# Patient Record
Sex: MALE | Race: WHITE | Employment: OTHER | URBAN - METROPOLITAN AREA
[De-identification: names, ages, dates, MRNs, and addresses within clinical notes are randomized per-mention and may not be internally consistent; named-entity substitution may affect disease eponyms.]

---

## 2018-10-15 ENCOUNTER — HOSPITAL ENCOUNTER (OUTPATIENT)
Age: 63
Setting detail: SPECIMEN
Discharge: HOME | End: 2018-10-15
Payer: MEDICARE

## 2018-10-15 DIAGNOSIS — Z12.5 SCREENING PSA (PROSTATE SPECIFIC ANTIGEN): ICD-10-CM

## 2018-10-15 DIAGNOSIS — I10 HYPERTENSION, UNSPECIFIED TYPE: ICD-10-CM

## 2018-10-15 DIAGNOSIS — E11.9 TYPE 2 DIABETES MELLITUS WITHOUT COMPLICATION, WITHOUT LONG-TERM CURRENT USE OF INSULIN (HCC): ICD-10-CM

## 2018-10-15 LAB
ABSOLUTE EOS #: 0.28 K/UL (ref 0–0.44)
ABSOLUTE IMMATURE GRANULOCYTE: <0.03 K/UL (ref 0–0.3)
ABSOLUTE LYMPH #: 1.28 K/UL (ref 1.1–3.7)
ABSOLUTE MONO #: 0.55 K/UL (ref 0.1–1.2)
ALBUMIN SERPL-MCNC: 4.7 G/DL (ref 3.5–5.2)
ALBUMIN/GLOBULIN RATIO: 1.7 (ref 1–2.5)
ALP BLD-CCNC: 86 U/L (ref 40–129)
ALT SERPL-CCNC: 18 U/L (ref 5–41)
ANION GAP SERPL CALCULATED.3IONS-SCNC: 18 MMOL/L (ref 9–17)
AST SERPL-CCNC: 14 U/L
BASOPHILS # BLD: 1 % (ref 0–2)
BASOPHILS ABSOLUTE: 0.07 K/UL (ref 0–0.2)
BILIRUB SERPL-MCNC: 0.41 MG/DL (ref 0.3–1.2)
BUN BLDV-MCNC: 27 MG/DL (ref 8–23)
BUN/CREAT BLD: ABNORMAL (ref 9–20)
CALCIUM SERPL-MCNC: 10 MG/DL (ref 8.6–10.4)
CHLORIDE BLD-SCNC: 101 MMOL/L (ref 98–107)
CHOLESTEROL/HDL RATIO: 4.8
CHOLESTEROL: 163 MG/DL
CO2: 23 MMOL/L (ref 20–31)
CREAT SERPL-MCNC: 1.67 MG/DL (ref 0.7–1.2)
CREATININE URINE: 84 MG/DL (ref 39–259)
DIFFERENTIAL TYPE: ABNORMAL
EOSINOPHILS RELATIVE PERCENT: 5 % (ref 1–4)
GFR AFRICAN AMERICAN: 51 ML/MIN
GFR NON-AFRICAN AMERICAN: 42 ML/MIN
GFR SERPL CREATININE-BSD FRML MDRD: ABNORMAL ML/MIN/{1.73_M2}
GFR SERPL CREATININE-BSD FRML MDRD: ABNORMAL ML/MIN/{1.73_M2}
GLUCOSE BLD-MCNC: 222 MG/DL (ref 70–99)
HCT VFR BLD CALC: 44.2 % (ref 40.7–50.3)
HDLC SERPL-MCNC: 34 MG/DL
HEMOGLOBIN: 14.5 G/DL (ref 13–17)
IMMATURE GRANULOCYTES: 0 %
LDL CHOLESTEROL: 97 MG/DL (ref 0–130)
LYMPHOCYTES # BLD: 22 % (ref 24–43)
MCH RBC QN AUTO: 29.7 PG (ref 25.2–33.5)
MCHC RBC AUTO-ENTMCNC: 32.8 G/DL (ref 28.4–34.8)
MCV RBC AUTO: 90.6 FL (ref 82.6–102.9)
MICROALBUMIN/CREAT 24H UR: 27 MG/L
MICROALBUMIN/CREAT UR-RTO: 32 MCG/MG CREAT
MONOCYTES # BLD: 9 % (ref 3–12)
NRBC AUTOMATED: 0 PER 100 WBC
PDW BLD-RTO: 12.1 % (ref 11.8–14.4)
PLATELET # BLD: 245 K/UL (ref 138–453)
PLATELET ESTIMATE: ABNORMAL
PMV BLD AUTO: 11.5 FL (ref 8.1–13.5)
POTASSIUM SERPL-SCNC: 5.3 MMOL/L (ref 3.7–5.3)
PROSTATE SPECIFIC ANTIGEN: 3.74 UG/L
RBC # BLD: 4.88 M/UL (ref 4.21–5.77)
RBC # BLD: ABNORMAL 10*6/UL
SEG NEUTROPHILS: 63 % (ref 36–65)
SEGMENTED NEUTROPHILS ABSOLUTE COUNT: 3.7 K/UL (ref 1.5–8.1)
SODIUM BLD-SCNC: 142 MMOL/L (ref 135–144)
TOTAL PROTEIN: 7.4 G/DL (ref 6.4–8.3)
TRIGL SERPL-MCNC: 161 MG/DL
VLDLC SERPL CALC-MCNC: ABNORMAL MG/DL (ref 1–30)
WBC # BLD: 5.9 K/UL (ref 3.5–11.3)
WBC # BLD: ABNORMAL 10*3/UL

## 2018-10-16 LAB
ESTIMATED AVERAGE GLUCOSE: 212 MG/DL
HBA1C MFR BLD: 9 % (ref 4–6)

## 2018-10-23 PROBLEM — E11.29 MICROALBUMINURIA DUE TO TYPE 2 DIABETES MELLITUS (HCC): Status: ACTIVE | Noted: 2018-10-23

## 2018-10-23 PROBLEM — R80.9 MICROALBUMINURIA DUE TO TYPE 2 DIABETES MELLITUS (HCC): Status: ACTIVE | Noted: 2018-10-23

## 2019-01-31 ENCOUNTER — HOSPITAL ENCOUNTER (OUTPATIENT)
Dept: DIABETES SERVICES | Age: 64
Setting detail: THERAPIES SERIES
Discharge: HOME | End: 2019-01-31
Payer: MEDICARE

## 2019-01-31 VITALS
BODY MASS INDEX: 27.71 KG/M2 | DIASTOLIC BLOOD PRESSURE: 89 MMHG | SYSTOLIC BLOOD PRESSURE: 130 MMHG | HEART RATE: 84 BPM | WEIGHT: 204.59 LBS | HEIGHT: 72 IN

## 2019-01-31 PROCEDURE — G0108 DIAB MANAGE TRN  PER INDIV: HCPCS

## 2019-01-31 RX ORDER — LISINOPRIL 5 MG/1
5 TABLET ORAL DAILY
Status: ON HOLD | COMMUNITY
End: 2020-04-24

## 2019-02-21 ENCOUNTER — HOSPITAL ENCOUNTER (OUTPATIENT)
Dept: DIABETES SERVICES | Age: 64
Setting detail: THERAPIES SERIES
Discharge: HOME | End: 2019-02-21
Payer: MEDICARE

## 2019-02-21 PROCEDURE — G0108 DIAB MANAGE TRN  PER INDIV: HCPCS

## 2019-03-11 ENCOUNTER — HOSPITAL ENCOUNTER (OUTPATIENT)
Dept: DIABETES SERVICES | Age: 64
Setting detail: THERAPIES SERIES
Discharge: HOME | End: 2019-03-11
Payer: MEDICARE

## 2019-03-11 DIAGNOSIS — E11.9 TYPE 2 DIABETES MELLITUS WITHOUT COMPLICATION, UNSPECIFIED WHETHER LONG TERM INSULIN USE (HCC): Primary | ICD-10-CM

## 2019-03-11 PROCEDURE — G0108 DIAB MANAGE TRN  PER INDIV: HCPCS

## 2019-03-14 ENCOUNTER — HOSPITAL ENCOUNTER (OUTPATIENT)
Dept: DIABETES SERVICES | Age: 64
Setting detail: THERAPIES SERIES
Discharge: HOME | End: 2019-03-14
Payer: MEDICARE

## 2019-03-14 PROCEDURE — G0108 DIAB MANAGE TRN  PER INDIV: HCPCS

## 2019-04-02 ENCOUNTER — HOSPITAL ENCOUNTER (OUTPATIENT)
Dept: DIABETES SERVICES | Age: 64
Setting detail: THERAPIES SERIES
Discharge: HOME | End: 2019-04-02
Payer: MEDICARE

## 2019-04-02 DIAGNOSIS — E11.9 TYPE 2 DIABETES MELLITUS WITHOUT COMPLICATION, UNSPECIFIED WHETHER LONG TERM INSULIN USE (HCC): Primary | ICD-10-CM

## 2019-04-02 PROCEDURE — G0108 DIAB MANAGE TRN  PER INDIV: HCPCS

## 2019-04-02 NOTE — PROGRESS NOTES
Diabetes Self- Management Education Program Assessment -   Also see Diabetic Screening  Patient, Bruna Corona,  here for diabetes self-management education  visit/ assessment. Today's visit was in an individual setting. Diet History :  Diet Questionnaire and typical meal /portion sheet completed  [x]Yes  [] NO    Goals setting:  Goal work sheet completed  [x]Yes  [] NO  (SEE  EDUCATION AND GOALS FLOWSHEET)    MEDICAL HISTORY:  Past Medical History:   Diagnosis Date    Contracture, elbow     h/o right elbow fx, compound fracture.  Erectile dysfunction     Hypertension     Osteogenesis imperfecta     DX 6 months. 30 broken bones in past.     Type II or unspecified type diabetes mellitus without mention of complication, not stated as uncontrolled      Family History   Problem Relation Age of Onset    Diabetes Mother     Stroke Mother     Diabetes Father     Heart Disease Father      Patient has no known allergies. There is no immunization history on file for this patient. Current Medications  Current Outpatient Medications   Medication Sig Dispense Refill    lisinopril (PRINIVIL;ZESTRIL) 5 MG tablet Take 5 mg by mouth daily      insulin glargine (LANTUS SOLOSTAR) 100 UNIT/ML injection pen Inject into the skin 2 times daily Taking 30 units in the am and 80 units 7-8p in the evening daily      insulin aspart (FIASP FLEXTOUCH) 100 UNIT/ML injection pen Inject 8 Units into the skin 2 times daily Taking 8 units plus ISS for breakfast and 8 units plus ISS for dinner      pantoprazole (PROTONIX) 40 MG tablet Take 1 tablet by mouth daily 90 tablet 1    ramipril (ALTACE) 5 MG capsule TAKE 1 CAPSULE DAILY 90 capsule 1    Blood Glucose Monitoring Suppl (ACURA BLOOD GLUCOSE METER) w/Device KIT 1 kit by Does not apply route 2 times daily A1C of 9.0. NIDDM. 1 kit 0    traMADol (ULTRAM) 50 MG tablet 50 mg daily.        No current facility-administered medications for this encounter.    : Comments:  Allergies:  No Known Allergies  Diabetes 5  / Health Status    A1C blood level - at goal < 7%   Lab Results   Component Value Date    LABA1C 9.0 (H) 10/15/2018    LABA1C 8.7 04/03/2015    LABA1C 8.5 (H) 10/17/2012     Lab Results   Component Value Date    LABMICR 32 (H) 10/15/2018    LDLCALC 91 04/03/2015    CREATININE 1.67 (H) 10/15/2018       Blood pressure ( 130/ 80)  Or less  BP Readings from Last 3 Encounters:   01/31/19 130/89   10/23/18 98/62   09/25/18 116/84        Cholesterol ( LDL under  100)   Lab Results   Component Value Date    LDLCALC 91 04/03/2015    LDLCHOLESTEROL 97 10/15/2018       4 . Smoking ? []Yes   [x]No    5. Taking an Asprin daily? []Yes   [x]No          Diabetes Self- Management Education Record    Participant Name: Junie Handy  Referring Provider: Azeem Murphy MD   Assessment/Evaluation Ratings:  1=Needs Instruction   4=Demonstrates Understanding/Competency  2=Needs Review   NC=Not Covered    3=Comprehends Key Points  N/A=Not Applicable  Topics/Learning Objectives Pre-session Assess Date:  1/31/19CB Instr. Date Reinforce Date Post- session Eval Comments   Diabetes disease process & Treatment process: Define diabetes & pre-diabetes; Identify own type of diabetes; role of the pancreas; signs/symptoms; diagnostic criteria; prevention & treatment options; contributing factors. 1 2/21/19CB 3/14/19CB  Hx of T2DM for about 13 y but on orals and only checked BG 1x/w until recently starting losing weight and checked BG on friend's meter and it was >500. [place] Ledy Strong family Hx with both mom and Dad. Daughter with him today who said she was told she may be \"insulin resistant\" years ago when she was trying to get pregnant. Also patient has Osteogeneis Imperfecta and passed onto other 2 kids in family ( not Candice Willam)  1/31/19CB   Incorporating nutritional management into lifestyle: Describe effect of type, amount & timing of food on blood glucose;  Describe basic meal planning techniques & current nutrition guideline   1 switched to diet pepsi about 3 16oz bottles/d, eats lunch out and does [place] Man dinners and fries for dinner as wife works in [employer] during the week and does not come home until weekend. Daughter lives close and is willing to help cook meals so Dad can eat healthy1/31/19CB 3/11/19 JW- praised pt progress, offered healthy suggestions for eating out and with tv dinners. Gave list to help with shopping and eating out. Provided carb plan to help with food amts. 4/2/19 JW  What to eat - Food groups, When to eat - timing of meals and snacks, and How much to eat - portions control. 1800 calories/ day   CHO choices/ meal  4,1,4,1,4,1   CHO choices/  day   grams of protein /day   gram of fat /day     Correctly read food labels & demonstrate CHO counting & portion control with personalized meal plan. Identify dining out strategies, & dietary sick day guidelines. 1 3/11/19 JW 4/2/19 JW     Incorporating physical activity into lifestyle:   Verbalize effect of exercise on blood glucose levels; benefits of regular exercise; safety considerations; contraindications; maintenance of activity. 1 2/21/19CB 3/14/19CB  Used to be very active at Y but now goes about 2x/w and uses treadmill. Daughter offered for him to come to the house to use their treadmill daily. Pt also golfs in summer and will walk the course.1/31/19CB  Just came back from cruise and did walking but also had larger portions of food. Plans to start using daughter's treadmill now.2/21/19CB    Has not been getting activity and daughter encouraged him to use treadmill at the house. 3/14/19CB   Using medications safely:  Identify effects of diabetes medicines on blood glucose levels; List diabetes medication taken, action & side effects;    1 3/14/19CB   Used to be on orals in past, but since seeing Dr. Yogi Evans 3 weeks ago he D/C and now just taking insulin. 1/31/19CB   Insulin / Injectable - Appropriate injection describe the relationship of blood glucose levels to long term complications of diabetes. Identify preventative measures & standards of care. 1 3/14/19CB   Has some numbness in toes and was given referral to Podiatrist but waiting until after cruise to set up. Up to date on immunizations. 1/31/19CB  Has not seen Podiatrist but did have checked . 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 1 3/14/19CB   Motivated now to take good care of self. Daughter with him and very supportive and wants to learn as well. Wife ( not mother of kids) has T2DM also. 1/31/19CB  Daughter with him for all sessions and very encouraging. Has been trying to cut back on CHO (fries his favorite) and making better choices. 3/14/19CB   Developing strategies to promote health/change behavior: Identify 7 self-care behaviors; Personal health risk factors; Benefits, challenges & strategies for behavioral change;    1    Has Osteogenesis Imperfecta and also son and younger daughter. Care in prevention of broken bones. 1/31/19CB     Individualized goal selection. My goal , to help me improve my health, I will:   1.try to watch potion size      2.learn to CHO count. Plan  Follow-up Appointments planned in individual setting. Next Appointment in 3 weeks.     Instruction Method: [x]Lecture/Discussion  []Power Point Presentation  [x]Handouts  [x]Return Demonstration      Education Materials/Equipment Provided:    [x]Self-Management - Initial assessment - Enrolment in to ADA  Where do I Begin, Living with Type 2 diabetes ADA home support program and handout for no concentrated sweets and diet meal planning basics, handout on diabetes education classes. 1/31/19CB      [x]Self-Management  Class 1 - Fox Living Well with Diabetes Booklet and Healthy i On the Road to better managing your diabetes map handouts2/21/19CB    [x] Self-Management  Class 2 - Meal Plan and handout for serving sizes, smarter snacking, Ready Set Carb Counting / Plate Method, Nutrient Conversion and International Diabetes [address] Eating for People with Diabetes and Nutrition in the [employer] - fast facts about fast food3/11/19 JW    [x] Self-Management  Class 3 -  Diabetes ID card,  foot care tips sheet, Healthy I  Continuing Your Journey with Diabetes map handout, Individualized Diabetes report card3/14/19CB    [x] Self-Management Class 4 - BD Booklet  Sick Day [place] and  Dinning Out Guide , recipe hand outs and tips, diabetes Cookbooks  ( when available) 4/2/19 JW    []Self-Management - 3 month follow - up  AADE booklet Side by Side a partnership approach to diabetes self- care, [place] Tobacco Quit line, [facility] Diabetes support program information sheet, [facility] [employer] information sheet       []Self-Management  Gestational - RN class -Resource materials provided today include educational self care booklet - \" Gestational Diabetes - A Healthy pregnancy and beyond\"  with SMGB and 3 small meals and 3 small snacks diet plan. SMBG sheets to fax back to MFM weekly.  [facility] guidelines for SMGB and blood glucose log sheets, Never too early to prevent diabetes handout from NDEP      []Self-Management Gestational - RD class - My Food Plan for Gestational diabetes    []Glucose Meter     []Insulin Kit     []Other      Encounter Type Date Start Time End Time Comments No Show Dates   Assessment 1/31/19CB   10:00 11:30   [x]In Person  []Telephone  With daughter, Virl Goltz 1 - Understanding diabetes 2/21/19CB 4:30 5:40  x with daughter, Virl Goltz 2- Nutrition and diabetes   3/11/19 JW 3:00 4:00 X with daughterReji Holiday    Class 3 - Preventing Complications 1/55/63PM 9:84 5:30  x with daughter, Javid Robi 4 -  In depth Nutrition and sick day care 4/2/19 JW 3:00 4:15 X with daughter, Con Robi 5 - 3 month follow up / goal reassessment        Gestational - RN         Gestational - RD        Individual MNT         Shared Med Appt         Yearly Follow-up        Meter Instrx        Insulin Instrx      []Pen  []Vial & Syringe      DSMS Support :   [] MNT      [] Annual update      []  DEEP Workshop - Diabetes Empowerment Education Program   Open to older adults living with diabetes or pre diabetes. [address]  [address] [facility]. [place], [place]  Friday 12:30pm- 3:00pm 1/11/19- 2/15/19  Free -  REGISTRATION IS REQUIRED -  CALL [phone]     [] Starting Fresh / DEEP Workshop - Open to older adults living with diabetes or pre diabetes. [address] [place], [place]    [id number]-7:30pm- on 6 week rotation  Free -  REGISTRATION IS REQUIRED - [facility] [phone]     []  Diabetes Group at  [place] [phone] 6 week diabetes education support   classes - contact : Amanda Carlisle [phone]  [email or web address] / for dates and locations      []Diabetes Workshop- [address]  [address] [employer]. [place], [place]  Tuesdays-1:00pm- 3:30pm  3/19/19- 4/9/19   Free  Registration is required [phone]     []ADA  Where do I Begin, Living with Type 2 diabetes ADA home support program  Web site: diabetes. org/living    Call: 1800 DIABETES  e-mail: [email or web address]     []  Internet web sites - ADAWeb site: diabetes. org/living   D- life     []  [facility] opens at 8 30 am daily for walkers, shops open at 10 am   3rd Tuesday of every month [facility] expert speakers visit from [address] [facility] [place] [facility] [place], [address] [place] [address]   [phone] Daily - 8:30am - 10am   []  Kerri Patiño - juan jsoé registration required [address], [address] [facility] Monday - Wednesday - Friday  11am - 12 :00 pm    []  [address]  Evening Hiking Group   Free - no registration required compareit4me  [address] [place], [address] [facility] [place] Thursday 6pm - 7pm  1/3/19 - 2/28/19     []  Sunday [address]  Evening Hiking Group   Free - no registration required compareit4me  [address], [address] [place] Thursday 7pm- 8pm  3/7/19 - 3/28/19   []  [address]  [address] [facility], [address] [facility] Tuesday and Thursday - 9 :30 am- 10:30am - ongoing   []  [address] [facility]  [address] [facility], [address] [facility] Thursday 11:00am - 11:45am   []  Third Wednesday Cooking  Class  Free  Registration is required  CALL [phone] or   Email [email or web address]. [address].   [address] [place], [place] [id number]-6:99- 6:00 pm  1/16/19     5/15/19    9/18/19  2/20/19     6/19/19    10/16/19  3/20/19     7/17/19     11/20/19  4/17/19     8/21/19     12/18/19         Post Education Referrals:      [] [address] information sheet and [address] [facility] , [address]      [] Dental care - [facility] care of [facility]     [] [facility] link  phone number - for information and referral to [facility]  Clinically  [address], WOUND, WEIGHT MANAGEMENT        []Other  Julieta Maldonado, RD, LD

## 2019-04-29 ENCOUNTER — HOSPITAL ENCOUNTER (OUTPATIENT)
Age: 64
Discharge: HOME | End: 2019-05-01
Payer: MEDICARE

## 2019-04-29 ENCOUNTER — HOSPITAL ENCOUNTER (OUTPATIENT)
Age: 64
Setting detail: SPECIMEN
Discharge: HOME | End: 2019-04-29
Payer: MEDICARE

## 2019-04-29 ENCOUNTER — HOSPITAL ENCOUNTER (OUTPATIENT)
Dept: GENERAL RADIOLOGY | Age: 64
Discharge: HOME | End: 2019-05-01
Payer: MEDICARE

## 2019-04-29 DIAGNOSIS — R05.9 COUGH: ICD-10-CM

## 2019-04-29 LAB
ANION GAP SERPL CALCULATED.3IONS-SCNC: 12 MMOL/L (ref 9–17)
BUN BLDV-MCNC: 21 MG/DL (ref 8–23)
BUN/CREAT BLD: ABNORMAL (ref 9–20)
CALCIUM SERPL-MCNC: 9.2 MG/DL (ref 8.6–10.4)
CHLORIDE BLD-SCNC: 104 MMOL/L (ref 98–107)
CO2: 23 MMOL/L (ref 20–31)
CREAT SERPL-MCNC: 1.7 MG/DL (ref 0.7–1.2)
GFR AFRICAN AMERICAN: 50 ML/MIN
GFR NON-AFRICAN AMERICAN: 41 ML/MIN
GFR SERPL CREATININE-BSD FRML MDRD: ABNORMAL ML/MIN/{1.73_M2}
GFR SERPL CREATININE-BSD FRML MDRD: ABNORMAL ML/MIN/{1.73_M2}
GLUCOSE BLD-MCNC: 277 MG/DL (ref 70–99)
POTASSIUM SERPL-SCNC: 5 MMOL/L (ref 3.7–5.3)
SODIUM BLD-SCNC: 139 MMOL/L (ref 135–144)

## 2019-04-29 PROCEDURE — 71046 X-RAY EXAM CHEST 2 VIEWS: CPT

## 2019-07-02 ENCOUNTER — TELEPHONE (OUTPATIENT)
Dept: GASTROENTEROLOGY | Age: 64
End: 2019-07-02

## 2019-07-29 ENCOUNTER — HOSPITAL ENCOUNTER (OUTPATIENT)
Dept: DIABETES SERVICES | Age: 64
Setting detail: THERAPIES SERIES
Discharge: HOME | End: 2019-07-29
Payer: MEDICARE

## 2020-01-08 ENCOUNTER — OUTSIDE SERVICES (OUTPATIENT)
Dept: PRIMARY CARE CLINIC | Age: 65
End: 2020-01-08

## 2020-01-15 ENCOUNTER — OUTSIDE SERVICES (OUTPATIENT)
Dept: PRIMARY CARE CLINIC | Age: 65
End: 2020-01-15

## 2020-01-15 PROBLEM — N18.30 STAGE 3 CHRONIC KIDNEY DISEASE (HCC): Status: ACTIVE | Noted: 2018-11-16

## 2020-01-15 PROBLEM — S82.92XA: Status: ACTIVE | Noted: 2020-01-15

## 2020-01-15 PROBLEM — S82.91XA: Status: ACTIVE | Noted: 2020-01-15

## 2020-01-16 NOTE — PROGRESS NOTES
Gina Peña is a 59 y.o. male being seen for his weekly follow up. Location of visit: [facility]    HPI:  Admission History: bilateral lower ext fractures- s/p surgery      Last DRG review:    New today:  Pt states pain comes and goes. Worse with walking. Would like to try to get off the oxy though. Wonders if he could try tramadol more in the afternoon. Maybe take the oxy when he first wakes up to go to therapy and then tramadol later through the day. Review of Systems   General Constitutional: Denies fever or chills. Ortho:  Denies joint pain or swelling. Respiratory: Denies cough SOB or wheezing. Cardiovascular: Denies chest pain. .  Gastrointestinal: Denies abdominal pain, nausea, vomiting, diarrhea, constipation. Genitourinary: Denies painful urination. Skin:  Denies rash or itching. Neurologic: No falls recently. Psychiatric: Denies sleep disturbance. Denies anxiety or depression symptoms. Physical Exam   General Appearance: in no acute distress, well nourished. Eyes: pupils equal.  Neck/Thyroid:  no cervical lymphadenopathy, no thyromegaly  Skin: warm and dry, incisions healing well. Heart: regular rate and rhythm. No murmurs. S1, S2 normal, no gallops. Lungs: clear to auscultation bilaterally. Abdomen:soft, nontender, nondistended, no masses or organomegaly. Extremities: no cyanosis or edema. Neurologic: verbally responsive, moves extremities. Psych: normal affect. ASSESSMENT:   Diagnosis Orders   1. Type 2 diabetes mellitus without complication, unspecified whether long term insulin use ([facility] [address]     2. Hypertension, unspecified type     3. Traumatic bilateral lower extremity fractures         PLAN:  Continue therapy. Will use tramadol and oxy as needed.

## 2020-01-21 ENCOUNTER — OUTSIDE SERVICES (OUTPATIENT)
Dept: PRIMARY CARE CLINIC | Age: 65
End: 2020-01-21

## 2020-01-22 NOTE — PROGRESS NOTES
Roseann Mccracken is a 59 y.o. male being seen for his weekly follow up. Location of visit: [facility]    HPI:  Admission History:      Last DRG review:    New today: Pt states Tramadol was not at all effective for his pain. He is taking Oxy but just working on decreasing it. Is supposed to go home tomorrow. Review of Systems   General Constitutional: Denies fever or chills. Ortho:  Denies joint pain or swelling. Respiratory: Denies cough SOB or wheezing. Cardiovascular: Denies chest pain. .  Gastrointestinal: Denies abdominal pain, nausea, vomiting, diarrhea, constipation. Genitourinary: Denies painful urination. Skin:  Denies rash or itching. Neurologic: No falls recently. Psychiatric: Denies sleep disturbance. Denies anxiety or depression symptoms. Physical Exam   General Appearance: in no acute distress, well nourished. Eyes: pupils equal.  Neck/Thyroid:  no cervical lymphadenopathy, no thyromegaly  Skin: warm and dry, incisions healing but slightly red around edges- looks more like just some healing than any infection  Heart: regular rate and rhythm. No murmurs. S1, S2 normal, no gallops. Lungs: clear to auscultation bilaterally. Abdomen:soft, nontender, nondistended, no masses or organomegaly. Extremities: no cyanosis or edema. Neurologic: verbally responsive, moves extremities. Psych: normal affect. ASSESSMENT:   Diagnosis Orders   1. Traumatic bilateral lower extremity fractures     2. Type 2 diabetes mellitus without complication, unspecified whether long term insulin use ([facility])         PLAN:  Discharge to home with home medication including the card of pain meds that we have here. He is to f/u with his PCP- Altagracia Anderson after discharge.

## 2020-01-30 ENCOUNTER — HOSPITAL ENCOUNTER (OUTPATIENT)
Age: 65
Setting detail: SPECIMEN
Discharge: HOME | End: 2020-01-30
Payer: MEDICARE

## 2020-01-30 LAB
ABSOLUTE EOS #: 0.35 K/UL (ref 0–0.44)
ABSOLUTE IMMATURE GRANULOCYTE: <0.03 K/UL (ref 0–0.3)
ABSOLUTE LYMPH #: 0.82 K/UL (ref 1.1–3.7)
ABSOLUTE MONO #: 0.48 K/UL (ref 0.1–1.2)
ANION GAP SERPL CALCULATED.3IONS-SCNC: 14 MMOL/L (ref 9–17)
BASOPHILS # BLD: 1 % (ref 0–2)
BASOPHILS ABSOLUTE: 0.06 K/UL (ref 0–0.2)
BUN BLDV-MCNC: 23 MG/DL (ref 8–23)
BUN/CREAT BLD: ABNORMAL (ref 9–20)
CALCIUM SERPL-MCNC: 10.3 MG/DL (ref 8.6–10.4)
CHLORIDE BLD-SCNC: 99 MMOL/L (ref 98–107)
CO2: 24 MMOL/L (ref 20–31)
CREAT SERPL-MCNC: 1.62 MG/DL (ref 0.7–1.2)
DIFFERENTIAL TYPE: ABNORMAL
EOSINOPHILS RELATIVE PERCENT: 5 % (ref 1–4)
GFR AFRICAN AMERICAN: 52 ML/MIN
GFR NON-AFRICAN AMERICAN: 43 ML/MIN
GFR SERPL CREATININE-BSD FRML MDRD: ABNORMAL ML/MIN/{1.73_M2}
GFR SERPL CREATININE-BSD FRML MDRD: ABNORMAL ML/MIN/{1.73_M2}
GLUCOSE BLD-MCNC: 248 MG/DL (ref 70–99)
HCT VFR BLD CALC: 44.6 % (ref 40.7–50.3)
HEMOGLOBIN: 14 G/DL (ref 13–17)
IMMATURE GRANULOCYTES: 0 %
LYMPHOCYTES # BLD: 11 % (ref 24–43)
MCH RBC QN AUTO: 29.6 PG (ref 25.2–33.5)
MCHC RBC AUTO-ENTMCNC: 31.4 G/DL (ref 28.4–34.8)
MCV RBC AUTO: 94.3 FL (ref 82.6–102.9)
MONOCYTES # BLD: 6 % (ref 3–12)
NRBC AUTOMATED: 0 PER 100 WBC
PDW BLD-RTO: 13.8 % (ref 11.8–14.4)
PLATELET # BLD: ABNORMAL K/UL (ref 138–453)
PLATELET ESTIMATE: ABNORMAL
PLATELET, FLUORESCENCE: 185 K/UL (ref 138–453)
PLATELET, IMMATURE FRACTION: 4.2 % (ref 1.1–10.3)
PMV BLD AUTO: ABNORMAL FL (ref 8.1–13.5)
POTASSIUM SERPL-SCNC: 4.8 MMOL/L (ref 3.7–5.3)
RBC # BLD: 4.73 M/UL (ref 4.21–5.77)
RBC # BLD: ABNORMAL 10*6/UL
SEG NEUTROPHILS: 77 % (ref 36–65)
SEGMENTED NEUTROPHILS ABSOLUTE COUNT: 5.91 K/UL (ref 1.5–8.1)
SODIUM BLD-SCNC: 137 MMOL/L (ref 135–144)
WBC # BLD: 7.6 K/UL (ref 3.5–11.3)
WBC # BLD: ABNORMAL 10*3/UL

## 2020-04-23 ENCOUNTER — APPOINTMENT (OUTPATIENT)
Dept: GENERAL RADIOLOGY | Age: 65
DRG: 481 | End: 2020-04-23
Payer: MEDICARE

## 2020-04-23 ENCOUNTER — ANESTHESIA (OUTPATIENT)
Dept: OPERATING ROOM | Age: 65
DRG: 481 | End: 2020-04-23
Payer: MEDICARE

## 2020-04-23 ENCOUNTER — ANESTHESIA EVENT (OUTPATIENT)
Dept: OPERATING ROOM | Age: 65
DRG: 481 | End: 2020-04-23
Payer: MEDICARE

## 2020-04-23 ENCOUNTER — HOSPITAL ENCOUNTER (INPATIENT)
Age: 65
LOS: 2 days | Discharge: NURSING, REHAB OR LONG-TERM CARE | DRG: 481 | End: 2020-04-27
Attending: EMERGENCY MEDICINE | Admitting: ORTHOPAEDIC SURGERY
Payer: MEDICARE

## 2020-04-23 VITALS — SYSTOLIC BLOOD PRESSURE: 154 MMHG | OXYGEN SATURATION: 100 % | TEMPERATURE: 96.6 F | DIASTOLIC BLOOD PRESSURE: 72 MMHG

## 2020-04-23 PROBLEM — S72.001A CLOSED RIGHT HIP FRACTURE, INITIAL ENCOUNTER (HCC): Status: ACTIVE | Noted: 2020-04-23

## 2020-04-23 PROBLEM — Q78.0 OSTEOGENESIS IMPERFECTA: Status: ACTIVE | Noted: 2019-12-05

## 2020-04-23 PROBLEM — E11.65 TYPE 2 DIABETES MELLITUS WITH HYPERGLYCEMIA (HCC): Status: ACTIVE | Noted: 2019-12-20

## 2020-04-23 PROBLEM — E11.22 TYPE 2 DIABETES MELLITUS WITH CHRONIC KIDNEY DISEASE (HCC): Status: ACTIVE | Noted: 2018-11-16

## 2020-04-23 LAB
ABSOLUTE EOS #: 0.3 K/UL (ref 0–0.4)
ABSOLUTE IMMATURE GRANULOCYTE: ABNORMAL K/UL (ref 0–0.3)
ABSOLUTE LYMPH #: 1 K/UL (ref 1–4.8)
ABSOLUTE MONO #: 0.5 K/UL (ref 0.1–1.3)
ANION GAP SERPL CALCULATED.3IONS-SCNC: 14 MMOL/L (ref 9–17)
BASOPHILS # BLD: 1 % (ref 0–2)
BASOPHILS ABSOLUTE: 0.1 K/UL (ref 0–0.2)
BUN BLDV-MCNC: 29 MG/DL (ref 8–23)
BUN/CREAT BLD: ABNORMAL (ref 9–20)
CALCIUM SERPL-MCNC: 9.8 MG/DL (ref 8.6–10.4)
CHLORIDE BLD-SCNC: 101 MMOL/L (ref 98–107)
CO2: 18 MMOL/L (ref 20–31)
CREAT SERPL-MCNC: 1.68 MG/DL (ref 0.7–1.2)
DIFFERENTIAL TYPE: ABNORMAL
EKG ATRIAL RATE: 75 BPM
EKG P AXIS: 62 DEGREES
EKG P-R INTERVAL: 208 MS
EKG Q-T INTERVAL: 380 MS
EKG QRS DURATION: 98 MS
EKG QTC CALCULATION (BAZETT): 424 MS
EKG R AXIS: -48 DEGREES
EKG T AXIS: 30 DEGREES
EKG VENTRICULAR RATE: 75 BPM
EOSINOPHILS RELATIVE PERCENT: 4 % (ref 0–4)
GFR AFRICAN AMERICAN: 50 ML/MIN
GFR NON-AFRICAN AMERICAN: 41 ML/MIN
GFR SERPL CREATININE-BSD FRML MDRD: ABNORMAL ML/MIN/{1.73_M2}
GFR SERPL CREATININE-BSD FRML MDRD: ABNORMAL ML/MIN/{1.73_M2}
GLUCOSE BLD-MCNC: 202 MG/DL (ref 70–99)
GLUCOSE BLD-MCNC: 214 MG/DL (ref 75–110)
GLUCOSE BLD-MCNC: 225 MG/DL (ref 75–110)
GLUCOSE BLD-MCNC: 244 MG/DL (ref 75–110)
GLUCOSE BLD-MCNC: 246 MG/DL (ref 75–110)
GLUCOSE BLD-MCNC: 270 MG/DL (ref 75–110)
HCT VFR BLD CALC: 42.5 % (ref 41–53)
HEMOGLOBIN: 14 G/DL (ref 13.5–17.5)
IMMATURE GRANULOCYTES: ABNORMAL %
LYMPHOCYTES # BLD: 12 % (ref 24–44)
MCH RBC QN AUTO: 28.9 PG (ref 26–34)
MCHC RBC AUTO-ENTMCNC: 33 G/DL (ref 31–37)
MCV RBC AUTO: 87.7 FL (ref 80–100)
MONOCYTES # BLD: 7 % (ref 1–7)
NRBC AUTOMATED: ABNORMAL PER 100 WBC
PDW BLD-RTO: 13.6 % (ref 11.5–14.9)
PLATELET # BLD: 224 K/UL (ref 150–450)
PLATELET ESTIMATE: ABNORMAL
PMV BLD AUTO: 8 FL (ref 6–12)
POTASSIUM SERPL-SCNC: 4.1 MMOL/L (ref 3.7–5.3)
RBC # BLD: 4.85 M/UL (ref 4.5–5.9)
RBC # BLD: ABNORMAL 10*6/UL
SEG NEUTROPHILS: 76 % (ref 36–66)
SEGMENTED NEUTROPHILS ABSOLUTE COUNT: 6.2 K/UL (ref 1.3–9.1)
SODIUM BLD-SCNC: 133 MMOL/L (ref 135–144)
WBC # BLD: 8.1 K/UL (ref 3.5–11)
WBC # BLD: ABNORMAL 10*3/UL

## 2020-04-23 PROCEDURE — 2500000003 HC RX 250 WO HCPCS: Performed by: NURSE ANESTHETIST, CERTIFIED REGISTERED

## 2020-04-23 PROCEDURE — 96374 THER/PROPH/DIAG INJ IV PUSH: CPT

## 2020-04-23 PROCEDURE — 0QS606Z REPOSITION RIGHT UPPER FEMUR WITH INTRAMEDULLARY INTERNAL FIXATION DEVICE, OPEN APPROACH: ICD-10-PCS | Performed by: ORTHOPAEDIC SURGERY

## 2020-04-23 PROCEDURE — 73502 X-RAY EXAM HIP UNI 2-3 VIEWS: CPT

## 2020-04-23 PROCEDURE — 73501 X-RAY EXAM HIP UNI 1 VIEW: CPT

## 2020-04-23 PROCEDURE — G0378 HOSPITAL OBSERVATION PER HR: HCPCS

## 2020-04-23 PROCEDURE — 2780000010 HC IMPLANT OTHER: Performed by: ORTHOPAEDIC SURGERY

## 2020-04-23 PROCEDURE — 3600000002 HC SURGERY LEVEL 2 BASE: Performed by: ORTHOPAEDIC SURGERY

## 2020-04-23 PROCEDURE — 2580000003 HC RX 258: Performed by: EMERGENCY MEDICINE

## 2020-04-23 PROCEDURE — 71045 X-RAY EXAM CHEST 1 VIEW: CPT

## 2020-04-23 PROCEDURE — 73552 X-RAY EXAM OF FEMUR 2/>: CPT

## 2020-04-23 PROCEDURE — 3600000004 HC SURGERY LEVEL 4 BASE: Performed by: ORTHOPAEDIC SURGERY

## 2020-04-23 PROCEDURE — 3700000000 HC ANESTHESIA ATTENDED CARE: Performed by: ORTHOPAEDIC SURGERY

## 2020-04-23 PROCEDURE — 2709999900 HC NON-CHARGEABLE SUPPLY: Performed by: ORTHOPAEDIC SURGERY

## 2020-04-23 PROCEDURE — C1713 ANCHOR/SCREW BN/BN,TIS/BN: HCPCS | Performed by: ORTHOPAEDIC SURGERY

## 2020-04-23 PROCEDURE — 85025 COMPLETE CBC W/AUTO DIFF WBC: CPT

## 2020-04-23 PROCEDURE — 6360000004 HC RX CONTRAST MEDICATION: Performed by: ORTHOPAEDIC SURGERY

## 2020-04-23 PROCEDURE — 27245 TREAT THIGH FRACTURE: CPT | Performed by: ORTHOPAEDIC SURGERY

## 2020-04-23 PROCEDURE — 96375 TX/PRO/DX INJ NEW DRUG ADDON: CPT

## 2020-04-23 PROCEDURE — 6370000000 HC RX 637 (ALT 250 FOR IP): Performed by: ORTHOPAEDIC SURGERY

## 2020-04-23 PROCEDURE — 36415 COLL VENOUS BLD VENIPUNCTURE: CPT

## 2020-04-23 PROCEDURE — 6360000002 HC RX W HCPCS: Performed by: ORTHOPAEDIC SURGERY

## 2020-04-23 PROCEDURE — 2580000003 HC RX 258: Performed by: ANESTHESIOLOGY

## 2020-04-23 PROCEDURE — 2580000003 HC RX 258: Performed by: ORTHOPAEDIC SURGERY

## 2020-04-23 PROCEDURE — C1776 JOINT DEVICE (IMPLANTABLE): HCPCS | Performed by: ORTHOPAEDIC SURGERY

## 2020-04-23 PROCEDURE — 96365 THER/PROPH/DIAG IV INF INIT: CPT

## 2020-04-23 PROCEDURE — 99218 PR INITIAL OBSERVATION CARE/DAY 30 MINUTES: CPT | Performed by: ORTHOPAEDIC SURGERY

## 2020-04-23 PROCEDURE — 96376 TX/PRO/DX INJ SAME DRUG ADON: CPT

## 2020-04-23 PROCEDURE — 6360000002 HC RX W HCPCS: Performed by: ANESTHESIOLOGY

## 2020-04-23 PROCEDURE — 99285 EMERGENCY DEPT VISIT HI MDM: CPT

## 2020-04-23 PROCEDURE — 76000 FLUOROSCOPY <1 HR PHYS/QHP: CPT

## 2020-04-23 PROCEDURE — 3700000001 HC ADD 15 MINUTES (ANESTHESIA): Performed by: ORTHOPAEDIC SURGERY

## 2020-04-23 PROCEDURE — 82947 ASSAY GLUCOSE BLOOD QUANT: CPT

## 2020-04-23 PROCEDURE — 7100000000 HC PACU RECOVERY - FIRST 15 MIN: Performed by: ORTHOPAEDIC SURGERY

## 2020-04-23 PROCEDURE — 3600000012 HC SURGERY LEVEL 2 ADDTL 15MIN: Performed by: ORTHOPAEDIC SURGERY

## 2020-04-23 PROCEDURE — 93005 ELECTROCARDIOGRAM TRACING: CPT | Performed by: EMERGENCY MEDICINE

## 2020-04-23 PROCEDURE — 93010 ELECTROCARDIOGRAM REPORT: CPT | Performed by: INTERNAL MEDICINE

## 2020-04-23 PROCEDURE — 6370000000 HC RX 637 (ALT 250 FOR IP): Performed by: FAMILY MEDICINE

## 2020-04-23 PROCEDURE — 6360000002 HC RX W HCPCS: Performed by: EMERGENCY MEDICINE

## 2020-04-23 PROCEDURE — 7100000001 HC PACU RECOVERY - ADDTL 15 MIN: Performed by: ORTHOPAEDIC SURGERY

## 2020-04-23 PROCEDURE — 2720000010 HC SURG SUPPLY STERILE: Performed by: ORTHOPAEDIC SURGERY

## 2020-04-23 PROCEDURE — 6360000002 HC RX W HCPCS: Performed by: NURSE ANESTHETIST, CERTIFIED REGISTERED

## 2020-04-23 PROCEDURE — 3600000014 HC SURGERY LEVEL 4 ADDTL 15MIN: Performed by: ORTHOPAEDIC SURGERY

## 2020-04-23 PROCEDURE — 80048 BASIC METABOLIC PNL TOTAL CA: CPT

## 2020-04-23 DEVICE — CEMENT BNE INJ: Type: IMPLANTABLE DEVICE | Site: HIP | Status: FUNCTIONAL

## 2020-04-23 DEVICE — SCREW BNE L48MM DIA5MM LAT FEM LT GRN TI ST LOK FULL THRD: Type: IMPLANTABLE DEVICE | Site: HIP | Status: FUNCTIONAL

## 2020-04-23 DEVICE — NAIL IM L400MM DIA11MM 130DEG LNG R PROX FEM GRN TI CANN: Type: IMPLANTABLE DEVICE | Site: HIP | Status: FUNCTIONAL

## 2020-04-23 DEVICE — IMPLANTABLE DEVICE: Type: IMPLANTABLE DEVICE | Site: HIP | Status: FUNCTIONAL

## 2020-04-23 RX ORDER — TRAMADOL HYDROCHLORIDE 50 MG/1
50 TABLET ORAL EVERY 6 HOURS PRN
Status: DISCONTINUED | OUTPATIENT
Start: 2020-04-23 | End: 2020-04-24

## 2020-04-23 RX ORDER — PROMETHAZINE HYDROCHLORIDE 25 MG/1
12.5 TABLET ORAL EVERY 6 HOURS PRN
Status: DISCONTINUED | OUTPATIENT
Start: 2020-04-23 | End: 2020-04-27 | Stop reason: HOSPADM

## 2020-04-23 RX ORDER — NICOTINE POLACRILEX 4 MG
15 LOZENGE BUCCAL PRN
Status: DISCONTINUED | OUTPATIENT
Start: 2020-04-23 | End: 2020-04-27 | Stop reason: HOSPADM

## 2020-04-23 RX ORDER — 0.9 % SODIUM CHLORIDE 0.9 %
1000 INTRAVENOUS SOLUTION INTRAVENOUS ONCE
Status: COMPLETED | OUTPATIENT
Start: 2020-04-23 | End: 2020-04-23

## 2020-04-23 RX ORDER — SODIUM CHLORIDE 0.9 % (FLUSH) 0.9 %
10 SYRINGE (ML) INJECTION EVERY 12 HOURS SCHEDULED
Status: DISCONTINUED | OUTPATIENT
Start: 2020-04-23 | End: 2020-04-27 | Stop reason: HOSPADM

## 2020-04-23 RX ORDER — DEXTROSE MONOHYDRATE 50 MG/ML
100 INJECTION, SOLUTION INTRAVENOUS PRN
Status: DISCONTINUED | OUTPATIENT
Start: 2020-04-23 | End: 2020-04-27 | Stop reason: HOSPADM

## 2020-04-23 RX ORDER — SODIUM CHLORIDE 9 MG/ML
INJECTION, SOLUTION INTRAVENOUS CONTINUOUS
Status: DISCONTINUED | OUTPATIENT
Start: 2020-04-23 | End: 2020-04-24

## 2020-04-23 RX ORDER — DEXTROSE AND SODIUM CHLORIDE 5; .45 G/100ML; G/100ML
INJECTION, SOLUTION INTRAVENOUS CONTINUOUS
Status: DISCONTINUED | OUTPATIENT
Start: 2020-04-23 | End: 2020-04-23

## 2020-04-23 RX ORDER — NEOSTIGMINE METHYLSULFATE 5 MG/5 ML
SYRINGE (ML) INTRAVENOUS PRN
Status: DISCONTINUED | OUTPATIENT
Start: 2020-04-23 | End: 2020-04-23 | Stop reason: SDUPTHER

## 2020-04-23 RX ORDER — METOCLOPRAMIDE HYDROCHLORIDE 5 MG/ML
INJECTION INTRAMUSCULAR; INTRAVENOUS PRN
Status: DISCONTINUED | OUTPATIENT
Start: 2020-04-23 | End: 2020-04-23 | Stop reason: SDUPTHER

## 2020-04-23 RX ORDER — PROMETHAZINE HYDROCHLORIDE 25 MG/1
12.5 TABLET ORAL EVERY 6 HOURS PRN
Status: DISCONTINUED | OUTPATIENT
Start: 2020-04-23 | End: 2020-04-23

## 2020-04-23 RX ORDER — LIDOCAINE HYDROCHLORIDE 10 MG/ML
INJECTION, SOLUTION EPIDURAL; INFILTRATION; INTRACAUDAL; PERINEURAL PRN
Status: DISCONTINUED | OUTPATIENT
Start: 2020-04-23 | End: 2020-04-23 | Stop reason: SDUPTHER

## 2020-04-23 RX ORDER — PHENYLEPHRINE HYDROCHLORIDE 10 MG/ML
INJECTION INTRAVENOUS PRN
Status: DISCONTINUED | OUTPATIENT
Start: 2020-04-23 | End: 2020-04-23 | Stop reason: SDUPTHER

## 2020-04-23 RX ORDER — OXYCODONE HYDROCHLORIDE AND ACETAMINOPHEN 5; 325 MG/1; MG/1
1 TABLET ORAL PRN
Status: DISCONTINUED | OUTPATIENT
Start: 2020-04-23 | End: 2020-04-23

## 2020-04-23 RX ORDER — PANTOPRAZOLE SODIUM 40 MG/1
40 TABLET, DELAYED RELEASE ORAL DAILY
Status: DISCONTINUED | OUTPATIENT
Start: 2020-04-23 | End: 2020-04-27 | Stop reason: HOSPADM

## 2020-04-23 RX ORDER — ROCURONIUM BROMIDE 10 MG/ML
INJECTION, SOLUTION INTRAVENOUS PRN
Status: DISCONTINUED | OUTPATIENT
Start: 2020-04-23 | End: 2020-04-23 | Stop reason: SDUPTHER

## 2020-04-23 RX ORDER — MORPHINE SULFATE 2 MG/ML
2 INJECTION, SOLUTION INTRAMUSCULAR; INTRAVENOUS
Status: DISCONTINUED | OUTPATIENT
Start: 2020-04-23 | End: 2020-04-24

## 2020-04-23 RX ORDER — ONDANSETRON 2 MG/ML
4 INJECTION INTRAMUSCULAR; INTRAVENOUS EVERY 6 HOURS PRN
Status: DISCONTINUED | OUTPATIENT
Start: 2020-04-23 | End: 2020-04-27 | Stop reason: HOSPADM

## 2020-04-23 RX ORDER — ONDANSETRON 2 MG/ML
4 INJECTION INTRAMUSCULAR; INTRAVENOUS
Status: DISCONTINUED | OUTPATIENT
Start: 2020-04-23 | End: 2020-04-23

## 2020-04-23 RX ORDER — SODIUM CHLORIDE 0.9 % (FLUSH) 0.9 %
10 SYRINGE (ML) INJECTION PRN
Status: DISCONTINUED | OUTPATIENT
Start: 2020-04-23 | End: 2020-04-27 | Stop reason: HOSPADM

## 2020-04-23 RX ORDER — GLYCOPYRROLATE 1 MG/5 ML
SYRINGE (ML) INTRAVENOUS PRN
Status: DISCONTINUED | OUTPATIENT
Start: 2020-04-23 | End: 2020-04-23 | Stop reason: SDUPTHER

## 2020-04-23 RX ORDER — SODIUM CHLORIDE 9 MG/ML
INJECTION, SOLUTION INTRAVENOUS CONTINUOUS
Status: DISCONTINUED | OUTPATIENT
Start: 2020-04-23 | End: 2020-04-23

## 2020-04-23 RX ORDER — SENNA AND DOCUSATE SODIUM 50; 8.6 MG/1; MG/1
1 TABLET, FILM COATED ORAL 2 TIMES DAILY
Status: DISCONTINUED | OUTPATIENT
Start: 2020-04-23 | End: 2020-04-27 | Stop reason: HOSPADM

## 2020-04-23 RX ORDER — MORPHINE SULFATE 10 MG/ML
INJECTION, SOLUTION INTRAMUSCULAR; INTRAVENOUS PRN
Status: DISCONTINUED | OUTPATIENT
Start: 2020-04-23 | End: 2020-04-23 | Stop reason: SDUPTHER

## 2020-04-23 RX ORDER — MIDAZOLAM HYDROCHLORIDE 1 MG/ML
INJECTION INTRAMUSCULAR; INTRAVENOUS PRN
Status: DISCONTINUED | OUTPATIENT
Start: 2020-04-23 | End: 2020-04-23 | Stop reason: SDUPTHER

## 2020-04-23 RX ORDER — ONDANSETRON 2 MG/ML
INJECTION INTRAMUSCULAR; INTRAVENOUS PRN
Status: DISCONTINUED | OUTPATIENT
Start: 2020-04-23 | End: 2020-04-23 | Stop reason: SDUPTHER

## 2020-04-23 RX ORDER — DIPHENHYDRAMINE HYDROCHLORIDE 50 MG/ML
12.5 INJECTION INTRAMUSCULAR; INTRAVENOUS
Status: DISCONTINUED | OUTPATIENT
Start: 2020-04-23 | End: 2020-04-23

## 2020-04-23 RX ORDER — PROPOFOL 10 MG/ML
INJECTION, EMULSION INTRAVENOUS PRN
Status: DISCONTINUED | OUTPATIENT
Start: 2020-04-23 | End: 2020-04-23 | Stop reason: SDUPTHER

## 2020-04-23 RX ORDER — LABETALOL 20 MG/4 ML (5 MG/ML) INTRAVENOUS SYRINGE
5 EVERY 10 MIN PRN
Status: DISCONTINUED | OUTPATIENT
Start: 2020-04-23 | End: 2020-04-23

## 2020-04-23 RX ORDER — OXYCODONE HYDROCHLORIDE AND ACETAMINOPHEN 5; 325 MG/1; MG/1
2 TABLET ORAL PRN
Status: DISCONTINUED | OUTPATIENT
Start: 2020-04-23 | End: 2020-04-23

## 2020-04-23 RX ORDER — ONDANSETRON 2 MG/ML
4 INJECTION INTRAMUSCULAR; INTRAVENOUS EVERY 6 HOURS PRN
Status: DISCONTINUED | OUTPATIENT
Start: 2020-04-23 | End: 2020-04-23

## 2020-04-23 RX ORDER — TRANEXAMIC ACID 100 MG/ML
INJECTION, SOLUTION INTRAVENOUS PRN
Status: DISCONTINUED | OUTPATIENT
Start: 2020-04-23 | End: 2020-04-23 | Stop reason: SDUPTHER

## 2020-04-23 RX ORDER — DEXTROSE MONOHYDRATE 25 G/50ML
12.5 INJECTION, SOLUTION INTRAVENOUS PRN
Status: DISCONTINUED | OUTPATIENT
Start: 2020-04-23 | End: 2020-04-27 | Stop reason: HOSPADM

## 2020-04-23 RX ORDER — FENTANYL CITRATE 50 UG/ML
INJECTION, SOLUTION INTRAMUSCULAR; INTRAVENOUS PRN
Status: DISCONTINUED | OUTPATIENT
Start: 2020-04-23 | End: 2020-04-23 | Stop reason: SDUPTHER

## 2020-04-23 RX ORDER — POLYETHYLENE GLYCOL 3350 17 G/17G
17 POWDER, FOR SOLUTION ORAL DAILY PRN
Status: DISCONTINUED | OUTPATIENT
Start: 2020-04-23 | End: 2020-04-23

## 2020-04-23 RX ADMIN — PHENYLEPHRINE HYDROCHLORIDE 100 MCG: 10 INJECTION INTRAVENOUS at 12:46

## 2020-04-23 RX ADMIN — PHENYLEPHRINE HYDROCHLORIDE 100 MCG: 10 INJECTION INTRAVENOUS at 11:25

## 2020-04-23 RX ADMIN — PANTOPRAZOLE SODIUM 40 MG: 40 TABLET, DELAYED RELEASE ORAL at 15:24

## 2020-04-23 RX ADMIN — TRANEXAMIC ACID 1000 MG: 100 INJECTION, SOLUTION INTRAVENOUS at 11:20

## 2020-04-23 RX ADMIN — FENTANYL CITRATE 100 MCG: 50 INJECTION, SOLUTION INTRAMUSCULAR; INTRAVENOUS at 11:03

## 2020-04-23 RX ADMIN — MORPHINE SULFATE 5 MG: 10 INJECTION, SOLUTION INTRAMUSCULAR; INTRAVENOUS at 12:15

## 2020-04-23 RX ADMIN — SENNOSIDES AND DOCUSATE SODIUM 1 TABLET: 8.6; 5 TABLET ORAL at 20:09

## 2020-04-23 RX ADMIN — CEFAZOLIN 2 G: 10 INJECTION, POWDER, FOR SOLUTION INTRAVENOUS at 11:10

## 2020-04-23 RX ADMIN — HYDROMORPHONE HYDROCHLORIDE 0.5 MG: 1 INJECTION, SOLUTION INTRAMUSCULAR; INTRAVENOUS; SUBCUTANEOUS at 13:56

## 2020-04-23 RX ADMIN — FENTANYL CITRATE 50 MCG: 50 INJECTION, SOLUTION INTRAMUSCULAR; INTRAVENOUS at 11:50

## 2020-04-23 RX ADMIN — DEXTROSE AND SODIUM CHLORIDE: 5; 450 INJECTION, SOLUTION INTRAVENOUS at 06:43

## 2020-04-23 RX ADMIN — ROCURONIUM BROMIDE 50 MG: 10 INJECTION, SOLUTION INTRAVENOUS at 11:03

## 2020-04-23 RX ADMIN — LIDOCAINE HYDROCHLORIDE 50 MG: 10 INJECTION, SOLUTION EPIDURAL; INFILTRATION; INTRACAUDAL; PERINEURAL at 11:03

## 2020-04-23 RX ADMIN — PROPOFOL 150 MG: 10 INJECTION, EMULSION INTRAVENOUS at 11:03

## 2020-04-23 RX ADMIN — HYDROMORPHONE HYDROCHLORIDE 0.5 MG: 1 INJECTION, SOLUTION INTRAMUSCULAR; INTRAVENOUS; SUBCUTANEOUS at 05:28

## 2020-04-23 RX ADMIN — INSULIN LISPRO 3 UNITS: 100 INJECTION, SOLUTION INTRAVENOUS; SUBCUTANEOUS at 20:17

## 2020-04-23 RX ADMIN — PHENYLEPHRINE HYDROCHLORIDE 100 MCG: 10 INJECTION INTRAVENOUS at 11:28

## 2020-04-23 RX ADMIN — PHENYLEPHRINE HYDROCHLORIDE 100 MCG: 10 INJECTION INTRAVENOUS at 12:01

## 2020-04-23 RX ADMIN — PHENYLEPHRINE HYDROCHLORIDE 200 MCG: 10 INJECTION INTRAVENOUS at 12:19

## 2020-04-23 RX ADMIN — CEFAZOLIN 2 G: 10 INJECTION, POWDER, FOR SOLUTION INTRAVENOUS at 20:08

## 2020-04-23 RX ADMIN — PHENYLEPHRINE HYDROCHLORIDE 100 MCG: 10 INJECTION INTRAVENOUS at 11:36

## 2020-04-23 RX ADMIN — Medication 0.2 MG: at 10:57

## 2020-04-23 RX ADMIN — MIDAZOLAM 2 MG: 1 INJECTION INTRAMUSCULAR; INTRAVENOUS at 10:57

## 2020-04-23 RX ADMIN — PHENYLEPHRINE HYDROCHLORIDE 100 MCG: 10 INJECTION INTRAVENOUS at 12:31

## 2020-04-23 RX ADMIN — PROPOFOL 50 MG: 10 INJECTION, EMULSION INTRAVENOUS at 13:16

## 2020-04-23 RX ADMIN — HYDROMORPHONE HYDROCHLORIDE 1 MG: 1 INJECTION, SOLUTION INTRAMUSCULAR; INTRAVENOUS; SUBCUTANEOUS at 04:52

## 2020-04-23 RX ADMIN — ONDANSETRON 4 MG: 2 INJECTION INTRAMUSCULAR; INTRAVENOUS at 12:56

## 2020-04-23 RX ADMIN — HYDROMORPHONE HYDROCHLORIDE 1 MG: 1 INJECTION, SOLUTION INTRAMUSCULAR; INTRAVENOUS; SUBCUTANEOUS at 05:48

## 2020-04-23 RX ADMIN — SODIUM CHLORIDE: 9 INJECTION, SOLUTION INTRAVENOUS at 10:22

## 2020-04-23 RX ADMIN — SODIUM CHLORIDE 1000 ML: 9 INJECTION, SOLUTION INTRAVENOUS at 06:11

## 2020-04-23 RX ADMIN — TRAMADOL HYDROCHLORIDE 50 MG: 50 TABLET, FILM COATED ORAL at 15:24

## 2020-04-23 RX ADMIN — Medication 3 MG: at 13:02

## 2020-04-23 RX ADMIN — HYDROMORPHONE HYDROCHLORIDE 1 MG: 1 INJECTION, SOLUTION INTRAMUSCULAR; INTRAVENOUS; SUBCUTANEOUS at 10:44

## 2020-04-23 RX ADMIN — ROCURONIUM BROMIDE 20 MG: 10 INJECTION, SOLUTION INTRAVENOUS at 11:46

## 2020-04-23 RX ADMIN — PHENYLEPHRINE HYDROCHLORIDE 100 MCG: 10 INJECTION INTRAVENOUS at 13:00

## 2020-04-23 RX ADMIN — METOCLOPRAMIDE 10 MG: 5 INJECTION, SOLUTION INTRAMUSCULAR; INTRAVENOUS at 11:00

## 2020-04-23 RX ADMIN — INSULIN LISPRO 6 UNITS: 100 INJECTION, SOLUTION INTRAVENOUS; SUBCUTANEOUS at 17:40

## 2020-04-23 RX ADMIN — TRAMADOL HYDROCHLORIDE 50 MG: 50 TABLET, FILM COATED ORAL at 22:40

## 2020-04-23 RX ADMIN — PHENYLEPHRINE HYDROCHLORIDE 100 MCG: 10 INJECTION INTRAVENOUS at 11:47

## 2020-04-23 RX ADMIN — HYDROMORPHONE HYDROCHLORIDE 0.5 MG: 1 INJECTION, SOLUTION INTRAMUSCULAR; INTRAVENOUS; SUBCUTANEOUS at 06:36

## 2020-04-23 RX ADMIN — Medication 10 ML: at 06:36

## 2020-04-23 RX ADMIN — PHENYLEPHRINE HYDROCHLORIDE 100 MCG: 10 INJECTION INTRAVENOUS at 12:50

## 2020-04-23 RX ADMIN — MORPHINE SULFATE 2 MG: 2 INJECTION, SOLUTION INTRAMUSCULAR; INTRAVENOUS at 20:18

## 2020-04-23 RX ADMIN — SODIUM CHLORIDE: 9 INJECTION, SOLUTION INTRAVENOUS at 15:24

## 2020-04-23 RX ADMIN — PHENYLEPHRINE HYDROCHLORIDE 100 MCG: 10 INJECTION INTRAVENOUS at 12:29

## 2020-04-23 RX ADMIN — FENTANYL CITRATE 50 MCG: 50 INJECTION, SOLUTION INTRAMUSCULAR; INTRAVENOUS at 11:53

## 2020-04-23 RX ADMIN — PHENYLEPHRINE HYDROCHLORIDE 100 MCG: 10 INJECTION INTRAVENOUS at 11:43

## 2020-04-23 RX ADMIN — MORPHINE SULFATE 2 MG: 10 INJECTION, SOLUTION INTRAMUSCULAR; INTRAVENOUS at 13:13

## 2020-04-23 RX ADMIN — HYDROMORPHONE HYDROCHLORIDE 0.25 MG: 1 INJECTION, SOLUTION INTRAMUSCULAR; INTRAVENOUS; SUBCUTANEOUS at 09:20

## 2020-04-23 RX ADMIN — SODIUM CHLORIDE: 9 INJECTION, SOLUTION INTRAVENOUS at 11:40

## 2020-04-23 RX ADMIN — Medication 0.6 MG: at 13:02

## 2020-04-23 RX ADMIN — LIDOCAINE HYDROCHLORIDE 50 MG: 10 INJECTION, SOLUTION EPIDURAL; INFILTRATION; INTRACAUDAL; PERINEURAL at 13:09

## 2020-04-23 RX ADMIN — MORPHINE SULFATE 3 MG: 10 INJECTION, SOLUTION INTRAMUSCULAR; INTRAVENOUS at 13:17

## 2020-04-23 ASSESSMENT — PULMONARY FUNCTION TESTS
PIF_VALUE: 20
PIF_VALUE: 6
PIF_VALUE: 24
PIF_VALUE: 23
PIF_VALUE: 2
PIF_VALUE: 23
PIF_VALUE: 16
PIF_VALUE: 3
PIF_VALUE: 24
PIF_VALUE: 23
PIF_VALUE: 22
PIF_VALUE: 1
PIF_VALUE: 0
PIF_VALUE: 24
PIF_VALUE: 25
PIF_VALUE: 20
PIF_VALUE: 24
PIF_VALUE: 23
PIF_VALUE: 29
PIF_VALUE: 24
PIF_VALUE: 18
PIF_VALUE: 23
PIF_VALUE: 2
PIF_VALUE: 24
PIF_VALUE: 1
PIF_VALUE: 23
PIF_VALUE: 1
PIF_VALUE: 23
PIF_VALUE: 20
PIF_VALUE: 25
PIF_VALUE: 23
PIF_VALUE: 24
PIF_VALUE: 23
PIF_VALUE: 0
PIF_VALUE: 25
PIF_VALUE: 23
PIF_VALUE: 25
PIF_VALUE: 24
PIF_VALUE: 3
PIF_VALUE: 25
PIF_VALUE: 23
PIF_VALUE: 24
PIF_VALUE: 25
PIF_VALUE: 12
PIF_VALUE: 24
PIF_VALUE: 23
PIF_VALUE: 1
PIF_VALUE: 1
PIF_VALUE: 24
PIF_VALUE: 24
PIF_VALUE: 2
PIF_VALUE: 24
PIF_VALUE: 23
PIF_VALUE: 24
PIF_VALUE: 2
PIF_VALUE: 23
PIF_VALUE: 23
PIF_VALUE: 24
PIF_VALUE: 22
PIF_VALUE: 26
PIF_VALUE: 10
PIF_VALUE: 25
PIF_VALUE: 24
PIF_VALUE: 6
PIF_VALUE: 25
PIF_VALUE: 23
PIF_VALUE: 23
PIF_VALUE: 24
PIF_VALUE: 3
PIF_VALUE: 23
PIF_VALUE: 23
PIF_VALUE: 0
PIF_VALUE: 24
PIF_VALUE: 23
PIF_VALUE: 23
PIF_VALUE: 25
PIF_VALUE: 21
PIF_VALUE: 24
PIF_VALUE: 25
PIF_VALUE: 23
PIF_VALUE: 24
PIF_VALUE: 3
PIF_VALUE: 24
PIF_VALUE: 24
PIF_VALUE: 23
PIF_VALUE: 24
PIF_VALUE: 24
PIF_VALUE: 23
PIF_VALUE: 2
PIF_VALUE: 23
PIF_VALUE: 24
PIF_VALUE: 2
PIF_VALUE: 6
PIF_VALUE: 24
PIF_VALUE: 20
PIF_VALUE: 23
PIF_VALUE: 23
PIF_VALUE: 17
PIF_VALUE: 23
PIF_VALUE: 23
PIF_VALUE: 2
PIF_VALUE: 25
PIF_VALUE: 24
PIF_VALUE: 26
PIF_VALUE: 24
PIF_VALUE: 24
PIF_VALUE: 22
PIF_VALUE: 23
PIF_VALUE: 23
PIF_VALUE: 24
PIF_VALUE: 23
PIF_VALUE: 24
PIF_VALUE: 17
PIF_VALUE: 2
PIF_VALUE: 18
PIF_VALUE: 24
PIF_VALUE: 23
PIF_VALUE: 25
PIF_VALUE: 25
PIF_VALUE: 13
PIF_VALUE: 24
PIF_VALUE: 10
PIF_VALUE: 23
PIF_VALUE: 24
PIF_VALUE: 22
PIF_VALUE: 2
PIF_VALUE: 24
PIF_VALUE: 25
PIF_VALUE: 24
PIF_VALUE: 24
PIF_VALUE: 25
PIF_VALUE: 24
PIF_VALUE: 24
PIF_VALUE: 25
PIF_VALUE: 24
PIF_VALUE: 23
PIF_VALUE: 1
PIF_VALUE: 23
PIF_VALUE: 24
PIF_VALUE: 23
PIF_VALUE: 2
PIF_VALUE: 24
PIF_VALUE: 23
PIF_VALUE: 24
PIF_VALUE: 15
PIF_VALUE: 24

## 2020-04-23 ASSESSMENT — ENCOUNTER SYMPTOMS
VOICE CHANGE: 0
SORE THROAT: 0
BLOOD IN STOOL: 0
ABDOMINAL PAIN: 0
CHOKING: 0
STRIDOR: 0
EYE PAIN: 0
ANAL BLEEDING: 0
SHORTNESS OF BREATH: 0
BACK PAIN: 0
TROUBLE SWALLOWING: 0
COLOR CHANGE: 0
VOMITING: 0
RECTAL PAIN: 0
COUGH: 0
DIARRHEA: 0
ABDOMINAL DISTENTION: 0
PHOTOPHOBIA: 0
NAUSEA: 0

## 2020-04-23 ASSESSMENT — PAIN SCALES - GENERAL
PAINLEVEL_OUTOF10: 3
PAINLEVEL_OUTOF10: 10
PAINLEVEL_OUTOF10: 0
PAINLEVEL_OUTOF10: 7
PAINLEVEL_OUTOF10: 3
PAINLEVEL_OUTOF10: 8
PAINLEVEL_OUTOF10: 10
PAINLEVEL_OUTOF10: 8
PAINLEVEL_OUTOF10: 3
PAINLEVEL_OUTOF10: 10
PAINLEVEL_OUTOF10: 10
PAINLEVEL_OUTOF10: 6
PAINLEVEL_OUTOF10: 10
PAINLEVEL_OUTOF10: 7

## 2020-04-23 ASSESSMENT — PAIN - FUNCTIONAL ASSESSMENT: PAIN_FUNCTIONAL_ASSESSMENT: 0-10

## 2020-04-23 ASSESSMENT — PAIN DESCRIPTION - LOCATION: LOCATION: HIP

## 2020-04-23 ASSESSMENT — PAIN DESCRIPTION - PAIN TYPE: TYPE: ACUTE PAIN

## 2020-04-23 ASSESSMENT — PAIN DESCRIPTION - ORIENTATION: ORIENTATION: RIGHT

## 2020-04-23 NOTE — PLAN OF CARE
Problem: Falls - Risk of:  Goal: Will remain free from falls  Description: Will remain free from falls  Outcome: Ongoing  Note: No falls this shift, call light within reach, bed rails up x2, bed in lowest position. Goal: Absence of physical injury  Description: Absence of physical injury  Outcome: Ongoing  Note: No injury this shift. Problem: Pain:  Goal: Pain level will decrease  Description: Pain level will decrease  Outcome: Ongoing  Note: Pain level decreased with PRN pain medication.     Goal: Control of acute pain  Description: Control of acute pain  Outcome: Ongoing  Goal: Control of chronic pain  Description: Control of chronic pain  Outcome: Ongoing

## 2020-04-23 NOTE — CARE COORDINATION
Admitted to room 2072 from ED per cart, VS taken, oriented to room, instructed on call light use, and orders reviewed. Orders released.

## 2020-04-23 NOTE — OP NOTE
OPERATIVE REPORT    Date of Surgery: 4/23/2020     Pre-operative Diagnosis: Right femur intertrochanteric fracture (S72.141)    Post-operative Diagnosis: Right femur intertrochanteric fracture (P50.810)    Procedure: Right femur intertrochanteric fracture surgical stabilization with intramedullary nail (07398)    Surgeon(s): Amor Shen MD    Assistant(s): Nick Whatley DO (PGY 4)    Anesthesia: General    Fluids: See anesthesia record    Urine output: See anesthesia record    Estimated blood loss: 100 mL    Findings: See note below    Specimen: None    Tourniquet time: Not applicable    Implants: Synthes 11 x 400 mm TFNa, 570 mm helical blade, and 5 x 48 mm distal interlocking screw, with proximal PMMA    Surgical Indications:  Ray Allison is a 59 y.o. old male who presented with a closed right femur 4-part intertrochanteric fracture stemming from a fall in his home. He has a history of osteogenesis imperfecta with history of multiple fractures. Following a discussion with the patient regarding both non-operative and operative treatment options, he consented to proceed with surgical stabilization of his fracture using an intramedullary nail. he came to this decision after demonstrating an understanding of our discussion regarding details of the procedure, risks and benefits, expected outcome, and postoperative course. Operative technique: Following appropriate identification of the patient and his operative extremity, consent was reviewed with the patient and his operative extremity was signed. he was wheeled to the operating room where he finished a course of pre-operative antibiotic prophylaxis by way of 2 G IV ancef. The anesthesia service administered a general anesthetic. All bony prominences were appropriately padded and the patient was secured to the operative fracture table in a supine position position.  The patient's operative extremity was placed in the fracture traction boot assembled

## 2020-04-23 NOTE — ED NOTES
Mode of arrival (squad #, walk in, police, etc) : [facility]        Chief complaint(s): fall, right hip pain        Arrival Note (brief scenario, treatment PTA, etc). : Pt is an alert and oriented x4 female who presents to the ER after loosing his balance in the bathroom. Pt fell and landed on his right hip. Pt denies any head injury ir LOC. Distal PMS intact. Pt arrives on vaccumatress by EMS. Pt is unbearable pain, not tolerated movement well. Dr Darling Doe at bedside for exam. Obvious deformity noted on the right hip. C= \"Have you ever felt that you should Cut down on your drinking? \"  No  A= \"Have people Annoyed you by criticizing your drinking? \"  No  G= \"Have you ever felt bad or Guilty about your drinking? \"  No  E= \"Have you ever had a drink as an Eye-opener first thing in the morning to steady your nerves or to help a hangover? \"  No      Deferred []      Reason for deferring: N/A    *If yes to two or more: probable alcohol abuse. Johny Meyers RN  04/23/20 [id number]

## 2020-04-23 NOTE — CONSULTS
Consult for medical management      Name: Sugar Ramirez  MRN: [id number]     Acct: [de-identified]  Room: [facility] OR [place]/NONE    Admit Date: 4/23/2020  PCP: ADEEL Cowart CNP      Chief Complaint:     Chief Complaint   Patient presents with    Hip Pain    Fall       History Obtained From:     patient, electronic medical record    History of Present Illness:      Sugar Ramirez is a  59 y.o.  male with a past medical history of osteogenic imperfecta, stable fractures in the past, hypertension, diabetes insulin-dependent, presents with Hip Pain and Fall  Patient presented with a fall and right hip pain. Patient right hip pain is 7 out of 10 well controlled with pain meds movement aggravates pain rest alleviates pain there is no pain radiation. Patient denies head injury loss of consciousness chest pain shortness of breath nausea vomiting abdominal pain. Past Medical History:     Past Medical History:   Diagnosis Date    Contracture, elbow     h/o right elbow fx, compound fracture.  Erectile dysfunction     Hypertension     Osteogenesis imperfecta     DX 6 months. 30 broken bones in past.     Type II or unspecified type diabetes mellitus without mention of complication, not stated as uncontrolled         Past Surgical History:     Past Surgical History:   Procedure Laterality Date    CHOLECYSTECTOMY      FRACTURE SURGERY Right     right elbow x 3. Compound fx at work.  FRACTURE SURGERY Bilateral     with hardware    FRACTURE SURGERY Left     with hardware    TONSILLECTOMY AND ADENOIDECTOMY          Medications Prior to Admission:       Prior to Admission medications    Medication Sig Start Date End Date Taking?  Authorizing Provider   lisinopril (PRINIVIL;ZESTRIL) 5 MG tablet Take 5 mg by mouth daily    Historical Provider, MD   insulin glargine (LANTUS SOLOSTAR) 100 UNIT/ML injection pen Inject into the skin 2 times daily Taking 30 units in the am and 80 units 7-8p in the evening daily controlled). Negative for back pain, myalgias and neck stiffness. Skin: Negative for color change, pallor and rash. Allergic/Immunologic: Negative for environmental allergies and food allergies. Neurological: Negative for tremors, seizures, facial asymmetry and numbness. Hematological: Negative for adenopathy. Does not bruise/bleed easily. Psychiatric/Behavioral: Negative for agitation, behavioral problems, hallucinations, self-injury and suicidal ideas. Code Status:  Full Code    Physical Exam:     Vitals:  BP (!) 182/82   Pulse 83   Temp 98.4 °F (36.9 °C) (Oral)   Resp 18   Ht 6' (1.829 m)   Wt 195 lb (88.5 kg)   SpO2 100%   BMI 26.45 kg/m²   Temp (24hrs), Av.8 °F (36 °C), Min:92.3 °F (33.5 °C), Max:98.4 °F (36.9 °C)      Physical Exam  Vitals signs reviewed. Constitutional:       Appearance: Normal appearance. He is not diaphoretic. HENT:      Head: Normocephalic and atraumatic. Right Ear: External ear normal.      Left Ear: External ear normal.      Nose: Nose normal.      Mouth/Throat:      Mouth: Mucous membranes are moist.      Pharynx: Oropharynx is clear. Eyes:      Conjunctiva/sclera: Conjunctivae normal.   Neck:      Musculoskeletal: Normal range of motion and neck supple. No neck rigidity. Cardiovascular:      Rate and Rhythm: Normal rate and regular rhythm. Pulses: Normal pulses. Heart sounds: Normal heart sounds. Pulmonary:      Effort: Pulmonary effort is normal.      Breath sounds: Normal breath sounds. Abdominal:      General: Bowel sounds are normal. There is no distension. Palpations: Abdomen is soft. Musculoskeletal:         General: No tenderness or deformity. Right lower leg: No edema. Left lower leg: No edema. Comments: S/p right hip ORIF   Skin:     General: Skin is warm and dry. Capillary Refill: Capillary refill takes less than 2 seconds. Coloration: Skin is not jaundiced.    Neurological:      General: No

## 2020-04-23 NOTE — FLOWSHEET NOTE
Patient and family are known to [address] [place] [address]. Patient has been [place] after extensive orthopedic injury last fall; he relayed to writer what happened this morning. Patient is an avid golfer and was just beginning to think he would be golfing again this summer. Writer offered emotional support and will continue to follow up.     04/23/20 1550   Encounter Summary   Services provided to: Patient and family together   Referral/Consult From: Family   Support System Spouse; Children;Family members;Friends/neighbors   Continue Visiting   (4-23-20)   Complexity of Encounter Moderate   Length of Encounter 15 minutes   Routine   Type Initial   Spiritual/Jehovah's witness   Type Spiritual support   Assessment Anxious   Intervention Active listening;Explored feelings, thoughts, concerns;Nurtured hope;Sustaining presence/ Ministry of presence; Discussed illness/injury and it's impact   Outcome Expressed gratitude;Engaged in conversation;Expressed feelings/needs/concerns;Venting emotion

## 2020-04-23 NOTE — ANESTHESIA PRE PROCEDURE
Department of Anesthesiology  Preprocedure Note       Name:  Wes Duff   Age:  59 y.o.  :  1955                                          MRN:  [id number]         Date:  2020      Surgeon: Mortimer Gerlach):  Alanna Myers MD    Procedure: HIP TFN (Right Hip)    Medications prior to admission:   Prior to Admission medications    Medication Sig Start Date End Date Taking? Authorizing Provider   lisinopril (PRINIVIL;ZESTRIL) 5 MG tablet Take 5 mg by mouth daily    Historical Provider, MD   insulin glargine (LANTUS SOLOSTAR) 100 UNIT/ML injection pen Inject into the skin 2 times daily Taking 30 units in the am and 80 units 7-8p in the evening daily    Historical Provider, MD   insulin aspart (FIASP FLEXTOUCH) 100 UNIT/ML injection pen Inject 8 Units into the skin 2 times daily Taking 8 units plus ISS for breakfast and 8 units plus ISS for dinner    Historical Provider, MD   pantoprazole (PROTONIX) 40 MG tablet Take 1 tablet by mouth daily 10/31/18 11/30/18  Malgorzata Chowdary MD   ramipril (ALTACE) 5 MG capsule TAKE 1 CAPSULE DAILY 10/31/18   Malgorzata Chowdary MD   Blood Glucose Monitoring Suppl [facility] BLOOD GLUCOSE METER) w/Device KIT 1 kit by Does not apply route 2 times daily A1C of 9.0. NIDDM. 10/23/18   Martita Seo PA-C   traMADol (ULTRAM) 50 MG tablet 50 mg daily.  13   Historical Provider, MD       Current medications:    Current Facility-Administered Medications   Medication Dose Route Frequency Provider Last Rate Last Dose    sodium chloride flush 0.9 % injection 10 mL  10 mL Intravenous 2 times per day Alanna Myers MD        sodium chloride flush 0.9 % injection 10 mL  10 mL Intravenous PRN Alanna Myers MD   10 mL at 20 0636    polyethylene glycol (GLYCOLAX) packet 17 g  17 g Oral Daily PRN Alanna Myers MD        promethazine (PHENERGAN) tablet 12.5 mg  12.5 mg Oral Q6H PRN Alanna Myers MD        Or    ondansetron (ZOFRAN) injection 4 mg  4 mg Intravenous Q6H PRN Kunal BARRIENTOS ADENOIDECTOMY         Social History:    Social History     Tobacco Use    Smoking status: Former Smoker     Packs/day: 2.00     Years: 18.00     Pack years: 36.00     Types: Cigarettes     Last attempt to quit: 12/3/1991     Years since quittin.4    Smokeless tobacco: Never Used   Substance Use Topics    Alcohol use: Not on file                                Counseling given: Not Answered      Vital Signs (Current):   Vitals:    20 0456 20 0529 20 0530 20 0559   BP:  (!) 161/91 (!) 150/93    Pulse: 73  74    Resp: 16  16    Temp:    98.1 °F (36.7 °C)   TempSrc:    Oral   SpO2: 99%  100%    Weight: 195 lb (88.5 kg)      Height: 6' (1.829 m)                                                 BP Readings from Last 3 Encounters:   20 (!) 150/93   19 130/89   10/23/18 98/62       NPO Status:                                                                                 BMI:   Wt Readings from Last 3 Encounters:   20 195 lb (88.5 kg)   19 204 lb 9.4 oz (92.8 kg)   10/23/18 202 lb (91.6 kg)     Body mass index is 26.45 kg/m². CBC:   Lab Results   Component Value Date    WBC 8.1 2020    RBC 4.85 2020    HGB 14.0 2020    HCT 42.5 2020    MCV 87.7 2020    RDW 13.6 2020     2020       CMP:   Lab Results   Component Value Date     2020    K 4.1 2020     2020    CO2 18 2020    BUN 29 2020    CREATININE 1.68 2020    GFRAA 50 2020    LABGLOM 41 2020    GLUCOSE 202 2020    GLUCOSE 165 10/13/2011    PROT 7.4 10/15/2018    CALCIUM 9.8 2020    BILITOT 0.41 10/15/2018    ALKPHOS 86 10/15/2018    AST 14 10/15/2018    ALT 18 10/15/2018       POC Tests: No results for input(s): POCGLU, POCNA, POCK, POCCL, POCBUN, POCHEMO, POCHCT in the last 72 hours.     Coags: No results found for: PROTIME, INR, APTT    HCG (If Applicable): No results found for: PREGTESTUR,

## 2020-04-23 NOTE — ED PROVIDER NOTES
History:  has a past surgical history that includes fracture surgery; Cholecystectomy; and Tonsillectomy and adenoidectomy. Social History:  reports that he quit smoking about 28 years ago. His smoking use included cigarettes. He has a 36.00 pack-year smoking history. He has never used smokeless tobacco.  Family History: Noncontributory at this time  Psychiatric History: Noncontributoryat this time    Allergies:has No Known Allergies. PHYSICAL EXAM     INITIAL VITALS: BP (!) 150/93   Pulse 74   Temp 98.1 °F (36.7 °C) (Oral)   Resp 16   Ht 6' (1.829 m)   Wt 195 lb (88.5 kg)   SpO2 100%   BMI 26.45 kg/m²     Physical Exam  Constitutional:       Appearance: He is well-developed. He is not diaphoretic. HENT:      Head: Normocephalic and atraumatic. Right Ear: External ear normal.      Left Ear: External ear normal.      Nose: Nose normal.   Eyes:      General: No scleral icterus. Right eye: No discharge. Left eye: No discharge. Conjunctiva/sclera: Conjunctivae normal.      Pupils: Pupils are equal, round, and reactive to light. Neck:      Musculoskeletal: Normal range of motion and neck supple. Trachea: No tracheal deviation. Cardiovascular:      Rate and Rhythm: Normal rate and regular rhythm. Heart sounds: Normal heart sounds. No murmur. No friction rub. No gallop. Pulmonary:      Effort: Pulmonary effort is normal. No respiratory distress. Breath sounds: Normal breath sounds. No wheezing or rales. Chest:      Chest wall: No tenderness. Abdominal:      General: Bowel sounds are normal. There is no distension. Palpations: Abdomen is soft. There is no mass. Tenderness: There is no abdominal tenderness. There is no guarding or rebound. Musculoskeletal:         General: Tenderness and deformity present. Right hip: He exhibits decreased range of motion, tenderness, bony tenderness, swelling and deformity.         Legs:       Comments: Right lateral hip pain, neurovascular intact distally light touch DP pulse in movement of toes. Skin:     Findings: No rash. Neurological:      Mental Status: He is alert and oriented to person, place, and time. Cranial Nerves: No cranial nerve deficit. Motor: No abnormal muscle tone. Deep Tendon Reflexes: Reflexes are normal and symmetric. Psychiatric:         Behavior: Behavior normal.         Thought Content: Thought content normal.         DIAGNOSTIC RESULTS     EKG: All EKG's areinterpreted by the Emergency Department Physician who either signs or Co-signs this chart in the absence of a cardiologist.  EKG Interpretation    Interpreted by emergency department physician    Rhythm: normal sinus   Rate: normal, 75  Axis: left  Ectopy: none  Conduction: left anterior fasciclar block  ST Segments: normal  T Waves: non specific changes  Q Waves: nonspecific    EKG  Impression: non-specific EKG        RADIOLOGY:   All plain film, CT,MRI, and formal ultrasound images (except ED bedside ultrasound) are read by the radiologist, see reports below, unless otherwise noted in the medical decision-making or here. XR HIP 2-3 VW W PELVIS RIGHT   Final Result   Proximal right femoral intercondylar impacted acute fracture. Bilateral hip joint mild degenerative osteoarthritis.          XR CHEST PORTABLE   Final Result   Unremarkable single portable semi upright AP view of the chest.                  LABS:  Labs Reviewed   CBC WITH AUTO DIFFERENTIAL - Abnormal; Notable for the following components:       Result Value    Seg Neutrophils 76 (*)     Lymphocytes 12 (*)     All other components within normal limits   BASIC METABOLIC PANEL - Abnormal; Notable for the following components:    Glucose 202 (*)     BUN 29 (*)     CREATININE 1.68 (*)     Sodium 133 (*)     CO2 18 (*)     GFR Non- 41 (*)     GFR  50 (*)     All other components within normal limits   HEMOGLOBIN A1C   POCT

## 2020-04-23 NOTE — H&P
ORTHOPAEDIC H and P      HPI / Chief Maikel Beth is a 59 y.o. old male who presented to the Emergency department this morning (4/23/20) for evaluation of his right him. He had gotten up to go to the bathroom overnight and fell hurting his right hip. He was brought to the ED as a result where he was diagnosed with a right intertrochanteric femur fracture. As a result he was admitted to the orthopedic service for appropriate fracture management. Of note he has a history of osteogenesis imperfecta and has had multiple fractures. None of this femur. His last fractures were of bilateral patellae in November of 2019. He had both of these fixed by Dr. Kayleen East. Past Medical History  Jazzy Wayne  has a past medical history of Contracture, elbow, Erectile dysfunction, Hypertension, Osteogenesis imperfecta, and Type II or unspecified type diabetes mellitus without mention of complication, not stated as uncontrolled. Past Surgical History  Jazzy Wayne  has a past surgical history that includes Cholecystectomy; Tonsillectomy and adenoidectomy; fracture surgery (Right); fracture surgery (Bilateral); and fracture surgery (Left). Current Medications  Reviewed. See EMR for details. Allergies  Allergies have been reviewed. Jazzy Wayne has No Known Allergies. Social History  Jazzy Wayne  reports that he quit smoking about 28 years ago. His smoking use included cigarettes. He has a 36.00 pack-year smoking history. He has never used smokeless tobacco.    Family History  Miles's family history includes Diabetes in his father and mother; Heart Disease in his father; Stroke in his mother. Review of Systems   History obtained from the patient.    Constitution: no fever or chills  Musculoskeletal: As noted in the HPI   Neurologic: no neurologic symptoms    Physical Exam  BP (!) 182/82   Pulse 83   Temp 98.4 °F (36.9 °C) (Oral)   Resp 18   Ht 6' (1.829 m)   Wt 195 lb (88.5 kg)   SpO2 100%   BMI 26.45 kg/m²   General with surgery. We will proceed with surgery as he has been cleared medically at intermediate risk. We will continue with mechanical DVT prophylaxis preoperatively and initiate chemoprophylaxis postoperatively. Postoperatively he will be mobilized with physical therapy and will likely be allowed to weight-bear as tolerated but this will be dependent on intraoperative assessment of the fracture and fixation.       Hollis Gordon MD    Shoulder and Elbow Surgery

## 2020-04-23 NOTE — CARE COORDINATION
CASE MANAGEMENT NOTE:    Admission Date:  4/23/2020 Devin Liriano is a 59 y.o.  male    Admitted for : Closed right hip fracture, initial encounter ([facility] Romaine Osler    Writer reviewed chart. Was unable to meet with patient as he has been in surgery. PCP:  Clary Finney NP                                Insurance:  Jody Montero      Current Residence/ Living Arrangements:  independently at home with spouse          Current Services PTA:  None listed. Is patient agreeable to VNS: Will need to discuss this with patient. DME:  Unsure what DME pt has. Potential Assistance Needed: May qualify for ARU? Will follow PT/OT rec's post op    Pharmacy:  [facility]       Does Patient want to use MEDS to BEDS? unsure    Is the Patient an [facility] with Readmission Risk Score greater than 14%? No  If yes, pt needs a follow up appointment made within 7 days. Family Members/Caregivers that pt would like involved in their care:    Yes    If yes, list name here:  Gabrielle Ly and [address]    Transportation Provider:  Patient and Family                   Discharge Plan:  Home VS ARU? Will follow PT/OT rec's.                  Electronically signed by: Sigrid Chan RN on 4/23/2020 at 1:57 PM

## 2020-04-24 ENCOUNTER — TELEPHONE (OUTPATIENT)
Dept: ORTHOPEDIC SURGERY | Age: 65
End: 2020-04-24

## 2020-04-24 LAB
ABSOLUTE EOS #: 0.1 K/UL (ref 0–0.4)
ABSOLUTE IMMATURE GRANULOCYTE: ABNORMAL K/UL (ref 0–0.3)
ABSOLUTE LYMPH #: 0.6 K/UL (ref 1–4.8)
ABSOLUTE MONO #: 0.5 K/UL (ref 0.1–1.3)
ALBUMIN SERPL-MCNC: 3.5 G/DL (ref 3.5–5.2)
ALBUMIN/GLOBULIN RATIO: ABNORMAL (ref 1–2.5)
ALP BLD-CCNC: 77 U/L (ref 40–129)
ALT SERPL-CCNC: 9 U/L (ref 5–41)
ANION GAP SERPL CALCULATED.3IONS-SCNC: 13 MMOL/L (ref 9–17)
AST SERPL-CCNC: 13 U/L
BASOPHILS # BLD: 1 % (ref 0–2)
BASOPHILS ABSOLUTE: 0 K/UL (ref 0–0.2)
BILIRUB SERPL-MCNC: 0.44 MG/DL (ref 0.3–1.2)
BUN BLDV-MCNC: 28 MG/DL (ref 8–23)
BUN/CREAT BLD: ABNORMAL (ref 9–20)
CALCIUM SERPL-MCNC: 9.1 MG/DL (ref 8.6–10.4)
CHLORIDE BLD-SCNC: 104 MMOL/L (ref 98–107)
CO2: 20 MMOL/L (ref 20–31)
CREAT SERPL-MCNC: 1.5 MG/DL (ref 0.7–1.2)
DIFFERENTIAL TYPE: ABNORMAL
EOSINOPHILS RELATIVE PERCENT: 2 % (ref 0–4)
GFR AFRICAN AMERICAN: 57 ML/MIN
GFR NON-AFRICAN AMERICAN: 47 ML/MIN
GFR SERPL CREATININE-BSD FRML MDRD: ABNORMAL ML/MIN/{1.73_M2}
GFR SERPL CREATININE-BSD FRML MDRD: ABNORMAL ML/MIN/{1.73_M2}
GLUCOSE BLD-MCNC: 211 MG/DL (ref 75–110)
GLUCOSE BLD-MCNC: 225 MG/DL (ref 70–99)
GLUCOSE BLD-MCNC: 232 MG/DL (ref 75–110)
GLUCOSE BLD-MCNC: 238 MG/DL (ref 75–110)
GLUCOSE BLD-MCNC: 271 MG/DL (ref 75–110)
HCT VFR BLD CALC: 31 % (ref 41–53)
HEMOGLOBIN: 10.2 G/DL (ref 13.5–17.5)
IMMATURE GRANULOCYTES: ABNORMAL %
LYMPHOCYTES # BLD: 12 % (ref 24–44)
MCH RBC QN AUTO: 28.9 PG (ref 26–34)
MCHC RBC AUTO-ENTMCNC: 32.8 G/DL (ref 31–37)
MCV RBC AUTO: 88.2 FL (ref 80–100)
MONOCYTES # BLD: 10 % (ref 1–7)
NRBC AUTOMATED: ABNORMAL PER 100 WBC
PDW BLD-RTO: 13.9 % (ref 11.5–14.9)
PLATELET # BLD: 166 K/UL (ref 150–450)
PLATELET ESTIMATE: ABNORMAL
PMV BLD AUTO: 8.2 FL (ref 6–12)
POTASSIUM SERPL-SCNC: 4.4 MMOL/L (ref 3.7–5.3)
RBC # BLD: 3.52 M/UL (ref 4.5–5.9)
RBC # BLD: ABNORMAL 10*6/UL
SEG NEUTROPHILS: 75 % (ref 36–66)
SEGMENTED NEUTROPHILS ABSOLUTE COUNT: 4.2 K/UL (ref 1.3–9.1)
SODIUM BLD-SCNC: 137 MMOL/L (ref 135–144)
TOTAL PROTEIN: 6.3 G/DL (ref 6.4–8.3)
WBC # BLD: 5.5 K/UL (ref 3.5–11)
WBC # BLD: ABNORMAL 10*3/UL

## 2020-04-24 PROCEDURE — 2580000003 HC RX 258: Performed by: ORTHOPAEDIC SURGERY

## 2020-04-24 PROCEDURE — 6360000002 HC RX W HCPCS: Performed by: FAMILY MEDICINE

## 2020-04-24 PROCEDURE — 97535 SELF CARE MNGMENT TRAINING: CPT

## 2020-04-24 PROCEDURE — 36415 COLL VENOUS BLD VENIPUNCTURE: CPT

## 2020-04-24 PROCEDURE — 6370000000 HC RX 637 (ALT 250 FOR IP): Performed by: ORTHOPAEDIC SURGERY

## 2020-04-24 PROCEDURE — G0378 HOSPITAL OBSERVATION PER HR: HCPCS

## 2020-04-24 PROCEDURE — 96366 THER/PROPH/DIAG IV INF ADDON: CPT

## 2020-04-24 PROCEDURE — 97110 THERAPEUTIC EXERCISES: CPT

## 2020-04-24 PROCEDURE — 6360000002 HC RX W HCPCS: Performed by: ORTHOPAEDIC SURGERY

## 2020-04-24 PROCEDURE — 97530 THERAPEUTIC ACTIVITIES: CPT

## 2020-04-24 PROCEDURE — 80053 COMPREHEN METABOLIC PANEL: CPT

## 2020-04-24 PROCEDURE — 6370000000 HC RX 637 (ALT 250 FOR IP): Performed by: INTERNAL MEDICINE

## 2020-04-24 PROCEDURE — 82947 ASSAY GLUCOSE BLOOD QUANT: CPT

## 2020-04-24 PROCEDURE — 97167 OT EVAL HIGH COMPLEX 60 MIN: CPT

## 2020-04-24 PROCEDURE — 97163 PT EVAL HIGH COMPLEX 45 MIN: CPT

## 2020-04-24 PROCEDURE — 96372 THER/PROPH/DIAG INJ SC/IM: CPT

## 2020-04-24 PROCEDURE — 96376 TX/PRO/DX INJ SAME DRUG ADON: CPT

## 2020-04-24 PROCEDURE — 85025 COMPLETE CBC W/AUTO DIFF WBC: CPT

## 2020-04-24 PROCEDURE — 6370000000 HC RX 637 (ALT 250 FOR IP): Performed by: FAMILY MEDICINE

## 2020-04-24 PROCEDURE — 6360000002 HC RX W HCPCS: Performed by: INTERNAL MEDICINE

## 2020-04-24 RX ORDER — ASPIRIN 81 MG/1
81 TABLET ORAL DAILY
Status: DISCONTINUED | OUTPATIENT
Start: 2020-04-24 | End: 2020-04-27 | Stop reason: HOSPADM

## 2020-04-24 RX ORDER — LORAZEPAM 2 MG/ML
0.5 INJECTION INTRAMUSCULAR EVERY 6 HOURS PRN
Status: DISCONTINUED | OUTPATIENT
Start: 2020-04-24 | End: 2020-04-27 | Stop reason: HOSPADM

## 2020-04-24 RX ORDER — METOPROLOL SUCCINATE 50 MG/1
50 TABLET, EXTENDED RELEASE ORAL 2 TIMES DAILY
Status: DISCONTINUED | OUTPATIENT
Start: 2020-04-24 | End: 2020-04-24

## 2020-04-24 RX ORDER — METOPROLOL SUCCINATE 50 MG/1
50 TABLET, EXTENDED RELEASE ORAL 2 TIMES DAILY
Status: ON HOLD | COMMUNITY
End: 2020-05-21 | Stop reason: HOSPADM

## 2020-04-24 RX ORDER — TRAZODONE HYDROCHLORIDE 100 MG/1
100 TABLET ORAL NIGHTLY
COMMUNITY

## 2020-04-24 RX ORDER — METOPROLOL SUCCINATE 50 MG/1
50 TABLET, EXTENDED RELEASE ORAL 2 TIMES DAILY
Status: DISCONTINUED | OUTPATIENT
Start: 2020-04-24 | End: 2020-04-27 | Stop reason: HOSPADM

## 2020-04-24 RX ORDER — TRAZODONE HYDROCHLORIDE 100 MG/1
100 TABLET ORAL NIGHTLY
Status: DISCONTINUED | OUTPATIENT
Start: 2020-04-24 | End: 2020-04-27 | Stop reason: HOSPADM

## 2020-04-24 RX ORDER — SERTRALINE HYDROCHLORIDE 100 MG/1
100 TABLET, FILM COATED ORAL NIGHTLY
COMMUNITY
End: 2021-07-19

## 2020-04-24 RX ORDER — SERTRALINE HYDROCHLORIDE 100 MG/1
100 TABLET, FILM COATED ORAL NIGHTLY
Status: DISCONTINUED | OUTPATIENT
Start: 2020-04-24 | End: 2020-04-27 | Stop reason: HOSPADM

## 2020-04-24 RX ORDER — MORPHINE SULFATE 2 MG/ML
2 INJECTION, SOLUTION INTRAMUSCULAR; INTRAVENOUS
Status: DISCONTINUED | OUTPATIENT
Start: 2020-04-24 | End: 2020-04-26

## 2020-04-24 RX ORDER — HYDROCODONE BITARTRATE AND ACETAMINOPHEN 5; 325 MG/1; MG/1
1 TABLET ORAL EVERY 4 HOURS PRN
Status: DISCONTINUED | OUTPATIENT
Start: 2020-04-24 | End: 2020-04-26

## 2020-04-24 RX ADMIN — CEFAZOLIN 2 G: 1 INJECTION, POWDER, FOR SOLUTION INTRAMUSCULAR; INTRAVENOUS at 11:20

## 2020-04-24 RX ADMIN — LORAZEPAM 0.5 MG: 2 INJECTION INTRAMUSCULAR; INTRAVENOUS at 21:08

## 2020-04-24 RX ADMIN — INSULIN LISPRO 5 UNITS: 100 INJECTION, SOLUTION INTRAVENOUS; SUBCUTANEOUS at 21:08

## 2020-04-24 RX ADMIN — SERTRALINE HYDROCHLORIDE 100 MG: 100 TABLET ORAL at 20:59

## 2020-04-24 RX ADMIN — TRAMADOL HYDROCHLORIDE 50 MG: 50 TABLET, FILM COATED ORAL at 05:27

## 2020-04-24 RX ADMIN — CEFAZOLIN 2 G: 10 INJECTION, POWDER, FOR SOLUTION INTRAVENOUS at 02:44

## 2020-04-24 RX ADMIN — MORPHINE SULFATE 2 MG: 2 INJECTION, SOLUTION INTRAMUSCULAR; INTRAVENOUS at 15:01

## 2020-04-24 RX ADMIN — Medication 10 ML: at 21:08

## 2020-04-24 RX ADMIN — PANTOPRAZOLE SODIUM 40 MG: 40 TABLET, DELAYED RELEASE ORAL at 07:19

## 2020-04-24 RX ADMIN — TRAZODONE HYDROCHLORIDE 100 MG: 100 TABLET ORAL at 20:59

## 2020-04-24 RX ADMIN — MORPHINE SULFATE 2 MG: 2 INJECTION, SOLUTION INTRAMUSCULAR; INTRAVENOUS at 12:58

## 2020-04-24 RX ADMIN — HYDROCODONE BITARTRATE AND ACETAMINOPHEN 1 TABLET: 5; 325 TABLET ORAL at 18:07

## 2020-04-24 RX ADMIN — HYDROCODONE BITARTRATE AND ACETAMINOPHEN 1 TABLET: 5; 325 TABLET ORAL at 13:53

## 2020-04-24 RX ADMIN — METOPROLOL SUCCINATE 50 MG: 50 TABLET, EXTENDED RELEASE ORAL at 15:06

## 2020-04-24 RX ADMIN — SENNOSIDES AND DOCUSATE SODIUM 1 TABLET: 8.6; 5 TABLET ORAL at 20:59

## 2020-04-24 RX ADMIN — MORPHINE SULFATE 2 MG: 2 INJECTION, SOLUTION INTRAMUSCULAR; INTRAVENOUS at 01:08

## 2020-04-24 RX ADMIN — TRAMADOL HYDROCHLORIDE 50 MG: 50 TABLET, FILM COATED ORAL at 11:27

## 2020-04-24 RX ADMIN — INSULIN LISPRO 6 UNITS: 100 INJECTION, SOLUTION INTRAVENOUS; SUBCUTANEOUS at 16:59

## 2020-04-24 RX ADMIN — MORPHINE SULFATE 2 MG: 2 INJECTION, SOLUTION INTRAMUSCULAR; INTRAVENOUS at 07:16

## 2020-04-24 RX ADMIN — SENNOSIDES AND DOCUSATE SODIUM 1 TABLET: 8.6; 5 TABLET ORAL at 07:19

## 2020-04-24 RX ADMIN — MORPHINE SULFATE 2 MG: 2 INJECTION, SOLUTION INTRAMUSCULAR; INTRAVENOUS at 10:31

## 2020-04-24 RX ADMIN — INSULIN LISPRO 6 UNITS: 100 INJECTION, SOLUTION INTRAVENOUS; SUBCUTANEOUS at 11:20

## 2020-04-24 RX ADMIN — LORAZEPAM 0.5 MG: 2 INJECTION INTRAMUSCULAR; INTRAVENOUS at 15:01

## 2020-04-24 RX ADMIN — ENOXAPARIN SODIUM 40 MG: 40 INJECTION SUBCUTANEOUS at 07:20

## 2020-04-24 RX ADMIN — Medication 10 ML: at 07:19

## 2020-04-24 RX ADMIN — HYDROCODONE BITARTRATE AND ACETAMINOPHEN 1 TABLET: 5; 325 TABLET ORAL at 21:52

## 2020-04-24 RX ADMIN — ASPIRIN 81 MG: 81 TABLET, COATED ORAL at 15:06

## 2020-04-24 RX ADMIN — CEFAZOLIN 2 G: 1 INJECTION, POWDER, FOR SOLUTION INTRAMUSCULAR; INTRAVENOUS at 18:07

## 2020-04-24 RX ADMIN — INSULIN LISPRO 6 UNITS: 100 INJECTION, SOLUTION INTRAVENOUS; SUBCUTANEOUS at 07:20

## 2020-04-24 ASSESSMENT — PAIN SCALES - GENERAL
PAINLEVEL_OUTOF10: 7
PAINLEVEL_OUTOF10: 6
PAINLEVEL_OUTOF10: 5
PAINLEVEL_OUTOF10: 10
PAINLEVEL_OUTOF10: 8
PAINLEVEL_OUTOF10: 8
PAINLEVEL_OUTOF10: 3
PAINLEVEL_OUTOF10: 9
PAINLEVEL_OUTOF10: 5
PAINLEVEL_OUTOF10: 8
PAINLEVEL_OUTOF10: 7
PAINLEVEL_OUTOF10: 10
PAINLEVEL_OUTOF10: 8
PAINLEVEL_OUTOF10: 4
PAINLEVEL_OUTOF10: 10
PAINLEVEL_OUTOF10: 9

## 2020-04-24 ASSESSMENT — ENCOUNTER SYMPTOMS
ABDOMINAL DISTENTION: 0
BACK PAIN: 0
COLOR CHANGE: 0
BLOOD IN STOOL: 0
CHOKING: 0
TROUBLE SWALLOWING: 0
PHOTOPHOBIA: 0
ANAL BLEEDING: 0
RECTAL PAIN: 0
STRIDOR: 0
VOICE CHANGE: 0

## 2020-04-24 ASSESSMENT — PAIN DESCRIPTION - LOCATION
LOCATION: HIP;GROIN
LOCATION: HIP
LOCATION: HIP;GROIN
LOCATION: HIP
LOCATION: LEG

## 2020-04-24 ASSESSMENT — PAIN DESCRIPTION - FREQUENCY
FREQUENCY: CONTINUOUS

## 2020-04-24 ASSESSMENT — PAIN DESCRIPTION - PAIN TYPE
TYPE: SURGICAL PAIN
TYPE: SURGICAL PAIN;ACUTE PAIN
TYPE: SURGICAL PAIN
TYPE: ACUTE PAIN;SURGICAL PAIN
TYPE: ACUTE PAIN;SURGICAL PAIN
TYPE: SURGICAL PAIN

## 2020-04-24 ASSESSMENT — PAIN DESCRIPTION - ONSET
ONSET: ON-GOING
ONSET: ON-GOING

## 2020-04-24 ASSESSMENT — PAIN DESCRIPTION - DESCRIPTORS
DESCRIPTORS: SHARP

## 2020-04-24 ASSESSMENT — PAIN DESCRIPTION - ORIENTATION
ORIENTATION: RIGHT

## 2020-04-24 ASSESSMENT — PAIN DESCRIPTION - PROGRESSION: CLINICAL_PROGRESSION: GRADUALLY WORSENING

## 2020-04-24 NOTE — PROGRESS NOTES
Value Ref Range    POC Glucose 225 (H) 75 - 110 mg/dL   POC Glucose Fingerstick    Collection Time: 04/23/20  7:18 PM   Result Value Ref Range    POC Glucose 244 (H) 75 - 110 mg/dL   Comp Metabolic Prof    Collection Time: 04/24/20  5:18 AM   Result Value Ref Range    Glucose 225 (H) 70 - 99 mg/dL    BUN 28 (H) 8 - 23 mg/dL    CREATININE 1.50 (H) 0.70 - 1.20 mg/dL    Bun/Cre Ratio NOT REPORTED 9 - 20    Calcium 9.1 8.6 - 10.4 mg/dL    Sodium 137 135 - 144 mmol/L    Potassium 4.4 3.7 - 5.3 mmol/L    Chloride 104 98 - 107 mmol/L    CO2 20 20 - 31 mmol/L    Anion Gap 13 9 - 17 mmol/L    Alkaline Phosphatase 77 40 - 129 U/L    ALT 9 5 - 41 U/L    AST 13 <40 U/L    Total Bilirubin 0.44 0.3 - 1.2 mg/dL    Total Protein 6.3 (L) 6.4 - 8.3 g/dL    Alb 3.5 3.5 - 5.2 g/dL    Albumin/Globulin Ratio NOT REPORTED 1.0 - 2.5    GFR Non- 47 (L) >60 mL/min    GFR  57 (L) >60 mL/min    GFR Comment          GFR Staging NOT REPORTED    CBC with DIFF    Collection Time: 04/24/20  5:18 AM   Result Value Ref Range    WBC 5.5 3.5 - 11.0 k/uL    RBC 3.52 (L) 4.5 - 5.9 m/uL    Hemoglobin 10.2 (L) 13.5 - 17.5 g/dL    Hematocrit 31.0 (L) 41 - 53 %    MCV 88.2 80 - 100 fL    MCH 28.9 26 - 34 pg    MCHC 32.8 31 - 37 g/dL    RDW 13.9 11.5 - 14.9 %    Platelets 722 731 - 501 k/uL    MPV 8.2 6.0 - 12.0 fL    NRBC Automated NOT REPORTED per 100 WBC    Differential Type NOT REPORTED     Immature Granulocytes NOT REPORTED 0 %    Absolute Immature Granulocyte NOT REPORTED 0.00 - 0.30 k/uL    WBC Morphology NOT REPORTED     RBC Morphology NOT REPORTED     Platelet Estimate NOT REPORTED     Seg Neutrophils 75 (H) 36 - 66 %    Lymphocytes 12 (L) 24 - 44 %    Monocytes 10 (H) 1 - 7 %    Eosinophils % 2 0 - 4 %    Basophils 1 0 - 2 %    Segs Absolute 4.20 1.3 - 9.1 k/uL    Absolute Lymph # 0.60 (L) 1.0 - 4.8 k/uL    Absolute Mono # 0.50 0.1 - 1.3 k/uL    Absolute Eos # 0.10 0.0 - 0.4 k/uL    Basophils Absolute 0.00 0.0 - 0.2 k/uL   POC Glucose Fingerstick    Collection Time: 20  7:03 AM   Result Value Ref Range    POC Glucose 232 (H) 75 - 110 mg/dL   POC Glucose Fingerstick    Collection Time: 20 11:03 AM   Result Value Ref Range    POC Glucose 238 (H) 75 - 110 mg/dL       Physical Examination:        Vitals:  BP (!) 133/93   Pulse 128   Temp 99.1 °F (37.3 °C) (Oral)   Resp 18   Ht 6' (1.829 m)   Wt 195 lb (88.5 kg)   SpO2 99%   BMI 26.45 kg/m²   Temp (24hrs), Av.1 °F (36.7 °C), Min:97.7 °F (36.5 °C), Max:99.1 °F (37.3 °C)    Recent Labs     20  1708 20  1918 20  0703 20  1103   POCGLU 225* 244* 232* 238*         Physical Exam  Vitals signs reviewed. Constitutional:       Appearance: Normal appearance. He is not diaphoretic. HENT:      Head: Normocephalic and atraumatic. Right Ear: External ear normal.      Left Ear: External ear normal.      Nose: Nose normal.      Mouth/Throat:      Mouth: Mucous membranes are moist.      Pharynx: Oropharynx is clear. Eyes:      Conjunctiva/sclera: Conjunctivae normal.   Neck:      Musculoskeletal: Normal range of motion and neck supple. No neck rigidity. Cardiovascular:      Rate and Rhythm: Normal rate and regular rhythm. Pulses: Normal pulses. Heart sounds: Normal heart sounds. Pulmonary:      Effort: Pulmonary effort is normal.      Breath sounds: Normal breath sounds. Abdominal:      General: Bowel sounds are normal. There is no distension. Palpations: Abdomen is soft. Musculoskeletal:      Right lower leg: No edema. Left lower leg: No edema. Comments: Tender right hip   Skin:     General: Skin is warm and dry. Capillary Refill: Capillary refill takes less than 2 seconds. Coloration: Skin is not jaundiced. Neurological:      General: No focal deficit present. Mental Status: Mental status is at baseline.    Psychiatric:         Mood and Affect: Mood normal.         Behavior: Behavior normal. Assessment:        Primary Problem  Closed right hip fracture, initial encounter ([facility]     Principal Problem:    Closed right hip fracture, initial encounter ([facility]  Active Problems:    DM w/o complication type II ([facility]    Hypertension    [facility]album[facility] due to type [facility]    Stage 3 chronic kidney disease ([facility]    Osteogenesis imperfecta    Type 2 diabetes mellitus with chronic kidney disease ([facility]    Type 2 diabetes mellitus with hyperglycemia ([facility]  Resolved Problems:    * No resolved hospital problems. *      Past Medical History:   Diagnosis Date    Contracture, elbow     h/o right elbow fx, compound fracture.  Erectile dysfunction     Hypertension     Osteogenesis imperfecta     DX 6 months. 30 broken bones in past.     Type II or unspecified type diabetes mellitus without mention of complication, not stated as uncontrolled         Plan:        1. Change IV morphine 2 mg every 2 hours  2. DC Ultram  3. Norco  every 4 hours as needed for moderate pain  4. Hold Lantus  5. Hold lisinopril  6. PPI  7. DVT Prophylaxis  8. EPCs  9.  PT/OT to evaluate and treat  10.  check and replace electrolytes per sliding scale      Electronically signed by Kobe Garcia MD

## 2020-04-24 NOTE — ANESTHESIA POSTPROCEDURE EVALUATION
Department of Anesthesiology  Postprocedure Note    Patient: Con Brown  MRN: [id number]  YOB: 1955  Date of evaluation: 4/24/2020  Time:  2:01 PM     Procedure Summary     Date:  04/23/20 Room / Location:  [address] [place] [address]: J CARLOS BATEMAN    Anesthesia Start:  8672 Anesthesia Stop:  4678    Procedure:  HIP TFN WITH C-ARM VISUALIZATION (Right Hip) Diagnosis:  (FRACTURE RIGHT HIP)    Surgeon:  Florentin Kay MD Responsible Provider:  Merissa Cage MD    Anesthesia Type:  general ASA Status:  3          Anesthesia Type: general    Ovidio Phase I: Oviido Score: 8    Ovidio Phase II:      Last vitals: Reviewed and per EMR flowsheets. Anesthesia Post Evaluation    Comments: POD #1. Pt seen lying in bed. Denies any anesthesia related issues. However, because we had noticed a tiny laceration on his upper lip after intubation - he was questioned about that specifically but he said he didn't feel anything but dryness. On further examination the left upper lip appears full but there was no obvious sign of laceration.

## 2020-04-24 NOTE — PROGRESS NOTES
Physical Therapy  Facility/Department: [facility] MED SURG  Daily Treatment Note  NAME: Richard Torres  : 1955  MRN: [id number]    Date of Service: 2020    Discharge Recommendations:  Patient would benefit from continued therapy after discharge   PT Equipment Recommendations  Equipment Needed: No    Assessment   Body structures, Functions, Activity limitations: Decreased functional mobility ; Decreased endurance;Decreased ROM; Decreased strength;Decreased balance; Increased pain  Assessment: pt would benefit from additional rehab at D/C   Treatment Diagnosis: impaired mobility  Specific instructions for Next Treatment: 2020 supine bilateral LE exercise x 10-20 reps; fall risk, Right LE PWB 25%; hx osteogenesis imperfecta, COORDINATE W/ NURSING FOR PAIN MEDS PRIOR TO TREATMENT  Prognosis: Good  Decision Making: High Complexity  History: admitted following a fall at home in the bathroom  Exam: ROM, MMT, balance and mobility assessments  Clinical Presentation: supine bilateral LE exercise x 10-20 reps; fall risk, Right LE PWB 25%; hx osteogenesis imperfecta, COORDINATE W/ NURSING FOR PAIN MEDS PRIOR TO TREATMENT  PT Education: Goals;PT Role;Plan of Care;Weight-bearing Education;Transfer Training;Gait Training  Barriers to Learning: pain level- can't concentrate   REQUIRES PT FOLLOW UP: Yes  Activity Tolerance  Activity Tolerance: Patient limited by pain     Patient Diagnosis(es): The primary encounter diagnosis was Closed fracture of right hip, initial encounter ([facility] [address] Diagnoses of Elevated BUN and Elevated serum creatinine were also pertinent to this visit. has a past medical history of Contracture, elbow, Erectile dysfunction, Hypertension, Osteogenesis imperfecta, and Type II or unspecified type diabetes mellitus without mention of complication, not stated as uncontrolled. has a past surgical history that includes Cholecystectomy;  Tonsillectomy and adenoidectomy; fracture surgery (Right); fracture

## 2020-04-24 NOTE — PROGRESS NOTES
Dressing: Dependent/Total(Dependent to mike/doff footies)  Additional Comments: Bathing needs - decided based on  OT clinical reasoning skills. UE Function           LUE Strength  L Shoulder Flex: 4-/5  L Shoulder ABduction: 4-/5  L Elbow Flex: 4/5  L Elbow Ext: 4/5  L Wrist Flex: 4/5  L Wrist Ext: 4-/5  L Hand General: (flexion 4/5,ext 4-/5)     LUE Tone: Normotonic     LUE AROM (degrees)  LUE General AROM: Elbow( flexion -wfls,extension limitations w end range extension), wrist and hand -wfls  L Shoulder Flexion 0-180: 90  L Shoulder ABduction 0-180: 90     Left Hand AROM (degrees)  Left Hand AROM: WFL  RUE Strength  R Shoulder Flex: 4-/5  R Shoulder ABduction: 4-/5  R Elbow Flex: 4/5  R Elbow Ext: 4/5  R Wrist Flex: 4/5  R Wrist Ext: 4-/5  R Hand General: (flex 4/5, ext 4-/5)      RUE Tone: Normotonic  RUE PROM (degrees)  RUE General PROM: Elbow( flexion -wfls,extension limitations w end range extension), wrist and hand -wfls  R Shoulder Flex  0-180: 90  R Shoulder ABduction 0-180: 90             Fine Motor Skills  Coordination  Movements Are Fluid And Coordinated: Yes                           Mobility  Supine to Sit: 2 Person assistance, Maximum assistance  Sit to Supine: Dependent/Total(dependent x3)       Balance  Sitting Balance: Moderate assistance(Pt sat at EOB)  Standing Balance: (TBA)     Functional Mobility  Assist Level: (TBA)  Bed mobility  Rolling to Left: 2 Person assistance;Maximum assistance  Supine to Sit: 2 Person assistance;Maximum assistance  Sit to Supine: Dependent/Total(dependent x3)  Scooting: Dependent/Total((dep x 3 to boost up in bed))  Comment: Pillow used between pt's legs to position him during bed mobility. Transfers  Sit to stand: (TBA)  Stand to sit: (TBA)  Transfer Comments: TBA - deferred d/t pt having increase pain. Pt didn't want to attempt standing.        Assessment  Performance deficits / Impairments: Decreased functional mobility , Decreased ADL status, Decreased

## 2020-04-24 NOTE — CONSULTS
Physical Medicine & Rehabilitation    CHART REVIEW      Admitting Physician: Genny Munguia MD    Primary Care Provider: ADEEL Flores CNP     Reason for Consult:  Acute Inpatient Rehabilitation    Chief Complaint: Status post fall with right hip pain    History of Present Illness:  Referring Provider is requesting an evaluation for appropriate placement upon discharge from acute care. Mr. Devin Liriano is a 59 y.o. male who was admitted to [address] [place] [address] on 4/23/2020 with Hip Pain and Fall    59year-old male status post fall with right hip pain. Diagnosed with right intertrochanteric femur fracture. Elizabeth Umana He does have a history of osteogenesis imperfecta and has had multiple fractures. Last fractures were of bilateral patella in November 2019- surgery by Dr. Oquendo  post right femur intratrochanteric fracture with IM nailing by Dr. Bogdan Macdonald right hip  Status postoperative reduction and internal fixation of a comminuted inter  trochanteric fracture of the right hip bridged by hip pin and intramedullary  christiano.  Fracture alignment is improved and satisfactory.  Expected soft tissue  postsurgical changes are noted. Functional History:  PTA: Independent with all activities.     Current:  PT:  Restrictions/Precautions: Fall Risk, Weight Bearing((peripheral IV left antecubital))  Implants present? : Metal implants((right humerus and ulna (hx 7 compound fractures), right knee ORIF))  Right Lower Extremity Weight Bearing: Partial Weight Bearing   Transfers  Comment: deferred at this time  WB Status: PWB 25% right LE    Transfers  Comment: deferred at this time  Ambulation  Ambulation?: No(not ready yet)  WB Status: PWB 25% right LE      Bed mobility  Rolling to Left: 2 Person assistance;Maximum assistance  Supine to Sit: 2 Person assistance;Supervision  Sit to Supine: Dependent/Total(dep x 3)  Scooting: Dependent/Total(dep x 3 to boost up in bed)  Comment: pt dangled at the EOB with a history of Closed right hip fracture, initial encounter ([facility] [address]    1. Status post fall with right femur intertrochanteric fracture status post IM nailing- 25% weight bearing per PT note  2. Osteogenesis imperfecta with multiple fractures 30 broken bones in past  3. Right elbow contracture history of elbow fracture,   4. ?  Acute kidney injury-BUN/creatinine 28/1.5  5. Blood Loss anemia  6. Diabetes  7. Pain-Ultram    Recommendations:  1. Diagnosis: Osteogenesis imperfecta, multiple fractures, status post fall with right femur fracture status post IM nailing  2. Therapy: Await OT, low level in PT currently- rolling to left max assist to person, supine to sit to person supervision, otherwise dependent to total for sit to supine and scooting, unable to evaluate transfers or ambulation at this time  3. Medical  Necessity: As above  4. Support: clarify, spouse  5. Rehab recommendation: First day postop, possible low level due to pain,  Follow progress- acute inpatient rehab versus skilled  depending on progress ,will follow( prior per notes independent transfers and ambulation)  6. DVT proph: Lovenox     Please call with questions. Ap Clark. Luzmaria Saavedra MD          This note is created with the assistance of a speech recognition program.  While intending to generate a document that actually reflects the content of the visit, the document can still have some errors including those of syntax and sound a like substitutions which may escape proof reading.   In such instances, actual meaning can be extrapolated by contextual diversion

## 2020-04-24 NOTE — PROGRESS NOTES
Physical Therapy    Facility/Department: [facility] MED SURG  Initial Assessment    NAME: Devin Liriano  : 1955  MRN: [id number]    Date of Service: 2020    Discharge Recommendations:  Patient would benefit from continued therapy after discharge   PT Equipment Recommendations  Equipment Needed: No    Assessment   Body structures, Functions, Activity limitations: Decreased functional mobility ; Decreased endurance;Decreased ROM; Decreased strength;Decreased balance; Increased pain  Assessment: pt would benefit from additional rehab at D/C   Treatment Diagnosis: impaired mobility  Specific instructions for Next Treatment: 2020 max x 2 for rolling, max x 2 supine > sit, dep x 3 for sit > supine & to boost up in bed; dangled 6 minutes at the EOB w/ mod x 1, POOR SITTING BALANCE- fall risk, Right LE PWB 25%; hx osteogenesis imperfecta, COORDINATE W/ NURSING FOR PAIN MEDS PRIOR TO TREATMENT  Prognosis: Good  Decision Making: High Complexity  History: admitted following a fall at home in the bathroom  Exam: ROM, MMT, balance and mobility assessments  Clinical Presentation: max x 2 for rolling, max x 2 supine > sit, dep x 3 for sit > supine & to boost up in bed; dangled 6 minutes at the EOB w/ mod x 1, POOR SITTING BALANCE- fall risk, Right LE PWB 25%; hx osteogenesis imperfecta, COORDINATE W/ NURSING FOR PAIN MEDS PRIOR TO TREATMENT  PT Education: Goals;PT Role;Plan of Care;Weight-bearing Education;Transfer Training;Gait Training  Barriers to Learning: pain level- can't concentrate   REQUIRES PT FOLLOW UP: Yes  Activity Tolerance  Activity Tolerance: Patient limited by pain       Patient Diagnosis(es): The primary encounter diagnosis was Closed fracture of right hip, initial encounter ([facility] [address] Diagnoses of Elevated BUN and Elevated serum creatinine were also pertinent to this visit.      has a past medical history of Contracture, elbow, Erectile dysfunction, Hypertension, Osteogenesis imperfecta, and Type II or Currently in Pain: Yes       Orientation  Orientation  Overall Orientation Status: Impaired(place: [facility], month: May then corrected to June, year: 2020, President: Bar Ndiaye, YOB: 1955)  Orientation Level: Oriented to place; Disoriented to time;Oriented to situation;Oriented to person  Social/Functional History  Social/Functional History  Lives With: Spouse  Type of Home: Mobile home  Home Layout: One level  Home Access: Stairs to enter with rails  Entrance Stairs - Number of Steps: 2  Entrance Stairs - Rails: Both  Bathroom Shower/Tub: Walk-in shower, Shower chair with back, Shower chair without back(unable to use either shower chair- unable to bend knees enough to use)  Bathroom Toilet: Handicap height  Bathroom Equipment: Grab bars in shower, Toilet raiser, Grab bars around toilet  Bathroom Accessibility: [facility] accessible(has to turn side ways when walking )  Home Equipment: Rolling walker, Wheelchair-manual, Cane, Reacher, Sock aid, Long-handled shoehorn  Receives Help From: Family  ADL Assistance: Independent(stands to take showers- unable to bend knees enough to sit to take a shower)  Homemaking Assistance: Needs assistance(spouse is primary for cooking, cleaning, grocery shopping  and laundry)  Homemaking Responsibilities: No  Ambulation Assistance: Independent  Transfer Assistance: Independent  Active : Yes  Mode of Transportation: [facility]  Occupation: Retired  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  Leisure & Hobbies: golf, shoot pool  Cognition        Objective     Observation/Palpation  Observation: peripheral IV left antecubital, surgical dressing right lateral hip    AROM RLE (degrees)  RLE AROM: Exceptions  RLE General AROM: limited motion due to poor pain control- previous right knee flexion contracture  R Hip Flexion 0-125: AAROM 10-35 degrees  R Hip ABduction 0-45: AAROM 0-20 degrees  R Hip ADduction 0-10: AAROM to neutral from abducted position  R Knee Flexion 0-145:  AAROM 20-45 degrees- HX OF PREVIOUS KNEE FLEXION CONTRACTURE PER DTR  R Knee Extension 0: appears to lack around 20 degrees from neutral  R Ankle Dorsiflexion 0-20: WFL  R Ankle Plantar Flexion 0-45: WFL  AROM LLE (degrees)  LLE AROM : Exceptions  LLE General AROM: limited due to increased pain on contralateral side  L Hip Flexion 0-125: AAROM 0-60  L Hip ABduction 0-45: AAROM 0-25  L Hip ADduction 0-10: AAROM to neutral from abducted position  L Knee Flexion 0-145: AAROM 0-75  L Knee Extension 0: 0  L Ankle Dorsiflexion 0-20: WFL  L Ankle Plantar Flexion 0-45: WFL  AROM RUE (degrees)  RUE General AROM: see OT for UE assessment  AROM LUE (degrees)  LUE General AROM: see OT for UE assessment  Strength RLE  Strength RLE: Exception  Comment: poor tolerance to MMT due to poor pain control  R Hip Flexion: 2-/5  R Hip ABduction: 2/5  R Hip ADduction: 2/5  R Knee Flexion: 2/5  R Knee Extension: 2-/5  R Ankle Dorsiflexion: 4-/5  R Ankle Plantar flexion: 4-/5  Strength LLE  Strength LLE: Exception  L Hip Flexion: 2+/5  L Hip ABduction: 3-/5  L Hip ADduction: 3-/5  L Knee Flexion: 3-/5  L Knee Extension: 3-/5  L Ankle Dorsiflexion: 4+/5  L Ankle Plantar Flexion: 4+/5  Strength RUE  Comment: see OT for UE assessment  Strength LUE  Comment: see OT for UE assessment     Sensation  Overall Sensation Status: Impaired(C/O neuropathy bilaterl feet- sole of foot)  Bed mobility  Rolling to Left: 2 Person assistance;Maximum assistance  Supine to Sit: 2 Person assistance; Maximum assistance  Sit to Supine: Dependent/Total(dep x 3)  Scooting: Dependent/Total(dep x 3 to boost up in bed)  Comment: pt dangled at the EOB x 6 minutes w/ mod x 1- tends to lean at a diagonal to the left and posteriorly - unable to flex at trunk; had issues w/ bleeding from IV site at left antecubital- nurse Ernestine Stain had to pull IV site- gown and linen changed  Transfers  Comment: deferred at this time  Ambulation  Ambulation?: No(not ready yet)  WB Status: PWB 25% right LE  Stairs/Curb  Stairs?: No     Balance  Sitting - Static: Poor  Sitting - Dynamic: Poor  Standing - Static: (NT)  Standing - Dynamic: (NT)        Plan   Plan  Times per week: BID x 5 days  Times per day: (BID x 5 days)  Specific instructions for Next Treatment: 4- max x 2 for rolling, max x 2 supine > sit, dep x 3 for sit > supine & to boost up in bed; dangled 6 minutes at the EOB w/ mod x 1, POOR SITTING BALANCE- fall risk, Right LE PWB 25%; hx osteogenesis imperfecta, COORDINATE W/ NURSING FOR PAIN MEDS PRIOR TO TREATMENT  Current Treatment Recommendations: Strengthening, Gait Training, Patient/Caregiver Education & Training, ROM, Balance Training, Endurance Training, Functional Mobility Training, Transfer Training, Safety Education & Training  Safety Devices  Type of devices: Bed alarm in place, All fall risk precautions in place, Call light within reach, Gait belt, Patient at risk for falls, Left in bed, Nurse notified(nurse García)    G-Code       OutComes Score                                                  AM-PAC Score  AM-PAC Inpatient Mobility Raw Score : 6 (04/24/20 0915)  AM-PAC Inpatient T-Scale Score : 23.55 (04/24/20 0915)  Mobility Inpatient CMS 0-100% Score: 100 (04/24/20 0915)  Mobility Inpatient CMS G-Code Modifier : CN (04/24/20 0915)          Goals  Short term goals  Time Frame for Short term goals: BID x 5 days  Short term goal 1: pt to tolerate 1/2 hour BID for therapuetic exercise and activity  Short term goal 2: pt to tolerate gentle ROM and strengthening exercises bilateral LEs  Short term goal 3: pt to demonstrate increased strength by 1/2 MMG bilateral LEs  Short term goal 4: pt to demonstrate rolling and supine <> sit w/ mod x 2  Short term goal 5: pt to tolerate dangling at the EOB x 15 minutes w/ min x 1 demonstrating fair or better balance  Short term goal 6: pt to demonstrate sit <> stand and bed <> chair using w walker right PWB 25% w/ mod x 2  Short term

## 2020-04-25 LAB
-: ABNORMAL
ABSOLUTE EOS #: 0 K/UL (ref 0–0.4)
ABSOLUTE IMMATURE GRANULOCYTE: ABNORMAL K/UL (ref 0–0.3)
ABSOLUTE LYMPH #: 0.5 K/UL (ref 1–4.8)
ABSOLUTE MONO #: 0.8 K/UL (ref 0.1–1.3)
ALBUMIN SERPL-MCNC: 3.4 G/DL (ref 3.5–5.2)
ALBUMIN/GLOBULIN RATIO: ABNORMAL (ref 1–2.5)
ALP BLD-CCNC: 82 U/L (ref 40–129)
ALT SERPL-CCNC: 6 U/L (ref 5–41)
AMORPHOUS: ABNORMAL
ANION GAP SERPL CALCULATED.3IONS-SCNC: 14 MMOL/L (ref 9–17)
AST SERPL-CCNC: 14 U/L
BACTERIA: ABNORMAL
BASOPHILS # BLD: 1 % (ref 0–2)
BASOPHILS ABSOLUTE: 0.1 K/UL (ref 0–0.2)
BILIRUB SERPL-MCNC: 0.9 MG/DL (ref 0.3–1.2)
BILIRUBIN URINE: NEGATIVE
BUN BLDV-MCNC: 21 MG/DL (ref 8–23)
BUN/CREAT BLD: ABNORMAL (ref 9–20)
CALCIUM SERPL-MCNC: 9.8 MG/DL (ref 8.6–10.4)
CASTS UA: ABNORMAL /LPF
CHLORIDE BLD-SCNC: 97 MMOL/L (ref 98–107)
CO2: 19 MMOL/L (ref 20–31)
COLOR: YELLOW
COMMENT UA: ABNORMAL
CREAT SERPL-MCNC: 1.35 MG/DL (ref 0.7–1.2)
CRYSTALS, UA: ABNORMAL /HPF
DIFFERENTIAL TYPE: ABNORMAL
EOSINOPHILS RELATIVE PERCENT: 1 % (ref 0–4)
EPITHELIAL CELLS UA: ABNORMAL /HPF
GFR AFRICAN AMERICAN: >60 ML/MIN
GFR NON-AFRICAN AMERICAN: 53 ML/MIN
GFR SERPL CREATININE-BSD FRML MDRD: ABNORMAL ML/MIN/{1.73_M2}
GFR SERPL CREATININE-BSD FRML MDRD: ABNORMAL ML/MIN/{1.73_M2}
GLUCOSE BLD-MCNC: 246 MG/DL (ref 75–110)
GLUCOSE BLD-MCNC: 282 MG/DL (ref 70–99)
GLUCOSE BLD-MCNC: 297 MG/DL (ref 75–110)
GLUCOSE BLD-MCNC: 325 MG/DL (ref 75–110)
GLUCOSE BLD-MCNC: 354 MG/DL (ref 75–110)
GLUCOSE URINE: ABNORMAL
HCT VFR BLD CALC: 31.8 % (ref 41–53)
HEMOGLOBIN: 10.5 G/DL (ref 13.5–17.5)
IMMATURE GRANULOCYTES: ABNORMAL %
KETONES, URINE: NEGATIVE
LEUKOCYTE ESTERASE, URINE: NEGATIVE
LYMPHOCYTES # BLD: 7 % (ref 24–44)
MCH RBC QN AUTO: 29.1 PG (ref 26–34)
MCHC RBC AUTO-ENTMCNC: 33.1 G/DL (ref 31–37)
MCV RBC AUTO: 88 FL (ref 80–100)
MONOCYTES # BLD: 10 % (ref 1–7)
MUCUS: ABNORMAL
NITRITE, URINE: NEGATIVE
NRBC AUTOMATED: ABNORMAL PER 100 WBC
OTHER OBSERVATIONS UA: ABNORMAL
PDW BLD-RTO: 13.8 % (ref 11.5–14.9)
PH UA: 5.5 (ref 5–8)
PLATELET # BLD: 178 K/UL (ref 150–450)
PLATELET ESTIMATE: ABNORMAL
PMV BLD AUTO: 8.4 FL (ref 6–12)
POTASSIUM SERPL-SCNC: 4.3 MMOL/L (ref 3.7–5.3)
PROTEIN UA: ABNORMAL
RBC # BLD: 3.62 M/UL (ref 4.5–5.9)
RBC # BLD: ABNORMAL 10*6/UL
RBC UA: ABNORMAL /HPF
RENAL EPITHELIAL, UA: ABNORMAL /HPF
SEG NEUTROPHILS: 81 % (ref 36–66)
SEGMENTED NEUTROPHILS ABSOLUTE COUNT: 6.5 K/UL (ref 1.3–9.1)
SODIUM BLD-SCNC: 130 MMOL/L (ref 135–144)
SPECIFIC GRAVITY UA: 1.03 (ref 1–1.03)
TOTAL PROTEIN: 7 G/DL (ref 6.4–8.3)
TRICHOMONAS: ABNORMAL
TURBIDITY: ABNORMAL
URINE HGB: ABNORMAL
UROBILINOGEN, URINE: NORMAL
WBC # BLD: 8 K/UL (ref 3.5–11)
WBC # BLD: ABNORMAL 10*3/UL
WBC UA: ABNORMAL /HPF
YEAST: ABNORMAL

## 2020-04-25 PROCEDURE — 96372 THER/PROPH/DIAG INJ SC/IM: CPT

## 2020-04-25 PROCEDURE — 6360000002 HC RX W HCPCS: Performed by: ORTHOPAEDIC SURGERY

## 2020-04-25 PROCEDURE — 6370000000 HC RX 637 (ALT 250 FOR IP): Performed by: INTERNAL MEDICINE

## 2020-04-25 PROCEDURE — 36415 COLL VENOUS BLD VENIPUNCTURE: CPT

## 2020-04-25 PROCEDURE — 6370000000 HC RX 637 (ALT 250 FOR IP): Performed by: FAMILY MEDICINE

## 2020-04-25 PROCEDURE — 97530 THERAPEUTIC ACTIVITIES: CPT

## 2020-04-25 PROCEDURE — 6370000000 HC RX 637 (ALT 250 FOR IP): Performed by: ORTHOPAEDIC SURGERY

## 2020-04-25 PROCEDURE — 6360000002 HC RX W HCPCS: Performed by: INTERNAL MEDICINE

## 2020-04-25 PROCEDURE — 85025 COMPLETE CBC W/AUTO DIFF WBC: CPT

## 2020-04-25 PROCEDURE — 82947 ASSAY GLUCOSE BLOOD QUANT: CPT

## 2020-04-25 PROCEDURE — 2580000003 HC RX 258: Performed by: ORTHOPAEDIC SURGERY

## 2020-04-25 PROCEDURE — 97110 THERAPEUTIC EXERCISES: CPT

## 2020-04-25 PROCEDURE — 6360000002 HC RX W HCPCS: Performed by: FAMILY MEDICINE

## 2020-04-25 PROCEDURE — 1200000000 HC SEMI PRIVATE

## 2020-04-25 PROCEDURE — 81001 URINALYSIS AUTO W/SCOPE: CPT

## 2020-04-25 PROCEDURE — 80053 COMPREHEN METABOLIC PANEL: CPT

## 2020-04-25 RX ORDER — INSULIN GLARGINE 100 [IU]/ML
20 INJECTION, SOLUTION SUBCUTANEOUS 2 TIMES DAILY
Status: DISCONTINUED | OUTPATIENT
Start: 2020-04-25 | End: 2020-04-26

## 2020-04-25 RX ADMIN — ASPIRIN 81 MG: 81 TABLET, COATED ORAL at 08:58

## 2020-04-25 RX ADMIN — HYDROCODONE BITARTRATE AND ACETAMINOPHEN 1 TABLET: 5; 325 TABLET ORAL at 19:27

## 2020-04-25 RX ADMIN — PANTOPRAZOLE SODIUM 40 MG: 40 TABLET, DELAYED RELEASE ORAL at 08:58

## 2020-04-25 RX ADMIN — LORAZEPAM 0.5 MG: 2 INJECTION INTRAMUSCULAR; INTRAVENOUS at 03:00

## 2020-04-25 RX ADMIN — CEFAZOLIN 2 G: 1 INJECTION, POWDER, FOR SOLUTION INTRAMUSCULAR; INTRAVENOUS at 01:56

## 2020-04-25 RX ADMIN — HYDROCODONE BITARTRATE AND ACETAMINOPHEN 1 TABLET: 5; 325 TABLET ORAL at 06:00

## 2020-04-25 RX ADMIN — SENNOSIDES AND DOCUSATE SODIUM 1 TABLET: 8.6; 5 TABLET ORAL at 08:57

## 2020-04-25 RX ADMIN — METOPROLOL SUCCINATE 50 MG: 50 TABLET, EXTENDED RELEASE ORAL at 08:58

## 2020-04-25 RX ADMIN — Medication 10 ML: at 08:58

## 2020-04-25 RX ADMIN — SERTRALINE HYDROCHLORIDE 100 MG: 100 TABLET ORAL at 19:27

## 2020-04-25 RX ADMIN — INSULIN LISPRO 15 UNITS: 100 INJECTION, SOLUTION INTRAVENOUS; SUBCUTANEOUS at 17:11

## 2020-04-25 RX ADMIN — HYDROCODONE BITARTRATE AND ACETAMINOPHEN 1 TABLET: 5; 325 TABLET ORAL at 23:22

## 2020-04-25 RX ADMIN — METOPROLOL SUCCINATE 50 MG: 50 TABLET, EXTENDED RELEASE ORAL at 19:26

## 2020-04-25 RX ADMIN — INSULIN LISPRO 9 UNITS: 100 INJECTION, SOLUTION INTRAVENOUS; SUBCUTANEOUS at 08:04

## 2020-04-25 RX ADMIN — INSULIN LISPRO 12 UNITS: 100 INJECTION, SOLUTION INTRAVENOUS; SUBCUTANEOUS at 11:33

## 2020-04-25 RX ADMIN — MORPHINE SULFATE 2 MG: 2 INJECTION, SOLUTION INTRAMUSCULAR; INTRAVENOUS at 20:40

## 2020-04-25 RX ADMIN — ENOXAPARIN SODIUM 40 MG: 40 INJECTION SUBCUTANEOUS at 08:58

## 2020-04-25 RX ADMIN — HYDROCODONE BITARTRATE AND ACETAMINOPHEN 1 TABLET: 5; 325 TABLET ORAL at 10:40

## 2020-04-25 RX ADMIN — SENNOSIDES AND DOCUSATE SODIUM 1 TABLET: 8.6; 5 TABLET ORAL at 19:27

## 2020-04-25 RX ADMIN — INSULIN GLARGINE 20 UNITS: 100 INJECTION, SOLUTION SUBCUTANEOUS at 12:44

## 2020-04-25 RX ADMIN — INSULIN GLARGINE 20 UNITS: 100 INJECTION, SOLUTION SUBCUTANEOUS at 22:02

## 2020-04-25 RX ADMIN — TRAZODONE HYDROCHLORIDE 100 MG: 100 TABLET ORAL at 22:02

## 2020-04-25 RX ADMIN — HYDROCODONE BITARTRATE AND ACETAMINOPHEN 1 TABLET: 5; 325 TABLET ORAL at 01:56

## 2020-04-25 RX ADMIN — HYDROCODONE BITARTRATE AND ACETAMINOPHEN 1 TABLET: 5; 325 TABLET ORAL at 15:11

## 2020-04-25 RX ADMIN — INSULIN LISPRO 3 UNITS: 100 INJECTION, SOLUTION INTRAVENOUS; SUBCUTANEOUS at 22:02

## 2020-04-25 RX ADMIN — Medication 10 ML: at 19:27

## 2020-04-25 ASSESSMENT — PAIN DESCRIPTION - ORIENTATION
ORIENTATION: RIGHT
ORIENTATION_2: LOWER
ORIENTATION: RIGHT

## 2020-04-25 ASSESSMENT — PAIN SCALES - GENERAL
PAINLEVEL_OUTOF10: 9
PAINLEVEL_OUTOF10: 6
PAINLEVEL_OUTOF10: 3
PAINLEVEL_OUTOF10: 6
PAINLEVEL_OUTOF10: 4
PAINLEVEL_OUTOF10: 8
PAINLEVEL_OUTOF10: 9
PAINLEVEL_OUTOF10: 8
PAINLEVEL_OUTOF10: 5
PAINLEVEL_OUTOF10: 6
PAINLEVEL_OUTOF10: 5
PAINLEVEL_OUTOF10: 6
PAINLEVEL_OUTOF10: 9
PAINLEVEL_OUTOF10: 6
PAINLEVEL_OUTOF10: 7
PAINLEVEL_OUTOF10: 7
PAINLEVEL_OUTOF10: 5

## 2020-04-25 ASSESSMENT — PAIN DESCRIPTION - PAIN TYPE
TYPE: SURGICAL PAIN
TYPE: SURGICAL PAIN
TYPE_2: CHRONIC PAIN
TYPE: ACUTE PAIN;CHRONIC PAIN;SURGICAL PAIN
TYPE: SURGICAL PAIN

## 2020-04-25 ASSESSMENT — PAIN DESCRIPTION - LOCATION
LOCATION: HIP
LOCATION_2: BACK

## 2020-04-25 ASSESSMENT — PAIN DESCRIPTION - DURATION: DURATION_2: CONTINUOUS

## 2020-04-25 ASSESSMENT — PAIN DESCRIPTION - DESCRIPTORS
DESCRIPTORS_2: ACHING;SORE
DESCRIPTORS: SHARP

## 2020-04-25 ASSESSMENT — PAIN DESCRIPTION - FREQUENCY: FREQUENCY: CONTINUOUS

## 2020-04-25 ASSESSMENT — PAIN DESCRIPTION - PROGRESSION: CLINICAL_PROGRESSION: NOT CHANGED

## 2020-04-25 ASSESSMENT — PAIN - FUNCTIONAL ASSESSMENT: PAIN_FUNCTIONAL_ASSESSMENT: PREVENTS OR INTERFERES WITH MANY ACTIVE NOT PASSIVE ACTIVITIES

## 2020-04-25 NOTE — PROGRESS NOTES
Pain: Yes  Pain Level: 9  Overall Orientation Status: Within Functional Limits     Pain Assessment  Pain Assessment: 0-10  Pain Level: 9  Pain Type: Acute pain, Chronic pain, Surgical pain  Clinical Progression: Not changed  Non-Pharmaceutical Pain Intervention(s): Distraction, Repositioned, Therapeutic presence    Objective  Cognition  Overall Cognitive Status: WFL  Attention Span: Attends with cues to redirect; Difficulty attending to directions(Pain and anxiety limited )  Memory: Appears intact  Safety Judgement: Decreased awareness of need for assistance;Decreased awareness of need for safety  Problem Solving: Assistance required to generate solutions;Assistance required to implement solutions  Insights: Decreased awareness of deficits  Initiation: Requires cues for some  Bed mobility  Rolling to Left: 2 Person assistance;Maximum assistance  Supine to Sit: 2 Person assistance;Maximum assistance  Scooting: Dependent/Total;2 Person assistance  Balance  Sitting Balance: Moderate assistance         ADL  Feeding: Setup; Increased time to complete  Grooming: Moderate assistance  UE Bathing: Moderate assistance  LE Bathing: Dependent/Total  UE Dressing: Moderate assistance  LE Dressing: Dependent/Total  Toileting: Dependent/Total  Additional Comments: Bathing needs - based on OT observation and clinical reasoning skills. Assessment  Performance deficits / Impairments: Decreased functional mobility ; Decreased safe awareness;Decreased ADL status; Decreased endurance;Decreased strength;Decreased ROM  Activity Tolerance: Patient limited by pain; Patient limited by fatigue  Activity Tolerance: Pt's rt hip/rt leg pain limits his movement which is restricted by limited limited typical Knee range   Type of devices: Patient at risk for falls; Left in bed;Call light within reach             Patient Education:     Learner:patient  Method: demonstration and explanation       Outcome: needs

## 2020-04-25 NOTE — PLAN OF CARE
Problem: Falls - Risk of:  Goal: Will remain free from falls  Description: Will remain free from falls  4/25/2020 1741 by Alis Peterson RN  Outcome: Met This Shift  Note: No falls this shift, call light within reach, bed rails up x2, bed in lowest position. 4/25/2020 0437 by Donald Aceves RN  Outcome: Met This Shift  Note: Pt. Free of falls and injuries this shift. Goal: Absence of physical injury  Description: Absence of physical injury  4/25/2020 1741 by Alis Peterson RN  Outcome: Met This Shift  Note: No injury this shift.    4/25/2020 0437 by Donald Aceves RN  Outcome: Met This Shift     Problem: Musculor/Skeletal Functional Status  Goal: Absence of falls  4/25/2020 1741 by Alis Peterson RN  Outcome: Met This Shift  4/25/2020 0437 by Donald Aceves RN  Outcome: Met This Shift

## 2020-04-25 NOTE — CARE COORDINATION
DISCHARGE PLANNING NOTE:    Patient and family would like [facility]. Referral made and they are considering accepting this patient - Will made decision on Monday per LSW notes. If not accepted at [facility], patient will discharge to his daughter Santa Chew home - Contact her with any discharge questions. Will continue to follow along.      Electronically signed by Gina Rg RN on 4/25/2020 at 12:59 PM

## 2020-04-25 NOTE — FLOWSHEET NOTE
Pt's wife Teodora Suarez was at bedside. Pt said it's been a \"rough\" morning with pain, wondering if he cracked a rib in the fall. He is discouraged to be \"back at this point. \" Writer provided emotional support and listening presence. 04/25/20 1147   Encounter Summary   Services provided to: Patient and family together   Referral/Consult From: Rounding   Continue Visiting   (4-25-20)   Complexity of Encounter Moderate   Length of Encounter 15 minutes   Routine   Type Follow up   Grief and Life Adjustment   Type New Diagnosis   Assessment Anxious  (Frustrated)   Intervention Active listening;Explored feelings, thoughts, concerns;Sustaining presence/ Ministry of presence; Discussed illness/injury and it's impact   Outcome Expressed gratitude;Engaged in conversation;Expressed feelings/needs/concerns;Venting emotion

## 2020-04-26 LAB
ABSOLUTE EOS #: 0.3 K/UL (ref 0–0.4)
ABSOLUTE IMMATURE GRANULOCYTE: ABNORMAL K/UL (ref 0–0.3)
ABSOLUTE LYMPH #: 0.7 K/UL (ref 1–4.8)
ABSOLUTE MONO #: 0.5 K/UL (ref 0.1–1.3)
ALBUMIN SERPL-MCNC: 3.3 G/DL (ref 3.5–5.2)
ALBUMIN/GLOBULIN RATIO: ABNORMAL (ref 1–2.5)
ALP BLD-CCNC: 76 U/L (ref 40–129)
ALT SERPL-CCNC: <5 U/L (ref 5–41)
ANION GAP SERPL CALCULATED.3IONS-SCNC: 11 MMOL/L (ref 9–17)
AST SERPL-CCNC: 13 U/L
BASOPHILS # BLD: 1 % (ref 0–2)
BASOPHILS ABSOLUTE: 0 K/UL (ref 0–0.2)
BILIRUB SERPL-MCNC: 0.58 MG/DL (ref 0.3–1.2)
BUN BLDV-MCNC: 31 MG/DL (ref 8–23)
BUN/CREAT BLD: ABNORMAL (ref 9–20)
CALCIUM SERPL-MCNC: 9.9 MG/DL (ref 8.6–10.4)
CHLORIDE BLD-SCNC: 98 MMOL/L (ref 98–107)
CO2: 24 MMOL/L (ref 20–31)
CREAT SERPL-MCNC: 1.38 MG/DL (ref 0.7–1.2)
DIFFERENTIAL TYPE: ABNORMAL
EOSINOPHILS RELATIVE PERCENT: 5 % (ref 0–4)
GFR AFRICAN AMERICAN: >60 ML/MIN
GFR NON-AFRICAN AMERICAN: 52 ML/MIN
GFR SERPL CREATININE-BSD FRML MDRD: ABNORMAL ML/MIN/{1.73_M2}
GFR SERPL CREATININE-BSD FRML MDRD: ABNORMAL ML/MIN/{1.73_M2}
GLUCOSE BLD-MCNC: 291 MG/DL (ref 75–110)
GLUCOSE BLD-MCNC: 294 MG/DL (ref 75–110)
GLUCOSE BLD-MCNC: 300 MG/DL (ref 75–110)
GLUCOSE BLD-MCNC: 302 MG/DL (ref 75–110)
GLUCOSE BLD-MCNC: 334 MG/DL (ref 70–99)
HCT VFR BLD CALC: 29 % (ref 41–53)
HEMOGLOBIN: 9.6 G/DL (ref 13.5–17.5)
IMMATURE GRANULOCYTES: ABNORMAL %
LYMPHOCYTES # BLD: 11 % (ref 24–44)
MCH RBC QN AUTO: 29 PG (ref 26–34)
MCHC RBC AUTO-ENTMCNC: 32.9 G/DL (ref 31–37)
MCV RBC AUTO: 88 FL (ref 80–100)
MONOCYTES # BLD: 9 % (ref 1–7)
NRBC AUTOMATED: ABNORMAL PER 100 WBC
PDW BLD-RTO: 13.7 % (ref 11.5–14.9)
PLATELET # BLD: 180 K/UL (ref 150–450)
PLATELET ESTIMATE: ABNORMAL
PMV BLD AUTO: 8.3 FL (ref 6–12)
POTASSIUM SERPL-SCNC: 4.6 MMOL/L (ref 3.7–5.3)
RBC # BLD: 3.3 M/UL (ref 4.5–5.9)
RBC # BLD: ABNORMAL 10*6/UL
SEG NEUTROPHILS: 74 % (ref 36–66)
SEGMENTED NEUTROPHILS ABSOLUTE COUNT: 4.7 K/UL (ref 1.3–9.1)
SODIUM BLD-SCNC: 133 MMOL/L (ref 135–144)
TOTAL PROTEIN: 6.9 G/DL (ref 6.4–8.3)
WBC # BLD: 6.2 K/UL (ref 3.5–11)
WBC # BLD: ABNORMAL 10*3/UL

## 2020-04-26 PROCEDURE — 80053 COMPREHEN METABOLIC PANEL: CPT

## 2020-04-26 PROCEDURE — 6360000002 HC RX W HCPCS: Performed by: FAMILY MEDICINE

## 2020-04-26 PROCEDURE — 2580000003 HC RX 258: Performed by: ORTHOPAEDIC SURGERY

## 2020-04-26 PROCEDURE — 85025 COMPLETE CBC W/AUTO DIFF WBC: CPT

## 2020-04-26 PROCEDURE — 97110 THERAPEUTIC EXERCISES: CPT

## 2020-04-26 PROCEDURE — 6360000002 HC RX W HCPCS: Performed by: ORTHOPAEDIC SURGERY

## 2020-04-26 PROCEDURE — 82947 ASSAY GLUCOSE BLOOD QUANT: CPT

## 2020-04-26 PROCEDURE — 6370000000 HC RX 637 (ALT 250 FOR IP): Performed by: FAMILY MEDICINE

## 2020-04-26 PROCEDURE — 97530 THERAPEUTIC ACTIVITIES: CPT

## 2020-04-26 PROCEDURE — 6370000000 HC RX 637 (ALT 250 FOR IP): Performed by: ORTHOPAEDIC SURGERY

## 2020-04-26 PROCEDURE — 36415 COLL VENOUS BLD VENIPUNCTURE: CPT

## 2020-04-26 PROCEDURE — 6370000000 HC RX 637 (ALT 250 FOR IP): Performed by: INTERNAL MEDICINE

## 2020-04-26 PROCEDURE — 1200000000 HC SEMI PRIVATE

## 2020-04-26 RX ORDER — ACETAMINOPHEN 500 MG
1000 TABLET ORAL EVERY 6 HOURS
Status: DISCONTINUED | OUTPATIENT
Start: 2020-04-26 | End: 2020-04-27 | Stop reason: HOSPADM

## 2020-04-26 RX ORDER — TRAZODONE HYDROCHLORIDE 100 MG/1
100 TABLET ORAL NIGHTLY
Status: CANCELLED | OUTPATIENT
Start: 2020-04-26

## 2020-04-26 RX ORDER — DEXTROSE MONOHYDRATE 50 MG/ML
100 INJECTION, SOLUTION INTRAVENOUS PRN
Status: CANCELLED | OUTPATIENT
Start: 2020-04-26

## 2020-04-26 RX ORDER — HYDROCODONE BITARTRATE AND ACETAMINOPHEN 5; 325 MG/1; MG/1
1 TABLET ORAL EVERY 4 HOURS PRN
Qty: 42 TABLET | Refills: 0 | Status: ON HOLD | OUTPATIENT
Start: 2020-04-26 | End: 2020-05-21 | Stop reason: HOSPADM

## 2020-04-26 RX ORDER — NICOTINE POLACRILEX 4 MG
15 LOZENGE BUCCAL PRN
Status: CANCELLED | OUTPATIENT
Start: 2020-04-26

## 2020-04-26 RX ORDER — INSULIN GLARGINE 100 [IU]/ML
40 INJECTION, SOLUTION SUBCUTANEOUS 2 TIMES DAILY
Status: DISCONTINUED | OUTPATIENT
Start: 2020-04-26 | End: 2020-04-27

## 2020-04-26 RX ORDER — INSULIN GLARGINE 100 [IU]/ML
20 INJECTION, SOLUTION SUBCUTANEOUS 2 TIMES DAILY
Status: CANCELLED | OUTPATIENT
Start: 2020-04-26

## 2020-04-26 RX ORDER — SODIUM CHLORIDE 0.9 % (FLUSH) 0.9 %
10 SYRINGE (ML) INJECTION PRN
Status: CANCELLED | OUTPATIENT
Start: 2020-04-26

## 2020-04-26 RX ORDER — SENNA AND DOCUSATE SODIUM 50; 8.6 MG/1; MG/1
1 TABLET, FILM COATED ORAL 2 TIMES DAILY
Status: CANCELLED | OUTPATIENT
Start: 2020-04-26

## 2020-04-26 RX ORDER — OXYCODONE HYDROCHLORIDE 5 MG/1
5 TABLET ORAL EVERY 4 HOURS PRN
Status: DISCONTINUED | OUTPATIENT
Start: 2020-04-26 | End: 2020-04-27 | Stop reason: HOSPADM

## 2020-04-26 RX ORDER — ASPIRIN 81 MG/1
81 TABLET ORAL DAILY
Status: CANCELLED | OUTPATIENT
Start: 2020-04-27

## 2020-04-26 RX ORDER — SERTRALINE HYDROCHLORIDE 100 MG/1
100 TABLET, FILM COATED ORAL NIGHTLY
Status: CANCELLED | OUTPATIENT
Start: 2020-04-26

## 2020-04-26 RX ORDER — HYDROCODONE BITARTRATE AND ACETAMINOPHEN 5; 325 MG/1; MG/1
1 TABLET ORAL EVERY 4 HOURS PRN
Status: CANCELLED | OUTPATIENT
Start: 2020-04-26

## 2020-04-26 RX ORDER — PANTOPRAZOLE SODIUM 40 MG/1
40 TABLET, DELAYED RELEASE ORAL DAILY
Status: CANCELLED | OUTPATIENT
Start: 2020-04-27

## 2020-04-26 RX ORDER — SODIUM CHLORIDE 0.9 % (FLUSH) 0.9 %
10 SYRINGE (ML) INJECTION EVERY 12 HOURS SCHEDULED
Status: CANCELLED | OUTPATIENT
Start: 2020-04-26

## 2020-04-26 RX ORDER — METOPROLOL SUCCINATE 50 MG/1
50 TABLET, EXTENDED RELEASE ORAL 2 TIMES DAILY
Status: CANCELLED | OUTPATIENT
Start: 2020-04-26

## 2020-04-26 RX ORDER — DEXTROSE MONOHYDRATE 25 G/50ML
12.5 INJECTION, SOLUTION INTRAVENOUS PRN
Status: CANCELLED | OUTPATIENT
Start: 2020-04-26

## 2020-04-26 RX ADMIN — OXYCODONE 5 MG: 5 TABLET ORAL at 20:59

## 2020-04-26 RX ADMIN — INSULIN GLARGINE 20 UNITS: 100 INJECTION, SOLUTION SUBCUTANEOUS at 07:55

## 2020-04-26 RX ADMIN — INSULIN GLARGINE 40 UNITS: 100 INJECTION, SOLUTION SUBCUTANEOUS at 20:59

## 2020-04-26 RX ADMIN — METOPROLOL SUCCINATE 50 MG: 50 TABLET, EXTENDED RELEASE ORAL at 07:55

## 2020-04-26 RX ADMIN — INSULIN LISPRO 5 UNITS: 100 INJECTION, SOLUTION INTRAVENOUS; SUBCUTANEOUS at 20:59

## 2020-04-26 RX ADMIN — Medication 10 ML: at 20:58

## 2020-04-26 RX ADMIN — SERTRALINE HYDROCHLORIDE 100 MG: 100 TABLET ORAL at 20:59

## 2020-04-26 RX ADMIN — SENNOSIDES AND DOCUSATE SODIUM 1 TABLET: 8.6; 5 TABLET ORAL at 07:55

## 2020-04-26 RX ADMIN — ENOXAPARIN SODIUM 40 MG: 40 INJECTION SUBCUTANEOUS at 07:57

## 2020-04-26 RX ADMIN — INSULIN LISPRO 12 UNITS: 100 INJECTION, SOLUTION INTRAVENOUS; SUBCUTANEOUS at 11:51

## 2020-04-26 RX ADMIN — Medication 10 ML: at 08:08

## 2020-04-26 RX ADMIN — MORPHINE SULFATE 2 MG: 2 INJECTION, SOLUTION INTRAMUSCULAR; INTRAVENOUS at 01:21

## 2020-04-26 RX ADMIN — PANTOPRAZOLE SODIUM 40 MG: 40 TABLET, DELAYED RELEASE ORAL at 07:55

## 2020-04-26 RX ADMIN — HYDROCODONE BITARTRATE AND ACETAMINOPHEN 1 TABLET: 5; 325 TABLET ORAL at 13:03

## 2020-04-26 RX ADMIN — INSULIN LISPRO 12 UNITS: 100 INJECTION, SOLUTION INTRAVENOUS; SUBCUTANEOUS at 08:04

## 2020-04-26 RX ADMIN — INSULIN LISPRO 9 UNITS: 100 INJECTION, SOLUTION INTRAVENOUS; SUBCUTANEOUS at 17:07

## 2020-04-26 RX ADMIN — HYDROCODONE BITARTRATE AND ACETAMINOPHEN 1 TABLET: 5; 325 TABLET ORAL at 08:34

## 2020-04-26 RX ADMIN — ACETAMINOPHEN 1000 MG: 500 TABLET, FILM COATED ORAL at 17:07

## 2020-04-26 RX ADMIN — ASPIRIN 81 MG: 81 TABLET, COATED ORAL at 07:55

## 2020-04-26 RX ADMIN — TRAZODONE HYDROCHLORIDE 100 MG: 100 TABLET ORAL at 20:59

## 2020-04-26 RX ADMIN — HYDROCODONE BITARTRATE AND ACETAMINOPHEN 1 TABLET: 5; 325 TABLET ORAL at 04:24

## 2020-04-26 RX ADMIN — OXYCODONE 5 MG: 5 TABLET ORAL at 15:54

## 2020-04-26 RX ADMIN — SENNOSIDES AND DOCUSATE SODIUM 1 TABLET: 8.6; 5 TABLET ORAL at 21:05

## 2020-04-26 RX ADMIN — METOPROLOL SUCCINATE 50 MG: 50 TABLET, EXTENDED RELEASE ORAL at 20:59

## 2020-04-26 ASSESSMENT — PAIN SCALES - GENERAL
PAINLEVEL_OUTOF10: 6
PAINLEVEL_OUTOF10: 3
PAINLEVEL_OUTOF10: 4
PAINLEVEL_OUTOF10: 4
PAINLEVEL_OUTOF10: 7
PAINLEVEL_OUTOF10: 8
PAINLEVEL_OUTOF10: 10
PAINLEVEL_OUTOF10: 6
PAINLEVEL_OUTOF10: 8
PAINLEVEL_OUTOF10: 8
PAINLEVEL_OUTOF10: 3
PAINLEVEL_OUTOF10: 9
PAINLEVEL_OUTOF10: 5

## 2020-04-26 ASSESSMENT — PAIN DESCRIPTION - LOCATION
LOCATION: HIP
LOCATION_2: RIB CAGE
LOCATION_2: RIB CAGE
LOCATION: HIP
LOCATION: HIP

## 2020-04-26 ASSESSMENT — PAIN - FUNCTIONAL ASSESSMENT: PAIN_FUNCTIONAL_ASSESSMENT: PREVENTS OR INTERFERES WITH MANY ACTIVE NOT PASSIVE ACTIVITIES

## 2020-04-26 ASSESSMENT — PAIN DESCRIPTION - ORIENTATION
ORIENTATION: RIGHT
ORIENTATION: RIGHT
ORIENTATION_2: RIGHT;LOWER
ORIENTATION: RIGHT
ORIENTATION_2: RIGHT;LOWER;ANTERIOR

## 2020-04-26 ASSESSMENT — PAIN DESCRIPTION - FREQUENCY
FREQUENCY: INTERMITTENT
FREQUENCY: INTERMITTENT

## 2020-04-26 ASSESSMENT — PAIN DESCRIPTION - PAIN TYPE
TYPE: SURGICAL PAIN
TYPE_2: ACUTE PAIN
TYPE: SURGICAL PAIN
TYPE_2: ACUTE PAIN
TYPE: SURGICAL PAIN

## 2020-04-26 ASSESSMENT — PAIN DESCRIPTION - DESCRIPTORS
DESCRIPTORS_2: SORE;ACHING
DESCRIPTORS: ACHING;SHARP
DESCRIPTORS: SHARP;SHOOTING

## 2020-04-26 ASSESSMENT — PAIN DESCRIPTION - DURATION
DURATION_2: CONTINUOUS
DURATION_2: CONTINUOUS

## 2020-04-26 ASSESSMENT — PAIN DESCRIPTION - INTENSITY
RATING_2: 2
RATING_2: 5

## 2020-04-26 ASSESSMENT — PAIN DESCRIPTION - ONSET: ONSET: ON-GOING

## 2020-04-26 NOTE — PROGRESS NOTES
Pt called home to daughter asking questions that were not making much sense. Confusing today with events that happened yesterday and just general displays of confusion. Dr. Yadi Joyner was contacted who ordered a UA. Will collect urine and continue to follow.

## 2020-04-26 NOTE — PROGRESS NOTES
[facility] Internal Medicine     Date:   4/26/2020  Patient name:  Igor Saini  Date of admission:  4/23/2020  4:52 AM  MRN:   [id number]  YOB: 1955    CC- post op     HPI-  Pt postop doing well   No chest pain sob  bp well controlled   Still hyperglycemia     REVIEW OF SYSTEMS:    · General----negative for fatigue, weight loss  · GI negative for nausea and vomiting, no dysphagia       EXAM-  BP (!) 113/51   Pulse 76   Temp 98.1 °F (36.7 °C) (Oral)   Resp 16   Ht 6' (1.829 m)   Wt 195 lb (88.5 kg)   SpO2 99%   BMI 26.45 kg/m²      · General appearance: NAD conversant  · Lungs: normal effort, clear to auscultation bilaterally,no wheeze.   · Heart: regular rate and rhythm, S1, S2 normal, no murmur  · Abdomen: soft, non-tender; no masses, no organomegaly  · Extremities: no cyanosis, no edema no clubbing no synovitis      Laboratory Testing:  CBC:   Recent Labs     04/26/20  0544   WBC 6.2   HGB 9.6*        BMP:    Recent Labs     04/24/20  0518 04/25/20  0532 04/26/20  0544    130* 133*   K 4.4 4.3 4.6    97* 98   CO2 20 19* 24   BUN 28* 21 31*   CREATININE 1.50* 1.35* 1.38*   GLUCOSE 225* 282* 334*         ASSESSMENT:    Patient Active Problem List   Diagnosis    DM w/o complication type II (HCC)    Hypertension    Erectile dysfunction    Microalbuminuria due to type 2 diabetes mellitus (HCC)    Stage 3 chronic kidney disease ([facility] [address]    Traumatic bilateral lower extremity fractures    Closed right hip fracture, initial encounter ([facility] [address]    Osteogenesis imperfecta    Type 2 diabetes mellitus with chronic kidney disease ([facility] [address]    Type 2 diabetes mellitus with hyperglycemia (HCC)       PLAN:  Pt with hx of HTN DM CRF   Cr stable it is his baseline   DM poor control increase lantus 40  bid ( pt is on high dose at home slowly increase )       Harish Kaur MD  [facility] Internal Medicine   [address] [facility] [address] [phone]

## 2020-04-26 NOTE — DISCHARGE INSTR - COC
Video (Video Swallowing Test): {Done Not Done QLUO:892204878}    Treatments at the Time of Hospital Discharge:   Respiratory Treatments: ***  Oxygen Therapy:  {Therapy; copd oxygen:77196}  Ventilator:    { CC Vent WZNM:251195365}    Rehab Therapies: {THERAPEUTIC INTERVENTION:1514214183}  Weight Bearing Status/Restrictions: { CC Weight Bearin}  Other Medical Equipment (for information only, NOT a DME order):  {EQUIPMENT:017395081}  Other Treatments: ***    Patient's personal belongings (please select all that are sent with patient):  {CHP DME Belongings:954344511}    RN SIGNATURE:  {Esignature:[id number]}    CASE MANAGEMENT/SOCIAL WORK SECTION    Inpatient Status Date: ***    Readmission Risk Assessment Score:  Readmission Risk              Risk of Unplanned Readmission:        14           Discharging to Facility/ Agency   · Name:   · Address:  · Phone:  · Fax:    Dialysis Facility (if applicable)   · Name:  · Address:  · Dialysis Schedule:  · Phone:  · Fax:    / signature: {Esignature:[id number]}    PHYSICIAN SECTION    Prognosis: Good    Condition at Discharge: Stable    Rehab Potential (if transferring to Rehab): Good    Recommended Labs or Other Treatments After Discharge: Daily dressing changes. Keep incisions clean and dry at all times. Physician Certification: I certify the above information and transfer of Teja Scott  is necessary for the continuing treatment of the diagnosis listed and that he requires {Admit to Appropriate Level of Care:94553} for less 30 days.      Update Admission H&P: No change in H&P    PHYSICIAN SIGNATURE:  Electronically signed by Kitty Garcia MD on 20 at 9:34 AM EDT

## 2020-04-26 NOTE — PROGRESS NOTES
assistance  UE Bathing: Moderate assistance  LE Bathing: Dependent/Total  UE Dressing: Moderate assistance  LE Dressing: Dependent/Total  Toileting: Dependent/Total  Additional Comments: Bathing needs - based on OT observation and clinical reasoning skills. Balance  Sitting Balance: Moderate assistance  Standing Balance: (TBA)      Functional Mobility  Assist Level: (TBA)     Bed mobility  Rolling to Left: 2 Person assistance, Maximum assistance  Supine to Sit: 2 Person assistance, Maximum assistance  Sit to Supine: Dependent/Total(dependent x3)  Scooting: Dependent/Total, 2 Person assistance(scoot to [facility])  Comment: repositioned w/ pillow support under right LE and ice on lateral right hip for patient comfort; pt refused OOB this p.m. due to high pain and high anxiety levels. Dr. Lori Stern to add pain med [facility] and get home anxiety meds started  Transfers  Sit to stand: (TBA)  Stand to sit: (TBA)  Transfer Comments: TBA - deferred d/t pt having increase pain. Pt didn't want to attempt standing. Objective:  BP 99/64   Pulse 95   Temp 97.9 °F (36.6 °C) (Oral)   Resp 18   Ht 6' (1.829 m)   Wt 195 lb (88.5 kg)   SpO2 100%   BMI 26.45 kg/m²  I Body mass index is 26.45 kg/m².  I   Wt Readings from Last 1 Encounters:   20 195 lb (88.5 kg)      Temp (24hrs), Av.1 °F (36.7 °C), Min:97.9 °F (36.6 °C), Max:98.2 °F (36.8 °C)               Medications   Scheduled Meds:   insulin glargine  20 Units Subcutaneous BID    aspirin  81 mg Oral Daily    sertraline  100 mg Oral Nightly    traZODone  100 mg Oral Nightly    metoprolol succinate  50 mg Oral BID    sodium chloride flush  10 mL Intravenous 2 times per day    pantoprazole  40 mg Oral Daily    insulin lispro  0-18 Units Subcutaneous TID WC    insulin lispro  0-9 Units Subcutaneous Nightly    sodium chloride flush  10 mL Intravenous 2 times per day    sennosides-docusate sodium  1 tablet Oral BID    enoxaparin  40 mg Subcutaneous Daily     Continuous Infusions:   dextrose       PRN Meds:.morphine, HYDROcodone 5 mg - acetaminophen, LORazepam, sodium chloride flush, glucose, dextrose, glucagon (rDNA), dextrose, sodium chloride flush, magnesium hydroxide, promethazine **OR** ondansetron     Diagnostics:     CBC:   Recent Labs     04/24/20  0518 04/25/20  0532 04/26/20  0544   WBC 5.5 8.0 6.2   RBC 3.52* 3.62* 3.30*   HGB 10.2* 10.5* 9.6*   HCT 31.0* 31.8* 29.0*   MCV 88.2 88.0 88.0   RDW 13.9 13.8 13.7    178 180     BMP:   Recent Labs     04/24/20  0518 04/25/20  0532 04/26/20  0544    130* 133*   K 4.4 4.3 4.6    97* 98   CO2 20 19* 24   BUN 28* 21 31*   CREATININE 1.50* 1.35* 1.38*     BNP: No results for input(s): BNP in the last 72 hours. PT/INR: No results for input(s): PROTIME, INR in the last 72 hours. APTT: No results for input(s): APTT in the last 72 hours. CARDIAC ENZYMES: No results for input(s): CKMB, CKMBINDEX, TROPONINT in the last 72 hours. Invalid input(s): CKTOTAL;3  FASTING LIPID PANEL:  Lab Results   Component Value Date    CHOL 163 10/15/2018    HDL 34 (L) 10/15/2018    TRIG 161 (H) 10/15/2018     LIVER PROFILE:   Recent Labs     04/24/20  0518 04/25/20  0532 04/26/20  0544   AST 13 14 13   ALT 9 6 <5*   BILITOT 0.44 0.90 0.58   ALKPHOS 77 82 76        I/O (24Hr):     Intake/Output Summary (Last 24 hours) at 4/26/2020 1243  Last data filed at 4/26/2020 0854  Gross per 24 hour   Intake 10 ml   Output 625 ml   Net -615 ml       Glu last 24 hour  Recent Labs     04/25/20  1611 04/25/20  1921 04/26/20  0642 04/26/20  1114   POCGLU 354* 246* 300* 302*       Recent Labs     04/25/20  2216   COLORU YELLOW   PHUR 5.5   SPECGRAV 1.027   PROTEINU 1+*   RBCUA 2 TO 5   BACTERIA FEW*   NITRU NEGATIVE   WBCUA 2 TO 5   LEUKOCYTESUR NEGATIVE   YEAST NOT REPORTED   GLUCOSEU 3+*   BILIRUBINUR NEGATIVE         Impression/Plan:    Mr. Jairo Choi is a 59 y.o.  male with a history of Closed right hip fracture, initial encounter ([facility] [address]     1. Status post fall with right femur intertrochanteric fracture status post IM nailing- 25% weight bearing per PT note  2. Osteogenesis imperfecta with multiple fractures 30 broken bones in past  3. Right elbow contracture history of elbow fracture,   4. Blood Loss anemia  5. Diabetes-insulin, sliding scale  6. Anxiety/depression-Ativan, Zoloft  7. Hypertension -Metroprolol  8. Pain- Norco, frequent use 1 tablet-consider long-acting pain medication     Recommendations:  1. Diagnosis: Osteogenesis imperfecta, multiple fractures, status post fall with right femur fracture status post IM nailing  2. Therapy:  Limited in PT and OT- dep/total lower extremity, mod assist upper extremity, toileting dependent/total rolling and supine to sit and scooting max assist to dependent/total-unable to assist with scooting  3. Medical  Necessity: As above  4. Support: clarify, spouse noted unable to take care of patient's needs at this time  5. Rehab recommendation: low level, would recommend skilled nurse facility at this time- monitor for any change  6. DVT proph: Angus Roger MD       This note is created with the assistance of a speech recognition program.  While intending to generate a document that actually reflects the content of the visit, the document can still have some errors including those of syntax and sound a like substitutions which may escape proof reading.   In such instances, actual meaning can be extrapolated by contextual diversion

## 2020-04-27 ENCOUNTER — HOSPITAL ENCOUNTER (INPATIENT)
Age: 65
LOS: 24 days | Discharge: HOME | DRG: 561 | End: 2020-05-21
Attending: PHYSICAL MEDICINE & REHABILITATION | Admitting: PHYSICAL MEDICINE & REHABILITATION
Payer: MEDICARE

## 2020-04-27 VITALS
HEIGHT: 72 IN | SYSTOLIC BLOOD PRESSURE: 131 MMHG | DIASTOLIC BLOOD PRESSURE: 65 MMHG | RESPIRATION RATE: 16 BRPM | OXYGEN SATURATION: 100 % | BODY MASS INDEX: 26.41 KG/M2 | HEART RATE: 93 BPM | WEIGHT: 195 LBS | TEMPERATURE: 98.2 F

## 2020-04-27 LAB
ABSOLUTE EOS #: 0.5 K/UL (ref 0–0.4)
ABSOLUTE IMMATURE GRANULOCYTE: ABNORMAL K/UL (ref 0–0.3)
ABSOLUTE LYMPH #: 0.7 K/UL (ref 1–4.8)
ABSOLUTE MONO #: 0.4 K/UL (ref 0.1–1.3)
ALBUMIN SERPL-MCNC: 3.1 G/DL (ref 3.5–5.2)
ALBUMIN/GLOBULIN RATIO: ABNORMAL (ref 1–2.5)
ALP BLD-CCNC: 73 U/L (ref 40–129)
ALT SERPL-CCNC: 6 U/L (ref 5–41)
ANION GAP SERPL CALCULATED.3IONS-SCNC: 11 MMOL/L (ref 9–17)
AST SERPL-CCNC: 11 U/L
BASOPHILS # BLD: 1 % (ref 0–2)
BASOPHILS ABSOLUTE: 0 K/UL (ref 0–0.2)
BILIRUB SERPL-MCNC: 0.56 MG/DL (ref 0.3–1.2)
BUN BLDV-MCNC: 40 MG/DL (ref 8–23)
BUN/CREAT BLD: ABNORMAL (ref 9–20)
CALCIUM SERPL-MCNC: 9.6 MG/DL (ref 8.6–10.4)
CHLORIDE BLD-SCNC: 97 MMOL/L (ref 98–107)
CO2: 24 MMOL/L (ref 20–31)
CREAT SERPL-MCNC: 1.47 MG/DL (ref 0.7–1.2)
DIFFERENTIAL TYPE: ABNORMAL
EOSINOPHILS RELATIVE PERCENT: 10 % (ref 0–4)
GFR AFRICAN AMERICAN: 58 ML/MIN
GFR NON-AFRICAN AMERICAN: 48 ML/MIN
GFR SERPL CREATININE-BSD FRML MDRD: ABNORMAL ML/MIN/{1.73_M2}
GFR SERPL CREATININE-BSD FRML MDRD: ABNORMAL ML/MIN/{1.73_M2}
GLUCOSE BLD-MCNC: 262 MG/DL (ref 75–110)
GLUCOSE BLD-MCNC: 310 MG/DL (ref 75–110)
GLUCOSE BLD-MCNC: 317 MG/DL (ref 75–110)
GLUCOSE BLD-MCNC: 335 MG/DL (ref 70–99)
GLUCOSE BLD-MCNC: 398 MG/DL (ref 75–110)
HCT VFR BLD CALC: 26.5 % (ref 41–53)
HEMOGLOBIN: 8.9 G/DL (ref 13.5–17.5)
IMMATURE GRANULOCYTES: ABNORMAL %
LYMPHOCYTES # BLD: 13 % (ref 24–44)
MCH RBC QN AUTO: 29.5 PG (ref 26–34)
MCHC RBC AUTO-ENTMCNC: 33.4 G/DL (ref 31–37)
MCV RBC AUTO: 88.1 FL (ref 80–100)
MONOCYTES # BLD: 8 % (ref 1–7)
NRBC AUTOMATED: ABNORMAL PER 100 WBC
PDW BLD-RTO: 14 % (ref 11.5–14.9)
PLATELET # BLD: 192 K/UL (ref 150–450)
PLATELET ESTIMATE: ABNORMAL
PMV BLD AUTO: 8.3 FL (ref 6–12)
POTASSIUM SERPL-SCNC: 4.4 MMOL/L (ref 3.7–5.3)
RBC # BLD: 3.01 M/UL (ref 4.5–5.9)
RBC # BLD: ABNORMAL 10*6/UL
SEG NEUTROPHILS: 68 % (ref 36–66)
SEGMENTED NEUTROPHILS ABSOLUTE COUNT: 3.4 K/UL (ref 1.3–9.1)
SODIUM BLD-SCNC: 132 MMOL/L (ref 135–144)
TOTAL PROTEIN: 6.6 G/DL (ref 6.4–8.3)
WBC # BLD: 5 K/UL (ref 3.5–11)
WBC # BLD: ABNORMAL 10*3/UL

## 2020-04-27 PROCEDURE — 6370000000 HC RX 637 (ALT 250 FOR IP): Performed by: ORTHOPAEDIC SURGERY

## 2020-04-27 PROCEDURE — 1180000000 HC REHAB R&B

## 2020-04-27 PROCEDURE — 36415 COLL VENOUS BLD VENIPUNCTURE: CPT

## 2020-04-27 PROCEDURE — 6360000002 HC RX W HCPCS: Performed by: PHYSICAL MEDICINE & REHABILITATION

## 2020-04-27 PROCEDURE — 97110 THERAPEUTIC EXERCISES: CPT

## 2020-04-27 PROCEDURE — 97116 GAIT TRAINING THERAPY: CPT

## 2020-04-27 PROCEDURE — 6370000000 HC RX 637 (ALT 250 FOR IP): Performed by: FAMILY MEDICINE

## 2020-04-27 PROCEDURE — 6370000000 HC RX 637 (ALT 250 FOR IP): Performed by: INTERNAL MEDICINE

## 2020-04-27 PROCEDURE — 6360000002 HC RX W HCPCS: Performed by: ORTHOPAEDIC SURGERY

## 2020-04-27 PROCEDURE — 80053 COMPREHEN METABOLIC PANEL: CPT

## 2020-04-27 PROCEDURE — 97530 THERAPEUTIC ACTIVITIES: CPT

## 2020-04-27 PROCEDURE — 6370000000 HC RX 637 (ALT 250 FOR IP): Performed by: PHYSICAL MEDICINE & REHABILITATION

## 2020-04-27 PROCEDURE — 82947 ASSAY GLUCOSE BLOOD QUANT: CPT

## 2020-04-27 PROCEDURE — 2580000003 HC RX 258: Performed by: ORTHOPAEDIC SURGERY

## 2020-04-27 PROCEDURE — 85025 COMPLETE CBC W/AUTO DIFF WBC: CPT

## 2020-04-27 RX ORDER — DEXTROSE MONOHYDRATE 50 MG/ML
100 INJECTION, SOLUTION INTRAVENOUS PRN
Status: DISCONTINUED | OUTPATIENT
Start: 2020-04-27 | End: 2020-05-21 | Stop reason: HOSPADM

## 2020-04-27 RX ORDER — TRAZODONE HYDROCHLORIDE 100 MG/1
100 TABLET ORAL NIGHTLY
Status: DISCONTINUED | OUTPATIENT
Start: 2020-04-27 | End: 2020-05-21 | Stop reason: HOSPADM

## 2020-04-27 RX ORDER — PANTOPRAZOLE SODIUM 40 MG/1
40 TABLET, DELAYED RELEASE ORAL DAILY
Status: DISCONTINUED | OUTPATIENT
Start: 2020-04-28 | End: 2020-05-21 | Stop reason: HOSPADM

## 2020-04-27 RX ORDER — NICOTINE POLACRILEX 4 MG
15 LOZENGE BUCCAL PRN
Status: DISCONTINUED | OUTPATIENT
Start: 2020-04-27 | End: 2020-05-21 | Stop reason: HOSPADM

## 2020-04-27 RX ORDER — SODIUM CHLORIDE 0.9 % (FLUSH) 0.9 %
10 SYRINGE (ML) INJECTION EVERY 12 HOURS SCHEDULED
Status: DISCONTINUED | OUTPATIENT
Start: 2020-04-27 | End: 2020-04-30

## 2020-04-27 RX ORDER — OXYCODONE HYDROCHLORIDE 5 MG/1
10 TABLET ORAL EVERY 4 HOURS PRN
Status: DISCONTINUED | OUTPATIENT
Start: 2020-04-27 | End: 2020-05-21 | Stop reason: HOSPADM

## 2020-04-27 RX ORDER — SENNA AND DOCUSATE SODIUM 50; 8.6 MG/1; MG/1
1 TABLET, FILM COATED ORAL 2 TIMES DAILY
Status: DISCONTINUED | OUTPATIENT
Start: 2020-04-27 | End: 2020-04-28

## 2020-04-27 RX ORDER — OXYCODONE HYDROCHLORIDE 5 MG/1
5 TABLET ORAL ONCE
Status: COMPLETED | OUTPATIENT
Start: 2020-04-27 | End: 2020-04-27

## 2020-04-27 RX ORDER — DEXTROSE MONOHYDRATE 25 G/50ML
12.5 INJECTION, SOLUTION INTRAVENOUS PRN
Status: DISCONTINUED | OUTPATIENT
Start: 2020-04-27 | End: 2020-05-21 | Stop reason: HOSPADM

## 2020-04-27 RX ORDER — ASPIRIN 81 MG/1
81 TABLET ORAL DAILY
Status: DISCONTINUED | OUTPATIENT
Start: 2020-04-28 | End: 2020-05-21 | Stop reason: HOSPADM

## 2020-04-27 RX ORDER — ACETAMINOPHEN 500 MG
500 TABLET ORAL EVERY 8 HOURS PRN
Status: DISCONTINUED | OUTPATIENT
Start: 2020-04-27 | End: 2020-04-28

## 2020-04-27 RX ORDER — OXYCODONE HYDROCHLORIDE 5 MG/1
5 TABLET ORAL EVERY 4 HOURS PRN
Qty: 30 TABLET | Refills: 0 | Status: ON HOLD | OUTPATIENT
Start: 2020-04-27 | End: 2020-05-21 | Stop reason: HOSPADM

## 2020-04-27 RX ORDER — SENNA AND DOCUSATE SODIUM 50; 8.6 MG/1; MG/1
1 TABLET, FILM COATED ORAL 2 TIMES DAILY
Status: DISCONTINUED | OUTPATIENT
Start: 2020-04-27 | End: 2020-04-27 | Stop reason: SDUPTHER

## 2020-04-27 RX ORDER — SODIUM CHLORIDE 0.9 % (FLUSH) 0.9 %
10 SYRINGE (ML) INJECTION PRN
Status: DISCONTINUED | OUTPATIENT
Start: 2020-04-27 | End: 2020-05-21 | Stop reason: HOSPADM

## 2020-04-27 RX ORDER — INSULIN GLARGINE 100 [IU]/ML
50 INJECTION, SOLUTION SUBCUTANEOUS 2 TIMES DAILY
Status: DISCONTINUED | OUTPATIENT
Start: 2020-04-27 | End: 2020-04-27 | Stop reason: HOSPADM

## 2020-04-27 RX ORDER — METOPROLOL SUCCINATE 50 MG/1
50 TABLET, EXTENDED RELEASE ORAL 2 TIMES DAILY
Status: DISCONTINUED | OUTPATIENT
Start: 2020-04-27 | End: 2020-05-21 | Stop reason: HOSPADM

## 2020-04-27 RX ORDER — OXYCODONE HYDROCHLORIDE 5 MG/1
5 TABLET ORAL EVERY 4 HOURS PRN
Status: DISCONTINUED | OUTPATIENT
Start: 2020-04-27 | End: 2020-05-21 | Stop reason: HOSPADM

## 2020-04-27 RX ORDER — POLYETHYLENE GLYCOL 3350 17 G/17G
17 POWDER, FOR SOLUTION ORAL DAILY
Status: DISCONTINUED | OUTPATIENT
Start: 2020-04-27 | End: 2020-05-21 | Stop reason: HOSPADM

## 2020-04-27 RX ORDER — SERTRALINE HYDROCHLORIDE 100 MG/1
100 TABLET, FILM COATED ORAL NIGHTLY
Status: DISCONTINUED | OUTPATIENT
Start: 2020-04-27 | End: 2020-05-21 | Stop reason: HOSPADM

## 2020-04-27 RX ADMIN — INSULIN LISPRO 12 UNITS: 100 INJECTION, SOLUTION INTRAVENOUS; SUBCUTANEOUS at 09:22

## 2020-04-27 RX ADMIN — METOPROLOL SUCCINATE 50 MG: 50 TABLET, EXTENDED RELEASE ORAL at 09:23

## 2020-04-27 RX ADMIN — SENNOSIDES AND DOCUSATE SODIUM 1 TABLET: 8.6; 5 TABLET ORAL at 09:23

## 2020-04-27 RX ADMIN — INSULIN LISPRO 9 UNITS: 100 INJECTION, SOLUTION INTRAVENOUS; SUBCUTANEOUS at 12:14

## 2020-04-27 RX ADMIN — SERTRALINE HYDROCHLORIDE 100 MG: 100 TABLET ORAL at 21:33

## 2020-04-27 RX ADMIN — OXYCODONE HYDROCHLORIDE 10 MG: 5 TABLET ORAL at 21:33

## 2020-04-27 RX ADMIN — ACETAMINOPHEN 500 MG: 500 TABLET, FILM COATED ORAL at 22:52

## 2020-04-27 RX ADMIN — Medication 10 ML: at 09:25

## 2020-04-27 RX ADMIN — SENNOSIDES AND DOCUSATE SODIUM 1 TABLET: 8.6; 5 TABLET ORAL at 21:33

## 2020-04-27 RX ADMIN — ACETAMINOPHEN 1000 MG: 500 TABLET, FILM COATED ORAL at 17:01

## 2020-04-27 RX ADMIN — INSULIN LISPRO 7 UNITS: 100 INJECTION, SOLUTION INTRAVENOUS; SUBCUTANEOUS at 21:32

## 2020-04-27 RX ADMIN — ACETAMINOPHEN 1000 MG: 500 TABLET, FILM COATED ORAL at 00:27

## 2020-04-27 RX ADMIN — OXYCODONE 5 MG: 5 TABLET ORAL at 09:16

## 2020-04-27 RX ADMIN — OXYCODONE 5 MG: 5 TABLET ORAL at 13:10

## 2020-04-27 RX ADMIN — METOPROLOL SUCCINATE 50 MG: 50 TABLET, EXTENDED RELEASE ORAL at 21:40

## 2020-04-27 RX ADMIN — INSULIN LISPRO 9 UNITS: 100 INJECTION, SOLUTION INTRAVENOUS; SUBCUTANEOUS at 17:00

## 2020-04-27 RX ADMIN — OXYCODONE 5 MG: 5 TABLET ORAL at 05:00

## 2020-04-27 RX ADMIN — ASPIRIN 81 MG: 81 TABLET, COATED ORAL at 09:22

## 2020-04-27 RX ADMIN — TRAZODONE HYDROCHLORIDE 100 MG: 100 TABLET ORAL at 21:33

## 2020-04-27 RX ADMIN — INSULIN GLARGINE 40 UNITS: 100 INJECTION, SOLUTION SUBCUTANEOUS at 09:18

## 2020-04-27 RX ADMIN — OXYCODONE 5 MG: 5 TABLET ORAL at 18:55

## 2020-04-27 RX ADMIN — OXYCODONE 5 MG: 5 TABLET ORAL at 01:14

## 2020-04-27 RX ADMIN — PANTOPRAZOLE SODIUM 40 MG: 40 TABLET, DELAYED RELEASE ORAL at 09:23

## 2020-04-27 RX ADMIN — OXYCODONE 5 MG: 5 TABLET ORAL at 17:01

## 2020-04-27 RX ADMIN — ACETAMINOPHEN 1000 MG: 500 TABLET, FILM COATED ORAL at 05:00

## 2020-04-27 RX ADMIN — ENOXAPARIN SODIUM 40 MG: 40 INJECTION SUBCUTANEOUS at 09:23

## 2020-04-27 RX ADMIN — ENOXAPARIN SODIUM 40 MG: 40 INJECTION SUBCUTANEOUS at 21:39

## 2020-04-27 ASSESSMENT — PAIN SCALES - GENERAL
PAINLEVEL_OUTOF10: 7
PAINLEVEL_OUTOF10: 10
PAINLEVEL_OUTOF10: 8
PAINLEVEL_OUTOF10: 9
PAINLEVEL_OUTOF10: 6
PAINLEVEL_OUTOF10: 9
PAINLEVEL_OUTOF10: 8
PAINLEVEL_OUTOF10: 7
PAINLEVEL_OUTOF10: 6
PAINLEVEL_OUTOF10: 4
PAINLEVEL_OUTOF10: 10
PAINLEVEL_OUTOF10: 4
PAINLEVEL_OUTOF10: 6
PAINLEVEL_OUTOF10: 8
PAINLEVEL_OUTOF10: 7
PAINLEVEL_OUTOF10: 8
PAINLEVEL_OUTOF10: 7
PAINLEVEL_OUTOF10: 8
PAINLEVEL_OUTOF10: 9

## 2020-04-27 ASSESSMENT — PAIN DESCRIPTION - PROGRESSION
CLINICAL_PROGRESSION: NOT CHANGED
CLINICAL_PROGRESSION: NOT CHANGED

## 2020-04-27 ASSESSMENT — PAIN DESCRIPTION - LOCATION
LOCATION: HIP

## 2020-04-27 ASSESSMENT — ENCOUNTER SYMPTOMS
TROUBLE SWALLOWING: 0
CHOKING: 0
BLOOD IN STOOL: 0
ANAL BLEEDING: 0
PHOTOPHOBIA: 0
ABDOMINAL DISTENTION: 0
VOICE CHANGE: 0
BACK PAIN: 0
STRIDOR: 0
RECTAL PAIN: 0
COLOR CHANGE: 0

## 2020-04-27 ASSESSMENT — PAIN DESCRIPTION - ORIENTATION
ORIENTATION: RIGHT

## 2020-04-27 ASSESSMENT — PAIN DESCRIPTION - PAIN TYPE
TYPE: SURGICAL PAIN
TYPE: SURGICAL PAIN;ACUTE PAIN
TYPE: SURGICAL PAIN
TYPE: SURGICAL PAIN;ACUTE PAIN
TYPE: SURGICAL PAIN

## 2020-04-27 ASSESSMENT — PAIN DESCRIPTION - DESCRIPTORS
DESCRIPTORS: SHARP

## 2020-04-27 ASSESSMENT — PAIN DESCRIPTION - FREQUENCY: FREQUENCY: INTERMITTENT

## 2020-04-27 NOTE — FLOWSHEET NOTE
Writer visited with patient and his daughter Sola Lopes before he was transferred to ARU. Patient is experiencing lots of pain and is frustrated. Patient offered encouragement and listening presence. 04/27/20 1822   Encounter Summary   Services provided to: Patient and family together   Referral/Consult From: Family   Continue Visiting   (4-27-20)   Complexity of Encounter Moderate   Length of Encounter 30 minutes   Routine   Type Follow up   Assessment Approachable   Intervention Active listening;Explored feelings, thoughts, concerns;Explored coping resources;Sustaining presence/ Ministry of presence; Discussed illness/injury and it's impact   Outcome Engaged in conversation;Expressed feelings/needs/concerns;Venting emotion

## 2020-04-27 NOTE — PROGRESS NOTES
and results of the pre-admission screening assessment completed by the Inpatient Rehabilitation Admissions Coordinator.

## 2020-04-27 NOTE — PROGRESS NOTES
Patient is stable and still complains of pain. Assisted to wheelchair. Daughter at bedside. All belongings sent with patient.

## 2020-04-27 NOTE — PROGRESS NOTES
[address]   Physical Therapy Progress Note    Date: 20  Patient Name: Benedict Holter       Room:   MRN: [id number]   Account: [de-identified]   : 1955  (62 y.o.)   Gender: male     Discharge Recommendations   Patient would benefit from continued therapy after discharge  Equipment Needed: No(pt has RW at home)    Referring Practitioner: (P) Dr Leah Piña  Diagnosis: (P) Right hip intertrochanteric femur fracture, s/p ORIF Right hip using a long TFN 20   Restrictions/Precautions: Fall Risk, Weight Bearing, General Precautions  Implants present? : Metal implants  Other position/activity restrictions: Low tolerance for Pain - restricts all repositioning and functional mobility / care task performances   Right Lower Extremity Weight Bearing: Partial Weight Bearing  Right Upper Extremity Weight Bearing: (Reports that he is limited in Active ROM of extension in R elbow due to past fractures/surgeries/conditions/procedures related to elbow fracture)   Past Medical History:  has a past medical history of Contracture, elbow, Erectile dysfunction, Hypertension, Osteogenesis imperfecta, and Type II or unspecified type diabetes mellitus without mention of complication, not stated as uncontrolled. Past Surgical History:   has a past surgical history that includes Cholecystectomy; Tonsillectomy and adenoidectomy; fracture surgery (Right); fracture surgery (Bilateral); fracture surgery (Left); and Femur fracture surgery (Right, 2020).   Additional Pertinent Hx: hx osteogenesis imperfecta- hx multiple fractures    Overall Orientation Status: Within Normal Limits  Restrictions/Precautions  Restrictions/Precautions: Fall Risk;Weight Bearing;General PrecautionsRequired Braces or Orthoses?: No  Implants present? : Metal implants  Lower Extremity Weight Bearing Restrictions  Right Lower Extremity Weight Bearing: Partial Weight Bearing  Partial Weight Bearing Percentage Or Pounds: 25%  Partial Weight Bearing Percentage Or Pounds: Reports that he only recently was able to achieve 90+ degrees of flexion in R knee due to past fractures/surgeries/conditions/procedures (Simultaneous filing. User may not have seen previous data.)  Position Activity Restriction  Other position/activity restrictions: Low tolerance for Pain - restricts all repositioning and functional mobility / care task performances     Subjective: Pt reports starting to feel pain in right hip  Comments: CEASAR Ferris. aware of 3rd attempt to work with pt. Pt agreeable to work with PT/OT at this time. Vital Signs  Patient Currently in Pain: Yes  Pain Assessment: 0-10  Pain Level: 5  Pain Type: Surgical pain  Pain Location: Hip  Pain Orientation: Right  Non-Pharmaceutical Pain Intervention(s): Ambulation/Increased Activity; Distraction;Repositioned  Response to Pain Intervention: Patient Satisfied        Bed Mobility  Rolling: Minimal assistance  Supine to Sit: Moderate assistance(x1 assisting torso, pt able to use gait belt as leg )  Sit to Supine: Unable to assess(pt left in bedside chair)  Scooting: Minimal assistance  Comment: Pt able to perform more tasks this date      Transfers:  Sit to Stand: Moderate Assistance;2 Person Assistance(from EOB)  Stand to sit: Maximum Assistance;2 Person Assistance(from high Bekah Chill position into Low chair)  Bed to Chair: Dependent/Total(standing with Bekah Chill)           WB Status: PWB 25% right LE           Stairs/Curb  Stairs?: No                              Posture: Fair  Sitting - Static: Good;-  Sitting - Dynamic: Poor;+  Standing - Static: Fair;-  Standing - Dynamic: (NT)  Comments: Seated EOB, Standing with Bekah Chill     Other exercises 1: Education on using gait belt as leg   Other exercises 2: Dangle EOB 10 min. Other exercises 3: Static Standing in Bekah Chill 1min 40sec.   Other exercises 5: Repositioned into bedside chair to improve tolerance and muscle strengthening           Activity Tolerance: Patient limited by pain; Patient Tolerated treatment well  Activity Tolerance: Pain limiting tx, pt requests 30min after pain meds for therapy  PT Equipment Recommendations  Equipment Needed: No(pt has RW at home)  Other Comments  Comments: Coordinate with nursing with pain med schedule; pt will not work with therapy unless he's had pain meds. Let pt utilize gait belt to R LE with supine to sitting EOB. Pt requires encouragement d/t anxiety/fear of pain    Assessment  Activity Tolerance: Patient limited by pain; Patient Tolerated treatment well   Body structures, Functions, Activity limitations: Decreased functional mobility ; Decreased endurance;Decreased ROM; Decreased strength;Decreased balance; Increased pain  Prognosis: Good  Discharge Recommendations: Patient would benefit from continued therapy after discharge     Type of devices: Call light within reach; Left in chair;Nurse notified;Gait belt     Plan  Times per week: BID x 5 days  Times per day: (BID x 5 days)  Current Treatment Recommendations: Strengthening, Gait Training, Patient/Caregiver Education & Training, ROM, Balance Training, Endurance Training, Functional Mobility Training, Transfer Training, Safety Education & Training    Patient Education  New Education Provided:  POC  Learner:patient  Method: explanation       Outcome: acknowledged understanding of POC and needs reinforcement     Goals  Short term goals  Time Frame for Short term goals: BID x 5 days  Short term goal 1: pt to tolerate 1/2 hour BID for therapuetic exercise and activity  Short term goal 2: pt to tolerate gentle ROM and strengthening exercises bilateral LEs  Short term goal 3: pt to demonstrate increased strength by 1/2 MMG bilateral LEs  Short term goal 4: pt to demonstrate rolling and supine <> sit w/ mod x 2  Short term goal 5: pt to tolerate dangling at the EOB x 15 minutes w/ min x 1 demonstrating fair or better balance  Short term goal 6: pt to demonstrate sit <> stand and bed <> chair using w walker right PWB 25% w/ mod x 2  Short term goal 7: pt to demonstrate gait 10-20'  w/ w walker  right PWB 25% w/ mod x 2 and W/C follow   Short term goal 8: pt to demonstrate fair balance  w/ w walker right PWB 25% w/ mod x 2    PT Individual Minutes  Time In: 1151  Time Out: 1220  Minutes: 29    Electronically signed by Kenny Fontenot PTA on 4/27/20 at 1:05 PM EDT

## 2020-04-27 NOTE — PROGRESS NOTES
[address]    INPATIENT OCCUPATIONAL THERAPY  PROGRESS NOTE  Date: 2020  Patient Name: Zulay Willard      Room:   MRN: [id number]    : 1955  (62 y.o.) Gender: male     Discharge Recommendations:  Further Occupational  Therapy is recommended upon facility discharge. Referring Practitioner: Dr Helen Kramer  Diagnosis: Right hip intertrochanteric femur fracture, s/p ORIF Right hip using a long TFN 20   General  Chart Reviewed: Yes, Imaging, Operative Notes, Orders, Progress Notes, History and Physical  Patient assessed for rehabilitation services?: Yes  Additional Pertinent Hx: Reports that he fell when walking to the bathroom at home ; History of Osteogenesis imperfecta   Family / Caregiver Present: No  Referring Practitioner: Dr Helen Kramer  Diagnosis: Right hip intertrochanteric femur fracture, s/p ORIF Right hip using a long TFN 20     Restrictions  Restrictions/Precautions: Fall Risk, Weight Bearing, General Precautions  Implants present? : Metal implants  Other position/activity restrictions: Low tolerance for Pain - restricts all repositioning and functional mobility / care task performances (Simultaneous filing.  User may not have seen previous data.)  Right Lower Extremity Weight Bearing: Partial Weight Bearing(PWB 25% )  Right Upper Extremity Weight Bearing: (Reports that he is limited in Active ROM of extension in R elbow due to past fractures/surgeries/conditions/procedures related to elbow fracture  )  Required Braces or Orthoses?: No      Subjective  Subjective: pt and spouse asking to sit up in chair  Comments: pt alert and demo anxiety at times req support, co-tx sukumar Castro PTA  Patient Currently in Pain: Yes  Pain Level: 8  Pain Location: Hip  Pain Orientation: Right  Overall Orientation Status: Within Functional Limits  Patient Observation  Observations: pt demo limited B knee flexion/extension  Pain Assessment  Pain Assessment: 0-10  Pain Level: 8  Pain Type: Surgical pain, Acute pain  Pain Location: Hip  Pain Orientation: Right  Clinical Progression: Not changed    Objective     Bed Mobility  Rolling: Minimal assistance  Supine to Sit: Moderate assistance(pt utilizes gait belt as leg  to transfer RLE to EOB)  Sit to Supine: Unable to assess(pt left in bedside chair)  Scooting: Minimal assistance  Comment: Pt demo slow movements    Balance  Sitting Balance: Stand by assistance  Standing Balance: Minimal assistance(A x 2)  Standing Balance  Time: 1-2 min c BUE support on aakash stedy horizontal bar  Activity: static stand to increase standing tolerance to support standing func tasks  Comment: max VCs for posture, support req to decrease pt anxiety  Functional Mobility  Assist Level: (TBA)   ADL  Feeding: Setup; Increased time to complete  Grooming: Minimal assistance; Setup  UE Bathing: Stand by assist;Setup; Increased time to complete  LE Bathing: Maximum assist  UE Dressing: Moderate assistance  LE Dressing: Dependent/Total  Toileting: Dependent/Total  Additional Comments: answers based on skilled observation and clinical reasoning . Transfers  Sit to Stand: Moderate Assistance;2 Person Assistance(from EOB)  Stand to sit: Maximum Assistance;2 Person Assistance(from [place] position into Low chair)  Transfer Comments: max VCs for foot/hand placement,              Assessment  Performance deficits / Impairments: Decreased functional mobility ; Decreased safe awareness;Decreased ADL status; Decreased endurance;Decreased strength;Decreased ROM  Assessment: Pt will benefit from OT to improve pt's selfcare independence through use of AE, ECWS, compensatory techniques, Pt has increase pain rt hip/groin w movement & sitting at EOB. Pt anxious during OT. Prognosis: Good  Discharge Recommendations: Patient would benefit from continued therapy after discharge  Activity Tolerance: Patient limited by pain; Patient limited by fatigue  Activity Tolerance:

## 2020-04-27 NOTE — PROGRESS NOTES
[address]   OCCUPATIONAL THERAPY MISSED TREATMENT NOTE   INPATIENT   Date: 20  Patient Name: Tracy Hair       Room: 5434/1413-26  MRN: [id number]   Account #: [de-identified]    : 1955  (62 y.o.)  Gender: male   Referring Practitioner: Dr Mulugeta Kramer  Diagnosis: Right hip intertrochanteric femur fracture, s/p ORIF Right hip using a long TFN 20              REASON FOR MISSED TREATMENT:  Patient refusal   -    Pt declined x2 this AM, 0841 pt cx requesting pain meds at 0900 first then will work with PT/OT at 0930. Writer and Pernell Ortiz PTA attempted again at 0930, pt and spouse reports receiving pain meds at 0915 and needs more time before working with PT/OT, 2 attempts this morning.        Meagan Urena MARLENE

## 2020-04-27 NOTE — PROGRESS NOTES
No events O/N. Up in bedside chair. Pain appropriately controlled. Blood pressure 131/65, pulse 93, temperature 98.2 °F (36.8 °C), temperature source Oral, resp. rate 16, height 6' (1.829 m), weight 195 lb (88.5 kg), SpO2 100 %. Right lower extremity dressings are clean, dry, and intact. 1+ pedal pulses. +teddy DF/PF of toes and foot. Sensation is grossly intact to light touch diffusely. A/P: sp right femur IT fracture IMN POD 4  - Pain control  - Encourage to get up to bedside chair. Continue to mobilize with PT. PWB RLE  - DVT ppx.  On lovenox  - Continue with daily dressing changes  - Dispo: ARU vs SNF today

## 2020-04-27 NOTE — PROGRESS NOTES
vitals taken, /??, HR 88 while sitting EOB, vitals retaken in supine /69, HR 88  Pain Assessment  Pain Assessment: 0-10  Pain Level: 10  Pain Type: Surgical pain, Acute pain  Pain Location: Hip  Pain Orientation: Right  Pain Descriptors: Sharp  Clinical Progression: Not changed    Objective     Bed mobility  Rolling to Left: 2 Person assistance; Moderate assistance  Rolling to Right: Moderate assistance;2 Person assistance  Supine to Sit: Minimal assistance(pt utilizes gait belt as leg , to transfer RLE to EOB)  Sit to Supine: 2 Person assistance; Moderate assistance  Comment:  slow guarded movements noted slow guarded movements noted  Balance  Sitting Balance: Stand by assistance  Standing Balance: Minimal assistance(A x 2)  Standing Balance  Time: 1-2 min c BUE support on walker-lift device  Activity: chair>bed transfer  Comment: max VCs for posture, sequencing, support req to decrease pt anxiety  Functional Mobility  Functional - Mobility Device: (walker-lift device)  Activity: (chair>bed transfer)  Assist Level: Moderate assistance(A x 2)  Functional Mobility Comments: max VCs for posture, sequencing, support req to decrease pt anxiety,  slow guarded movements noted   ADL  Feeding: Setup; Increased time to complete  Grooming: Moderate assistance  UE Bathing: Moderate assistance  LE Bathing: Dependent/Total  UE Dressing: Moderate assistance  LE Dressing: Dependent/Total  Toileting: Dependent/Total  Additional Comments: Bathing needs - based on OT observation and clinical reasoning skills. Transfers  Sit to stand: 2 Person assistance; Moderate assistance(from low chair c walker-lift)  Stand to sit: 2 Person assistance; Moderate assistance(walker-lift device>bed)  Transfer Comments: max VCs for foot/hand placement, pt demo G return, slow guarded movements noted                             Assessment  Performance deficits / Impairments: Decreased functional mobility ; Decreased safe Individual Minutes  Time In: 7124  Time Out: 8481  Minutes: 41      Electronically signed by MARLENE Mohr on 4/27/20 at 3:14 PM EDT

## 2020-04-27 NOTE — PROGRESS NOTES
EOB)  Bed to Chair: Moderate assistance, 2 Person Assistance(Standing with Walker Lift)  Comment: Using Walker lift from bedside chair to bed  Ambulation  Ambulation?: Yes  WB Status: PWB 25% right LE  Ambulation 1  Surface: level tile  Device: Rhoda Baumgarten Walker Lift)  Assistance: Moderate assistance, 2 Person assistance  Quality of Gait: short step length, maintaining PWB with bilat UE support on walker Lift, very guarded movements with extra time for sequencing  Gait Deviations: Slow Belén, Decreased step length, Decreased step height, Shuffles  Distance: 6 steps (3steps forward, 3 steps backward)  Comments: Max encouragment to participate and complete. vc's for sequencing with PWB 25% on right LE    Surface: level tile  Ambulation 1  Surface: level tile  Device: Rhoda Baumgarten Walker Lift)  Assistance: Moderate assistance, 2 Person assistance  Quality of Gait: short step length, maintaining PWB with bilat UE support on walker Lift, very guarded movements with extra time for sequencing  Gait Deviations: Slow Belén, Decreased step length, Decreased step height, Shuffles  Distance: 6 steps (3steps forward, 3 steps backward)  Comments: Max encouragment to participate and complete. vc's for sequencing with PWB 25% on right LE        OT:  ADL  Feeding: Setup, Increased time to complete  Grooming: Moderate assistance  UE Bathing: Moderate assistance  LE Bathing: Dependent/Total  UE Dressing: Moderate assistance  LE Dressing: Dependent/Total  Toileting: Dependent/Total  Additional Comments: Bathing needs - based on OT observation and clinical reasoning skills.          Balance  Sitting Balance: Stand by assistance  Standing Balance: Minimal assistance(A x 2)   Standing Balance  Time: 1-2 min c BUE support on walker-lift device  Activity: chair>bed transfer  Comment: max VCs for posture, sequencing, support req to decrease pt anxiety  Functional Mobility  Functional - Mobility Device: (walker-lift device)  Activity: (chair>bed flush, glucose, dextrose, glucagon (rDNA), dextrose, sodium chloride flush, magnesium hydroxide, promethazine **OR** ondansetron     Diagnostics:     CBC:   Recent Labs     04/25/20  0532 04/26/20  0544 04/27/20  0545   WBC 8.0 6.2 5.0   RBC 3.62* 3.30* 3.01*   HGB 10.5* 9.6* 8.9*   HCT 31.8* 29.0* 26.5*   MCV 88.0 88.0 88.1   RDW 13.8 13.7 14.0    180 192     BMP:   Recent Labs     04/25/20  0532 04/26/20  0544 04/27/20  0545   * 133* 132*   K 4.3 4.6 4.4   CL 97* 98 97*   CO2 19* 24 24   BUN 21 31* 40*   CREATININE 1.35* 1.38* 1.47*     BNP: No results for input(s): BNP in the last 72 hours. PT/INR: No results for input(s): PROTIME, INR in the last 72 hours. APTT: No results for input(s): APTT in the last 72 hours. CARDIAC ENZYMES: No results for input(s): CKMB, CKMBINDEX, TROPONINT in the last 72 hours. Invalid input(s): CKTOTAL;3  FASTING LIPID PANEL:  Lab Results   Component Value Date    CHOL 163 10/15/2018    HDL 34 (L) 10/15/2018    TRIG 161 (H) 10/15/2018     LIVER PROFILE:   Recent Labs     04/25/20  0532 04/26/20  0544 04/27/20  0545   AST 14 13 11   ALT 6 <5* 6   BILITOT 0.90 0.58 0.56   ALKPHOS 82 76 73        I/O (24Hr):     Intake/Output Summary (Last 24 hours) at 4/27/2020 1528  Last data filed at 4/26/2020 2058  Gross per 24 hour   Intake 10 ml   Output 150 ml   Net -140 ml       Glu last 24 hour  Recent Labs     04/26/20  1555 04/26/20 2023 04/27/20  0634 04/27/20  1109   POCGLU 294* 291* 317* 262*       Recent Labs     04/25/20  2216   COLORU YELLOW   PHUR 5.5   SPECGRAV 1.027   PROTEINU 1+*   RBCUA 2 TO 5   BACTERIA FEW*   NITRU NEGATIVE   WBCUA 2 TO 5   LEUKOCYTESUR NEGATIVE   YEAST NOT REPORTED   GLUCOSEU 3+*   BILIRUBINUR NEGATIVE         Impression: Mr. Genna Avila is a 59 y.o.  male with a history of Closed right hip fracture, [facility]     1. Status post fall with right femur intertrochanteric fracture status post IM nailing- 25% weight bearing per PT

## 2020-04-27 NOTE — PROGRESS NOTES
Progress Note    4/27/2020   2:23 PM    Name:  Devin Liriano  MRN:    [id number]     Acct:     [de-identified]   Room:  2072/2072-01   Day: 2     Admit Date: 4/23/2020  4:52 AM  PCP: ADEEL Flores CNP    Subjective:     C/C:   Chief Complaint   Patient presents with    Hip Pain    Fall       Interval History: Status: not changed. Right hip pain well controlled. Vital signs stable. Labs reviewed, creatinine 1.47    ROS:   all 10 systems reviewed and are negative except as noted    Review of Systems   Constitutional: Negative for appetite change, chills and diaphoresis. HENT: Negative for drooling, ear pain, trouble swallowing and voice change. Eyes: Negative for photophobia and visual disturbance. Respiratory: Negative for choking and stridor. Cardiovascular: Negative for chest pain and palpitations. Gastrointestinal: Negative for abdominal distention, anal bleeding, blood in stool and rectal pain. Endocrine: Negative for polyphagia and polyuria. Genitourinary: Negative for dysuria, flank pain, hematuria and urgency. Musculoskeletal: Positive for arthralgias (Right hip pain well controlled). Negative for back pain, myalgias and neck stiffness. Skin: Negative for color change, pallor and rash. Allergic/Immunologic: Negative for environmental allergies and food allergies. Neurological: Negative for tremors, seizures, facial asymmetry and numbness. Hematological: Negative for adenopathy. Does not bruise/bleed easily. Psychiatric/Behavioral: Negative for agitation, behavioral problems, hallucinations, self-injury and suicidal ideas. Medications:      Allergies: No Known Allergies    Current Meds: insulin glargine (LANTUS) injection vial 50 Units, BID  oxyCODONE (ROXICODONE) immediate release tablet 5 mg, Q4H PRN  acetaminophen (TYLENOL) tablet 1,000 mg, Q6H  aspirin EC tablet 81 mg, Daily  sertraline (ZOLOFT) tablet 100 mg, Nightly  traZODone (DESYREL) tablet 100 mg, Collection Time: 04/26/20  3:55 PM   Result Value Ref Range    POC Glucose 294 (H) 75 - 110 mg/dL   POC Glucose Fingerstick    Collection Time: 04/26/20  8:23 PM   Result Value Ref Range    POC Glucose 291 (H) 75 - 110 mg/dL   CBC with DIFF    Collection Time: 04/27/20  5:45 AM   Result Value Ref Range    WBC 5.0 3.5 - 11.0 k/uL    RBC 3.01 (L) 4.5 - 5.9 m/uL    Hemoglobin 8.9 (L) 13.5 - 17.5 g/dL    Hematocrit 26.5 (L) 41 - 53 %    MCV 88.1 80 - 100 fL    MCH 29.5 26 - 34 pg    MCHC 33.4 31 - 37 g/dL    RDW 14.0 11.5 - 14.9 %    Platelets 091 409 - 206 k/uL    MPV 8.3 6.0 - 12.0 fL    NRBC Automated NOT REPORTED per 100 WBC    Differential Type NOT REPORTED     Seg Neutrophils 68 (H) 36 - 66 %    Lymphocytes 13 (L) 24 - 44 %    Monocytes 8 (H) 1 - 7 %    Eosinophils % 10 (H) 0 - 4 %    Basophils 1 0 - 2 %    Immature Granulocytes NOT REPORTED 0 %    Segs Absolute 3.40 1.3 - 9.1 k/uL    Absolute Lymph # 0.70 (L) 1.0 - 4.8 k/uL    Absolute Mono # 0.40 0.1 - 1.3 k/uL    Absolute Eos # 0.50 (H) 0.0 - 0.4 k/uL    Basophils Absolute 0.00 0.0 - 0.2 k/uL    Absolute Immature Granulocyte NOT REPORTED 0.00 - 0.30 k/uL    WBC Morphology NOT REPORTED     RBC Morphology NOT REPORTED     Platelet Estimate NOT REPORTED    Comprehensive Metabolic Panel w/ Reflex to MG    Collection Time: 04/27/20  5:45 AM   Result Value Ref Range    Glucose 335 (H) 70 - 99 mg/dL    BUN 40 (H) 8 - 23 mg/dL    CREATININE 1.47 (H) 0.70 - 1.20 mg/dL    Bun/Cre Ratio NOT REPORTED 9 - 20    Calcium 9.6 8.6 - 10.4 mg/dL    Sodium 132 (L) 135 - 144 mmol/L    Potassium 4.4 3.7 - 5.3 mmol/L    Chloride 97 (L) 98 - 107 mmol/L    CO2 24 20 - 31 mmol/L    Anion Gap 11 9 - 17 mmol/L    Alkaline Phosphatase 73 40 - 129 U/L    ALT 6 5 - 41 U/L    AST 11 <40 U/L    Total Bilirubin 0.56 0.3 - 1.2 mg/dL    Total Protein 6.6 6.4 - 8.3 g/dL    Alb 3.1 (L) 3.5 - 5.2 g/dL    Albumin/Globulin Ratio NOT REPORTED 1.0 - 2.5    GFR Non- 48 (L) >60 mL/min Behavior: Behavior normal.         Assessment:        Primary Problem  Closed right hip fracture, initial encounter ([facility].)     Principal Problem:    Closed right hip fracture, initial encounter ([facility].)  Active Problems:    DM w/o complication type II ([facility].)    Hypertension    Microalbuminuria due to type 2 [facility] mellitus [facility]    Stage 3 chronic kidney disease ([facility].)    Osteogenesis imperfecta    Type 2 diabetes mellitus with chronic kidney disease ([facility].)    Type 2 diabetes mellitus with hyperglycemia (HCC)  Resolved Problems:    * No resolved hospital problems. *      Past Medical History:   Diagnosis Date    Contracture, elbow     h/o right elbow fx, compound fracture.  Erectile dysfunction     Hypertension     Osteogenesis imperfecta     DX 6 months. 30 broken bones in past.     Type II or unspecified type diabetes mellitus without mention of complication, not stated as uncontrolled         Plan:        1. Increase Lantus to 50 units subcu twice daily  2. Monitor CMP  3. PPI  4. DVT Prophylaxis  5. EPCs  6.  PT/OT to evaluate and treat  7.  check and replace electrolytes per sliding scale      Electronically signed by Ebony Chao MD

## 2020-04-28 ENCOUNTER — APPOINTMENT (OUTPATIENT)
Dept: GENERAL RADIOLOGY | Age: 65
DRG: 561 | End: 2020-04-28
Attending: PHYSICAL MEDICINE & REHABILITATION
Payer: MEDICARE

## 2020-04-28 PROBLEM — K21.9 GASTROESOPHAGEAL REFLUX DISEASE WITHOUT ESOPHAGITIS: Status: ACTIVE | Noted: 2020-04-28

## 2020-04-28 LAB
ALBUMIN SERPL-MCNC: 3.3 G/DL (ref 3.5–5.2)
ALBUMIN/GLOBULIN RATIO: ABNORMAL (ref 1–2.5)
ALP BLD-CCNC: 86 U/L (ref 40–129)
ALT SERPL-CCNC: 10 U/L (ref 5–41)
ANION GAP SERPL CALCULATED.3IONS-SCNC: 12 MMOL/L (ref 9–17)
AST SERPL-CCNC: 13 U/L
BILIRUB SERPL-MCNC: 0.62 MG/DL (ref 0.3–1.2)
BUN BLDV-MCNC: 41 MG/DL (ref 8–23)
BUN/CREAT BLD: ABNORMAL (ref 9–20)
CALCIUM SERPL-MCNC: 9.5 MG/DL (ref 8.6–10.4)
CHLORIDE BLD-SCNC: 97 MMOL/L (ref 98–107)
CO2: 25 MMOL/L (ref 20–31)
CREAT SERPL-MCNC: 1.42 MG/DL (ref 0.7–1.2)
GFR AFRICAN AMERICAN: >60 ML/MIN
GFR NON-AFRICAN AMERICAN: 50 ML/MIN
GFR SERPL CREATININE-BSD FRML MDRD: ABNORMAL ML/MIN/{1.73_M2}
GFR SERPL CREATININE-BSD FRML MDRD: ABNORMAL ML/MIN/{1.73_M2}
GLUCOSE BLD-MCNC: 225 MG/DL (ref 75–110)
GLUCOSE BLD-MCNC: 234 MG/DL (ref 75–110)
GLUCOSE BLD-MCNC: 332 MG/DL (ref 75–110)
GLUCOSE BLD-MCNC: 347 MG/DL (ref 75–110)
GLUCOSE BLD-MCNC: 374 MG/DL (ref 70–99)
POTASSIUM SERPL-SCNC: 4.5 MMOL/L (ref 3.7–5.3)
SODIUM BLD-SCNC: 134 MMOL/L (ref 135–144)
TOTAL PROTEIN: 6.7 G/DL (ref 6.4–8.3)

## 2020-04-28 PROCEDURE — 97535 SELF CARE MNGMENT TRAINING: CPT

## 2020-04-28 PROCEDURE — 99223 1ST HOSP IP/OBS HIGH 75: CPT | Performed by: PHYSICAL MEDICINE & REHABILITATION

## 2020-04-28 PROCEDURE — 97162 PT EVAL MOD COMPLEX 30 MIN: CPT

## 2020-04-28 PROCEDURE — 6370000000 HC RX 637 (ALT 250 FOR IP): Performed by: PHYSICAL MEDICINE & REHABILITATION

## 2020-04-28 PROCEDURE — 71045 X-RAY EXAM CHEST 1 VIEW: CPT

## 2020-04-28 PROCEDURE — 97530 THERAPEUTIC ACTIVITIES: CPT

## 2020-04-28 PROCEDURE — 36415 COLL VENOUS BLD VENIPUNCTURE: CPT

## 2020-04-28 PROCEDURE — 1180000000 HC REHAB R&B

## 2020-04-28 PROCEDURE — 6360000002 HC RX W HCPCS: Performed by: PHYSICAL MEDICINE & REHABILITATION

## 2020-04-28 PROCEDURE — 6370000000 HC RX 637 (ALT 250 FOR IP): Performed by: ORTHOPAEDIC SURGERY

## 2020-04-28 PROCEDURE — 97167 OT EVAL HIGH COMPLEX 60 MIN: CPT

## 2020-04-28 PROCEDURE — 80053 COMPREHEN METABOLIC PANEL: CPT

## 2020-04-28 PROCEDURE — 82947 ASSAY GLUCOSE BLOOD QUANT: CPT

## 2020-04-28 RX ORDER — INSULIN GLARGINE 100 [IU]/ML
40 INJECTION, SOLUTION SUBCUTANEOUS 2 TIMES DAILY
Status: DISCONTINUED | OUTPATIENT
Start: 2020-04-28 | End: 2020-04-30

## 2020-04-28 RX ORDER — BISACODYL 10 MG
10 SUPPOSITORY, RECTAL RECTAL DAILY PRN
Status: DISCONTINUED | OUTPATIENT
Start: 2020-04-28 | End: 2020-05-21 | Stop reason: HOSPADM

## 2020-04-28 RX ORDER — SENNA PLUS 8.6 MG/1
2 TABLET ORAL NIGHTLY PRN
Status: DISCONTINUED | OUTPATIENT
Start: 2020-04-28 | End: 2020-05-21 | Stop reason: HOSPADM

## 2020-04-28 RX ORDER — OXYCODONE HCL 10 MG/1
10 TABLET, FILM COATED, EXTENDED RELEASE ORAL EVERY 12 HOURS SCHEDULED
Status: COMPLETED | OUTPATIENT
Start: 2020-04-28 | End: 2020-05-03

## 2020-04-28 RX ORDER — ACETAMINOPHEN 500 MG
1000 TABLET ORAL EVERY 8 HOURS SCHEDULED
Status: DISCONTINUED | OUTPATIENT
Start: 2020-04-28 | End: 2020-05-21 | Stop reason: HOSPADM

## 2020-04-28 RX ADMIN — OXYCODONE HYDROCHLORIDE 10 MG: 5 TABLET ORAL at 01:48

## 2020-04-28 RX ADMIN — INSULIN GLARGINE 40 UNITS: 100 INJECTION, SOLUTION SUBCUTANEOUS at 21:12

## 2020-04-28 RX ADMIN — INSULIN LISPRO 12 UNITS: 100 INJECTION, SOLUTION INTRAVENOUS; SUBCUTANEOUS at 16:46

## 2020-04-28 RX ADMIN — OXYCODONE HYDROCHLORIDE 10 MG: 5 TABLET ORAL at 15:06

## 2020-04-28 RX ADMIN — OXYCODONE HYDROCHLORIDE 10 MG: 10 TABLET, FILM COATED, EXTENDED RELEASE ORAL at 21:07

## 2020-04-28 RX ADMIN — MAGNESIUM HYDROXIDE 30 ML: 400 SUSPENSION ORAL at 10:46

## 2020-04-28 RX ADMIN — POLYETHYLENE GLYCOL 3350 17 G: 17 POWDER, FOR SOLUTION ORAL at 08:19

## 2020-04-28 RX ADMIN — OXYCODONE HYDROCHLORIDE 10 MG: 5 TABLET ORAL at 06:41

## 2020-04-28 RX ADMIN — ENOXAPARIN SODIUM 40 MG: 40 INJECTION SUBCUTANEOUS at 08:20

## 2020-04-28 RX ADMIN — OXYCODONE HYDROCHLORIDE 10 MG: 5 TABLET ORAL at 15:57

## 2020-04-28 RX ADMIN — ACETAMINOPHEN 1000 MG: 500 TABLET, FILM COATED ORAL at 13:40

## 2020-04-28 RX ADMIN — ASPIRIN 81 MG: 81 TABLET, COATED ORAL at 08:20

## 2020-04-28 RX ADMIN — METOPROLOL SUCCINATE 50 MG: 50 TABLET, EXTENDED RELEASE ORAL at 21:07

## 2020-04-28 RX ADMIN — ACETAMINOPHEN 1000 MG: 500 TABLET, FILM COATED ORAL at 21:06

## 2020-04-28 RX ADMIN — INSULIN LISPRO 12 UNITS: 100 INJECTION, SOLUTION INTRAVENOUS; SUBCUTANEOUS at 08:22

## 2020-04-28 RX ADMIN — METOPROLOL SUCCINATE 50 MG: 50 TABLET, EXTENDED RELEASE ORAL at 08:19

## 2020-04-28 RX ADMIN — SERTRALINE HYDROCHLORIDE 100 MG: 100 TABLET ORAL at 21:10

## 2020-04-28 RX ADMIN — SENNOSIDES AND DOCUSATE SODIUM 1 TABLET: 8.6; 5 TABLET ORAL at 08:20

## 2020-04-28 RX ADMIN — OXYCODONE HYDROCHLORIDE 10 MG: 5 TABLET ORAL at 20:06

## 2020-04-28 RX ADMIN — INSULIN GLARGINE 40 UNITS: 100 INJECTION, SOLUTION SUBCUTANEOUS at 12:03

## 2020-04-28 RX ADMIN — PANTOPRAZOLE SODIUM 40 MG: 40 TABLET, DELAYED RELEASE ORAL at 08:19

## 2020-04-28 RX ADMIN — INSULIN LISPRO 3 UNITS: 100 INJECTION, SOLUTION INTRAVENOUS; SUBCUTANEOUS at 21:11

## 2020-04-28 RX ADMIN — OXYCODONE HYDROCHLORIDE 10 MG: 5 TABLET ORAL at 10:46

## 2020-04-28 RX ADMIN — INSULIN LISPRO 6 UNITS: 100 INJECTION, SOLUTION INTRAVENOUS; SUBCUTANEOUS at 11:20

## 2020-04-28 RX ADMIN — TRAZODONE HYDROCHLORIDE 100 MG: 100 TABLET ORAL at 21:06

## 2020-04-28 ASSESSMENT — PAIN SCALES - GENERAL
PAINLEVEL_OUTOF10: 4
PAINLEVEL_OUTOF10: 8
PAINLEVEL_OUTOF10: 4
PAINLEVEL_OUTOF10: 6
PAINLEVEL_OUTOF10: 7
PAINLEVEL_OUTOF10: 6
PAINLEVEL_OUTOF10: 9
PAINLEVEL_OUTOF10: 7
PAINLEVEL_OUTOF10: 4
PAINLEVEL_OUTOF10: 7
PAINLEVEL_OUTOF10: 8
PAINLEVEL_OUTOF10: 4

## 2020-04-28 ASSESSMENT — ENCOUNTER SYMPTOMS
CHOKING: 0
VOICE CHANGE: 0
STRIDOR: 0
BACK PAIN: 0
TROUBLE SWALLOWING: 0
BLOOD IN STOOL: 0
PHOTOPHOBIA: 0
ABDOMINAL DISTENTION: 0
COLOR CHANGE: 0
RECTAL PAIN: 0
ANAL BLEEDING: 0

## 2020-04-28 ASSESSMENT — PAIN DESCRIPTION - ONSET: ONSET: ON-GOING

## 2020-04-28 ASSESSMENT — PAIN DESCRIPTION - ORIENTATION
ORIENTATION: RIGHT
ORIENTATION: RIGHT

## 2020-04-28 ASSESSMENT — PAIN DESCRIPTION - DESCRIPTORS
DESCRIPTORS: ACHING
DESCRIPTORS: ACHING

## 2020-04-28 ASSESSMENT — PAIN DESCRIPTION - LOCATION
LOCATION: HIP;RIB CAGE
LOCATION: HIP;RIB CAGE

## 2020-04-28 ASSESSMENT — PAIN DESCRIPTION - PAIN TYPE
TYPE: ACUTE PAIN
TYPE: ACUTE PAIN;SURGICAL PAIN

## 2020-04-28 ASSESSMENT — PAIN DESCRIPTION - FREQUENCY
FREQUENCY: CONTINUOUS
FREQUENCY: CONTINUOUS

## 2020-04-28 ASSESSMENT — PAIN DESCRIPTION - PROGRESSION: CLINICAL_PROGRESSION: GRADUALLY IMPROVING

## 2020-04-28 NOTE — PROGRESS NOTES
Physical Therapy    Facility/Department: [facility]  Initial Assessment    NAME: Igor Saini  : 1955  MRN: [id number]    Date of Service: 2020    Discharge Recommendations:  Patient would benefit from continued therapy after discharge, Home with assist PRN   PT Equipment Recommendations  Equipment Needed: No(pt has RW at home)    Assessment   Body structures, Functions, Activity limitations: Decreased functional mobility ; Decreased endurance;Decreased ROM; Decreased strength;Decreased balance; Increased pain;Decreased ADL status; Decreased posture  Assessment: Pt severely limited by pain levels at this time, requiring 2 assist for all mobility. Pt would benefit from continued therapy to maximize his functional potential.  Treatment Diagnosis: impaired mobility 2* R hip fx  Specific instructions for Next Treatment: STS with aakash carey, progress to walker lift and RW, monitor with pain meds, encouragement  Prognosis: Good  Decision Making: Medium Complexity  History: 60 y/o male admitted to [facility] 20 with hip pain and fall. 54-year-old male status post fall with right hip pain. Diagnosed with right intertrochanteric femur fracture. Abner Hadley He does have a history of osteogenesis imperfecta and has had multiple fractures. Last fractures were of bilateral patella in 2019- surgery by Dr. Darnell Hammond. Pt admitted to [facility] 20. Exam: ROM, MMT, balance and mobility assessments  Clinical Presentation: Pt alert, pleasant despite pain, requires encouragement and motivation. Barriers to Learning: pain/anxiety  REQUIRES PT FOLLOW UP: Yes  Activity Tolerance  Activity Tolerance: Patient limited by pain; Patient limited by endurance  Other Comments  Comments: Coordinate with nursing with pain med schedule; Pt requires encouragement d/t anxiety/fear of pain       Patient Diagnosis(es): There were no encounter diagnoses.      has a past medical history of Contracture, elbow, Erectile dysfunction, Hypertension, Osteogenesis imperfecta, and Type II or unspecified type diabetes mellitus without mention of complication, not stated as uncontrolled. has a past surgical history that includes Cholecystectomy; Tonsillectomy and adenoidectomy; fracture surgery (Right); fracture surgery (Bilateral); fracture surgery (Left); and Femur fracture surgery (Right, 4/23/2020). Restrictions  Restrictions/Precautions  Restrictions/Precautions: Fall Risk, Weight Bearing, General Precautions  Required Braces or Orthoses?: No  Implants present? : Metal implants(R hip, R knee, R elbow - pins/screws)  Lower Extremity Weight Bearing Restrictions  Right Lower Extremity Weight Bearing: Partial Weight Bearing(PWB 25%)  Partial Weight Bearing Percentage Or Pounds: 25%   Partial Weight Bearing Percentage Or Pounds: Reports that he only recently was able to achieve 90+ degrees of flexion in R knee due to past fractures/surgeries/conditions/procedures   Upper Extremity Weight Bearing Restrictions  Right Upper Extremity Weight Bearing: (Reports that he is limited in Active ROM of extension in R elbow due to past fractures/surgeries/conditions/procedures related to elbow fracture )  Position Activity Restriction  Other position/activity restrictions: Low tolerance for Pain - restricts all repositioning and functional mobility / care task performances   Vision/Hearing  Vision: Impaired  Vision Exceptions: Wears glasses at all times  Hearing: Within functional limits     Subjective  General  Chart Reviewed: Yes  Patient assessed for rehabilitation services?: Yes  Additional Pertinent Hx: 58 y/o male admitted [facility]/23/20 with hip pain and fall. 77-year-old male status post fall with right hip pain. Diagnosed with right intertrochanteric femur fracture. Christine Prey He does have a history of osteogenesis imperfecta and has had multiple fractures.   Last fractures were of bilateral patella in November 2019- surgery by  In Jacky         Time Out 0922         Minutes 60         Timed Code Treatment Minutes: [place] , [place]

## 2020-04-28 NOTE — CONSULTS
Consult for medical management      Name: Helena Leal  MRN: [id number]     [place]: [de-identified]  Room: [address] [place] [address]    Admit Date: 4/27/2020  PCP: ADEEL Malik CNP      Chief Complaint:    closed right hip fracture    History Obtained From:     patient, electronic medical record    History of Present Illness:      Helena Leal is a  59 y.o.  male  With a past medical history of diabetes mellitus CKD stage 3 hypertension presents with  Closed right hip fracture   patient is status post right hip  ORIF. Patient's right hip pain is well controlled. Patient denies chest pain shortness of breath, fever chills palpitations nausea vomiting abdominal pain. Past Medical History:     Past Medical History:   Diagnosis Date    Contracture, elbow     h/o right elbow fx, compound fracture.  Erectile dysfunction     Hypertension     Osteogenesis imperfecta     DX 6 months. 30 broken bones in past.     Type II or unspecified type diabetes mellitus without mention of complication, not stated as uncontrolled         Past Surgical History:     Past Surgical History:   Procedure Laterality Date    CHOLECYSTECTOMY      FEMUR FRACTURE SURGERY Right 4/23/2020    HIP TFN WITH C-ARM VISUALIZATION performed by Dax Tatum MD at [facility] Right     right elbow x 3. Compound fx at work.  FRACTURE SURGERY Bilateral     with hardware    FRACTURE SURGERY Left     with hardware    TONSILLECTOMY AND ADENOIDECTOMY          Medications Prior to Admission:       Prior to Admission medications    Medication Sig Start Date End Date Taking? Authorizing Provider   oxyCODONE (ROXICODONE) 5 MG immediate release tablet Take 1 tablet by mouth every 4 hours as needed for Pain for up to 5 days.  4/27/20 5/2/20  Yanick Kramer MD   enoxaparin (LOVENOX) 40 MG/0.4ML injection Inject 0.4 mLs into the skin daily for 10 days 4/27/20 5/7/20  Dax Tatum MD   HYDROcodone-acetaminophen (NORCO) 5-325 MG per tablet Time: 04/28/20  3:48 PM   Result Value Ref Range    POC Glucose 332 (H) 75 - 110 mg/dL       Assesment:     Primary Problem  Closed right hip fracture, initial encounter ([facility]    Principal Problem:    Closed right hip fracture, initial encounter [facility])  Active Problems:    Essential hypertension    Microalbuminuria due to type 2 diabetes mellitus ([facility].)    Stage 3 chronic kidney disease ([place] [address].)    Type 2 diabetes mellitus with chronic kidney disease ([facility].)    Type 2 diabetes mellitus with hyperglycemia ([facility].)    Gastroesophageal reflux disease without esophagitis  Resolved Problems:    * No resolved hospital problems. *      Plan:     1. Restart home medications  2. Lantus 40 mg subcu twice daily  3.  insulin sliding scale  4. CBC CMP twice weekly  5. DVT prophylaxis  6. POCT  AC and HS  7. PPI  8. PT OT to evaluate and treat  9.  pain control  10.  check and replace electrolytes per sliding scale  11. EPCs       thanks for the consult.   Electronically signed by Cristina Montgomery MD     Copy sent to Dr. Amanda Morales, APRN - CNP

## 2020-04-28 NOTE — FLOWSHEET NOTE
Writer stopped to check on patient since he transferred to ARU; wife Chau Liur with him. Clearly in pain and not in much of a mood to talk so writer offered emotional support and will follow up on Sat when [address] [employer] works again. 04/28/20 6959   Encounter Summary   Services provided to: Patient and family together   Referral/Consult From: Ori   Continue Visiting   (4-28-20)   Complexity of Encounter High   Length of Encounter 15 minutes   Routine   Type Follow up   Assessment Anxious; Helplessness   Intervention Active listening;Sustaining presence/ Ministry of presence; Discussed illness/injury and it's impact   Outcome Expressed gratitude;Expressed feelings/needs/concerns;Venting emotion

## 2020-04-28 NOTE — CARE COORDINATION
Pt completed the phq-2 depression scale and scored a 5  which places the pt in the mild depression category. There were no reports of suicidal/homicidal ideation or plans at the time of the assessment. there is no  history suicidal/homicidal attempts or thought. There is a history of mental health treatment. There is no history of drug or alcohol abuse.  There is no history of drug or alcohol treatment in the past.

## 2020-04-28 NOTE — PROGRESS NOTES
[facility]    Acute Rehabilitation OT Evaluation  Date: 20  Patient Name: Igor Saini       Room: 2890/0092-36  MRN: [id number]  Account: [de-identified]   : 1955  (62 y.o.) Gender: male     Referring Practitioner: Regina Miller MD  Diagnosis: Closed R hip fracture   Additional Pertinent Hx: admitted to the [address] [place] [address] on 2020. He was originally admitted to [facility] on 2020 for fall at home from standing height with R hip fracture. He has known history of osteogenesis imperfecta with multiple previous fractures. Work-up included x-rays of R hip and femur showing displaced R IT fracture. Treatment included IM nail fixation performed by Dr. Jahaira De León on 2020. Treatment Diagnosis: impaired self care status    Past Medical History:  has a past medical history of Contracture, elbow, Erectile dysfunction, Hypertension, Osteogenesis imperfecta, and Type II or unspecified type diabetes mellitus without mention of complication, not stated as uncontrolled. Past Surgical History:   has a past surgical history that includes Cholecystectomy; Tonsillectomy and adenoidectomy; fracture surgery (Right); fracture surgery (Bilateral); fracture surgery (Left); and Femur fracture surgery (Right, 2020).     Restrictions  Restrictions/Precautions: Fall Risk, Weight Bearing, General Precautions  Implants present? : Metal implants(R hip, R knee, R elbow - pins/screws)  Other position/activity restrictions: Low tolerance for Pain - restricts all repositioning and functional mobility / care task performances   Right Lower Extremity Weight Bearing: Partial Weight Bearing(PWB 25%)  Right Upper Extremity Weight Bearing: (Reports that he is limited in Active ROM of extension in R elbow due to past fractures/surgeries/conditions/procedures related to elbow fracture )  Required Braces or Orthoses?: No      Subjective  Subjective: \"PT was very tough today, I am in a lot of pain right now\"   Comments: Patient required Max encouragement to participate in OT session this date. Patient reporting high pain levels.  Patient's daughter present during AM session, son present during PM session   Pain Level: 8  Pain Location: Hip, Rib cage  Pain Orientation: Right  Orientation  Overall Orientation Status: Within Functional Limits  Orientation Level: Oriented X4  Vision  Vision: Impaired  Vision Exceptions: Wears glasses at all times  Hearing  Hearing: Within functional limits  Social/Functional History  Lives With: Spouse  Type of Home: Mobile home  Home Layout: One level  Home Access: Stairs to enter with rails  Entrance Stairs - Number of Steps: 2(4 platform steps on other entry, B HR (can reach both))  Entrance Stairs - Rails: Both(can reach both)  Bathroom Shower/Tub: Walk-in shower, Shower chair with back, Shower chair without back, Doors((? (unable to use either shower chair- unable to bend knees enough to use))  Bathroom Toilet: Standard  Bathroom Equipment: Grab bars in shower, Toilet raiser, Grab bars around toilet, Shower chair  Bathroom Accessibility: Walker accessible((has to turn side ways when walking)  Home Equipment: Rolling walker, Wheelchair-manual, Cane, Reacher, Sock aid, Long-handled shoehorn  Receives Help From: Family  ADL Assistance: Independent(((stands to take showers- unable to bend knees enough to sit to take a shower)  Homemaking Assistance: Independent(shares with wife - wife is in [facility] for work)  Homemaking Responsibilities: No(wife does)  Ambulation Assistance: Independent(using RW since December 2019)  Transfer Assistance: Independent  Active : Yes  Mode of Transportation: [facility]  Occupation: Retired  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

## 2020-04-28 NOTE — CARE COORDINATION
CASE MANAGEMENT NOTE:    Admission Date:  4/27/2020 Natividad Dodson is a 59 y.o.  male    Admitted for : Closed right hip fracture, initial encounter ([facility] [S72.001A]    Met with:  Patient    PCP:  Christopher Torres cnp                                Insurance:  Alpa Russo      Current Residence/ Living Arrangements:  independently at home with wife. However, she is out of town 4 days out of the week             Current Services PTA:  No    Is patient agreeable to VNS: Yes    Freedom of choice provided:  Yes    List of [address] provided: Yes    VNS chosen:  No    DME:  walker    Home Oxygen: No    Nebulizer: No    CPAP/BIPAP: No    Supplier: N/A    Potential Assistance Needed: Yes    SNF needed: No    Freedom of choice and list provided: NA    Pharmacy:  unk       Does Patient want to use MEDS to BEDS? Yes    Is the Patient an [facility] with Readmission Risk Score greater than 14%? No  If yes, pt needs a follow up appointment made within 7 days. Family Members/Caregivers that pt would like involved in their care:    Yes    If yes, list name here:  [address]    Transportation Provider:  Family             Is patient in Isolation/One on One/Altered Mental Status? No  If yes, skip next question. If no, would they like an I-Pad to  use? No  If yes, call [phone].              Discharge Plan:  Discharged home with vns dme ordered as needed                 Electronically signed by: BENJAMÍN Villa LSW on 4/28/2020 at 2:12 PM

## 2020-04-28 NOTE — PLAN OF CARE
Nutrition Problem: Increased nutrient needs  Intervention: Food and/or Nutrient Delivery: Continue current diet, Modify current ONS  Nutritional Goals: PO intake % of meals and supplements

## 2020-04-28 NOTE — PLAN OF CARE
Problem: Pain:  Goal: Control of acute pain  Description: Control of acute pain  Outcome: Ongoing  Patient assessed for pain, medicated per orders. Patient reports an acceptable level of discomfort with the current medications. Patient was repositioned and cold/heat therapy applied. Problem: Falls - Risk of:  Goal: Will remain free from falls  Description: Will remain free from falls  Outcome: Ongoing  No falls or injury this shift. Bed is maintained at the lowest level, brakes engaged, call light and assistive devices in reach. Room is clutter free, adequate lighting for safe transfers and ambulation. Assistance provided for transfers and toileting. Problem: Mobility - Impaired:  Goal: Mobility will improve  Description: Mobility will improve  Outcome: Ongoing  Problem: Skin Integrity:  Goal: Will show no infection signs and symptoms  Description: Will show no infection signs and symptoms  Outcome: Ongoing   Skin assessment completed this shift. Nutrition and Hydration status assessed with adequate intake. Yovani Score as charted. Patient tolerates repositioning by staff at least every 2 hours. Patient able to reposition self for comfort and to prevent breakdown. Patient verbalizes understanding of pressure ulcer prevention measures. Skin integrity maintained. No new skin breakdown noted. Skin to high risk pressure areas including coccyx and heels are clear. Samia / Incontinence care provided as needed throughout the shift. Aloe Vesta Moisture Barrier ointment applied to buttocks as a preventative measure.

## 2020-04-28 NOTE — H&P
elbow fracture )   Transfers  Sit to Stand: 2 Person Assistance, Maximum Assistance(from low chair using walker lift)  Stand to sit: 2 Person Assistance, Maximum Assistance(to EOB)  Bed to Chair: (pt defers up to w/c - requests back to bed)  Comment: Attempted to stand with RW and raised HOB, unable to keep L LE flexed and stand. Stood in [facility] x2 with max x2, able to stand with bed raised and cueing. Pt maintaining WB precautions with standing with cues. WB Status: PWB 25% right LE    Transfers  Sit to Stand: 2 Person Assistance, Maximum Assistance(from low chair using walker lift)  Stand to sit: 2 Person Assistance, Maximum Assistance(to EOB)  Bed to Chair: (pt defers up to w/c - requests back to bed)  Comment: Attempted to stand with RW and raised HOB, unable to keep L LE flexed and stand. Stood in [address] [facility] x2 with max x2, able to stand with bed raised and cueing. Pt maintaining WB precautions with standing with cues. Ambulation  Ambulation?: No(pt defers attempting with green walker d/t pain)  WB Status: PWB 25% right LE                 OT:   ADL  Feeding: Independent(reports no difficulty opening packages/containers/cutting up food during mealtime)  Grooming: Setup, Increased time to complete(oral care, washing face)  UE Bathing: Setup, Increased time to complete, Stand by assistance(washing underarms/chest/abdomen)  LE Bathing: Increased time to complete, Verbal cueing, Maximum assistance(washing ivanna area only (and applying powder) ; requires assistance with all other LB bathing areas)  UE Dressing: Minimal assistance(hospital gown change only)  LE Dressing: Dependent/Total(socks/TEDs)  Toileting: Dependent/Total(urinal/bedpan )  Additional Comments: Patient agreeable to few ADL tasks only from supine position due to high pain level this date. Functional Mobility  Functional Mobility Comments: Pt declines sitting EOB/transfers/further mobility this date due to high pain level.  Per PT report, pt required 2 assist with Linda Cruz      Bed mobility  Rolling to Left: Unable to assess  Rolling to Right: Unable to assess  Supine to Sit: Unable to assess  Sit to Supine: Unable to assess  Scooting: Unable to assess  Comment: Patient refusing to perform any bed mobility tasks this date due to high pain level   Transfers  Sit to stand: Unable to assess  Stand to sit: Unable to assess  Transfer Comments: pt refusing transfers/mobility this date due to high pain level, per PT report, pt required 2 person assist with Linda Cruz this date                  SPEECH:      Past Medical History:      Diagnosis Date    Contracture, elbow     h/o right elbow fx, compound fracture.  Erectile dysfunction     Hypertension     Osteogenesis imperfecta     DX 6 months. 30 broken bones in past.     Type II or unspecified type diabetes mellitus without mention of complication, not stated as uncontrolled        Past Surgical History:      Procedure Laterality Date    CHOLECYSTECTOMY      FEMUR FRACTURE SURGERY Right 4/23/2020    HIP TFN WITH C-ARM VISUALIZATION performed by Saw Romero MD at [facility] Right     right elbow x 3. Compound fx at work.  FRACTURE SURGERY Bilateral     with hardware    FRACTURE SURGERY Left     with hardware    TONSILLECTOMY AND ADENOIDECTOMY         Allergies:    Patient has no known allergies.     Medications   Scheduled Meds:   acetaminophen  1,000 mg Oral 3 times per day    insulin glargine  40 Units Subcutaneous BID    oxyCODONE  10 mg Oral 2 times per day    sodium chloride flush  10 mL Intravenous 2 times per day    aspirin  81 mg Oral Daily    insulin lispro  0-18 Units Subcutaneous TID WC    insulin lispro  0-9 Units Subcutaneous Nightly    metoprolol succinate  50 mg Oral BID    pantoprazole  40 mg Oral Daily    sertraline  100 mg Oral Nightly    traZODone  100 mg Oral Nightly    polyethylene glycol  17 g Oral Daily    enoxaparin  40 mg

## 2020-04-29 LAB
GLUCOSE BLD-MCNC: 185 MG/DL (ref 75–110)
GLUCOSE BLD-MCNC: 202 MG/DL (ref 75–110)
GLUCOSE BLD-MCNC: 220 MG/DL (ref 75–110)

## 2020-04-29 PROCEDURE — 6360000002 HC RX W HCPCS: Performed by: PHYSICAL MEDICINE & REHABILITATION

## 2020-04-29 PROCEDURE — 6370000000 HC RX 637 (ALT 250 FOR IP): Performed by: PHYSICAL MEDICINE & REHABILITATION

## 2020-04-29 PROCEDURE — 82947 ASSAY GLUCOSE BLOOD QUANT: CPT

## 2020-04-29 PROCEDURE — 97535 SELF CARE MNGMENT TRAINING: CPT

## 2020-04-29 PROCEDURE — 99231 SBSQ HOSP IP/OBS SF/LOW 25: CPT | Performed by: PHYSICAL MEDICINE & REHABILITATION

## 2020-04-29 PROCEDURE — 6370000000 HC RX 637 (ALT 250 FOR IP): Performed by: ORTHOPAEDIC SURGERY

## 2020-04-29 PROCEDURE — 97110 THERAPEUTIC EXERCISES: CPT

## 2020-04-29 PROCEDURE — 97530 THERAPEUTIC ACTIVITIES: CPT

## 2020-04-29 PROCEDURE — 1180000000 HC REHAB R&B

## 2020-04-29 RX ORDER — LIDOCAINE 4 G/G
1 PATCH TOPICAL DAILY
Status: DISCONTINUED | OUTPATIENT
Start: 2020-04-29 | End: 2020-05-21 | Stop reason: HOSPADM

## 2020-04-29 RX ADMIN — ENOXAPARIN SODIUM 40 MG: 40 INJECTION SUBCUTANEOUS at 07:41

## 2020-04-29 RX ADMIN — SERTRALINE HYDROCHLORIDE 100 MG: 100 TABLET ORAL at 21:03

## 2020-04-29 RX ADMIN — INSULIN LISPRO 3 UNITS: 100 INJECTION, SOLUTION INTRAVENOUS; SUBCUTANEOUS at 15:53

## 2020-04-29 RX ADMIN — INSULIN LISPRO 3 UNITS: 100 INJECTION, SOLUTION INTRAVENOUS; SUBCUTANEOUS at 07:40

## 2020-04-29 RX ADMIN — TRAZODONE HYDROCHLORIDE 100 MG: 100 TABLET ORAL at 21:03

## 2020-04-29 RX ADMIN — OXYCODONE HYDROCHLORIDE 10 MG: 5 TABLET ORAL at 04:20

## 2020-04-29 RX ADMIN — ACETAMINOPHEN 1000 MG: 500 TABLET, FILM COATED ORAL at 13:03

## 2020-04-29 RX ADMIN — INSULIN GLARGINE 40 UNITS: 100 INJECTION, SOLUTION SUBCUTANEOUS at 07:39

## 2020-04-29 RX ADMIN — INSULIN LISPRO 5 UNITS: 100 INJECTION, SOLUTION INTRAVENOUS; SUBCUTANEOUS at 21:09

## 2020-04-29 RX ADMIN — OXYCODONE HYDROCHLORIDE 10 MG: 10 TABLET, FILM COATED, EXTENDED RELEASE ORAL at 07:40

## 2020-04-29 RX ADMIN — METOPROLOL SUCCINATE 50 MG: 50 TABLET, EXTENDED RELEASE ORAL at 21:03

## 2020-04-29 RX ADMIN — ACETAMINOPHEN 1000 MG: 500 TABLET, FILM COATED ORAL at 21:03

## 2020-04-29 RX ADMIN — POLYETHYLENE GLYCOL 3350 17 G: 17 POWDER, FOR SOLUTION ORAL at 07:41

## 2020-04-29 RX ADMIN — INSULIN LISPRO 6 UNITS: 100 INJECTION, SOLUTION INTRAVENOUS; SUBCUTANEOUS at 11:17

## 2020-04-29 RX ADMIN — ACETAMINOPHEN 1000 MG: 500 TABLET, FILM COATED ORAL at 05:33

## 2020-04-29 RX ADMIN — INSULIN GLARGINE 40 UNITS: 100 INJECTION, SOLUTION SUBCUTANEOUS at 21:07

## 2020-04-29 RX ADMIN — OXYCODONE HYDROCHLORIDE 10 MG: 5 TABLET ORAL at 13:48

## 2020-04-29 RX ADMIN — OXYCODONE HYDROCHLORIDE 10 MG: 5 TABLET ORAL at 21:03

## 2020-04-29 RX ADMIN — ASPIRIN 81 MG: 81 TABLET, COATED ORAL at 07:40

## 2020-04-29 RX ADMIN — METOPROLOL SUCCINATE 50 MG: 50 TABLET, EXTENDED RELEASE ORAL at 07:40

## 2020-04-29 RX ADMIN — PANTOPRAZOLE SODIUM 40 MG: 40 TABLET, DELAYED RELEASE ORAL at 07:40

## 2020-04-29 RX ADMIN — OXYCODONE HYDROCHLORIDE 10 MG: 5 TABLET ORAL at 09:48

## 2020-04-29 RX ADMIN — OXYCODONE HYDROCHLORIDE 10 MG: 10 TABLET, FILM COATED, EXTENDED RELEASE ORAL at 21:03

## 2020-04-29 ASSESSMENT — PAIN DESCRIPTION - DESCRIPTORS
DESCRIPTORS: SHARP;SORE;ACHING
DESCRIPTORS: SHARP;SORE;ACHING

## 2020-04-29 ASSESSMENT — PAIN DESCRIPTION - LOCATION
LOCATION: HIP
LOCATION: HIP

## 2020-04-29 ASSESSMENT — PAIN SCALES - GENERAL
PAINLEVEL_OUTOF10: 9
PAINLEVEL_OUTOF10: 6
PAINLEVEL_OUTOF10: 4
PAINLEVEL_OUTOF10: 8
PAINLEVEL_OUTOF10: 6
PAINLEVEL_OUTOF10: 0
PAINLEVEL_OUTOF10: 7
PAINLEVEL_OUTOF10: 0
PAINLEVEL_OUTOF10: 2
PAINLEVEL_OUTOF10: 9
PAINLEVEL_OUTOF10: 3
PAINLEVEL_OUTOF10: 5

## 2020-04-29 ASSESSMENT — PAIN DESCRIPTION - PROGRESSION: CLINICAL_PROGRESSION: GRADUALLY IMPROVING

## 2020-04-29 ASSESSMENT — PAIN DESCRIPTION - ORIENTATION
ORIENTATION: RIGHT
ORIENTATION: RIGHT

## 2020-04-29 ASSESSMENT — PAIN DESCRIPTION - PAIN TYPE
TYPE: ACUTE PAIN;SURGICAL PAIN
TYPE: ACUTE PAIN;SURGICAL PAIN

## 2020-04-29 NOTE — PROGRESS NOTES
10/15/2018    TRIG 161 (H) 10/15/2018     LIVER PROFILE:   Recent Labs     04/27/20  0545 04/28/20  0611   AST 11 13   ALT 6 10   BILITOT 0.56 0.62   ALKPHOS 73 86        Current Medications:   Current Facility-Administered Medications: senna (SENOKOT) tablet 17.2 mg, 2 tablet, Oral, Nightly PRN  bisacodyl (DULCOLAX) suppository 10 mg, 10 mg, Rectal, Daily PRN  acetaminophen (TYLENOL) tablet 1,000 mg, 1,000 mg, Oral, 3 times per day  insulin glargine (LANTUS) injection vial 40 Units, 40 Units, Subcutaneous, BID  oxyCODONE (OXYCONTIN) extended release tablet 10 mg, 10 mg, Oral, 2 times per day  sodium chloride flush 0.9 % injection 10 mL, 10 mL, Intravenous, 2 times per day  sodium chloride flush 0.9 % injection 10 mL, 10 mL, Intravenous, PRN  dextrose 5 % solution, 100 mL/hr, Intravenous, PRN  dextrose 50 % IV solution, 12.5 g, Intravenous, PRN  glucagon (rDNA) injection 1 mg, 1 mg, Intramuscular, PRN  glucose (GLUTOSE) 40 % oral gel 15 g, 15 g, Oral, PRN  aspirin EC tablet 81 mg, 81 mg, Oral, Daily  insulin lispro (HUMALOG) injection vial 0-18 Units, 0-18 Units, Subcutaneous, TID WC  insulin lispro (HUMALOG) injection vial 0-9 Units, 0-9 Units, Subcutaneous, Nightly  metoprolol succinate (TOPROL XL) extended release tablet 50 mg, 50 mg, Oral, BID  pantoprazole (PROTONIX) tablet 40 mg, 40 mg, Oral, Daily  sertraline (ZOLOFT) tablet 100 mg, 100 mg, Oral, Nightly  traZODone (DESYREL) tablet 100 mg, 100 mg, Oral, Nightly  polyethylene glycol (GLYCOLAX) packet 17 g, 17 g, Oral, Daily  enoxaparin (LOVENOX) injection 40 mg, 40 mg, Subcutaneous, Daily  magnesium hydroxide (MILK OF MAGNESIA) 400 MG/5ML suspension 30 mL, 30 mL, Oral, Daily PRN  oxyCODONE (ROXICODONE) immediate release tablet 5 mg, 5 mg, Oral, Q4H PRN **OR** oxyCODONE (ROXICODONE) immediate release tablet 10 mg, 10 mg, Oral, Q4H PRN      Impression/Plan:   Impaired ADLs, gait, and mobility due to:      1. R hip fracture s/p IM nail.:  PT/OT for gait,

## 2020-04-29 NOTE — PROGRESS NOTES
[address]   ACUTE REHABILITATION OCCUPATIONAL THERAPY  DAILY NOTE    Date: 20  Patient Name: Primo Day      Room: 9970/3755-14    MRN: [id number]   : 1955  (59 y.o.)  Gender: male   Referring Practitioner: Nadine Antonio  Diagnosis: Closed R hip fx       Restrictions  Restrictions/Precautions: Fall Risk, Weight Bearing, General Precautions  Implants present? : Metal implants(R hip, R knee, R elbow - pins/screws)  Other position/activity restrictions: hx osteogenesis imperfecta- hx multiple fractures; Low tolerance for Pain - restricts all repositioning and functional mobility / care task performances   Right Lower Extremity Weight Bearing: Partial Weight Bearing(PWB 25%)  Right Upper Extremity Weight Bearing: (Reports that he is limited in Active ROM of extension in R elbow due to past fractures/surgeries/conditions/procedures related to elbow fracture)  Required Braces or Orthoses?: No    Subjective  Subjective: AM: \"the change in medication seems to be helping\" PM: \"oh I not getting up again, I didnt want to get up for PT but I did\"   Comments: Patient agreeable to ADLs in bed today, reports up to toilet yesterday with nursing assist, using Bekah Chill onto toilet and requiring Maxi Move for off of toilet.  PM: patient agreeable to BUE exercises supine in bed    Patient Currently in Pain: Yes  Pain Level: 4  Pain Location: Hip  Pain Orientation: Right  Restrictions/Precautions: Fall Risk;Weight Bearing;General Precautions  Overall Orientation Status: Within Functional Limits  Orientation Level: Oriented X4     Pain Assessment  Pain Assessment: 0-10  Pain Level: 4  Pain Type: Acute pain, Surgical pain  Pain Location: Hip  Pain Orientation: Right  Pain Descriptors: Sharp, Sore, Aching  Pain Frequency: Continuous    Objective  Cognition  Overall Cognitive Status: WFL  Cognition Comment: limited by pain and anxiety for movements this date      Bed mobility  Rolling to Left: rolling to pull up )  Toileting: None  Additional Comments: Patient agreeable for ADL tasks supine position this date. Initiated AE training for increased independence with LB ADLs, patient receptive, needs continued training. Pain more controlled this date. Patient reports fatigue with ADLs          OT scores     Eating  Assistance Needed: Independent  CARE Score: 6  Oral Hygiene  Assistance Needed: Setup or clean-up assistance  CARE Score: 5  Toileting Hygiene  Assistance Needed: Dependent  CARE Score: 1  Shower/Bathe Self  Assistance Needed: Partial/moderate assistance  CARE Score: 3  Upper Body Dressing  Assistance Needed: Partial/moderate assistance  CARE Score: 3  Lower Body Dressing  Assistance Needed: Substantial/maximal assistance  CARE Score: 2  Putting On/Taking Off Footwear  Assistance Needed: Dependent  CARE Score: 1  Toilet Transfer  Assistance Needed: Dependent  CARE Score: 1      Assessment  Performance deficits / Impairments: Decreased functional mobility ; Decreased safe awareness;Decreased ADL status; Decreased endurance;Decreased strength;Decreased ROM; Decreased balance  Prognosis: Good  Discharge Recommendations: Patient would benefit from continued therapy after discharge;Home with assist PRN  Activity Tolerance: Patient limited by pain; Patient limited by fatigue  Safety Devices in place: Yes  Type of devices: Patient at risk for falls; Left in bed;Call light within reach          Patient Education:  Patient Goals   Patient goals : To have less pain and be able to move  Learner:patient  Method: demonstration and explanation       Outcome: needs reinforcement   OT Education  OT Education: OT Role;Plan of Care;Precautions; ADL Adaptive Strategies; Energy Conservation;Orientation;Equipment; Family Education  Patient Education: activity promotion, ADL participation, encouragement to participate in therapies, AE training for LB ADLs     Plan  Plan  Times per week: 900 min/week of combined OT/PT due to

## 2020-04-29 NOTE — PLAN OF CARE
Individualized Plan of Care  [facility]    Rehabilitation physician: Dr Ines Darnell Date: 4/27/2020     Rehabilitation Diagnosis: Closed right hip fracture, initial encounter [facility]) [S72.001A]     Rehabilitation impairments: self care, mobility and pain management    Factors facilitating achievement of predicted outcomes: Family support, Cooperative and Has homemaker services  Barriers to the achievement of predicted outcomes: Pain, Limited participation, Decreased endurance, Lower extremity weakness, Medical complications, Stairs at home and Medication managment    Patient Goals: Improve independence with mobility, Improvement of mobility at a wheelchair level, Increase overall strength and endurance, Increase balance, Increase independence with activities of daily living, Increase safety awareness, Integrate appropriate pain management plan, Assure adequate nutritional option for discharge and Provide appropriate patient and family education      NURSING:  Nursing goals for [facility] while on the rehabilitation unit will include:  Adequate number of bowel movements, Urinate with no urinary retention >300ml in bladder, Maintain O2 SATs at an acceptable level during stay, Effective pain management while on the rehabilitation unit, Establish adequate pain control plan for discharge, Absence of skin breakdown while on the rehabilitation unit, Avoidance of any hospital acquired infections, Freedom from injury during hospitalization and Complete education with patient/family with understanding demonstrated regarding disease process and resultant impairment     In order to achieve these goals, nursing interventions may include bowel/bladder training, education for medical assistive devices, medication education, O2 saturation management, energy conservation, stress management techniques, fall prevention, alarms protocol, seating and positioning, skin/wound care, pressure relief instruction, dressing changes, infection protection, DVT prophylaxis, assistance with safe transfers , and/or assistance with bathroom activities and hygiene. PHYSICAL THERAPY:  Goals:        Short term goals  Time Frame for Short term goals: 1 week  Short term goal 1: Pt to reduce pain to 2-3/10 to improve ease and progression of functional mobility. Short term goal 2: Pt to demonstrate min x2 bed mobility with safe technique. Short term goal 3: Pt to demonstrate min x2 transfers with appropriate device and good maintaining of PWB R LE. Short term goal 4: Pt to amb 25'-50' with appropriate device min x2 while maintaining RLE PWB. Short term goal 5: Pt to ascend/descend 2-4 steps with B UE support min x2. Short term goal 6: Pt to improve posture to GOOD. Short term goal 7: Pt to improve sitting balance to Good (Static/dynamic). Short term goal 8: Pt to propel w/c 50' Mod I.  Long term goals  Time Frame for Long term goals : by D/C  Long term goal 1: Pt to improve R LE AROM to WNL. Long term goal 2: Pt to improve BLE strength by 1/2 MMG. Long term goal 3: Pt to perform 2MWT with amb at least 76' , Mod I with device. Long term goal 4: Pt to perform bed mobility Mod I.  Long term goal 5: Pt to perform transfers Mod I with device from varied surfaces to simulate home set up. Long term goal 6: Pt to amb at least 100' Mod I with device on varied terrain  Long term goal 7: Pt to ascend/descend 2-4 steps per home set up, including platform steps with RW, SBA. Long term goal 8: Pt to demonstrate Mod I in room with device. Long term goal 9: Pt to improve standing balance to Good- to reduce fall risk at home and in community. Plan of Care: Pt to be seen by physical therapy services Plan Of Care:  Due to impaired functional endurance and/or medical issues, the patient is to be seen for a combined total of at least a  900 minutes over 7 days of Physical, Occupational and/or Speech Therapy.    per day at Physical, Occupational and/or Speech Therapy. per day at least 5 out of 7 days per week     Anticipated interventions may include ADL and IADL retraining, strengthening, safety education and training, patient/caregiver education and training, equipment evaluation/ training/procurement, neuromuscular reeducation, wheelchair mobility training. SPEECH THERAPY:   Goals:                      Plan of Care:     CASE MANAGEMENT:  Goals:   Assist patient/family with discharge planning, patient/family counseling,  and coordination with insurance during the inpatient rehabilitation stay. Other members of the multidisciplinary rehabilitation team that will be involved in the patient's plan of care include recreational therapy, dietary, respiratory therapy, and neuropsychology. Medical issues being managed closely and that require 24 hour availability of a physician:  Weight bearing precautions, Wound care, Pain management, Infection protection, DVT prophylaxis, Fall precautions and History of heart disease                                           Physician anticipated functional outcomes: Improved independence with functional measures   Estimated length of stay for this admission 3 weeks  Medical Prognosis: Fair  Anticipated disposition: Home. The potential to achieve the above medical and rehabilitative goals is fair. This plan of care has been developed with the assistance and input of the multidisciplinary rehabilitation team.  The plan was reviewed with the patient on 4/29/2020. The patient has had the opportunity to provide input to the therapy team.    I have reviewed this Individualized Plan of Care and agree with its contents. Above documentation has been expanded, modified, adjusted to reflect the findings of my evaluations and goals for the patient.     Physician:  Electronically signed by Kaye Hastings MD on 4/29/20 at 11:53 AM EDT

## 2020-04-29 NOTE — PROGRESS NOTES
position/activity restrictions: hx osteogenesis imperfecta- hx multiple fractures; Low tolerance for Pain - restricts all repositioning and functional mobility / care task performances     Subjective: Pt reports having pain medication about 20 mins before a.m. PT, requesting additional at end of a.m. OT (CEASAR Ye notified). States he slept well, but refused to attempt  a.m., agreed in afternoon with encouragement. States he had an issue with R LE sliding off [place] Bro during toilet transfer, req'd use of Maxi Move to complete transfer back to bed, thus reluctant to attempt again. Vital Signs  Patient Currently in Pain: Yes  Pain Assessment: 0-10  Pain Level: 9(Peak, during afternoon with an hour before next med. due)  Pain Type: Acute pain;Surgical pain  Pain Location: Hip  Pain Orientation: Right  Pain Descriptors: Sharp;Sore;Aching  Clinical Progression: Gradually improving  Non-Pharmaceutical Pain Intervention(s): Elevation; Ambulation/Increased Activity;Cold applied;Relaxation techniques;Repositioned; Rest  Response to Pain Intervention: Patient Satisfied;None    Bed Mobility  Supine to Sit: Moderate assistance(x2)  Sit to Supine: Maximal assistance(x1)  Scootin Person assistance;Maximal assistance(Pt demo's good ability to use UEs to assist. Pt unable to dangle L LE off bed without pain/req's support)  Comment: Dr. Carolee Obrien suggested (& pt agreed/states he's used before) to attempt use of triangle for additional A with bed mobility. Guarded with all movement. Rail, 1 pillow, HOB ~30*. Transfers:  Sit to Stand: 2 Person Assistance;Maximum Assistance(elevated bed height, L foot blocked in [place])  Stand to sit: 2 Person Assistance; Moderate Assistance(blocking L foot in [place]. )  Bed to Chair: (Refused sitting in recliner)    WB Status: PWB 25% right LE    BALANCE Posture: Fair  Sitting - Static: Fair(EOB, UE support, no back support/posterior lean without UEs)  Sitting - Dynamic: Fair;-(EOB, UE support, no back support/posterior lean without UEs)  Standing - Static: Fair;-(in [employer])    EXERCISES    Other exercises 2: Dangle EOB 30 min. Other exercises 3: Static Standing in [employer] 1min  Other exercises 5: Seated B LE ther ex, AAROM R LE, AROM L LE, 2-3 sets of 5 reps each(Marching, DF/PF, hip add)  Other exercises 6: Cold pack applied to R hip at end of a.m. PT  Other exercises 7: Bed mobility x2  Other exercises 9: Supine heel slides, hip abd/add, 2x5 each, A/AROM R LE, AROM L LE(Pt grimacing w/L LE mvmt; indicates he's anticipating pain)  Other exercises 10: Educated on breathing technique to reduce guarding, control pain with pt indicating reduced pain; little carryover without VCs. Activity Tolerance: Patient limited by pain, Patient limited by endurance     Other Comments  Comments: Coordinate with nursing with pain med schedule; Pt requires encouragement d/t anxiety/fear of pain    Current Treatment Recommendations: Strengthening, Gait Training, Patient/Caregiver Education & Training, ROM, Balance Training, Endurance Training, Functional Mobility Training, Transfer Training, Safety Education & Training, Stair training, Positioning, Home Exercise Program    Conditions Requiring Skilled Therapeutic Intervention  Body structures, Functions, Activity limitations: Decreased functional mobility ; Decreased endurance;Decreased ROM; Decreased strength;Decreased balance; Increased pain;Decreased ADL status; Decreased posture  Assessment: Pain & self-limiting at this time, requiring 2 assist for all mobility. REQUIRES PT FOLLOW UP: Yes  Discharge Recommendations: Patient would benefit from continued therapy after discharge;Home with assist PRN    Goals  Short term goals  Time Frame for Short term goals: 1 week  Short term goal 1: Pt to reduce pain to 2-3/10 to improve ease and progression of functional mobility.   Short term goal 2: Pt to demonstrate min x2 bed mobility with safe technique. Short term goal 3: Pt to demonstrate min x2 transfers with appropriate device and good maintaining of PWB R LE. Short term goal 4: Pt to amb 25'-50' with appropriate device min x2 while maintaining RLE PWB. Short term goal 5: Pt to ascend/descend 2-4 steps with B UE support min x2. Short term goal 6: Pt to improve posture to GOOD. Short term goal 7: Pt to improve sitting balance to Good (Static/dynamic). Short term goal 8: Pt to propel w/c 50' Mod I.  Long term goals  Time Frame for Long term goals : by D/C  Long term goal 1: Pt to improve R LE AROM to WNL. Long term goal 2: Pt to improve BLE strength by 1/2 MMG. Long term goal 3: Pt to perform 2MWT with amb at least 76' , Mod I with device. Long term goal 4: Pt to perform bed mobility Mod I.  Long term goal 5: Pt to perform transfers Mod I with device from varied surfaces to simulate home set up. Long term goal 6: Pt to amb at least 100' Mod I with device on varied terrain  Long term goal 7: Pt to ascend/descend 2-4 steps per home set up, including platform steps with RW, SBA. Long term goal 8: Pt to demonstrate Mod I in room with device. Long term goal 9: Pt to improve standing balance to Good- to reduce fall risk at home and in community.      04/29/20 0845 04/29/20 1243   PT Individual Minutes   Time In [id number]   Time Out [id number]   Minutes 64 34     Electronically signed by Lexa Garza PTA on 4/29/20 at 3:53 PM EDT

## 2020-04-30 LAB
ABSOLUTE EOS #: 0.4 K/UL (ref 0–0.4)
ABSOLUTE IMMATURE GRANULOCYTE: ABNORMAL K/UL (ref 0–0.3)
ABSOLUTE LYMPH #: 0.7 K/UL (ref 1–4.8)
ABSOLUTE MONO #: 0.5 K/UL (ref 0.1–1.3)
ALBUMIN SERPL-MCNC: 3.3 G/DL (ref 3.5–5.2)
ALBUMIN/GLOBULIN RATIO: ABNORMAL (ref 1–2.5)
ALP BLD-CCNC: 102 U/L (ref 40–129)
ALT SERPL-CCNC: 17 U/L (ref 5–41)
ANION GAP SERPL CALCULATED.3IONS-SCNC: 11 MMOL/L (ref 9–17)
AST SERPL-CCNC: 16 U/L
BASOPHILS # BLD: 1 % (ref 0–2)
BASOPHILS ABSOLUTE: 0 K/UL (ref 0–0.2)
BILIRUB SERPL-MCNC: 0.68 MG/DL (ref 0.3–1.2)
BUN BLDV-MCNC: 44 MG/DL (ref 8–23)
BUN/CREAT BLD: ABNORMAL (ref 9–20)
CALCIUM SERPL-MCNC: 10.2 MG/DL (ref 8.6–10.4)
CHLORIDE BLD-SCNC: 98 MMOL/L (ref 98–107)
CO2: 28 MMOL/L (ref 20–31)
CREAT SERPL-MCNC: 1.47 MG/DL (ref 0.7–1.2)
DIFFERENTIAL TYPE: ABNORMAL
EOSINOPHILS RELATIVE PERCENT: 7 % (ref 0–4)
GFR AFRICAN AMERICAN: 58 ML/MIN
GFR NON-AFRICAN AMERICAN: 48 ML/MIN
GFR SERPL CREATININE-BSD FRML MDRD: ABNORMAL ML/MIN/{1.73_M2}
GFR SERPL CREATININE-BSD FRML MDRD: ABNORMAL ML/MIN/{1.73_M2}
GLUCOSE BLD-MCNC: 189 MG/DL (ref 75–110)
GLUCOSE BLD-MCNC: 208 MG/DL (ref 70–99)
GLUCOSE BLD-MCNC: 223 MG/DL (ref 75–110)
GLUCOSE BLD-MCNC: 251 MG/DL (ref 75–110)
GLUCOSE BLD-MCNC: 255 MG/DL (ref 75–110)
GLUCOSE BLD-MCNC: 293 MG/DL (ref 75–110)
HCT VFR BLD CALC: 27 % (ref 41–53)
HEMOGLOBIN: 9 G/DL (ref 13.5–17.5)
IMMATURE GRANULOCYTES: ABNORMAL %
LYMPHOCYTES # BLD: 12 % (ref 24–44)
MCH RBC QN AUTO: 29.4 PG (ref 26–34)
MCHC RBC AUTO-ENTMCNC: 33.3 G/DL (ref 31–37)
MCV RBC AUTO: 88.3 FL (ref 80–100)
MONOCYTES # BLD: 9 % (ref 1–7)
NRBC AUTOMATED: ABNORMAL PER 100 WBC
PDW BLD-RTO: 13.6 % (ref 11.5–14.9)
PLATELET # BLD: 249 K/UL (ref 150–450)
PLATELET ESTIMATE: ABNORMAL
PMV BLD AUTO: 8.3 FL (ref 6–12)
POTASSIUM SERPL-SCNC: 5 MMOL/L (ref 3.7–5.3)
RBC # BLD: 3.05 M/UL (ref 4.5–5.9)
RBC # BLD: ABNORMAL 10*6/UL
SEG NEUTROPHILS: 71 % (ref 36–66)
SEGMENTED NEUTROPHILS ABSOLUTE COUNT: 4.2 K/UL (ref 1.3–9.1)
SODIUM BLD-SCNC: 137 MMOL/L (ref 135–144)
TOTAL PROTEIN: 7 G/DL (ref 6.4–8.3)
WBC # BLD: 5.9 K/UL (ref 3.5–11)
WBC # BLD: ABNORMAL 10*3/UL

## 2020-04-30 PROCEDURE — 99231 SBSQ HOSP IP/OBS SF/LOW 25: CPT | Performed by: PHYSICAL MEDICINE & REHABILITATION

## 2020-04-30 PROCEDURE — 36415 COLL VENOUS BLD VENIPUNCTURE: CPT

## 2020-04-30 PROCEDURE — 97530 THERAPEUTIC ACTIVITIES: CPT

## 2020-04-30 PROCEDURE — 6370000000 HC RX 637 (ALT 250 FOR IP): Performed by: ORTHOPAEDIC SURGERY

## 2020-04-30 PROCEDURE — 80053 COMPREHEN METABOLIC PANEL: CPT

## 2020-04-30 PROCEDURE — 97535 SELF CARE MNGMENT TRAINING: CPT

## 2020-04-30 PROCEDURE — 6370000000 HC RX 637 (ALT 250 FOR IP): Performed by: PHYSICAL MEDICINE & REHABILITATION

## 2020-04-30 PROCEDURE — 97110 THERAPEUTIC EXERCISES: CPT

## 2020-04-30 PROCEDURE — 1180000000 HC REHAB R&B

## 2020-04-30 PROCEDURE — 6360000002 HC RX W HCPCS: Performed by: PHYSICAL MEDICINE & REHABILITATION

## 2020-04-30 PROCEDURE — 6370000000 HC RX 637 (ALT 250 FOR IP): Performed by: FAMILY MEDICINE

## 2020-04-30 PROCEDURE — 97542 WHEELCHAIR MNGMENT TRAINING: CPT

## 2020-04-30 PROCEDURE — 82947 ASSAY GLUCOSE BLOOD QUANT: CPT

## 2020-04-30 PROCEDURE — 85025 COMPLETE CBC W/AUTO DIFF WBC: CPT

## 2020-04-30 RX ORDER — INSULIN GLARGINE 100 [IU]/ML
45 INJECTION, SOLUTION SUBCUTANEOUS 2 TIMES DAILY
Status: DISCONTINUED | OUTPATIENT
Start: 2020-04-30 | End: 2020-05-03

## 2020-04-30 RX ADMIN — INSULIN LISPRO 3 UNITS: 100 INJECTION, SOLUTION INTRAVENOUS; SUBCUTANEOUS at 07:45

## 2020-04-30 RX ADMIN — OXYCODONE HYDROCHLORIDE 10 MG: 10 TABLET, FILM COATED, EXTENDED RELEASE ORAL at 07:44

## 2020-04-30 RX ADMIN — ACETAMINOPHEN 1000 MG: 500 TABLET, FILM COATED ORAL at 21:21

## 2020-04-30 RX ADMIN — POLYETHYLENE GLYCOL 3350 17 G: 17 POWDER, FOR SOLUTION ORAL at 07:43

## 2020-04-30 RX ADMIN — OXYCODONE HYDROCHLORIDE 10 MG: 5 TABLET ORAL at 19:28

## 2020-04-30 RX ADMIN — ACETAMINOPHEN 1000 MG: 500 TABLET, FILM COATED ORAL at 05:40

## 2020-04-30 RX ADMIN — INSULIN GLARGINE 40 UNITS: 100 INJECTION, SOLUTION SUBCUTANEOUS at 07:45

## 2020-04-30 RX ADMIN — INSULIN LISPRO 5 UNITS: 100 INJECTION, SOLUTION INTRAVENOUS; SUBCUTANEOUS at 11:41

## 2020-04-30 RX ADMIN — ASPIRIN 81 MG: 81 TABLET, COATED ORAL at 07:44

## 2020-04-30 RX ADMIN — SERTRALINE HYDROCHLORIDE 100 MG: 100 TABLET ORAL at 21:20

## 2020-04-30 RX ADMIN — METOPROLOL SUCCINATE 50 MG: 50 TABLET, EXTENDED RELEASE ORAL at 07:44

## 2020-04-30 RX ADMIN — METOPROLOL SUCCINATE 50 MG: 50 TABLET, EXTENDED RELEASE ORAL at 21:20

## 2020-04-30 RX ADMIN — INSULIN LISPRO 9 UNITS: 100 INJECTION, SOLUTION INTRAVENOUS; SUBCUTANEOUS at 16:13

## 2020-04-30 RX ADMIN — OXYCODONE HYDROCHLORIDE 10 MG: 5 TABLET ORAL at 11:40

## 2020-04-30 RX ADMIN — INSULIN GLARGINE 45 UNITS: 100 INJECTION, SOLUTION SUBCUTANEOUS at 21:21

## 2020-04-30 RX ADMIN — OXYCODONE HYDROCHLORIDE 10 MG: 5 TABLET ORAL at 04:10

## 2020-04-30 RX ADMIN — INSULIN LISPRO 3 UNITS: 100 INJECTION, SOLUTION INTRAVENOUS; SUBCUTANEOUS at 21:22

## 2020-04-30 RX ADMIN — TRAZODONE HYDROCHLORIDE 100 MG: 100 TABLET ORAL at 21:20

## 2020-04-30 RX ADMIN — OXYCODONE HYDROCHLORIDE 10 MG: 10 TABLET, FILM COATED, EXTENDED RELEASE ORAL at 21:21

## 2020-04-30 RX ADMIN — ACETAMINOPHEN 1000 MG: 500 TABLET, FILM COATED ORAL at 13:34

## 2020-04-30 RX ADMIN — PANTOPRAZOLE SODIUM 40 MG: 40 TABLET, DELAYED RELEASE ORAL at 07:45

## 2020-04-30 RX ADMIN — ENOXAPARIN SODIUM 40 MG: 40 INJECTION SUBCUTANEOUS at 07:44

## 2020-04-30 ASSESSMENT — PAIN DESCRIPTION - ORIENTATION
ORIENTATION: RIGHT

## 2020-04-30 ASSESSMENT — PAIN - FUNCTIONAL ASSESSMENT
PAIN_FUNCTIONAL_ASSESSMENT: PREVENTS OR INTERFERES SOME ACTIVE ACTIVITIES AND ADLS

## 2020-04-30 ASSESSMENT — PAIN SCALES - GENERAL
PAINLEVEL_OUTOF10: 7
PAINLEVEL_OUTOF10: 6
PAINLEVEL_OUTOF10: 0
PAINLEVEL_OUTOF10: 0
PAINLEVEL_OUTOF10: 7
PAINLEVEL_OUTOF10: 4
PAINLEVEL_OUTOF10: 7
PAINLEVEL_OUTOF10: 7
PAINLEVEL_OUTOF10: 6
PAINLEVEL_OUTOF10: 8
PAINLEVEL_OUTOF10: 6

## 2020-04-30 ASSESSMENT — PAIN DESCRIPTION - PAIN TYPE
TYPE: ACUTE PAIN;SURGICAL PAIN
TYPE: ACUTE PAIN
TYPE: ACUTE PAIN;SURGICAL PAIN
TYPE: ACUTE PAIN;SURGICAL PAIN

## 2020-04-30 ASSESSMENT — PAIN DESCRIPTION - FREQUENCY
FREQUENCY: INTERMITTENT

## 2020-04-30 ASSESSMENT — PAIN DESCRIPTION - LOCATION
LOCATION: HIP
LOCATION: HIP
LOCATION: LEG;HIP
LOCATION: LEG;HIP
LOCATION: HIP;LEG

## 2020-04-30 ASSESSMENT — PAIN DESCRIPTION - ONSET
ONSET: GRADUAL

## 2020-04-30 ASSESSMENT — ENCOUNTER SYMPTOMS
STRIDOR: 0
CHOKING: 0
ANAL BLEEDING: 0
TROUBLE SWALLOWING: 0
RECTAL PAIN: 0
PHOTOPHOBIA: 0
ABDOMINAL DISTENTION: 0
BLOOD IN STOOL: 0
COLOR CHANGE: 0
BACK PAIN: 0
VOICE CHANGE: 0

## 2020-04-30 ASSESSMENT — PAIN DESCRIPTION - PROGRESSION
CLINICAL_PROGRESSION: NOT CHANGED

## 2020-04-30 ASSESSMENT — PAIN DESCRIPTION - DESCRIPTORS
DESCRIPTORS: ACHING;DULL
DESCRIPTORS: ACHING;DISCOMFORT;DULL
DESCRIPTORS: ACHING;SHARP;SORE;STABBING
DESCRIPTORS: ACHING

## 2020-04-30 NOTE — PROGRESS NOTES
[address] [facility]    ACUTE REHABILITATION OCCUPATIONAL THERAPY  DAILY NOTE    Date: 20  Patient Name: Tamiko Dan      Room: 3105/1824-32    MRN: [id number]   : 1955  (59 y.o.)  Gender: male   Referring Practitioner: Breanne Rincon  Diagnosis: Closed R hip fx       Restrictions  Restrictions/Precautions: Fall Risk, Weight Bearing, General Precautions  Implants present? : Metal implants(R hip, R knee, R elbow - pins/screws)  Other position/activity restrictions: hx osteogenesis imperfecta- hx multiple fractures; Low tolerance for Pain - restricts all repositioning and functional mobility / care task performances   Right Lower Extremity Weight Bearing: Partial Weight Bearing(PWB 25%)  Right Upper Extremity Weight Bearing: (Reports that he is limited in Active ROM of extension in R elbow due to past fractures/surgeries/conditions/procedures related to elbow fracture)  Required Braces or Orthoses?: No  Equipment Used: Wheelchair, Bed, Other(Anne Peralta)    Subjective  Subjective: \"I'll just have my wife wash me up later\" patient initial response for AM ADLs. Encouragement provided for participation in self care - pt then agreeable  Comments: Patient up in w/c AM after PT session, remained in w/c for partial ADL session, became too painful and requested back to bed.  Max Ax2 with Shalom Lyman for back to bed- pt had skin tear on R elbow during transfer- CEASAR Sanders notified and in room at OT exit to inspect   Patient Currently in Pain: Yes  Pain Level: 8(6/10 AM; 8/10 PM)  Pain Location: Hip  Pain Orientation: Right  Restrictions/Precautions: Fall Risk;Weight Bearing;General Precautions  Overall Orientation Status: Within Functional Limits     Pain Assessment  Pain Assessment: 0-10  Pain Level: 8(6/10 AM; 8/10 PM)  Pain Type: Acute pain, Surgical pain  Pain Location: Hip  Pain Orientation: Right  Pain Radiating Towards: up toward ribs/lower back/down thigh  Pain Descriptors: Aching, Dull  Pain Frequency: Continuous    Objective  Cognition  Overall Cognitive Status: WFL    Balance  Sitting Balance: Stand by assistance(seated at EOB for transferring purposes )  Standing Balance: Maximum assistance(x2 in [facility])    Bed mobility  Sit to Supine: Maximum assistance;2 Person assistance  Scooting: Maximal assistance;2 Person assistance(to scoot to [facility])  Comment: assisted patient back to bed at end of AM session     Transfers  Sit to stand: Maximum assistance;2 Person assistance  Stand to sit: Maximum assistance;2 Person assistance  Transfer Comments: Encouraged patient to stay up in w/c for lunch and patient initially agreeable, however after ADLs reports pain level too high in w/c and requests back to bed. Max x2 in Zach Anoop; Patient had a skin tear on R elbow during transfer, covered with towel to finish transfer, notified RN Valerie Humphreys in room assessing R elbow at OT exit       Wheelchair Bed Transfers  Technique Cristin Barbour)  Equipment used: [employer]; wheelchair; other [facility])  Level of assistance: 2 Person assistance; Maximum assistance  Comments: Zach Davalos transfer Max x2 from w/c >bed at end of AM OT session; patient limited by decreased ROM/flexion in moises knees which does not allow Zach Anoop to be close enough for leverage for sit<>stand, pt required significant assistance to stand, VC at low back/pelvis for correct upright posture, and assist to slide RLE out for descent onto bed. bed height raised for pain management/increased ease            Type of ROM/Therapeutic Exercise  Type of ROM/Therapeutic Exercise: Free weights  Comment: 3# elbows; 2# shoulder; performed the following BUE exercises 1 UE at a time for increased overall strength/endurance for functional tasks, R elbow to endrange during exercises.  tolerated well, required rest breaks between sets    Exercises  Elbow Flexion: 2x15  Elbow Extension: 2x15  Supination: 2x15  Pronation: 2x15  Wrist Flexion: 2x15  Wrist pain;Patient limited by fatigue  Activity Tolerance: limited by pain while up in w/c however first date in w/c, requests back to bed at end of OT AM session, declines up to chair PM session   Safety Devices in place: Yes  Type of devices: Patient at risk for falls; Left in bed;Call light within reach          Patient Education:  Patient Goals   Patient goals : To have less pain and be able to move  Learner:patient  Method: demonstration and explanation       Outcome: needs reinforcement   OT Education  OT Education: OT Role;Plan of Care;Precautions; ADL Adaptive Strategies; Energy Conservation;Orientation;Equipment; Family Education  Patient Education: activity promotion, ADL participation, encouragement to participate in therapies, AE training for LB ADLs, encouragement for up to chair/out of bed activity   Barriers to Learning: pain level /fatigue level     Plan  Plan  Times per week: 900 min/week   Times per day: Twice a day  Current Treatment Recommendations: Strengthening, ROM, Functional Mobility Training, Balance Training, Patient/Caregiver Education & Training, Self-Care / ADL, Safety Education & Training, Home Management Training, Positioning, Endurance Training, Pain Management, Equipment Evaluation, Education, & procurement  Patient Goals   Patient goals :  To have less pain and be able to move  Short term goals  Time Frame for Short term goals: in 5-7 days, patient will  Short term goal 1: perform functional transfers during ADL routine with Mod A using DME as appropriate  Short term goal 2: perform toileting routine (transfer/hygiene/clothing management) with Mod A   Short term goal 3: V/D Good understanding of AE/DME/compensatory techniques for self care/mobility tasks   Short term goal 4: V/D Good understanding of PWB (25%) RLE during self care/mobility tasks   Short term goal 5: increase standing tolerance to 5+ minutes with CGA-Min A with 0-1 UE support during functional task   Short term goal 6:

## 2020-04-30 NOTE — PATIENT CARE CONFERENCE
bed mobility Mod I.  Long term goal 5: Pt to perform transfers Mod I with device from varied surfaces to simulate home set up. Long term goal 6: Pt to amb at least 100' Mod I with device on varied terrain  Long term goal 7: Pt to ascend/descend 2-4 steps per home set up, including platform steps with RW, SBA. Long term goal 8: Pt to demonstrate Mod I in room with device. Long term goal 9: Pt to improve standing balance to Good- to reduce fall risk at home and in community. OT:Long term goals  Time Frame for Long term goals : by discharge, patient will  Long term goal 1: perform functional transfers/mobility during ADL routine with Modified [place] using DME as appropriate  Long term goal 2: perform BADL tasks with independence/modified independence using AE/compensatory techniques/DME as needed  Long term goal 3: V/D Good understanding of BUE HEP for continued strength for functional tasks  Long term goal 4: V/D Good understanding of DME for increased bathroom safety for safe d/c home   Long term goal 5: perform simple meal prep/laundry task/other desired IADL task with Modified independence using DME/compensatory techniques as needed   ST:     Team Members Present at Conference:  :  [address] [facility], [address] [place] [address]  Occupational Therapist: Jaime Ortiz OT   Physical [address]  PT  Speech Therapist:  N/A  Nurse: Юлия Ruth RN     Recreation Therapy: Junie Jones  Dietary/Nutrition: Ivonne Jernigan RD LD  Pastoral Care: Deric Strickland  CMG:   Veronia Castleman RN     I approve the established interdisciplinary plan of care as documented within the medical record of Nury Coats.     Olga Bustos MD

## 2020-04-30 NOTE — PROGRESS NOTES
flex L knee adequately to brace shins against Bekah Chill & maintain WB restriction without Max x2. Ambulation  Ambulation?: No(Pt req's max encouragement to attempt stand today. )  WB Status: PWB 25% right LE               OT:  ADL  Equipment Provided: Reacher  Feeding: Independent  Grooming: Setup, Increased time to complete(oral care, washing face, shaving face)  UE Bathing: Setup, Increased time to complete, Stand by assistance(washing underarms/chest/abdomen)  LE Bathing: Increased time to complete, Verbal cueing, Maximum assistance(washing ivanna area only (and applying powder) ; requires assistance with all other LB bathing areas)  UE Dressing: Minimal assistance(overhead shirt)  LE Dressing: Maximum assistance, Increased time to complete(initiated reacher training for threading shorts, rolling to pull up )  Toileting: None  Additional Comments: Patient agreeable for ADL tasks supine position this date. Initiated AE training for increased independence with LB ADLs, patient receptive, needs continued training. Pain more controlled this date.  Patient reports fatigue with ADLs                 Functional Mobility  Functional Mobility Comments: Pt declines sitting EOB/transfers/further mobility this date due to high pain level / fatigue level, will continue to encourage     Bed mobility  Rolling to Left: Maximum assistance  Rolling to Right: Maximum assistance  Supine to Sit: Unable to assess  Sit to Supine: Unable to assess  Scooting: Unable to assess(2 person assist for supine scoot to [address])  Comment: AM: bed mobility performed for clothing management/bathing/assisting CEASAR Freire for buttocks inspection; pt refusing up to bedside/transfers this date due to high pain/fatigue level   Transfers  Sit to stand: Unable to assess  Stand to sit: Unable to assess  Transfer Comments: pt refusing transfers/mobility this date due to high pain level - continue to encourage    Toilet Transfers  Toilet Transfer: and self care. Partial weight bearing 25% RLE. Tylenol prn, oxycodone prn. Ice prn. Continue routine Tylenol  and short course OxyContin. 2. R rib pain/tenderness: Has lidoderm patches. 3. DM: on sliding scale and ACHS accuchecks. On Lantus 40 units BID    4. HTN: on Toprol BID  5. GERD/GI prophylaxis: on pantoprazole  6. Insomnia: on Trazodone q hs  7. Depression: on Zoloft at hs   8. Bowel management: On miralax daily, senokot prn, milk of magnesia prn and Dulcolax prn.   9. DVT Prophylaxis:  low molecular weight heparin, SCD's while in bed and LUCA's while in bed  10. Dr. Chad Rodrigues group for medical management      Electronically signed by Ly Meehan MD on 4/30/2020 at 10:54 AM      This note is created with the assistance of a speech recognition program.  While intending to generate a document that actually reflects the content of the visit, the document can still have some errors including those of syntax and sound a like substitutions which may escape proof reading. In such instances, actual meaning can be extrapolated by contextual diversion.

## 2020-04-30 NOTE — PLAN OF CARE
Problem: Pain:  Goal: Pain level will decrease  Outcome: Ongoing  Note: Current pain regimen effective for complaints of R hip and leg pain. Non-pharmacologic interventions in place with effective relief. Problem: Falls - Risk of:  Goal: Absence of physical injury  Outcome: Ongoing     Problem: Mobility - Impaired:  Goal: Mobility will improve  Outcome: Ongoing     Problem: Skin Integrity:  Goal: Will show no infection signs and symptoms  Outcome: Ongoing     Problem: Musculor/Skeletal Functional Status  Goal: Absence of falls  Outcome: Ongoing  Note: No falls or injuries occurred at this time. No attempts to get out of bed without nursing assistance. Call light within reach and pt. uses appropriately for assistance. Siderails up x 2. Nonskid footwear remains on. Bed in low and locked position. Hourly nursing rounds made. Pt. Alert and oriented, aware of limitations, and exhibits good safety judgement. Pt. uses assistive devices appropriately. Pt. understands individual fall risk factors. Will continue to monitor and educate patient.

## 2020-04-30 NOTE — PROGRESS NOTES
04/30/20  1041   POCGLU 185* 255* 189* 293*         Physical Exam  Vitals signs reviewed. Constitutional:       Appearance: Normal appearance. He is not diaphoretic. HENT:      Head: Normocephalic and atraumatic. Right Ear: External ear normal.      Left Ear: External ear normal.      Nose: Nose normal.      Mouth/Throat:      Mouth: Mucous membranes are moist.      Pharynx: Oropharynx is clear. Eyes:      Conjunctiva/sclera: Conjunctivae normal.   Neck:      Musculoskeletal: Normal range of motion and neck supple. No neck rigidity. Cardiovascular:      Rate and Rhythm: Normal rate and regular rhythm. Pulses: Normal pulses. Heart sounds: Normal heart sounds. Pulmonary:      Effort: Pulmonary effort is normal.      Breath sounds: Normal breath sounds. Abdominal:      General: Bowel sounds are normal. There is no distension. Palpations: Abdomen is soft. Musculoskeletal: Normal range of motion. General: No tenderness (Right hip) or deformity. Right lower leg: No edema. Left lower leg: No edema. Skin:     General: Skin is warm and dry. Capillary Refill: Capillary refill takes less than 2 seconds. Coloration: Skin is not jaundiced. Neurological:      General: No focal deficit present. Mental Status: Mental status is at baseline. Psychiatric:         Mood and Affect: Mood normal.         Behavior: Behavior normal.         Assessment:        Primary Problem  Closed right hip fracture, initial encounter ([facility]     Principal Problem:    Closed right hip fracture, initial encounter [facility])  Active Problems:    Essential hypertension    Microalbuminuria due to type 2 diabetes mellitus ([facility]    Stage 3 chronic kidney disease (HCC)    Type 2 diabetes mellitus with chronic kidney disease ([facility]    Type 2 diabetes mellitus with hyperglycemia (HCC)    Gastroesophageal reflux disease without esophagitis  Resolved Problems:    * No resolved hospital problems.

## 2020-05-01 LAB
GLUCOSE BLD-MCNC: 195 MG/DL (ref 75–110)
GLUCOSE BLD-MCNC: 226 MG/DL (ref 75–110)
GLUCOSE BLD-MCNC: 237 MG/DL (ref 75–110)
GLUCOSE BLD-MCNC: 259 MG/DL (ref 75–110)

## 2020-05-01 PROCEDURE — 97110 THERAPEUTIC EXERCISES: CPT

## 2020-05-01 PROCEDURE — 6370000000 HC RX 637 (ALT 250 FOR IP): Performed by: PHYSICAL MEDICINE & REHABILITATION

## 2020-05-01 PROCEDURE — 97530 THERAPEUTIC ACTIVITIES: CPT

## 2020-05-01 PROCEDURE — 99231 SBSQ HOSP IP/OBS SF/LOW 25: CPT | Performed by: PHYSICAL MEDICINE & REHABILITATION

## 2020-05-01 PROCEDURE — 97535 SELF CARE MNGMENT TRAINING: CPT

## 2020-05-01 PROCEDURE — 6360000002 HC RX W HCPCS: Performed by: PHYSICAL MEDICINE & REHABILITATION

## 2020-05-01 PROCEDURE — 82947 ASSAY GLUCOSE BLOOD QUANT: CPT

## 2020-05-01 PROCEDURE — 6370000000 HC RX 637 (ALT 250 FOR IP): Performed by: FAMILY MEDICINE

## 2020-05-01 PROCEDURE — 1180000000 HC REHAB R&B

## 2020-05-01 PROCEDURE — 6370000000 HC RX 637 (ALT 250 FOR IP): Performed by: ORTHOPAEDIC SURGERY

## 2020-05-01 RX ADMIN — INSULIN GLARGINE 45 UNITS: 100 INJECTION, SOLUTION SUBCUTANEOUS at 08:33

## 2020-05-01 RX ADMIN — OXYCODONE HYDROCHLORIDE 10 MG: 5 TABLET ORAL at 20:44

## 2020-05-01 RX ADMIN — INSULIN GLARGINE 45 UNITS: 100 INJECTION, SOLUTION SUBCUTANEOUS at 20:46

## 2020-05-01 RX ADMIN — OXYCODONE HYDROCHLORIDE 10 MG: 5 TABLET ORAL at 16:34

## 2020-05-01 RX ADMIN — ACETAMINOPHEN 1000 MG: 500 TABLET, FILM COATED ORAL at 05:18

## 2020-05-01 RX ADMIN — ASPIRIN 81 MG: 81 TABLET, COATED ORAL at 08:31

## 2020-05-01 RX ADMIN — ACETAMINOPHEN 1000 MG: 500 TABLET, FILM COATED ORAL at 20:45

## 2020-05-01 RX ADMIN — OXYCODONE HYDROCHLORIDE 10 MG: 5 TABLET ORAL at 08:31

## 2020-05-01 RX ADMIN — OXYCODONE HYDROCHLORIDE 10 MG: 5 TABLET ORAL at 04:14

## 2020-05-01 RX ADMIN — POLYETHYLENE GLYCOL 3350 17 G: 17 POWDER, FOR SOLUTION ORAL at 08:31

## 2020-05-01 RX ADMIN — TRAZODONE HYDROCHLORIDE 100 MG: 100 TABLET ORAL at 20:45

## 2020-05-01 RX ADMIN — SERTRALINE HYDROCHLORIDE 100 MG: 100 TABLET ORAL at 20:45

## 2020-05-01 RX ADMIN — INSULIN LISPRO 9 UNITS: 100 INJECTION, SOLUTION INTRAVENOUS; SUBCUTANEOUS at 08:33

## 2020-05-01 RX ADMIN — METOPROLOL SUCCINATE 50 MG: 50 TABLET, EXTENDED RELEASE ORAL at 08:31

## 2020-05-01 RX ADMIN — ENOXAPARIN SODIUM 40 MG: 40 INJECTION SUBCUTANEOUS at 08:31

## 2020-05-01 RX ADMIN — ACETAMINOPHEN 1000 MG: 500 TABLET, FILM COATED ORAL at 12:34

## 2020-05-01 RX ADMIN — OXYCODONE HYDROCHLORIDE 10 MG: 5 TABLET ORAL at 12:34

## 2020-05-01 RX ADMIN — OXYCODONE HYDROCHLORIDE 10 MG: 10 TABLET, FILM COATED, EXTENDED RELEASE ORAL at 08:31

## 2020-05-01 RX ADMIN — INSULIN LISPRO 3 UNITS: 100 INJECTION, SOLUTION INTRAVENOUS; SUBCUTANEOUS at 12:07

## 2020-05-01 RX ADMIN — OXYCODONE HYDROCHLORIDE 10 MG: 10 TABLET, FILM COATED, EXTENDED RELEASE ORAL at 20:45

## 2020-05-01 RX ADMIN — PANTOPRAZOLE SODIUM 40 MG: 40 TABLET, DELAYED RELEASE ORAL at 08:31

## 2020-05-01 RX ADMIN — INSULIN LISPRO 6 UNITS: 100 INJECTION, SOLUTION INTRAVENOUS; SUBCUTANEOUS at 16:33

## 2020-05-01 RX ADMIN — METOPROLOL SUCCINATE 50 MG: 50 TABLET, EXTENDED RELEASE ORAL at 20:44

## 2020-05-01 RX ADMIN — INSULIN LISPRO 3 UNITS: 100 INJECTION, SOLUTION INTRAVENOUS; SUBCUTANEOUS at 20:46

## 2020-05-01 ASSESSMENT — PAIN DESCRIPTION - PAIN TYPE
TYPE: ACUTE PAIN;SURGICAL PAIN
TYPE: ACUTE PAIN;SURGICAL PAIN
TYPE: SURGICAL PAIN

## 2020-05-01 ASSESSMENT — PAIN DESCRIPTION - FREQUENCY: FREQUENCY: INTERMITTENT

## 2020-05-01 ASSESSMENT — PAIN SCALES - GENERAL
PAINLEVEL_OUTOF10: 6
PAINLEVEL_OUTOF10: 5
PAINLEVEL_OUTOF10: 7
PAINLEVEL_OUTOF10: 5
PAINLEVEL_OUTOF10: 4
PAINLEVEL_OUTOF10: 6
PAINLEVEL_OUTOF10: 2
PAINLEVEL_OUTOF10: 4
PAINLEVEL_OUTOF10: 6
PAINLEVEL_OUTOF10: 4
PAINLEVEL_OUTOF10: 2
PAINLEVEL_OUTOF10: 6
PAINLEVEL_OUTOF10: 4

## 2020-05-01 ASSESSMENT — PAIN DESCRIPTION - LOCATION
LOCATION: HIP

## 2020-05-01 ASSESSMENT — PAIN DESCRIPTION - ORIENTATION
ORIENTATION: RIGHT

## 2020-05-01 ASSESSMENT — PAIN DESCRIPTION - DESCRIPTORS
DESCRIPTORS: ACHING;THROBBING
DESCRIPTORS: ACHING

## 2020-05-01 ASSESSMENT — PAIN DESCRIPTION - ONSET: ONSET: ON-GOING

## 2020-05-01 NOTE — PROGRESS NOTES
Physical Therapy  [address]   Acute Rehabilitation Physical Therapy Progress Note    Date: 20  Patient Name: Heber Schrader       Room: 6063/2072-72  MRN: [id number]   Account: [de-identified]   : 1955  (62 y.o.) Gender: male     Referring Practitioner: Trinidad Montague  Diagnosis: Closed R hip fx  Past Medical History:  has a past medical history of Contracture, elbow, Erectile dysfunction, Hypertension, Osteogenesis imperfecta, and Type II or unspecified type diabetes mellitus without mention of complication, not stated as uncontrolled. Past Surgical History:   has a past surgical history that includes Cholecystectomy; Tonsillectomy and adenoidectomy; fracture surgery (Right); fracture surgery (Bilateral); fracture surgery (Left); and Femur fracture surgery (Right, 2020). Additional Pertinent Hx: 60 y/o male admitted to [facility] 20 with R hip pain and fall. Diagnosed with right intertrochanteric femur fracture. Last fractures were of bilateral patella in 2019- surgery by Dr. Kimo Ruelas. Pt admitted to [facility] 20. Restrictions/Precautions  Restrictions/Precautions: Fall Risk;Weight Bearing;General Precautions  Required Braces or Orthoses?: No  Implants present? : Metal implants(R hip, R knee, R elbow - pins/screws)  Lower Extremity Weight Bearing Restrictions  Right Lower Extremity Weight Bearing: Partial Weight Bearing(PWB 25%)  Partial Weight Bearing Percentage Or Pounds: 25%   Partial Weight Bearing Percentage Or Pounds: Reports that he only recently was able to achieve 90+ degrees of flexion in R knee due to past fractures/surgeries/conditions/procedures   Position Activity Restriction  Other position/activity restrictions: hx osteogenesis imperfecta- hx multiple fractures;  Low tolerance for Pain - restricts all repositioning and functional mobility / care task performances     Subjective: Pt states pain level is averaging about 4/10,

## 2020-05-01 NOTE — DISCHARGE SUMMARY
demonstrating appropriate progress with physical therapy, on 4/27/20 the patient was discharged to the acute rehab unit with a follow up appointment with Dr. Wilfrido Robbins in 2 weeks. Discharge Medications       Medication List      START taking these medications    enoxaparin 40 MG/0.4ML injection  Commonly known as:  LOVENOX  Inject 0.4 mLs into the skin daily for 10 days     HYDROcodone-acetaminophen 5-325 MG per tablet  Commonly known as:  NORCO  Take 1 tablet by mouth every 4 hours as needed for Pain for up to 7 days. oxyCODONE 5 MG immediate release tablet  Commonly known as:  ROXICODONE  Take 1 tablet by mouth every 4 hours as needed for Pain for up to 5 days. CONTINUE taking these medications    [employer] Blood Glucose Meter w/Device Kit  1 kit by Does not apply route 2 times daily A1C of 9.0. NIDDM. aspirin 81 MG tablet     Fiasp FlexTouch 100 UNIT/ML injection pen  Generic drug:  insulin aspart     Lantus SoloStar 100 UNIT/ML injection pen  Generic drug:  insulin glargine     metoprolol succinate 50 MG extended release tablet  Commonly known as:  TOPROL XL     pantoprazole 40 MG tablet  Commonly known as:  PROTONIX  Take 1 tablet by mouth daily     sertraline 100 MG tablet  Commonly known as:  ZOLOFT     traZODone 100 MG tablet  Commonly known as:  DESYREL        STOP taking these medications    lisinopril 5 MG tablet  Commonly known as:  PRINIVIL;ZESTRIL           Where to Get Your Medications      You can get these medications from any pharmacy    Bring a paper prescription for each of these medications  · enoxaparin 40 MG/0.4ML injection  · HYDROcodone-acetaminophen 5-325 MG per tablet  · oxyCODONE 5 MG immediate release tablet           Discharge Disposition  ARU    Activity on Discharge  [facility] RLE    Discharge Instructions  Daily dressing changes.  Keep incision clean and dry at all times    Follow-Up Scheduled    Earlis Ache, APRN - CNP  Daniella Hou [address] [facility], [address] [place] [id number] [phone]    In 2 weeks  For suture removal, For wound re-check

## 2020-05-01 NOTE — PROGRESS NOTES
Physical Medicine & Rehabilitation  Progress Note      Subjective:      59year-old gentleman with R hip fracture s/p IM nail fixation. Patient is well, and has had no acute complaints or problems. Pain is controlled. Mobility improving and able to use aakash steady for transfers now. ROS:  Denies fevers, chills, sweats. No chest pain, palpitations, lightheadedness. Denies coughing, wheezing or shortness of breath. Denies abdominal pain, nausea, diarrhea or constipation. No new areas of joint pain. Denies new areas of numbness or weakness. Denies new anxiety or depression issues. No new skin problems. Rehabilitation:   Progressing in therapies. PT:  Restrictions/Precautions: (P) Fall Risk, Weight Bearing, General Precautions  Implants present? : Metal implants(R hip, R knee, R elbow - pins/screws)  Other position/activity restrictions: hx osteogenesis imperfecta- hx multiple fractures; Low tolerance for Pain - restricts all repositioning and functional mobility / care task performances   Right Lower Extremity Weight Bearing: Partial Weight Bearing(PWB 25%)  Right Upper Extremity Weight Bearing: (Reports that he is limited in Active ROM of extension in R elbow due to past fractures/surgeries/conditions/procedures related to elbow fracture)   Transfers  Sit to Stand: 2 Person Assistance, Maximum Assistance(w/ aakash steady)  Stand to sit: 2 Person Assistance, Moderate Assistance(using aakash steady)  Bed to Chair: Dependent/Total(aakash steady used at this time)  Comment: Pt unable to flex L knee adequately to brace shins against Marshia Hacking. WB Status: PWB 25% right LE    Transfers  Sit to Stand: 2 Person Assistance, Maximum Assistance(w/ aakash steady)  Stand to sit: 2 Person Assistance, Moderate Assistance(using aakash steady)  Bed to Chair: Dependent/Total(aakash steady used at this time)  Comment: Pt unable to flex L knee adequately to brace shins against Marshia Hacking.   Ambulation  Ambulation?: No  WB Status: patient to stay up in w/c for lunch and patient initially agreeable, however after ADLs reports pain level too high in w/c and requests back to bed. Max x2 in Linda Cruz; Patient had a skin tear on R elbow during transfer, covered with towel to finish transfer, notified CEASAR Camp in room assessing R elbow at OT exit    Toilet Transfers  Toilet Transfer: Dependent/Total  Toilet Transfers Comments: Staff used a TONY Stedy to Transfer from Bed to toilet, but required a [employer] off toilet and return to [employer] Transfers  Wheelchair/Bed - Technique: Linda Cruz)  Equipment Used: Wheelchair, [employer], [employer])  Level of Asssistance: 2 Person assistance, Maximum assistance  Wheelchair Transfers Comments: Linda Cruz transfer Max x2 from w/c >bed at end of AM OT session; patient limited by decreased ROM/flexion in moises knees which does not allow Linda Cruz to be close enough for leverage for sit<>stand, pt required significant assistance to stand, VC at low back/pelvis for correct upright posture, and assist to slide RLE out for descent onto bed. bed height raised for pain management/increased ease     SPEECH:      Objective:  BP (!) 146/75   Pulse 73   Temp 98.4 °F (36.9 °C) (Oral)   Resp 16   Ht 6' (1.829 m)   Wt 190 lb (86.2 kg)   SpO2 99%   BMI 25.77 kg/m²       GEN: Well developed, well nourished, in NAD  HEENT:  NCAT.  PERRL.  EOMI.  Mucous membranes pink and moist.   PULM:  Clear to ausculation. No rales or rhonchi. Respirations WNL and unlabored. CV:  Regular rate rhythm.  No murmurs or gallops. GI:  Abdomen soft. Nontender. Non-distended.  BS + and equal.    NEUROLOGICAL: A&O x3. Sensation intact to light touch. MSK:  Functional ROM BUE and LLEs. Pain and weakness impairs AROM R hip. Strength 5/5 key muscles BUEs. L hip and knee muscles 4/5.  R hip 2/5, R knee extension 3/5.    SKIN: Warm dry and intact except R lateral hip and thigh incisions with dressings in Subcutaneous, Nightly  metoprolol succinate (TOPROL XL) extended release tablet 50 mg, 50 mg, Oral, BID  pantoprazole (PROTONIX) tablet 40 mg, 40 mg, Oral, Daily  sertraline (ZOLOFT) tablet 100 mg, 100 mg, Oral, Nightly  traZODone (DESYREL) tablet 100 mg, 100 mg, Oral, Nightly  polyethylene glycol (GLYCOLAX) packet 17 g, 17 g, Oral, Daily  enoxaparin (LOVENOX) injection 40 mg, 40 mg, Subcutaneous, Daily  magnesium hydroxide (MILK OF MAGNESIA) 400 MG/5ML suspension 30 mL, 30 mL, Oral, Daily PRN  oxyCODONE (ROXICODONE) immediate release tablet 5 mg, 5 mg, Oral, Q4H PRN **OR** oxyCODONE (ROXICODONE) immediate release tablet 10 mg, 10 mg, Oral, Q4H PRN      Impression/Plan:   Impaired ADLs, gait, and mobility due to:      1. R hip fracture s/p IM nail. :  PT/OT for gait, mobility, strengthening, endurance, ADLs, and self care. Partial weight bearing 25% RLE. Tylenol prn, oxycodone prn. Ice prn. Continue routine Tylenol and short course OxyContin scheduled through 5/3.  2. R rib pain/tenderness: Has lidoderm patches. 3. DM: on sliding scale and ACHS accuchecks. On Lantus - IM adjusting dose for hyperglycemia. 4. HTN: on Toprol BID  5. GERD/GI prophylaxis: on pantoprazole  6. Insomnia: on Trazodone q hs  7. Depression: on Zoloft at hs   8. Bowel management: On miralax daily, senokot prn, milk of magnesia prn and Dulcolax prn.   9. DVT Prophylaxis:  low molecular weight heparin, SCD's while in bed and LUCA's while in bed  10. Dr. Madeleine Heath group for medical management    Electronically signed by Michel Guerrero MD on 5/1/2020 at 10:59 AM      This note is created with the assistance of a speech recognition program.  While intending to generate a document that actually reflects the content of the visit, the document can still have some errors including those of syntax and sound a like substitutions which may escape proof reading. In such instances, actual meaning can be extrapolated by contextual diversion.

## 2020-05-01 NOTE — PLAN OF CARE
Problem: Pain:  Goal: Control of acute pain  Description: Control of acute pain  Outcome: Ongoing  Note: Pain is controlled with the use of prn pain meds. Will continue to assess. Problem: Falls - Risk of:  Goal: Will remain free from falls  Description: Will remain free from falls  Outcome: Ongoing  Note: Patient remains free from falls so far this shift. Will continue to round at least hourly, place bed in lowest position with call light within reach, and alarm activated. Problem: Skin Integrity:  Goal: Will show no infection signs and symptoms  Description: Will show no infection signs and symptoms  Outcome: Ongoing  Note: There are no new skin issues so far this shift. Will continue to assist patient with repositioning at least every two hours.

## 2020-05-02 LAB
GLUCOSE BLD-MCNC: 154 MG/DL (ref 75–110)
GLUCOSE BLD-MCNC: 254 MG/DL (ref 75–110)
GLUCOSE BLD-MCNC: 256 MG/DL (ref 75–110)
GLUCOSE BLD-MCNC: 258 MG/DL (ref 75–110)

## 2020-05-02 PROCEDURE — 82947 ASSAY GLUCOSE BLOOD QUANT: CPT

## 2020-05-02 PROCEDURE — 97530 THERAPEUTIC ACTIVITIES: CPT

## 2020-05-02 PROCEDURE — 6360000002 HC RX W HCPCS: Performed by: PHYSICAL MEDICINE & REHABILITATION

## 2020-05-02 PROCEDURE — 97110 THERAPEUTIC EXERCISES: CPT

## 2020-05-02 PROCEDURE — 6370000000 HC RX 637 (ALT 250 FOR IP): Performed by: FAMILY MEDICINE

## 2020-05-02 PROCEDURE — 6370000000 HC RX 637 (ALT 250 FOR IP): Performed by: PHYSICAL MEDICINE & REHABILITATION

## 2020-05-02 PROCEDURE — 1180000000 HC REHAB R&B

## 2020-05-02 PROCEDURE — 6370000000 HC RX 637 (ALT 250 FOR IP): Performed by: ORTHOPAEDIC SURGERY

## 2020-05-02 PROCEDURE — 97535 SELF CARE MNGMENT TRAINING: CPT

## 2020-05-02 PROCEDURE — 97542 WHEELCHAIR MNGMENT TRAINING: CPT

## 2020-05-02 RX ADMIN — INSULIN GLARGINE 45 UNITS: 100 INJECTION, SOLUTION SUBCUTANEOUS at 08:24

## 2020-05-02 RX ADMIN — POLYETHYLENE GLYCOL 3350 17 G: 17 POWDER, FOR SOLUTION ORAL at 08:23

## 2020-05-02 RX ADMIN — INSULIN LISPRO 9 UNITS: 100 INJECTION, SOLUTION INTRAVENOUS; SUBCUTANEOUS at 16:38

## 2020-05-02 RX ADMIN — TRAZODONE HYDROCHLORIDE 100 MG: 100 TABLET ORAL at 20:18

## 2020-05-02 RX ADMIN — INSULIN LISPRO 3 UNITS: 100 INJECTION, SOLUTION INTRAVENOUS; SUBCUTANEOUS at 08:24

## 2020-05-02 RX ADMIN — OXYCODONE HYDROCHLORIDE 10 MG: 5 TABLET ORAL at 10:25

## 2020-05-02 RX ADMIN — OXYCODONE HYDROCHLORIDE 10 MG: 5 TABLET ORAL at 14:28

## 2020-05-02 RX ADMIN — INSULIN GLARGINE 45 UNITS: 100 INJECTION, SOLUTION SUBCUTANEOUS at 20:23

## 2020-05-02 RX ADMIN — METOPROLOL SUCCINATE 50 MG: 50 TABLET, EXTENDED RELEASE ORAL at 08:24

## 2020-05-02 RX ADMIN — SERTRALINE HYDROCHLORIDE 100 MG: 100 TABLET ORAL at 20:18

## 2020-05-02 RX ADMIN — ACETAMINOPHEN 1000 MG: 500 TABLET, FILM COATED ORAL at 20:18

## 2020-05-02 RX ADMIN — INSULIN LISPRO 9 UNITS: 100 INJECTION, SOLUTION INTRAVENOUS; SUBCUTANEOUS at 11:41

## 2020-05-02 RX ADMIN — ACETAMINOPHEN 1000 MG: 500 TABLET, FILM COATED ORAL at 14:28

## 2020-05-02 RX ADMIN — ENOXAPARIN SODIUM 40 MG: 40 INJECTION SUBCUTANEOUS at 08:24

## 2020-05-02 RX ADMIN — METOPROLOL SUCCINATE 50 MG: 50 TABLET, EXTENDED RELEASE ORAL at 20:18

## 2020-05-02 RX ADMIN — OXYCODONE HYDROCHLORIDE 10 MG: 5 TABLET ORAL at 03:39

## 2020-05-02 RX ADMIN — ASPIRIN 81 MG: 81 TABLET, COATED ORAL at 08:24

## 2020-05-02 RX ADMIN — OXYCODONE HYDROCHLORIDE 10 MG: 10 TABLET, FILM COATED, EXTENDED RELEASE ORAL at 08:24

## 2020-05-02 RX ADMIN — OXYCODONE HYDROCHLORIDE 10 MG: 10 TABLET, FILM COATED, EXTENDED RELEASE ORAL at 20:18

## 2020-05-02 RX ADMIN — INSULIN LISPRO 5 UNITS: 100 INJECTION, SOLUTION INTRAVENOUS; SUBCUTANEOUS at 20:25

## 2020-05-02 RX ADMIN — OXYCODONE HYDROCHLORIDE 10 MG: 5 TABLET ORAL at 20:18

## 2020-05-02 RX ADMIN — ACETAMINOPHEN 1000 MG: 500 TABLET, FILM COATED ORAL at 10:25

## 2020-05-02 RX ADMIN — PANTOPRAZOLE SODIUM 40 MG: 40 TABLET, DELAYED RELEASE ORAL at 08:24

## 2020-05-02 ASSESSMENT — PAIN DESCRIPTION - ONSET
ONSET: GRADUAL
ONSET: ON-GOING
ONSET: ON-GOING

## 2020-05-02 ASSESSMENT — PAIN DESCRIPTION - PAIN TYPE
TYPE: SURGICAL PAIN
TYPE: SURGICAL PAIN
TYPE: ACUTE PAIN;SURGICAL PAIN
TYPE: ACUTE PAIN;SURGICAL PAIN

## 2020-05-02 ASSESSMENT — ENCOUNTER SYMPTOMS
STRIDOR: 0
FACIAL SWELLING: 0
VOICE CHANGE: 0
CHOKING: 0
ANAL BLEEDING: 0
ABDOMINAL DISTENTION: 0
RECTAL PAIN: 0
COLOR CHANGE: 0
EYE ITCHING: 0
PHOTOPHOBIA: 0
APNEA: 0
CHEST TIGHTNESS: 0

## 2020-05-02 ASSESSMENT — PAIN DESCRIPTION - DESCRIPTORS
DESCRIPTORS: SHARP;ACHING
DESCRIPTORS: ACHING;THROBBING
DESCRIPTORS: ACHING;JABBING
DESCRIPTORS: ACHING;THROBBING

## 2020-05-02 ASSESSMENT — PAIN SCALES - GENERAL
PAINLEVEL_OUTOF10: 5
PAINLEVEL_OUTOF10: 4
PAINLEVEL_OUTOF10: 7
PAINLEVEL_OUTOF10: 8
PAINLEVEL_OUTOF10: 0
PAINLEVEL_OUTOF10: 0
PAINLEVEL_OUTOF10: 7
PAINLEVEL_OUTOF10: 6
PAINLEVEL_OUTOF10: 0

## 2020-05-02 ASSESSMENT — PAIN DESCRIPTION - LOCATION
LOCATION: HIP
LOCATION: LEG;HIP
LOCATION: HIP
LOCATION: LEG;HIP

## 2020-05-02 ASSESSMENT — PAIN DESCRIPTION - FREQUENCY
FREQUENCY: INTERMITTENT

## 2020-05-02 ASSESSMENT — PAIN DESCRIPTION - ORIENTATION
ORIENTATION: RIGHT

## 2020-05-02 ASSESSMENT — PAIN - FUNCTIONAL ASSESSMENT: PAIN_FUNCTIONAL_ASSESSMENT: PREVENTS OR INTERFERES SOME ACTIVE ACTIVITIES AND ADLS

## 2020-05-02 ASSESSMENT — PAIN DESCRIPTION - PROGRESSION: CLINICAL_PROGRESSION: NOT CHANGED

## 2020-05-02 NOTE — FLOWSHEET NOTE
Patient indicated pain is under control better than it was for the most part. He said he appreciates all his friends calling and encouraging him. At the same time, he is frustrated with being told what he needs to do. It's been a long road since his original injury last fall, and patient expressed discouragement and wondering if his life will ever be what it was. Writer offered listening presence and emotional support. 05/02/20 1309   Encounter Summary   Services provided to: Patient   Referral/Consult From: [facility]   Support System Spouse; Children;Friends/neighbors   Continue Visiting   (5-2-20)   Complexity of Encounter Moderate   Length of Encounter 15 minutes   Routine   Type Follow up   Assessment Approachable   Intervention Active listening;Explored feelings, thoughts, concerns;Nurtured hope;Explored coping resources;Sustaining presence/ Ministry of presence; Discussed illness/injury and it's impact; Discussed meaning/purpose   Outcome Expressed gratitude;Engaged in conversation;Expressed feelings/needs/concerns;Receptive

## 2020-05-02 NOTE — PROGRESS NOTES
Physical Medicine & Rehabilitation  Progress Note      Subjective:      59year-old gentleman with R hip fracture s/p IM nail fixation. Patient is well, and has had no acute complaints or problems. Pain is controlled. Mobility improving and able to use aakash steady for transfers now. Pain is well controlled . B/B Ok , last BM was this AM    ROS:  Denies fevers, chills, sweats. No chest pain, palpitations, lightheadedness. Denies coughing, wheezing or shortness of breath. Denies abdominal pain, nausea, diarrhea or constipation. No new areas of joint pain. Denies new areas of numbness or weakness. Denies new anxiety or depression issues. No new skin problems. Rehabilitation:   Progressing in therapies. PT:    Bed Mobility  Sit to Supine: Moderate assistance(B LE assist, with VCs for sequencing with trunk)  Scooting: Stand by assistance(Pt using rails & UE strength to pull himself to [facility])     Transfers:  Sit to Stand: 2 Person Assistance;Maximum Assistance(w/ aakash steady; awkward positioning w/minimal R knee flexion)  Stand to sit: 2 Person Assistance; Moderate Assistance(using aakash steady)  Bed to Chair: Dependent/Total(aakash steady used at this time)     WB Status: PWB 25% right LE     Stairs/Curb  Stairs?: No(no safe/ready)     Wheelchair Activities  Propulsion: Yes  Propulsion 1  Propulsion: Manual  Level: Level Tile  Method: RUE;LUE  Level of Assistance: Stand by assistance  Description/ Details: Straight path, 90* & 180* turns, manuevering in tight spaces, used alternate force on wheels to turn in tight space, engaged brakes appropriately.    Distance: 79'     BALANCE Posture: Fair  Sitting - Static: Fair;+(EOB, UE support, no back support/posterior lean without UEs)  Sitting - Dynamic: Fair(EOB, UE support, no back support/posterior lean without UEs)  Standing - Static: Poor(in [place])  Standing - Dynamic: (Unable to assess)       OT       Bed mobility  Supine to Sit: Moderate assistance(assist

## 2020-05-02 NOTE — PROGRESS NOTES
Progress Note    5/2/2020   8:53 AM    Name:  Benedict Holter  MRN:    [id number]     Acct:     [de-identified]   Room:  [address] [place] [address] Day: 5     Admit Date: 4/27/2020  6:11 PM  PCP: ADEEL Daley CNP    Subjective:     C/C:  Hip pain    Interval History: Status: not changed. patient still has hip pain with movement better with pain medication   denies any nausea or vomit    ROS:   all 10 systems reviewed and are negative except as noted    Review of Systems   Constitutional: Negative for appetite change and fatigue. HENT: Negative for drooling, facial swelling, mouth sores, sneezing and voice change. Eyes: Negative for photophobia and itching. Respiratory: Negative for apnea, choking, chest tightness and stridor. Cardiovascular: Negative for chest pain, palpitations and leg swelling. Gastrointestinal: Negative for abdominal distention, anal bleeding and rectal pain. Endocrine: Negative for polydipsia and polyuria. Genitourinary: Negative for difficulty urinating, flank pain, frequency and urgency. Musculoskeletal: Positive for arthralgias. Negative for gait problem, joint swelling, myalgias and neck stiffness. Skin: Negative for color change and wound. Allergic/Immunologic: Negative for food allergies. Neurological: Negative for seizures, facial asymmetry, weakness and headaches. Hematological: Negative for adenopathy. Does not bruise/bleed easily. Psychiatric/Behavioral: Negative for agitation, decreased concentration and dysphoric mood. The patient is not hyperactive. Medications:      Allergies: No Known Allergies    Current Meds: insulin glargine (LANTUS) injection vial 45 Units, BID  lidocaine 4 % external patch 1 patch, Daily  senna (SENOKOT) tablet 17.2 mg, Nightly PRN  bisacodyl (DULCOLAX) suppository 10 mg, Daily PRN  acetaminophen (TYLENOL) tablet 1,000 mg, 3 times per day  oxyCODONE (OXYCONTIN) extended release tablet 10 mg, 2 times per day  sodium chloride History:   Diagnosis Date    Contracture, elbow     h/o right elbow fx, compound fracture.  Erectile dysfunction     Hypertension     Osteogenesis imperfecta     DX 6 months. 30 broken bones in past.     Type II or unspecified type diabetes mellitus without mention of complication, not stated as uncontrolled         Plan:        1.  continue with Lantus  2. PPI on Protonix  3.  on oxycodone  4. DVT Prophylaxis Lovenox  5. EPCs  6.  PT/OT to evaluate and treat  7.  check and replace electrolytes per sliding scale      Electronically signed by Sohail Martinez MD

## 2020-05-03 LAB
GLUCOSE BLD-MCNC: 159 MG/DL (ref 75–110)
GLUCOSE BLD-MCNC: 272 MG/DL (ref 75–110)
GLUCOSE BLD-MCNC: 312 MG/DL (ref 75–110)
GLUCOSE BLD-MCNC: 320 MG/DL (ref 75–110)

## 2020-05-03 PROCEDURE — 6370000000 HC RX 637 (ALT 250 FOR IP): Performed by: FAMILY MEDICINE

## 2020-05-03 PROCEDURE — 97530 THERAPEUTIC ACTIVITIES: CPT

## 2020-05-03 PROCEDURE — 97542 WHEELCHAIR MNGMENT TRAINING: CPT

## 2020-05-03 PROCEDURE — 6360000002 HC RX W HCPCS: Performed by: PHYSICAL MEDICINE & REHABILITATION

## 2020-05-03 PROCEDURE — 97110 THERAPEUTIC EXERCISES: CPT

## 2020-05-03 PROCEDURE — 82947 ASSAY GLUCOSE BLOOD QUANT: CPT

## 2020-05-03 PROCEDURE — 97535 SELF CARE MNGMENT TRAINING: CPT

## 2020-05-03 PROCEDURE — 6370000000 HC RX 637 (ALT 250 FOR IP): Performed by: PHYSICAL MEDICINE & REHABILITATION

## 2020-05-03 PROCEDURE — 6370000000 HC RX 637 (ALT 250 FOR IP): Performed by: ORTHOPAEDIC SURGERY

## 2020-05-03 PROCEDURE — 1180000000 HC REHAB R&B

## 2020-05-03 RX ORDER — INSULIN GLARGINE 100 [IU]/ML
48 INJECTION, SOLUTION SUBCUTANEOUS 2 TIMES DAILY
Status: DISCONTINUED | OUTPATIENT
Start: 2020-05-03 | End: 2020-05-05

## 2020-05-03 RX ADMIN — OXYCODONE HYDROCHLORIDE 10 MG: 10 TABLET, FILM COATED, EXTENDED RELEASE ORAL at 08:35

## 2020-05-03 RX ADMIN — OXYCODONE HYDROCHLORIDE 10 MG: 5 TABLET ORAL at 11:34

## 2020-05-03 RX ADMIN — OXYCODONE HYDROCHLORIDE 10 MG: 5 TABLET ORAL at 05:32

## 2020-05-03 RX ADMIN — INSULIN LISPRO 3 UNITS: 100 INJECTION, SOLUTION INTRAVENOUS; SUBCUTANEOUS at 20:28

## 2020-05-03 RX ADMIN — METOPROLOL SUCCINATE 50 MG: 50 TABLET, EXTENDED RELEASE ORAL at 20:23

## 2020-05-03 RX ADMIN — SERTRALINE HYDROCHLORIDE 100 MG: 100 TABLET ORAL at 20:23

## 2020-05-03 RX ADMIN — ACETAMINOPHEN 1000 MG: 500 TABLET, FILM COATED ORAL at 05:32

## 2020-05-03 RX ADMIN — INSULIN GLARGINE 48 UNITS: 100 INJECTION, SOLUTION SUBCUTANEOUS at 20:28

## 2020-05-03 RX ADMIN — SENNOSIDES 17.2 MG: 8.6 TABLET, FILM COATED ORAL at 20:28

## 2020-05-03 RX ADMIN — ACETAMINOPHEN 1000 MG: 500 TABLET, FILM COATED ORAL at 20:23

## 2020-05-03 RX ADMIN — PANTOPRAZOLE SODIUM 40 MG: 40 TABLET, DELAYED RELEASE ORAL at 08:35

## 2020-05-03 RX ADMIN — INSULIN LISPRO 12 UNITS: 100 INJECTION, SOLUTION INTRAVENOUS; SUBCUTANEOUS at 11:32

## 2020-05-03 RX ADMIN — OXYCODONE HYDROCHLORIDE 10 MG: 5 TABLET ORAL at 20:23

## 2020-05-03 RX ADMIN — INSULIN GLARGINE 45 UNITS: 100 INJECTION, SOLUTION SUBCUTANEOUS at 08:37

## 2020-05-03 RX ADMIN — ACETAMINOPHEN 1000 MG: 500 TABLET, FILM COATED ORAL at 14:03

## 2020-05-03 RX ADMIN — ASPIRIN 81 MG: 81 TABLET, COATED ORAL at 08:34

## 2020-05-03 RX ADMIN — TRAZODONE HYDROCHLORIDE 100 MG: 100 TABLET ORAL at 20:23

## 2020-05-03 RX ADMIN — ENOXAPARIN SODIUM 40 MG: 40 INJECTION SUBCUTANEOUS at 08:35

## 2020-05-03 RX ADMIN — INSULIN LISPRO 5 UNITS: 100 INJECTION, SOLUTION INTRAVENOUS; SUBCUTANEOUS at 16:43

## 2020-05-03 RX ADMIN — METOPROLOL SUCCINATE 50 MG: 50 TABLET, EXTENDED RELEASE ORAL at 08:35

## 2020-05-03 ASSESSMENT — PAIN SCALES - GENERAL
PAINLEVEL_OUTOF10: 2
PAINLEVEL_OUTOF10: 5
PAINLEVEL_OUTOF10: 4
PAINLEVEL_OUTOF10: 4
PAINLEVEL_OUTOF10: 7
PAINLEVEL_OUTOF10: 5
PAINLEVEL_OUTOF10: 6

## 2020-05-03 ASSESSMENT — ENCOUNTER SYMPTOMS
CHEST TIGHTNESS: 0
ABDOMINAL DISTENTION: 0
VOICE CHANGE: 0
COLOR CHANGE: 0
RECTAL PAIN: 0
FACIAL SWELLING: 0
CHOKING: 0
APNEA: 0
STRIDOR: 0
PHOTOPHOBIA: 0
EYE ITCHING: 0
ANAL BLEEDING: 0

## 2020-05-03 ASSESSMENT — PAIN DESCRIPTION - ORIENTATION
ORIENTATION: RIGHT
ORIENTATION: RIGHT

## 2020-05-03 ASSESSMENT — PAIN DESCRIPTION - LOCATION
LOCATION: HIP
LOCATION: HIP

## 2020-05-03 ASSESSMENT — PAIN DESCRIPTION - FREQUENCY: FREQUENCY: INTERMITTENT

## 2020-05-03 ASSESSMENT — PAIN DESCRIPTION - PAIN TYPE
TYPE: SURGICAL PAIN
TYPE: SURGICAL PAIN

## 2020-05-03 ASSESSMENT — PAIN DESCRIPTION - ONSET: ONSET: ON-GOING

## 2020-05-03 ASSESSMENT — PAIN DESCRIPTION - DESCRIPTORS
DESCRIPTORS: ACHING;SORE
DESCRIPTORS: ACHING;THROBBING

## 2020-05-03 NOTE — PROGRESS NOTES
Physical Medicine & Rehabilitation  Progress Note      Subjective:      59year-old gentleman with R hip fracture s/p IM nail fixation. Patient is well, and has had no acute complaints or problems. Pain is controlled. Mobility improving and able to use aakash steady for transfers now. Pain is well controlled . B/B Ok. ROS:  Denies fevers, chills, sweats. No chest pain, palpitations, lightheadedness. Denies coughing, wheezing or shortness of breath. Denies abdominal pain, nausea, diarrhea or constipation. No new areas of joint pain. Denies new areas of numbness or weakness. Denies new anxiety or depression issues. No new skin problems. Rehabilitation:   Progressing in therapies. PT:    Bed Mobility  Sit to Supine: Moderate assistance(B LE assist, improved trunk mobility/pivot at hips)  Comment: Bed flat, 2 pillows     Transfers:  Sit to Stand: 2 Person Assistance;Maximum Assistance(w/aakash stedy & // bars; awkward positioning w/minimal R knee flexion)  Stand to sit: 2 Person Assistance;Minimal Assistance(using aakash steady)  Bed to Chair: Dependent/Total(aakash steady used at this time)     WB Status: PWB 25% right LE      Stairs/Curb  Stairs?: No(no safe/ready)     Wheelchair Activities  Propulsion: Yes  Propulsion 1  Propulsion: Manual  Level: Level Tile  Method: ASHLEYE;SAM  Level of Assistance: Stand by assistance  Description/ Details: Straight path, 90* turns, VCs to engage brakes.    Distance: 26'     BALANCE Posture: Fair  Sitting - Static: Fair;+(EOB, UE support, no back support)  Sitting - Dynamic: Fair(EOB, UE support, no back support)  Standing - Static: Poor(in // bars; 2 assist, max VCs to straighten UEs )  Standing - Dynamic: (Unable to assess)   OT    Cognition  Overall Cognitive Status: [facility]  Cognition Comment: occasional limits in higher level problem solving and with inititation  Balance  Standing Balance: Maximum assistance(max x 1 in aakash stedy)  Bed mobility  Supine to Sit: Moderate assistance(assist with RLE)  Transfers  Stand Pivot Transfers: Dependent/Total;2 Person assistance(aakash carey, dependent)  Sit to stand: Dependent/Total(2-3 person TA)  Stand to sit: Dependent/Total;2 Person assistance(max x 2)  Transfer Comments: aakash carey dependent, 25 % WB on R  Standing Balance  Time: 3-4 min x 2 in am  Activity: in aakash carey , adls  Toilet Transfers  Toilet Transfer: Dependent/Total  Toilet Transfers Comments: aakash carey, max A x 2 onto toilet, max A x 3 off toilet. in pm, obtained BSC to elevate seat and provide B grab bars to trial for future toileting needs. Type of ROM/Therapeutic Exercise  Type of ROM/Therapeutic Exercise: AROM; Free weights; Resistive Bands  Comment: BUE with 20 oz liquid bottle then theraband- shld ex with resistance as jacqueline for work jacqueline  Exercises  Shoulder Flexion: x  Shoulder ABduction: x  Horizontal ABduction: x  ADL  Feeding: Independent  Grooming: Modified independent (in w/c at sink)  UE Bathing: Setup  LE Bathing: Dependent/Total(TA x 2-3 to stand and bathe bottom, set up with remainder with LHS for feet)  UE Dressing: Setup(pullover shirt)  LE Dressing: Dependent/Total(dep x 2 to pull shorts over hips with partial squat, min A R sock with sock aide, set up L sock with sock aide, min A RLE pants over feet)  Toileting: Dependent/Total(TA x 3 )  Additional Comments: Pt refused TEDS stating they are uncomfortable. pt used toilet then completed bathing and dressing and grooming at sink. biggest limitation is sit to stand complicated by lack of knee flex, brian on R)         Objective:  /68   Pulse 73   Temp 97.7 °F (36.5 °C) (Oral)   Resp 16   Ht 6' (1.829 m)   Wt 190 lb (86.2 kg)   SpO2 100%   BMI 25.77 kg/m²       GEN: Well developed, well nourished, in NAD  HEENT:  NCAT.  PERRL.  EOMI.  Mucous membranes pink and moist.   PULM:  Clear to ausculation. No rales or rhonchi. Respirations WNL and unlabored.    CV:  Regular rate rhythm.  No murmurs or injection 1 mg, 1 mg, Intramuscular, PRN  glucose (GLUTOSE) 40 % oral gel 15 g, 15 g, Oral, PRN  aspirin EC tablet 81 mg, 81 mg, Oral, Daily  insulin lispro (HUMALOG) injection vial 0-18 Units, 0-18 Units, Subcutaneous, TID WC  insulin lispro (HUMALOG) injection vial 0-9 Units, 0-9 Units, Subcutaneous, Nightly  metoprolol succinate (TOPROL XL) extended release tablet 50 mg, 50 mg, Oral, BID  pantoprazole (PROTONIX) tablet 40 mg, 40 mg, Oral, Daily  sertraline (ZOLOFT) tablet 100 mg, 100 mg, Oral, Nightly  traZODone (DESYREL) tablet 100 mg, 100 mg, Oral, Nightly  polyethylene glycol (GLYCOLAX) packet 17 g, 17 g, Oral, Daily  enoxaparin (LOVENOX) injection 40 mg, 40 mg, Subcutaneous, Daily  magnesium hydroxide (MILK OF MAGNESIA) 400 MG/5ML suspension 30 mL, 30 mL, Oral, Daily PRN  oxyCODONE (ROXICODONE) immediate release tablet 5 mg, 5 mg, Oral, Q4H PRN **OR** oxyCODONE (ROXICODONE) immediate release tablet 10 mg, 10 mg, Oral, Q4H PRN      Impression/Plan:   Impaired ADLs, gait, and mobility due to:      1. R hip fracture s/p IM nail. :  PT/OT for gait, mobility, strengthening, endurance, ADLs, and self care. Partial weight bearing 25% RLE. Tylenol prn, oxycodone prn. Ice prn. Continue routine Tylenol and short course OxyContin scheduled through 5/3.  2. R rib pain/tenderness: Has lidoderm patches. 3. DM: on sliding scale and ACHS accuchecks. On Lantus - IM adjusting dose for hyperglycemia. 4. HTN: on Toprol BID  5. GERD/GI prophylaxis: on pantoprazole  6. Insomnia: on Trazodone q hs  7. Depression: on Zoloft at hs   8. Bowel management: On miralax daily, senokot prn, milk of magnesia prn and Dulcolax prn.   9. DVT Prophylaxis:  low molecular weight heparin, SCD's while in bed and LUCA's while in bed  10.  Dr. [facility] for medical management    Electronically signed by Maryam Metzger MD on 5/3/2020 at 10:12 AM      This note is created with the assistance of a speech recognition program.  While

## 2020-05-03 NOTE — PROGRESS NOTES
pain & guarding    Vital Signs  Patient Currently in Pain: Yes  Pain Assessment: 0-10  Pain Level: 6  Pain Type: Surgical pain  Pain Location: Hip  Pain Orientation: Right  Pain Descriptors: Aching; Sore  Non-Pharmaceutical Pain Intervention(s): Repositioned;Rest;Ambulation/Increased Activity; Elevation  Response to Pain Intervention: Patient Satisfied;None    Patient Observation  Observations: Pt guarded with most movements, reports inability to flex R knee more than 30*    Bed Mobility  Sit to Supine: Moderate assistance(B LE assist, improved trunk mobility/pivot at hips)  Comment: Bed flat, 2 pillows, rails    Transfers:  Sit to Stand: 2 Person Assistance;Maximum Assistance(w/aakash stedy; awkward positioning w/minimal R knee flexion & need to guard R LE from slipping off SS platform. )  Stand to sit: 2 Person Assistance;Minimal Assistance(aakash steady; pt demo's good use of UE to control descent. )  Bed to Chair: Dependent/Total(aakash steady used at this time)    WB Status: PWB 25% right LE    Stairs/Curb  Stairs?: No(no safe/ready)    Wheelchair Activities  Propulsion: Yes  Propulsion 1  Propulsion: Manual  Level: Level Tile  Method: RUE;LUE  Level of Assistance: Stand by assistance  Description/ Details: Straight path, 90* turns, VCs to engage brakes. Distance: 300'    BALANCE Posture: Fair  Sitting - Static: Fair;+(EOB, UE support, no back support)  Sitting - Dynamic: Fair(EOB, UE support, no back support)  Standing - Static: Poor(in // bars; 2 assist, max VCs to straighten UEs )  Standing - Dynamic: (Unable to assess)    EXERCISES    Other exercises?: Yes  Other exercises 1: Supine (B) LE ex, 2x10 reps each to pt tolerance: AROM LLE, A/AROM RLE  Other exercises 3: seated (B) LE ex, 2x10, 1# L LE, A/AROM R LE, lime (min) resistance band (hip ABD & L hamstring curls)  Other exercises 4: Seated R LE skates w/towel, 2x10, with education on continuing independently when in W/C to improve knee ROM.  (R knee flexion to WNL. Long term goal 2: Pt to improve BLE strength by 1/2 MMG. Long term goal 3: Pt to perform 2MWT with amb at least 76' , Mod I with device. Long term goal 4: Pt to perform bed mobility Mod I.  Long term goal 5: Pt to perform transfers Mod I with device from varied surfaces to simulate home set up. Long term goal 6: Pt to amb at least 100' Mod I with device on varied terrain  Long term goal 7: Pt to ascend/descend 2-4 steps per home set up, including platform steps with RW, SBA. Long term goal 8: Pt to demonstrate Mod I in room with device. Long term goal 9: Pt to improve standing balance to Good- to reduce fall risk at home and in community.      05/03/20 1140 05/03/20 1500   PT Individual Minutes   Time In [id number]   Time Out 1115 1457   Minutes 40 16     Electronically signed by Santa Altamirano PTA on 5/3/20 at 4:48 PM EDT

## 2020-05-03 NOTE — PROGRESS NOTES
diabetes mellitus without mention of complication, not stated as uncontrolled         Plan:        1. Increase lantus to 48 units twice a day  2. PPI  3. DVT Prophylaxis on Lovenox  4.  continue with pain control  5. EPCs  6.  PT/OT to evaluate and treat  7.  check and replace electrolytes per sliding scale      Electronically signed by Gigi Grande MD

## 2020-05-04 LAB
ABSOLUTE EOS #: 0.3 K/UL (ref 0–0.4)
ABSOLUTE IMMATURE GRANULOCYTE: ABNORMAL K/UL (ref 0–0.3)
ABSOLUTE LYMPH #: 0.8 K/UL (ref 1–4.8)
ABSOLUTE MONO #: 0.4 K/UL (ref 0.1–1.3)
ALBUMIN SERPL-MCNC: 3.2 G/DL (ref 3.5–5.2)
ALBUMIN/GLOBULIN RATIO: ABNORMAL (ref 1–2.5)
ALP BLD-CCNC: 126 U/L (ref 40–129)
ALT SERPL-CCNC: 14 U/L (ref 5–41)
ANION GAP SERPL CALCULATED.3IONS-SCNC: 11 MMOL/L (ref 9–17)
AST SERPL-CCNC: 13 U/L
BASOPHILS # BLD: 2 % (ref 0–2)
BASOPHILS ABSOLUTE: 0.1 K/UL (ref 0–0.2)
BILIRUB SERPL-MCNC: 0.52 MG/DL (ref 0.3–1.2)
BUN BLDV-MCNC: 32 MG/DL (ref 8–23)
BUN/CREAT BLD: ABNORMAL (ref 9–20)
CALCIUM SERPL-MCNC: 9.7 MG/DL (ref 8.6–10.4)
CHLORIDE BLD-SCNC: 102 MMOL/L (ref 98–107)
CO2: 26 MMOL/L (ref 20–31)
CREAT SERPL-MCNC: 1.34 MG/DL (ref 0.7–1.2)
DIFFERENTIAL TYPE: ABNORMAL
EOSINOPHILS RELATIVE PERCENT: 7 % (ref 0–4)
GFR AFRICAN AMERICAN: >60 ML/MIN
GFR NON-AFRICAN AMERICAN: 54 ML/MIN
GFR SERPL CREATININE-BSD FRML MDRD: ABNORMAL ML/MIN/{1.73_M2}
GFR SERPL CREATININE-BSD FRML MDRD: ABNORMAL ML/MIN/{1.73_M2}
GLUCOSE BLD-MCNC: 178 MG/DL (ref 75–110)
GLUCOSE BLD-MCNC: 233 MG/DL (ref 75–110)
GLUCOSE BLD-MCNC: 238 MG/DL (ref 70–99)
GLUCOSE BLD-MCNC: 241 MG/DL (ref 75–110)
GLUCOSE BLD-MCNC: 275 MG/DL (ref 75–110)
HCT VFR BLD CALC: 28.7 % (ref 41–53)
HEMOGLOBIN: 9.5 G/DL (ref 13.5–17.5)
IMMATURE GRANULOCYTES: ABNORMAL %
LYMPHOCYTES # BLD: 16 % (ref 24–44)
MCH RBC QN AUTO: 30 PG (ref 26–34)
MCHC RBC AUTO-ENTMCNC: 33.3 G/DL (ref 31–37)
MCV RBC AUTO: 90 FL (ref 80–100)
MONOCYTES # BLD: 7 % (ref 1–7)
NRBC AUTOMATED: ABNORMAL PER 100 WBC
PDW BLD-RTO: 14.4 % (ref 11.5–14.9)
PLATELET # BLD: 294 K/UL (ref 150–450)
PLATELET ESTIMATE: ABNORMAL
PMV BLD AUTO: 7.8 FL (ref 6–12)
POTASSIUM SERPL-SCNC: 4.4 MMOL/L (ref 3.7–5.3)
RBC # BLD: 3.19 M/UL (ref 4.5–5.9)
RBC # BLD: ABNORMAL 10*6/UL
SEG NEUTROPHILS: 68 % (ref 36–66)
SEGMENTED NEUTROPHILS ABSOLUTE COUNT: 3.3 K/UL (ref 1.3–9.1)
SODIUM BLD-SCNC: 139 MMOL/L (ref 135–144)
TOTAL PROTEIN: 6.9 G/DL (ref 6.4–8.3)
WBC # BLD: 4.9 K/UL (ref 3.5–11)
WBC # BLD: ABNORMAL 10*3/UL

## 2020-05-04 PROCEDURE — 6360000002 HC RX W HCPCS: Performed by: PHYSICAL MEDICINE & REHABILITATION

## 2020-05-04 PROCEDURE — 36415 COLL VENOUS BLD VENIPUNCTURE: CPT

## 2020-05-04 PROCEDURE — 97530 THERAPEUTIC ACTIVITIES: CPT

## 2020-05-04 PROCEDURE — 85025 COMPLETE CBC W/AUTO DIFF WBC: CPT

## 2020-05-04 PROCEDURE — 97110 THERAPEUTIC EXERCISES: CPT

## 2020-05-04 PROCEDURE — 80053 COMPREHEN METABOLIC PANEL: CPT

## 2020-05-04 PROCEDURE — 97542 WHEELCHAIR MNGMENT TRAINING: CPT

## 2020-05-04 PROCEDURE — 6370000000 HC RX 637 (ALT 250 FOR IP): Performed by: PHYSICAL MEDICINE & REHABILITATION

## 2020-05-04 PROCEDURE — 6370000000 HC RX 637 (ALT 250 FOR IP): Performed by: FAMILY MEDICINE

## 2020-05-04 PROCEDURE — 6370000000 HC RX 637 (ALT 250 FOR IP): Performed by: ORTHOPAEDIC SURGERY

## 2020-05-04 PROCEDURE — 97116 GAIT TRAINING THERAPY: CPT

## 2020-05-04 PROCEDURE — 1180000000 HC REHAB R&B

## 2020-05-04 PROCEDURE — 97535 SELF CARE MNGMENT TRAINING: CPT

## 2020-05-04 PROCEDURE — 82947 ASSAY GLUCOSE BLOOD QUANT: CPT

## 2020-05-04 PROCEDURE — 99232 SBSQ HOSP IP/OBS MODERATE 35: CPT | Performed by: PHYSICAL MEDICINE & REHABILITATION

## 2020-05-04 RX ADMIN — PANTOPRAZOLE SODIUM 40 MG: 40 TABLET, DELAYED RELEASE ORAL at 08:16

## 2020-05-04 RX ADMIN — ACETAMINOPHEN 1000 MG: 500 TABLET, FILM COATED ORAL at 14:26

## 2020-05-04 RX ADMIN — SENNOSIDES 17.2 MG: 8.6 TABLET, FILM COATED ORAL at 21:19

## 2020-05-04 RX ADMIN — INSULIN GLARGINE 48 UNITS: 100 INJECTION, SOLUTION SUBCUTANEOUS at 21:20

## 2020-05-04 RX ADMIN — POLYETHYLENE GLYCOL 3350 17 G: 17 POWDER, FOR SOLUTION ORAL at 08:23

## 2020-05-04 RX ADMIN — INSULIN LISPRO 3 UNITS: 100 INJECTION, SOLUTION INTRAVENOUS; SUBCUTANEOUS at 08:17

## 2020-05-04 RX ADMIN — OXYCODONE 5 MG: 5 TABLET ORAL at 21:19

## 2020-05-04 RX ADMIN — INSULIN GLARGINE 48 UNITS: 100 INJECTION, SOLUTION SUBCUTANEOUS at 08:16

## 2020-05-04 RX ADMIN — INSULIN LISPRO 3 UNITS: 100 INJECTION, SOLUTION INTRAVENOUS; SUBCUTANEOUS at 21:20

## 2020-05-04 RX ADMIN — OXYCODONE HYDROCHLORIDE 10 MG: 5 TABLET ORAL at 16:47

## 2020-05-04 RX ADMIN — OXYCODONE HYDROCHLORIDE 10 MG: 5 TABLET ORAL at 03:48

## 2020-05-04 RX ADMIN — OXYCODONE HYDROCHLORIDE 10 MG: 5 TABLET ORAL at 08:15

## 2020-05-04 RX ADMIN — INSULIN LISPRO 9 UNITS: 100 INJECTION, SOLUTION INTRAVENOUS; SUBCUTANEOUS at 11:48

## 2020-05-04 RX ADMIN — ENOXAPARIN SODIUM 40 MG: 40 INJECTION SUBCUTANEOUS at 08:15

## 2020-05-04 RX ADMIN — ASPIRIN 81 MG: 81 TABLET, COATED ORAL at 08:15

## 2020-05-04 RX ADMIN — SERTRALINE HYDROCHLORIDE 100 MG: 100 TABLET ORAL at 21:20

## 2020-05-04 RX ADMIN — TRAZODONE HYDROCHLORIDE 100 MG: 100 TABLET ORAL at 21:19

## 2020-05-04 RX ADMIN — ACETAMINOPHEN 1000 MG: 500 TABLET, FILM COATED ORAL at 21:19

## 2020-05-04 RX ADMIN — INSULIN LISPRO 6 UNITS: 100 INJECTION, SOLUTION INTRAVENOUS; SUBCUTANEOUS at 16:48

## 2020-05-04 RX ADMIN — METOPROLOL SUCCINATE 50 MG: 50 TABLET, EXTENDED RELEASE ORAL at 21:19

## 2020-05-04 RX ADMIN — OXYCODONE HYDROCHLORIDE 10 MG: 5 TABLET ORAL at 12:13

## 2020-05-04 RX ADMIN — METOPROLOL SUCCINATE 50 MG: 50 TABLET, EXTENDED RELEASE ORAL at 08:15

## 2020-05-04 ASSESSMENT — PAIN SCALES - GENERAL
PAINLEVEL_OUTOF10: 6
PAINLEVEL_OUTOF10: 7
PAINLEVEL_OUTOF10: 2
PAINLEVEL_OUTOF10: 7
PAINLEVEL_OUTOF10: 6
PAINLEVEL_OUTOF10: 8
PAINLEVEL_OUTOF10: 5
PAINLEVEL_OUTOF10: 5
PAINLEVEL_OUTOF10: 6
PAINLEVEL_OUTOF10: 0
PAINLEVEL_OUTOF10: 6
PAINLEVEL_OUTOF10: 4
PAINLEVEL_OUTOF10: 8
PAINLEVEL_OUTOF10: 5

## 2020-05-04 ASSESSMENT — PAIN DESCRIPTION - PAIN TYPE
TYPE: SURGICAL PAIN

## 2020-05-04 ASSESSMENT — PAIN DESCRIPTION - ORIENTATION
ORIENTATION: RIGHT

## 2020-05-04 ASSESSMENT — PAIN DESCRIPTION - ONSET
ONSET: ON-GOING
ONSET: ON-GOING

## 2020-05-04 ASSESSMENT — PAIN DESCRIPTION - FREQUENCY
FREQUENCY: INTERMITTENT
FREQUENCY: INTERMITTENT

## 2020-05-04 ASSESSMENT — PAIN DESCRIPTION - DESCRIPTORS
DESCRIPTORS: ACHING;THROBBING
DESCRIPTORS: ACHING;THROBBING
DESCRIPTORS: THROBBING

## 2020-05-04 ASSESSMENT — PAIN DESCRIPTION - LOCATION
LOCATION: HIP

## 2020-05-04 NOTE — PLAN OF CARE
Problem: Pain:  Goal: Control of acute pain  Description: Control of acute pain  Outcome: Ongoing  Note: Pain assessment completed. Pt. able to rest.  Patient medicated with Oxycontin, Humaira and Tylenol for pain this shift as ordered. Patient relates a tolerable level of discomfort with the current medication. Pt. Repositions per self for comfort. Nonverbal cues indicate pain relief. Pt. Rests quietly with eyes closed after pain medication administration. Respirations easy and unlabored. Appears free from distress. Problem: Falls - Risk of:  Goal: Will remain free from falls  Description: Will remain free from falls  Outcome: Ongoing  Note: No falls or injuries sustained at this time. No attempts to get out of bed without nursing assistance. Call light within reach and pt. uses appropriately for assistance. Siderails up x 2. Nonskid footwear remains on. Bed in low and locked position. Hourly nursing rounds made. Pt. Alert and oriented, aware of limitations, and exhibits good safety judgement. Pt. uses assistive devices appropriately. Pt. understands individual fall risk factors. Pt. room located close to nurse's station. Bed alarm remains engaged throughout the shift as a precaution. Problem: Skin Integrity:  Goal: Absence of new skin breakdown  Description: Absence of new skin breakdown  Outcome: Ongoing  Note: Skin assessment completed this shift. Nutrition and Hydration status assessed with adequate intake. Yovani Score as charted. Bilateral heels remain elevated on pillows throughout the shift. Patient able to reposition self for comfort and to prevent breakdown. Patient verbalizes understanding of pressure ulcer prevention measures. Skin integrity maintained. No new skin breakdown noted. Skin to high risk pressure areas including heels are clear. Buttocks slightly pink. Right Hip incision with Medipore dressing clean, dry and intact.

## 2020-05-04 NOTE — PROGRESS NOTES
Nutrition Assessment    Type and Reason for Visit: Reassess    Nutrition Recommendations: Continue Carbohydrate Control diet, 4 carbs/meal. Decreased Ensure High Protein to 1x/day. Nutrition Assessment: Patient reports a good appetite but prefers a small breakfast. Patient concerned that oral nutrition supplement is causing elevated glucose levels. Ensure High Protein is lower in carbs than Glucerna (Ensure High Protein has 19 grams of carb per bottle and Glucerna has 26 gram of carbs). Ensure High Protein decreased to 1x/day and protein intake was encouraged. Monitor p.o intakes and labs. Malnutrition Assessment:  · Malnutrition Status: Insufficient data    Nutrition Risk Level: Moderate    Nutrient Needs:  · Estimated Daily Total Kcal: [id number] based on 22-24 kcal per kg admission wt  · Estimated Daily Protein (g):  based on 1.2-1.4 gm per kg Ideal Body Wt     Nutrition Diagnosis:   · Problem: Increased nutrient needs  · Etiology: related to (healing)     Signs and symptoms:  as evidenced by Presence of wounds    Objective Information:  · Nutrition-Focused Physical Findings: +2 RLE edema.    · Wound Type: Surgical Wound  · Current Nutrition Therapies:  · Oral Diet Orders: Carb Control 4 Carbs/Meal   · Oral Diet intake: %, 51-75%  · Oral Nutrition Supplement (ONS) Orders: Low Calorie High Protein Supplement  · ONS intake: 51-75%  · Anthropometric Measures:  · Ht: 6' (182.9 cm)   · Current Body Wt: 190 lb 0.6 oz (86.2 kg)  · Admission Body Wt: 190 lb 0.6 oz (86.2 kg)  · Ideal Body Wt: 178 lb (80.7 kg), % Ideal Body 107%  · BMI Classification: BMI 25.0 - 29.9 Overweight    Nutrition Interventions:   Continue current diet, Modify current ONS  Continued Inpatient Monitoring    Nutrition Evaluation:   · Evaluation: Progressing toward goals   · Goals: PO intake % of meals and supplements    · Monitoring: Meal Intake, Weight, Supplement Intake, Pertinent Labs, Monitor Bowel Function, Diet

## 2020-05-04 NOTE — PROGRESS NOTES
4/23/2020). Restrictions  Restrictions/Precautions  Restrictions/Precautions: Fall Risk, Weight Bearing, General Precautions  Required Braces or Orthoses?: No  Implants present? : Metal implants  Lower Extremity Weight Bearing Restrictions  Right Lower Extremity Weight Bearing: Partial Weight Bearing  Partial Weight Bearing Percentage Or Pounds: 25%   Left Lower Extremity Weight Bearing: Weight Bearing As Tolerated  Partial Weight Bearing Percentage Or Pounds: Reports that he only recently was able to achieve 90+ degrees of flexion in R knee due to past fractures/surgeries/conditions/procedures   Upper Extremity Weight Bearing Restrictions  Right Upper Extremity Weight Bearing: (Reports that he is limited in Active ROM of extension in R elbow due to past fractures/surgeries/conditions/procedures related to elbow fracture)  Position Activity Restriction  Other position/activity restrictions: hx osteogenesis imperfecta- hx multiple fractures; Low tolerance for Pain - restricts all repositioning and functional mobility / care task performances   Subjective   General  Chart Reviewed: Yes  Additional Pertinent Hx: 60 y/o male admitted to [facility] 4/23/20 with R hip pain and fall. Diagnosed with right intertrochanteric femur fracture. Last fractures were of bilateral patella in November 2019- surgery by Dr. Domenico Miranda. Pt admitted to [facility] 4/27/20. Response To Previous Treatment: Patient reporting fatigue but able to participate. Family / Caregiver Present: No  Referring Practitioner: Dr Raudel Lancaster: Patient refusing OOB in AM 2* to increased pain. \"If you take it easy on me this morning, I will work harder this afternoon. \"  General Comment  Comments: Pt tends to hold breath; VCs for breathing technique to minimize pain & guarding. Smart leg placed for R LE and adjusted to patient specifications. Patient able to tolerate up in w/c with extended R LE.     Pain Screening  Patient LE skates w/towel, 2x10, with education on continuing independently when in W/C to improve knee ROM. (R knee flexion ~30*)  Other exercises 5: UBE x10 minutes (fwd/retro) (requires breaks throughout )      Other Activities: (Requires frequent breaks )     Goals  Short term goals  Time Frame for Short term goals: 1 week  Short term goal 1: Pt to reduce pain to 2-3/10 to improve ease and progression of functional mobility. Short term goal 2: Pt to demonstrate min x2 bed mobility with safe technique. Short term goal 3: Pt to demonstrate min x2 transfers with appropriate device and good maintaining of PWB R LE. Short term goal 4: Pt to amb 25'-50' with appropriate device min x2 while maintaining RLE PWB. Short term goal 5: Pt to ascend/descend 2-4 steps with B UE support min x2. Short term goal 6: Pt to improve posture to GOOD. Short term goal 7: Pt to improve sitting balance to Good (Static/dynamic). Short term goal 8: Pt to propel w/c 50' Mod I.  Long term goals  Time Frame for Long term goals : by D/C  Long term goal 1: Pt to improve R LE AROM to WNL. Long term goal 2: Pt to improve BLE strength by 1/2 MMG. Long term goal 3: Pt to perform 2MWT with amb at least 76' , Mod I with device. Long term goal 4: Pt to perform bed mobility Mod I.  Long term goal 5: Pt to perform transfers Mod I with device from varied surfaces to simulate home set up. Long term goal 6: Pt to amb at least 100' Mod I with device on varied terrain  Long term goal 7: Pt to ascend/descend 2-4 steps per home set up, including platform steps with RW, SBA. Long term goal 8: Pt to demonstrate Mod I in room with device. Long term goal 9: Pt to improve standing balance to Good- to reduce fall risk at home and in community. Patient Goals   Patient goals : To golf again    Plan    Plan  Times per week: 900 min/week of combined PT/OT due to decreased activity tolerance and increased pain.   Times per day: (BID x 5 days)  Specific instructions

## 2020-05-04 NOTE — PATIENT CARE CONFERENCE
has stayed with daughter before and family is experienced with pt's prior fracture hx. Wife works full time (works at outpatient PT office but currently laid off due to pandemic), daughter lives 10 minutes away at [employer] full time. Family Education: Family Education initiated and Ongoing    Risk for Readmission: High 14>   Readmission Risk              Risk of Unplanned Readmission:        16         Critical Items: None      Problem / Barrier Intervention / Plan  Results   Impaired ability to care for self  Patient and Caregiver Training in modified care strategies and use of AE/DME for care tasks     Impaired functional mobility Progress bed mobility, transfers, and amb with appropriate AD and maintaining WB precautions     Pain control/decreased activity tolerance Strengthening, AROM, ice, technique, rest breaks, positioning, 900/week     Stairs to enter/exit home Progress strengthening, AROM of RLE, technique, education, family training                                  Total Self Care Score    Total Mobility Score  Admission Score:  18      Admission Score:  15  Progress:  26       Progress:  21  Goal:  42/42         Goal:  76/90   `  Discharge Plan   Estimated Discharge Date: 5/21/2020  Overnight or Day Pass: No  Factors facilitating achievement of predicted outcomes: Family support, Cooperative, Pleasant and Has needed Durable Medical Equipment at home  Barriers to the achievement of predicted outcomes: Pain, Decreased motivation, Unrealistic expectations, Lower extremity weakness, Skin Care, Wound Care and Medication managment    Functional Goals at discharge:  Predicted Outcome: Home with familyPATIENT'S LEVEL OF ASSISTANCE: Minimal Assistance  Discharge therapy goals:  PT: Long term goals  Time Frame for Long term goals : by D/C  Long term goal 1: Pt to improve R LE AROM to WNL. Long term goal 2: Pt to improve BLE strength by 1/2 MMG.   Long term goal 3: Pt to perform 2MWT with amb at least 76' ,

## 2020-05-04 NOTE — PROGRESS NOTES
Physical Medicine & Rehabilitation  Progress Note      Subjective:      59year-old gentleman with R hip fracture s/p IM nail fixation. Patient is well, and has had no acute complaints or problems. Aching pain continues R femur/thigh/hip. Responding to ice and medication. Strength and mobility are improving. ROS:  Denies fevers, chills, sweats. No chest pain, palpitations, lightheadedness. Denies coughing, wheezing or shortness of breath. Denies abdominal pain, nausea, diarrhea or constipation. No new areas of joint pain. Denies new areas of numbness or weakness. Denies new anxiety or depression issues. No new skin problems. Rehabilitation:   Progressing in therapies. PT:  Restrictions/Precautions: Fall Risk, Weight Bearing, General Precautions  Implants present? : Metal implants  Other position/activity restrictions: hx osteogenesis imperfecta- hx multiple fractures; Low tolerance for Pain - restricts all repositioning and functional mobility / care task performances   Right Lower Extremity Weight Bearing: Partial Weight Bearing  Left Lower Extremity Weight Bearing: Weight Bearing As Tolerated  Right Upper Extremity Weight Bearing: (Reports that he is limited in Active ROM of extension in R elbow due to past fractures/surgeries/conditions/procedures related to elbow fracture)   Transfers  Sit to Stand: 2 Person Assistance, Maximum Assistance(w/aakash stedy; awkward positioning w/minimal R knee flexion & need to guard R LE from slipping off SS platform. )  Stand to sit: 2 Person Assistance, Minimal Assistance(aakash steady; pt demo's good use of UE to control descent. )  Bed to Chair: Dependent/Total(aakash steady used at this time)  Comment: Pt unable to flex L knee adequately to brace shins against Murcia Campbell.   WB Status: PWB 25% right LE    Transfers  Sit to Stand: 2 Person Assistance, Maximum Assistance(w/aakash stedy; awkward positioning w/minimal R knee flexion & need to guard R LE from slipping off SS platform. )  Stand to sit: 2 Person Assistance, Minimal Assistance(aakash steady; pt demo's good use of UE to control descent. )  Bed to Chair: Dependent/Total(aakash steady used at this time)  Comment: Pt unable to flex L knee adequately to brace shins against Sanjana Climes. Ambulation  Ambulation?: No  WB Status: PWB 25% right LE               OT:  ADL  Equipment Provided: Reacher, Sock aid, Long-handled sponge  Feeding: Setup, Modified independent   Grooming: Modified independent   UE Bathing: Setup  LE Bathing: Minimal assistance(ivanna and buttocks only in bed due to increased pain )  UE Dressing: Setup(performed HOB elevated donning [place] shirt )  LE Dressing: Moderate assistance(supine, assist threading BLE pulling up rolling side to side)  Toileting: Dependent/Total(TA x 2)  Additional Comments: Pt refused TEDS stating they are uncomfortable- noting due to :\"My feet are so odd shaped. \" pt does have wide metatarsal area  . pt used toilet then completed bathing and dressing and grooming at sink.  biggest limitation is sit to stand complicated by lack of knee flex, brian on R)           Balance  Sitting Balance: Modified independent   Standing Balance: (max x1 to maintain )   Standing Balance  Time: 3-4 min x 3 in am  Activity: in melly dixon  Comment:    Functional Mobility  Functional - Mobility Device: Wheelchair  Activity: To/from bathroom  Assist Level: Modified independent   Functional Mobility Comments: pt able to maneuver w/c in room around bed post OT request to try this     Bed mobility  Rolling to Left: Maximum assistance  Rolling to Right: Maximum assistance  Supine to Sit: Minimal assistance(assist for RLE less extensive today)  Sit to Supine: Maximum assistance, 2 Person assistance  Scooting: Stand by assistance(Pt using rails & UE strength to pull himself to [facility])  Comment: assisted patient back to bed at end of AM session   Transfers  Stand Pivot Transfers: Dependent/Total, 2 Person assistance(aakash cherry)  Sit to stand: Dependent/Total, 2 Person assistance(TA x 2)  Stand to sit: Dependent/Total, 2 Person assistance  Transfer Comments: aakash carey, dependent, difficulty maintain 25 % WB on R, ed re use LLE and BUE to improve. sit to stand limited by B knee flex contractures- worse on R   Toilet Transfers  Toilet Transfer: Dependent/Total  Toilet Transfers Comments: aakash carey, max A x 2 onto and off toilet. BSC to elevate seat and provide B grab bars to trialed but did not improve set up and was returned        Wheelchair Bed Transfers  Wheelchair/Bed - Technique: (utilizing aakash carey )  Equipment Used: Wheelchair, Bed, Other  Level of Asssistance: 2 Person assistance, Maximum assistance  Wheelchair Transfers Comments: Zach Davalos transfer Max x2 from w/c >bed at end of AM OT session; patient limited by decreased ROM/flexion in moises knees which does not allow Zach Davalos to be close enough for leverage for sit<>stand, pt required significant assistance to stand, VC at low back/pelvis for correct upright posture, and assist to slide RLE out for descent onto bed. bed height raised for pain management/increased ease     SPEECH:      Objective:  BP (!) 146/74   Pulse 80   Temp 98.1 °F (36.7 °C) (Oral)   Resp 18   Ht 6' (1.829 m)   Wt 190 lb (86.2 kg)   SpO2 100%   BMI 25.77 kg/m²       GEN: Well developed, well nourished, in NAD  HEENT:  NCAT.  PERRL.  EOMI.  Mucous membranes pink and moist.   PULM:  Clear to ausculation. No rales or rhonchi. Respirations WNL and unlabored. CV:  Regular rate rhythm.  No murmurs or gallops. GI:  Abdomen soft. Nontender. Non-distended.  BS + and equal.    NEUROLOGICAL: A&O x3. Sensation intact to light touch. MSK:  Functional ROM BUE and LLEs. Pain and weakness impairs AROM R hip. Strength 5/5 key muscles BUEs. L hip and knee muscles 4/5. R hip 3/5, R knee extension 4/5.    SKIN: Warm dry and intact except R lateral hip and thigh incisions. Good turgor.   EXTREMITIES:  No calf tenderness to palpation. Proximal RLE edema. No edema LLE.   PSYCH: Mood WNL. Appropriately interactive. Affect WNL.     Diagnostics:     CBC:   Recent Labs     05/04/20  0752   WBC 4.9   RBC 3.19*   HGB 9.5*   HCT 28.7*   MCV 90.0   RDW 14.4        BMP:   Recent Labs     05/04/20  0752      K 4.4      CO2 26   BUN 32*   CREATININE 1.34*   GLUCOSE 238*     BNP: No results for input(s): BNP in the last 72 hours. PT/INR: No results for input(s): PROTIME, INR in the last 72 hours. APTT: No results for input(s): APTT in the last 72 hours. CARDIAC ENZYMES: No results for input(s): CKMB, CKMBINDEX, TROPONINT in the last 72 hours.     Invalid input(s): CKTOTAL;3  FASTING LIPID PANEL:  Lab Results   Component Value Date    CHOL 163 10/15/2018    HDL 34 (L) 10/15/2018    TRIG 161 (H) 10/15/2018     LIVER PROFILE:   Recent Labs     05/04/20  0752   AST 13   ALT 14   BILITOT 0.52   ALKPHOS 126        Current Medications:   Current Facility-Administered Medications: insulin glargine (LANTUS) injection vial 48 Units, 48 Units, Subcutaneous, BID  lidocaine 4 % external patch 1 patch, 1 patch, Transdermal, Daily  senna (SENOKOT) tablet 17.2 mg, 2 tablet, Oral, Nightly PRN  bisacodyl (DULCOLAX) suppository 10 mg, 10 mg, Rectal, Daily PRN  acetaminophen (TYLENOL) tablet 1,000 mg, 1,000 mg, Oral, 3 times per day  sodium chloride flush 0.9 % injection 10 mL, 10 mL, Intravenous, PRN  dextrose 5 % solution, 100 mL/hr, Intravenous, PRN  dextrose 50 % IV solution, 12.5 g, Intravenous, PRN  glucagon (rDNA) injection 1 mg, 1 mg, Intramuscular, PRN  glucose (GLUTOSE) 40 % oral gel 15 g, 15 g, Oral, PRN  aspirin EC tablet 81 mg, 81 mg, Oral, Daily  insulin lispro (HUMALOG) injection vial 0-18 Units, 0-18 Units, Subcutaneous, TID WC  insulin lispro (HUMALOG) injection vial 0-9 Units, 0-9 Units, Subcutaneous, Nightly  metoprolol succinate (TOPROL XL) extended release tablet 50 mg, 50 mg, Oral, BID  pantoprazole (PROTONIX) tablet 40 mg, 40 mg, Oral, Daily  sertraline (ZOLOFT) tablet 100 mg, 100 mg, Oral, Nightly  traZODone (DESYREL) tablet 100 mg, 100 mg, Oral, Nightly  polyethylene glycol (GLYCOLAX) packet 17 g, 17 g, Oral, Daily  enoxaparin (LOVENOX) injection 40 mg, 40 mg, Subcutaneous, Daily  magnesium hydroxide (MILK OF MAGNESIA) 400 MG/5ML suspension 30 mL, 30 mL, Oral, Daily PRN  oxyCODONE (ROXICODONE) immediate release tablet 5 mg, 5 mg, Oral, Q4H PRN **OR** oxyCODONE (ROXICODONE) immediate release tablet 10 mg, 10 mg, Oral, Q4H PRN      Impression/Plan:   Impaired ADLs, gait, and mobility due to:      1. R hip fracture s/p IM nail. :  PT/OT for gait, mobility, strengthening, endurance, ADLs, and self care. Partial weight bearing 25% RLE. Tylenol prn, oxycodone prn. Ice prn. Continue routine Tylenol and short course Completed course of OxyContin 5/3. Educated patient on plan to reduce pain medication as tolerated as he continues to heal.   2. R rib pain/tenderness: Has lidoderm patches. 3. DM: on sliding scale and ACHS accuchecks. On Lantus - IM adjusting dose for hyperglycemia. 4. HTN: on Toprol BID  5. GERD/GI prophylaxis: on pantoprazole  6. Insomnia: on Trazodone q hs  7. Depression: on Zoloft at hs   8. Bowel management: On miralax daily, senokot prn, milk of magnesia prn and Dulcolax prn.   9. DVT Prophylaxis:  low molecular weight heparin, SCD's while in bed and LUCA's while in bed  10. Dr. Jeff Alvarado group for medical management    Electronically signed by Eloina Perez MD on 5/4/2020 at 10:56 AM      This note is created with the assistance of a speech recognition program.  While intending to generate a document that actually reflects the content of the visit, the document can still have some errors including those of syntax and sound a like substitutions which may escape proof reading. In such instances, actual meaning can be extrapolated by contextual diversion.

## 2020-05-05 LAB
GLUCOSE BLD-MCNC: 183 MG/DL (ref 75–110)
GLUCOSE BLD-MCNC: 199 MG/DL (ref 75–110)
GLUCOSE BLD-MCNC: 221 MG/DL (ref 75–110)
GLUCOSE BLD-MCNC: 283 MG/DL (ref 75–110)

## 2020-05-05 PROCEDURE — 99232 SBSQ HOSP IP/OBS MODERATE 35: CPT | Performed by: PHYSICAL MEDICINE & REHABILITATION

## 2020-05-05 PROCEDURE — 6370000000 HC RX 637 (ALT 250 FOR IP): Performed by: FAMILY MEDICINE

## 2020-05-05 PROCEDURE — 82947 ASSAY GLUCOSE BLOOD QUANT: CPT

## 2020-05-05 PROCEDURE — 6370000000 HC RX 637 (ALT 250 FOR IP): Performed by: ORTHOPAEDIC SURGERY

## 2020-05-05 PROCEDURE — 1180000000 HC REHAB R&B

## 2020-05-05 PROCEDURE — 97535 SELF CARE MNGMENT TRAINING: CPT

## 2020-05-05 PROCEDURE — 97542 WHEELCHAIR MNGMENT TRAINING: CPT

## 2020-05-05 PROCEDURE — 97110 THERAPEUTIC EXERCISES: CPT

## 2020-05-05 PROCEDURE — 6370000000 HC RX 637 (ALT 250 FOR IP): Performed by: PHYSICAL MEDICINE & REHABILITATION

## 2020-05-05 PROCEDURE — 6360000002 HC RX W HCPCS: Performed by: PHYSICAL MEDICINE & REHABILITATION

## 2020-05-05 PROCEDURE — 97530 THERAPEUTIC ACTIVITIES: CPT

## 2020-05-05 RX ORDER — GABAPENTIN 100 MG/1
100 CAPSULE ORAL 3 TIMES DAILY
Status: DISCONTINUED | OUTPATIENT
Start: 2020-05-05 | End: 2020-05-21 | Stop reason: HOSPADM

## 2020-05-05 RX ORDER — INSULIN GLARGINE 100 [IU]/ML
50 INJECTION, SOLUTION SUBCUTANEOUS 2 TIMES DAILY
Status: DISCONTINUED | OUTPATIENT
Start: 2020-05-05 | End: 2020-05-12

## 2020-05-05 RX ADMIN — ASPIRIN 81 MG: 81 TABLET, COATED ORAL at 08:09

## 2020-05-05 RX ADMIN — ACETAMINOPHEN 1000 MG: 500 TABLET, FILM COATED ORAL at 21:31

## 2020-05-05 RX ADMIN — OXYCODONE 5 MG: 5 TABLET ORAL at 06:24

## 2020-05-05 RX ADMIN — METRONIDAZOLE 100 MG: 500 TABLET ORAL at 13:28

## 2020-05-05 RX ADMIN — INSULIN LISPRO 3 UNITS: 100 INJECTION, SOLUTION INTRAVENOUS; SUBCUTANEOUS at 16:52

## 2020-05-05 RX ADMIN — OXYCODONE HYDROCHLORIDE 10 MG: 5 TABLET ORAL at 15:35

## 2020-05-05 RX ADMIN — ACETAMINOPHEN 1000 MG: 500 TABLET, FILM COATED ORAL at 06:24

## 2020-05-05 RX ADMIN — ACETAMINOPHEN 1000 MG: 500 TABLET, FILM COATED ORAL at 13:28

## 2020-05-05 RX ADMIN — METOPROLOL SUCCINATE 50 MG: 50 TABLET, EXTENDED RELEASE ORAL at 21:31

## 2020-05-05 RX ADMIN — INSULIN LISPRO 9 UNITS: 100 INJECTION, SOLUTION INTRAVENOUS; SUBCUTANEOUS at 12:47

## 2020-05-05 RX ADMIN — POLYETHYLENE GLYCOL 3350 17 G: 17 POWDER, FOR SOLUTION ORAL at 08:09

## 2020-05-05 RX ADMIN — INSULIN LISPRO 3 UNITS: 100 INJECTION, SOLUTION INTRAVENOUS; SUBCUTANEOUS at 08:11

## 2020-05-05 RX ADMIN — SERTRALINE HYDROCHLORIDE 100 MG: 100 TABLET ORAL at 21:31

## 2020-05-05 RX ADMIN — INSULIN GLARGINE 50 UNITS: 100 INJECTION, SOLUTION SUBCUTANEOUS at 21:33

## 2020-05-05 RX ADMIN — INSULIN LISPRO 3 UNITS: 100 INJECTION, SOLUTION INTRAVENOUS; SUBCUTANEOUS at 21:34

## 2020-05-05 RX ADMIN — OXYCODONE 5 MG: 5 TABLET ORAL at 02:23

## 2020-05-05 RX ADMIN — INSULIN GLARGINE 48 UNITS: 100 INJECTION, SOLUTION SUBCUTANEOUS at 08:11

## 2020-05-05 RX ADMIN — METOPROLOL SUCCINATE 50 MG: 50 TABLET, EXTENDED RELEASE ORAL at 08:09

## 2020-05-05 RX ADMIN — OXYCODONE HYDROCHLORIDE 10 MG: 5 TABLET ORAL at 21:32

## 2020-05-05 RX ADMIN — ENOXAPARIN SODIUM 40 MG: 40 INJECTION SUBCUTANEOUS at 08:09

## 2020-05-05 RX ADMIN — OXYCODONE HYDROCHLORIDE 10 MG: 5 TABLET ORAL at 10:52

## 2020-05-05 RX ADMIN — METRONIDAZOLE 100 MG: 500 TABLET ORAL at 21:32

## 2020-05-05 RX ADMIN — TRAZODONE HYDROCHLORIDE 100 MG: 100 TABLET ORAL at 21:32

## 2020-05-05 ASSESSMENT — PAIN SCALES - GENERAL
PAINLEVEL_OUTOF10: 2
PAINLEVEL_OUTOF10: 2
PAINLEVEL_OUTOF10: 0
PAINLEVEL_OUTOF10: 6
PAINLEVEL_OUTOF10: 7
PAINLEVEL_OUTOF10: 7
PAINLEVEL_OUTOF10: 6
PAINLEVEL_OUTOF10: 7
PAINLEVEL_OUTOF10: 6
PAINLEVEL_OUTOF10: 5
PAINLEVEL_OUTOF10: 6
PAINLEVEL_OUTOF10: 7

## 2020-05-05 ASSESSMENT — ENCOUNTER SYMPTOMS
BACK PAIN: 0
TROUBLE SWALLOWING: 0
RECTAL PAIN: 0
COLOR CHANGE: 0
ABDOMINAL DISTENTION: 0
STRIDOR: 0
ANAL BLEEDING: 0
PHOTOPHOBIA: 0
CHOKING: 0
VOICE CHANGE: 0
BLOOD IN STOOL: 0

## 2020-05-05 ASSESSMENT — PAIN DESCRIPTION - PROGRESSION: CLINICAL_PROGRESSION: GRADUALLY IMPROVING

## 2020-05-05 ASSESSMENT — PAIN DESCRIPTION - DESCRIPTORS: DESCRIPTORS: ACHING;SORE

## 2020-05-05 ASSESSMENT — PAIN DESCRIPTION - ORIENTATION
ORIENTATION: RIGHT
ORIENTATION: RIGHT

## 2020-05-05 ASSESSMENT — PAIN DESCRIPTION - PAIN TYPE
TYPE: SURGICAL PAIN
TYPE: SURGICAL PAIN

## 2020-05-05 ASSESSMENT — PAIN DESCRIPTION - LOCATION
LOCATION: HIP
LOCATION: HIP

## 2020-05-05 ASSESSMENT — PAIN DESCRIPTION - FREQUENCY: FREQUENCY: CONTINUOUS

## 2020-05-05 NOTE — PLAN OF CARE
Problem: Pain:  Description: Pain management should include both nonpharmacologic and pharmacologic interventions. Goal: Pain level will decrease  Description: Pain level will decrease  5/5/2020 0528 by Jean Paul Patrick RN  Outcome: Ongoing  Note: Patients pain level decreased with PRN medications. 1/1/1365 0817 by Parris French RN  Outcome: Ongoing     Problem: Falls - Risk of:  Goal: Will remain free from falls  Description: Will remain free from falls  5/5/2020 0528 by Jean Paul Patrick RN  Outcome: Ongoing  Note: No falls during this shift. Call light is within reach. Side rails up x2 with bed in lowest position. Patient safety maintained. 6/7/3088 9202 by Parris French RN  Outcome: Ongoing     Problem: Falls - Risk of:  Goal: Absence of physical injury  Description: Absence of physical injury  5/5/2020 0528 by Jean Paul Patrick RN  Outcome: Ongoing  Note: No injury this shift. Patient safety maintained.    1/9/5511 0269 by Parris French RN  Outcome: Ongoing

## 2020-05-05 NOTE — PROGRESS NOTES
Physical Medicine & Rehabilitation  Progress Note      Subjective:      59year-old gentleman with R hip fracture s/p IM nail fixation. Patient is well, and has had no acute complaints or problems. Pain is moderate - severe and responds to medication but impaired his sleep last night. ROS:  Denies fevers, chills, sweats. No chest pain, palpitations, lightheadedness. Denies coughing, wheezing or shortness of breath. Denies abdominal pain, nausea, diarrhea or constipation. No new areas of joint pain. Denies new areas of numbness or weakness. Denies new anxiety or depression issues. No new skin problems. Rehabilitation:   Progressing in therapies. PT:  Restrictions/Precautions: Fall Risk, Weight Bearing, General Precautions  Implants present? : Metal implants  Other position/activity restrictions: hx osteogenesis imperfecta- hx multiple fractures; Low tolerance for Pain - restricts all repositioning and functional mobility / care task performances   Right Lower Extremity Weight Bearing: Partial Weight Bearing  Left Lower Extremity Weight Bearing: Weight Bearing As Tolerated  Right Upper Extremity Weight Bearing: (Reports that he is limited in Active ROM of extension in R elbow due to past fractures/surgeries/conditions/procedures related to elbow fracture)   Transfers  Sit to Stand: Moderate Assistance, 2 Person Assistance(Mod x2 with walker lift; Max x1 with aakash steady )  Stand to sit: Moderate Assistance, 2 Person Assistance  Bed to Chair: Dependent/Total(Aakash steady )  Comment: Poor positioning of LLE 2* to limited knee flexion. Cues for technique and safety with aakash steady and walker lift transfers. WB Status: PWB 25% right LE  Ambulation 1  Surface: level tile  Device: (Walker lift (green walker lift) )  Assistance:  Moderate assistance, 2 Person assistance  Quality of Gait: Short step length, heavy reliance on UE's to maintain PWB   Gait Deviations: Slow Bel[place], Decreased step height,

## 2020-05-05 NOTE — PROGRESS NOTES
elbow fracture)  Position Activity Restriction  Other position/activity restrictions: hx osteogenesis imperfecta- hx multiple fractures; Low tolerance for Pain - restricts all repositioning and functional mobility / care task performances     Subjective: Patient declined full PM session due to increased pain level. Writer educated him on proper use of CP. CP  x 15 mins. Comments: Pt tends to hold breath; VCs for breathing technique to minimize pain & guarding. Smart leg placed for R LE and adjusted to patient specifications. Patient able to tolerate up in w/c with extended R LE. Vital Signs  Patient Currently in Pain: Yes  Pain Assessment: 0-10  Pain Level: 7  Pain Type: Surgical pain  Pain Location: Hip  Pain Orientation: Right  Non-Pharmaceutical Pain Intervention(s): Repositioned;Cold applied; Emotional support  Response to Pain Intervention: Patient Satisfied        Patient Observation  Observations: Pt guarded with most movements, reports inability to flex R knee more than 30*       Bed Mobility:   Rolling: Rolling Left;Minimal assistance(Bed flat used HR, not able to roll to (R))  Supine to Sit: Moderate assistance(1 person assist to support RLE and assist with trunk prn)  Sit to Supine: Moderate assistance(B LE assist, improved trunk mobility/pivot at hips)  Scooting: Independent      Transfers:  Sit to Stand: Moderate Assistance;2 Person Assistance(Mod x2 with walker lift;  Max x1 with aakash steady )  Stand to sit: Moderate Assistance;2 Person Assistance  Bed to Chair: Dependent/Total(Aakash steady )                       Stairs/Curb  Stairs?: No              Wheelchair Activities  Wheelchair Type: Standard  Wheelchair Cushion: (waffle cushion)  Pressure Relief Type: Self Adjusts;Push up;Lateral lean  Wheelchair Parts Management: No  Propulsion: Yes  Propulsion 1  Propulsion: Manual  Level: Level Tile  Method: RUE;LUE  Level of Assistance: Stand by assistance  Description/ Details: Straight path, 90* SBA.  Long term goal 8: Pt to demonstrate Mod I in room with device. Long term goal 9: Pt to improve standing balance to Good- to reduce fall risk at home and in community.        05/05/20 1030 05/05/20 1345   PT Individual Minutes   Time In 1030 1345   Time Out [id number]   Minutes 50 10       Electronically signed by Shelly Garcia PTA on 5/5/20 at 5:12 PM EDT

## 2020-05-05 NOTE — PROGRESS NOTES
Progress Note    5/5/2020   3:29 PM    Name:  Igor Saini  MRN:    [id number]     Dereje Turcios:     [de-identified]   Room:  [address] [place] [address] Day: 8     Admit Date: 4/27/2020  6:11 PM  PCP: ADEEL Christensen CNP    Subjective:     C/C: No chief complaint on file. Interval History: Status: not changed. Right hip pain well controlled. Patient denies chest pain shortness of breath or palpitations. Recent labs reviewed, vital signs reviewed    ROS:   all 10 systems reviewed and are negative except as noted    Review of Systems   Constitutional: Negative for appetite change, chills and diaphoresis. HENT: Negative for drooling, ear pain, trouble swallowing and voice change. Eyes: Negative for photophobia and visual disturbance. Respiratory: Negative for choking and stridor. Cardiovascular: Negative for chest pain and palpitations. Gastrointestinal: Negative for abdominal distention, anal bleeding, blood in stool and rectal pain. Endocrine: Negative for polyphagia and polyuria. Genitourinary: Negative for dysuria, flank pain, hematuria and urgency. Musculoskeletal: Positive for arthralgias (Right hip). Negative for back pain, myalgias and neck stiffness. Skin: Negative for color change, pallor and rash. Allergic/Immunologic: Negative for environmental allergies and food allergies. Neurological: Negative for tremors, seizures, facial asymmetry and numbness. Hematological: Negative for adenopathy. Does not bruise/bleed easily. Psychiatric/Behavioral: Negative for agitation, behavioral problems, hallucinations, self-injury and suicidal ideas. Medications:      Allergies: No Known Allergies    Current Meds: gabapentin (NEURONTIN) capsule 100 mg, TID  insulin glargine (LANTUS) injection vial 48 Units, BID  lidocaine 4 % external patch 1 patch, Daily  senna (SENOKOT) tablet 17.2 mg, Nightly PRN  bisacodyl (DULCOLAX) suppository 10 mg, Daily PRN  acetaminophen (TYLENOL) tablet 1,000 mg, 3 Not on file    Stress: Not on file   Relationships    Social connections     Talks on phone: Not on file     Gets together: Not on file     Attends Religion service: Not on file     Active member of club or organization: Not on file     Attends meetings of clubs or organizations: Not on file     Relationship status: Not on file    Intimate partner violence     Fear of current or ex partner: Not on file     Emotionally abused: Not on file     Physically abused: Not on file     Forced sexual activity: Not on file   Other Topics Concern    Not on file   Social History Narrative    Not on file       I/O (24Hr): Intake/Output Summary (Last 24 hours) at 2020 1529  Last data filed at 2020 1296  Gross per 24 hour   Intake 240 ml   Output 1075 ml   Net -835 ml     Radiology:  Xr Chest Portable    Result Date: 2020  No acute findings. Labs:  Recent Results (from the past 24 hour(s))   POC Glucose Fingerstick    Collection Time: 20  3:52 PM   Result Value Ref Range    POC Glucose 233 (H) 75 - 110 mg/dL   POC Glucose Fingerstick    Collection Time: 20  8:03 PM   Result Value Ref Range    POC Glucose 241 (H) 75 - 110 mg/dL   POC Glucose Fingerstick    Collection Time: 20  7:55 AM   Result Value Ref Range    POC Glucose 183 (H) 75 - 110 mg/dL   POC Glucose Fingerstick    Collection Time: 20 11:28 AM   Result Value Ref Range    POC Glucose 283 (H) 75 - 110 mg/dL       Physical Examination:        Vitals:  /75   Pulse 72   Temp 98.2 °F (36.8 °C) (Oral)   Resp 20   Ht 6' (1.829 m)   Wt 190 lb (86.2 kg)   SpO2 99%   BMI 25.77 kg/m²   Temp (24hrs), Av.3 °F (36.8 °C), Min:98.2 °F (36.8 °C), Max:98.4 °F (36.9 °C)    Recent Labs     20  1552 20  0755 20  1128   POCGLU 233* 241* 183* 283*         Physical Exam  Vitals signs reviewed. Constitutional:       Appearance: Normal appearance. He is not diaphoretic.    HENT:      Head: Normocephalic and atraumatic. Right Ear: External ear normal.      Left Ear: External ear normal.      Nose: Nose normal.      Mouth/Throat:      Mouth: Mucous membranes are moist.      Pharynx: Oropharynx is clear. Eyes:      Conjunctiva/sclera: Conjunctivae normal.   Neck:      Musculoskeletal: Normal range of motion and neck supple. No neck rigidity. Cardiovascular:      Rate and Rhythm: Normal rate and regular rhythm. Pulses: Normal pulses. Heart sounds: Normal heart sounds. Pulmonary:      Effort: Pulmonary effort is normal.      Breath sounds: Normal breath sounds. Abdominal:      General: Bowel sounds are normal. There is no distension. Palpations: Abdomen is soft. Musculoskeletal: Normal range of motion. General: Tenderness (Right hip) present. No deformity. Right lower leg: No edema. Left lower leg: No edema. Skin:     General: Skin is warm and dry. Capillary Refill: Capillary refill takes less than 2 seconds. Coloration: Skin is not jaundiced. Neurological:      General: No focal deficit present. Mental Status: Mental status is at baseline. Psychiatric:         Mood and Affect: Mood normal.         Behavior: Behavior normal.         Assessment:        Primary Problem  Closed right hip fracture, initial encounter ([facility]     Principal Problem:    Closed right hip fracture, initial encounter [facility])  Active Problems:    Essential hypertension    Microalbuminuria due to type 2 diabetes mellitus ([facility]    Stage 3 chronic kidney disease ([facility])    Type 2 diabetes mellitus with chronic kidney disease ([facility]    Type 2 diabetes mellitus with hyperglycemia ([facility])    Gastroesophageal reflux disease without esophagitis  Resolved Problems:    * No resolved hospital problems. *      Past Medical History:   Diagnosis Date    Contracture, elbow     h/o right elbow fx, compound fracture.      Erectile dysfunction     Hypertension     Osteogenesis imperfecta

## 2020-05-05 NOTE — PLAN OF CARE
Problem: Pain:  Goal: Pain level will decrease  Description: Pain level will decrease  5/6/9186 2209 by Colleen Padilla RN  Outcome: Ongoing  5/5/2020 0528 by Lisa Diaz RN  Outcome: Ongoing  Note: Patients pain level decreased with PRN medications. Goal: Control of acute pain  Description: Control of acute pain  9/6/4417 5783 by Colleen Padilla RN  Outcome: Ongoing  5/5/2020 0528 by Lisa Diaz RN  Outcome: Ongoing  Goal: Control of chronic pain  Description: Control of chronic pain  8/9/6176 4407 by Colleen Padilla RN  Outcome: Ongoing  5/5/2020 0528 by Lisa Diaz RN  Outcome: Ongoing     Problem: Falls - Risk of:  Goal: Will remain free from falls  Description: Will remain free from falls  9/9/3895 0548 by Colleen Padilla RN  Outcome: Ongoing  5/5/2020 0528 by Lisa Diaz RN  Outcome: Ongoing  Note: No falls during this shift. Call light is within reach. Side rails up x2 with bed in lowest position. Patient safety maintained. Goal: Absence of physical injury  Description: Absence of physical injury  7/6/9692 4228 by Colleen Padilla RN  Outcome: Ongoing  5/5/2020 0528 by Lisa Diaz RN  Outcome: Ongoing  Note: No injury this shift. Patient safety maintained.       Problem: Mobility - Impaired:  Goal: Mobility will improve  Description: Mobility will improve  0/2/9607 1010 by Colleen Padilla RN  Outcome: Ongoing  5/5/2020 0528 by Lisa Diaz RN  Outcome: Ongoing     Problem: Skin Integrity:  Goal: Will show no infection signs and symptoms  Description: Will show no infection signs and symptoms  8/8/0040 9095 by Colleen Padilla RN  Outcome: Ongoing  5/5/2020 0528 by iLsa Diaz RN  Outcome: Ongoing  Goal: Absence of new skin breakdown  Description: Absence of new skin breakdown  7/5/6799 5790 by Colleen Padilla RN  Outcome: Ongoing  5/5/2020 0528 by Lisa Diaz RN  Outcome: Ongoing     Problem: Musculor/Skeletal Functional Status  Goal: Highest potential functional level  5/5/2020 1543

## 2020-05-05 NOTE — PROGRESS NOTES
Home Management Training, Positioning, Endurance Training, Pain Management, Equipment Evaluation, Education, & procurement  Patient Goals   Patient goals :  To have less pain and be able to move  Short term goals  Time Frame for Short term goals: in 5-7 days, patient will  Short term goal 1: perform functional transfers during ADL routine with Mod A using DME as appropriate  Short term goal 2: perform toileting routine (transfer/hygiene/clothing management) with Mod A   Short term goal 3: V/D Good understanding of AE/DME/compensatory techniques for self care/mobility tasks   Short term goal 4: V/D Good understanding of PWB (25%) RLE during self care/mobility tasks   Short term goal 5: increase standing tolerance to 5+ minutes with CGA-Min A with 0-1 UE support during functional task   Short term goal 6: actively participate in 30+ minutes of therapeutic exercise/activity to promote increased safety and independence with self care and mobility tasks   Long term goals  Time Frame for Long term goals : by discharge, patient will  Long term goal 1: perform functional transfers/mobility during ADL routine with Modified [place] using DME as appropriate  Long term goal 2: perform BADL tasks with independence/modified independence using AE/compensatory techniques/DME as needed  Long term goal 3: V/D Good understanding of BUE HEP for continued strength for functional tasks  Long term goal 4: V/D Good understanding of DME for increased bathroom safety for safe d/c home   Long term goal 5: perform simple meal prep/laundry task/other desired IADL task with Modified independence using DME/compensatory techniques as needed      05/05/20 0929 05/05/20 1233   OT Individual Minutes   Time In [address] [facility] [address]   Time Out [id number]   Minutes 46 29   Time Code Minutes    Timed Code Treatment Minutes 46 Minutes 29 Minutes     Electronically signed by Lea Fine on 5/5/20 at 3:19 PM EDT

## 2020-05-06 LAB
GLUCOSE BLD-MCNC: 114 MG/DL (ref 75–110)
GLUCOSE BLD-MCNC: 186 MG/DL (ref 75–110)
GLUCOSE BLD-MCNC: 225 MG/DL (ref 75–110)
GLUCOSE BLD-MCNC: 268 MG/DL (ref 75–110)

## 2020-05-06 PROCEDURE — 97530 THERAPEUTIC ACTIVITIES: CPT

## 2020-05-06 PROCEDURE — 6370000000 HC RX 637 (ALT 250 FOR IP): Performed by: ORTHOPAEDIC SURGERY

## 2020-05-06 PROCEDURE — 1180000000 HC REHAB R&B

## 2020-05-06 PROCEDURE — 97535 SELF CARE MNGMENT TRAINING: CPT

## 2020-05-06 PROCEDURE — 6370000000 HC RX 637 (ALT 250 FOR IP): Performed by: PHYSICAL MEDICINE & REHABILITATION

## 2020-05-06 PROCEDURE — 82947 ASSAY GLUCOSE BLOOD QUANT: CPT

## 2020-05-06 PROCEDURE — 97110 THERAPEUTIC EXERCISES: CPT

## 2020-05-06 PROCEDURE — 6370000000 HC RX 637 (ALT 250 FOR IP): Performed by: FAMILY MEDICINE

## 2020-05-06 PROCEDURE — 99232 SBSQ HOSP IP/OBS MODERATE 35: CPT | Performed by: PHYSICAL MEDICINE & REHABILITATION

## 2020-05-06 PROCEDURE — 97542 WHEELCHAIR MNGMENT TRAINING: CPT

## 2020-05-06 PROCEDURE — 97116 GAIT TRAINING THERAPY: CPT

## 2020-05-06 PROCEDURE — 6360000002 HC RX W HCPCS: Performed by: PHYSICAL MEDICINE & REHABILITATION

## 2020-05-06 RX ADMIN — INSULIN LISPRO 3 UNITS: 100 INJECTION, SOLUTION INTRAVENOUS; SUBCUTANEOUS at 11:34

## 2020-05-06 RX ADMIN — PANTOPRAZOLE SODIUM 40 MG: 40 TABLET, DELAYED RELEASE ORAL at 07:32

## 2020-05-06 RX ADMIN — OXYCODONE HYDROCHLORIDE 10 MG: 5 TABLET ORAL at 05:33

## 2020-05-06 RX ADMIN — METRONIDAZOLE 100 MG: 500 TABLET ORAL at 20:37

## 2020-05-06 RX ADMIN — SERTRALINE HYDROCHLORIDE 100 MG: 100 TABLET ORAL at 20:37

## 2020-05-06 RX ADMIN — ACETAMINOPHEN 1000 MG: 500 TABLET, FILM COATED ORAL at 20:37

## 2020-05-06 RX ADMIN — ACETAMINOPHEN 1000 MG: 500 TABLET, FILM COATED ORAL at 05:33

## 2020-05-06 RX ADMIN — OXYCODONE HYDROCHLORIDE 10 MG: 5 TABLET ORAL at 13:01

## 2020-05-06 RX ADMIN — ENOXAPARIN SODIUM 40 MG: 40 INJECTION SUBCUTANEOUS at 07:29

## 2020-05-06 RX ADMIN — TRAZODONE HYDROCHLORIDE 100 MG: 100 TABLET ORAL at 20:37

## 2020-05-06 RX ADMIN — INSULIN LISPRO 5 UNITS: 100 INJECTION, SOLUTION INTRAVENOUS; SUBCUTANEOUS at 20:40

## 2020-05-06 RX ADMIN — POLYETHYLENE GLYCOL 3350 17 G: 17 POWDER, FOR SOLUTION ORAL at 07:32

## 2020-05-06 RX ADMIN — INSULIN GLARGINE 50 UNITS: 100 INJECTION, SOLUTION SUBCUTANEOUS at 20:42

## 2020-05-06 RX ADMIN — OXYCODONE HYDROCHLORIDE 10 MG: 5 TABLET ORAL at 09:16

## 2020-05-06 RX ADMIN — OXYCODONE HYDROCHLORIDE 10 MG: 5 TABLET ORAL at 20:38

## 2020-05-06 RX ADMIN — ACETAMINOPHEN 1000 MG: 500 TABLET, FILM COATED ORAL at 13:01

## 2020-05-06 RX ADMIN — METOPROLOL SUCCINATE 50 MG: 50 TABLET, EXTENDED RELEASE ORAL at 07:32

## 2020-05-06 RX ADMIN — METRONIDAZOLE 100 MG: 500 TABLET ORAL at 07:32

## 2020-05-06 RX ADMIN — OXYCODONE HYDROCHLORIDE 10 MG: 5 TABLET ORAL at 16:58

## 2020-05-06 RX ADMIN — INSULIN GLARGINE 50 UNITS: 100 INJECTION, SOLUTION SUBCUTANEOUS at 07:29

## 2020-05-06 RX ADMIN — METRONIDAZOLE 100 MG: 500 TABLET ORAL at 13:01

## 2020-05-06 RX ADMIN — METOPROLOL SUCCINATE 50 MG: 50 TABLET, EXTENDED RELEASE ORAL at 20:37

## 2020-05-06 RX ADMIN — ASPIRIN 81 MG: 81 TABLET, COATED ORAL at 07:32

## 2020-05-06 RX ADMIN — INSULIN LISPRO 6 UNITS: 100 INJECTION, SOLUTION INTRAVENOUS; SUBCUTANEOUS at 06:00

## 2020-05-06 RX ADMIN — SENNOSIDES 17.2 MG: 8.6 TABLET, FILM COATED ORAL at 20:37

## 2020-05-06 ASSESSMENT — ENCOUNTER SYMPTOMS
BACK PAIN: 0
RECTAL PAIN: 0
STRIDOR: 0
BLOOD IN STOOL: 0
VOICE CHANGE: 0
ANAL BLEEDING: 0
PHOTOPHOBIA: 0
ABDOMINAL DISTENTION: 0
TROUBLE SWALLOWING: 0
CHOKING: 0
COLOR CHANGE: 0

## 2020-05-06 ASSESSMENT — PAIN DESCRIPTION - ORIENTATION: ORIENTATION: RIGHT

## 2020-05-06 ASSESSMENT — PAIN DESCRIPTION - PAIN TYPE
TYPE: SURGICAL PAIN
TYPE: SURGICAL PAIN

## 2020-05-06 ASSESSMENT — PAIN SCALES - GENERAL
PAINLEVEL_OUTOF10: 5
PAINLEVEL_OUTOF10: 7
PAINLEVEL_OUTOF10: 4
PAINLEVEL_OUTOF10: 6
PAINLEVEL_OUTOF10: 4
PAINLEVEL_OUTOF10: 3
PAINLEVEL_OUTOF10: 4
PAINLEVEL_OUTOF10: 0
PAINLEVEL_OUTOF10: 6
PAINLEVEL_OUTOF10: 7

## 2020-05-06 ASSESSMENT — PAIN DESCRIPTION - PROGRESSION: CLINICAL_PROGRESSION: GRADUALLY IMPROVING

## 2020-05-06 ASSESSMENT — PAIN DESCRIPTION - LOCATION
LOCATION: HIP
LOCATION: HIP

## 2020-05-06 ASSESSMENT — PAIN DESCRIPTION - DESCRIPTORS: DESCRIPTORS: SORE

## 2020-05-06 ASSESSMENT — PAIN DESCRIPTION - FREQUENCY: FREQUENCY: CONTINUOUS

## 2020-05-06 NOTE — PROGRESS NOTES
Risk;Weight Bearing;General Precautions  Overall Orientation Status: Within Functional Limits  Orientation Level: Oriented X4     Pain Assessment  Pain Assessment: 0-10  Pain Level: 5  Patient's Stated Pain Goal: No pain  Pain Type: Surgical pain  Pain Location: Hip  Pain Orientation: Right  Pain Descriptors: Sore  Pain Frequency: Continuous  Clinical Progression: Gradually improving  Response to Pain Intervention: Patient Satisfied  Multiple Pain Sites: No    Objective  Balance  Sitting Balance: Independent  Standing Balance: Minimal assistance(in Jose Maria Rowels)  Bed mobility  Supine to Sit: Modified independent  Scooting: Independent  Comment: Pt left up in wheelchair at end of AM session. Transfers  Sit to stand: Moderate assistance  Stand to sit: Moderate assistance  Transfer Comments: Pt consistently completed sit<>stand with Mod A x1 today. Toilet Transfers  Toilet Transfer: Dependent/Total  Toilet Transfers Comments: Jose Maria Rowels, Mod A for sit<>stand     Type of ROM/Therapeutic Exercise  Type of ROM/Therapeutic Exercise: Cane/Wand  Comment: 3# weight bar utilized for BUE 20 reps with rest breaks as needed. Activity completed to increase strength and endurance for ADLs and transfers   Exercises  Scapular Protraction: x  Scapular Retraction: x  Shoulder Flexion: x  Shoulder Extension: x  Elbow Flexion: x  Elbow Extension: x  Grasp/Release: red power web- 2x15; 5# digiflex (gross grasp/release, digits)   Other: Forward/backward circles x20 each     ADL  Equipment Provided: Long-handled sponge;Reacher;Sock aid  Feeding: Independent  Grooming: Modified independent (w/c level at sink)  UE Bathing: Setup  LE Bathing: Minimal assistance(A to dry lower legs/feet)  UE Dressing: Setup  LE Dressing: Moderate assistance(threaded w/AE, A to pull up R side and don socks)  Toileting: Minimal assistance(A to pull up R side of shorts completely)  Additional Comments: Pt T/F'd from bed to toilet with SS.   Pt able to doff

## 2020-05-06 NOTE — PROGRESS NOTES
continue to assess safe way to transfer pt into shower. Wheelchair Bed Transfers  Wheelchair/Bed - Technique: (utilizing justus carey )  Equipment Used: Wheelchair, Bed, Other  Level of Asssistance: Moderate assistance  Wheelchair Transfers Comments: Mod A x2 in AM session, Mod-Max A x1 in PM session. SPEECH:      Objective:  BP (!) 106/54   Pulse 65   Temp 97.5 °F (36.4 °C) (Oral)   Resp 16   Ht 6' (1.829 m)   Wt 190 lb (86.2 kg)   SpO2 97%   BMI 25.77 kg/m²       GEN: Well developed, well nourished, in NAD  HEENT:  NCAT.  PERRL.  EOMI.  Mucous membranes pink and moist.   PULM:  Clear to ausculation. No rales or rhonchi. Respirations WNL and unlabored. CV:  Regular rate rhythm.  No murmurs or gallops. GI:  Abdomen soft. Nontender. Non-distended.  BS + and equal.    NEUROLOGICAL: A&O x3. Sensation intact to light touch. MSK:  Functional ROM BUE and LLEs. Pain and weakness impairs AROM R hip. Strength 5/5 key muscles BUEs. L hip and knee muscles 4+/5. R hip 3/5, R knee extension 4/5.    SKIN: Warm dry and intact except healing R lateral hip and thigh incisions. Good turgor. EXTREMITIES:  No calf tenderness to palpation. Proximal RLE edema. No edema LLE.   PSYCH: Mood WNL. Appropriately interactive. Affect WNL.     Diagnostics:     CBC:   Recent Labs     05/04/20  0752   WBC 4.9   RBC 3.19*   HGB 9.5*   HCT 28.7*   MCV 90.0   RDW 14.4        BMP:   Recent Labs     05/04/20  0752      K 4.4      CO2 26   BUN 32*   CREATININE 1.34*   GLUCOSE 238*     BNP: No results for input(s): BNP in the last 72 hours. PT/INR: No results for input(s): PROTIME, INR in the last 72 hours. APTT: No results for input(s): APTT in the last 72 hours. CARDIAC ENZYMES: No results for input(s): CKMB, CKMBINDEX, TROPONINT in the last 72 hours.     Invalid input(s): CKTOTAL;3  FASTING LIPID PANEL:  Lab Results   Component Value Date    CHOL 163 10/15/2018    HDL 34 (L) 10/15/2018    TRIG 161 (H) 10/15/2018

## 2020-05-06 NOTE — PROGRESS NOTES
Orientation  Orientation  Overall Orientation Status: Within Normal Limits  Objective   Bed mobility  Rolling to Left: Minimal assistance  Rolling to Right: Unable to assess  Supine to Sit: Stand by assistance  Sit to Supine: Minimal assistance(Educated on use of leg /gait belt to self assist RLE)  Scooting: Independent  Transfers  Sit to Stand: Moderate Assistance;2 Person Assistance(Mod x2 with walker lift; Max x1 with aakash steady)  Stand to sit: Maximum Assistance  Bed to Chair: Dependent/Total(Aakash Steady )  Squat Pivot Transfers: Dependent/Total;2 Person Assistance  Comment: Poor positioning of LLE 2* to limited knee flexion. Cues for technique and safety with aakash steady and walker lift transfers. Ambulation  Ambulation?: Yes  WB Status: PWB 25% right LE  Ambulation 1  Surface: level tile  Device: (Green walker lift )  Other Apparatus: Wheelchair follow  Assistance: Moderate assistance;2 Person assistance  Quality of Gait: Short step length, heavy reliance on UE's to maintain PWB   Gait Deviations: Slow [place];Decreased step height;Decreased step length  Distance: 25 ft  Comments: Cues to advance R LE and sequencing, maintaining PWB 25% and posture. C/o (B) UE fatigue. Stairs/Curb  Stairs?: No  Wheelchair Activities  Wheelchair Type: Standard  Wheelchair Cushion: (Waffle Cushion )  Pressure Relief Type: Self Adjusts;Push up;Lateral lean  Wheelchair Parts Management: No  Propulsion: Yes  Propulsion 1  Propulsion: Manual  Level: Level Tile  Method: RUE;LUE  Level of Assistance: Stand by assistance  Description/ Details: Straight path, 90* turns, VCs to engage brakes.    Distance: 275 ft      Other exercises  Other exercises?: Yes  Other exercises 2: TA ISO x10 hold 5 seconds   Other exercises 3: Seated (B) LE ex, 2x10, 1# L LE, A/AROM R LE, lime (min) resistance band (hip ABD & L hamstring curls)  Other exercises 4: Seated R LE skates w/towel, 2x10, with education on continuing independently when in

## 2020-05-06 NOTE — PROGRESS NOTES
Progress Note    5/6/2020   4:38 PM    Name:  Judy Hannah  MRN:    [id number]     [place]:     [de-identified]   Room:  [address] [place] [address] Day: 9     Admit Date: 4/27/2020  6:11 PM  PCP: ADEEL West CNP    Subjective:     C/C: No chief complaint on file. Interval History: Status: not changed. Right hip pain well controlled. Denies chest pain shortness of breath. Vital signs stable. Blood sugar readings noted    ROS:   all 10 systems reviewed and are negative except as noted    Review of Systems   Constitutional: Negative for appetite change, chills and diaphoresis. HENT: Negative for drooling, ear pain, trouble swallowing and voice change. Eyes: Negative for photophobia and visual disturbance. Respiratory: Negative for choking and stridor. Cardiovascular: Negative for chest pain and palpitations. Gastrointestinal: Negative for abdominal distention, anal bleeding, blood in stool and rectal pain. Endocrine: Negative for polyphagia and polyuria. Genitourinary: Negative for dysuria, flank pain, hematuria and urgency. Musculoskeletal: Positive for arthralgias (Right hip pain). Negative for back pain, myalgias and neck stiffness. Skin: Negative for color change, pallor and rash. Allergic/Immunologic: Negative for environmental allergies and food allergies. Neurological: Negative for tremors, seizures, facial asymmetry and numbness. Hematological: Negative for adenopathy. Does not bruise/bleed easily. Psychiatric/Behavioral: Negative for agitation, behavioral problems, hallucinations, self-injury and suicidal ideas. Medications:      Allergies: No Known Allergies    Current Meds: gabapentin (NEURONTIN) capsule 100 mg, TID  insulin glargine (LANTUS) injection vial 50 Units, BID  lidocaine 4 % external patch 1 patch, Daily  senna (SENOKOT) tablet 17.2 mg, Nightly PRN  bisacodyl (DULCOLAX) suppository 10 mg, Daily PRN  acetaminophen (TYLENOL) tablet 1,000 mg, 3 times per  Stress: Not on file   Relationships    Social connections     Talks on phone: Not on file     Gets together: Not on file     Attends [employer] service: Not on file     Active member of club or organization: Not on file     Attends meetings of clubs or organizations: Not on file     Relationship status: Not on file    Intimate partner violence     Fear of current or ex partner: Not on file     Emotionally abused: Not on file     Physically abused: Not on file     Forced sexual activity: Not on file   Other Topics Concern    Not on file   Social History Narrative    Not on file       I/O (24Hr): No intake or output data in the 24 hours ending 20 1638  Radiology:  No results found. Labs:  Recent Results (from the past 24 hour(s))   POC Glucose Fingerstick    Collection Time: 20  8:10 PM   Result Value Ref Range    POC Glucose 221 (H) 75 - 110 mg/dL   POC Glucose Fingerstick    Collection Time: 20  6:37 AM   Result Value Ref Range    POC Glucose 114 (H) 75 - 110 mg/dL   POC Glucose Fingerstick    Collection Time: 20 11:32 AM   Result Value Ref Range    POC Glucose 186 (H) 75 - 110 mg/dL   POC Glucose Fingerstick    Collection Time: 20  4:03 PM   Result Value Ref Range    POC Glucose 225 (H) 75 - 110 mg/dL       Physical Examination:        Vitals:  BP (!) 106/54   Pulse 65   Temp 97.5 °F (36.4 °C) (Oral)   Resp 16   Ht 6' (1.829 m)   Wt 190 lb (86.2 kg)   SpO2 97%   BMI 25.77 kg/m²   Temp (24hrs), Av °F (36.7 °C), Min:97.5 °F (36.4 °C), Max:98.4 °F (36.9 °C)    Recent Labs     20  0637 20  1132 20  1603   POCGLU 221* 114* 186* 225*         Physical Exam  Vitals signs reviewed. Constitutional:       Appearance: Normal appearance. He is not diaphoretic. HENT:      Head: Normocephalic and atraumatic.       Right Ear: External ear normal.      Left Ear: External ear normal.      Nose: Nose normal.      Mouth/Throat:      Mouth: Mucous membranes are moist.      Pharynx: Oropharynx is clear. Eyes:      Conjunctiva/sclera: Conjunctivae normal.   Neck:      Musculoskeletal: Normal range of motion and neck supple. No neck rigidity. Cardiovascular:      Rate and Rhythm: Normal rate and regular rhythm. Pulses: Normal pulses. Heart sounds: Normal heart sounds. Pulmonary:      Effort: Pulmonary effort is normal.      Breath sounds: Normal breath sounds. Abdominal:      General: Bowel sounds are normal. There is no distension. Palpations: Abdomen is soft. Musculoskeletal:         General: Tenderness (Right hip) present. No deformity. Skin:     General: Skin is warm and dry. Capillary Refill: Capillary refill takes less than 2 seconds. Coloration: Skin is not jaundiced. Neurological:      General: No focal deficit present. Mental Status: Mental status is at baseline. Psychiatric:         Mood and Affect: Mood normal.         Behavior: Behavior normal.         Assessment:        Primary Problem  Closed right hip fracture, initial encounter ([facility]     Principal Problem:    Closed right hip fracture, initial encounter [facility])  Active Problems:    Essential hypertension    Microalbuminuria due to type 2 diabetes mellitus ([facility]    Stage 3 chronic kidney disease (HCC)    Type 2 diabetes mellitus with chronic kidney disease ([facility]    Type 2 diabetes mellitus with hyperglycemia ([facility])    Gastroesophageal reflux disease without esophagitis  Resolved Problems:    * No resolved hospital problems. *      Past Medical History:   Diagnosis Date    Contracture, elbow     h/o right elbow fx, compound fracture.  Erectile dysfunction     Hypertension     Osteogenesis imperfecta     DX 6 months.  30 broken bones in past.     Type II or unspecified type diabetes mellitus without mention of complication, not stated as uncontrolled         Plan:        1.  continue Lantus 50 mg subcu twice daily  2.  on metoprolol XL 50 mg twice daily  3. PPI on Protonix  4. DVT Prophylaxis on Lovenox  5. EPCs  6.  PT/OT to evaluate and treat  7.  check and replace electrolytes per sliding scale      Electronically signed by Fadi Sherman MD

## 2020-05-07 LAB
ABSOLUTE EOS #: 0.4 K/UL (ref 0–0.4)
ABSOLUTE IMMATURE GRANULOCYTE: ABNORMAL K/UL (ref 0–0.3)
ABSOLUTE LYMPH #: 1 K/UL (ref 1–4.8)
ABSOLUTE MONO #: 0.4 K/UL (ref 0.1–1.3)
ALBUMIN SERPL-MCNC: 3.2 G/DL (ref 3.5–5.2)
ALBUMIN/GLOBULIN RATIO: ABNORMAL (ref 1–2.5)
ALP BLD-CCNC: 150 U/L (ref 40–129)
ALT SERPL-CCNC: 12 U/L (ref 5–41)
ANION GAP SERPL CALCULATED.3IONS-SCNC: 10 MMOL/L (ref 9–17)
AST SERPL-CCNC: 14 U/L
BASOPHILS # BLD: 1 % (ref 0–2)
BASOPHILS ABSOLUTE: 0.1 K/UL (ref 0–0.2)
BILIRUB SERPL-MCNC: 0.37 MG/DL (ref 0.3–1.2)
BUN BLDV-MCNC: 26 MG/DL (ref 8–23)
BUN/CREAT BLD: ABNORMAL (ref 9–20)
CALCIUM SERPL-MCNC: 9.7 MG/DL (ref 8.6–10.4)
CHLORIDE BLD-SCNC: 103 MMOL/L (ref 98–107)
CO2: 25 MMOL/L (ref 20–31)
CREAT SERPL-MCNC: 1.31 MG/DL (ref 0.7–1.2)
DIFFERENTIAL TYPE: ABNORMAL
EOSINOPHILS RELATIVE PERCENT: 8 % (ref 0–4)
GFR AFRICAN AMERICAN: >60 ML/MIN
GFR NON-AFRICAN AMERICAN: 55 ML/MIN
GFR SERPL CREATININE-BSD FRML MDRD: ABNORMAL ML/MIN/{1.73_M2}
GFR SERPL CREATININE-BSD FRML MDRD: ABNORMAL ML/MIN/{1.73_M2}
GLUCOSE BLD-MCNC: 179 MG/DL (ref 75–110)
GLUCOSE BLD-MCNC: 195 MG/DL (ref 70–99)
GLUCOSE BLD-MCNC: 226 MG/DL (ref 75–110)
GLUCOSE BLD-MCNC: 231 MG/DL (ref 75–110)
GLUCOSE BLD-MCNC: 237 MG/DL (ref 75–110)
HCT VFR BLD CALC: 29.4 % (ref 41–53)
HEMOGLOBIN: 9.8 G/DL (ref 13.5–17.5)
IMMATURE GRANULOCYTES: ABNORMAL %
LYMPHOCYTES # BLD: 17 % (ref 24–44)
MCH RBC QN AUTO: 30.4 PG (ref 26–34)
MCHC RBC AUTO-ENTMCNC: 33.4 G/DL (ref 31–37)
MCV RBC AUTO: 90.9 FL (ref 80–100)
MONOCYTES # BLD: 7 % (ref 1–7)
NRBC AUTOMATED: ABNORMAL PER 100 WBC
PDW BLD-RTO: 14.1 % (ref 11.5–14.9)
PLATELET # BLD: 294 K/UL (ref 150–450)
PLATELET ESTIMATE: ABNORMAL
PMV BLD AUTO: 7.9 FL (ref 6–12)
POTASSIUM SERPL-SCNC: 4.5 MMOL/L (ref 3.7–5.3)
RBC # BLD: 3.23 M/UL (ref 4.5–5.9)
RBC # BLD: ABNORMAL 10*6/UL
SEG NEUTROPHILS: 67 % (ref 36–66)
SEGMENTED NEUTROPHILS ABSOLUTE COUNT: 3.9 K/UL (ref 1.3–9.1)
SODIUM BLD-SCNC: 138 MMOL/L (ref 135–144)
TOTAL PROTEIN: 6.6 G/DL (ref 6.4–8.3)
WBC # BLD: 5.9 K/UL (ref 3.5–11)
WBC # BLD: ABNORMAL 10*3/UL

## 2020-05-07 PROCEDURE — 6370000000 HC RX 637 (ALT 250 FOR IP): Performed by: ORTHOPAEDIC SURGERY

## 2020-05-07 PROCEDURE — 6370000000 HC RX 637 (ALT 250 FOR IP): Performed by: PHYSICAL MEDICINE & REHABILITATION

## 2020-05-07 PROCEDURE — 97535 SELF CARE MNGMENT TRAINING: CPT

## 2020-05-07 PROCEDURE — 85025 COMPLETE CBC W/AUTO DIFF WBC: CPT

## 2020-05-07 PROCEDURE — 1180000000 HC REHAB R&B

## 2020-05-07 PROCEDURE — 97530 THERAPEUTIC ACTIVITIES: CPT

## 2020-05-07 PROCEDURE — 6370000000 HC RX 637 (ALT 250 FOR IP): Performed by: FAMILY MEDICINE

## 2020-05-07 PROCEDURE — 82947 ASSAY GLUCOSE BLOOD QUANT: CPT

## 2020-05-07 PROCEDURE — 97110 THERAPEUTIC EXERCISES: CPT

## 2020-05-07 PROCEDURE — 97542 WHEELCHAIR MNGMENT TRAINING: CPT

## 2020-05-07 PROCEDURE — 99231 SBSQ HOSP IP/OBS SF/LOW 25: CPT | Performed by: PHYSICAL MEDICINE & REHABILITATION

## 2020-05-07 PROCEDURE — 36415 COLL VENOUS BLD VENIPUNCTURE: CPT

## 2020-05-07 PROCEDURE — 80053 COMPREHEN METABOLIC PANEL: CPT

## 2020-05-07 PROCEDURE — 6360000002 HC RX W HCPCS: Performed by: PHYSICAL MEDICINE & REHABILITATION

## 2020-05-07 RX ADMIN — METRONIDAZOLE 100 MG: 500 TABLET ORAL at 07:14

## 2020-05-07 RX ADMIN — OXYCODONE HYDROCHLORIDE 10 MG: 5 TABLET ORAL at 06:07

## 2020-05-07 RX ADMIN — INSULIN LISPRO 3 UNITS: 100 INJECTION, SOLUTION INTRAVENOUS; SUBCUTANEOUS at 20:56

## 2020-05-07 RX ADMIN — SERTRALINE HYDROCHLORIDE 100 MG: 100 TABLET ORAL at 20:55

## 2020-05-07 RX ADMIN — INSULIN LISPRO 6 UNITS: 100 INJECTION, SOLUTION INTRAVENOUS; SUBCUTANEOUS at 16:21

## 2020-05-07 RX ADMIN — METRONIDAZOLE 100 MG: 500 TABLET ORAL at 13:15

## 2020-05-07 RX ADMIN — OXYCODONE HYDROCHLORIDE 10 MG: 5 TABLET ORAL at 14:59

## 2020-05-07 RX ADMIN — METOPROLOL SUCCINATE 50 MG: 50 TABLET, EXTENDED RELEASE ORAL at 07:14

## 2020-05-07 RX ADMIN — PANTOPRAZOLE SODIUM 40 MG: 40 TABLET, DELAYED RELEASE ORAL at 07:14

## 2020-05-07 RX ADMIN — INSULIN GLARGINE 50 UNITS: 100 INJECTION, SOLUTION SUBCUTANEOUS at 07:13

## 2020-05-07 RX ADMIN — ACETAMINOPHEN 1000 MG: 500 TABLET, FILM COATED ORAL at 13:14

## 2020-05-07 RX ADMIN — METOPROLOL SUCCINATE 50 MG: 50 TABLET, EXTENDED RELEASE ORAL at 20:55

## 2020-05-07 RX ADMIN — INSULIN GLARGINE 50 UNITS: 100 INJECTION, SOLUTION SUBCUTANEOUS at 20:56

## 2020-05-07 RX ADMIN — INSULIN LISPRO 3 UNITS: 100 INJECTION, SOLUTION INTRAVENOUS; SUBCUTANEOUS at 06:47

## 2020-05-07 RX ADMIN — OXYCODONE HYDROCHLORIDE 10 MG: 5 TABLET ORAL at 10:37

## 2020-05-07 RX ADMIN — OXYCODONE HYDROCHLORIDE 10 MG: 5 TABLET ORAL at 01:43

## 2020-05-07 RX ADMIN — METRONIDAZOLE 100 MG: 500 TABLET ORAL at 20:55

## 2020-05-07 RX ADMIN — ACETAMINOPHEN 1000 MG: 500 TABLET, FILM COATED ORAL at 06:07

## 2020-05-07 RX ADMIN — ACETAMINOPHEN 1000 MG: 500 TABLET, FILM COATED ORAL at 20:55

## 2020-05-07 RX ADMIN — POLYETHYLENE GLYCOL 3350 17 G: 17 POWDER, FOR SOLUTION ORAL at 07:14

## 2020-05-07 RX ADMIN — ASPIRIN 81 MG: 81 TABLET, COATED ORAL at 07:14

## 2020-05-07 RX ADMIN — OXYCODONE HYDROCHLORIDE 10 MG: 5 TABLET ORAL at 18:50

## 2020-05-07 RX ADMIN — ENOXAPARIN SODIUM 40 MG: 40 INJECTION SUBCUTANEOUS at 07:14

## 2020-05-07 RX ADMIN — INSULIN LISPRO 6 UNITS: 100 INJECTION, SOLUTION INTRAVENOUS; SUBCUTANEOUS at 11:09

## 2020-05-07 RX ADMIN — TRAZODONE HYDROCHLORIDE 100 MG: 100 TABLET ORAL at 20:55

## 2020-05-07 ASSESSMENT — ENCOUNTER SYMPTOMS
PHOTOPHOBIA: 0
RECTAL PAIN: 0
EYE ITCHING: 0
ABDOMINAL DISTENTION: 0
CHOKING: 0
STRIDOR: 0
COLOR CHANGE: 0
CHEST TIGHTNESS: 0
ANAL BLEEDING: 0
FACIAL SWELLING: 0
VOICE CHANGE: 0
APNEA: 0

## 2020-05-07 ASSESSMENT — PAIN DESCRIPTION - PAIN TYPE: TYPE: SURGICAL PAIN

## 2020-05-07 ASSESSMENT — PAIN SCALES - GENERAL
PAINLEVEL_OUTOF10: 7
PAINLEVEL_OUTOF10: 0
PAINLEVEL_OUTOF10: 4
PAINLEVEL_OUTOF10: 7
PAINLEVEL_OUTOF10: 7
PAINLEVEL_OUTOF10: 0
PAINLEVEL_OUTOF10: 7
PAINLEVEL_OUTOF10: 8

## 2020-05-07 ASSESSMENT — PAIN DESCRIPTION - ORIENTATION: ORIENTATION: RIGHT

## 2020-05-07 ASSESSMENT — PAIN DESCRIPTION - LOCATION: LOCATION: HIP

## 2020-05-07 NOTE — PROGRESS NOTES
Yes  Type of devices: Patient at risk for falls; Left in bed;Call light within reach(After a.m OT- therapist took pt to P.T, P.M OT in pt's room w pt in bed)          Patient Education:  Patient Goals   Patient goals : To have less pain and be able to move  Learner:patient  Method: demonstration and explanation       Outcome: needs reinforcement   OT Education  OT Education: ADL Adaptive Strategies; Energy Conservation  Patient Education: Pt practice using sockaid in a.m . Pt having difficulty getting footies on today. Pt states normally its easiser. Discussed w pt pacing w UE exercises. Barriers to Learning: fatigue    Plan  Plan  Times per week: 900 min/week   Times per day: Twice a day  Current Treatment Recommendations: Strengthening, ROM, Functional Mobility Training, Balance Training, Patient/Caregiver Education & Training, Self-Care / ADL, Safety Education & Training, Home Management Training, Positioning, Endurance Training, Pain Management, Equipment Evaluation, Education, & procurement  Plan Comment: continue OT  Patient Goals   Patient goals :  To have less pain and be able to move  Short term goals  Time Frame for Short term goals: in 5-7 days, patient will  Short term goal 1: perform functional transfers during ADL routine with Mod A using DME as appropriate  Short term goal 2: perform toileting routine (transfer/hygiene/clothing management) with Mod A   Short term goal 3: V/D Good understanding of AE/DME/compensatory techniques for self care/mobility tasks   Short term goal 4: V/D Good understanding of PWB (25%) RLE during self care/mobility tasks   Short term goal 5: increase standing tolerance to 5+ minutes with CGA-Min A with 0-1 UE support during functional task   Short term goal 6: actively participate in 30+ minutes of therapeutic exercise/activity to promote increased safety and independence with self care and mobility tasks   Long term goals  Time Frame for Long term goals : by discharge, patient

## 2020-05-07 NOTE — PROGRESS NOTES
Physical Therapy  [address]  Acute Rehabilitation Physical Therapy Progress Note    Date: 20  Patient Name: Heber Schrader       Room: 7732/7343-28  MRN: [id number]   Account: [de-identified]   : 1955  (62 y.o.) Gender: male     Referring Practitioner: Trinidad Montague  Diagnosis: Closed R hip fx  Past Medical History:  has a past medical history of Contracture, elbow, Erectile dysfunction, Hypertension, Osteogenesis imperfecta, and Type II or unspecified type diabetes mellitus without mention of complication, not stated as uncontrolled. Past Surgical History:   has a past surgical history that includes Cholecystectomy; Tonsillectomy and adenoidectomy; fracture surgery (Right); fracture surgery (Bilateral); fracture surgery (Left); and Femur fracture surgery (Right, 2020). Additional Pertinent Hx: 58 y/o male admitted to [facility] 20 with R hip pain and fall. Diagnosed with right intertrochanteric femur fracture. Last fractures were of bilateral patella in 2019- surgery by Dr. Kimo Ruelas. Pt admitted to [facility] 20.     Overall Orientation Status: Within Normal Limits  Restrictions/Precautions  Restrictions/Precautions: Fall Risk;Weight Bearing;General Precautions  Required Braces or Orthoses?: No  Implants present? : Metal implants  Lower Extremity Weight Bearing Restrictions  Right Lower Extremity Weight Bearing: Partial Weight Bearing  Partial Weight Bearing Percentage Or Pounds: 25%   Left Lower Extremity Weight Bearing: Weight Bearing As Tolerated  Partial Weight Bearing Percentage Or Pounds: Reports that he only recently was able to achieve 90+ degrees of flexion in R knee due to past fractures/surgeries/conditions/procedures   Upper Extremity Weight Bearing Restrictions  Right Upper Extremity Weight Bearing: (Reports that he is limited in Active ROM of extension in R elbow due to past fractures/surgeries/conditions/procedures related to Minutes 50 35       Electronically signed by Jose Barbra, TIARRA on 5/7/20 at 5:24 PM EDT

## 2020-05-07 NOTE — PROGRESS NOTES
height, Decreased step length  Distance: 25 ft  Comments: Cues to advance R LE and sequencing, maintaining PWB 25% and posture. C/o (B) UE fatigue. Transfers  Sit to Stand: Moderate Assistance, 2 Person Assistance(Mod x2 with walker lift; Max x1 with aakash steady)  Stand to sit: Maximum Assistance  Bed to Chair: Dependent/Total(Aakash Steady )  Squat Pivot Transfers: Dependent/Total, 2 Person Assistance  Comment: Poor positioning of LLE 2* to limited knee flexion. Cues for technique and safety with aakash steady and walker lift transfers. Ambulation  Ambulation?: Yes  WB Status: PWB 25% right LE  Ambulation 1  Surface: level tile  Device: (Green walker lift )  Other Apparatus: Wheelchair follow  Assistance: Moderate assistance, 2 Person assistance  Quality of Gait: Short step length, heavy reliance on UE's to maintain PWB   Gait Deviations: Slow Belén, Decreased step height, Decreased step length  Distance: 25 ft  Comments: Cues to advance R LE and sequencing, maintaining PWB 25% and posture. C/o (B) UE fatigue. Surface: level tile  Ambulation 1  Surface: level tile  Device: (Green walker lift )  Other Apparatus: Wheelchair follow  Assistance: Moderate assistance, 2 Person assistance  Quality of Gait: Short step length, heavy reliance on UE's to maintain PWB   Gait Deviations: Slow Belén, Decreased step height, Decreased step length  Distance: 25 ft  Comments: Cues to advance R LE and sequencing, maintaining PWB 25% and posture. C/o (B) UE fatigue. OT:  ADL  Equipment Provided: Long-handled sponge, Reacher, Sock aid  Feeding: Independent  Grooming: Modified independent (w/c level at sink)  UE Bathing: Setup  LE Bathing: Minimal assistance(A to dry lower legs/feet)  UE Dressing: Setup  LE Dressing: Moderate assistance(threaded w/AE, A to pull up R side and don socks)  Toileting: Minimal assistance(A to pull up R side of shorts completely)  Additional Comments: Pt T/F'd from bed to toilet with SS.   Pt [place] in the last 72 hours.     Invalid input(s): CKTOTAL;3  FASTING LIPID PANEL:  Lab Results   Component Value Date    CHOL 163 10/15/2018    HDL 34 (L) 10/15/2018    TRIG 161 (H) 10/15/2018     LIVER PROFILE:   Recent Labs     05/07/20  0648   AST 14   ALT 12   BILITOT 0.37   ALKPHOS 150*        Current Medications:   Current Facility-Administered Medications: gabapentin (NEURONTIN) capsule 100 mg, 100 mg, Oral, TID  insulin glargine (LANTUS) injection vial 50 Units, 50 Units, Subcutaneous, BID  lidocaine 4 % external patch 1 patch, 1 patch, Transdermal, Daily  senna (SENOKOT) tablet 17.2 mg, 2 tablet, Oral, Nightly PRN  bisacodyl (DULCOLAX) suppository 10 mg, 10 mg, Rectal, Daily PRN  acetaminophen (TYLENOL) tablet 1,000 mg, 1,000 mg, Oral, 3 times per day  sodium chloride flush 0.9 % injection 10 mL, 10 mL, Intravenous, PRN  dextrose 5 % solution, 100 mL/hr, Intravenous, PRN  dextrose 50 % IV solution, 12.5 g, Intravenous, PRN  glucagon (rDNA) injection 1 mg, 1 mg, Intramuscular, PRN  glucose (GLUTOSE) 40 % oral gel 15 g, 15 g, Oral, PRN  aspirin EC tablet 81 mg, 81 mg, Oral, Daily  insulin lispro (HUMALOG) injection vial 0-18 Units, 0-18 Units, Subcutaneous, TID WC  insulin lispro (HUMALOG) injection vial 0-9 Units, 0-9 Units, Subcutaneous, Nightly  metoprolol succinate (TOPROL XL) extended release tablet 50 mg, 50 mg, Oral, BID  pantoprazole (PROTONIX) tablet 40 mg, 40 mg, Oral, Daily  sertraline (ZOLOFT) tablet 100 mg, 100 mg, Oral, Nightly  traZODone (DESYREL) tablet 100 mg, 100 mg, Oral, Nightly  polyethylene glycol (GLYCOLAX) packet 17 g, 17 g, Oral, Daily  enoxaparin (LOVENOX) injection 40 mg, 40 mg, Subcutaneous, Daily  magnesium hydroxide (MILK OF MAGNESIA) 400 MG/5ML suspension 30 mL, 30 mL, Oral, Daily PRN  oxyCODONE (ROXICODONE) immediate release tablet 5 mg, 5 mg, Oral, Q4H PRN **OR** oxyCODONE (ROXICODONE) immediate release tablet 10 mg, 10 mg, Oral, Q4H PRN      Impression/Plan: Impaired ADLs, gait, and mobility due to:      1. R hip fracture s/p IM nail: PT/OT  for gait, mobility, strengthening, endurance, ADLs, and self care. Partial weight bearing 25% RLE. Tylenol prn, oxycodone prn. Ice prn. Continue routine Tylenol and short course Completed course of OxyContin 5/3. Continue gabapentin. Pain control significantly improved. 2. R rib pain/tenderness: Has lidoderm patches. 3. DM:on sliding scale and ACHS accuchecks. On Lantus     4. HTN: stable on Toprol BID  5. GERD/GI prophylaxis: on pantoprazole  6. Insomnia: on Trazodone q hs  7. Depression: on Zoloft at hs   8. Bowel management: On miralax daily, senokot prn, milk of magnesia prn and Dulcolax prn.   9. Dr[facility] for medical management  10. DVT prophylaxis:  On Lovenox    Electronically signed by Ryley Mireles MD on 5/7/2020 at 9:31 AM      This note is created with the assistance of a speech recognition program.  While intending to generate a document that actually reflects the content of the visit, the document can still have some errors including those of syntax and sound a like substitutions which may escape proof reading. In such instances, actual meaning can be extrapolated by contextual diversion.

## 2020-05-07 NOTE — PROGRESS NOTES
Progress Note    5/7/2020   11:38 AM    Name:  Shankar Viveros  MRN:    [id number]     Acct:     [de-identified]   Room:  [address] [place] [address] Day: 10     Admit Date: 4/27/2020  6:11 PM  PCP: ADEEL Jaquez CNP    Subjective:     C/C: No chief complaint on file. Interval History: Status: improved. Patient right hip pain is improved   sugars are stable    ROS:   all 10 systems reviewed and are negative except as noted    Review of Systems   Constitutional: Negative for appetite change and fatigue. HENT: Negative for drooling, facial swelling, mouth sores, sneezing and voice change. Eyes: Negative for photophobia and itching. Respiratory: Negative for apnea, choking, chest tightness and stridor. Cardiovascular: Negative for chest pain, palpitations and leg swelling. Gastrointestinal: Negative for abdominal distention, anal bleeding and rectal pain. Endocrine: Negative for polydipsia and polyuria. Genitourinary: Negative for difficulty urinating, flank pain, frequency and urgency. Musculoskeletal: Negative for gait problem, joint swelling, myalgias and neck stiffness. Skin: Negative for color change and wound. Allergic/Immunologic: Negative for food allergies. Neurological: Negative for seizures, facial asymmetry, weakness and headaches. Hematological: Negative for adenopathy. Does not bruise/bleed easily. Psychiatric/Behavioral: Negative for agitation, decreased concentration and dysphoric mood. The patient is not hyperactive. Medications:      Allergies: No Known Allergies    Current Meds: gabapentin (NEURONTIN) capsule 100 mg, TID  insulin glargine (LANTUS) injection vial 50 Units, BID  lidocaine 4 % external patch 1 patch, Daily  senna (SENOKOT) tablet 17.2 mg, Nightly PRN  bisacodyl (DULCOLAX) suppository 10 mg, Daily PRN  acetaminophen (TYLENOL) tablet 1,000 mg, 3 times per day  sodium chloride flush 0.9 % injection 10 mL, PRN  dextrose 5 % solution, PRN  dextrose 50 % IV evaluate and treat  7.  check and replace electrolytes per sliding scale      Electronically signed by Sohail Martinez MD

## 2020-05-08 LAB
GLUCOSE BLD-MCNC: 112 MG/DL (ref 75–110)
GLUCOSE BLD-MCNC: 215 MG/DL (ref 75–110)
GLUCOSE BLD-MCNC: 244 MG/DL (ref 75–110)
GLUCOSE BLD-MCNC: 89 MG/DL (ref 75–110)

## 2020-05-08 PROCEDURE — 6370000000 HC RX 637 (ALT 250 FOR IP): Performed by: PHYSICAL MEDICINE & REHABILITATION

## 2020-05-08 PROCEDURE — 97110 THERAPEUTIC EXERCISES: CPT

## 2020-05-08 PROCEDURE — 97116 GAIT TRAINING THERAPY: CPT

## 2020-05-08 PROCEDURE — 6370000000 HC RX 637 (ALT 250 FOR IP): Performed by: FAMILY MEDICINE

## 2020-05-08 PROCEDURE — 6370000000 HC RX 637 (ALT 250 FOR IP): Performed by: ORTHOPAEDIC SURGERY

## 2020-05-08 PROCEDURE — 97535 SELF CARE MNGMENT TRAINING: CPT

## 2020-05-08 PROCEDURE — 97530 THERAPEUTIC ACTIVITIES: CPT

## 2020-05-08 PROCEDURE — 1180000000 HC REHAB R&B

## 2020-05-08 PROCEDURE — 6360000002 HC RX W HCPCS: Performed by: PHYSICAL MEDICINE & REHABILITATION

## 2020-05-08 PROCEDURE — 82947 ASSAY GLUCOSE BLOOD QUANT: CPT

## 2020-05-08 PROCEDURE — 99231 SBSQ HOSP IP/OBS SF/LOW 25: CPT | Performed by: PHYSICAL MEDICINE & REHABILITATION

## 2020-05-08 RX ADMIN — POLYETHYLENE GLYCOL 3350 17 G: 17 POWDER, FOR SOLUTION ORAL at 08:37

## 2020-05-08 RX ADMIN — OXYCODONE HYDROCHLORIDE 10 MG: 5 TABLET ORAL at 04:02

## 2020-05-08 RX ADMIN — INSULIN GLARGINE 50 UNITS: 100 INJECTION, SOLUTION SUBCUTANEOUS at 08:38

## 2020-05-08 RX ADMIN — OXYCODONE HYDROCHLORIDE 10 MG: 5 TABLET ORAL at 16:57

## 2020-05-08 RX ADMIN — ASPIRIN 81 MG: 81 TABLET, COATED ORAL at 08:37

## 2020-05-08 RX ADMIN — METRONIDAZOLE 100 MG: 500 TABLET ORAL at 08:37

## 2020-05-08 RX ADMIN — PANTOPRAZOLE SODIUM 40 MG: 40 TABLET, DELAYED RELEASE ORAL at 08:37

## 2020-05-08 RX ADMIN — ENOXAPARIN SODIUM 40 MG: 40 INJECTION SUBCUTANEOUS at 08:37

## 2020-05-08 RX ADMIN — INSULIN GLARGINE 50 UNITS: 100 INJECTION, SOLUTION SUBCUTANEOUS at 21:06

## 2020-05-08 RX ADMIN — METRONIDAZOLE 100 MG: 500 TABLET ORAL at 21:06

## 2020-05-08 RX ADMIN — OXYCODONE HYDROCHLORIDE 10 MG: 5 TABLET ORAL at 08:37

## 2020-05-08 RX ADMIN — METOPROLOL SUCCINATE 50 MG: 50 TABLET, EXTENDED RELEASE ORAL at 21:05

## 2020-05-08 RX ADMIN — INSULIN LISPRO 6 UNITS: 100 INJECTION, SOLUTION INTRAVENOUS; SUBCUTANEOUS at 12:16

## 2020-05-08 RX ADMIN — INSULIN LISPRO 2 UNITS: 100 INJECTION, SOLUTION INTRAVENOUS; SUBCUTANEOUS at 21:06

## 2020-05-08 RX ADMIN — METRONIDAZOLE 100 MG: 500 TABLET ORAL at 12:54

## 2020-05-08 RX ADMIN — OXYCODONE HYDROCHLORIDE 10 MG: 5 TABLET ORAL at 21:05

## 2020-05-08 RX ADMIN — SERTRALINE HYDROCHLORIDE 100 MG: 100 TABLET ORAL at 21:05

## 2020-05-08 RX ADMIN — TRAZODONE HYDROCHLORIDE 100 MG: 100 TABLET ORAL at 21:05

## 2020-05-08 RX ADMIN — ACETAMINOPHEN 1000 MG: 500 TABLET, FILM COATED ORAL at 12:54

## 2020-05-08 RX ADMIN — ACETAMINOPHEN 1000 MG: 500 TABLET, FILM COATED ORAL at 21:05

## 2020-05-08 RX ADMIN — METOPROLOL SUCCINATE 50 MG: 50 TABLET, EXTENDED RELEASE ORAL at 08:37

## 2020-05-08 RX ADMIN — OXYCODONE HYDROCHLORIDE 10 MG: 5 TABLET ORAL at 12:54

## 2020-05-08 RX ADMIN — ACETAMINOPHEN 1000 MG: 500 TABLET, FILM COATED ORAL at 05:51

## 2020-05-08 ASSESSMENT — PAIN DESCRIPTION - PROGRESSION
CLINICAL_PROGRESSION: GRADUALLY IMPROVING

## 2020-05-08 ASSESSMENT — ENCOUNTER SYMPTOMS
ANAL BLEEDING: 0
APNEA: 0
CHEST TIGHTNESS: 0
VOICE CHANGE: 0
ABDOMINAL DISTENTION: 0
FACIAL SWELLING: 0
PHOTOPHOBIA: 0
RECTAL PAIN: 0
EYE ITCHING: 0
COLOR CHANGE: 0
STRIDOR: 0
CHOKING: 0

## 2020-05-08 ASSESSMENT — PAIN DESCRIPTION - LOCATION
LOCATION: HIP
LOCATION: ARM
LOCATION_2: ARM
LOCATION: ARM
LOCATION: HIP
LOCATION: HIP
LOCATION: ARM

## 2020-05-08 ASSESSMENT — PAIN DESCRIPTION - FREQUENCY
FREQUENCY: CONTINUOUS

## 2020-05-08 ASSESSMENT — PAIN DESCRIPTION - PAIN TYPE
TYPE: SURGICAL PAIN
TYPE: ACUTE PAIN
TYPE_2: ACUTE PAIN
TYPE: ACUTE PAIN
TYPE: SURGICAL PAIN;ACUTE PAIN
TYPE: SURGICAL PAIN
TYPE: ACUTE PAIN
TYPE: SURGICAL PAIN
TYPE: SURGICAL PAIN

## 2020-05-08 ASSESSMENT — PAIN DESCRIPTION - DESCRIPTORS
DESCRIPTORS: SHARP
DESCRIPTORS: ACHING;DULL
DESCRIPTORS: SHARP
DESCRIPTORS_2: STABBING
DESCRIPTORS: SHARP

## 2020-05-08 ASSESSMENT — PAIN SCALES - GENERAL
PAINLEVEL_OUTOF10: 5
PAINLEVEL_OUTOF10: 0
PAINLEVEL_OUTOF10: 3
PAINLEVEL_OUTOF10: 5
PAINLEVEL_OUTOF10: 5
PAINLEVEL_OUTOF10: 6
PAINLEVEL_OUTOF10: 4
PAINLEVEL_OUTOF10: 4
PAINLEVEL_OUTOF10: 6
PAINLEVEL_OUTOF10: 5
PAINLEVEL_OUTOF10: 7

## 2020-05-08 ASSESSMENT — PAIN DESCRIPTION - DIRECTION
RADIATING_TOWARDS: WRIST
RADIATING_TOWARDS: WRIST

## 2020-05-08 ASSESSMENT — PAIN DESCRIPTION - ORIENTATION
ORIENTATION: RIGHT
ORIENTATION: LEFT
ORIENTATION: RIGHT
ORIENTATION: RIGHT
ORIENTATION_2: LEFT
ORIENTATION: LEFT
ORIENTATION: RIGHT
ORIENTATION: RIGHT
ORIENTATION: LEFT

## 2020-05-08 ASSESSMENT — PAIN DESCRIPTION - INTENSITY: RATING_2: 6

## 2020-05-08 ASSESSMENT — PAIN DESCRIPTION - ONSET
ONSET: ON-GOING

## 2020-05-08 ASSESSMENT — PAIN DESCRIPTION - DURATION: DURATION_2: INTERMITTENT

## 2020-05-08 ASSESSMENT — PAIN - FUNCTIONAL ASSESSMENT
PAIN_FUNCTIONAL_ASSESSMENT: PREVENTS OR INTERFERES SOME ACTIVE ACTIVITIES AND ADLS

## 2020-05-08 NOTE — PROGRESS NOTES
elbow fracture)  Position Activity Restriction  Other position/activity restrictions: hx osteogenesis imperfecta- hx multiple fractures; Low tolerance for Pain - restricts all repositioning and functional mobility / care task performances     Subjective: Patient pleasant and eager to perform today in therapy. Continues to report pain but states he has been managing it with medications per MD.  Comments: ACE wrap donned to L UE. OK for ACE per Melissa Melchor. Vital Signs  Patient Currently in Pain: Yes  Pain Assessment: 0-10  Pain Level: 5  Pain Type: Surgical pain;Acute pain  Pain Location: Hip  Pain Orientation: Right  Non-Pharmaceutical Pain Intervention(s): Emotional support;Repositioned;Cold applied  Response to Pain Intervention: Patient Satisfied        Patient Observation  Observations: Pt guarded with most movements, reports inability to flex R knee more than 30*       Bed Mobility:   Rolling: Rolling Left;Minimal assistance(Bed flat used HR, not able to roll to (R))  Supine to Sit: Moderate assistance(1 person assist to support RLE and assist with trunk prn)  Sit to Supine: Moderate assistance(B LE assist, improved trunk mobility/pivot at hips)  Scooting: Independent  Comment: Patient very guarded during movement. Cues given for relaxtion and safety. Transfers:  Sit to Stand: Maximum Assistance;2 Person Assistance(To RW;  [place] cues for safety and technique.)  Stand to sit: Moderate Assistance;2 Person Assistance(From  to [facility].)  Bed to Chair: Maximum assistance(Squat pivot to strong side.; Cues for safety.)     Squat Pivot Transfers: Maximum Assistance(To strong side.)     WB Status: PWB 25% right LE  Ambulation 1  Surface: level tile  Device: Rolling Walker  Other Apparatus: Wheelchair follow  Assistance: Moderate assistance;2 Person assistance(Max x 2 for sit to stand to RW and then Mod x 2 amb.)  Quality of Gait: Antalgic gait pattern. Max cues for gait sequence due to PWB 25% into R LE.   Slow ezequiel. No LOB  Gait Deviations: Slow Ezequiel;Decreased step length;Decreased step height  Distance: 5 ft twice(Seated break between amb.)  Comments: Max encouragement to continue. Stairs/Curb  Stairs?: No              Wheelchair Activities  Wheelchair Type: Standard  Wheelchair Cushion: (waffle cushion)  Pressure Relief Type: Self Adjusts;Push up;Lateral lean  Wheelchair Parts Management: No  Propulsion: Yes  Propulsion 1  Propulsion: Manual  Level: Level Tile  Method: RUE;LUE  Level of Assistance: Stand by assistance  Description/ Details: Straight path, 90* turns, VCs to engage brakes. Tight spaces within room and setting up for transfer to strong side. Distance: 200 ft           BALANCE Posture: Fair  Sitting - Static: Fair;+(EOB, UE support, no back support)  Sitting - Dynamic: Fair(EOB, UE support, no back support)  Standing - Static: Poor(With RW; 2 assist, max VCs to straighten UEs )  Standing - Dynamic: Poor;-(With RW;  Mod x 2 assist.)    EXERCISES    Other exercises?: Yes  Other exercises 1: Supine (B) LE ex, 2x10 reps each to pt tolerance: AROM LLE, A/AROM RLE  Other exercises 2: Squat pivot transfers to strong side. Other exercises 3: Seated (B) LE ex, 2x10, 1# L LE, A/AROM R LE, lime (min) resistance band (hip ABD & L hamstring curls)  Other exercises 4: Seated R LE skates w/towel, 2x10, with education on continuing independently when in W/C to improve knee ROM. (R knee flexion ~30*)  Other exercises 5: UBE x10 minutes (fwd/retro)   Other exercises 6: Seated at EOB reaching activity/lat leans: Pt very limited by pain to put weight into (R) HIp/buttock  Other exercises 7: Bed mobility  Other exercises 8: Sit to stands to RW x 2 reps in AM and PM.  Other Activities  Other Activities: (Requires frequent breaks )        Activity Tolerance: Patient limited by pain, Patient limited by endurance  PT Equipment Recommendations  Equipment Needed: No  Other: Patient has RW and wheelchair at home. balance to Good (Static/dynamic). Short term goal 8: Pt to propel w/c 50' Mod I.  Long term goals  Time Frame for Long term goals : by D/C  Long term goal 1: Pt to improve R LE AROM to WNL. Long term goal 2: Pt to improve BLE strength by 1/2 MMG. Long term goal 3: Pt to perform 2MWT with amb at least 76' , Mod I with device. Long term goal 4: Pt to perform bed mobility Mod I.  Long term goal 5: Pt to perform transfers Mod I with device from varied surfaces to simulate home set up. Long term goal 6: Pt to amb at least 100' Mod I with device on varied terrain  Long term goal 7: Pt to ascend/descend 2-4 steps per home set up, including platform steps with RW, SBA. Long term goal 8: Pt to demonstrate Mod I in room with device. Long term goal 9: Pt to improve standing balance to Good- to reduce fall risk at home and in community.        05/08/20 1030 05/08/20 1430   PT Individual Minutes   Time In 1030 1430   Time Out 1115 1510   Minutes 45 40       Electronically signed by Ronda Chaudhari PTA on 5/8/20 at 4:57 PM EDT

## 2020-05-08 NOTE — PROGRESS NOTES
Location: Hip  Pain Orientation: Right    Objective  Cognition  Overall Cognitive Status: WNL  Perception  Overall Perceptual Status: WFL  Balance  Sitting Balance: Independent  Standing Balance: Minimal assistance     Bed mobility  Supine to Sit: Stand by assistance  Sit to Supine: Minimal assistance    Transfers  Sit to stand: Moderate assistance;2 Person assistance(due to pt complaing of arm pain this AM  )  Stand to sit: Moderate assistance;2 Person assistance  Transfer Comments: mod x2 for safety      Standing Balance  Time: 1 min x3   Activity: transfers, LBD   Comment: standing in aakash carey     Functional Mobility  Functional - Mobility Device: Wheelchair  Activity: To/from bathroom  Assist Level: Modified independent     Toilet Transfers  Toilet - Technique: (aakash carey )  Equipment Used: Standard toilet(grab bars )  Toilet Transfer: 2 Person assistance; Moderate assistance  Toilet Transfers Comments: with aakash carey             ADL  Feeding: Independent  Grooming: Modified independent   UE Bathing: Setup  LE Bathing: Moderate assistance  UE Dressing: Setup(donning [place] shirt )  LE Dressing: Moderate assistance(using reacher, assist pulling up )  Toileting: Maximum assistance          Assessment  Performance deficits / Impairments: Decreased functional mobility ; Decreased safe awareness;Decreased ADL status; Decreased endurance;Decreased strength;Decreased ROM; Decreased balance  Prognosis: Good  Activity Tolerance: Patient limited by pain  Safety Devices in place: Yes  Type of devices: Left in chair;Call light within reach          Patient Education:  Patient Goals   Patient goals :  To have less pain and be able to move     Learner:patient  Method: demonstration       Outcome: acknowledged understanding         Plan  Plan  Times per week: 900 min/week   Times per day: Twice a day  Current Treatment Recommendations: Strengthening, ROM, Functional Mobility Training, Balance Training, Patient/Caregiver Education & Training, Self-Care / ADL, Safety Education & Training, Home Management Training, Positioning, Endurance Training, Pain Management, Equipment Evaluation, Education, & procurement  Plan Comment: continue OT  Patient Goals   Patient goals :  To have less pain and be able to move  Short term goals  Time Frame for Short term goals: in 5-7 days, patient will  Short term goal 1: perform functional transfers during ADL routine with Mod A using DME as appropriate  Short term goal 2: perform toileting routine (transfer/hygiene/clothing management) with Mod A   Short term goal 3: V/D Good understanding of AE/DME/compensatory techniques for self care/mobility tasks   Short term goal 4: V/D Good understanding of PWB (25%) RLE during self care/mobility tasks   Short term goal 5: increase standing tolerance to 5+ minutes with CGA-Min A with 0-1 UE support during functional task   Short term goal 6: actively participate in 30+ minutes of therapeutic exercise/activity to promote increased safety and independence with self care and mobility tasks   Long term goals  Time Frame for Long term goals : by discharge, patient will  Long term goal 1: perform functional transfers/mobility during ADL routine with Modified [place] using DME as appropriate  Long term goal 2: perform BADL tasks with independence/modified independence using AE/compensatory techniques/DME as needed  Long term goal 3: V/D Good understanding of BUE HEP for continued strength for functional tasks  Long term goal 4: V/D Good understanding of DME for increased bathroom safety for safe d/c home   Long term goal 5: perform simple meal prep/laundry task/other desired IADL task with Modified independence using DME/compensatory techniques as needed         05/08/20 0731   OT Individual Minutes   Time In 0731   Time Out 0832   Minutes 61     Electronically signed by MARLENE Castaneda on 5/8/20 at 1:32 PM EDT

## 2020-05-08 NOTE — PROGRESS NOTES
Acute pain  Pain Location: Arm  Pain Orientation: Left  Pain Descriptors: Sharp  Clinical Progression: Gradually improving  Response to Pain Intervention: Patient Satisfied    Objective  Cognition  Overall Cognitive Status: WNL  Perception  Overall Perceptual Status: WFL  Balance  Sitting Balance: Independent  Standing Balance: Minimal assistance  Bed mobility  Rolling to Right: Unable to assess;Stand by assistance  Supine to Sit: Stand by assistance  Sit to Supine: Minimal assistance(Educated on use of leg /gait belt to self assist RLE)  Scooting: Independent  Transfers  Stand Pivot Transfers: Dependent/Total(aakash carey)  Sit to stand: Contact guard assistance  Stand to sit: Contact guard assistance    Standing Balance  Time: ~1 min x 2   Activity: static stand  Comment: standing in aaaksh carey, pt arnold G unerstanding of  PWB  Functional Mobility  Functional - Mobility Device: Wheelchair  Activity: To/From therapy gym  Assist Level: Independent          ADL  Additional Comments: see AM note    Assessment  Performance deficits / Impairments: Decreased functional mobility ; Decreased safe awareness;Decreased ADL status; Decreased endurance;Decreased strength;Decreased ROM; Decreased balance  Assessment: A.M tx focus on sinkside ADL,transfers w aakash carey, & UE exercises. P.M OT focus on rt UE strengthening exercises. Prognosis: Good  Discharge Recommendations: Patient would benefit from continued therapy after discharge;Home with assist PRN  Activity Tolerance: Patient limited by pain  Activity Tolerance: Pt has pain in lt arm in PM  Safety Devices in place: Yes  Type of devices: Left in chair;Call light within reach  Equipment Recommendations  Equipment Needed: (TBD)       Patient Education: OT POC,   Patient Goals   Patient goals :  To have less pain and be able to move  Learner:patient  Method: explanation       Outcome: acknowledged understanding        Plan  Plan  Times per week: 900 min/week   Times per

## 2020-05-08 NOTE — PROGRESS NOTES
together: Not on file     Attends Holiness service: Not on file     Active member of club or organization: Not on file     Attends meetings of clubs or organizations: Not on file     Relationship status: Not on file    Intimate partner violence     Fear of current or ex partner: Not on file     Emotionally abused: Not on file     Physically abused: Not on file     Forced sexual activity: Not on file   Other Topics Concern    Not on file   Social History Narrative    Not on file       I/O (24Hr): No intake or output data in the 24 hours ending 20 1115  Radiology:  No results found. Labs:  Recent Results (from the past 24 hour(s))   POC Glucose Fingerstick    Collection Time: 20  4:18 PM   Result Value Ref Range    POC Glucose 237 (H) 75 - 110 mg/dL   POC Glucose Fingerstick    Collection Time: 20  8:17 PM   Result Value Ref Range    POC Glucose 231 (H) 75 - 110 mg/dL   POC Glucose Fingerstick    Collection Time: 20  7:14 AM   Result Value Ref Range    POC Glucose 112 (H) 75 - 110 mg/dL       Physical Examination:        Vitals:  /78   Pulse 70   Temp 97.8 °F (36.6 °C) (Oral)   Resp 18   Ht 6' (1.829 m)   Wt 190 lb (86.2 kg)   SpO2 100%   BMI 25.77 kg/m²   Temp (24hrs), Av.9 °F (36.6 °C), Min:97.8 °F (36.6 °C), Max:97.9 °F (36.6 °C)    Recent Labs     20  1054 20  1618 20  2017 20  0714   POCGLU 226* 237* 231* 112*         Physical Exam  Vitals signs reviewed. Constitutional:       General: He is not in acute distress. Appearance: He is well-developed. He is not diaphoretic. HENT:      Head: Normocephalic and atraumatic. Nose: Nose normal.   Eyes:      General: Lids are normal.      Conjunctiva/sclera: Conjunctivae normal.   Neck:      Musculoskeletal: Normal range of motion and neck supple. Trachea: No tracheal deviation. Cardiovascular:      Rate and Rhythm: Normal rate and regular rhythm.       Heart sounds: Normal heart better  2. PPI  3. DVT Prophylaxis on Lovenox  4. EPCs  5.  PT/OT to evaluate and treat  6.  check and replace electrolytes per sliding scale      Electronically signed by Jerrell Jin MD

## 2020-05-09 LAB
GLUCOSE BLD-MCNC: 107 MG/DL (ref 75–110)
GLUCOSE BLD-MCNC: 187 MG/DL (ref 75–110)
GLUCOSE BLD-MCNC: 197 MG/DL (ref 75–110)
GLUCOSE BLD-MCNC: 201 MG/DL (ref 75–110)

## 2020-05-09 PROCEDURE — 82947 ASSAY GLUCOSE BLOOD QUANT: CPT

## 2020-05-09 PROCEDURE — 97110 THERAPEUTIC EXERCISES: CPT

## 2020-05-09 PROCEDURE — 97530 THERAPEUTIC ACTIVITIES: CPT

## 2020-05-09 PROCEDURE — 97535 SELF CARE MNGMENT TRAINING: CPT

## 2020-05-09 PROCEDURE — 1180000000 HC REHAB R&B

## 2020-05-09 PROCEDURE — 6370000000 HC RX 637 (ALT 250 FOR IP): Performed by: ORTHOPAEDIC SURGERY

## 2020-05-09 PROCEDURE — 6370000000 HC RX 637 (ALT 250 FOR IP): Performed by: PHYSICAL MEDICINE & REHABILITATION

## 2020-05-09 PROCEDURE — 6360000002 HC RX W HCPCS: Performed by: PHYSICAL MEDICINE & REHABILITATION

## 2020-05-09 PROCEDURE — 6370000000 HC RX 637 (ALT 250 FOR IP): Performed by: FAMILY MEDICINE

## 2020-05-09 PROCEDURE — 99231 SBSQ HOSP IP/OBS SF/LOW 25: CPT | Performed by: PHYSICAL MEDICINE & REHABILITATION

## 2020-05-09 RX ADMIN — INSULIN LISPRO 6 UNITS: 100 INJECTION, SOLUTION INTRAVENOUS; SUBCUTANEOUS at 13:22

## 2020-05-09 RX ADMIN — INSULIN GLARGINE 50 UNITS: 100 INJECTION, SOLUTION SUBCUTANEOUS at 22:17

## 2020-05-09 RX ADMIN — INSULIN GLARGINE 50 UNITS: 100 INJECTION, SOLUTION SUBCUTANEOUS at 09:14

## 2020-05-09 RX ADMIN — ACETAMINOPHEN 1000 MG: 500 TABLET, FILM COATED ORAL at 13:22

## 2020-05-09 RX ADMIN — OXYCODONE HYDROCHLORIDE 10 MG: 5 TABLET ORAL at 09:12

## 2020-05-09 RX ADMIN — TRAZODONE HYDROCHLORIDE 100 MG: 100 TABLET ORAL at 21:28

## 2020-05-09 RX ADMIN — METOPROLOL SUCCINATE 50 MG: 50 TABLET, EXTENDED RELEASE ORAL at 09:13

## 2020-05-09 RX ADMIN — METRONIDAZOLE 100 MG: 500 TABLET ORAL at 21:27

## 2020-05-09 RX ADMIN — METOPROLOL SUCCINATE 50 MG: 50 TABLET, EXTENDED RELEASE ORAL at 21:27

## 2020-05-09 RX ADMIN — METRONIDAZOLE 100 MG: 500 TABLET ORAL at 13:26

## 2020-05-09 RX ADMIN — OXYCODONE HYDROCHLORIDE 10 MG: 5 TABLET ORAL at 13:22

## 2020-05-09 RX ADMIN — SERTRALINE HYDROCHLORIDE 100 MG: 100 TABLET ORAL at 21:27

## 2020-05-09 RX ADMIN — ACETAMINOPHEN 1000 MG: 500 TABLET, FILM COATED ORAL at 05:03

## 2020-05-09 RX ADMIN — OXYCODONE HYDROCHLORIDE 10 MG: 5 TABLET ORAL at 05:03

## 2020-05-09 RX ADMIN — PANTOPRAZOLE SODIUM 40 MG: 40 TABLET, DELAYED RELEASE ORAL at 09:13

## 2020-05-09 RX ADMIN — INSULIN LISPRO 2 UNITS: 100 INJECTION, SOLUTION INTRAVENOUS; SUBCUTANEOUS at 21:29

## 2020-05-09 RX ADMIN — ENOXAPARIN SODIUM 40 MG: 40 INJECTION SUBCUTANEOUS at 09:14

## 2020-05-09 RX ADMIN — OXYCODONE 5 MG: 5 TABLET ORAL at 21:28

## 2020-05-09 RX ADMIN — ACETAMINOPHEN 1000 MG: 500 TABLET, FILM COATED ORAL at 21:27

## 2020-05-09 RX ADMIN — OXYCODONE HYDROCHLORIDE 10 MG: 5 TABLET ORAL at 17:26

## 2020-05-09 RX ADMIN — ASPIRIN 81 MG: 81 TABLET, COATED ORAL at 09:13

## 2020-05-09 RX ADMIN — METRONIDAZOLE 100 MG: 500 TABLET ORAL at 09:13

## 2020-05-09 RX ADMIN — INSULIN LISPRO 3 UNITS: 100 INJECTION, SOLUTION INTRAVENOUS; SUBCUTANEOUS at 17:25

## 2020-05-09 ASSESSMENT — PAIN DESCRIPTION - PAIN TYPE
TYPE: ACUTE PAIN
TYPE_2: ACUTE PAIN
TYPE: SURGICAL PAIN
TYPE: ACUTE PAIN;SURGICAL PAIN

## 2020-05-09 ASSESSMENT — PAIN DESCRIPTION - DESCRIPTORS
DESCRIPTORS: ACHING;DISCOMFORT
DESCRIPTORS: ACHING;DULL

## 2020-05-09 ASSESSMENT — PAIN DESCRIPTION - LOCATION
LOCATION_2: ARM
LOCATION: HIP

## 2020-05-09 ASSESSMENT — PAIN SCALES - GENERAL
PAINLEVEL_OUTOF10: 4
PAINLEVEL_OUTOF10: 7
PAINLEVEL_OUTOF10: 5
PAINLEVEL_OUTOF10: 0
PAINLEVEL_OUTOF10: 5
PAINLEVEL_OUTOF10: 5
PAINLEVEL_OUTOF10: 6
PAINLEVEL_OUTOF10: 4
PAINLEVEL_OUTOF10: 8
PAINLEVEL_OUTOF10: 6
PAINLEVEL_OUTOF10: 7
PAINLEVEL_OUTOF10: 6

## 2020-05-09 ASSESSMENT — PAIN DESCRIPTION - DURATION: DURATION_2: INTERMITTENT

## 2020-05-09 ASSESSMENT — PAIN DESCRIPTION - ONSET
ONSET: ON-GOING
ONSET: ON-GOING

## 2020-05-09 ASSESSMENT — PAIN DESCRIPTION - PROGRESSION: CLINICAL_PROGRESSION: GRADUALLY IMPROVING

## 2020-05-09 ASSESSMENT — PAIN DESCRIPTION - ORIENTATION
ORIENTATION_2: LEFT
ORIENTATION: RIGHT

## 2020-05-09 ASSESSMENT — PAIN DESCRIPTION - FREQUENCY
FREQUENCY: CONTINUOUS
FREQUENCY: CONTINUOUS

## 2020-05-09 ASSESSMENT — PAIN DESCRIPTION - INTENSITY: RATING_2: 4

## 2020-05-09 NOTE — PROGRESS NOTES
Transfers  Wheelchair/Bed - Technique: (aakash carey )  Equipment Used: Bed, Wheelchair  Level of Asssistance: Moderate assistance, 2 Person assistance  Wheelchair Transfers Comments: mod x2 for safety     SPEECH:      Objective:  /64   Pulse 77   Temp 97.5 °F (36.4 °C) (Oral)   Resp 16   Ht 6' (1.829 m)   Wt 190 lb (86.2 kg)   SpO2 100%   BMI 25.77 kg/m²       GEN: Well developed, well nourished, in NAD  HEENT:  NCAT.  PERRL.  EOMI.  Mucous membranes pink and moist.   PULM:  Clear to ausculation. No rales or rhonchi. Respirations WNL and unlabored. CV:  Regular rate rhythm.  No murmurs or gallops. GI:  Abdomen soft. Nontender. Non-distended.  BS + and equal.    NEUROLOGICAL: A&O x3. Sensation intact to light touch. MSK:  Functional ROM BUE and LLEs. Pain and weakness impairs AROM R hip. Strength 5/5 key muscles BUEs. L hip and knee muscles 4+/5. R hip 3/5, R knee extension 4/5.    SKIN: Warm dry and intact except healing R lateral hip and thigh incisions - well healed. Good turgor. EXTREMITIES:  No calf tenderness to palpation. Proximal RLE edema. No edema LLE.   PSYCH: Mood WNL. Appropriately interactive. Affect WNL.     Diagnostics:     CBC:   Recent Labs     05/07/20  0648   WBC 5.9   RBC 3.23*   HGB 9.8*   HCT 29.4*   MCV 90.9   RDW 14.1        BMP:   Recent Labs     05/07/20  0648      K 4.5      CO2 25   BUN 26*   CREATININE 1.31*   GLUCOSE 195*     BNP: No results for input(s): BNP in the last 72 hours. PT/INR: No results for input(s): PROTIME, INR in the last 72 hours. APTT: No results for input(s): APTT in the last 72 hours. CARDIAC ENZYMES: No results for input(s): CKMB, CKMBINDEX, TROPONINT in the last 72 hours.     Invalid input(s): CKTOTAL;3  FASTING LIPID PANEL:  Lab Results   Component Value Date    CHOL 163 10/15/2018    HDL 34 (L) 10/15/2018    TRIG 161 (H) 10/15/2018     LIVER PROFILE:   Recent Labs     05/07/20  0648   AST 14   ALT 12   BILITOT 0.37   ALKPHOS rib pain/tenderness: Has lidoderm patches.   3. L shoulder/arm pain: Ice prn. ACE wrap in place. Will monitor. 4. DM:on sliding scale and ACHS accuchecks. On Lantus     5. HTN: stable on Toprol BID  6. GERD/GI prophylaxis: on pantoprazole  7. Insomnia: on Trazodone q hs  8. Depression: on Cassandria Plunk hs   9. Bowel management: On miralax daily, senokot prn, milk of magnesia prn and Dulcolax prn.   10. Dr[facility] for medical management  11. DVT prophylaxis:  On Lovenox    Electronically signed by Lobito Perez MD on 5/9/2020 at 11:23 AM      This note is created with the assistance of a speech recognition program.  While intending to generate a document that actually reflects the content of the visit, the document can still have some errors including those of syntax and sound a like substitutions which may escape proof reading. In such instances, actual meaning can be extrapolated by contextual diversion.

## 2020-05-09 NOTE — PROGRESS NOTES
[facility]    ACUTE REHABILITATION OCCUPATIONAL THERAPY  DAILY NOTE    Date: 20  Patient Name: Natividad Dodson      Room: 5861/9285-15    MRN: [id number]   : 1955  (59 y.o.)  Gender: male   Referring Practitioner: Lucie Umana  Diagnosis: Closed R hip fx  Additional Pertinent Hx: admitted to the [address] [place] [address] on 2020. He was originally admitted to [facility] on 2020 for fall at home from standing height with R hip fracture. He has known history of osteogenesis imperfecta with multiple previous fractures. Work-up included x-rays of R hip and femur showing displaced R IT fracture. Treatment included IM nail fixation performed by Dr. Viktoriya Whyte on 2020. Restrictions  Restrictions/Precautions: Fall Risk, Weight Bearing, General Precautions  Implants present? : Metal implants  Other position/activity restrictions: hx osteogenesis imperfecta- hx multiple fractures;  Low tolerance for Pain - restricts all repositioning and functional mobility / care task performances   Right Lower Extremity Weight Bearing: Partial Weight Bearing  Left Lower Extremity Weight Bearing: Weight Bearing As Tolerated  Right Upper Extremity Weight Bearing: (Reports that he is limited in Active ROM of extension in R elbow due to past fractures/surgeries/conditions/procedures related to elbow fracture)  Required Braces or Orthoses?: No  Equipment Used: Bed, Wheelchair    Subjective  Subjective: Pt has small skin tear on buttocks, RN aware   Comments: pt alert and motivated  Patient Currently in Pain: Yes  Pain Level: 4  Pain Location: Hip  Pain Orientation: Right  Restrictions/Precautions: Fall Risk;Weight Bearing;General Precautions  Overall Orientation Status: Within Functional Limits     Pain Assessment  Pain Assessment: 0-10  Pain Level: 4  Pain Type: Acute pain  Pain Location: Hip  Pain Orientation: Right    Objective  Cognition  Overall Cognitive Status:

## 2020-05-09 NOTE — PLAN OF CARE
Problem: Pain:  Goal: Pain level will decrease  Description: Pain level will decrease  Outcome: Ongoing  Goal: Control of acute pain  Description: Control of acute pain  Outcome: Ongoing  Goal: Control of chronic pain  Description: Control of chronic pain  Outcome: Ongoing     Problem: Falls - Risk of:  Goal: Will remain free from falls  Description: Will remain free from falls  Outcome: Ongoing  Goal: Absence of physical injury  Description: Absence of physical injury  Outcome: Ongoing     Problem: Mobility - Impaired:  Goal: Mobility will improve  Description: Mobility will improve  Outcome: Ongoing     Problem: Skin Integrity:  Goal: Will show no infection signs and symptoms  Description: Will show no infection signs and symptoms  Outcome: Ongoing  Goal: Absence of new skin breakdown  Description: Absence of new skin breakdown  Outcome: Ongoing     Problem: Musculor/Skeletal Functional Status  Goal: Highest potential functional level  Outcome: Ongoing  Goal: Absence of falls  Outcome: Ongoing     Problem: Nutrition  Goal: Optimal nutrition therapy  Outcome: Ongoing

## 2020-05-09 NOTE — PROGRESS NOTES
past fractures/surgeries/conditions/procedures related to elbow fracture)  Position Activity Restriction  Other position/activity restrictions: hx osteogenesis imperfecta- hx multiple fractures; Low tolerance for Pain - restricts all repositioning and functional mobility / care task performances   Subjective   General  Chart Reviewed: Yes  Additional Pertinent Hx: 60 y/o male admitted to [facility] 4/23/20 with R hip pain and fall. Diagnosed with right intertrochanteric femur fracture. Last fractures were of bilateral patella in November 2019- surgery by Dr. Lynn Boyd. [facility]. Response To Previous Treatment: Patient with no complaints from previous session. Family / Caregiver Present: No  Referring Practitioner: Dr Odalis Berrios: Pt seated in [facility] upon arrival, willing to participate in tx. General Comment  Comments: PM: pt refusing tx initially, agreeable to tx after encouragement from wife. Pain Screening  Patient Currently in Pain: Yes  Pain Assessment  Pain Assessment: 0-10  Pain Level: 5  Non-Pharmaceutical Pain Intervention(s): Ambulation/Increased Activity; Distraction;Elevation;Cold applied  Vital Signs  Patient Currently in Pain: Yes  Patient Observation  Observations: Pt guarded c LUE movements       Orientation  Orientation  Overall Orientation Status: Within Normal Limits  Orientation Level: Oriented X4  Cognition      Objective   Bed mobility  Scooting: Independent(to center of bed with HOB flat)  Transfers  Stand Pivot Transfers: Maximum Assistance(Anne steady)  Comment: Pt unable to perform STS in AM d/t increased pain levels. Ambulation  Ambulation?: No  Stairs/Curb  Stairs?: No     Balance  Posture: Fair  Sitting - Static: Fair;+(EOB, UE support, no back support)  Sitting - Dynamic: Fair(EOB, UE support, no back support)  Other exercises  Other exercises?: Yes  Other exercises 1: Seated B LE 10 reps x2, R LE AAROM, L LE AROM.  Lime Swapna, #1   Other exercises 2: WC push ups x5   Other exercises 3: Educated pt on energy conservation techniques, Ice on:off time, and WC pressure relief ~ 15min intervals. Other exercises 4: Supine ex's B LE x10-15, R LE AAROM. Other exercises 5: Supine isometric quads/glutes x10 ~3sec hold   AROM RUE (degrees)  RUE General AROM: see OT for UE assessment     Other Activities: (Requires frequent breaks )  Comment: monitor SpO2 and HR throughout d/t pt's hx of increased HR with pain     Goals  Short term goals  Time Frame for Short term goals: 1 week  Short term goal 1: Pt to reduce pain to 2-3/10 to improve ease and progression of functional mobility. Short term goal 2: Pt to demonstrate min x2 bed mobility with safe technique. Short term goal 3: Pt to demonstrate min x2 transfers with appropriate device and good maintaining of PWB R LE. Short term goal 4: Pt to amb 25'-50' with appropriate device min x2 while maintaining RLE PWB. Short term goal 5: Pt to ascend/descend 2-4 steps with B UE support min x2. Short term goal 6: Pt to improve posture to GOOD. Short term goal 7: Pt to improve sitting balance to Good (Static/dynamic). Short term goal 8: Pt to propel w/c 50' Mod I.  Long term goals  Time Frame for Long term goals : by D/C  Long term goal 1: Pt to improve R LE AROM to WNL. Long term goal 2: Pt to improve BLE strength by 1/2 MMG. Long term goal 3: Pt to perform 2MWT with amb at least 76' , Mod I with device. Long term goal 4: Pt to perform bed mobility Mod I.  Long term goal 5: Pt to perform transfers Mod I with device from varied surfaces to simulate home set up. Long term goal 6: Pt to amb at least 100' Mod I with device on varied terrain  Long term goal 7: Pt to ascend/descend 2-4 steps per home set up, including platform steps with RW, SBA. Long term goal 8: Pt to demonstrate Mod I in room with device.   Long term goal 9: Pt to improve standing balance to Good- to reduce fall risk at home

## 2020-05-10 LAB
GLUCOSE BLD-MCNC: 108 MG/DL (ref 75–110)
GLUCOSE BLD-MCNC: 152 MG/DL (ref 75–110)
GLUCOSE BLD-MCNC: 176 MG/DL (ref 75–110)
GLUCOSE BLD-MCNC: 187 MG/DL (ref 75–110)

## 2020-05-10 PROCEDURE — 6370000000 HC RX 637 (ALT 250 FOR IP): Performed by: FAMILY MEDICINE

## 2020-05-10 PROCEDURE — 6360000002 HC RX W HCPCS: Performed by: PHYSICAL MEDICINE & REHABILITATION

## 2020-05-10 PROCEDURE — 97116 GAIT TRAINING THERAPY: CPT

## 2020-05-10 PROCEDURE — 1180000000 HC REHAB R&B

## 2020-05-10 PROCEDURE — 97530 THERAPEUTIC ACTIVITIES: CPT

## 2020-05-10 PROCEDURE — 82947 ASSAY GLUCOSE BLOOD QUANT: CPT

## 2020-05-10 PROCEDURE — 6370000000 HC RX 637 (ALT 250 FOR IP): Performed by: ORTHOPAEDIC SURGERY

## 2020-05-10 PROCEDURE — 6370000000 HC RX 637 (ALT 250 FOR IP): Performed by: PHYSICAL MEDICINE & REHABILITATION

## 2020-05-10 PROCEDURE — 97110 THERAPEUTIC EXERCISES: CPT

## 2020-05-10 RX ADMIN — METOPROLOL SUCCINATE 50 MG: 50 TABLET, EXTENDED RELEASE ORAL at 21:03

## 2020-05-10 RX ADMIN — INSULIN GLARGINE 50 UNITS: 100 INJECTION, SOLUTION SUBCUTANEOUS at 21:03

## 2020-05-10 RX ADMIN — OXYCODONE HYDROCHLORIDE 10 MG: 5 TABLET ORAL at 21:03

## 2020-05-10 RX ADMIN — OXYCODONE HYDROCHLORIDE 10 MG: 5 TABLET ORAL at 08:55

## 2020-05-10 RX ADMIN — ACETAMINOPHEN 1000 MG: 500 TABLET, FILM COATED ORAL at 21:03

## 2020-05-10 RX ADMIN — METRONIDAZOLE 100 MG: 500 TABLET ORAL at 08:56

## 2020-05-10 RX ADMIN — INSULIN LISPRO 3 UNITS: 100 INJECTION, SOLUTION INTRAVENOUS; SUBCUTANEOUS at 17:07

## 2020-05-10 RX ADMIN — PANTOPRAZOLE SODIUM 40 MG: 40 TABLET, DELAYED RELEASE ORAL at 08:57

## 2020-05-10 RX ADMIN — SERTRALINE HYDROCHLORIDE 100 MG: 100 TABLET ORAL at 21:03

## 2020-05-10 RX ADMIN — INSULIN LISPRO 2 UNITS: 100 INJECTION, SOLUTION INTRAVENOUS; SUBCUTANEOUS at 21:03

## 2020-05-10 RX ADMIN — METRONIDAZOLE 100 MG: 500 TABLET ORAL at 21:03

## 2020-05-10 RX ADMIN — METOPROLOL SUCCINATE 50 MG: 50 TABLET, EXTENDED RELEASE ORAL at 08:56

## 2020-05-10 RX ADMIN — INSULIN LISPRO 3 UNITS: 100 INJECTION, SOLUTION INTRAVENOUS; SUBCUTANEOUS at 11:25

## 2020-05-10 RX ADMIN — ACETAMINOPHEN 1000 MG: 500 TABLET, FILM COATED ORAL at 12:53

## 2020-05-10 RX ADMIN — OXYCODONE HYDROCHLORIDE 10 MG: 5 TABLET ORAL at 12:53

## 2020-05-10 RX ADMIN — OXYCODONE HYDROCHLORIDE 10 MG: 5 TABLET ORAL at 04:17

## 2020-05-10 RX ADMIN — ASPIRIN 81 MG: 81 TABLET, COATED ORAL at 08:56

## 2020-05-10 RX ADMIN — OXYCODONE HYDROCHLORIDE 10 MG: 5 TABLET ORAL at 16:58

## 2020-05-10 RX ADMIN — TRAZODONE HYDROCHLORIDE 100 MG: 100 TABLET ORAL at 21:03

## 2020-05-10 RX ADMIN — INSULIN GLARGINE 50 UNITS: 100 INJECTION, SOLUTION SUBCUTANEOUS at 09:04

## 2020-05-10 RX ADMIN — ENOXAPARIN SODIUM 40 MG: 40 INJECTION SUBCUTANEOUS at 08:56

## 2020-05-10 RX ADMIN — METRONIDAZOLE 100 MG: 500 TABLET ORAL at 14:41

## 2020-05-10 RX ADMIN — ACETAMINOPHEN 1000 MG: 500 TABLET, FILM COATED ORAL at 06:23

## 2020-05-10 ASSESSMENT — PAIN SCALES - GENERAL
PAINLEVEL_OUTOF10: 8
PAINLEVEL_OUTOF10: 7
PAINLEVEL_OUTOF10: 6
PAINLEVEL_OUTOF10: 5
PAINLEVEL_OUTOF10: 7
PAINLEVEL_OUTOF10: 0
PAINLEVEL_OUTOF10: 5
PAINLEVEL_OUTOF10: 6
PAINLEVEL_OUTOF10: 5
PAINLEVEL_OUTOF10: 7
PAINLEVEL_OUTOF10: 6

## 2020-05-10 ASSESSMENT — PAIN DESCRIPTION - LOCATION
LOCATION: HIP
LOCATION: HIP

## 2020-05-10 ASSESSMENT — PAIN DESCRIPTION - ORIENTATION
ORIENTATION: RIGHT
ORIENTATION: RIGHT

## 2020-05-10 ASSESSMENT — PAIN DESCRIPTION - FREQUENCY: FREQUENCY: INTERMITTENT

## 2020-05-10 ASSESSMENT — PAIN DESCRIPTION - PAIN TYPE
TYPE: SURGICAL PAIN
TYPE: SURGICAL PAIN

## 2020-05-10 ASSESSMENT — PAIN DESCRIPTION - PROGRESSION: CLINICAL_PROGRESSION: NOT CHANGED

## 2020-05-10 ASSESSMENT — PAIN DESCRIPTION - DESCRIPTORS: DESCRIPTORS: ACHING;DISCOMFORT

## 2020-05-10 ASSESSMENT — PAIN DESCRIPTION - ONSET: ONSET: ON-GOING

## 2020-05-10 NOTE — FLOWSHEET NOTE
Patient texted Zaid Oliver and asked if she could come talk w/him. He was in a reflective mood, talking about divorce and about current marriage. He shared plans and goals that are keeping him motivated as he pushes through therapy to recover. Patient appreciated listening presence and emotional support. 05/10/20 1250   Encounter Summary   Services provided to: Patient   Referral/Consult From: Patient   Continue Visiting   (5-10-20)   Complexity of Encounter Moderate   Length of Encounter 45 minutes   Routine   Type Follow up   Grief and Life Adjustment   Type Adjustment to illness   Assessment Calm   Intervention Active listening;Explored feelings, thoughts, concerns;Explored coping resources;Sustaining presence/ Ministry of presence; Discussed meaning/purpose   Outcome Expressed gratitude;Engaged in conversation;Expressed feelings/needs/concerns;New perspective; Shared life review

## 2020-05-10 NOTE — PROGRESS NOTES
[facility] physicians Internal Medicine     Date:   5/10/2020  Patient name:  Tamiko Dan  Date of admission:  4/27/2020  6:11 PM  MRN:   [id number]  YOB: 1955    CC- post op     HPI-    Pt s/p right hip sx   No sob     REVIEW OF SYSTEMS:    · General----negative for fatigue, weight loss  · GI negative for nausea and vomiting, no dysphagia       EXAM-  /65   Pulse 71   Temp 98.2 °F (36.8 °C) (Oral)   Resp 16   Ht 6' (1.829 m)   Wt 190 lb (86.2 kg)   SpO2 97%   BMI 25.77 kg/m²      · General appearance: NAD conversant  · Lungs: normal effort, clear to auscultation bilaterally,no wheeze.   · Heart: regular rate and rhythm, S1, S2 normal, no murmur  · Abdomen: soft, non-tender; no masses, no organomegaly  · Extremities: no cyanosis, no edema no clubbing no synovitis      Laboratory Testing:  CBC:   Recent Labs     05/07/20  0648   WBC 5.9   HGB 9.8*        BMP:    Recent Labs     05/07/20  0648      K 4.5      CO2 25   BUN 26*   CREATININE 1.31*   GLUCOSE 195*         ASSESSMENT:    Patient Active Problem List   Diagnosis    DM w/o complication type II ([address]    Essential hypertension    Erectile dysfunction    Microalbuminuria due to type 2 diabetes mellitus ([address]    Stage 3 chronic kidney disease ([address]    Traumatic bilateral lower extremity fractures    Closed right hip fracture, initial encounter ([address]    Osteogenesis imperfecta    Type 2 diabetes mellitus with chronic kidney disease ([address]    Type 2 diabetes mellitus with hyperglycemia ([address]    Gastroesophageal reflux disease without esophagitis       PLAN:    Pt post op   HTN well controlled  DM   GERD stable     Navjot Muniz MD  [facility] physicians Internal Medicine   [address] [address]  [phone]

## 2020-05-10 NOTE — PROGRESS NOTES
fracture)  Position Activity Restriction  Other position/activity restrictions: hx osteogenesis imperfecta- hx multiple fractures; Low tolerance for Pain - restricts all repositioning and functional mobility / care task performances   Subjective   General  Chart Reviewed: Yes  Additional Pertinent Hx: 58 y/o male admitted to [facility] 4/23/20 with R hip pain and fall. Diagnosed with right intertrochanteric femur fracture. Last fractures were of bilateral patella in November 2019- surgery by Dr. Grant Kendall. Pt admitted to [facility] 4/27/20. Response To Previous Treatment: Patient with no complaints from previous session. Family / Caregiver Present: No  Referring Practitioner: Dr Traci Vogt: Pt positioned semifowlers in bed upon arrival, agreeable to tx. Pain Screening  Patient Currently in Pain: Yes  Pain Assessment  Pain Assessment: 0-10  Pain Level: 7  Pain Type: Surgical pain  Pain Location: Hip  Pain Orientation: Right  Vital Signs  Patient Currently in Pain: Yes  Patient Observation  Observations: Muscles spasming with AAROM on  R LE        Orientation  Orientation  Overall Orientation Status: Within Normal Limits  Orientation Level: Oriented X4  Cognition      Objective   Bed mobility  Sit to Supine: Stand by assistance  Scooting: Minimal assistance(Lateral scooting at EOB L/R)  Comment: Pt completes bed mobility with HOB elevated and use of HR  Transfers  Sit to Stand: 2 Person Assistance;Maximum Assistance(with RW)  Stand to sit: Moderate Assistance;2 Person Assistance(From  to [facility].)  Stand Pivot Transfers: Moderate Assistance;2 Person Assistance(Anne steady)  Comment: Pt requires VC for proper sequencing, constant cuing to maintain WB status with poor return. Pt B UE guided to RW to assist with completion of transfer. B foot block t.   Ambulation  Ambulation?: No  WB Status: PWB 25% right LE  Ambulation 1  Surface: level tile  Device: Rolling Walker  Other Apparatus:

## 2020-05-10 NOTE — PLAN OF CARE
Problem: Pain:  Goal: Pain level will decrease  Description: Pain level will decrease  Outcome: Ongoing  Goal: Control of acute pain  Description: Control of acute pain  Outcome: Ongoing  Goal: Control of chronic pain  Description: Control of chronic pain  Outcome: Ongoing     Problem: Falls - Risk of:  Goal: Will remain free from falls  Description: Will remain free from falls  Outcome: Ongoing  Note:   No falls or injuries sustained at this time. No attempts to get out of bed without nursing assistance. Call light within reach and pt. uses appropriately for assistance. Siderails up x 2. Nonskid footwear remains on. Bed in low and locked position. Hourly nursing rounds made. Pt. Alert and oriented, aware of limitations, and exhibits good safety judgement. Pt. uses assistive devices appropriately. Pt. understands individual fall risk factors. Pt. reoriented to surroundings and reminded to use call light with each nurse/patient interaction. Bed alarm remains engaged throughout the shift as a precaution.     Goal: Absence of physical injury  Description: Absence of physical injury  Outcome: Ongoing     Problem: Mobility - Impaired:  Goal: Mobility will improve  Description: Mobility will improve  Outcome: Ongoing     Problem: Skin Integrity:  Goal: Will show no infection signs and symptoms  Description: Will show no infection signs and symptoms  Outcome: Ongoing  Goal: Absence of new skin breakdown  Description: Absence of new skin breakdown  Outcome: Ongoing     Problem: Musculor/Skeletal Functional Status  Goal: Highest potential functional level  Outcome: Ongoing  Goal: Absence of falls  Outcome: Ongoing     Problem: Nutrition  Goal: Optimal nutrition therapy  Outcome: Ongoing

## 2020-05-10 NOTE — PROGRESS NOTES
[facility] Internal Medicine     Date:   5/10/2020  Patient name:  Helena Leal  Date of admission:  4/27/2020  6:11 PM  MRN:   [id number]  YOB: 1955    CC- post op     HPI-    Pt s/p right hip sx   No complaints   bp on the lower normal   REVIEW OF SYSTEMS:    · General----negative for fatigue, weight loss  · GI negative for nausea and vomiting, no dysphagia       EXAM-  BP (!) 104/56   Pulse 71   Temp 98.1 °F (36.7 °C) (Oral)   Resp 16   Ht 6' (1.829 m)   Wt 190 lb (86.2 kg)   SpO2 99%   BMI 25.77 kg/m²      · General appearance: NAD conversant  · Lungs: normal effort, clear to auscultation bilaterally,no wheeze. · Heart: regular rate and rhythm, S1, S2 normal, no murmur  · Abdomen: soft, non-tender; no masses, no organomegaly  · Extremities: no cyanosis, no edema no clubbing no synovitis      Laboratory Testing:  CBC:   No results for input(s): WBC, HGB, PLT in the last 72 hours. BMP:    No results for input(s): NA, K, CL, CO2, BUN, CREATININE, GLUCOSE in the last 72 hours.       ASSESSMENT:    Patient Active Problem List   Diagnosis    DM w/o complication type II ([address]    Essential hypertension    Erectile dysfunction    Microalbuminuria due to type 2 diabetes mellitus ([address]    Stage 3 chronic kidney disease ([address]    Traumatic bilateral lower extremity fractures    Closed right hip fracture, initial encounter ([address]    Osteogenesis imperfecta    Type 2 diabetes mellitus with chronic kidney disease ([address]    Type 2 diabetes mellitus with hyperglycemia ([address]    Gastroesophageal reflux disease without esophagitis       PLAN:    Pt post op   HTN well controlled on toprol check bmp am   DM   GERD stable     VerMD Martina Mathews [facility] Internal Medicine   [address] [place] [address]  [phone]

## 2020-05-11 LAB
ABSOLUTE EOS #: 0.4 K/UL (ref 0–0.4)
ABSOLUTE IMMATURE GRANULOCYTE: ABNORMAL K/UL (ref 0–0.3)
ABSOLUTE LYMPH #: 0.9 K/UL (ref 1–4.8)
ABSOLUTE MONO #: 0.4 K/UL (ref 0.1–1.3)
ALBUMIN SERPL-MCNC: 3.5 G/DL (ref 3.5–5.2)
ALBUMIN/GLOBULIN RATIO: ABNORMAL (ref 1–2.5)
ALP BLD-CCNC: 185 U/L (ref 40–129)
ALT SERPL-CCNC: 10 U/L (ref 5–41)
ANION GAP SERPL CALCULATED.3IONS-SCNC: 10 MMOL/L (ref 9–17)
AST SERPL-CCNC: 12 U/L
BASOPHILS # BLD: 2 % (ref 0–2)
BASOPHILS ABSOLUTE: 0.1 K/UL (ref 0–0.2)
BILIRUB SERPL-MCNC: 0.36 MG/DL (ref 0.3–1.2)
BUN BLDV-MCNC: 23 MG/DL (ref 8–23)
BUN/CREAT BLD: ABNORMAL (ref 9–20)
CALCIUM SERPL-MCNC: 9.5 MG/DL (ref 8.6–10.4)
CHLORIDE BLD-SCNC: 103 MMOL/L (ref 98–107)
CO2: 27 MMOL/L (ref 20–31)
CREAT SERPL-MCNC: 1.47 MG/DL (ref 0.7–1.2)
DIFFERENTIAL TYPE: ABNORMAL
EOSINOPHILS RELATIVE PERCENT: 10 % (ref 0–4)
GFR AFRICAN AMERICAN: 58 ML/MIN
GFR NON-AFRICAN AMERICAN: 48 ML/MIN
GFR SERPL CREATININE-BSD FRML MDRD: ABNORMAL ML/MIN/{1.73_M2}
GFR SERPL CREATININE-BSD FRML MDRD: ABNORMAL ML/MIN/{1.73_M2}
GLUCOSE BLD-MCNC: 166 MG/DL (ref 75–110)
GLUCOSE BLD-MCNC: 174 MG/DL (ref 75–110)
GLUCOSE BLD-MCNC: 182 MG/DL (ref 75–110)
GLUCOSE BLD-MCNC: 199 MG/DL (ref 75–110)
GLUCOSE BLD-MCNC: 68 MG/DL (ref 75–110)
GLUCOSE BLD-MCNC: 79 MG/DL (ref 70–99)
HCT VFR BLD CALC: 31.5 % (ref 41–53)
HEMOGLOBIN: 10.3 G/DL (ref 13.5–17.5)
IMMATURE GRANULOCYTES: ABNORMAL %
LYMPHOCYTES # BLD: 22 % (ref 24–44)
MCH RBC QN AUTO: 29.5 PG (ref 26–34)
MCHC RBC AUTO-ENTMCNC: 32.6 G/DL (ref 31–37)
MCV RBC AUTO: 90.4 FL (ref 80–100)
MONOCYTES # BLD: 10 % (ref 1–7)
NRBC AUTOMATED: ABNORMAL PER 100 WBC
PDW BLD-RTO: 15.2 % (ref 11.5–14.9)
PLATELET # BLD: 300 K/UL (ref 150–450)
PLATELET ESTIMATE: ABNORMAL
PMV BLD AUTO: 8 FL (ref 6–12)
POTASSIUM SERPL-SCNC: 4.1 MMOL/L (ref 3.7–5.3)
RBC # BLD: 3.48 M/UL (ref 4.5–5.9)
RBC # BLD: ABNORMAL 10*6/UL
SEG NEUTROPHILS: 56 % (ref 36–66)
SEGMENTED NEUTROPHILS ABSOLUTE COUNT: 2.4 K/UL (ref 1.3–9.1)
SODIUM BLD-SCNC: 140 MMOL/L (ref 135–144)
TOTAL PROTEIN: 6.7 G/DL (ref 6.4–8.3)
WBC # BLD: 4.3 K/UL (ref 3.5–11)
WBC # BLD: ABNORMAL 10*3/UL

## 2020-05-11 PROCEDURE — 97110 THERAPEUTIC EXERCISES: CPT

## 2020-05-11 PROCEDURE — 6370000000 HC RX 637 (ALT 250 FOR IP): Performed by: ORTHOPAEDIC SURGERY

## 2020-05-11 PROCEDURE — 85025 COMPLETE CBC W/AUTO DIFF WBC: CPT

## 2020-05-11 PROCEDURE — 82947 ASSAY GLUCOSE BLOOD QUANT: CPT

## 2020-05-11 PROCEDURE — 99231 SBSQ HOSP IP/OBS SF/LOW 25: CPT | Performed by: PHYSICAL MEDICINE & REHABILITATION

## 2020-05-11 PROCEDURE — 1180000000 HC REHAB R&B

## 2020-05-11 PROCEDURE — 97530 THERAPEUTIC ACTIVITIES: CPT

## 2020-05-11 PROCEDURE — 6370000000 HC RX 637 (ALT 250 FOR IP): Performed by: PHYSICAL MEDICINE & REHABILITATION

## 2020-05-11 PROCEDURE — 6360000002 HC RX W HCPCS: Performed by: PHYSICAL MEDICINE & REHABILITATION

## 2020-05-11 PROCEDURE — 36415 COLL VENOUS BLD VENIPUNCTURE: CPT

## 2020-05-11 PROCEDURE — 97535 SELF CARE MNGMENT TRAINING: CPT

## 2020-05-11 PROCEDURE — 97116 GAIT TRAINING THERAPY: CPT

## 2020-05-11 PROCEDURE — 80053 COMPREHEN METABOLIC PANEL: CPT

## 2020-05-11 PROCEDURE — 6370000000 HC RX 637 (ALT 250 FOR IP): Performed by: FAMILY MEDICINE

## 2020-05-11 RX ADMIN — INSULIN LISPRO 2 UNITS: 100 INJECTION, SOLUTION INTRAVENOUS; SUBCUTANEOUS at 22:09

## 2020-05-11 RX ADMIN — ASPIRIN 81 MG: 81 TABLET, COATED ORAL at 07:28

## 2020-05-11 RX ADMIN — INSULIN LISPRO 3 UNITS: 100 INJECTION, SOLUTION INTRAVENOUS; SUBCUTANEOUS at 16:37

## 2020-05-11 RX ADMIN — INSULIN GLARGINE 50 UNITS: 100 INJECTION, SOLUTION SUBCUTANEOUS at 08:58

## 2020-05-11 RX ADMIN — SERTRALINE HYDROCHLORIDE 100 MG: 100 TABLET ORAL at 22:09

## 2020-05-11 RX ADMIN — OXYCODONE HYDROCHLORIDE 10 MG: 5 TABLET ORAL at 17:51

## 2020-05-11 RX ADMIN — ACETAMINOPHEN 1000 MG: 500 TABLET, FILM COATED ORAL at 05:43

## 2020-05-11 RX ADMIN — OXYCODONE HYDROCHLORIDE 10 MG: 5 TABLET ORAL at 22:09

## 2020-05-11 RX ADMIN — METOPROLOL SUCCINATE 50 MG: 50 TABLET, EXTENDED RELEASE ORAL at 22:08

## 2020-05-11 RX ADMIN — METRONIDAZOLE 100 MG: 500 TABLET ORAL at 13:49

## 2020-05-11 RX ADMIN — METRONIDAZOLE 100 MG: 500 TABLET ORAL at 07:29

## 2020-05-11 RX ADMIN — POLYETHYLENE GLYCOL 3350 17 G: 17 POWDER, FOR SOLUTION ORAL at 07:28

## 2020-05-11 RX ADMIN — OXYCODONE HYDROCHLORIDE 10 MG: 5 TABLET ORAL at 09:49

## 2020-05-11 RX ADMIN — TRAZODONE HYDROCHLORIDE 100 MG: 100 TABLET ORAL at 22:09

## 2020-05-11 RX ADMIN — ACETAMINOPHEN 1000 MG: 500 TABLET, FILM COATED ORAL at 22:08

## 2020-05-11 RX ADMIN — OXYCODONE HYDROCHLORIDE 10 MG: 5 TABLET ORAL at 05:43

## 2020-05-11 RX ADMIN — INSULIN LISPRO 3 UNITS: 100 INJECTION, SOLUTION INTRAVENOUS; SUBCUTANEOUS at 11:47

## 2020-05-11 RX ADMIN — ENOXAPARIN SODIUM 40 MG: 40 INJECTION SUBCUTANEOUS at 07:28

## 2020-05-11 RX ADMIN — METRONIDAZOLE 100 MG: 500 TABLET ORAL at 22:08

## 2020-05-11 RX ADMIN — PANTOPRAZOLE SODIUM 40 MG: 40 TABLET, DELAYED RELEASE ORAL at 07:29

## 2020-05-11 RX ADMIN — ACETAMINOPHEN 1000 MG: 500 TABLET, FILM COATED ORAL at 13:49

## 2020-05-11 RX ADMIN — METOPROLOL SUCCINATE 50 MG: 50 TABLET, EXTENDED RELEASE ORAL at 07:29

## 2020-05-11 RX ADMIN — INSULIN GLARGINE 50 UNITS: 100 INJECTION, SOLUTION SUBCUTANEOUS at 22:09

## 2020-05-11 RX ADMIN — OXYCODONE HYDROCHLORIDE 10 MG: 5 TABLET ORAL at 13:49

## 2020-05-11 ASSESSMENT — ENCOUNTER SYMPTOMS
ABDOMINAL DISTENTION: 0
COLOR CHANGE: 0
CHOKING: 0
BLOOD IN STOOL: 0
TROUBLE SWALLOWING: 0
BACK PAIN: 0
VOICE CHANGE: 0
STRIDOR: 0
RECTAL PAIN: 0
ANAL BLEEDING: 0
PHOTOPHOBIA: 0

## 2020-05-11 ASSESSMENT — PAIN SCALES - GENERAL
PAINLEVEL_OUTOF10: 6
PAINLEVEL_OUTOF10: 5
PAINLEVEL_OUTOF10: 6
PAINLEVEL_OUTOF10: 4
PAINLEVEL_OUTOF10: 0
PAINLEVEL_OUTOF10: 7
PAINLEVEL_OUTOF10: 5
PAINLEVEL_OUTOF10: 6
PAINLEVEL_OUTOF10: 6
PAINLEVEL_OUTOF10: 4
PAINLEVEL_OUTOF10: 3
PAINLEVEL_OUTOF10: 0

## 2020-05-11 ASSESSMENT — PAIN DESCRIPTION - ORIENTATION
ORIENTATION: RIGHT

## 2020-05-11 ASSESSMENT — PAIN DESCRIPTION - PAIN TYPE
TYPE: ACUTE PAIN;SURGICAL PAIN
TYPE: SURGICAL PAIN

## 2020-05-11 ASSESSMENT — PAIN DESCRIPTION - PROGRESSION
CLINICAL_PROGRESSION: NOT CHANGED
CLINICAL_PROGRESSION: NOT CHANGED

## 2020-05-11 ASSESSMENT — PAIN - FUNCTIONAL ASSESSMENT: PAIN_FUNCTIONAL_ASSESSMENT: PREVENTS OR INTERFERES SOME ACTIVE ACTIVITIES AND ADLS

## 2020-05-11 ASSESSMENT — PAIN DESCRIPTION - FREQUENCY
FREQUENCY: INTERMITTENT
FREQUENCY: INTERMITTENT

## 2020-05-11 ASSESSMENT — PAIN DESCRIPTION - ONSET
ONSET: ON-GOING
ONSET: ON-GOING

## 2020-05-11 ASSESSMENT — PAIN DESCRIPTION - LOCATION
LOCATION: HIP

## 2020-05-11 ASSESSMENT — PAIN DESCRIPTION - DESCRIPTORS
DESCRIPTORS: ACHING
DESCRIPTORS: ACHING;DISCOMFORT

## 2020-05-11 ASSESSMENT — PAIN DESCRIPTION - DIRECTION: RADIATING_TOWARDS: WRIST

## 2020-05-11 NOTE — PROGRESS NOTES
Apparatus: Wheelchair follow  Assistance: Moderate assistance, 2 Person assistance  Quality of Gait: Antalgic gait pattern. Max cues for gait sequence and to maintain PWB 25% into R LE. Slow ezequiel. No LOB  Gait Deviations: Slow Ezequiel, Decreased step length, Decreased step height  Distance: 11ft  Comments: Pt Pt demos poor compliance with WB precautions, B UE fatigue quickly. Transfers  Sit to Stand: 2 Person Assistance, Maximum Assistance(with RW)  Stand to sit: Moderate Assistance, 2 Person Assistance(From RW to [facility].)  Bed to Chair: Maximum assistance(Squat pivot to strong side.; Cues for safety.)  Stand Pivot Transfers: Moderate Assistance, 2 Person Assistance(Anne steady)  Squat Pivot Transfers: Maximum Assistance(To strong side.)  Comment: Pt requires VC for proper sequencing, constant cuing to maintain WB status with poor return. Pt B UE guided to RW to assist with completion of transfer. B foot block t. Ambulation  Ambulation?: No  WB Status: PWB 25% right LE  Ambulation 1  Surface: level tile  Device: Rolling Walker  Other Apparatus: Wheelchair follow  Assistance: Moderate assistance, 2 Person assistance  Quality of Gait: Antalgic gait pattern. Max cues for gait sequence and to maintain PWB 25% into R LE. Slow ezequiel. No LOB  Gait Deviations: Slow Ezequiel, Decreased step length, Decreased step height  Distance: 11ft  Comments: Pt Pt demos poor compliance with WB precautions, B UE fatigue quickly. Surface: level tile  Ambulation 1  Surface: level tile  Device: Rolling Walker  Other Apparatus: Wheelchair follow  Assistance: Moderate assistance, 2 Person assistance  Quality of Gait: Antalgic gait pattern. Max cues for gait sequence and to maintain PWB 25% into R LE. Slow ezequiel. No LOB  Gait Deviations: Slow Ezequiel, Decreased step length, Decreased step height  Distance: 11ft  Comments: Pt Pt demos poor compliance with WB precautions, B UE fatigue quickly.      OT:  ADL  Equipment pt unable to reach GB with RUE 2* existing limitations. Attempted use of RUE on w/c arm rest and LUE on GB but pt unable to get enough leverage to stand. Will continue to assess safe way to transfer pt into shower. Wheelchair Bed Transfers  Wheelchair/Bed - Technique: (utilizing aakash carey )  Equipment Used: Bed, Wheelchair  Level of Asssistance: Moderate assistance  Wheelchair Transfers Comments: mod x2 for safety     SPEECH:      Objective:  /67   Pulse 65   Temp 97.9 °F (36.6 °C) (Oral)   Resp 16   Ht 6' (1.829 m)   Wt 190 lb (86.2 kg)   SpO2 100%   BMI 25.77 kg/m²       GEN: Well developed, well nourished, in NAD  HEENT:  NCAT.  PERRL.  EOMI.  Mucous membranes pink and moist.   PULM:  Clear to ausculation. No rales or rhonchi. Respirations WNL and unlabored. CV:  Regular rate rhythm.  No murmurs or gallops. GI:  Abdomen soft. Nontender. Non-distended.  BS + and equal.    NEUROLOGICAL: A&O x3. Sensation intact to light touch. MSK:  Functional ROM BUE and LLEs. Pain and weakness impairs AROM R hip. Strength 5/5 key muscles BUEs. L hip and knee muscles 4+/5. R hip 3/5, R knee extension 4/5.    SKIN: Warm dry and intact except healing R lateral hip and thigh incisions - well healed, steri strips in place. Good turgor. EXTREMITIES:  No calf tenderness to palpation. Proximal RLE edema. No edema LLE.   PSYCH: Mood WNL. Appropriately interactive. Affect WNL.     Diagnostics:     CBC:   Recent Labs     05/11/20  0633   WBC 4.3   RBC 3.48*   HGB 10.3*   HCT 31.5*   MCV 90.4   RDW 15.2*        BMP:   Recent Labs     05/11/20  0633      K 4.1      CO2 27   BUN 23   CREATININE 1.47*   GLUCOSE 79     BNP: No results for input(s): BNP in the last 72 hours. PT/INR: No results for input(s): PROTIME, INR in the last 72 hours. APTT: No results for input(s): APTT in the last 72 hours. CARDIAC ENZYMES: No results for input(s): CKMB, CKMBINDEX, TROPONINT in the last 72 hours.     Invalid input(s): CKTOTAL;3  FASTING LIPID PANEL:  Lab Results   Component Value Date    CHOL 163 10/15/2018    HDL 34 (L) 10/15/2018    TRIG 161 (H) 10/15/2018     LIVER PROFILE:   Recent Labs     05/11/20  0633   AST 12   ALT 10   BILITOT 0.36   ALKPHOS 185*        Current Medications:   Current Facility-Administered Medications: gabapentin (NEURONTIN) capsule 100 mg, 100 mg, Oral, TID  insulin glargine (LANTUS) injection vial 50 Units, 50 Units, Subcutaneous, BID  lidocaine 4 % external patch 1 patch, 1 patch, Transdermal, Daily  senna (SENOKOT) tablet 17.2 mg, 2 tablet, Oral, Nightly PRN  bisacodyl (DULCOLAX) suppository 10 mg, 10 mg, Rectal, Daily PRN  acetaminophen (TYLENOL) tablet 1,000 mg, 1,000 mg, Oral, 3 times per day  sodium chloride flush 0.9 % injection 10 mL, 10 mL, Intravenous, PRN  dextrose 5 % solution, 100 mL/hr, Intravenous, PRN  dextrose 50 % IV solution, 12.5 g, Intravenous, PRN  glucagon (rDNA) injection 1 mg, 1 mg, Intramuscular, PRN  glucose (GLUTOSE) 40 % oral gel 15 g, 15 g, Oral, PRN  aspirin EC tablet 81 mg, 81 mg, Oral, Daily  insulin lispro (HUMALOG) injection vial 0-18 Units, 0-18 Units, Subcutaneous, TID WC  insulin lispro (HUMALOG) injection vial 0-9 Units, 0-9 Units, Subcutaneous, Nightly  metoprolol succinate (TOPROL XL) extended release tablet 50 mg, 50 mg, Oral, BID  pantoprazole (PROTONIX) tablet 40 mg, 40 mg, Oral, Daily  sertraline (ZOLOFT) tablet 100 mg, 100 mg, Oral, Nightly  traZODone (DESYREL) tablet 100 mg, 100 mg, Oral, Nightly  polyethylene glycol (GLYCOLAX) packet 17 g, 17 g, Oral, Daily  enoxaparin (LOVENOX) injection 40 mg, 40 mg, Subcutaneous, Daily  magnesium hydroxide (MILK OF MAGNESIA) 400 MG/5ML suspension 30 mL, 30 mL, Oral, Daily PRN  oxyCODONE (ROXICODONE) immediate release tablet 5 mg, 5 mg, Oral, Q4H PRN **OR** oxyCODONE (ROXICODONE) immediate release tablet 10 mg, 10 mg, Oral, Q4H PRN      Impression/Plan:   Impaired ADLs, gait, and mobility due to:      1. R hip

## 2020-05-11 NOTE — PROGRESS NOTES
Pt refuses TEDS   Prognosis: Good  Activity Tolerance: Patient Tolerated treatment well             Patient Education:  Patient Goals   Patient goals : To have less pain and be able to move  Learner:patient  Method: demonstration and explanation       Outcome: acknowledged understanding         Plan  Plan  Times per week: 900 min/week   Times per day: Twice a day  Current Treatment Recommendations: Strengthening, ROM, Functional Mobility Training, Balance Training, Patient/Caregiver Education & Training, Self-Care / ADL, Safety Education & Training, Home Management Training, Positioning, Endurance Training, Pain Management, Equipment Evaluation, Education, & procurement  Plan Comment: continue OT  Patient Goals   Patient goals :  To have less pain and be able to move  Short term goals  Time Frame for Short term goals: in 5-7 days, patient will  Short term goal 1: perform functional transfers during ADL routine with Mod A using DME as appropriate  Short term goal 2: perform toileting routine (transfer/hygiene/clothing management) with Mod A   Short term goal 3: V/D Good understanding of AE/DME/compensatory techniques for self care/mobility tasks   Short term goal 4: V/D Good understanding of PWB (25%) RLE during self care/mobility tasks   Short term goal 5: increase standing tolerance to 5+ minutes with CGA-Min A with 0-1 UE support during functional task   Short term goal 6: actively participate in 30+ minutes of therapeutic exercise/activity to promote increased safety and independence with self care and mobility tasks   Long term goals  Time Frame for Long term goals : by discharge, patient will  Long term goal 1: perform functional transfers/mobility during ADL routine with Modified [place] using DME as appropriate  Long term goal 2: perform BADL tasks with independence/modified independence using AE/compensatory techniques/DME as needed  Long term goal 3: V/D Good understanding of BUE HEP for continued

## 2020-05-11 NOTE — PROGRESS NOTES
Continue current diet, Continue current ONS  Continued Inpatient Monitoring    Nutrition Evaluation:   · Evaluation: Progressing toward goals   · Goals: PO intake % of meals and supplements    · Monitoring: Meal Intake, Weight, Supplement Intake, Pertinent Labs, Monitor Bowel Function, Diet Tolerance, I&O, Skin Integrity, Wound Healing      Some areas of assessment may be incomplete due to standard COVID-19 Precautions. Sana Doe R.D., L.D.   Phone: [phone]

## 2020-05-11 NOTE — PATIENT CARE CONFERENCE
Maximal Assistance  Discharge therapy goals:  PT: Long term goals  Time Frame for Long term goals : by D/C  Long term goal 1: Pt to improve R LE AROM to WNL. Long term goal 2: Pt to improve BLE strength by 1/2 MMG. Long term goal 3: Pt to perform 2MWT with amb at least 76' , Mod I with device. Long term goal 4: Pt to perform bed mobility Mod I.  Long term goal 5: Pt to perform transfers Mod I with device from varied surfaces to simulate home set up. Long term goal 6: Pt to amb at least 100' Mod I with device on varied terrain  Long term goal 7: Pt to ascend/descend 2-4 steps per home set up, including platform steps with RW, SBA. Long term goal 8: Pt to demonstrate Mod I in room with device. Long term goal 9: Pt to improve standing balance to Good- to reduce fall risk at home and in community.   OT:Long term goals  Time Frame for Long term goals : by discharge, patient will  Long term goal 1: perform functional transfers/mobility during ADL routine with Modified [place] using DME as appropriate  Long term goal 2: perform BADL tasks with independence/modified independence using AE/compensatory techniques/DME as needed  Long term goal 3: V/D Good understanding of BUE HEP for continued strength for functional tasks  Long term goal 4: V/D Good understanding of DME for increased bathroom safety for safe d/c home   Long term goal 5: perform simple meal prep/laundry task/other desired IADL task with Modified independence using DME/compensatory techniques as needed   ST:     Team Members Present at Conference:  :  [facility], [address] [place] [address]  Occupational Therapist: Юлия Olson OT   [address] PT  Speech Therapist:  N/A  Nurse: Jamel Avendaño RN   Recreation Therapy: Mariah Guardado  Dietary/Nutrition: Jason Rae RD LD  Pastoral Care: Ranee Hatchet  CMG:   Stephy Kidd RN     I approve the established interdisciplinary plan of care as documented within the medical record of Helena Leal.     Ngozi Sherman MD

## 2020-05-11 NOTE — PROGRESS NOTES
PRN  dextrose 50 % IV solution, PRN  glucagon (rDNA) injection 1 mg, PRN  glucose (GLUTOSE) 40 % oral gel 15 g, PRN  aspirin EC tablet 81 mg, Daily  insulin lispro (HUMALOG) injection vial 0-18 Units, TID WC  insulin lispro (HUMALOG) injection vial 0-9 Units, Nightly  metoprolol succinate (TOPROL XL) extended release tablet 50 mg, BID  pantoprazole (PROTONIX) tablet 40 mg, Daily  sertraline (ZOLOFT) tablet 100 mg, Nightly  traZODone (DESYREL) tablet 100 mg, Nightly  polyethylene glycol (GLYCOLAX) packet 17 g, Daily  enoxaparin (LOVENOX) injection 40 mg, Daily  magnesium hydroxide (MILK OF MAGNESIA) 400 MG/5ML suspension 30 mL, Daily PRN  oxyCODONE (ROXICODONE) immediate release tablet 5 mg, Q4H PRN    Or  oxyCODONE (ROXICODONE) immediate release tablet 10 mg, Q4H PRN        Data:     Code Status:  Full Code    Family History   Problem Relation Age of Onset    Diabetes Mother     Stroke Mother     Diabetes Father     Heart Disease Father        Social History     Socioeconomic History    Marital status:      Spouse name: Not on file    Number of children: Not on file    Years of education: Not on file    Highest education level: Not on file   Occupational History    Not on file   Social Needs    Financial resource strain: Not on file    Food insecurity     Worry: Not on file     Inability: Not on file    Transportation needs     Medical: Not on file     Non-medical: Not on file   Tobacco Use    Smoking status: Former Smoker     Packs/day: 2.00     Years: 18.00     Pack years: 36.00     Types: Cigarettes     Last attempt to quit: 12/3/1991     Years since quittin.4    Smokeless tobacco: Never Used   Substance and Sexual Activity    Alcohol use: Not on file    Drug use: Not on file    Sexual activity: Not on file   Lifestyle    Physical activity     Days per week: Not on file     Minutes per session: Not on file    Stress: Not on file   Relationships    Social connections     Talks on

## 2020-05-11 NOTE — PROGRESS NOTES
pushing down into RW. Gait Deviations: Slow Belén;Decreased step length;Decreased step height  Distance: 15 ft  Comments: Max encouragement to continue with amb. Stairs/Curb  Stairs?: No              Wheelchair Activities  Wheelchair Type: Standard  Wheelchair Cushion: (waffle cushion)  Pressure Relief Type: Self Adjusts;Push up;Lateral lean  Wheelchair Parts Management: No  Propulsion: Yes  Propulsion 1  Propulsion: Manual  Level: Level Tile  Method: RUE;LUE  Level of Assistance: Stand by assistance  Description/ Details: Straight path, 90* turns, VCs to engage brakes. Tight spaces within room and setting up for transfer to strong side. Distance: 200 ft           BALANCE Posture: Fair  Sitting - Static: Fair;+(EOB, UE support, no back support)  Sitting - Dynamic: Fair(EOB, UE support, no back support)  Standing - Static: Poor(With RW; 2 assist, max VCs to straighten UEs )  Standing - Dynamic: Poor;-(With RW;  Mod x 2 assist.)    EXERCISES    Other exercises?: Yes  Other exercises 1: Supine (B) LE ex, 2x10 reps each to pt tolerance: AROM LLE, A/AROM RLE  Other exercises 2: Squat pivot transfers to strong side. Other exercises 3: Seated (B) LE ex, 2x10, 1# L LE, A/AROM R LE, lime (min) resistance band (hip ABD & L hamstring curls)  Other exercises 4: Seated R LE skates w/towel, 2x10, with education on continuing independently when in W/C to improve knee ROM. (R knee flexion ~30*)  Other exercises 5: UBE x10 minutes (fwd/retro)   Other exercises 6: Seated at EOB reaching activity/lat leans: Pt very limited by pain to put weight into (R) HIp/buttock  Other exercises 7: Bed mobility  Other exercises 8: Sit to stands to RW x 2 reps in AM and PM.  Other Activities  Other Activities: (Requires frequent breaks )        Activity Tolerance: Patient limited by pain, Patient limited by endurance  PT Equipment Recommendations  Equipment Needed: No  Other: Patient has RW and wheelchair at home.    Other to Good (Static/dynamic). Short term goal 8: Pt to propel w/c 50' Mod I.  Long term goals  Time Frame for Long term goals : by D/C  Long term goal 1: Pt to improve R LE AROM to WNL. Long term goal 2: Pt to improve BLE strength by 1/2 MMG. Long term goal 3: Pt to perform 2MWT with amb at least 76' , Mod I with device. Long term goal 4: Pt to perform bed mobility Mod I.  Long term goal 5: Pt to perform transfers Mod I with device from varied surfaces to simulate home set up. Long term goal 6: Pt to amb at least 100' Mod I with device on varied terrain  Long term goal 7: Pt to ascend/descend 2-4 steps per home set up, including platform steps with RW, SBA. Long term goal 8: Pt to demonstrate Mod I in room with device. Long term goal 9: Pt to improve standing balance to Good- to reduce fall risk at home and in community.        05/11/20 1040 05/11/20 1445   PT Individual Minutes   Time In [id number]   Time Out 1120 1530   Minutes 40 45       Electronically signed by Jana Tijerina PTA on 5/11/20 at 5:17 PM EDT

## 2020-05-12 LAB
GLUCOSE BLD-MCNC: 155 MG/DL (ref 75–110)
GLUCOSE BLD-MCNC: 168 MG/DL (ref 75–110)
GLUCOSE BLD-MCNC: 209 MG/DL (ref 75–110)
GLUCOSE BLD-MCNC: 78 MG/DL (ref 75–110)

## 2020-05-12 PROCEDURE — 97542 WHEELCHAIR MNGMENT TRAINING: CPT

## 2020-05-12 PROCEDURE — 97116 GAIT TRAINING THERAPY: CPT

## 2020-05-12 PROCEDURE — 99231 SBSQ HOSP IP/OBS SF/LOW 25: CPT | Performed by: PHYSICAL MEDICINE & REHABILITATION

## 2020-05-12 PROCEDURE — 97110 THERAPEUTIC EXERCISES: CPT

## 2020-05-12 PROCEDURE — 97535 SELF CARE MNGMENT TRAINING: CPT

## 2020-05-12 PROCEDURE — 97530 THERAPEUTIC ACTIVITIES: CPT

## 2020-05-12 PROCEDURE — 6370000000 HC RX 637 (ALT 250 FOR IP): Performed by: ORTHOPAEDIC SURGERY

## 2020-05-12 PROCEDURE — 6370000000 HC RX 637 (ALT 250 FOR IP): Performed by: PHYSICAL MEDICINE & REHABILITATION

## 2020-05-12 PROCEDURE — 6360000002 HC RX W HCPCS: Performed by: PHYSICAL MEDICINE & REHABILITATION

## 2020-05-12 PROCEDURE — 82947 ASSAY GLUCOSE BLOOD QUANT: CPT

## 2020-05-12 PROCEDURE — 1180000000 HC REHAB R&B

## 2020-05-12 PROCEDURE — 6370000000 HC RX 637 (ALT 250 FOR IP): Performed by: FAMILY MEDICINE

## 2020-05-12 RX ORDER — INSULIN GLARGINE 100 [IU]/ML
40 INJECTION, SOLUTION SUBCUTANEOUS NIGHTLY
Status: DISCONTINUED | OUTPATIENT
Start: 2020-05-12 | End: 2020-05-21 | Stop reason: HOSPADM

## 2020-05-12 RX ORDER — INSULIN GLARGINE 100 [IU]/ML
50 INJECTION, SOLUTION SUBCUTANEOUS EVERY MORNING
Status: DISCONTINUED | OUTPATIENT
Start: 2020-05-12 | End: 2020-05-19

## 2020-05-12 RX ADMIN — ENOXAPARIN SODIUM 40 MG: 40 INJECTION SUBCUTANEOUS at 07:53

## 2020-05-12 RX ADMIN — INSULIN LISPRO 3 UNITS: 100 INJECTION, SOLUTION INTRAVENOUS; SUBCUTANEOUS at 17:08

## 2020-05-12 RX ADMIN — METRONIDAZOLE 100 MG: 500 TABLET ORAL at 07:53

## 2020-05-12 RX ADMIN — OXYCODONE HYDROCHLORIDE 10 MG: 5 TABLET ORAL at 13:02

## 2020-05-12 RX ADMIN — SERTRALINE HYDROCHLORIDE 100 MG: 100 TABLET ORAL at 20:34

## 2020-05-12 RX ADMIN — PANTOPRAZOLE SODIUM 40 MG: 40 TABLET, DELAYED RELEASE ORAL at 07:53

## 2020-05-12 RX ADMIN — TRAZODONE HYDROCHLORIDE 100 MG: 100 TABLET ORAL at 20:34

## 2020-05-12 RX ADMIN — OXYCODONE HYDROCHLORIDE 10 MG: 5 TABLET ORAL at 17:11

## 2020-05-12 RX ADMIN — METOPROLOL SUCCINATE 50 MG: 50 TABLET, EXTENDED RELEASE ORAL at 07:53

## 2020-05-12 RX ADMIN — INSULIN LISPRO 2 UNITS: 100 INJECTION, SOLUTION INTRAVENOUS; SUBCUTANEOUS at 20:35

## 2020-05-12 RX ADMIN — OXYCODONE HYDROCHLORIDE 10 MG: 5 TABLET ORAL at 04:11

## 2020-05-12 RX ADMIN — ACETAMINOPHEN 1000 MG: 500 TABLET, FILM COATED ORAL at 20:34

## 2020-05-12 RX ADMIN — INSULIN GLARGINE 50 UNITS: 100 INJECTION, SOLUTION SUBCUTANEOUS at 07:54

## 2020-05-12 RX ADMIN — INSULIN LISPRO 6 UNITS: 100 INJECTION, SOLUTION INTRAVENOUS; SUBCUTANEOUS at 11:45

## 2020-05-12 RX ADMIN — ACETAMINOPHEN 1000 MG: 500 TABLET, FILM COATED ORAL at 13:02

## 2020-05-12 RX ADMIN — METRONIDAZOLE 100 MG: 500 TABLET ORAL at 13:02

## 2020-05-12 RX ADMIN — INSULIN GLARGINE 40 UNITS: 100 INJECTION, SOLUTION SUBCUTANEOUS at 20:35

## 2020-05-12 RX ADMIN — METOPROLOL SUCCINATE 50 MG: 50 TABLET, EXTENDED RELEASE ORAL at 20:34

## 2020-05-12 RX ADMIN — METRONIDAZOLE 100 MG: 500 TABLET ORAL at 20:34

## 2020-05-12 RX ADMIN — ACETAMINOPHEN 1000 MG: 500 TABLET, FILM COATED ORAL at 06:20

## 2020-05-12 RX ADMIN — ASPIRIN 81 MG: 81 TABLET, COATED ORAL at 07:52

## 2020-05-12 RX ADMIN — OXYCODONE HYDROCHLORIDE 10 MG: 5 TABLET ORAL at 07:54

## 2020-05-12 ASSESSMENT — PAIN SCALES - GENERAL
PAINLEVEL_OUTOF10: 4
PAINLEVEL_OUTOF10: 6
PAINLEVEL_OUTOF10: 0
PAINLEVEL_OUTOF10: 4
PAINLEVEL_OUTOF10: 5
PAINLEVEL_OUTOF10: 5
PAINLEVEL_OUTOF10: 6
PAINLEVEL_OUTOF10: 6
PAINLEVEL_OUTOF10: 5
PAINLEVEL_OUTOF10: 6
PAINLEVEL_OUTOF10: 7
PAINLEVEL_OUTOF10: 5

## 2020-05-12 ASSESSMENT — ENCOUNTER SYMPTOMS
TROUBLE SWALLOWING: 0
CHOKING: 0
COLOR CHANGE: 0
STRIDOR: 0
VOICE CHANGE: 0
PHOTOPHOBIA: 0
RECTAL PAIN: 0
BLOOD IN STOOL: 0
BACK PAIN: 0
ANAL BLEEDING: 0
ABDOMINAL DISTENTION: 0

## 2020-05-12 ASSESSMENT — PAIN DESCRIPTION - DESCRIPTORS
DESCRIPTORS: SORE
DESCRIPTORS: ACHING
DESCRIPTORS: ACHING

## 2020-05-12 ASSESSMENT — PAIN DESCRIPTION - PAIN TYPE
TYPE: ACUTE PAIN

## 2020-05-12 ASSESSMENT — PAIN DESCRIPTION - ONSET
ONSET: ON-GOING

## 2020-05-12 ASSESSMENT — PAIN DESCRIPTION - FREQUENCY
FREQUENCY: INTERMITTENT
FREQUENCY: INTERMITTENT

## 2020-05-12 ASSESSMENT — PAIN DESCRIPTION - LOCATION
LOCATION: HIP

## 2020-05-12 ASSESSMENT — PAIN DESCRIPTION - ORIENTATION
ORIENTATION: RIGHT

## 2020-05-12 ASSESSMENT — PAIN DESCRIPTION - PROGRESSION
CLINICAL_PROGRESSION: NOT CHANGED
CLINICAL_PROGRESSION: NOT CHANGED

## 2020-05-12 ASSESSMENT — PAIN DESCRIPTION - DIRECTION: RADIATING_TOWARDS: WRIST

## 2020-05-12 ASSESSMENT — PAIN - FUNCTIONAL ASSESSMENT: PAIN_FUNCTIONAL_ASSESSMENT: PREVENTS OR INTERFERES SOME ACTIVE ACTIVITIES AND ADLS

## 2020-05-12 NOTE — PROGRESS NOTES
needed   Timed Code Treatment Minutes: 47 Minutes     05/12/20 1036 05/12/20 1348   OT Individual Minutes   Time In [id number]   Time Out [id number]   Minutes 46 47   Minute Variance   Variance 14  --    Reason   (Pt's lunch came)  --    Time Code Minutes    Timed Code Treatment Minutes 46 Minutes 47 Minutes     Electronically signed by Mesfin Watson OT on 5/12/20 at 4:34 PM EDT

## 2020-05-12 NOTE — PROGRESS NOTES
assistance(Max x 2 for sit to stand to RW and then Mod x 2 amb.)  Quality of Gait: Antalgic gait pattern. Max cues for gait sequence due to PWB 25% into R LE. Slow ezequiel. No LOB. Cues given to relax UE's. Patient relies heavily on them pushing down into RW. Gait Deviations: Slow Ezequiel, Decreased step length, Decreased step height  Distance: 15 ft  Comments: Max encouragement to continue with amb. Transfers  Sit to Stand: Maximum Assistance, 2 Person Assistance(To RW;  Verball cues for safety and technique.)  Stand to sit: Moderate Assistance, 2 Person Assistance(From RW to [facility].)  Bed to Chair: Maximum assistance(Squat pivot to strong side.; Cues for safety.)  Stand Pivot Transfers: Dependent/Total(Anne Peralta)  Squat Pivot Transfers: Maximum Assistance(To strong side.)  Comment: Pt requires VC for proper sequencing, constant cuing to maintain WB status with poor return. Pt B UE guided to RW to assist with completion of transfer. B foot block t. Ambulation  Ambulation?: Yes  WB Status: PWB 25% right LE  Ambulation 1  Surface: level tile  Device: Rolling Walker  Other Apparatus: (WC brought up to patient post amb.)  Assistance: Moderate assistance, 2 Person assistance(Max x 2 for sit to stand to RW and then Mod x 2 amb.)  Quality of Gait: Antalgic gait pattern. Max cues for gait sequence due to PWB 25% into R LE. Slow ezequiel. No LOB. Cues given to relax UE's. Patient relies heavily on them pushing down into RW. Gait Deviations: Slow Ezqeuiel, Decreased step length, Decreased step height  Distance: 15 ft  Comments: Max encouragement to continue with amb. Surface: level tile  Ambulation 1  Surface: level tile  Device: Rolling Walker  Other Apparatus: (WC brought up to patient post amb.)  Assistance: Moderate assistance, 2 Person assistance(Max x 2 for sit to stand to RW and then Mod x 2 amb.)  Quality of Gait: Antalgic gait pattern. Max cues for gait sequence due to PWB 25% into R LE. Slow ezequiel. No results for input(s): PROTIME, INR in the last 72 hours. APTT: No results for input(s): APTT in the last 72 hours. CARDIAC ENZYMES: No results for input(s): CKMB, CKMBINDEX, TROPONINT in the last 72 hours.     Invalid input(s): CKTOTAL;3  FASTING LIPID PANEL:  Lab Results   Component Value Date    CHOL 163 10/15/2018    HDL 34 (L) 10/15/2018    TRIG 161 (H) 10/15/2018     LIVER PROFILE:   Recent Labs     05/11/20  0633   AST 12   ALT 10   BILITOT 0.36   ALKPHOS 185*        Current Medications:   Current Facility-Administered Medications: gabapentin (NEURONTIN) capsule 100 mg, 100 mg, Oral, TID  insulin glargine (LANTUS) injection vial 50 Units, 50 Units, Subcutaneous, BID  lidocaine 4 % external patch 1 patch, 1 patch, Transdermal, Daily  senna (SENOKOT) tablet 17.2 mg, 2 tablet, Oral, Nightly PRN  bisacodyl (DULCOLAX) suppository 10 mg, 10 mg, Rectal, Daily PRN  acetaminophen (TYLENOL) tablet 1,000 mg, 1,000 mg, Oral, 3 times per day  sodium chloride flush 0.9 % injection 10 mL, 10 mL, Intravenous, PRN  dextrose 5 % solution, 100 mL/hr, Intravenous, PRN  dextrose 50 % IV solution, 12.5 g, Intravenous, PRN  glucagon (rDNA) injection 1 mg, 1 mg, Intramuscular, PRN  glucose (GLUTOSE) 40 % oral gel 15 g, 15 g, Oral, PRN  aspirin EC tablet 81 mg, 81 mg, Oral, Daily  insulin lispro (HUMALOG) injection vial 0-18 Units, 0-18 Units, Subcutaneous, TID WC  insulin lispro (HUMALOG) injection vial 0-9 Units, 0-9 Units, Subcutaneous, Nightly  metoprolol succinate (TOPROL XL) extended release tablet 50 mg, 50 mg, Oral, BID  pantoprazole (PROTONIX) tablet 40 mg, 40 mg, Oral, Daily  sertraline (ZOLOFT) tablet 100 mg, 100 mg, Oral, Nightly  traZODone (DESYREL) tablet 100 mg, 100 mg, Oral, Nightly  polyethylene glycol (GLYCOLAX) packet 17 g, 17 g, Oral, Daily  enoxaparin (LOVENOX) injection 40 mg, 40 mg, Subcutaneous, Daily  magnesium hydroxide (MILK OF MAGNESIA) 400 MG/5ML suspension 30 mL, 30 mL, Oral, Daily PRN  oxyCODONE (ROXICODONE) immediate release tablet 5 mg, 5 mg, Oral, Q4H PRN **OR** oxyCODONE (ROXICODONE) immediate release tablet 10 mg, 10 mg, Oral, Q4H PRN      Impression/Plan:   Impaired ADLs, gait, and mobility due to:      1. R hip fracture s/p IM nail: PT/OT  for gait, mobility, strengthening, endurance, ADLs, and self care. Partial weight bearing 25% RLE. Pain controlled on Tylenol prn, oxycodone prn, gabapentin. Ice prn. Continue routine Tylenol and short course   2. R rib pain/tenderness:Has lidoderm patches   3. L shoulder/arm pain: Ice prn.   4. DM: on sliding scale and ACHS accuchecks. On Lantus     5. HTN: stable on Toprol BID  6. GERD/GI Prophylaxis: on pantoprazole  7. Insomnia: on Trazodone q hs  8. Depression:on Dipika Cornet hs    9. Bowel management: On miralax daily, senokot prn, milk of magnesia prn and Dulcolax prn. 8. Dr. Gallego [facility] group for medical management - creatinine is elevated but stable. 11. DVT prophylaxis:  On Lovenox    Electronically signed by Jayla Gamez MD on 5/12/2020 at 9:39 AM      This note is created with the assistance of a speech recognition program.  While intending to generate a document that actually reflects the content of the visit, the document can still have some errors including those of syntax and sound a like substitutions which may escape proof reading. In such instances, actual meaning can be extrapolated by contextual diversion.

## 2020-05-12 NOTE — PROGRESS NOTES
to elbow fracture)  Position Activity Restriction  Other position/activity restrictions: hx osteogenesis imperfecta- hx multiple fractures; Low tolerance for Pain - restricts all repositioning and functional mobility / care task performances     Subjective: Patient reports he will be staying with daughter and son roe sharma post DC. States he will not need DME due to getting from last DC from his knees. Patient reports Son Korina Jim takes him up 1 step retro in [facility] to enter home. Comments: Seated PROM R knee 55 degrees flexion. Seated L knee PROM  85 degrees flexion. Vital Signs  Patient Currently in Pain: Yes  Pain Assessment: 0-10  Pain Level: 5  Pain Type: Acute pain  Pain Location: Hip  Pain Orientation: Right  Non-Pharmaceutical Pain Intervention(s): Cold applied;Repositioned  Response to Pain Intervention: Patient Satisfied        Patient Observation  Observations: Pt guarded with most movements. Bed Mobility:   Rolling: Rolling Left;Minimal assistance(Bed flat used HR, not able to roll to (R))  Supine to Sit: Moderate assistance(1 person assist to support RLE and assist with trunk prn)  Sit to Supine: Moderate assistance(B LE assist, improved trunk mobility/pivot at hips)  Scooting: Independent    Transfers:  Sit to Stand: Maximum Assistance;2 Person Assistance(To RW;  Verball cues for safety and technique.)  Stand to sit: Moderate Assistance;2 Person Assistance(From  to [facility].)  Bed to Chair: Maximum assistance(Squat pivot to strong side.; Cues for safety.)     Squat Pivot Transfers: Maximum Assistance(To strong side.)     WB Status: PWB 25% right LE  Ambulation 1  Surface: level tile  Device: Rolling Walker  Other Apparatus: (WC brought up to patient post amb.)  Assistance: Moderate assistance;2 Person assistance(Max x 2 for sit to stand to RW and then Mod x 2 amb.)  Quality of Gait: Antalgic gait pattern. Max cues for gait sequence due to PWB 25% into R LE. Slow ezequiel. No LOB.  Cues given to relax UE's.  Patient relies heavily on them pushing down into RW. Gait Deviations: Slow Belén;Decreased step length;Decreased step height  Distance: 20 ft  Comments: Max encouragement to continue with amb. Stairs/Curb  Stairs?: No              Wheelchair Activities  Wheelchair Type: Standard  Wheelchair Cushion: (waffle cushion)  Pressure Relief Type: Self Adjusts;Push up;Lateral lean  Wheelchair Parts Management: No  Propulsion: Yes  Propulsion 1  Propulsion: Manual  Level: Level Tile  Method: RUE;LUE  Level of Assistance: Stand by assistance  Description/ Details: Straight path, 90* turns, VCs to engage brakes. Tight spaces within room and setting up for transfer to strong side. Distance: 200 ft           BALANCE Posture: Fair  Sitting - Static: Fair;+(EOB, UE support, no back support)  Sitting - Dynamic: Fair(EOB, UE support, no back support)  Standing - Static: Poor(With RW; 2 assist, max VCs to straighten UEs )  Standing - Dynamic: Poor;-(With RW;  Mod x 2 assist.)    EXERCISES    Other exercises?: Yes  Other exercises 1: Supine (B) LE ex, 2x10 reps each to pt tolerance: AROM LLE, A/AROM RLE  Other exercises 2: Squat pivot transfers to strong side. Other exercises 3: Seated (B) LE ex, 2x10, 1# L LE, A/AROM R LE, lime (min) resistance band (hip ABD & L hamstring curls)  Other exercises 4: Seated R LE skates w/towel, 2x10, with education on continuing independently when in W/C to improve knee ROM.  (R knee flexion ~30*)  Other exercises 5: UBE x10 minutes (fwd/retro)   Other exercises 6: Seated at EOB reaching activity/lat leans: Pt very limited by pain to put weight into (R) HIp/buttock  Other exercises 7: Bed mobility  Other exercises 8: Sit to stands to RW x 2 reps in AM and PM.  Other Activities  Other Activities: (Requires frequent breaks )        Activity Tolerance: Patient limited by pain, Patient limited by endurance  PT Equipment Recommendations  Equipment Needed: No  Other: Patient has RW and

## 2020-05-12 NOTE — PROGRESS NOTES
stated as uncontrolled         Plan:        1. Change Lantus to 40 mg subcu nightly and 50 mg subcu in the morning  2. CMP, CBC every Monday Thursday  3. Discussed with patient and nurse  4. PPI Protonix  5. DVT Prophylaxis  6. EPCs  7.  PT/OT to evaluate and treat  8.  check and replace electrolytes per sliding scale      Electronically signed by Cristina Montgomery MD

## 2020-05-13 LAB
GLUCOSE BLD-MCNC: 141 MG/DL (ref 75–110)
GLUCOSE BLD-MCNC: 224 MG/DL (ref 75–110)
GLUCOSE BLD-MCNC: 225 MG/DL (ref 75–110)
GLUCOSE BLD-MCNC: 241 MG/DL (ref 75–110)

## 2020-05-13 PROCEDURE — 6370000000 HC RX 637 (ALT 250 FOR IP): Performed by: ORTHOPAEDIC SURGERY

## 2020-05-13 PROCEDURE — 6370000000 HC RX 637 (ALT 250 FOR IP): Performed by: PHYSICAL MEDICINE & REHABILITATION

## 2020-05-13 PROCEDURE — 6370000000 HC RX 637 (ALT 250 FOR IP): Performed by: FAMILY MEDICINE

## 2020-05-13 PROCEDURE — 1180000000 HC REHAB R&B

## 2020-05-13 PROCEDURE — 99231 SBSQ HOSP IP/OBS SF/LOW 25: CPT | Performed by: PHYSICAL MEDICINE & REHABILITATION

## 2020-05-13 PROCEDURE — 97535 SELF CARE MNGMENT TRAINING: CPT

## 2020-05-13 PROCEDURE — 97110 THERAPEUTIC EXERCISES: CPT

## 2020-05-13 PROCEDURE — 97542 WHEELCHAIR MNGMENT TRAINING: CPT

## 2020-05-13 PROCEDURE — 6360000002 HC RX W HCPCS: Performed by: PHYSICAL MEDICINE & REHABILITATION

## 2020-05-13 PROCEDURE — 82947 ASSAY GLUCOSE BLOOD QUANT: CPT

## 2020-05-13 PROCEDURE — 97530 THERAPEUTIC ACTIVITIES: CPT

## 2020-05-13 RX ADMIN — OXYCODONE HYDROCHLORIDE 10 MG: 5 TABLET ORAL at 18:00

## 2020-05-13 RX ADMIN — INSULIN LISPRO 3 UNITS: 100 INJECTION, SOLUTION INTRAVENOUS; SUBCUTANEOUS at 20:56

## 2020-05-13 RX ADMIN — METOPROLOL SUCCINATE 50 MG: 50 TABLET, EXTENDED RELEASE ORAL at 08:58

## 2020-05-13 RX ADMIN — METRONIDAZOLE 100 MG: 500 TABLET ORAL at 20:55

## 2020-05-13 RX ADMIN — INSULIN LISPRO 3 UNITS: 100 INJECTION, SOLUTION INTRAVENOUS; SUBCUTANEOUS at 08:53

## 2020-05-13 RX ADMIN — METRONIDAZOLE 100 MG: 500 TABLET ORAL at 08:58

## 2020-05-13 RX ADMIN — METRONIDAZOLE 100 MG: 500 TABLET ORAL at 13:49

## 2020-05-13 RX ADMIN — ACETAMINOPHEN 1000 MG: 500 TABLET, FILM COATED ORAL at 05:29

## 2020-05-13 RX ADMIN — TRAZODONE HYDROCHLORIDE 100 MG: 100 TABLET ORAL at 20:55

## 2020-05-13 RX ADMIN — ENOXAPARIN SODIUM 40 MG: 40 INJECTION SUBCUTANEOUS at 08:59

## 2020-05-13 RX ADMIN — PANTOPRAZOLE SODIUM 40 MG: 40 TABLET, DELAYED RELEASE ORAL at 08:58

## 2020-05-13 RX ADMIN — SERTRALINE HYDROCHLORIDE 100 MG: 100 TABLET ORAL at 20:55

## 2020-05-13 RX ADMIN — ACETAMINOPHEN 1000 MG: 500 TABLET, FILM COATED ORAL at 13:49

## 2020-05-13 RX ADMIN — OXYCODONE HYDROCHLORIDE 10 MG: 5 TABLET ORAL at 22:20

## 2020-05-13 RX ADMIN — METOPROLOL SUCCINATE 50 MG: 50 TABLET, EXTENDED RELEASE ORAL at 20:55

## 2020-05-13 RX ADMIN — OXYCODONE HYDROCHLORIDE 10 MG: 5 TABLET ORAL at 05:29

## 2020-05-13 RX ADMIN — ACETAMINOPHEN 1000 MG: 500 TABLET, FILM COATED ORAL at 20:55

## 2020-05-13 RX ADMIN — INSULIN GLARGINE 40 UNITS: 100 INJECTION, SOLUTION SUBCUTANEOUS at 20:56

## 2020-05-13 RX ADMIN — INSULIN LISPRO 6 UNITS: 100 INJECTION, SOLUTION INTRAVENOUS; SUBCUTANEOUS at 11:28

## 2020-05-13 RX ADMIN — INSULIN GLARGINE 50 UNITS: 100 INJECTION, SOLUTION SUBCUTANEOUS at 08:53

## 2020-05-13 RX ADMIN — ASPIRIN 81 MG: 81 TABLET, COATED ORAL at 08:58

## 2020-05-13 RX ADMIN — OXYCODONE 5 MG: 5 TABLET ORAL at 00:09

## 2020-05-13 RX ADMIN — OXYCODONE HYDROCHLORIDE 10 MG: 5 TABLET ORAL at 10:53

## 2020-05-13 RX ADMIN — INSULIN LISPRO 6 UNITS: 100 INJECTION, SOLUTION INTRAVENOUS; SUBCUTANEOUS at 17:58

## 2020-05-13 ASSESSMENT — PAIN DESCRIPTION - LOCATION
LOCATION: HIP

## 2020-05-13 ASSESSMENT — PAIN SCALES - GENERAL
PAINLEVEL_OUTOF10: 7
PAINLEVEL_OUTOF10: 3
PAINLEVEL_OUTOF10: 6
PAINLEVEL_OUTOF10: 5
PAINLEVEL_OUTOF10: 6
PAINLEVEL_OUTOF10: 7
PAINLEVEL_OUTOF10: 5
PAINLEVEL_OUTOF10: 4
PAINLEVEL_OUTOF10: 6
PAINLEVEL_OUTOF10: 5
PAINLEVEL_OUTOF10: 7
PAINLEVEL_OUTOF10: 7
PAINLEVEL_OUTOF10: 6

## 2020-05-13 ASSESSMENT — PAIN DESCRIPTION - FREQUENCY
FREQUENCY: INTERMITTENT

## 2020-05-13 ASSESSMENT — ENCOUNTER SYMPTOMS
ABDOMINAL DISTENTION: 0
CHOKING: 0
STRIDOR: 0
COLOR CHANGE: 0
TROUBLE SWALLOWING: 0
RECTAL PAIN: 0
ANAL BLEEDING: 0
PHOTOPHOBIA: 0
VOICE CHANGE: 0
BLOOD IN STOOL: 0
BACK PAIN: 0

## 2020-05-13 ASSESSMENT — PAIN DESCRIPTION - PROGRESSION
CLINICAL_PROGRESSION: NOT CHANGED
CLINICAL_PROGRESSION: NOT CHANGED
CLINICAL_PROGRESSION: GRADUALLY IMPROVING
CLINICAL_PROGRESSION: GRADUALLY IMPROVING

## 2020-05-13 ASSESSMENT — PAIN DESCRIPTION - ORIENTATION
ORIENTATION: RIGHT

## 2020-05-13 ASSESSMENT — PAIN DESCRIPTION - DESCRIPTORS
DESCRIPTORS: ACHING
DESCRIPTORS: SORE

## 2020-05-13 ASSESSMENT — PAIN DESCRIPTION - PAIN TYPE
TYPE: ACUTE PAIN

## 2020-05-13 ASSESSMENT — PAIN DESCRIPTION - ONSET
ONSET: ON-GOING
ONSET: ON-GOING

## 2020-05-13 NOTE — PROGRESS NOTES
Physical Therapy  [address] [facility]   Acute Rehabilitation Physical Therapy Progress Note    Date: 20  Patient Name: Helena Leal       Room: 6483/5289-53  MRN: [id number]   Account: [de-identified]   : 1955  (62 y.o.) Gender: male     Referring Practitioner: Ellen Gracia  Diagnosis: Closed R hip fx  Past Medical History:  has a past medical history of Contracture, elbow, Erectile dysfunction, Hypertension, Osteogenesis imperfecta, and Type II or unspecified type diabetes mellitus without mention of complication, not stated as uncontrolled. Past Surgical History:   has a past surgical history that includes Cholecystectomy; Tonsillectomy and adenoidectomy; fracture surgery (Right); fracture surgery (Bilateral); fracture surgery (Left); and Femur fracture surgery (Right, 2020). Additional Pertinent Hx: 58 y/o male admitted to [facility] 20 with R hip pain and fall. Diagnosed with right intertrochanteric femur fracture. Last fractures were of bilateral patella in 2019- surgery by Dr. Jennie Anaya. Pt admitted to [facility] 20.     Overall Orientation Status: Within Normal Limits  Orientation Level: Oriented X4  Restrictions/Precautions  Restrictions/Precautions: Fall Risk;Weight Bearing;General Precautions  Required Braces or Orthoses?: No  Implants present? : Metal implants  Lower Extremity Weight Bearing Restrictions  Right Lower Extremity Weight Bearing: Partial Weight Bearing  Partial Weight Bearing Percentage Or Pounds: 25%   Left Lower Extremity Weight Bearing: Weight Bearing As Tolerated  Partial Weight Bearing Percentage Or Pounds: Reports that he only recently was able to achieve 90+ degrees of flexion in R knee due to past fractures/surgeries/conditions/procedures   Upper Extremity Weight Bearing Restrictions  Right Upper Extremity Weight Bearing: (Reports that he is limited in Active ROM of extension in R elbow due to past fractures/surgeries/conditions/procedures related to elbow fracture))  Position Activity Restriction  Other position/activity restrictions: hx osteogenesis imperfecta- hx multiple fractures; Low tolerance for Pain - restricts all repositioning and functional mobility / care task performances     Subjective: Pt is in bed upon arrival visiting family. PT is pleasant and agreeable to PT. \"I'm sore from yesterday\". PM: Pt is in bed visiting his two children. Pt reports he is unable to tolerate out of bed activity this afternoon however agreeable to bed mob/ex's. Pt reports increase in R hip pain. Vital Signs  BP Location: Right Arm  Level of Consciousness: Alert  Patient Currently in Pain: Yes  Pain Assessment: 0-10  Pain Level: 6  Pain Type: Acute pain  Pain Location: Hip  Pain Orientation: Right  Clinical Progression: Gradually improving  Non-Pharmaceutical Pain Intervention(s): Ambulation/Increased Activity;Cold applied;Repositioned  Response to Pain Intervention: Patient Satisfied  POSS Score (Patient Ctrl Analgesia): 1     Oxygen Therapy  O2 Device: None (Room air)  Patient Observation  Observations: Pt guarded with most movements. Bed Mobility:   Bed Mobility  Rolling: Rolling Left;Minimal assistance(Bed flat used HR, utilizes bed rails. )  Supine to Sit: Minimal assistance(1 person assist to support RLE and assist with trunk prn)  Sit to Supine: Unable to assess  Scooting: Supervision  Comment: Hospital bed with use of bed rails. Pt requires assistance with RLE only this date. Pt initially requires Min A x1 rolling in bed progressing to CGA x1. PT educated on importance of bed mob/repositioning for skin protection.        Transfers:  Sit to Stand: Maximum Assistance;2 Person Assistance(To RW;  Verball cues for safety and technique.)  Stand to sit: Moderate Assistance;2 Person Assistance(From RW to [facility].)  Bed to Chair: Maximum assistance(Squat pivot to strong side.; Cues for safety.)       Pt requires R<>L x2 each with edu. Min A progressing to CGA  Other Activities  Other Activities: (Requires frequent breaks )  Comment: monitor SpO2 and HR throughout d/t pt's hx of increased HR with pain        Activity Tolerance: Patient limited by pain, Patient limited by endurance  Activity Tolerance: Pain limiting, Pt unable to perform STS d/t pain level  PT Equipment Recommendations  Equipment Needed: No  Other: Patient has RW and wheelchair at home. Other Comments  Comments: Coordinate with nursing with pain med schedule; Pt requires encouragement d/t anxiety/fear of pain    Patient Education  New Education Provided:    Cindy Arredondo; Family  Method: demonstration and explanation       Outcome: acknowledged understanding of POC, pt's daughter and son informed he will need family education prior to d/c, needs reinforcement and asked questions     Current Treatment Recommendations: Strengthening, Gait Training, Patient/Caregiver Education & Training, ROM, Balance Training, Endurance Training, Functional Mobility Training, Transfer Training, Safety Education & Training, Stair training, Positioning, Home Exercise Program    Conditions Requiring Skilled Therapeutic Intervention  Body structures, Functions, Activity limitations: Decreased functional mobility ; Decreased endurance;Decreased ROM; Decreased strength;Decreased balance; Increased pain;Decreased ADL status; Decreased posture  Assessment: Pt agreeable to additional movement, sitting up in W/C for longer periods. Treatment Diagnosis: impaired mobility 2* R hip fx  Prognosis: Good  Decision Making: Medium Complexity  History: 60 y/o male admitted [facility] 4/23/20 with hip pain and fall. 80-year-old male status post fall with right hip pain. Diagnosed with right intertrochanteric femur fracture. Maria M Ferraro He does have a history of osteogenesis imperfecta and has had multiple fractures.   Last fractures were of bilateral patella in November 2019- surgery by

## 2020-05-13 NOTE — PROGRESS NOTES
[facility]    ACUTE REHABILITATION OCCUPATIONAL THERAPY  DAILY NOTE    Date: 20  Patient Name: Genna Avila      Room: 6868/5415-02    MRN: [id number]   : 1955  (59 y.o.)  Gender: male   Referring Practitioner: Merna Thrasher  Diagnosis: Closed R hip fx  Additional Pertinent Hx: admitted to the [address] [place] [address] on 2020. He was originally admitted to [address] [place] [address] on 2020 for fall at home from standing height with R hip fracture. He has known history of osteogenesis imperfecta with multiple previous fractures. Work-up included x-rays of R hip and femur showing displaced R IT fracture. Treatment included IM nail fixation performed by Dr. Beverly Madrid on 2020. Restrictions  Restrictions/Precautions: Fall Risk, Weight Bearing, General Precautions  Implants present? : Metal implants  Other position/activity restrictions: hx osteogenesis imperfecta- hx multiple fractures;  Low tolerance for Pain - restricts all repositioning and functional mobility / care task performances   Right Lower Extremity Weight Bearing: Partial Weight Bearing  Left Lower Extremity Weight Bearing: Weight Bearing As Tolerated  Right Upper Extremity Weight Bearing: (Reports that he is limited in Active ROM of extension in R elbow due to past fractures/surgeries/conditions/procedures related to elbow fracture))  Required Braces or Orthoses?: No  Equipment Used: Bed, Wheelchair    Subjective  Restrictions/Precautions: Fall Risk;Weight Bearing;General Precautions  Overall Orientation Status: Within Functional Limits  Orientation Level: Oriented X4     Pain Assessment  Pain Assessment: 0-10  Pain Level: 6  Pain Type: Acute pain  Pain Location: Hip  Pain Orientation: Right  Clinical Progression: Not changed  Non-Pharmaceutical Pain Intervention(s): Repositioned, Rest  Response to Pain Intervention: Patient Satisfied   PM: Pt reporting 10/10 hip pain    Objective  Cognition  Overall with RUE  Other: verbal cueing throughout for technique and progression. Educated on importance of rest between sets to facilitate muscle recover, good return                        ADL  Equipment Provided: Long-handled sponge;Reacher;Sock aid  Feeding: Independent(per pt, RN confirmed )  Grooming: Modified independent (completed oral care seated sink-side. )  UE Bathing: Setup(completed in shower chair, sufficient BUE AROM and strength to wash/dry all parts.)  LE Bathing: Minimal assistance(pt required assist for posterior perineal hygiene)  UE Dressing: Setup(donned T-shirt while seated in w/c. Writer provided set-up to promote independence and safety )  LE Dressing: Setup;Maximum assistance(pt able to don sock using sock aid while seated in w/c. Demo'd good technique and safety awarness. Total assist to don shorts this morning due to increased pain )  Toileting: Maximum assistance(able to adjust clothing before. Assist for perineal hygiene while standing in aakash steady and to pull pants up around waist )  Additional Comments: AM: ADLs completed, pt declined LUCA hose           Assessment  Performance deficits / Impairments: Decreased functional mobility ; Decreased safe awareness;Decreased ADL status; Decreased endurance;Decreased strength;Decreased ROM; Decreased balance  Assessment: Pt demonstrated decreased performance with ADLs this morning, citing increased R hip/leg pain with activity this date. Pt attributes increased pain to tough session with physical therapy. Prognosis: Good  Discharge Recommendations: Patient would benefit from continued therapy after discharge;Home with assist PRN  Activity Tolerance: Patient limited by pain  Activity Tolerance: pt discontinued treatment session after completing shower due to increased Rt hip/leg pain  Safety Devices in place: Yes  Type of devices: Patient at risk for falls; Left in bed;Gait belt;Call light within reach(AM/PM)          Patient Education:  Patient Goals Patient goals : To have less pain and be able to move  Learner:patient  Method: demonstration and explanation       Outcome: demonstrated understanding   OT Education  OT Education: Transfer Training;OT Role;Plan of Care;ADL Adaptive Strategies  Barriers to Learning: Pain, fatigue    Plan  Plan  Times per week: 900 min/week   Times per day: Twice a day  Current Treatment Recommendations: Strengthening, ROM, Functional Mobility Training, Balance Training, Patient/Caregiver Education & Training, Self-Care / ADL, Safety Education & Training, Home Management Training, Positioning, Endurance Training, Pain Management, Equipment Evaluation, Education, & procurement  Plan Comment: continue OT  Patient Goals   Patient goals :  To have less pain and be able to move  Short term goals  Time Frame for Short term goals: in 5-7 days, patient will  Short term goal 1: perform functional transfers during ADL routine with Mod A using DME as appropriate  Short term goal 2: perform toileting routine (transfer/hygiene/clothing management) with Mod A   Short term goal 3: V/D Good understanding of AE/DME/compensatory techniques for self care/mobility tasks   Short term goal 4: V/D Good understanding of PWB (25%) RLE during self care/mobility tasks   Short term goal 5: increase standing tolerance to 5+ minutes with CGA-Min A with 0-1 UE support during functional task   Short term goal 6: actively participate in 30+ minutes of therapeutic exercise/activity to promote increased safety and independence with self care and mobility tasks   Long term goals  Time Frame for Long term goals : by discharge, patient will  Long term goal 1: perform functional transfers/mobility during ADL routine with Modified [place] using DME as appropriate  Long term goal 2: perform BADL tasks with independence/modified independence using AE/compensatory techniques/DME as needed  Long term goal 3: V/D Good understanding of BUE HEP for continued strength for functional tasks  Long term goal 4: V/D Good understanding of DME for increased bathroom safety for safe d/c home   Long term goal 5: perform simple meal prep/laundry task/other desired IADL task with Modified independence using DME/compensatory techniques as needed        05/13/20 1058 05/13/20 1311   OT Individual Minutes   Time In 1010 1255   Time Out 1055 1325   Minutes 45 30   Time Code Minutes    Timed Code Treatment Minutes 45 Minutes 30 Minutes     Electronically signed by Brooke Garland OT on 5/13/20 at 2:08 PM EDT

## 2020-05-13 NOTE — PLAN OF CARE
Problem: Pain:  Goal: Control of acute pain  Description: Control of acute pain  Outcome: Ongoing  Note: Pain controlled with prn medications this shift. Will continue to monitor. Problem: Falls - Risk of:  Goal: Will remain free from falls  Description: Will remain free from falls  Outcome: Ongoing  Note: Patient remains free from falls/injuries at this time. Bed locked and in lowest position. Bed alarm activated. Call light within reach. Hourly nursing rounds made. Will continue to monitor. Problem: Falls - Risk of:  Goal: Absence of physical injury  Description: Absence of physical injury  Outcome: Ongoing     Problem: Mobility - Impaired:  Goal: Mobility will improve  Description: Mobility will improve  Outcome: Ongoing     Problem: Skin Integrity:  Goal: Will show no infection signs and symptoms  Description: Will show no infection signs and symptoms  Outcome: Ongoing     Problem: Skin Integrity:  Goal: Absence of new skin breakdown  Description: Absence of new skin breakdown  Outcome: Ongoing  Note: No new skin problems. Skin integrity maintained. Will continue to monitor.

## 2020-05-13 NOTE — PROGRESS NOTES
Rolling Walker  Other Apparatus: (WC brought up to patient post amb.)  Assistance: Moderate assistance, 2 Person assistance(Max x 2 for sit to stand to RW and then Mod x 2 amb.)  Quality of Gait: Antalgic gait pattern. Max cues for gait sequence due to PWB 25% into R LE. Slow belén. No LOB. Cues given to relax UE's. Patient relies heavily on them pushing down into RW. Gait Deviations: Slow Belén, Decreased step length, Decreased step height  Distance: 20 ft  Comments: Max encouragement to continue with amb. Transfers  Sit to Stand: Maximum Assistance, 2 Person Assistance(To RW;  Verball cues for safety and technique.)  Stand to sit: Moderate Assistance, 2 Person Assistance(From RW to R [address] [place] [address].)  Bed to Chair: Maximum assistance(Squat pivot to strong side.; Cues for safety.)  Stand Pivot Transfers: Dependent/Total(Anne Peralta)  Squat Pivot Transfers: Maximum Assistance(To strong side.)  Comment: Pt requires VC for proper sequencing, constant cuing to maintain WB status with poor return. Pt B UE guided to RW to assist with completion of transfer. B foot block t. Ambulation  Ambulation?: Yes  WB Status: PWB 25% right LE  Ambulation 1  Surface: level tile  Device: Rolling Walker  Other Apparatus: (WC brought up to patient post amb.)  Assistance: Moderate assistance, 2 Person assistance(Max x 2 for sit to stand to RW and then Mod x 2 amb.)  Quality of Gait: Antalgic gait pattern. Max cues for gait sequence due to PWB 25% into R LE. Slow belén. No LOB. Cues given to relax UE's. Patient relies heavily on them pushing down into RW. Gait Deviations: Slow Belén, Decreased step length, Decreased step height  Distance: 20 ft  Comments: Max encouragement to continue with amb. Surface: level tile  Ambulation 1  Surface: level tile  Device: Rolling Walker  Other Apparatus: (WC brought up to patient post amb.)  Assistance:  Moderate assistance, 2 Person assistance(Max x 2 for sit to stand to RW and then Mod x 2 .)  Scooting: Supervision(In a.m/p.m OT)  Comment: Pt completes bed mobility with HOB elevated and use of HR  Transfers  Stand Pivot Transfers: Dependent/Total(aakash stedy)  Sit to stand: Dependent/Total, Moderate assistance  Stand to sit: Contact guard assistance, Dependent/Total  Transfer Comments: AM: completed transfers in aakash steady   Toilet Transfers  Toilet - Technique: (SS)  Equipment Used: Grab bars  Toilet Transfer: Maximum assistance, Dependent/Total  Toilet Transfers Comments: AM: with aakash steady. Pt reporting increased fatigue and hip pain this morning      Shower Transfers  Shower - Transfer From: Other  Shower - Transfer Type: To and From  Shower - Transfer To: Other  Shower Transfers: Maximal assistance, Dependent  Shower Transfers Comments: pt completed transfer using aakash steady and rolling shower chair. No LOB. verbal cueing for slow transition from stand to sit   Wheelchair Bed Transfers  Wheelchair/Bed - Technique: (utilizing aakash stedy )  Equipment Used: Bed, Wheelchair  Level of Asssistance: Moderate assistance  Wheelchair Transfers Comments: mod x2 for safety     SPEECH:      Objective:  /72   Pulse 71   Temp 98.2 °F (36.8 °C) (Oral)   Resp 16   Ht 6' (1.829 m)   Wt 190 lb (86.2 kg)   SpO2 100%   BMI 25.77 kg/m²       GEN: Well developed, well nourished, in NAD  HEENT:  NCAT.  PERRL.  EOMI.  Mucous membranes pink and moist.   PULM:  Clear to ausculation. No rales or rhonchi. Respirations WNL and unlabored. CV:  Regular rate rhythm.  No murmurs or gallops. GI:  Abdomen soft. Nontender. Non-distended.  BS + and equal.    NEUROLOGICAL: A&O x3. Sensation intact to light touch. MSK:  Functional ROM BUE and LLEs. Pain and weakness impairs AROM R hip. Strength 5/5 key muscles BUEs. L hip and knee muscles 4+/5.  R hip 4-/5, R knee extension 4/5.    SKIN: Warm dry and intact except healing R lateral hip and thigh incisions - well healed, steri strips in place. Good turgor. EXTREMITIES:  No calf tenderness to palpation. Proximal RLE edema. No edema LLE.   PSYCH: Mood WNL. Appropriately interactive. Affect WNL.     Diagnostics:     CBC:   Recent Labs     05/11/20  0633   WBC 4.3   RBC 3.48*   HGB 10.3*   HCT 31.5*   MCV 90.4   RDW 15.2*        BMP:   Recent Labs     05/11/20  0633      K 4.1      CO2 27   BUN 23   CREATININE 1.47*   GLUCOSE 79     BNP: No results for input(s): BNP in the last 72 hours. PT/INR: No results for input(s): PROTIME, INR in the last 72 hours. APTT: No results for input(s): APTT in the last 72 hours. CARDIAC ENZYMES: No results for input(s): CKMB, CKMBINDEX, TROPONINT in the last 72 hours.     Invalid input(s): CKTOTAL;3  FASTING LIPID PANEL:  Lab Results   Component Value Date    CHOL 163 10/15/2018    HDL 34 (L) 10/15/2018    TRIG 161 (H) 10/15/2018     LIVER PROFILE:   Recent Labs     05/11/20  0633   AST 12   ALT 10   BILITOT 0.36   ALKPHOS 185*        Current Medications:   Current Facility-Administered Medications: insulin glargine (LANTUS) injection vial 40 Units, 40 Units, Subcutaneous, Nightly  insulin glargine (LANTUS) injection vial 50 Units, 50 Units, Subcutaneous, QAM  gabapentin (NEURONTIN) capsule 100 mg, 100 mg, Oral, TID  lidocaine 4 % external patch 1 patch, 1 patch, Transdermal, Daily  senna (SENOKOT) tablet 17.2 mg, 2 tablet, Oral, Nightly PRN  bisacodyl (DULCOLAX) suppository 10 mg, 10 mg, Rectal, Daily PRN  acetaminophen (TYLENOL) tablet 1,000 mg, 1,000 mg, Oral, 3 times per day  sodium chloride flush 0.9 % injection 10 mL, 10 mL, Intravenous, PRN  dextrose 5 % solution, 100 mL/hr, Intravenous, PRN  dextrose 50 % IV solution, 12.5 g, Intravenous, PRN  glucagon (rDNA) injection 1 mg, 1 mg, Intramuscular, PRN  glucose (GLUTOSE) 40 % oral gel 15 g, 15 g, Oral, PRN  aspirin EC tablet 81 mg, 81 mg, Oral, Daily  insulin lispro (HUMALOG) injection vial 0-18 Units, 0-18 Units, Subcutaneous, TID WC  insulin lispro meaning can be extrapolated by contextual diversion.

## 2020-05-13 NOTE — PROGRESS NOTES
mg, Daily PRN  acetaminophen (TYLENOL) tablet 1,000 mg, 3 times per day  sodium chloride flush 0.9 % injection 10 mL, PRN  dextrose 5 % solution, PRN  dextrose 50 % IV solution, PRN  glucagon (rDNA) injection 1 mg, PRN  glucose (GLUTOSE) 40 % oral gel 15 g, PRN  aspirin EC tablet 81 mg, Daily  insulin lispro (HUMALOG) injection vial 0-18 Units, TID WC  insulin lispro (HUMALOG) injection vial 0-9 Units, Nightly  metoprolol succinate (TOPROL XL) extended release tablet 50 mg, BID  pantoprazole (PROTONIX) tablet 40 mg, Daily  sertraline (ZOLOFT) tablet 100 mg, Nightly  traZODone (DESYREL) tablet 100 mg, Nightly  polyethylene glycol (GLYCOLAX) packet 17 g, Daily  enoxaparin (LOVENOX) injection 40 mg, Daily  magnesium hydroxide (MILK OF MAGNESIA) 400 MG/5ML suspension 30 mL, Daily PRN  oxyCODONE (ROXICODONE) immediate release tablet 5 mg, Q4H PRN    Or  oxyCODONE (ROXICODONE) immediate release tablet 10 mg, Q4H PRN        Data:     Code Status:  Full Code    Family History   Problem Relation Age of Onset    Diabetes Mother     Stroke Mother     Diabetes Father     Heart Disease Father        Social History     Socioeconomic History    Marital status:      Spouse name: Not on file    Number of children: Not on file    Years of education: Not on file    Highest education level: Not on file   Occupational History    Not on file   Social Needs    Financial resource strain: Not on file    Food insecurity     Worry: Not on file     Inability: Not on file   [employer] needs     Medical: Not on file     Non-medical: Not on file   Tobacco Use    Smoking status: Former Smoker     Packs/day: 2.00     Years: 18.00     Pack years: 36.00     Types: Cigarettes     Last attempt to quit: 12/3/1991     Years since quittin.4    Smokeless tobacco: Never Used   Substance and Sexual Activity    Alcohol use: Not on file    Drug use: Not on file    Sexual activity: Not on file   Lifestyle    Physical activity Days per week: Not on file     Minutes per session: Not on file    Stress: Not on file   Relationships    Social connections     Talks on phone: Not on file     Gets together: Not on file     Attends [employer] service: Not on file     Active member of club or organization: Not on file     Attends meetings of clubs or organizations: Not on file     Relationship status: Not on file    Intimate partner violence     Fear of current or ex partner: Not on file     Emotionally abused: Not on file     Physically abused: Not on file     Forced sexual activity: Not on file   Other Topics Concern    Not on file   Social History Narrative    Not on file       I/O (24Hr): Intake/Output Summary (Last 24 hours) at 2020 1343  Last data filed at 2020 0630  Gross per 24 hour   Intake --   Output 950 ml   Net -950 ml     Radiology:  No results found. Labs:  Recent Results (from the past 24 hour(s))   POC Glucose Fingerstick    Collection Time: 20  3:38 PM   Result Value Ref Range    POC Glucose 155 (H) 75 - 110 mg/dL   POC Glucose Fingerstick    Collection Time: 20  8:18 PM   Result Value Ref Range    POC Glucose 168 (H) 75 - 110 mg/dL   POC Glucose Fingerstick    Collection Time: 20  7:20 AM   Result Value Ref Range    POC Glucose 141 (H) 75 - 110 mg/dL   POC Glucose Fingerstick    Collection Time: 20 11:06 AM   Result Value Ref Range    POC Glucose 224 (H) 75 - 110 mg/dL       Physical Examination:        Vitals:  /72   Pulse 71   Temp 98.2 °F (36.8 °C) (Oral)   Resp 16   Ht 6' (1.829 m)   Wt 190 lb (86.2 kg)   SpO2 100%   BMI 25.77 kg/m²   Temp (24hrs), Av.3 °F (36.8 °C), Min:98.2 °F (36.8 °C), Max:98.4 °F (36.9 °C)    Recent Labs     20  1538 20  2018 20  0720 20  1106   POCGLU 155* 168* 141* 224*         Physical Exam  Vitals signs reviewed. Constitutional:       Appearance: Normal appearance. He is not diaphoretic.    HENT:      Head: Normocephalic and atraumatic. Right Ear: External ear normal.      Left Ear: External ear normal.      Nose: Nose normal.      Mouth/Throat:      Mouth: Mucous membranes are moist.      Pharynx: Oropharynx is clear. Eyes:      Conjunctiva/sclera: Conjunctivae normal.   Neck:      Musculoskeletal: Normal range of motion and neck supple. No neck rigidity. Cardiovascular:      Rate and Rhythm: Normal rate and regular rhythm. Pulses: Normal pulses. Heart sounds: Normal heart sounds. Pulmonary:      Effort: Pulmonary effort is normal.      Breath sounds: Normal breath sounds. Abdominal:      General: Bowel sounds are normal. There is no distension. Palpations: Abdomen is soft. Musculoskeletal: Normal range of motion. General: No tenderness or deformity. Right lower leg: No edema. Left lower leg: No edema. Skin:     General: Skin is warm and dry. Capillary Refill: Capillary refill takes less than 2 seconds. Coloration: Skin is not jaundiced. Neurological:      General: No focal deficit present. Mental Status: Mental status is at baseline. Psychiatric:         Mood and Affect: Mood normal.         Behavior: Behavior normal.         Assessment:        Primary Problem  Closed right hip fracture, initial encounter ([facility]     Principal Problem:    Closed right hip fracture, initial encounter [facility])  Active Problems:    Essential hypertension    Microalbuminuria due to type 2 diabetes mellitus ([facility]    Stage 3 chronic kidney disease (HCC)    Type 2 diabetes mellitus with chronic kidney disease ([facility]    Type 2 diabetes mellitus with hyperglycemia ([facility])    Gastroesophageal reflux disease without esophagitis  Resolved Problems:    * No resolved hospital problems. *      Past Medical History:   Diagnosis Date    Contracture, elbow     h/o right elbow fx, compound fracture.  Erectile dysfunction     Hypertension     Osteogenesis imperfecta     DX 6 months.  27

## 2020-05-13 NOTE — PROGRESS NOTES
and be able to move  Learner:patient  Method: demonstration and explanation       Outcome: needs reinforcement   OT Education  OT Education: Transfer Training;OT Role;Plan of Care;ADL Adaptive Strategies  Barriers to Learning: Pain, fatigue    Plan  Plan  Times per week: 900 min/week   Times per day: Twice a day  Current Treatment Recommendations: Strengthening, ROM, Functional Mobility Training, Balance Training, Patient/Caregiver Education & Training, Self-Care / ADL, Safety Education & Training, Home Management Training, Positioning, Endurance Training, Pain Management, Equipment Evaluation, Education, & procurement  Plan Comment: continue OT  Patient Goals   Patient goals :  To have less pain and be able to move  Short term goals  Time Frame for Short term goals: in 5-7 days, patient will  Short term goal 1: perform functional transfers during ADL routine with Mod A using DME as appropriate  Short term goal 2: perform toileting routine (transfer/hygiene/clothing management) with Mod A   Short term goal 3: V/D Good understanding of AE/DME/compensatory techniques for self care/mobility tasks   Short term goal 4: V/D Good understanding of PWB (25%) RLE during self care/mobility tasks   Short term goal 5: increase standing tolerance to 5+ minutes with CGA-Min A with 0-1 UE support during functional task   Short term goal 6: actively participate in 30+ minutes of therapeutic exercise/activity to promote increased safety and independence with self care and mobility tasks   Long term goals  Time Frame for Long term goals : by discharge, patient will  Long term goal 1: perform functional transfers/mobility during ADL routine with Modified [place] using DME as appropriate  Long term goal 2: perform BADL tasks with independence/modified independence using AE/compensatory techniques/DME as needed  Long term goal 3: V/D Good understanding of BUE HEP for continued strength for functional tasks  Long term goal 4: V/D Good

## 2020-05-14 LAB
ABSOLUTE EOS #: 0.4 K/UL (ref 0–0.4)
ABSOLUTE IMMATURE GRANULOCYTE: ABNORMAL K/UL (ref 0–0.3)
ABSOLUTE LYMPH #: 0.8 K/UL (ref 1–4.8)
ABSOLUTE MONO #: 0.4 K/UL (ref 0.1–1.3)
ALBUMIN SERPL-MCNC: 3.6 G/DL (ref 3.5–5.2)
ALBUMIN/GLOBULIN RATIO: ABNORMAL (ref 1–2.5)
ALP BLD-CCNC: 176 U/L (ref 40–129)
ALT SERPL-CCNC: 11 U/L (ref 5–41)
ANION GAP SERPL CALCULATED.3IONS-SCNC: 10 MMOL/L (ref 9–17)
AST SERPL-CCNC: 12 U/L
BASOPHILS # BLD: 2 % (ref 0–2)
BASOPHILS ABSOLUTE: 0.1 K/UL (ref 0–0.2)
BILIRUB SERPL-MCNC: 0.32 MG/DL (ref 0.3–1.2)
BUN BLDV-MCNC: 20 MG/DL (ref 8–23)
BUN/CREAT BLD: ABNORMAL (ref 9–20)
CALCIUM SERPL-MCNC: 9.7 MG/DL (ref 8.6–10.4)
CHLORIDE BLD-SCNC: 105 MMOL/L (ref 98–107)
CO2: 27 MMOL/L (ref 20–31)
CREAT SERPL-MCNC: 1.46 MG/DL (ref 0.7–1.2)
DIFFERENTIAL TYPE: ABNORMAL
EOSINOPHILS RELATIVE PERCENT: 11 % (ref 0–4)
GFR AFRICAN AMERICAN: 59 ML/MIN
GFR NON-AFRICAN AMERICAN: 49 ML/MIN
GFR SERPL CREATININE-BSD FRML MDRD: ABNORMAL ML/MIN/{1.73_M2}
GFR SERPL CREATININE-BSD FRML MDRD: ABNORMAL ML/MIN/{1.73_M2}
GLUCOSE BLD-MCNC: 105 MG/DL (ref 75–110)
GLUCOSE BLD-MCNC: 114 MG/DL (ref 70–99)
GLUCOSE BLD-MCNC: 227 MG/DL (ref 75–110)
GLUCOSE BLD-MCNC: 91 MG/DL (ref 75–110)
HCT VFR BLD CALC: 31.5 % (ref 41–53)
HEMOGLOBIN: 10.1 G/DL (ref 13.5–17.5)
IMMATURE GRANULOCYTES: ABNORMAL %
LYMPHOCYTES # BLD: 19 % (ref 24–44)
MCH RBC QN AUTO: 29.1 PG (ref 26–34)
MCHC RBC AUTO-ENTMCNC: 32.2 G/DL (ref 31–37)
MCV RBC AUTO: 90.3 FL (ref 80–100)
MONOCYTES # BLD: 11 % (ref 1–7)
NRBC AUTOMATED: ABNORMAL PER 100 WBC
PDW BLD-RTO: 15.3 % (ref 11.5–14.9)
PLATELET # BLD: 249 K/UL (ref 150–450)
PLATELET ESTIMATE: ABNORMAL
PMV BLD AUTO: 8.4 FL (ref 6–12)
POTASSIUM SERPL-SCNC: 5.2 MMOL/L (ref 3.7–5.3)
RBC # BLD: 3.48 M/UL (ref 4.5–5.9)
RBC # BLD: ABNORMAL 10*6/UL
SEG NEUTROPHILS: 57 % (ref 36–66)
SEGMENTED NEUTROPHILS ABSOLUTE COUNT: 2.3 K/UL (ref 1.3–9.1)
SODIUM BLD-SCNC: 142 MMOL/L (ref 135–144)
TOTAL PROTEIN: 6.8 G/DL (ref 6.4–8.3)
WBC # BLD: 4 K/UL (ref 3.5–11)
WBC # BLD: ABNORMAL 10*3/UL

## 2020-05-14 PROCEDURE — 97110 THERAPEUTIC EXERCISES: CPT

## 2020-05-14 PROCEDURE — 36415 COLL VENOUS BLD VENIPUNCTURE: CPT

## 2020-05-14 PROCEDURE — 97530 THERAPEUTIC ACTIVITIES: CPT

## 2020-05-14 PROCEDURE — 1180000000 HC REHAB R&B

## 2020-05-14 PROCEDURE — 85025 COMPLETE CBC W/AUTO DIFF WBC: CPT

## 2020-05-14 PROCEDURE — 82947 ASSAY GLUCOSE BLOOD QUANT: CPT

## 2020-05-14 PROCEDURE — 99231 SBSQ HOSP IP/OBS SF/LOW 25: CPT | Performed by: PHYSICAL MEDICINE & REHABILITATION

## 2020-05-14 PROCEDURE — 97116 GAIT TRAINING THERAPY: CPT

## 2020-05-14 PROCEDURE — 6370000000 HC RX 637 (ALT 250 FOR IP): Performed by: PHYSICAL MEDICINE & REHABILITATION

## 2020-05-14 PROCEDURE — 6360000002 HC RX W HCPCS: Performed by: PHYSICAL MEDICINE & REHABILITATION

## 2020-05-14 PROCEDURE — 97542 WHEELCHAIR MNGMENT TRAINING: CPT

## 2020-05-14 PROCEDURE — 6370000000 HC RX 637 (ALT 250 FOR IP): Performed by: FAMILY MEDICINE

## 2020-05-14 PROCEDURE — 6370000000 HC RX 637 (ALT 250 FOR IP): Performed by: ORTHOPAEDIC SURGERY

## 2020-05-14 PROCEDURE — 80053 COMPREHEN METABOLIC PANEL: CPT

## 2020-05-14 PROCEDURE — 97535 SELF CARE MNGMENT TRAINING: CPT

## 2020-05-14 RX ADMIN — OXYCODONE HYDROCHLORIDE 10 MG: 5 TABLET ORAL at 04:58

## 2020-05-14 RX ADMIN — INSULIN LISPRO 6 UNITS: 100 INJECTION, SOLUTION INTRAVENOUS; SUBCUTANEOUS at 12:35

## 2020-05-14 RX ADMIN — ACETAMINOPHEN 1000 MG: 500 TABLET, FILM COATED ORAL at 21:24

## 2020-05-14 RX ADMIN — METRONIDAZOLE 100 MG: 500 TABLET ORAL at 21:25

## 2020-05-14 RX ADMIN — PANTOPRAZOLE SODIUM 40 MG: 40 TABLET, DELAYED RELEASE ORAL at 09:16

## 2020-05-14 RX ADMIN — INSULIN GLARGINE 50 UNITS: 100 INJECTION, SOLUTION SUBCUTANEOUS at 14:15

## 2020-05-14 RX ADMIN — INSULIN LISPRO 3 UNITS: 100 INJECTION, SOLUTION INTRAVENOUS; SUBCUTANEOUS at 21:25

## 2020-05-14 RX ADMIN — ASPIRIN 81 MG: 81 TABLET, COATED ORAL at 09:16

## 2020-05-14 RX ADMIN — METOPROLOL SUCCINATE 50 MG: 50 TABLET, EXTENDED RELEASE ORAL at 09:16

## 2020-05-14 RX ADMIN — METRONIDAZOLE 100 MG: 500 TABLET ORAL at 09:16

## 2020-05-14 RX ADMIN — OXYCODONE HYDROCHLORIDE 10 MG: 5 TABLET ORAL at 09:16

## 2020-05-14 RX ADMIN — ACETAMINOPHEN 1000 MG: 500 TABLET, FILM COATED ORAL at 04:58

## 2020-05-14 RX ADMIN — ACETAMINOPHEN 1000 MG: 500 TABLET, FILM COATED ORAL at 14:14

## 2020-05-14 RX ADMIN — SERTRALINE HYDROCHLORIDE 100 MG: 100 TABLET ORAL at 21:25

## 2020-05-14 RX ADMIN — OXYCODONE HYDROCHLORIDE 10 MG: 5 TABLET ORAL at 21:24

## 2020-05-14 RX ADMIN — METOPROLOL SUCCINATE 50 MG: 50 TABLET, EXTENDED RELEASE ORAL at 21:24

## 2020-05-14 RX ADMIN — TRAZODONE HYDROCHLORIDE 100 MG: 100 TABLET ORAL at 21:25

## 2020-05-14 RX ADMIN — METRONIDAZOLE 100 MG: 500 TABLET ORAL at 14:14

## 2020-05-14 RX ADMIN — ENOXAPARIN SODIUM 40 MG: 40 INJECTION SUBCUTANEOUS at 09:16

## 2020-05-14 RX ADMIN — INSULIN GLARGINE 40 UNITS: 100 INJECTION, SOLUTION SUBCUTANEOUS at 21:25

## 2020-05-14 RX ADMIN — OXYCODONE HYDROCHLORIDE 10 MG: 5 TABLET ORAL at 14:14

## 2020-05-14 ASSESSMENT — PAIN DESCRIPTION - DESCRIPTORS
DESCRIPTORS: ACHING
DESCRIPTORS: ACHING;DISCOMFORT
DESCRIPTORS: ACHING

## 2020-05-14 ASSESSMENT — PAIN DESCRIPTION - PROGRESSION
CLINICAL_PROGRESSION: GRADUALLY IMPROVING
CLINICAL_PROGRESSION: NOT CHANGED
CLINICAL_PROGRESSION: GRADUALLY IMPROVING
CLINICAL_PROGRESSION: NOT CHANGED
CLINICAL_PROGRESSION: GRADUALLY IMPROVING
CLINICAL_PROGRESSION: NOT CHANGED
CLINICAL_PROGRESSION: GRADUALLY IMPROVING

## 2020-05-14 ASSESSMENT — PAIN SCALES - GENERAL
PAINLEVEL_OUTOF10: 7
PAINLEVEL_OUTOF10: 7
PAINLEVEL_OUTOF10: 5
PAINLEVEL_OUTOF10: 3
PAINLEVEL_OUTOF10: 6
PAINLEVEL_OUTOF10: 6
PAINLEVEL_OUTOF10: 5
PAINLEVEL_OUTOF10: 7

## 2020-05-14 ASSESSMENT — ENCOUNTER SYMPTOMS
ANAL BLEEDING: 0
PHOTOPHOBIA: 0
ABDOMINAL DISTENTION: 0
FACIAL SWELLING: 0
COLOR CHANGE: 0
CHEST TIGHTNESS: 0
APNEA: 0
EYE ITCHING: 0
RECTAL PAIN: 0
STRIDOR: 0
VOICE CHANGE: 0
CHOKING: 0

## 2020-05-14 ASSESSMENT — PAIN DESCRIPTION - LOCATION
LOCATION: HIP
LOCATION: HIP
LOCATION: HIP;LEG
LOCATION: HIP

## 2020-05-14 ASSESSMENT — PAIN DESCRIPTION - PAIN TYPE
TYPE: ACUTE PAIN
TYPE: ACUTE PAIN
TYPE: ACUTE PAIN;SURGICAL PAIN
TYPE: ACUTE PAIN

## 2020-05-14 ASSESSMENT — PAIN DESCRIPTION - ONSET
ONSET: ON-GOING
ONSET: ON-GOING
ONSET: GRADUAL

## 2020-05-14 ASSESSMENT — PAIN DESCRIPTION - FREQUENCY
FREQUENCY: INTERMITTENT
FREQUENCY: INTERMITTENT
FREQUENCY: CONTINUOUS

## 2020-05-14 ASSESSMENT — PAIN - FUNCTIONAL ASSESSMENT: PAIN_FUNCTIONAL_ASSESSMENT: PREVENTS OR INTERFERES SOME ACTIVE ACTIVITIES AND ADLS

## 2020-05-14 ASSESSMENT — PAIN DESCRIPTION - ORIENTATION
ORIENTATION: RIGHT
ORIENTATION: LEFT

## 2020-05-14 NOTE — PROGRESS NOTES
admitted to [facility] 4/23/20 with hip pain and fall. 22-year-old male status post fall with right hip pain. Diagnosed with right intertrochanteric femur fracture. Marijean Fallow He does have a history of osteogenesis imperfecta and has had multiple fractures. Last fractures were of bilateral patella in November 2019- surgery by Dr. Sven Gomez. Pt admitted to [facility] 4/27/20. Exam: ROM, MMT, balance and mobility assessments  Clinical Presentation: Pt alert, pleasant despite pain, requires encouragement and motivation. Barriers to Learning: pain/anxiety  REQUIRES PT FOLLOW UP: Yes  Discharge Recommendations: Patient would benefit from continued therapy after discharge;Home with assist PRN    Goals  Short term goals  Time Frame for Short term goals: 1 week  Short term goal 1: Pt to reduce pain to 2-3/10 to improve ease and progression of functional mobility. Short term goal 2: Pt to demonstrate min x2 bed mobility with safe technique. Short term goal 3: Pt to demonstrate min x2 transfers with appropriate device and good maintaining of PWB R LE. Short term goal 4: Pt to amb 25'-50' with appropriate device min x2 while maintaining RLE PWB. Short term goal 5: Pt to ascend/descend 2-4 steps with B UE support min x2. Short term goal 6: Pt to improve posture to GOOD. Short term goal 7: Pt to improve sitting balance to Good (Static/dynamic). Short term goal 8: Pt to propel w/c 50' Mod I.  Long term goals  Time Frame for Long term goals : by D/C  Long term goal 1: Pt to improve R LE AROM to WNL. Long term goal 2: Pt to improve BLE strength by 1/2 MMG. Long term goal 3: Pt to perform 2MWT with amb at least 76' , Mod I with device. Long term goal 4: Pt to perform bed mobility Mod I.  Long term goal 5: Pt to perform transfers Mod I with device from varied surfaces to simulate home set up.   Long term goal 6: Pt to amb at least 100' Mod I with device on varied terrain  Long term goal 7: Pt to ascend/descend 2-4 steps per home set up, including platform steps with RW, SBA. Long term goal 8: Pt to demonstrate Mod I in room with device. Long term goal 9: Pt to improve standing balance to Good- to reduce fall risk at home and in community.        05/14/20 0940 05/14/20 1300   PT Individual Minutes   Time In 0940 1300   Time Out 1024 1333   Minutes 44 33       Electronically signed by Chris Quinn PTA on 5/14/20 at 3:33 PM EDT

## 2020-05-14 NOTE — PROGRESS NOTES
Physical Medicine & Rehabilitation  Progress Note      Subjective:      59year-old gentleman with R hip fracture s/p IM nail fixation. Patient is well, and has had no acute complaints or problems    ROS:  Denies fevers, chills, sweats. No chest pain, palpitations, lightheadedness. Denies coughing, wheezing or shortness of breath. Denies abdominal pain, nausea, diarrhea or constipation. No new areas of joint pain. Denies new areas of numbness or weakness. Denies new anxiety or depression issues. No new skin problems. Rehabilitation:   Progressing in therapies. PT:  Restrictions/Precautions: Fall Risk, Weight Bearing, General Precautions  Implants present? : Metal implants  Other position/activity restrictions: hx osteogenesis imperfecta- hx multiple fractures; Low tolerance for Pain - restricts all repositioning and functional mobility / care task performances   Right Lower Extremity Weight Bearing: Partial Weight Bearing  Left Lower Extremity Weight Bearing: Weight Bearing As Tolerated  Right Upper Extremity Weight Bearing: (Reports that he is limited in Active ROM of extension in R elbow due to past fractures/surgeries/conditions/procedures related to elbow fracture))   Transfers  Sit to Stand: Maximum Assistance, 2 Person Assistance(To RW;  Verball cues for safety and technique.)  Stand to sit: Moderate Assistance, 2 Person Assistance(From RW to [facility]  Bed to Chair: Maximum assistance(Squat pivot to strong side.; Cues for safety.)  Stand Pivot Transfers: Dependent/Total(Anne Peralta)  Squat Pivot Transfers: Maximum Assistance(To strong side.)  Comment: Pt requires VC for proper sequencing, constant cuing to maintain WB status with poor return. Pt B UE guided to RW to assist with completion of transfer. B foot block. Very limited ROM limiting STS transfers.    WB Status: PWB 25% right LE  Ambulation 1  Surface: level tile  Device: Rolling Walker  Other Apparatus: (WC brought up to patient post Antalgic gait pattern. Max cues for gait sequence due to PWB 25% into R LE. Slow belén. No LOB. Cues given to relax UE's. Patient relies heavily on them pushing down into RW. Gait Deviations: Slow Belén, Decreased step length, Decreased step height  Distance: 20 ft  Comments: Max encouragement to continue with amb. OT:  ADL  Equipment Provided: Long-handled sponge, Reacher, Sock aid  Feeding: Independent(per pt, RN confirmed )  Grooming: Modified independent (completed oral care seated sink-side. )  UE Bathing: Setup(completed in shower chair, sufficient BUE AROM and strength to wash/dry all parts.)  LE Bathing: Minimal assistance(pt required assist for posterior perineal hygiene)  UE Dressing: Setup(donned T-shirt while seated in w/c. Writer provided set-up to promote independence and safety )  LE Dressing: Setup, Maximum assistance(pt able to don sock using sock aid while seated in w/c. Demo'd good technique and safety awarness. Total assist to don shorts this morning due to increased pain )  Toileting: Maximum assistance(able to adjust clothing before. Assist for perineal hygiene while standing in aakash steady and to pull pants up around waist )  Additional Comments: AM: ADLs completed, pt declined LUCA hose          Balance  Sitting Balance: Modified independent (incidental use of bed rail to maintain stability )  Standing Balance: Contact guard assistance(standing in Edwena Sunni steady)   Standing Balance  Time: AM: 1 minute  Activity: AM: standing in aakash steady while writer completed perineal hygiene   Comment: Pt states he wants to be able to stand without the aakash stedy.   Functional Mobility  Functional - Mobility Device: Wheelchair  Activity: Other(in his room in p.m)  Assist Level: Modified independent   Functional Mobility Comments: pt manuvering around bed and into bathroom      Bed mobility  Rolling to Left: Minimal assistance  Rolling to Right: Unable to assess, Stand by assistance  Supine to Sit: Minimal assistance(OT supported rt leg in a.m. No assist w rt leg needed in p.m OT)  Sit to Supine: Minimal assistance(OT supported rt leg in a.m .)  Scooting: Supervision(In a.m/p.m OT)  Comment: Pt completes bed mobility with HOB elevated and use of HR  Transfers  Stand Pivot Transfers: Dependent/Total(aakash stedy)  Sit to stand: Dependent/Total, Moderate assistance  Stand to sit: Contact guard assistance, Dependent/Total  Transfer Comments: AM: completed transfers in aakash steady   Toilet Transfers  Toilet - Technique: (SS)  Equipment Used: Grab bars  Toilet Transfer: Maximum assistance, Dependent/Total  Toilet Transfers Comments: AM: with aakash steady. Pt reporting increased fatigue and hip pain this morning      Shower Transfers  Shower - Transfer From: Other  Shower - Transfer Type: To and From  Shower - Transfer To: Other  Shower Transfers: Maximal assistance, Dependent  Shower Transfers Comments: pt completed transfer using aakash steady and rolling shower chair. No LOB. verbal cueing for slow transition from stand to sit   Wheelchair Bed Transfers  Wheelchair/Bed - Technique: (utilizing aakash stedy )  Equipment Used: Bed, Wheelchair  Level of Asssistance: Moderate assistance  Wheelchair Transfers Comments: mod x2 for safety     SPEECH:      Objective:  /64   Pulse 63   Temp 97.7 °F (36.5 °C) (Oral)   Resp 16   Ht 6' (1.829 m)   Wt 190 lb (86.2 kg)   SpO2 100%   BMI 25.77 kg/m²       GEN: Well developed, well nourished, in NAD  HEENT:  NCAT.  PERRL.  EOMI.  Mucous membranes pink and moist.   PULM:  Clear to ausculation. No rales or rhonchi. Respirations WNL and unlabored. CV:  Regular rate rhythm.  No murmurs or gallops. GI:  Abdomen soft. Nontender. Non-distended.  BS + and equal.    NEUROLOGICAL: A&O x3. Sensation intact to light touch. MSK:  Functional ROM BUE and LLEs. Pain and weakness impairs AROM R hip. Strength 5/5 key muscles BUEs. L hip and knee muscles 4+/5.  R hip 4-/5, R knee the document can still have some errors including those of syntax and sound a like substitutions which may escape proof reading. In such instances, actual meaning can be extrapolated by contextual diversion.

## 2020-05-14 NOTE — PROGRESS NOTES
Normal range of motion and neck supple. Trachea: No tracheal deviation. Cardiovascular:      Rate and Rhythm: Normal rate and regular rhythm. Heart sounds: Normal heart sounds, S1 normal and S2 normal.   Pulmonary:      Effort: Pulmonary effort is normal. No respiratory distress. Breath sounds: Normal breath sounds. No decreased breath sounds, wheezing or rhonchi. Abdominal:      General: Bowel sounds are normal. There is no distension. Palpations: Abdomen is soft. Tenderness: There is no abdominal tenderness. Musculoskeletal:         General: Tenderness present. No deformity. Lymphadenopathy:      Cervical: No cervical adenopathy. Skin:     General: Skin is warm and dry. Capillary Refill: Capillary refill takes less than 2 seconds. Nails: There is no clubbing. Neurological:      Mental Status: He is alert. Mental status is at baseline. Motor: No abnormal muscle tone or seizure activity. Coordination: Coordination normal.         Assessment:        Primary Problem  Closed right hip fracture, initial encounter ([facility]     Principal Problem:    Closed right hip fracture, initial encounter [facility])  Active Problems:    Essential hypertension    Microalbuminuria due to type 2 diabetes mellitus ([facility]    Stage 3 chronic [facility] ([facility]    Type 2 diabetes mellitus with chronic kidney disease ([facility]    Type 2 diabetes mellitus with hyperglycemia ([facility])    Gastroesophageal reflux disease without esophagitis  Resolved Problems:    * No resolved hospital problems. *      Past Medical History:   Diagnosis Date    Contracture, elbow     h/o right elbow fx, compound fracture.  Erectile dysfunction     Hypertension     Osteogenesis imperfecta     DX 6 months. 30 broken bones in past.     Type II or unspecified type diabetes mellitus without mention of complication, not stated as uncontrolled         Plan:        1. Continue with insulin. lantus  2.  PPI on Protonix  3. DVT Prophylaxis on Lovenox  4. EPCs  5.  PT/OT to evaluate and treat  6.  check and replace electrolytes per sliding scale      Electronically signed by Ben Guido MD

## 2020-05-15 LAB
GLUCOSE BLD-MCNC: 104 MG/DL (ref 75–110)
GLUCOSE BLD-MCNC: 225 MG/DL (ref 75–110)
GLUCOSE BLD-MCNC: 264 MG/DL (ref 75–110)

## 2020-05-15 PROCEDURE — 97542 WHEELCHAIR MNGMENT TRAINING: CPT

## 2020-05-15 PROCEDURE — 6370000000 HC RX 637 (ALT 250 FOR IP): Performed by: FAMILY MEDICINE

## 2020-05-15 PROCEDURE — 6370000000 HC RX 637 (ALT 250 FOR IP): Performed by: PHYSICAL MEDICINE & REHABILITATION

## 2020-05-15 PROCEDURE — 6360000002 HC RX W HCPCS: Performed by: PHYSICAL MEDICINE & REHABILITATION

## 2020-05-15 PROCEDURE — 1180000000 HC REHAB R&B

## 2020-05-15 PROCEDURE — 6370000000 HC RX 637 (ALT 250 FOR IP): Performed by: ORTHOPAEDIC SURGERY

## 2020-05-15 PROCEDURE — 97116 GAIT TRAINING THERAPY: CPT

## 2020-05-15 PROCEDURE — 82947 ASSAY GLUCOSE BLOOD QUANT: CPT

## 2020-05-15 PROCEDURE — 97530 THERAPEUTIC ACTIVITIES: CPT

## 2020-05-15 PROCEDURE — 97110 THERAPEUTIC EXERCISES: CPT

## 2020-05-15 PROCEDURE — 99231 SBSQ HOSP IP/OBS SF/LOW 25: CPT | Performed by: PHYSICAL MEDICINE & REHABILITATION

## 2020-05-15 PROCEDURE — 97535 SELF CARE MNGMENT TRAINING: CPT

## 2020-05-15 RX ADMIN — ACETAMINOPHEN 1000 MG: 500 TABLET, FILM COATED ORAL at 22:20

## 2020-05-15 RX ADMIN — METRONIDAZOLE 100 MG: 500 TABLET ORAL at 09:00

## 2020-05-15 RX ADMIN — METOPROLOL SUCCINATE 50 MG: 50 TABLET, EXTENDED RELEASE ORAL at 09:00

## 2020-05-15 RX ADMIN — ACETAMINOPHEN 1000 MG: 500 TABLET, FILM COATED ORAL at 14:34

## 2020-05-15 RX ADMIN — INSULIN LISPRO 6 UNITS: 100 INJECTION, SOLUTION INTRAVENOUS; SUBCUTANEOUS at 11:42

## 2020-05-15 RX ADMIN — PANTOPRAZOLE SODIUM 40 MG: 40 TABLET, DELAYED RELEASE ORAL at 08:00

## 2020-05-15 RX ADMIN — ACETAMINOPHEN 1000 MG: 500 TABLET, FILM COATED ORAL at 05:53

## 2020-05-15 RX ADMIN — OXYCODONE HYDROCHLORIDE 10 MG: 5 TABLET ORAL at 09:01

## 2020-05-15 RX ADMIN — INSULIN GLARGINE 50 UNITS: 100 INJECTION, SOLUTION SUBCUTANEOUS at 09:01

## 2020-05-15 RX ADMIN — OXYCODONE HYDROCHLORIDE 10 MG: 5 TABLET ORAL at 19:15

## 2020-05-15 RX ADMIN — TRAZODONE HYDROCHLORIDE 100 MG: 100 TABLET ORAL at 22:20

## 2020-05-15 RX ADMIN — OXYCODONE HYDROCHLORIDE 10 MG: 5 TABLET ORAL at 12:56

## 2020-05-15 RX ADMIN — INSULIN LISPRO 5 UNITS: 100 INJECTION, SOLUTION INTRAVENOUS; SUBCUTANEOUS at 22:20

## 2020-05-15 RX ADMIN — SERTRALINE HYDROCHLORIDE 100 MG: 100 TABLET ORAL at 22:20

## 2020-05-15 RX ADMIN — ASPIRIN 81 MG: 81 TABLET, COATED ORAL at 09:00

## 2020-05-15 RX ADMIN — INSULIN GLARGINE 40 UNITS: 100 INJECTION, SOLUTION SUBCUTANEOUS at 22:21

## 2020-05-15 RX ADMIN — METRONIDAZOLE 100 MG: 500 TABLET ORAL at 14:30

## 2020-05-15 RX ADMIN — METRONIDAZOLE 100 MG: 500 TABLET ORAL at 22:20

## 2020-05-15 RX ADMIN — POLYETHYLENE GLYCOL 3350 17 G: 17 POWDER, FOR SOLUTION ORAL at 09:00

## 2020-05-15 RX ADMIN — METOPROLOL SUCCINATE 50 MG: 50 TABLET, EXTENDED RELEASE ORAL at 22:20

## 2020-05-15 RX ADMIN — ENOXAPARIN SODIUM 40 MG: 40 INJECTION SUBCUTANEOUS at 09:01

## 2020-05-15 ASSESSMENT — PAIN DESCRIPTION - PAIN TYPE
TYPE: ACUTE PAIN
TYPE: ACUTE PAIN
TYPE: ACUTE PAIN;SURGICAL PAIN
TYPE: ACUTE PAIN

## 2020-05-15 ASSESSMENT — PAIN DESCRIPTION - FREQUENCY: FREQUENCY: INTERMITTENT

## 2020-05-15 ASSESSMENT — PAIN DESCRIPTION - PROGRESSION

## 2020-05-15 ASSESSMENT — PAIN DESCRIPTION - ORIENTATION
ORIENTATION: LEFT
ORIENTATION: RIGHT
ORIENTATION: LEFT
ORIENTATION: RIGHT

## 2020-05-15 ASSESSMENT — PAIN SCALES - WONG BAKER: WONGBAKER_NUMERICALRESPONSE: 0

## 2020-05-15 ASSESSMENT — PAIN SCALES - GENERAL
PAINLEVEL_OUTOF10: 5
PAINLEVEL_OUTOF10: 7
PAINLEVEL_OUTOF10: 5
PAINLEVEL_OUTOF10: 6
PAINLEVEL_OUTOF10: 5
PAINLEVEL_OUTOF10: 7
PAINLEVEL_OUTOF10: 2
PAINLEVEL_OUTOF10: 7
PAINLEVEL_OUTOF10: 4

## 2020-05-15 ASSESSMENT — ENCOUNTER SYMPTOMS
RECTAL PAIN: 0
APNEA: 0
CHOKING: 0
CHEST TIGHTNESS: 0
STRIDOR: 0
PHOTOPHOBIA: 0
ANAL BLEEDING: 0
FACIAL SWELLING: 0
ABDOMINAL DISTENTION: 0
EYE ITCHING: 0
VOICE CHANGE: 0
COLOR CHANGE: 0

## 2020-05-15 ASSESSMENT — PAIN DESCRIPTION - LOCATION
LOCATION: HIP
LOCATION: HIP
LOCATION: LEG;HIP
LOCATION: LEG

## 2020-05-15 ASSESSMENT — PAIN DESCRIPTION - DESCRIPTORS
DESCRIPTORS: ACHING;DISCOMFORT
DESCRIPTORS: ACHING;DISCOMFORT

## 2020-05-15 ASSESSMENT — PAIN DESCRIPTION - ONSET: ONSET: GRADUAL

## 2020-05-15 ASSESSMENT — PAIN - FUNCTIONAL ASSESSMENT: PAIN_FUNCTIONAL_ASSESSMENT: PREVENTS OR INTERFERES SOME ACTIVE ACTIVITIES AND ADLS

## 2020-05-15 NOTE — PROGRESS NOTES
5  Ordoñez-Baker Pain Rating: No hurt  Pain Type: Acute pain, Surgical pain  Pain Location: Hip  Pain Orientation: Right    Objective  Cognition  Overall Cognitive Status: WFL  Perception  Overall Perceptual Status: WFL  Balance  Sitting Balance: Modified independent (seated EOB and on shower seat)  Standing Balance: Contact guard assistance(standing in aakash stedy )     Bed mobility  Supine to Sit: Stand by assistance(HOB elevated )    Transfers  Sit to stand: Moderate assistance  Stand to sit: Moderate assistance    Standing Balance  Time: 1 min x4   Activity: AM: ADL, transfers      Functional Mobility  Functional - Mobility Device: Wheelchair  Activity: To/from bathroom  Assist Level: Modified independent   Functional Mobility Comments: pt manuvering around bed and into bathroom     Toilet Transfers  Toilet - Technique: (utilizing fidencio stedy )  Equipment Used: Grab bars    Toilet Transfer: Dependent/Total ( utilizing aakash stedy)   Shower Transfers    Shower - Transfer From: Other(utilizing aakash stedy )  Shower - Transfer Type: To and From  Shower - Transfer To: Shower seat with back(roll in shower chair)  Shower Transfers: Dependent     Type of ROM/Therapeutic Exercise  Type of ROM/Therapeutic Exercise: Free weights  Comment: Pt utilizing 2# free weight to perform RUE exercises 15 reps each. Exercises  Shoulder Depression: x  Shoulder Elevation: x  Shoulder Flexion: x  Shoulder Extension: x  Shoulder ABduction: x  Shoulder ADduction: x  Horizontal ABduction: x  Horizontal ADduction: x  Elbow Flexion: x  Elbow Extension: x  Supination: x  Pronation: x  Wrist Flexion: x  Wrist Extension: x  Other: LUE  AROM performed within avaible ranges. No pain reported per pt, 15 reps. ADL  Feeding: Independent  Grooming: Independent  UE Bathing: Setup(seated in shower)  LE Bathing: Minimal assistance  UE Dressing: Setup  LE Dressing: Moderate assistance(assist pulling up, threading wiht reacher.  Refused TEDS )  Toileting: Moderate assistance          Assessment  Performance deficits / Impairments: Decreased functional mobility ; Decreased safe awareness;Decreased ADL status; Decreased endurance;Decreased strength;Decreased ROM; Decreased balance  Prognosis: Good  Activity Tolerance: Patient limited by pain  Activity Tolerance: pt discontinued treatment session after completing shower due to increased Rt hip/leg pain  Safety Devices in place: Yes          Patient Education:  Patient Goals   Patient goals : To have less pain and be able to move  Learner:patient  Method: demonstration and explanation       Outcome: acknowledged understanding         Plan  Plan  Times per week: 900 min/week   Times per day: Twice a day  Current Treatment Recommendations: Strengthening, ROM, Functional Mobility Training, Balance Training, Patient/Caregiver Education & Training, Self-Care / ADL, Safety Education & Training, Home Management Training, Positioning, Endurance Training, Pain Management, Equipment Evaluation, Education, & procurement  Plan Comment: continue OT  Patient Goals   Patient goals :  To have less pain and be able to move  Short term goals  Time Frame for Short term goals: in 5-7 days, patient will  Short term goal 1: perform functional transfers during ADL routine with Mod A using DME as appropriate  Short term goal 2: perform toileting routine (transfer/hygiene/clothing management) with Mod A   Short term goal 3: V/D Good understanding of AE/DME/compensatory techniques for self care/mobility tasks   Short term goal 4: V/D Good understanding of PWB (25%) RLE during self care/mobility tasks   Short term goal 5: increase standing tolerance to 5+ minutes with CGA-Min A with 0-1 UE support during functional task   Short term goal 6: actively participate in 30+ minutes of therapeutic exercise/activity to promote increased safety and independence with self care and mobility tasks   Long term goals  Time Frame for Long term goals :

## 2020-05-15 NOTE — PROGRESS NOTES
elbow fracture)  Position Activity Restriction  Other position/activity restrictions: hx osteogenesis imperfecta- hx multiple fractures; Low tolerance for Pain - restricts all repositioning and functional mobility / care task performances     Subjective: Patient reports he will be staying with daughter and son roe sharma post DC. States he will not need DME due to getting from last DC from his knees. Patient reports Eris Coy takes him up 1 step retro in [facility] to enter home. Comments: Seated PROM R knee 55 degrees flexion. Seated L knee PROM  85 degrees flexion. Vital Signs  Patient Currently in Pain: Yes  Pain Assessment: 0-10  Pain Level: 6  Pain Type: Acute pain  Pain Location: Leg  Pain Orientation: Left  Non-Pharmaceutical Pain Intervention(s): Cold applied; Emotional support  Response to Pain Intervention: Patient Satisfied        Patient Observation  Observations: Pt guarded with most movements. Bed Mobility:   Bed Mobility  Rolling: Rolling Left;Minimal assistance(Bed flat used HR, not able to roll to (R))  Supine to Sit: Moderate assistance(1 person assist to support RLE and assist with trunk prn)  Sit to Supine: Moderate assistance(B LE assist, improved trunk mobility/pivot at hips)  Scooting: Independent  Bed mobility  Scooting: Independent    Transfers:  Sit to Stand: Maximum Assistance;2 Person Assistance(To RW;  Verball cues for safety and technique.)  Stand to sit: Moderate Assistance;2 Person Assistance(From  to [facility].)  Bed to Chair: Maximum assistance(Squat pivot to strong side.; Cues for safety.)     Squat Pivot Transfers: Maximum Assistance(To strong side.)     WB Status: PWB 25% right LE  Ambulation 1  Surface: level tile  Device: Rolling Walker  Other Apparatus: (WC brought up to patient post amb.)  Assistance: Moderate assistance;2 Person assistance(Max x 2 for sit to stand to RW and then Mod x 2 amb.)  Quality of Gait: Antalgic gait pattern.   Max cues for gait sequence due to PWB 25% into R LE.  Slow ezequiel. No LOB. Cues given to relax UE's. Patient relies heavily on them pushing down into RW. Gait Deviations: Slow Ezequiel;Decreased step length;Decreased step height  Distance: 15 ft;  5 ft  Comments: Max encouragement to continue with amb. Stairs/Curb  Stairs?: No              Wheelchair Activities  Wheelchair Type: Standard  Wheelchair Cushion: (waffle cushion)  Pressure Relief Type: Self Adjusts;Push up;Lateral lean  Wheelchair Parts Management: No  Propulsion: Yes  Propulsion 1  Propulsion: Manual  Level: Level Tile  Method: RUE;LUE  Level of Assistance: Stand by assistance  Description/ Details: Straight path, 90* turns, VCs to engage brakes. Tight spaces within room and setting up for transfer to strong side. Distance: 200 ft           BALANCE Posture: Fair  Sitting - Static: Fair;+(EOB, UE support, no back support)  Sitting - Dynamic: Fair(EOB, UE support, no back support)  Standing - Static: Poor(With RW; 2 assist, max VCs to straighten UEs )  Standing - Dynamic: Poor;-(With RW;  Mod x 2 assist.)    EXERCISES    Other exercises?: Yes  Other exercises 1: Supine (B) LE ex, 2x10 reps each to pt tolerance: AROM LLE, A/AROM RLE  Other exercises 2: Squat pivot transfers to strong side. Other exercises 3: Seated (B) LE ex, 2x10, 1# L LE, A/AROM R LE, lime (min) resistance band (hip ABD & L hamstring curls)  Other exercises 4: Seated R LE skates w/towel, 2x10, with education on continuing independently when in W/C to improve knee ROM. (R knee flexion ~30*)  Other exercises 5: UBE x10 minutes (fwd/retro)   Other exercises 6: NuStep x 15 mins. Workload 4.  R LE PROM only  Other exercises 7: Bed mobility  Other exercises 8: Sit to stands to RW x 2 reps in AM and PM.  Other Activities  Comment: Breaks given PRN. Activity Tolerance: Patient limited by pain, Patient limited by endurance  PT Equipment Recommendations  Equipment Needed: No  Other: Patient has RW and wheelchair at home. Other Comments  Comments: Coordinate with nursing with pain med schedule; Pt requires encouragement d/t anxiety/fear of pain        Current Treatment Recommendations: Strengthening, Gait Training, Patient/Caregiver Education & Training, ROM, Balance Training, Endurance Training, Functional Mobility Training, Transfer Training, Safety Education & Training, Stair training, Positioning, Home Exercise Program    Conditions Requiring Skilled Therapeutic Intervention  Body structures, Functions, Activity limitations: Decreased functional mobility ; Decreased endurance;Decreased ROM; Decreased strength;Decreased balance; Increased pain;Decreased ADL status; Decreased posture  Treatment Diagnosis: impaired mobility 2* R hip fx  Prognosis: Good  History: 60 y/o male admitted to [facility]/23/20 with hip pain and fall. 79-year-old male status post fall with right hip pain. Diagnosed with right intertrochanteric femur fracture. Belle Woodson He does have a history of osteogenesis imperfecta and has had multiple fractures. Last fractures were of bilateral patella in November 2019- surgery by Dr. Cook Broseley. Pt admitted to [facility] 4/27/20. Barriers to Learning: pain/anxiety  REQUIRES PT FOLLOW UP: Yes  Discharge Recommendations: Patient would benefit from continued therapy after discharge;Home with assist PRN    Goals  Short term goals  Time Frame for Short term goals: 1 week  Short term goal 1: Pt to reduce pain to 2-3/10 to improve ease and progression of functional mobility. Short term goal 2: Pt to demonstrate min x2 bed mobility with safe technique. Short term goal 3: Pt to demonstrate min x2 transfers with appropriate device and good maintaining of PWB R LE. Short term goal 4: Pt to amb 25'-50' with appropriate device min x2 while maintaining RLE PWB. Short term goal 5: Pt to ascend/descend 2-4 steps with B UE support min x2. Short term goal 6: Pt to improve posture to GOOD.   Short term goal 7: Pt to improve sitting

## 2020-05-15 NOTE — PROGRESS NOTES
Progress Note    5/15/2020   12:04 PM    Name:  Devin Liriano  MRN:    [id number]     Acct:     [de-identified]   Room:  [address] [place] [address] Day: 25     Admit Date: 4/27/2020  6:11 PM  PCP: ADEEL Flores CNP    Subjective:     C/C: Right hip pain    Interval History: Status: improved. Right hip pain controlled  Muscle pain better    ROS:   all 10 systems reviewed and are negative except as noted    Review of Systems   Constitutional: Negative for appetite change and fatigue. HENT: Negative for drooling, facial swelling, mouth sores, sneezing and voice change. Eyes: Negative for photophobia and itching. Respiratory: Negative for apnea, choking, chest tightness and stridor. Cardiovascular: Negative for chest pain, palpitations and leg swelling. Gastrointestinal: Negative for abdominal distention, anal bleeding and rectal pain. Endocrine: Negative for polydipsia and polyuria. Genitourinary: Negative for difficulty urinating, flank pain, frequency and urgency. Musculoskeletal: Positive for arthralgias. Negative for gait problem, joint swelling, myalgias and neck stiffness. Skin: Negative for color change and wound. Allergic/Immunologic: Negative for food allergies. Neurological: Negative for seizures, facial asymmetry, weakness and headaches. Hematological: Negative for adenopathy. Does not bruise/bleed easily. Psychiatric/Behavioral: Negative for agitation, decreased concentration and dysphoric mood. The patient is not hyperactive. Medications:      Allergies: No Known Allergies    Current Meds: insulin glargine (LANTUS) injection vial 40 Units, Nightly  insulin glargine (LANTUS) injection vial 50 Units, QAM  gabapentin (NEURONTIN) capsule 100 mg, TID  lidocaine 4 % external patch 1 patch, Daily  senna (SENOKOT) tablet 17.2 mg, Nightly PRN  bisacodyl (DULCOLAX) suppository 10 mg, Daily PRN  acetaminophen (TYLENOL) tablet 1,000 mg, 3 times per day  sodium chloride flush 0.9 % injection 10 mL, PRN  dextrose 5 % solution, PRN  dextrose 50 % IV solution, PRN  glucagon (rDNA) injection 1 mg, PRN  glucose (GLUTOSE) 40 % oral gel 15 g, PRN  aspirin EC tablet 81 mg, Daily  insulin lispro (HUMALOG) injection vial 0-18 Units, TID WC  insulin lispro (HUMALOG) injection vial 0-9 Units, Nightly  metoprolol succinate (TOPROL XL) extended release tablet 50 mg, BID  pantoprazole (PROTONIX) tablet 40 mg, Daily  sertraline (ZOLOFT) tablet 100 mg, Nightly  traZODone (DESYREL) tablet 100 mg, Nightly  polyethylene glycol (GLYCOLAX) packet 17 g, Daily  enoxaparin (LOVENOX) injection 40 mg, Daily  magnesium hydroxide (MILK OF MAGNESIA) 400 MG/5ML suspension 30 mL, Daily PRN  oxyCODONE (ROXICODONE) immediate release tablet 5 mg, Q4H PRN    Or  oxyCODONE (ROXICODONE) immediate release tablet 10 mg, Q4H PRN        Data:     Code Status:  Full Code    Family History   Problem Relation Age of Onset    Diabetes Mother     Stroke Mother     Diabetes Father     Heart Disease Father        Social History     Socioeconomic History    Marital status:      Spouse name: Not on file    Number of children: Not on file    Years of education: Not on file    Highest education level: Not on file   Occupational History    Not on file   Social Needs    Financial resource strain: Not on file    Food insecurity     Worry: Not on file     Inability: Not on file   [employer] needs     Medical: Not on file     Non-medical: Not on file   Tobacco Use    Smoking status: Former Smoker     Packs/day: 2.00     Years: 18.00     Pack years: 36.00     Types: Cigarettes     Last attempt to quit: 12/3/1991     Years since quittin.4    Smokeless tobacco: Never Used   Substance and Sexual Activity    Alcohol use: Not on file    Drug use: Not on file    Sexual activity: Not on file   Lifestyle    Physical activity     Days per week: Not on file     Minutes per session: Not on file    Stress: Not on file Relationships    Social connections     Talks on phone: Not on file     Gets together: Not on file     Attends Congregational service: Not on file     Active member of club or organization: Not on file     Attends meetings of clubs or organizations: Not on file     Relationship status: Not on file    Intimate partner violence     Fear of current or ex partner: Not on file     Emotionally abused: Not on file     Physically abused: Not on file     Forced sexual activity: Not on file   Other Topics Concern    Not on file   Social History Narrative    Not on file       I/O (24Hr): Intake/Output Summary (Last 24 hours) at 5/15/2020 1204  Last data filed at 5/15/2020 0602  Gross per 24 hour   Intake --   Output 900 ml   Net -900 ml     Radiology:  No results found. Labs:  Recent Results (from the past 24 hour(s))   POC Glucose Fingerstick    Collection Time: 20  3:34 PM   Result Value Ref Range    POC Glucose 105 75 - 110 mg/dL   POC Glucose Fingerstick    Collection Time: 20  8:51 PM   Result Value Ref Range    POC Glucose 227 (H) 75 - 110 mg/dL   POC Glucose Fingerstick    Collection Time: 05/15/20  5:57 AM   Result Value Ref Range    POC Glucose 104 75 - 110 mg/dL   POC Glucose Fingerstick    Collection Time: 05/15/20 10:40 AM   Result Value Ref Range    POC Glucose 225 (H) 75 - 110 mg/dL       Physical Examination:        Vitals:  /66   Pulse 61   Temp 97.2 °F (36.2 °C) (Oral)   Resp 16   Ht 6' (1.829 m)   Wt 190 lb (86.2 kg)   SpO2 97%   BMI 25.77 kg/m²   Temp (24hrs), Av °F (36.7 °C), Min:97.2 °F (36.2 °C), Max:98.8 °F (37.1 °C)    Recent Labs     20  1534 20  2051 05/15/20  0557 05/15/20  1040   POCGLU 105 227* 104 225*         Physical Exam  Vitals signs reviewed. Constitutional:       General: He is not in acute distress. Appearance: He is well-developed. He is not diaphoretic. HENT:      Head: Normocephalic and atraumatic.       Nose: Nose normal.   Eyes:

## 2020-05-15 NOTE — PLAN OF CARE
Problem: Pain:  Goal: Control of acute pain  Description: Control of acute pain  Outcome: Ongoing  Note: Pain assessment completed so far this shift. Pt. able to rest after the use of pain medications. Patient medicated with Humaira Q4hrs PRN and Tylenol Scheduled for pain this shift as ordered.  Patient relates a tolerable level of discomfort with the current medication. Pt. Repositions per self for comfort. Nonverbal cues indicate pain relief. Pt. Rests quietly with eyes closed after pain medication administration. Respirations easy and unlabored. Appears free from distress.         Problem: Falls - Risk of:  Goal: Will remain free from falls  Description: Will remain free from falls  Outcome: Ongoing  Note: No falls or injuries sustained at this time. No attempts to get out of bed without nursing assistance. Call light within reach and pt. uses appropriately for assistance. Siderails up x 2. Nonskid footwear remains on. Bed in low and locked position. Hourly nursing rounds made. Pt. Alert and oriented, aware of limitations, and exhibits good safety judgement. Pt. uses assistive devices appropriately. Pt. understands individual fall risk factors. Pt. room located close to nurse's station. Bed alarm remains engaged throughout the shift as a precaution.         Problem: Skin Integrity:  Goal: Absence of new skin breakdown  Description: Absence of new skin breakdown  Outcome: Ongoing  Note: Skin assessment completed this shift. Nutrition and Hydration status assessed with adequate intake. Yovani Score as charted. Bilateral heels remain elevated on pillows throughout the shift. Patient able to reposition self for comfort and to prevent breakdown. Patient verbalizes understanding of pressure ulcer prevention measures. Skin integrity maintained. No new skin breakdown noted. Skin to high risk pressure areas including heels are clear. Buttocks slightly pink. Right Hip incision staples out, Steristrips intact. MARLENE. No drainage noted.

## 2020-05-15 NOTE — PROGRESS NOTES
Physical Medicine & Rehabilitation  Progress Note      Subjective:      59year-old gentleman with R hip fracture s/p IM nail fixation. Patient is well, and has had no acute complaints or problems. ROS:  Denies fevers, chills, sweats. No chest pain, palpitations, lightheadedness. Denies coughing, wheezing or shortness of breath. Denies abdominal pain, nausea, diarrhea or constipation. No new areas of joint pain. Denies new areas of numbness or weakness. Denies new anxiety or depression issues. No new skin problems. Rehabilitation:   Progressing in therapies. PT:  Restrictions/Precautions: Fall Risk, Weight Bearing, General Precautions  Implants present? : Metal implants  Other position/activity restrictions: hx osteogenesis imperfecta- hx multiple fractures; Low tolerance for Pain - restricts all repositioning and functional mobility / care task performances   Right Lower Extremity Weight Bearing: Partial Weight Bearing  Left Lower Extremity Weight Bearing: Weight Bearing As Tolerated  Right Upper Extremity Weight Bearing: (Reports that he is limited in Active ROM of extension in R elbow due to past fractures/surgeries/conditions/procedures related to elbow fracture))   Transfers  Sit to Stand: Maximum Assistance  Stand to sit: Moderate Assistance  Bed to Chair: Dependent/Total  Stand Pivot Transfers: Dependent/Total([facility])  Squat Pivot Transfers: Maximum Assistance(To strong side.)  Comment: Pt requires VC for proper sequencing, constant cuing to maintain WB status with poor return. Pt agreeable to transfer to chair with SS this date d/t increase pain. Second person SBA for safety. WB Status: PWB 25% right LE  Ambulation 1  Surface: level tile  Device: Parallel Bars  Other Apparatus: (WC brought up to patient post amb.)  Assistance: Moderate assistance, 2 Person assistance(Max x 2 for sit to stand to RW and then Mod x 2 amb.)  Quality of Gait: Antalgic gait pattern.   Max cues for gait PRN  oxyCODONE (ROXICODONE) immediate release tablet 5 mg, 5 mg, Oral, Q4H PRN **OR** oxyCODONE (ROXICODONE) immediate release tablet 10 mg, 10 mg, Oral, Q4H PRN      Impression/Plan:   Impaired ADLs, gait, and mobility due to:      1. R hip fracture s/p IM nail: PT/OT  for gait, mobility, strengthening, endurance, ADLs, and self care. Partial weight bearing 25% RLE. On oxycodone prn, gabapentin. Ice prn. Continue routine Tylenol. 2. R rib pain/tenderness: Improved. Has lidoderm patches   3. L shoulder/arm pain: Ice prn.   4. DM: on sliding scale and ACHS accuchecks. On Lantus with increased dose by IM.      5. HTN: stable on Toprol BID  6. GERD/GI Prophylaxis: on pantoprazole  7. Insomnia: on Trazodone q hs  8. Depression:on Gustavo Dessert hs    9. Bowel management: On miralax daily, senokot prn, milk of magnesia prn and Dulcolax prn. 10. Dr. Thomas Hernandes group for medical management - creatinine is elevated but stable.   11. DVT prophylaxis:  On Lovenox    Electronically signed by Benton Molina MD on 5/15/2020 at 9:47 AM      This note is created with the assistance of a speech recognition program.  While intending to generate a document that actually reflects the content of the visit, the document can still have some errors including those of syntax and sound a like substitutions which may escape proof reading. In such instances, actual meaning can be extrapolated by contextual diversion.

## 2020-05-16 LAB
GLUCOSE BLD-MCNC: 174 MG/DL (ref 75–110)
GLUCOSE BLD-MCNC: 182 MG/DL (ref 75–110)
GLUCOSE BLD-MCNC: 215 MG/DL (ref 75–110)
GLUCOSE BLD-MCNC: 237 MG/DL (ref 75–110)

## 2020-05-16 PROCEDURE — 97110 THERAPEUTIC EXERCISES: CPT

## 2020-05-16 PROCEDURE — 97530 THERAPEUTIC ACTIVITIES: CPT

## 2020-05-16 PROCEDURE — 1180000000 HC REHAB R&B

## 2020-05-16 PROCEDURE — 97535 SELF CARE MNGMENT TRAINING: CPT

## 2020-05-16 PROCEDURE — 6370000000 HC RX 637 (ALT 250 FOR IP): Performed by: PHYSICAL MEDICINE & REHABILITATION

## 2020-05-16 PROCEDURE — 99231 SBSQ HOSP IP/OBS SF/LOW 25: CPT | Performed by: PHYSICAL MEDICINE & REHABILITATION

## 2020-05-16 PROCEDURE — 6360000002 HC RX W HCPCS: Performed by: PHYSICAL MEDICINE & REHABILITATION

## 2020-05-16 PROCEDURE — 6370000000 HC RX 637 (ALT 250 FOR IP): Performed by: ORTHOPAEDIC SURGERY

## 2020-05-16 PROCEDURE — 82947 ASSAY GLUCOSE BLOOD QUANT: CPT

## 2020-05-16 PROCEDURE — 97116 GAIT TRAINING THERAPY: CPT

## 2020-05-16 PROCEDURE — 6370000000 HC RX 637 (ALT 250 FOR IP): Performed by: FAMILY MEDICINE

## 2020-05-16 RX ADMIN — ACETAMINOPHEN 1000 MG: 500 TABLET, FILM COATED ORAL at 06:28

## 2020-05-16 RX ADMIN — OXYCODONE HYDROCHLORIDE 10 MG: 5 TABLET ORAL at 12:19

## 2020-05-16 RX ADMIN — PANTOPRAZOLE SODIUM 40 MG: 40 TABLET, DELAYED RELEASE ORAL at 10:44

## 2020-05-16 RX ADMIN — METOPROLOL SUCCINATE 50 MG: 50 TABLET, EXTENDED RELEASE ORAL at 21:26

## 2020-05-16 RX ADMIN — ASPIRIN 81 MG: 81 TABLET, COATED ORAL at 10:44

## 2020-05-16 RX ADMIN — POLYETHYLENE GLYCOL 3350 17 G: 17 POWDER, FOR SOLUTION ORAL at 10:44

## 2020-05-16 RX ADMIN — INSULIN LISPRO 3 UNITS: 100 INJECTION, SOLUTION INTRAVENOUS; SUBCUTANEOUS at 17:16

## 2020-05-16 RX ADMIN — INSULIN GLARGINE 40 UNITS: 100 INJECTION, SOLUTION SUBCUTANEOUS at 21:27

## 2020-05-16 RX ADMIN — ACETAMINOPHEN 1000 MG: 500 TABLET, FILM COATED ORAL at 21:25

## 2020-05-16 RX ADMIN — OXYCODONE HYDROCHLORIDE 10 MG: 5 TABLET ORAL at 18:05

## 2020-05-16 RX ADMIN — ACETAMINOPHEN 1000 MG: 500 TABLET, FILM COATED ORAL at 14:30

## 2020-05-16 RX ADMIN — INSULIN LISPRO 3 UNITS: 100 INJECTION, SOLUTION INTRAVENOUS; SUBCUTANEOUS at 21:27

## 2020-05-16 RX ADMIN — SERTRALINE HYDROCHLORIDE 100 MG: 100 TABLET ORAL at 21:26

## 2020-05-16 RX ADMIN — METRONIDAZOLE 100 MG: 500 TABLET ORAL at 21:26

## 2020-05-16 RX ADMIN — INSULIN GLARGINE 50 UNITS: 100 INJECTION, SOLUTION SUBCUTANEOUS at 10:45

## 2020-05-16 RX ADMIN — OXYCODONE HYDROCHLORIDE 10 MG: 5 TABLET ORAL at 22:39

## 2020-05-16 RX ADMIN — METRONIDAZOLE 100 MG: 500 TABLET ORAL at 14:30

## 2020-05-16 RX ADMIN — OXYCODONE HYDROCHLORIDE 10 MG: 5 TABLET ORAL at 08:15

## 2020-05-16 RX ADMIN — METRONIDAZOLE 100 MG: 500 TABLET ORAL at 10:44

## 2020-05-16 RX ADMIN — INSULIN LISPRO 6 UNITS: 100 INJECTION, SOLUTION INTRAVENOUS; SUBCUTANEOUS at 10:45

## 2020-05-16 RX ADMIN — ENOXAPARIN SODIUM 40 MG: 40 INJECTION SUBCUTANEOUS at 10:44

## 2020-05-16 RX ADMIN — TRAZODONE HYDROCHLORIDE 100 MG: 100 TABLET ORAL at 21:26

## 2020-05-16 RX ADMIN — METOPROLOL SUCCINATE 50 MG: 50 TABLET, EXTENDED RELEASE ORAL at 10:52

## 2020-05-16 ASSESSMENT — PAIN DESCRIPTION - PROGRESSION

## 2020-05-16 ASSESSMENT — PAIN SCALES - GENERAL
PAINLEVEL_OUTOF10: 3
PAINLEVEL_OUTOF10: 6
PAINLEVEL_OUTOF10: 7
PAINLEVEL_OUTOF10: 7
PAINLEVEL_OUTOF10: 4
PAINLEVEL_OUTOF10: 7
PAINLEVEL_OUTOF10: 3
PAINLEVEL_OUTOF10: 4
PAINLEVEL_OUTOF10: 7
PAINLEVEL_OUTOF10: 0
PAINLEVEL_OUTOF10: 5
PAINLEVEL_OUTOF10: 7

## 2020-05-16 ASSESSMENT — PAIN SCALES - WONG BAKER: WONGBAKER_NUMERICALRESPONSE: 0

## 2020-05-16 ASSESSMENT — PAIN DESCRIPTION - ORIENTATION
ORIENTATION: RIGHT

## 2020-05-16 ASSESSMENT — ENCOUNTER SYMPTOMS
FACIAL SWELLING: 0
CHOKING: 0
STRIDOR: 0
APNEA: 0
RECTAL PAIN: 0
PHOTOPHOBIA: 0
COLOR CHANGE: 0
ANAL BLEEDING: 0
ABDOMINAL DISTENTION: 0
CHEST TIGHTNESS: 0
VOICE CHANGE: 0
EYE ITCHING: 0

## 2020-05-16 ASSESSMENT — PAIN DESCRIPTION - PAIN TYPE
TYPE: ACUTE PAIN

## 2020-05-16 ASSESSMENT — PAIN DESCRIPTION - LOCATION
LOCATION: HIP

## 2020-05-16 ASSESSMENT — PAIN DESCRIPTION - FREQUENCY: FREQUENCY: CONTINUOUS

## 2020-05-16 ASSESSMENT — PAIN DESCRIPTION - DESCRIPTORS
DESCRIPTORS: ACHING
DESCRIPTORS: ACHING;DISCOMFORT

## 2020-05-16 ASSESSMENT — PAIN DESCRIPTION - ONSET: ONSET: ON-GOING

## 2020-05-16 NOTE — PROGRESS NOTES
term goals : by D/C  Long term goal 1: Pt to improve R LE AROM to WNL. Long term goal 2: Pt to improve BLE strength by 1/2 MMG. Long term goal 3: Pt to perform 2MWT with amb at least 76' , Mod I with device. Long term goal 4: Pt to perform bed mobility Mod I.  Long term goal 5: Pt to perform transfers Mod I with device from varied surfaces to simulate home set up. Long term goal 6: Pt to amb at least 100' Mod I with device on varied terrain  Long term goal 7: Pt to ascend/descend 2-4 steps per home set up, including platform steps with RW, SBA. Long term goal 8: Pt to demonstrate Mod I in room with device. Long term goal 9: Pt to improve standing balance to Good- to reduce fall risk at home and in community.        05/16/20 0945 05/16/20 1434   PT Individual Minutes   Time In 0945 1345   Time Out [id number]   Minutes 60 45       Electronically signed by Christine Romero PTA on 5/16/20 at 3:38 PM EDT

## 2020-05-16 NOTE — PROGRESS NOTES
Moderate assistance, 2 Person assistance(Max x 2 for sit to stand to RW and then Mod x 2 amb.)  Quality of Gait: Antalgic gait pattern. Max cues for gait sequence due to PWB 25% into R LE. Slow ezequiel. No LOB. Cues given to relax UE's. Patient relies heavily on them pushing down into RW. Gait Deviations: Slow Ezequiel, Decreased step length, Decreased step height  Distance: 15 ft;  5 ft  Comments: Max encouragement to continue with amb. Transfers  Sit to Stand: Maximum Assistance, 2 Person Assistance(To RW;  Verball cues for safety and technique.)  Stand to sit: Moderate Assistance, 2 Person Assistance(From RW to [facility].)  Bed to Chair: Maximum assistance(Squat pivot to strong side.; Cues for safety.)  Stand Pivot Transfers: Dependent/Total(Anne Peralta)  Squat Pivot Transfers: Maximum Assistance(To strong side.)  Comment: Pt requires VC for proper sequencing, constant cuing to maintain WB status with poor return. Pt agreeable to transfer to chair with SS this date d/t increase pain. Second person SBA for safety. Ambulation  Ambulation?: Yes  WB Status: PWB 25% right LE  Ambulation 1  Surface: level tile  Device: Rolling Walker  Other Apparatus: (WC brought up to patient post amb.)  Assistance: Moderate assistance, 2 Person assistance(Max x 2 for sit to stand to RW and then Mod x 2 amb.)  Quality of Gait: Antalgic gait pattern. Max cues for gait sequence due to PWB 25% into R LE. Slow ezequiel. No LOB. Cues given to relax UE's. Patient relies heavily on them pushing down into RW. Gait Deviations: Slow Ezequiel, Decreased step length, Decreased step height  Distance: 15 ft;  5 ft  Comments: Max encouragement to continue with amb. Surface: level tile  Ambulation 1  Surface: level tile  Device: Rolling Walker  Other Apparatus: (WC brought up to patient post amb.)  Assistance: Moderate assistance, 2 Person assistance(Max x 2 for sit to stand to RW and then Mod x 2 amb.)  Quality of Gait: Antalgic gait pattern. input(s): PROTIME, INR in the last 72 hours. APTT: No results for input(s): APTT in the last 72 hours. CARDIAC ENZYMES: No results for input(s): CKMB, CKMBINDEX, TROPONINT in the last 72 hours.     Invalid input(s): CKTOTAL;3  FASTING LIPID PANEL:  Lab Results   Component Value Date    CHOL 163 10/15/2018    HDL 34 (L) 10/15/2018    TRIG 161 (H) 10/15/2018     LIVER PROFILE:   Recent Labs     05/14/20  0653   AST 12   ALT 11   BILITOT 0.32   ALKPHOS 176*        Current Medications:   Current Facility-Administered Medications: insulin glargine (LANTUS) injection vial 40 Units, 40 Units, Subcutaneous, Nightly  insulin glargine (LANTUS) injection vial 50 Units, 50 Units, Subcutaneous, QAM  gabapentin (NEURONTIN) capsule 100 mg, 100 mg, Oral, TID  lidocaine 4 % external patch 1 patch, 1 patch, Transdermal, Daily  senna (SENOKOT) tablet 17.2 mg, 2 tablet, Oral, Nightly PRN  bisacodyl (DULCOLAX) suppository 10 mg, 10 mg, Rectal, Daily PRN  acetaminophen (TYLENOL) tablet 1,000 mg, 1,000 mg, Oral, 3 times per day  sodium chloride flush 0.9 % injection 10 mL, 10 mL, Intravenous, PRN  dextrose 5 % solution, 100 mL/hr, Intravenous, PRN  dextrose 50 % IV solution, 12.5 g, Intravenous, PRN  glucagon (rDNA) injection 1 mg, 1 mg, Intramuscular, PRN  glucose (GLUTOSE) 40 % oral gel 15 g, 15 g, Oral, PRN  aspirin EC tablet 81 mg, 81 mg, Oral, Daily  insulin lispro (HUMALOG) injection vial 0-18 Units, 0-18 Units, Subcutaneous, TID WC  insulin lispro (HUMALOG) injection vial 0-9 Units, 0-9 Units, Subcutaneous, Nightly  metoprolol succinate (TOPROL XL) extended release tablet 50 mg, 50 mg, Oral, BID  pantoprazole (PROTONIX) tablet 40 mg, 40 mg, Oral, Daily  sertraline (ZOLOFT) tablet 100 mg, 100 mg, Oral, Nightly  traZODone (DESYREL) tablet 100 mg, 100 mg, Oral, Nightly  polyethylene glycol (GLYCOLAX) packet 17 g, 17 g, Oral, Daily  enoxaparin (LOVENOX) injection 40 mg, 40 mg, Subcutaneous, Daily  magnesium hydroxide (MILK OF

## 2020-05-16 NOTE — PROGRESS NOTES
Mod A using DME as appropriate  Short term goal 2: perform toileting routine (transfer/hygiene/clothing management) with Mod A   Short term goal 3: V/D Good understanding of AE/DME/compensatory techniques for self care/mobility tasks   Short term goal 4: V/D Good understanding of PWB (25%) RLE during self care/mobility tasks   Short term goal 5: increase standing tolerance to 5+ minutes with CGA-Min A with 0-1 UE support during functional task   Short term goal 6: actively participate in 30+ minutes of therapeutic exercise/activity to promote increased safety and independence with self care and mobility tasks   Long term goals  Time Frame for Long term goals : by discharge, patient will  Long term goal 1: perform functional transfers/mobility during ADL routine with Modified [place] using DME as appropriate  Long term goal 2: perform BADL tasks with independence/modified independence using AE/compensatory techniques/DME as needed  Long term goal 3: V/D Good understanding of BUE HEP for continued strength for functional tasks  Long term goal 4: V/D Good understanding of DME for increased bathroom safety for safe d/c home   Long term goal 5: perform simple meal prep/laundry task/other desired IADL task with Modified independence using DME/compensatory techniques as needed        Electronically signed by MARLENE Claire on 5/16/20 at 4:12 PM EDT         05/16/20 0941 05/16/20 1547   OT Individual Minutes   Time In [address]   Time Out 0935 1335   Minutes 59 31

## 2020-05-16 NOTE — PROGRESS NOTES
Progress Note    5/16/2020   2:47 PM    Name:  Judy Hannah  MRN:    [id number]     Acct:     [de-identified]   Room:  [address] [place] [address] Day: 23     Admit Date: 4/27/2020  6:11 PM  PCP: ADEEL West CNP    Subjective:     C/C: Right hip pain    Interval History: Status: improved. Right hip pain controlled  Muscle pain better    Doing well  No chest pain or shortness of breath  Hip pain is better  No fever  ROS:   all 10 systems reviewed and are negative except as noted    Review of Systems   Constitutional: Negative for appetite change and fatigue. HENT: Negative for drooling, facial swelling, mouth sores, sneezing and voice change. Eyes: Negative for photophobia and itching. Respiratory: Negative for apnea, choking, chest tightness and stridor. Cardiovascular: Negative for chest pain, palpitations and leg swelling. Gastrointestinal: Negative for abdominal distention, anal bleeding and rectal pain. Endocrine: Negative for polydipsia and polyuria. Genitourinary: Negative for difficulty urinating, flank pain, frequency and urgency. Musculoskeletal: Positive for arthralgias. Negative for gait problem, joint swelling, myalgias and neck stiffness. Skin: Negative for color change and wound. Allergic/Immunologic: Negative for food allergies. Neurological: Negative for seizures, facial asymmetry, weakness and headaches. Hematological: Negative for adenopathy. Does not bruise/bleed easily. Psychiatric/Behavioral: Negative for agitation, decreased concentration and dysphoric mood. The patient is not hyperactive. Medications:      Allergies: No Known Allergies    Current Meds: insulin glargine (LANTUS) injection vial 40 Units, Nightly  insulin glargine (LANTUS) injection vial 50 Units, QAM  gabapentin (NEURONTIN) capsule 100 mg, TID  lidocaine 4 % external patch 1 patch, Daily  senna (SENOKOT) tablet 17.2 mg, Nightly PRN  bisacodyl (DULCOLAX) suppository 10 mg, Daily PRN  acetaminophen (TYLENOL) tablet 1,000 mg, 3 times per day  sodium chloride flush 0.9 % injection 10 mL, PRN  dextrose 5 % solution, PRN  dextrose 50 % IV solution, PRN  glucagon (rDNA) injection 1 mg, PRN  glucose (GLUTOSE) 40 % oral gel 15 g, PRN  aspirin EC tablet 81 mg, Daily  insulin lispro (HUMALOG) injection vial 0-18 Units, TID WC  insulin lispro (HUMALOG) injection vial 0-9 Units, Nightly  metoprolol succinate (TOPROL XL) extended release tablet 50 mg, BID  pantoprazole (PROTONIX) tablet 40 mg, Daily  sertraline (ZOLOFT) tablet 100 mg, Nightly  traZODone (DESYREL) tablet 100 mg, Nightly  polyethylene glycol (GLYCOLAX) packet 17 g, Daily  enoxaparin (LOVENOX) injection 40 mg, Daily  magnesium hydroxide (MILK OF MAGNESIA) 400 MG/5ML suspension 30 mL, Daily PRN  oxyCODONE (ROXICODONE) immediate release tablet 5 mg, Q4H PRN    Or  oxyCODONE (ROXICODONE) immediate release tablet 10 mg, Q4H PRN        Data:     Code Status:  Full Code    Family History   Problem Relation Age of Onset    Diabetes Mother     Stroke Mother     Diabetes Father     Heart Disease Father        Social History     Socioeconomic History    Marital status:      Spouse name: Not on file    Number of children: Not on file    Years of education: Not on file    Highest education level: Not on file   Occupational History    Not on file   Social Needs    Financial resource strain: Not on file    Food insecurity     Worry: Not on file     Inability: Not on file   [employer] needs     Medical: Not on file     Non-medical: Not on file   Tobacco Use    Smoking status: Former Smoker     Packs/day: 2.00     Years: 18.00     Pack years: 36.00     Types: Cigarettes     Last attempt to quit: 12/3/1991     Years since quittin.4    Smokeless tobacco: Never Used   Substance and Sexual Activity    Alcohol use: Not on file    Drug use: Not on file    Sexual activity: Not on file   Lifestyle    Physical activity     Days per week: Not on file     Minutes per session: Not on file    Stress: Not on file   Relationships    Social connections     Talks on phone: Not on file     Gets together: Not on file     Attends Zoroastrianism service: Not on file     Active member of club or organization: Not on file     Attends meetings of clubs or organizations: Not on file     Relationship status: Not on file    Intimate partner violence     Fear of current or ex partner: Not on file     Emotionally abused: Not on file     Physically abused: Not on file     Forced sexual activity: Not on file   Other Topics Concern    Not on file   Social History Narrative    Not on file       I/O (24Hr): Intake/Output Summary (Last 24 hours) at 2020 1447  Last data filed at 2020 1339  Gross per 24 hour   Intake --   Output 1075 ml   Net -1075 ml     Radiology:  No results found. Labs:  Recent Results (from the past 24 hour(s))   POC Glucose Fingerstick    Collection Time: 05/15/20  8:17 PM   Result Value Ref Range    POC Glucose 264 (H) 75 - 110 mg/dL   POC Glucose Fingerstick    Collection Time: 20  7:33 AM   Result Value Ref Range    POC Glucose 237 (H) 75 - 110 mg/dL   POC Glucose Fingerstick    Collection Time: 20 10:49 AM   Result Value Ref Range    POC Glucose 174 (H) 75 - 110 mg/dL       Physical Examination:        Vitals:  /64   Pulse 70   Temp 97.3 °F (36.3 °C) (Oral)   Resp 18   Ht 6' (1.829 m)   Wt 190 lb (86.2 kg)   SpO2 99%   BMI 25.77 kg/m²   Temp (24hrs), Av °F (36.7 °C), Min:97.3 °F (36.3 °C), Max:98.6 °F (37 °C)    Recent Labs     05/15/20  1040 05/15/20  2017 20  0733 20  1049   POCGLU 225* 264* 237* 174*         Physical Exam  Vitals signs reviewed. Constitutional:       General: He is not in acute distress. Appearance: He is well-developed. He is not diaphoretic. HENT:      Head: Normocephalic and atraumatic.       Nose: Nose normal.   Eyes:      General: Lids are normal.

## 2020-05-17 LAB
GLUCOSE BLD-MCNC: 173 MG/DL (ref 75–110)
GLUCOSE BLD-MCNC: 173 MG/DL (ref 75–110)
GLUCOSE BLD-MCNC: 187 MG/DL (ref 75–110)
GLUCOSE BLD-MCNC: 89 MG/DL (ref 75–110)

## 2020-05-17 PROCEDURE — 97110 THERAPEUTIC EXERCISES: CPT

## 2020-05-17 PROCEDURE — 97535 SELF CARE MNGMENT TRAINING: CPT

## 2020-05-17 PROCEDURE — 97116 GAIT TRAINING THERAPY: CPT

## 2020-05-17 PROCEDURE — 6370000000 HC RX 637 (ALT 250 FOR IP): Performed by: PHYSICAL MEDICINE & REHABILITATION

## 2020-05-17 PROCEDURE — 6360000002 HC RX W HCPCS: Performed by: PHYSICAL MEDICINE & REHABILITATION

## 2020-05-17 PROCEDURE — 82947 ASSAY GLUCOSE BLOOD QUANT: CPT

## 2020-05-17 PROCEDURE — 6370000000 HC RX 637 (ALT 250 FOR IP): Performed by: FAMILY MEDICINE

## 2020-05-17 PROCEDURE — 6370000000 HC RX 637 (ALT 250 FOR IP): Performed by: ORTHOPAEDIC SURGERY

## 2020-05-17 PROCEDURE — 97530 THERAPEUTIC ACTIVITIES: CPT

## 2020-05-17 PROCEDURE — 1180000000 HC REHAB R&B

## 2020-05-17 RX ADMIN — OXYCODONE HYDROCHLORIDE 10 MG: 5 TABLET ORAL at 05:49

## 2020-05-17 RX ADMIN — TRAZODONE HYDROCHLORIDE 100 MG: 100 TABLET ORAL at 20:35

## 2020-05-17 RX ADMIN — ENOXAPARIN SODIUM 40 MG: 40 INJECTION SUBCUTANEOUS at 07:26

## 2020-05-17 RX ADMIN — METRONIDAZOLE 100 MG: 500 TABLET ORAL at 14:49

## 2020-05-17 RX ADMIN — SERTRALINE HYDROCHLORIDE 100 MG: 100 TABLET ORAL at 20:35

## 2020-05-17 RX ADMIN — INSULIN LISPRO 2 UNITS: 100 INJECTION, SOLUTION INTRAVENOUS; SUBCUTANEOUS at 20:37

## 2020-05-17 RX ADMIN — ACETAMINOPHEN 1000 MG: 500 TABLET, FILM COATED ORAL at 20:35

## 2020-05-17 RX ADMIN — METOPROLOL SUCCINATE 50 MG: 50 TABLET, EXTENDED RELEASE ORAL at 20:35

## 2020-05-17 RX ADMIN — METRONIDAZOLE 100 MG: 500 TABLET ORAL at 20:35

## 2020-05-17 RX ADMIN — INSULIN LISPRO 3 UNITS: 100 INJECTION, SOLUTION INTRAVENOUS; SUBCUTANEOUS at 17:14

## 2020-05-17 RX ADMIN — METRONIDAZOLE 100 MG: 500 TABLET ORAL at 07:26

## 2020-05-17 RX ADMIN — METOPROLOL SUCCINATE 50 MG: 50 TABLET, EXTENDED RELEASE ORAL at 10:38

## 2020-05-17 RX ADMIN — INSULIN LISPRO 3 UNITS: 100 INJECTION, SOLUTION INTRAVENOUS; SUBCUTANEOUS at 11:12

## 2020-05-17 RX ADMIN — INSULIN GLARGINE 40 UNITS: 100 INJECTION, SOLUTION SUBCUTANEOUS at 20:36

## 2020-05-17 RX ADMIN — ASPIRIN 81 MG: 81 TABLET, COATED ORAL at 07:31

## 2020-05-17 RX ADMIN — ACETAMINOPHEN 1000 MG: 500 TABLET, FILM COATED ORAL at 14:49

## 2020-05-17 RX ADMIN — POLYETHYLENE GLYCOL 3350 17 G: 17 POWDER, FOR SOLUTION ORAL at 07:26

## 2020-05-17 RX ADMIN — OXYCODONE HYDROCHLORIDE 10 MG: 5 TABLET ORAL at 16:04

## 2020-05-17 RX ADMIN — OXYCODONE HYDROCHLORIDE 10 MG: 5 TABLET ORAL at 20:36

## 2020-05-17 RX ADMIN — INSULIN GLARGINE 50 UNITS: 100 INJECTION, SOLUTION SUBCUTANEOUS at 07:26

## 2020-05-17 RX ADMIN — OXYCODONE HYDROCHLORIDE 10 MG: 5 TABLET ORAL at 09:44

## 2020-05-17 RX ADMIN — ACETAMINOPHEN 1000 MG: 500 TABLET, FILM COATED ORAL at 05:49

## 2020-05-17 RX ADMIN — PANTOPRAZOLE SODIUM 40 MG: 40 TABLET, DELAYED RELEASE ORAL at 07:25

## 2020-05-17 ASSESSMENT — PAIN DESCRIPTION - DESCRIPTORS
DESCRIPTORS_2: STABBING
DESCRIPTORS: DISCOMFORT
DESCRIPTORS: ACHING

## 2020-05-17 ASSESSMENT — PAIN DESCRIPTION - LOCATION
LOCATION: HIP
LOCATION_2: ARM
LOCATION: HIP
LOCATION: HIP

## 2020-05-17 ASSESSMENT — PAIN SCALES - GENERAL
PAINLEVEL_OUTOF10: 4
PAINLEVEL_OUTOF10: 4
PAINLEVEL_OUTOF10: 5
PAINLEVEL_OUTOF10: 6
PAINLEVEL_OUTOF10: 5
PAINLEVEL_OUTOF10: 7
PAINLEVEL_OUTOF10: 6
PAINLEVEL_OUTOF10: 0
PAINLEVEL_OUTOF10: 7
PAINLEVEL_OUTOF10: 5
PAINLEVEL_OUTOF10: 4

## 2020-05-17 ASSESSMENT — PAIN DESCRIPTION - PROGRESSION: CLINICAL_PROGRESSION: GRADUALLY IMPROVING

## 2020-05-17 ASSESSMENT — PAIN DESCRIPTION - INTENSITY: RATING_2: 5

## 2020-05-17 ASSESSMENT — ENCOUNTER SYMPTOMS
STRIDOR: 0
APNEA: 0
FACIAL SWELLING: 0
COLOR CHANGE: 0
EYE ITCHING: 0
RECTAL PAIN: 0
VOICE CHANGE: 0
ABDOMINAL DISTENTION: 0
CHOKING: 0
PHOTOPHOBIA: 0
ANAL BLEEDING: 0
CHEST TIGHTNESS: 0

## 2020-05-17 ASSESSMENT — PAIN DESCRIPTION - ORIENTATION
ORIENTATION: RIGHT
ORIENTATION_2: LEFT

## 2020-05-17 ASSESSMENT — PAIN DESCRIPTION - PAIN TYPE
TYPE: ACUTE PAIN
TYPE_2: ACUTE PAIN

## 2020-05-17 ASSESSMENT — PAIN DESCRIPTION - FREQUENCY: FREQUENCY: CONTINUOUS

## 2020-05-17 ASSESSMENT — PAIN DESCRIPTION - ONSET: ONSET: ON-GOING

## 2020-05-17 NOTE — PROGRESS NOTES
Physical Therapy  [address] [facility]   Acute Rehabilitation Physical Therapy Progress Note    Date: 20  Patient Name: Shankar Viveros       Room: 0451/2601-41  MRN: [id number]   Account: [de-identified]   : 1955  (62 y.o.) Gender: male     Referring Practitioner: Jos Zimmerman  Diagnosis: Closed R hip fx  Past Medical History:  has a past medical history of Contracture, elbow, Erectile dysfunction, Hypertension, Osteogenesis imperfecta, and Type II or unspecified type diabetes mellitus without mention of complication, not stated as uncontrolled. Past Surgical History:   has a past surgical history that includes Cholecystectomy; Tonsillectomy and adenoidectomy; fracture surgery (Right); fracture surgery (Bilateral); fracture surgery (Left); and Femur fracture surgery (Right, 2020). Additional Pertinent Hx: 58 y/o male admitted to [facility] 20 with R hip pain and fall. Diagnosed with right intertrochanteric femur fracture. Last fractures were of bilateral patella in 2019- surgery by Dr. Sheila Nixon. Pt admitted to [facility] 20.     Overall Orientation Status: Within Normal Limits  Restrictions/Precautions  Restrictions/Precautions: Fall Risk;Weight Bearing;General Precautions  Required Braces or Orthoses?: No  Implants present? : Metal implants  Lower Extremity Weight Bearing Restrictions  Right Lower Extremity Weight Bearing: Partial Weight Bearing  Partial Weight Bearing Percentage Or Pounds: 25%   Left Lower Extremity Weight Bearing: Weight Bearing As Tolerated  Partial Weight Bearing Percentage Or Pounds: Reports that he only recently was able to achieve 90+ degrees of flexion in R knee due to past fractures/surgeries/conditions/procedures   Upper Extremity Weight Bearing Restrictions  Right Upper Extremity Weight Bearing: (Reports that he is limited in Active ROM of extension in R elbow due to past fractures/surgeries/conditions/procedures related to med schedule; Pt requires encouragement d/t anxiety/fear of pain      Current Treatment Recommendations: Strengthening, Gait Training, Patient/Caregiver Education & Training, ROM, Balance Training, Endurance Training, Functional Mobility Training, Transfer Training, Safety Education & Training, Stair training, Positioning, Home Exercise Program    Conditions Requiring Skilled Therapeutic Intervention  Body structures, Functions, Activity limitations: Decreased functional mobility ; Decreased endurance;Decreased ROM; Decreased strength;Decreased balance; Increased pain;Decreased ADL status; Decreased posture  Treatment Diagnosis: impaired mobility 2* R hip fx  Prognosis: Good  History: 60 y/o male admitted to [address] [place] [address] 4/23/20 with hip pain and fall. 51-year-old male status post fall with right hip pain. Diagnosed with right intertrochanteric femur fracture. Maxi Mars[place] He does have a history of osteogenesis imperfecta and has had multiple fractures. Last fractures were of bilateral patella in November 2019- surgery by Dr. Tennilel Lara. Pt admitted to [facility] 4/27/20. Barriers to Learning: pain/anxiety  REQUIRES PT FOLLOW UP: Yes  Discharge Recommendations: Patient would benefit from continued therapy after discharge;Home with assist PRN    Goals  Short term goals  Time Frame for Short term goals: 1 week  Short term goal 1: Pt to reduce pain to 2-3/10 to improve ease and progression of functional mobility. Short term goal 2: Pt to demonstrate min x2 bed mobility with safe technique. Short term goal 3: Pt to demonstrate min x2 transfers with appropriate device and good maintaining of PWB R LE. Short term goal 4: Pt to amb 25'-50' with appropriate device min x2 while maintaining RLE PWB. Short term goal 5: Pt to ascend/descend 2-4 steps with B UE support min x2. Short term goal 6: Pt to improve posture to GOOD. Short term goal 7: Pt to improve sitting balance to Good (Static/dynamic).    Short term goal 8: Pt to

## 2020-05-17 NOTE — PROGRESS NOTES
Appearance: He is well-developed. He is not diaphoretic. HENT:      Head: Normocephalic and atraumatic. Nose: Nose normal.   Eyes:      General: Lids are normal.      Conjunctiva/sclera: Conjunctivae normal.   Neck:      Musculoskeletal: Normal range of motion and neck supple. Trachea: No tracheal deviation. Cardiovascular:      Rate and Rhythm: Normal rate and regular rhythm. Heart sounds: Normal heart sounds, S1 normal and S2 normal.   Pulmonary:      Effort: Pulmonary effort is normal. No respiratory distress. Breath sounds: Normal breath sounds. No decreased breath sounds, wheezing or rhonchi. Abdominal:      General: Bowel sounds are normal. There is no distension. Palpations: Abdomen is soft. Tenderness: There is no abdominal tenderness. Musculoskeletal:         General: Tenderness present. No deformity. Lymphadenopathy:      Cervical: No cervical adenopathy. Skin:     General: Skin is warm and dry. Capillary Refill: Capillary refill takes less than 2 seconds. Nails: There is no clubbing. Neurological:      Mental Status: He is alert. Motor: No abnormal muscle tone or seizure activity. Coordination: Coordination normal.   Psychiatric:         Behavior: Behavior normal.         Thought Content: Thought content normal.         Judgment: Judgment normal.         Assessment:        Primary Problem  Closed right hip fracture, initial encounter ([facility]     Principal Problem:    Closed right hip fracture, initial encounter [facility])  Active Problems:    Essential hypertension    Microalbuminuria due to type 2 diabetes mellitus ([facility]    Stage 3 chronic kidney disease ([facility]    Type 2 diabetes mellitus with chronic kidney disease ([facility]    Type 2 diabetes mellitus with hyperglycemia (H[facility])    Gastroesophageal reflux disease without esophagitis  Resolved Problems:    * No resolved hospital problems.  *      Past Medical History:   Diagnosis Date    Contracture, elbow     h/o right elbow fx, compound fracture.  Erectile dysfunction     Hypertension     Osteogenesis imperfecta     DX 6 months. 30 broken bones in past.     Type II or unspecified type diabetes mellitus without mention of complication, not stated as uncontrolled         Plan:        1. Continue with insulin. lantus  2. PPI on Protonix  3. DVT Prophylaxis on Lovenox  4. EPCs  5.  PT/OT to evaluate and treat  6.  check and replace electrolytes per sliding scale    5/16  Patient is doing well post hip fracture  No fever    Sugar control noted  We will continue coverage and Lantus    Hypertension  Blood pressure is controlled  No shortness of breath or chest pain    Medications reviewed  Continue same for now      May 17  Patient is stable  Pain control is adequate  No DVT    Blood pressure is well controlled    Blood sugars controlled    Medications reviewed continue same    Electronically signed by Jb Obrien MD

## 2020-05-18 LAB
ABSOLUTE EOS #: 0.5 K/UL (ref 0–0.4)
ABSOLUTE IMMATURE GRANULOCYTE: ABNORMAL K/UL (ref 0–0.3)
ABSOLUTE LYMPH #: 0.8 K/UL (ref 1–4.8)
ABSOLUTE MONO #: 0.3 K/UL (ref 0.1–1.3)
ALBUMIN SERPL-MCNC: 3.7 G/DL (ref 3.5–5.2)
ALBUMIN/GLOBULIN RATIO: ABNORMAL (ref 1–2.5)
ALP BLD-CCNC: 176 U/L (ref 40–129)
ALT SERPL-CCNC: 10 U/L (ref 5–41)
ANION GAP SERPL CALCULATED.3IONS-SCNC: 13 MMOL/L (ref 9–17)
AST SERPL-CCNC: 13 U/L
BASOPHILS # BLD: 1 % (ref 0–2)
BASOPHILS ABSOLUTE: 0 K/UL (ref 0–0.2)
BILIRUB SERPL-MCNC: 0.35 MG/DL (ref 0.3–1.2)
BUN BLDV-MCNC: 22 MG/DL (ref 8–23)
BUN/CREAT BLD: ABNORMAL (ref 9–20)
CALCIUM SERPL-MCNC: 9.3 MG/DL (ref 8.6–10.4)
CHLORIDE BLD-SCNC: 100 MMOL/L (ref 98–107)
CO2: 26 MMOL/L (ref 20–31)
CREAT SERPL-MCNC: 1.34 MG/DL (ref 0.7–1.2)
DIFFERENTIAL TYPE: ABNORMAL
EOSINOPHILS RELATIVE PERCENT: 14 % (ref 0–4)
GFR AFRICAN AMERICAN: >60 ML/MIN
GFR NON-AFRICAN AMERICAN: 54 ML/MIN
GFR SERPL CREATININE-BSD FRML MDRD: ABNORMAL ML/MIN/{1.73_M2}
GFR SERPL CREATININE-BSD FRML MDRD: ABNORMAL ML/MIN/{1.73_M2}
GLUCOSE BLD-MCNC: 113 MG/DL (ref 75–110)
GLUCOSE BLD-MCNC: 169 MG/DL (ref 70–99)
GLUCOSE BLD-MCNC: 177 MG/DL (ref 75–110)
GLUCOSE BLD-MCNC: 188 MG/DL (ref 75–110)
GLUCOSE BLD-MCNC: 368 MG/DL (ref 75–110)
HCT VFR BLD CALC: 34.1 % (ref 41–53)
HEMOGLOBIN: 11.1 G/DL (ref 13.5–17.5)
IMMATURE GRANULOCYTES: ABNORMAL %
LYMPHOCYTES # BLD: 21 % (ref 24–44)
MCH RBC QN AUTO: 29.5 PG (ref 26–34)
MCHC RBC AUTO-ENTMCNC: 32.5 G/DL (ref 31–37)
MCV RBC AUTO: 90.7 FL (ref 80–100)
MONOCYTES # BLD: 9 % (ref 1–7)
NRBC AUTOMATED: ABNORMAL PER 100 WBC
PDW BLD-RTO: 15.3 % (ref 11.5–14.9)
PLATELET # BLD: 197 K/UL (ref 150–450)
PLATELET ESTIMATE: ABNORMAL
PMV BLD AUTO: 8.7 FL (ref 6–12)
POTASSIUM SERPL-SCNC: 4.1 MMOL/L (ref 3.7–5.3)
RBC # BLD: 3.75 M/UL (ref 4.5–5.9)
RBC # BLD: ABNORMAL 10*6/UL
SEG NEUTROPHILS: 55 % (ref 36–66)
SEGMENTED NEUTROPHILS ABSOLUTE COUNT: 2 K/UL (ref 1.3–9.1)
SODIUM BLD-SCNC: 139 MMOL/L (ref 135–144)
TOTAL PROTEIN: 7 G/DL (ref 6.4–8.3)
WBC # BLD: 3.7 K/UL (ref 3.5–11)
WBC # BLD: ABNORMAL 10*3/UL

## 2020-05-18 PROCEDURE — 80053 COMPREHEN METABOLIC PANEL: CPT

## 2020-05-18 PROCEDURE — 1180000000 HC REHAB R&B

## 2020-05-18 PROCEDURE — 6370000000 HC RX 637 (ALT 250 FOR IP): Performed by: ORTHOPAEDIC SURGERY

## 2020-05-18 PROCEDURE — 97530 THERAPEUTIC ACTIVITIES: CPT

## 2020-05-18 PROCEDURE — 6360000002 HC RX W HCPCS: Performed by: PHYSICAL MEDICINE & REHABILITATION

## 2020-05-18 PROCEDURE — 85025 COMPLETE CBC W/AUTO DIFF WBC: CPT

## 2020-05-18 PROCEDURE — 97535 SELF CARE MNGMENT TRAINING: CPT

## 2020-05-18 PROCEDURE — 99231 SBSQ HOSP IP/OBS SF/LOW 25: CPT | Performed by: PHYSICAL MEDICINE & REHABILITATION

## 2020-05-18 PROCEDURE — 6370000000 HC RX 637 (ALT 250 FOR IP): Performed by: FAMILY MEDICINE

## 2020-05-18 PROCEDURE — 6370000000 HC RX 637 (ALT 250 FOR IP): Performed by: PHYSICAL MEDICINE & REHABILITATION

## 2020-05-18 PROCEDURE — 36415 COLL VENOUS BLD VENIPUNCTURE: CPT

## 2020-05-18 PROCEDURE — 97110 THERAPEUTIC EXERCISES: CPT

## 2020-05-18 PROCEDURE — 82947 ASSAY GLUCOSE BLOOD QUANT: CPT

## 2020-05-18 PROCEDURE — 97116 GAIT TRAINING THERAPY: CPT

## 2020-05-18 RX ADMIN — OXYCODONE HYDROCHLORIDE 10 MG: 5 TABLET ORAL at 06:09

## 2020-05-18 RX ADMIN — INSULIN LISPRO 7 UNITS: 100 INJECTION, SOLUTION INTRAVENOUS; SUBCUTANEOUS at 21:18

## 2020-05-18 RX ADMIN — SERTRALINE HYDROCHLORIDE 100 MG: 100 TABLET ORAL at 20:05

## 2020-05-18 RX ADMIN — ACETAMINOPHEN 1000 MG: 500 TABLET, FILM COATED ORAL at 06:09

## 2020-05-18 RX ADMIN — TRAZODONE HYDROCHLORIDE 100 MG: 100 TABLET ORAL at 20:06

## 2020-05-18 RX ADMIN — ASPIRIN 81 MG: 81 TABLET, COATED ORAL at 07:06

## 2020-05-18 RX ADMIN — INSULIN LISPRO 3 UNITS: 100 INJECTION, SOLUTION INTRAVENOUS; SUBCUTANEOUS at 10:59

## 2020-05-18 RX ADMIN — INSULIN GLARGINE 40 UNITS: 100 INJECTION, SOLUTION SUBCUTANEOUS at 21:17

## 2020-05-18 RX ADMIN — PANTOPRAZOLE SODIUM 40 MG: 40 TABLET, DELAYED RELEASE ORAL at 07:06

## 2020-05-18 RX ADMIN — ENOXAPARIN SODIUM 40 MG: 40 INJECTION SUBCUTANEOUS at 07:06

## 2020-05-18 RX ADMIN — METOPROLOL SUCCINATE 50 MG: 50 TABLET, EXTENDED RELEASE ORAL at 20:05

## 2020-05-18 RX ADMIN — ACETAMINOPHEN 1000 MG: 500 TABLET, FILM COATED ORAL at 13:05

## 2020-05-18 RX ADMIN — METRONIDAZOLE 100 MG: 500 TABLET ORAL at 20:05

## 2020-05-18 RX ADMIN — METRONIDAZOLE 100 MG: 500 TABLET ORAL at 07:06

## 2020-05-18 RX ADMIN — ACETAMINOPHEN 1000 MG: 500 TABLET, FILM COATED ORAL at 21:18

## 2020-05-18 RX ADMIN — METOPROLOL SUCCINATE 50 MG: 50 TABLET, EXTENDED RELEASE ORAL at 07:38

## 2020-05-18 RX ADMIN — INSULIN GLARGINE 50 UNITS: 100 INJECTION, SOLUTION SUBCUTANEOUS at 07:06

## 2020-05-18 RX ADMIN — POLYETHYLENE GLYCOL 3350 17 G: 17 POWDER, FOR SOLUTION ORAL at 07:05

## 2020-05-18 RX ADMIN — METRONIDAZOLE 100 MG: 500 TABLET ORAL at 13:05

## 2020-05-18 RX ADMIN — OXYCODONE HYDROCHLORIDE 10 MG: 5 TABLET ORAL at 10:33

## 2020-05-18 RX ADMIN — OXYCODONE HYDROCHLORIDE 10 MG: 5 TABLET ORAL at 16:30

## 2020-05-18 RX ADMIN — INSULIN LISPRO 3 UNITS: 100 INJECTION, SOLUTION INTRAVENOUS; SUBCUTANEOUS at 16:27

## 2020-05-18 ASSESSMENT — ENCOUNTER SYMPTOMS
ABDOMINAL DISTENTION: 0
BLOOD IN STOOL: 0
TROUBLE SWALLOWING: 0
RECTAL PAIN: 0
VOICE CHANGE: 0
ANAL BLEEDING: 0
CHOKING: 0
COLOR CHANGE: 0
STRIDOR: 0
BACK PAIN: 0
PHOTOPHOBIA: 0

## 2020-05-18 ASSESSMENT — PAIN DESCRIPTION - LOCATION
LOCATION: HIP
LOCATION: HIP

## 2020-05-18 ASSESSMENT — PAIN DESCRIPTION - ORIENTATION
ORIENTATION: RIGHT
ORIENTATION: RIGHT

## 2020-05-18 ASSESSMENT — PAIN SCALES - GENERAL
PAINLEVEL_OUTOF10: 5
PAINLEVEL_OUTOF10: 7
PAINLEVEL_OUTOF10: 4
PAINLEVEL_OUTOF10: 4
PAINLEVEL_OUTOF10: 5
PAINLEVEL_OUTOF10: 6
PAINLEVEL_OUTOF10: 5
PAINLEVEL_OUTOF10: 5
PAINLEVEL_OUTOF10: 7

## 2020-05-18 NOTE — PROGRESS NOTES
Apparatus: (WC brought up to patient post amb.)  Assistance: Moderate assistance(Max x 2 for sit to stand to RW and then Mod x 2 amb.)  Quality of Gait: Antalgic gait pattern. Max cues for gait sequence due to PWB 25% into R LE. Slow ezequiel. No LOB. Cues given to relax UE's. Patient relies heavily on them pushing down into RW. Gait Deviations: Slow Ezequiel, Decreased step length, Decreased step height  Distance: 15 ft AM and PM  Comments: Max encouragement to continue with amb. Transfers  Sit to Stand: Maximum Assistance, 2 Person Assistance(To RW;  Verball cues for safety and technique.)  Stand to sit: Moderate Assistance, 2 Person Assistance(From RW to [facility].)  Bed to Chair: Maximum assistance(Squat pivot to strong side.; Cues for safety.)  Stand Pivot Transfers: Dependent/Total(Anne Fabian)  Squat Pivot Transfers: Maximum Assistance(To strong side.)  Lateral Transfers: Minimal Assistance(slide board to strong side.)  Comment: Pt requires VC for proper sequencing, constant cuing to maintain WB status with poor return. Pt agreeable to transfer to chair with SS this date d/t increase pain. Second person SBA for safety. Ambulation  Ambulation?: Yes  WB Status: PWB 25% right LE  Ambulation 1  Surface: level tile  Device: Rolling Walker  Other Apparatus: (WC brought up to patient post amb.)  Assistance: Moderate assistance(Max x 2 for sit to stand to RW and then Mod x 2 amb.)  Quality of Gait: Antalgic gait pattern. Max cues for gait sequence due to PWB 25% into R LE. Slow ezequiel. No LOB. Cues given to relax UE's. Patient relies heavily on them pushing down into RW. Gait Deviations: Slow Ezequiel, Decreased step length, Decreased step height  Distance: 15 ft AM and PM  Comments: Max encouragement to continue with amb. Surface: level tile  Ambulation 1  Surface: level tile  Device: Rolling Walker  Other Apparatus: (WC brought up to patient post amb.)  Assistance:  Moderate assistance(Max x 2 for sit to effort this date and req tony felicianojohana to complete sit>stand transfer. Toilet Transfers  Toilet - Technique: (c tony stedy )  Equipment Used: Grab bars  Toilet Transfer: Dependent/Total  Toilet Transfers Comments: AM: with use  tony steady. Guidance and assist to sit due to lack of flexion in knees; Mod A to get up from toilet       Shower Transfers  Shower - Transfer From: Other(utilizing tony stedy )  Shower - Transfer Type: To and From  Shower - Transfer To: Other(roll in shower chair )  Shower - Technique: Stand pivot  Shower Transfers: Dependent  Shower Transfers Comments: pt completed transfer using TONY Stedy and roll-in shower chair. verbal cueing for transition from surfaces    Wheelchair Bed Transfers  Wheelchair/Bed - Technique: Stand step(TONY Stedy and rasied/tall bed height )  Equipment Used: Bed, Other  Level of Asssistance: Moderate assistance  Wheelchair Transfers Comments: mod x2 for safety     SPEECH:      Objective:  /69   Pulse 67   Temp 97.7 °F (36.5 °C) (Oral)   Resp 16   Ht 6' (1.829 m)   Wt 190 lb (86.2 kg)   SpO2 98%   BMI 25.77 kg/m²       GEN: Well developed, well nourished, in NAD  HEENT:  NCAT.  PERRL.  EOMI.  Mucous membranes pink and moist.   PULM:  Clear to ausculation. No rales or rhonchi. Respirations WNL and unlabored. CV:  bradycardic rate regular rhythm.  No murmurs or gallops. GI:  Abdomen soft. Nontender. Non-distended.  BS + and equal.    NEUROLOGICAL: A&O x3. Sensation intact to light touch. MSK:  Functional ROM BUE and LLEs. Pain and weakness impairs AROM R hip. Strength 5/5 key muscles BUEs. L hip and knee muscles 4+/5. R hip 3/5, R knee extension 4/5.    SKIN: Warm dry and intact except healing R lateral hip and thigh incisions - well healed, steri strips in place. Good turgor. EXTREMITIES:  No calf tenderness to palpation. Proximal RLE edema. No edema LLE.   PSYCH: Mood WNL. Appropriately interactive. Affect WNL.       Diagnostics:     CBC:   Recent Labs

## 2020-05-18 NOTE — PROGRESS NOTES
[facility]: J CARLOS BATEMAN   ACUTE REHABILITATION OCCUPATIONAL THERAPY  DAILY NOTE    Date: 20  Patient Name: Devin Liriano      Room: 5499/4496-04    MRN: [id number]   : 1955  (59 y.o.)  Gender: male   Referring Practitioner: Abimbola Logan  Diagnosis: Closed R hip fx  Additional Pertinent Hx: admitted to the [address] [place] [address] unit on 2020. He was originally admitted to [facility] on 2020 for fall at home from standing height with R hip fracture. He has known history of osteogenesis imperfecta with multiple previous fractures. Work-up included x-rays of R hip and femur showing displaced R IT fracture. Treatment included IM nail fixation performed by Dr. Bertha Mclain on 2020. Restrictions  Restrictions/Precautions: Fall Risk, Weight Bearing, General Precautions  Implants present? : Metal implants  Other position/activity restrictions: hx osteogenesis imperfecta- hx multiple fractures; Low tolerance for Pain - restricts all repositioning and functional mobility / care task performances   Right Lower Extremity Weight Bearing: Partial Weight Bearing  Left Lower Extremity Weight Bearing: Weight Bearing As Tolerated  Right Upper Extremity Weight Bearing: (Reports that he is limited in Active ROM of extension in R elbow due to past fractures/surgeries/conditions/procedures related to elbow fracture)  Required Braces or Orthoses?: No  Equipment Used: Bed, Other    Subjective  Subjective: \" My back starts to hurt when I stand for a while\"   Comments: Pt pleasant and cooperative   Patient Currently in Pain: Yes  Pain Level: 4  Pain Location: Hip  Pain Orientation: Right  Restrictions/Precautions: Fall Risk;Weight Bearing;General Precautions  Overall Orientation Status: Within Functional Limits  Patient Observation  Observations: pt pleasant and cooperative.  Demos some anxiety when transfering   Pain Assessment  Pain Assessment: 0-10  Pain Level: 4  Ordoñez-Baker Pain Rating: desired IADL task with Modified independence using DME/compensatory techniques as needed        05/18/20 0732 05/18/20 1548 05/18/20 1549   OT Individual Minutes   Time In 0732 0829 1300   Time Out 0810 0836 1330   Minutes 38 7 30       Electronically signed by MARLENE Carbajal on 5/18/20 at 3:49 PM EDT

## 2020-05-18 NOTE — PATIENT CARE CONFERENCE
complete(utilizing LHS for lower legs and feet )   Upper Body Dressing   5  Assistance Needed: Setup or clean-up assistance       Setup(don/doff [place] shirt )   Lower Body Dressing   3  Assistance Needed: Partial/moderate assistance     Putting On/Taking Off Footwear   2  Assistance Needed: Substantial/maximal assistance      Minimal assistance(assist pulling up pants, using reacher and sock aid for sock) 1  Toilet Transfer   1  Assistance Needed: Dependent  aakash stedy     Toilet - Technique: (utilizing aakash [employer] )  Equipment Used: Grab bars   Toilet Transfer: Dependent/Total   Toileting Hygiene   3  Assistance Needed: Partial/moderate assistance       Minimal assistance    Bed mobility  Supine to Sit: Stand by assistance  Sit to Supine: Minimal assistance(Lifting RLE into bed )     Shower Transfers: Dependent  Balance  Sitting Balance: Modified independent   Standing Balance: Contact guard assistance  Standing Balance  Time: 1 min x4   Activity: transfers, ADL   Comment: utilizing [employer]     Equipment Recommendations  Equipment Needed: (TBD)  Assessment: Pt demonstrated decreased performance with ADLs this morning, citing increased R hip/leg pain with activity this date. Pt attributes increased pain to tough session with physical therapy.      Short term goals  Time Frame for Short term goals: in 5-7 days, patient will  Short term goal 1: perform functional transfers during ADL routine with Mod A using DME as appropriate  Short term goal 2: perform toileting routine (transfer/hygiene/clothing management) with Mod A   Short term goal 3: V/D Good understanding of AE/DME/compensatory techniques for self care/mobility tasks   Short term goal 4: V/D Good understanding of PWB (25%) RLE during self care/mobility tasks   Short term goal 5: increase standing tolerance to 5+ minutes with CGA-Min A with 0-1 UE support during functional task   Short term goal 6: actively participate in 30+ minutes of therapeutic exercise/activity to promote increased safety and independence with self care and mobility tasks     SPEECH THERAPY    Short Term Goal:     RECREATIONAL THERAPY  Comment/Participation: Participating in unit program      NUTRITION  Weight: 190 lb (86.2 kg) / Body mass index is 25.77 kg/m². Diet: Carbohydrate Control diet, 4 carbs/meal: Ensure High Protein 1x/day. Patient continues to have a good p.o intakes and is consuming oral nutrition supplement. Glucose values appears to be fairly controlled. Results for Anya Ferguson (MRN [id number]) as of 5/18/2020 13:17   Ref. Range 5/17/2020 07:05 5/17/2020 10:39 5/17/2020 15:41 5/17/2020 20:19 5/18/2020 06:17 5/18/2020 07:25 5/18/2020 10:56   POC Glucose Latest Ref Range: 75 - 110 mg/dL 89 173 (H) 173 (H) 187 (H) 113 (H)  188 (H)       Please see nutrition note for details.     SOCIAL WORK ASSESSMENT  Assessment: wife   Pre-Admission Status:  Lives With: Spouse  Type of Home: Mobile home  Home Layout: One level  Home Access: Stairs to enter with rails  Entrance Stairs - Number of Steps: 2(4 platform steps on other entry, B HR (can reach both))  Entrance Stairs - Rails: Both(can reach both)  Bathroom Shower/Tub: Walk-in shower, Shower chair with back, Shower chair without back, Doors((? (unable to use either shower chair- unable to bend knees enough to use))  Bathroom Toilet: Standard  Bathroom Equipment: Grab bars in shower, Toilet raiser, Grab bars around toilet, Shower chair  Bathroom Accessibility: Walker accessible((has to turn side ways when walking)  Home Equipment: Rolling walker, Wheelchair-manual, Cane, Reacher, Sock aid, Long-handled shoehorn  Receives Help From: Family  ADL Assistance: Independent(((stands to take showers- unable to bend knees enough to sit to take a shower)  Homemaking Assistance: Independent(shares with wife - wife is in [facility] for work)  Homemaking Responsibilities: No(wife does)  Ambulation Assistance: Independent(using RW since Assistance  Discharge therapy goals:  PT: Long term goals  Time Frame for Long term goals : by D/C  Long term goal 1: Pt to improve R LE AROM to WNL. Long term goal 2: Pt to improve BLE strength by 1/2 MMG. Long term goal 3: Pt to perform 2MWT with amb at least 76' , Mod I with device. Long term goal 4: Pt to perform bed mobility Mod I.  Long term goal 5: Pt to perform transfers Mod I with device from varied surfaces to simulate home set up. Long term goal 6: Pt to amb at least 100' Mod I with device on varied terrain  Long term goal 7: Pt to ascend/descend 2-4 steps per home set up, including platform steps with RW, SBA. Long term goal 8: Pt to demonstrate Mod I in room with device. Long term goal 9: Pt to improve standing balance to Good- to reduce fall risk at home and in community.   OT:Long term goals  Time Frame for Long term goals : by discharge, patient will  Long term goal 1: perform functional transfers/mobility during ADL routine with Modified [place]on using DME as appropriate  Long term goal 2: perform BADL tasks with independence/modified independence using AE/compensatory techniques/DME as needed  Long term goal 3: V/D Good understanding of BUE HEP for continued strength for functional tasks  Long term goal 4: V/D Good understanding of DME for increased bathroom safety for safe d/c home   Long term goal 5: perform simple meal prep/laundry task/other desired IADL task with Modified independence using DME/compensatory techniques as needed   ST:     Team Members Present at Conference:  :  [facility], [address] [place] [address]  Occupational Therapist: Margo Kellogg OT   [address] PT  Speech Therapist:  N/A  Nurse: Diane Tirado RN    Recreation Therapy: Perri Callahan  Dietary/Nutrition: Ailyn Colbert RD LD    Pastoral Care: Ludmila Leonard  CMG:   Lawrence Burnett RN     I approve the established interdisciplinary plan of care as documented within the medical record of [facility] Rita Kaba.     Julio Vines MD

## 2020-05-18 NOTE — PROGRESS NOTES
Progress Note    5/18/2020   2:30 PM    Name:  Traci Hurtado  MRN:    [id number]     [place]:     [de-identified]   Room:  [address] [place] [address] Day: 21     Admit Date: 4/27/2020  6:11 PM  PCP: ADEEL Allison CNP    Subjective:     C/C: No chief complaint on file. Interval History: Status: not changed. Patient is participating in physical therapy. Denies chest pain shortness of breath lightheadedness. Recent blood sugar values reviewed. Creatinine elevated at 1.34. Vital signs stable    ROS:   all 10 systems reviewed and are negative except as noted    Review of Systems   Constitutional: Negative for appetite change, chills and diaphoresis. HENT: Negative for drooling, ear pain, trouble swallowing and voice change. Eyes: Negative for photophobia and visual disturbance. Respiratory: Negative for choking and stridor. Cardiovascular: Negative for chest pain and palpitations. Gastrointestinal: Negative for abdominal distention, anal bleeding, blood in stool and rectal pain. Endocrine: Negative for polyphagia and polyuria. Genitourinary: Negative for dysuria, flank pain, hematuria and urgency. Musculoskeletal: Negative for back pain, myalgias and neck stiffness. Skin: Negative for color change, pallor and rash. Allergic/Immunologic: Negative for environmental allergies and food allergies. Neurological: Negative for tremors, seizures, facial asymmetry and numbness. Hematological: Negative for adenopathy. Does not bruise/bleed easily. Psychiatric/Behavioral: Negative for agitation, behavioral problems, hallucinations, self-injury and suicidal ideas. Medications:      Allergies: No Known Allergies    Current Meds: insulin glargine (LANTUS) injection vial 40 Units, Nightly  insulin glargine (LANTUS) injection vial 50 Units, QAM  gabapentin (NEURONTIN) capsule 100 mg, TID  lidocaine 4 % external patch 1 patch, Daily  senna (SENOKOT) tablet 17.2 mg, Nightly PRN  bisacodyl (DULCOLAX) suppository 10 mg, Daily PRN  acetaminophen (TYLENOL) tablet 1,000 mg, 3 times per day  sodium chloride flush 0.9 % injection 10 mL, PRN  dextrose 5 % solution, PRN  dextrose 50 % IV solution, PRN  glucagon (rDNA) injection 1 mg, PRN  glucose (GLUTOSE) 40 % oral gel 15 g, PRN  aspirin EC tablet 81 mg, Daily  insulin lispro (HUMALOG) injection vial 0-18 Units, TID WC  insulin lispro (HUMALOG) injection vial 0-9 Units, Nightly  metoprolol succinate (TOPROL XL) extended release tablet 50 mg, BID  pantoprazole (PROTONIX) tablet 40 mg, Daily  sertraline (ZOLOFT) tablet 100 mg, Nightly  traZODone (DESYREL) tablet 100 mg, Nightly  polyethylene glycol (GLYCOLAX) packet 17 g, Daily  enoxaparin (LOVENOX) injection 40 mg, Daily  magnesium hydroxide (MILK OF MAGNESIA) 400 MG/5ML suspension 30 mL, Daily PRN  oxyCODONE (ROXICODONE) immediate release tablet 5 mg, Q4H PRN    Or  oxyCODONE (ROXICODONE) immediate release tablet 10 mg, Q4H PRN        Data:     Code Status:  Full Code    Family History   Problem Relation Age of Onset    Diabetes Mother     Stroke Mother     Diabetes Father     Heart Disease Father        Social History     Socioeconomic History    Marital status:      Spouse name: Not on file    Number of children: Not on file    Years of education: Not on file    Highest education level: Not on file   Occupational History    Not on file   Social Needs    Financial resource strain: Not on file    Food insecurity     Worry: Not on file     Inability: Not on file   [employer] needs     Medical: Not on file     Non-medical: Not on file   Tobacco Use    Smoking status: Former Smoker     Packs/day: 2.00     Years: 18.00     Pack years: 36.00     Types: Cigarettes     Last attempt to quit: 12/3/1991     Years since quittin.4    Smokeless tobacco: Never Used   Substance and Sexual Activity    Alcohol use: Not on file    Drug use: Not on file    Sexual activity: Not on file   Lifestyle    Estimate NOT REPORTED     Seg Neutrophils 55 36 - 66 %    Lymphocytes 21 (L) 24 - 44 %    Monocytes 9 (H) 1 - 7 %    Eosinophils % 14 (H) 0 - 4 %    Basophils 1 0 - 2 %    Segs Absolute 2.00 1.3 - 9.1 k/uL    Absolute Lymph # 0.80 (L) 1.0 - 4.8 k/uL    Absolute Mono # 0.30 0.1 - 1.3 k/uL    Absolute Eos # 0.50 (H) 0.0 - 0.4 k/uL    Basophils Absolute 0.00 0.0 - 0.2 k/uL   Comprehensive Metabolic Panel w/ Reflex to MG    Collection Time: 20  7:25 AM   Result Value Ref Range    Glucose 169 (H) 70 - 99 mg/dL    BUN 22 8 - 23 mg/dL    CREATININE 1.34 (H) 0.70 - 1.20 mg/dL    Bun/Cre Ratio NOT REPORTED 9 - 20    Calcium 9.3 8.6 - 10.4 mg/dL    Sodium 139 135 - 144 mmol/L    Potassium 4.1 3.7 - 5.3 mmol/L    Chloride 100 98 - 107 mmol/L    CO2 26 20 - 31 mmol/L    Anion Gap 13 9 - 17 mmol/L    Alkaline Phosphatase 176 (H) 40 - 129 U/L    ALT 10 5 - 41 U/L    AST 13 <40 U/L    Total Bilirubin 0.35 0.3 - 1.2 mg/dL    Total Protein 7.0 6.4 - 8.3 g/dL    Alb 3.7 3.5 - 5.2 g/dL    Albumin/Globulin Ratio NOT REPORTED 1.0 - 2.5    GFR Non- 54 (L) >60 mL/min    GFR African American >60 >60 mL/min    GFR Comment          GFR Staging NOT REPORTED    POC Glucose Fingerstick    Collection Time: 20 10:56 AM   Result Value Ref Range    POC Glucose 188 (H) 75 - 110 mg/dL       Physical Examination:        Vitals:  /69   Pulse 67   Temp 97.7 °F (36.5 °C) (Oral)   Resp 16   Ht 6' (1.829 m)   Wt 190 lb (86.2 kg)   SpO2 98%   BMI 25.77 kg/m²   Temp (24hrs), Av.3 °F (36.8 °C), Min:97.7 °F (36.5 °C), Max:98.8 °F (37.1 °C)    Recent Labs     20  1541 20  2019 20  0617 20  1056   POCGLU 173* 187* 113* 188*         Physical Exam  Vitals signs reviewed. Constitutional:       Appearance: Normal appearance. He is not diaphoretic. HENT:      Head: Normocephalic and atraumatic.       Right Ear: External ear normal.      Left Ear: External ear normal.      Nose: Nose normal.

## 2020-05-18 NOTE — PLAN OF CARE
Nutrition Problem: Increased nutrient needs  Intervention: Food and/or Nutrient Delivery: Continue current diet, Continue current ONS  Nutritional Goals: PO intake % of meals and supplements

## 2020-05-18 NOTE — PROGRESS NOTES
elbow fracture)  Position Activity Restriction  Other position/activity restrictions: hx osteogenesis imperfecta- hx multiple fractures; Low tolerance for Pain - restricts all repositioning and functional mobility / care task performances     Subjective: Patient's daughter who he plans on staying with came in today for family training. She reports she is feeling comfortable with bringing him home still. Comments: Seated PROM R knee 55 degrees flexion. Seated L knee PROM  95 degrees flexion. Vital Signs  Patient Currently in Pain: Yes  Pain Assessment: 0-10  Pain Level: 5  Pain Location: Hip  Pain Orientation: Right  Non-Pharmaceutical Pain Intervention(s): Cold applied(CP placed post AM and PM session to R hip x 15 mins.)  Response to Pain Intervention: Patient Satisfied        Patient Observation  Observations: Patient continues to be guarded and requires cues for relaxtion. Bed Mobility:   Bed Mobility  Rolling: Rolling Left;Minimal assistance(Bed flat used HR, not able to roll to (R))  Supine to Sit: Moderate assistance(1 person assist to support RLE and assist with trunk prn)  Sit to Supine: Moderate assistance(B LE assist, improved trunk mobility/pivot at hips)  Scooting: Independent  Comment: Daughter came in this PM for family training on bed mobility, transfers and amb. Bed mobility  Scooting: Independent    Transfers:  Sit to Stand: Maximum Assistance;2 Person Assistance(To RW;  Verball cues for safety and technique.)  Stand to sit: Moderate Assistance;2 Person Assistance(From RW to [facility]  Bed to Chair: Maximum assistance(Squat pivot to strong side.; Cues for safety.)     Squat Pivot Transfers: Maximum Assistance(To strong side.)  Lateral Transfers: Minimal Assistance(slide board to strong side.; Mod A to weak side.)  WB Status: PWB 25% right LE  Ambulation 1  Surface: level tile  Device: Rolling Walker  Other Apparatus: (WC brought up to patient post amb.)  Assistance:  Moderate assistance(Max x 2 for sit to stand to RW and then Mod  amb.)  Quality of Gait: Antalgic gait pattern. Max cues for gait sequence due to PWB 25% into R LE. Slow ezequiel. No LOB. Cues given to relax UE's. Patient relies heavily on them pushing down into RW. Gait Deviations: Slow Ezequiel;Decreased step length;Decreased step height  Distance: 10 ft(completed during family training in PM.)  Comments: Max encouragement to continue with amb. Stairs/Curb  Stairs?: No              Wheelchair Activities  Wheelchair Type: Standard  Wheelchair Cushion: (waffle cushion)  Pressure Relief Type: Self Adjusts;Push up;Lateral lean  Wheelchair Parts Management: No  Propulsion: Yes  Propulsion 1  Propulsion: Manual  Level: Level Tile  Method: RUE;LUE  Level of Assistance: Stand by assistance  Description/ Details: Straight path, 90* turns, VCs to engage brakes. Tight spaces within room and setting up for transfer to strong side. Distance: 200 ft twice              BALANCE Posture: Fair  Sitting - Static: Fair;+(EOB, UE support, no back support)  Sitting - Dynamic: Fair(EOB, UE support, no back support)  Standing - Static: Poor(With RW; 2 assist, max VCs to straighten UEs )  Standing - Dynamic: Poor;-(With RW;  Mod x 2 assist.)    EXERCISES    Other exercises?: Yes  Other exercises 1: Supine (B) LE ex, 2x10 reps each to pt tolerance: AROM LLE, A/AROM RLE  Other exercises 2: Squat pivot transfers to strong side. Other exercises 3: Seated (B) LE ex, 2x10, 1# L LE, A/AROM R LE, lime (min) resistance band (hip ABD & L hamstring curls)  Other exercises 4: Seated R LE skates w/towel, 2x10, with education on continuing independently when in W/C to improve knee ROM. Other exercises 5: Family training. Other exercises 6: NuStep x 15 mins.  Workload 4.  R LE PROM only  Other exercises 7: Bed mobility  Other exercises 8: Sit to stands to RW x 2 reps in AM and PM.  Other exercises 9: Slide board transfer with education x 1  Other

## 2020-05-19 LAB
ALBUMIN SERPL-MCNC: 3.6 G/DL (ref 3.5–5.2)
ALBUMIN/GLOBULIN RATIO: ABNORMAL (ref 1–2.5)
ALP BLD-CCNC: 168 U/L (ref 40–129)
ALT SERPL-CCNC: 11 U/L (ref 5–41)
ANION GAP SERPL CALCULATED.3IONS-SCNC: 12 MMOL/L (ref 9–17)
AST SERPL-CCNC: 11 U/L
BILIRUB SERPL-MCNC: 0.35 MG/DL (ref 0.3–1.2)
BUN BLDV-MCNC: 21 MG/DL (ref 8–23)
BUN/CREAT BLD: ABNORMAL (ref 9–20)
CALCIUM SERPL-MCNC: 9.7 MG/DL (ref 8.6–10.4)
CHLORIDE BLD-SCNC: 103 MMOL/L (ref 98–107)
CO2: 26 MMOL/L (ref 20–31)
CREAT SERPL-MCNC: 1.21 MG/DL (ref 0.7–1.2)
GFR AFRICAN AMERICAN: >60 ML/MIN
GFR NON-AFRICAN AMERICAN: >60 ML/MIN
GFR SERPL CREATININE-BSD FRML MDRD: ABNORMAL ML/MIN/{1.73_M2}
GFR SERPL CREATININE-BSD FRML MDRD: ABNORMAL ML/MIN/{1.73_M2}
GLUCOSE BLD-MCNC: 123 MG/DL (ref 75–110)
GLUCOSE BLD-MCNC: 143 MG/DL (ref 70–99)
GLUCOSE BLD-MCNC: 166 MG/DL (ref 75–110)
GLUCOSE BLD-MCNC: 185 MG/DL (ref 75–110)
GLUCOSE BLD-MCNC: 194 MG/DL (ref 75–110)
POTASSIUM SERPL-SCNC: 4.2 MMOL/L (ref 3.7–5.3)
SODIUM BLD-SCNC: 141 MMOL/L (ref 135–144)
TOTAL PROTEIN: 6.9 G/DL (ref 6.4–8.3)

## 2020-05-19 PROCEDURE — 6370000000 HC RX 637 (ALT 250 FOR IP): Performed by: ORTHOPAEDIC SURGERY

## 2020-05-19 PROCEDURE — 97530 THERAPEUTIC ACTIVITIES: CPT

## 2020-05-19 PROCEDURE — 82947 ASSAY GLUCOSE BLOOD QUANT: CPT

## 2020-05-19 PROCEDURE — 6370000000 HC RX 637 (ALT 250 FOR IP): Performed by: PHYSICAL MEDICINE & REHABILITATION

## 2020-05-19 PROCEDURE — 97116 GAIT TRAINING THERAPY: CPT

## 2020-05-19 PROCEDURE — 6360000002 HC RX W HCPCS: Performed by: PHYSICAL MEDICINE & REHABILITATION

## 2020-05-19 PROCEDURE — 97110 THERAPEUTIC EXERCISES: CPT

## 2020-05-19 PROCEDURE — 6370000000 HC RX 637 (ALT 250 FOR IP): Performed by: FAMILY MEDICINE

## 2020-05-19 PROCEDURE — 1180000000 HC REHAB R&B

## 2020-05-19 PROCEDURE — 80053 COMPREHEN METABOLIC PANEL: CPT

## 2020-05-19 PROCEDURE — 97535 SELF CARE MNGMENT TRAINING: CPT

## 2020-05-19 PROCEDURE — 36415 COLL VENOUS BLD VENIPUNCTURE: CPT

## 2020-05-19 PROCEDURE — 99231 SBSQ HOSP IP/OBS SF/LOW 25: CPT | Performed by: PHYSICAL MEDICINE & REHABILITATION

## 2020-05-19 RX ORDER — INSULIN GLARGINE 100 [IU]/ML
52 INJECTION, SOLUTION SUBCUTANEOUS EVERY MORNING
Status: DISCONTINUED | OUTPATIENT
Start: 2020-05-20 | End: 2020-05-21 | Stop reason: HOSPADM

## 2020-05-19 RX ADMIN — METRONIDAZOLE 100 MG: 500 TABLET ORAL at 15:07

## 2020-05-19 RX ADMIN — INSULIN LISPRO 2 UNITS: 100 INJECTION, SOLUTION INTRAVENOUS; SUBCUTANEOUS at 21:09

## 2020-05-19 RX ADMIN — OXYCODONE HYDROCHLORIDE 10 MG: 5 TABLET ORAL at 10:23

## 2020-05-19 RX ADMIN — METRONIDAZOLE 100 MG: 500 TABLET ORAL at 08:52

## 2020-05-19 RX ADMIN — INSULIN LISPRO 3 UNITS: 100 INJECTION, SOLUTION INTRAVENOUS; SUBCUTANEOUS at 12:37

## 2020-05-19 RX ADMIN — SERTRALINE HYDROCHLORIDE 100 MG: 100 TABLET ORAL at 21:08

## 2020-05-19 RX ADMIN — INSULIN GLARGINE 40 UNITS: 100 INJECTION, SOLUTION SUBCUTANEOUS at 21:09

## 2020-05-19 RX ADMIN — PANTOPRAZOLE SODIUM 40 MG: 40 TABLET, DELAYED RELEASE ORAL at 08:52

## 2020-05-19 RX ADMIN — OXYCODONE HYDROCHLORIDE 10 MG: 5 TABLET ORAL at 06:36

## 2020-05-19 RX ADMIN — METOPROLOL SUCCINATE 50 MG: 50 TABLET, EXTENDED RELEASE ORAL at 21:08

## 2020-05-19 RX ADMIN — TRAZODONE HYDROCHLORIDE 100 MG: 100 TABLET ORAL at 21:08

## 2020-05-19 RX ADMIN — ENOXAPARIN SODIUM 40 MG: 40 INJECTION SUBCUTANEOUS at 08:52

## 2020-05-19 RX ADMIN — INSULIN GLARGINE 50 UNITS: 100 INJECTION, SOLUTION SUBCUTANEOUS at 08:52

## 2020-05-19 RX ADMIN — METRONIDAZOLE 100 MG: 500 TABLET ORAL at 21:08

## 2020-05-19 RX ADMIN — OXYCODONE HYDROCHLORIDE 10 MG: 5 TABLET ORAL at 15:07

## 2020-05-19 RX ADMIN — METOPROLOL SUCCINATE 50 MG: 50 TABLET, EXTENDED RELEASE ORAL at 08:52

## 2020-05-19 RX ADMIN — INSULIN LISPRO 3 UNITS: 100 INJECTION, SOLUTION INTRAVENOUS; SUBCUTANEOUS at 08:53

## 2020-05-19 RX ADMIN — OXYCODONE 5 MG: 5 TABLET ORAL at 21:08

## 2020-05-19 RX ADMIN — ACETAMINOPHEN 1000 MG: 500 TABLET, FILM COATED ORAL at 15:07

## 2020-05-19 RX ADMIN — ACETAMINOPHEN 1000 MG: 500 TABLET, FILM COATED ORAL at 21:08

## 2020-05-19 RX ADMIN — ASPIRIN 81 MG: 81 TABLET, COATED ORAL at 08:52

## 2020-05-19 RX ADMIN — ACETAMINOPHEN 1000 MG: 500 TABLET, FILM COATED ORAL at 06:36

## 2020-05-19 ASSESSMENT — PAIN DESCRIPTION - PROGRESSION: CLINICAL_PROGRESSION: GRADUALLY IMPROVING

## 2020-05-19 ASSESSMENT — PAIN DESCRIPTION - FREQUENCY: FREQUENCY: CONTINUOUS

## 2020-05-19 ASSESSMENT — ENCOUNTER SYMPTOMS
COLOR CHANGE: 0
RECTAL PAIN: 0
PHOTOPHOBIA: 0
TROUBLE SWALLOWING: 0
BLOOD IN STOOL: 0
CHOKING: 0
BACK PAIN: 0
VOICE CHANGE: 0
STRIDOR: 0
ABDOMINAL DISTENTION: 0
ANAL BLEEDING: 0

## 2020-05-19 ASSESSMENT — PAIN SCALES - GENERAL
PAINLEVEL_OUTOF10: 6
PAINLEVEL_OUTOF10: 4
PAINLEVEL_OUTOF10: 6
PAINLEVEL_OUTOF10: 5
PAINLEVEL_OUTOF10: 3
PAINLEVEL_OUTOF10: 1
PAINLEVEL_OUTOF10: 6
PAINLEVEL_OUTOF10: 6
PAINLEVEL_OUTOF10: 5
PAINLEVEL_OUTOF10: 3

## 2020-05-19 ASSESSMENT — PAIN DESCRIPTION - ORIENTATION
ORIENTATION: RIGHT

## 2020-05-19 ASSESSMENT — PAIN DESCRIPTION - LOCATION
LOCATION: HIP

## 2020-05-19 ASSESSMENT — PAIN DESCRIPTION - PAIN TYPE
TYPE: ACUTE PAIN

## 2020-05-19 ASSESSMENT — PAIN DESCRIPTION - DESCRIPTORS: DESCRIPTORS: ACHING

## 2020-05-19 ASSESSMENT — PAIN DESCRIPTION - ONSET: ONSET: ON-GOING

## 2020-05-19 NOTE — PROGRESS NOTES
amb.    Surface: level tile  Ambulation 1  Surface: level tile  Device: Rolling Walker  Other Apparatus: (WC brought up to patient post amb.)  Assistance: Moderate assistance(Max x 2 for sit to stand to RW and then Mod  amb.)  Quality of Gait: Antalgic gait pattern. Max cues for gait sequence due to PWB 25% into R LE. Slow ezequiel. No LOB. Cues given to relax UE's. Patient relies heavily on them pushing down into RW. Gait Deviations: Slow Ezequiel, Decreased step length, Decreased step height  Distance: 10 ft(completed during family training in PM.)  Comments: Max encouragement to continue with amb.     OT:  ADL  Equipment Provided: Long-handled sponge, Reacher, Sock aid  Feeding: Independent  Grooming: Setup  UE Bathing: Setup(seated in shower )  LE Bathing: Setup, Increased time to complete(utilizing LHS for lower legs and feet )  UE Dressing: Setup(don/doff [place] shirt )  LE Dressing: Minimal assistance(assist pulling up pants, using reacher and sock aid for sock)  Toileting: Minimal assistance  Additional Comments: pt transferred to shower chair c wheels from [address] [place] [address] and transferred into shower         Balance  Sitting Balance: Modified independent   Standing Balance: Contact guard assistance   Standing Balance  Time: 1 min x4   Activity: transfers, ADL   Comment: utilizing aakash carey   Functional Mobility  Functional - Mobility Device: Wheelchair  Activity: (around pt room )  Assist Level: Modified independent   Functional Mobility Comments: pt manuvering around bed and into bathroom      Bed mobility  Rolling to Left: Minimal assistance  Rolling to Right: Stand by assistance  Supine to Sit: Stand by assistance  Sit to Supine: Minimal assistance(Lifting RLE into bed )  Scooting: Independent  Comment: Pt completes bed mobility with HOB elevated and use of HR  Transfers  Stand Pivot Transfers: Dependent/Total(aakash carey)  Sit to stand: Minimal assistance(utilizing aakash cherry )  Stand to sit: Minimal

## 2020-05-19 NOTE — PLAN OF CARE
Problem: Pain:  Goal: Control of acute pain  Description: Control of acute pain  5/19/2020 1348 by Luna Guan RN  Outcome: Ongoing  Note: Pt reports that pain medications help. Pt accepting of non-pharmacological interventions, observed resting and repositioning this shift. Will continue to monitor and assist as needed. Problem: Falls - Risk of:  Goal: Will remain free from falls  Description: Will remain free from falls  5/19/2020 1348 by Luna Guan RN  Outcome: Ongoing  Note: Pt educated on and verbalizes understanding of fall risks. Pt wearing nonskid stockings, uses assistive devices appropriately, call light within reach, encouraged to ask for assistance. Pt calls out appropriately this shift, transfers with aakash steady for pt safety. Will continue to monitor and intervene as needed. Problem: Mobility - Impaired:  Goal: Mobility will improve  Description: Mobility will improve  5/19/2020 1348 by Luna Guan RN  Outcome: Ongoing  Note: Pt educated on and verbalizes understanding of fall risks. Pt wearing nonskid stockings, uses assistive devices appropriately, call light within reach, encouraged to ask for assistance. Will continue to monitor and intervene as needed. Problem: Skin Integrity:  Goal: Absence of new skin breakdown  Description: Absence of new skin breakdown  5/19/2020 1348 by Luna Guan RN  Outcome: Ongoing  Note: Pt educated on risks for impaired skin integrity. Pt assisted in keeping skin clean and dry, assisted and prompted to reposition frequently. Will continue to monitor and intervene as needed.

## 2020-05-19 NOTE — PROGRESS NOTES
(DULCOLAX) suppository 10 mg, Daily PRN  acetaminophen (TYLENOL) tablet 1,000 mg, 3 times per day  sodium chloride flush 0.9 % injection 10 mL, PRN  dextrose 5 % solution, PRN  dextrose 50 % IV solution, PRN  glucagon (rDNA) injection 1 mg, PRN  glucose (GLUTOSE) 40 % oral gel 15 g, PRN  aspirin EC tablet 81 mg, Daily  insulin lispro (HUMALOG) injection vial 0-18 Units, TID WC  insulin lispro (HUMALOG) injection vial 0-9 Units, Nightly  metoprolol succinate (TOPROL XL) extended release tablet 50 mg, BID  pantoprazole (PROTONIX) tablet 40 mg, Daily  sertraline (ZOLOFT) tablet 100 mg, Nightly  traZODone (DESYREL) tablet 100 mg, Nightly  polyethylene glycol (GLYCOLAX) packet 17 g, Daily  enoxaparin (LOVENOX) injection 40 mg, Daily  magnesium hydroxide (MILK OF MAGNESIA) 400 MG/5ML suspension 30 mL, Daily PRN  oxyCODONE (ROXICODONE) immediate release tablet 5 mg, Q4H PRN    Or  oxyCODONE (ROXICODONE) immediate release tablet 10 mg, Q4H PRN        Data:     Code Status:  Full Code    Family History   Problem Relation Age of Onset    Diabetes Mother     Stroke Mother     Diabetes Father     Heart Disease Father        Social History     Socioeconomic History    Marital status:      Spouse name: Not on file    Number of children: Not on file    Years of education: Not on file    Highest education level: Not on file   Occupational History    Not on file   Social Needs    Financial resource strain: Not on file    Food insecurity     Worry: Not on file     Inability: Not on file   [employer] needs     Medical: Not on file     Non-medical: Not on file   Tobacco Use    Smoking status: Former Smoker     Packs/day: 2.00     Years: 18.00     Pack years: 36.00     Types: Cigarettes     Last attempt to quit: 12/3/1991     Years since quittin.4    Smokeless tobacco: Never Used   Substance and Sexual Activity    Alcohol use: Not on file    Drug use: Not on file    Sexual activity: Not on file Lifestyle    Physical activity     Days per week: Not on file     Minutes per session: Not on file    Stress: Not on file   Relationships    Social connections     Talks on phone: Not on file     Gets together: Not on file     Attends [employer] service: Not on file     Active member of club or organization: Not on file     Attends meetings of clubs or organizations: Not on file     Relationship status: Not on file    Intimate partner violence     Fear of current or ex partner: Not on file     Emotionally abused: Not on file     Physically abused: Not on file     Forced sexual activity: Not on file   Other Topics Concern    Not on file   Social History Narrative    Not on file       I/O (24Hr): Intake/Output Summary (Last 24 hours) at 5/19/2020 1408  Last data filed at 5/19/2020 0640  Gross per 24 hour   Intake 500 ml   Output 1600 ml   Net -1100 ml     Radiology:  No results found.     Labs:  Recent Results (from the past 24 hour(s))   POC Glucose Fingerstick    Collection Time: 05/18/20  4:10 PM   Result Value Ref Range    POC Glucose 177 (H) 75 - 110 mg/dL   POC Glucose Fingerstick    Collection Time: 05/18/20  8:32 PM   Result Value Ref Range    POC Glucose 368 (H) 75 - 110 mg/dL   Comprehensive Metabolic Panel w/ Reflex to MG    Collection Time: 05/19/20  6:35 AM   Result Value Ref Range    Glucose 143 (H) 70 - 99 mg/dL    BUN 21 8 - 23 mg/dL    CREATININE 1.21 (H) 0.70 - 1.20 mg/dL    Bun/Cre Ratio NOT REPORTED 9 - 20    Calcium 9.7 8.6 - 10.4 mg/dL    Sodium 141 135 - 144 mmol/L    Potassium 4.2 3.7 - 5.3 mmol/L    Chloride 103 98 - 107 mmol/L    CO2 26 20 - 31 mmol/L    Anion Gap 12 9 - 17 mmol/L    Alkaline Phosphatase 168 (H) 40 - 129 U/L    ALT 11 5 - 41 U/L    AST 11 <40 U/L    Total Bilirubin 0.35 0.3 - 1.2 mg/dL    Total Protein 6.9 6.4 - 8.3 g/dL    Alb 3.6 3.5 - 5.2 g/dL    Albumin/Globulin Ratio NOT REPORTED 1.0 - 2.5    GFR Non-African American >60 >60 mL/min    GFR African American >60

## 2020-05-19 NOTE — PROGRESS NOTES
5: perform simple meal prep/laundry task/other desired IADL task with Modified independence using DME/compensatory techniques as needed         05/19/20 0733 05/19/20 1530   OT Individual Minutes   Time In 0733 1230   Time Out 0822 1300   Minutes 49 30     Electronically signed by MARLENE Smart on 5/19/20 at 3:32 PM EDT

## 2020-05-19 NOTE — PROGRESS NOTES
elbow fracture)  Position Activity Restriction  Other position/activity restrictions: hx osteogenesis imperfecta- hx multiple fractures; Low tolerance for Pain - restricts all repositioning and functional mobility / care task performances     Subjective: Patient continues to report \" I feel like I will do well at home. \"  Patient's daughter may come in again Thursday AM for continued family training. Comments: Seated PROM R knee 55 degrees flexion. Seated L knee PROM  95 degrees flexion. Vital Signs  Patient Currently in Pain: Yes  Pain Assessment: 0-10  Pain Level: 6  Pain Type: Acute pain  Pain Location: Hip  Pain Orientation: Right  Non-Pharmaceutical Pain Intervention(s): Cold applied; Emotional support  Response to Pain Intervention: Patient Satisfied        Patient Observation  Observations: Patient continues to be guarded and requires cues for relaxtion. Bed Mobility:   Bed Mobility  Rolling: Rolling Left;Minimal assistance(Bed flat used HR, not able to roll to (R))  Supine to Sit: Moderate assistance(1 person assist to support RLE and assist with trunk prn)  Sit to Supine: Moderate assistance(B LE assist, improved trunk mobility/pivot at hips)  Scooting: Independent  Comment: Completed toilet transfer with Boris Hannah Steady x 2 person assist.  Max x2 for sit to stand.   Bed mobility  Scooting: Independent    Transfers:  Sit to Stand: Maximum Assistance;2 Person Assistance(To RW;  Verball cues for safety and technique.)  Stand to sit: Moderate Assistance;2 Person Assistance(From RW to [facility].)  Bed to Chair: Maximum assistance(Squat pivot to strong side.; Cues for safety.)     Squat Pivot Transfers: Maximum Assistance(To strong side.)  Lateral Transfers: Minimal Assistance(slide board to strong side.; Mod A to weak side.)  WB Status: PWB 25% right LE  Ambulation 1  Surface: level tile  Device: Rolling Walker  Other Apparatus: (WC brought up to patient post amb.)  Assistance: Minimal assistance(Max x 2 for sit to stand to RW and then Min A  amb.)  Quality of Gait: Antalgic gait pattern. Max cues for gait sequence due to PWB 25% into R LE. Slow ezequiel. No LOB. Cues given to relax UE's. Patient relies heavily on them pushing down into RW. Gait Deviations: Slow Ezequiel;Decreased step length;Decreased step height  Distance: 25 ft  Comments: Max encouragement to continue with amb. Stairs/Curb  Stairs?: No              Wheelchair Activities  Wheelchair Type: Standard  Wheelchair Cushion: (waffle cushion)  Pressure Relief Type: Self Adjusts;Push up;Lateral lean  Wheelchair Parts Management: No  Propulsion: Yes  Propulsion 1  Propulsion: Manual  Level: Level Tile  Method: RUE;LUE  Level of Assistance: Stand by assistance  Description/ Details: Straight path, 90* turns, VCs to engage brakes. Tight spaces within room and setting up for transfer to strong side. Distance: 200 ft twice              BALANCE Posture: Fair  Sitting - Static: Fair;+(EOB, UE support, no back support)  Sitting - Dynamic: Fair(EOB, UE support, no back support)  Standing - Static: Poor(With RW; 2 assist, max VCs to straighten UEs )  Standing - Dynamic: Poor;-(With RW;  Mod x 2 assist.)    EXERCISES    Other exercises?: Yes  Other exercises 1: Supine (B) LE ex, 2x10 reps each to pt tolerance: AROM LLE, A/AROM RLE  Other exercises 2: Squat pivot transfers to strong side. Other exercises 3: Seated (B) LE ex, 2x10, 1# L LE, A/AROM R LE, lime (min) resistance band (hip ABD & L hamstring curls)  Other exercises 4: Seated R LE skates w/towel, 2x10, with education on continuing independently when in W/C to improve knee ROM. Other exercises 6: NuStep x 15 mins. Workload 4.  R LE PROM only  Other exercises 7: Bed mobility  Other exercises 8: Sit to stands to RW x 2 reps in AM and PM.  Other exercises 9: Slide board transfer with education x 1  Other Activities  Comment: Breaks given PRN.         Activity Tolerance: Patient limited by pain, Patient limited by endurance  PT Equipment Recommendations  Equipment Needed: No  Other: Patient has RW and wheelchair at home. Other Comments  Comments: Coordinate with nursing with pain med schedule; Pt requires encouragement d/t anxiety/fear of pain      Current Treatment Recommendations: Strengthening, Gait Training, Patient/Caregiver Education & Training, ROM, Balance Training, Endurance Training, Functional Mobility Training, Transfer Training, Safety Education & Training, Stair training, Positioning, Home Exercise Program    Conditions Requiring Skilled Therapeutic Intervention  Body structures, Functions, Activity limitations: Decreased functional mobility ; Decreased endurance;Decreased ROM; Decreased strength;Decreased balance; Increased pain;Decreased ADL status; Decreased posture  Treatment Diagnosis: impaired mobility 2* R hip fx  Prognosis: Good  History: 58 y/o male admitted to [facility] 4/23/20 with hip pain and fall. 19-year-old male status post fall with right hip pain. Diagnosed with right intertrochanteric femur fracture. Bernabe Macdonald He does have a history of osteogenesis imperfecta and has had multiple fractures. Last fractures were of bilateral patella in November 2019- surgery by Dr. Domenico Miranda. Pt admitted to [facility] 4/27/20. Barriers to Learning: pain/anxiety  REQUIRES PT FOLLOW UP: Yes  Discharge Recommendations: Patient would benefit from continued therapy after discharge;Home with assist PRN    Goals  Short term goals  Time Frame for Short term goals: 1 week  Short term goal 1: Pt to reduce pain to 2-3/10 to improve ease and progression of functional mobility. Short term goal 2: Pt to demonstrate min x2 bed mobility with safe technique. Short term goal 3: Pt to demonstrate min x2 transfers with appropriate device and good maintaining of PWB R LE. Short term goal 4: Pt to amb 25'-50' with appropriate device min x2 while maintaining RLE PWB.   Short term goal 5: Pt to ascend/descend 2-4 steps

## 2020-05-20 LAB
GLUCOSE BLD-MCNC: 207 MG/DL (ref 75–110)
GLUCOSE BLD-MCNC: 251 MG/DL (ref 75–110)
GLUCOSE BLD-MCNC: 74 MG/DL (ref 75–110)
GLUCOSE BLD-MCNC: 89 MG/DL (ref 75–110)

## 2020-05-20 PROCEDURE — 6370000000 HC RX 637 (ALT 250 FOR IP): Performed by: FAMILY MEDICINE

## 2020-05-20 PROCEDURE — 97530 THERAPEUTIC ACTIVITIES: CPT

## 2020-05-20 PROCEDURE — 1180000000 HC REHAB R&B

## 2020-05-20 PROCEDURE — 99231 SBSQ HOSP IP/OBS SF/LOW 25: CPT | Performed by: PHYSICAL MEDICINE & REHABILITATION

## 2020-05-20 PROCEDURE — 6370000000 HC RX 637 (ALT 250 FOR IP): Performed by: PHYSICAL MEDICINE & REHABILITATION

## 2020-05-20 PROCEDURE — 97542 WHEELCHAIR MNGMENT TRAINING: CPT

## 2020-05-20 PROCEDURE — 97110 THERAPEUTIC EXERCISES: CPT

## 2020-05-20 PROCEDURE — 82947 ASSAY GLUCOSE BLOOD QUANT: CPT

## 2020-05-20 PROCEDURE — 97116 GAIT TRAINING THERAPY: CPT

## 2020-05-20 PROCEDURE — 6360000002 HC RX W HCPCS: Performed by: PHYSICAL MEDICINE & REHABILITATION

## 2020-05-20 PROCEDURE — 6370000000 HC RX 637 (ALT 250 FOR IP): Performed by: ORTHOPAEDIC SURGERY

## 2020-05-20 PROCEDURE — 97535 SELF CARE MNGMENT TRAINING: CPT

## 2020-05-20 RX ADMIN — OXYCODONE HYDROCHLORIDE 10 MG: 5 TABLET ORAL at 07:10

## 2020-05-20 RX ADMIN — ACETAMINOPHEN 1000 MG: 500 TABLET, FILM COATED ORAL at 05:33

## 2020-05-20 RX ADMIN — ENOXAPARIN SODIUM 40 MG: 40 INJECTION SUBCUTANEOUS at 07:10

## 2020-05-20 RX ADMIN — SERTRALINE HYDROCHLORIDE 100 MG: 100 TABLET ORAL at 22:02

## 2020-05-20 RX ADMIN — METRONIDAZOLE 100 MG: 500 TABLET ORAL at 13:53

## 2020-05-20 RX ADMIN — INSULIN LISPRO 6 UNITS: 100 INJECTION, SOLUTION INTRAVENOUS; SUBCUTANEOUS at 11:09

## 2020-05-20 RX ADMIN — METRONIDAZOLE 100 MG: 500 TABLET ORAL at 22:02

## 2020-05-20 RX ADMIN — OXYCODONE HYDROCHLORIDE 10 MG: 5 TABLET ORAL at 17:26

## 2020-05-20 RX ADMIN — PANTOPRAZOLE SODIUM 40 MG: 40 TABLET, DELAYED RELEASE ORAL at 07:10

## 2020-05-20 RX ADMIN — OXYCODONE HYDROCHLORIDE 10 MG: 5 TABLET ORAL at 03:46

## 2020-05-20 RX ADMIN — ACETAMINOPHEN 1000 MG: 500 TABLET, FILM COATED ORAL at 22:02

## 2020-05-20 RX ADMIN — INSULIN GLARGINE 52 UNITS: 100 INJECTION, SOLUTION SUBCUTANEOUS at 07:14

## 2020-05-20 RX ADMIN — TRAZODONE HYDROCHLORIDE 100 MG: 100 TABLET ORAL at 22:02

## 2020-05-20 RX ADMIN — METOPROLOL SUCCINATE 50 MG: 50 TABLET, EXTENDED RELEASE ORAL at 22:02

## 2020-05-20 RX ADMIN — METRONIDAZOLE 100 MG: 500 TABLET ORAL at 07:10

## 2020-05-20 RX ADMIN — OXYCODONE HYDROCHLORIDE 10 MG: 5 TABLET ORAL at 22:02

## 2020-05-20 RX ADMIN — ACETAMINOPHEN 1000 MG: 500 TABLET, FILM COATED ORAL at 13:53

## 2020-05-20 RX ADMIN — OXYCODONE HYDROCHLORIDE 10 MG: 5 TABLET ORAL at 11:25

## 2020-05-20 RX ADMIN — INSULIN GLARGINE 40 UNITS: 100 INJECTION, SOLUTION SUBCUTANEOUS at 22:03

## 2020-05-20 RX ADMIN — METOPROLOL SUCCINATE 50 MG: 50 TABLET, EXTENDED RELEASE ORAL at 07:10

## 2020-05-20 RX ADMIN — ASPIRIN 81 MG: 81 TABLET, COATED ORAL at 07:10

## 2020-05-20 RX ADMIN — INSULIN LISPRO 5 UNITS: 100 INJECTION, SOLUTION INTRAVENOUS; SUBCUTANEOUS at 22:03

## 2020-05-20 ASSESSMENT — PAIN SCALES - GENERAL
PAINLEVEL_OUTOF10: 7
PAINLEVEL_OUTOF10: 7
PAINLEVEL_OUTOF10: 5
PAINLEVEL_OUTOF10: 7
PAINLEVEL_OUTOF10: 6
PAINLEVEL_OUTOF10: 4
PAINLEVEL_OUTOF10: 5
PAINLEVEL_OUTOF10: 7
PAINLEVEL_OUTOF10: 6
PAINLEVEL_OUTOF10: 4
PAINLEVEL_OUTOF10: 5
PAINLEVEL_OUTOF10: 5

## 2020-05-20 ASSESSMENT — PAIN DESCRIPTION - LOCATION
LOCATION: HIP
LOCATION: HIP;KNEE
LOCATION: HIP;KNEE
LOCATION: HIP
LOCATION: HIP

## 2020-05-20 ASSESSMENT — PAIN DESCRIPTION - FREQUENCY
FREQUENCY: CONTINUOUS

## 2020-05-20 ASSESSMENT — ENCOUNTER SYMPTOMS
VOICE CHANGE: 0
APNEA: 0
CHOKING: 0
ABDOMINAL DISTENTION: 0
RECTAL PAIN: 0
PHOTOPHOBIA: 0
FACIAL SWELLING: 0
ANAL BLEEDING: 0
EYE ITCHING: 0
CHEST TIGHTNESS: 0
COLOR CHANGE: 0
STRIDOR: 0

## 2020-05-20 ASSESSMENT — PAIN DESCRIPTION - PROGRESSION
CLINICAL_PROGRESSION: GRADUALLY IMPROVING
CLINICAL_PROGRESSION: NOT CHANGED
CLINICAL_PROGRESSION: GRADUALLY IMPROVING

## 2020-05-20 ASSESSMENT — PAIN DESCRIPTION - ORIENTATION
ORIENTATION: RIGHT

## 2020-05-20 ASSESSMENT — PAIN DESCRIPTION - PAIN TYPE
TYPE: ACUTE PAIN
TYPE: ACUTE PAIN;SURGICAL PAIN
TYPE: ACUTE PAIN

## 2020-05-20 ASSESSMENT — PAIN DESCRIPTION - DESCRIPTORS
DESCRIPTORS: SORE
DESCRIPTORS: ACHING
DESCRIPTORS: SORE
DESCRIPTORS: ACHING
DESCRIPTORS: SORE

## 2020-05-20 ASSESSMENT — PAIN DESCRIPTION - ONSET
ONSET: AWAKENED FROM SLEEP
ONSET: ON-GOING

## 2020-05-20 ASSESSMENT — PAIN - FUNCTIONAL ASSESSMENT: PAIN_FUNCTIONAL_ASSESSMENT: PREVENTS OR INTERFERES WITH ALL ACTIVE AND SOME PASSIVE ACTIVITIES

## 2020-05-20 NOTE — PROGRESS NOTES
Rolling Walker  Other Apparatus: (WC brought up to patient post amb.)  Assistance: Minimal assistance(Max x 2 for sit to stand to RW and then Min A  amb.)  Quality of Gait: Antalgic gait pattern. Max cues for gait sequence due to PWB 25% into R LE. Slow ezequiel. No LOB. Cues given to relax UE's. Patient relies heavily on them pushing down into RW. Gait Deviations: Slow Ezequiel, Decreased step length, Decreased step height  Distance: 25 ft  Comments: Max encouragement to continue with amb. Transfers  Sit to Stand: Maximum Assistance, 2 Person Assistance(To RW;  Verball cues for safety and technique.)  Stand to sit: Moderate Assistance, 2 Person Assistance(From RW to [facility].)  Bed to Chair: Maximum assistance(Squat pivot to strong side.; Cues for safety.)  Stand Pivot Transfers: Dependent/Total(Anne Peralta)  Squat Pivot Transfers: Maximum Assistance(To strong side.)  Lateral Transfers: Minimal Assistance(slide board to strong side.; Mod A to weak side.)  Comment: Pt requires VC for proper sequencing, constant cuing to maintain WB status with poor return. Pt agreeable to transfer to chair with SS this date d/t increase pain. Second person SBA for safety. Ambulation  Ambulation?: Yes  WB Status: PWB 25% right LE  Ambulation 1  Surface: level tile  Device: Rolling Walker  Other Apparatus: (WC brought up to patient post amb.)  Assistance: Minimal assistance(Max x 2 for sit to stand to RW and then Min A  amb.)  Quality of Gait: Antalgic gait pattern. Max cues for gait sequence due to PWB 25% into R LE. Slow ezequiel. No LOB. Cues given to relax UE's. Patient relies heavily on them pushing down into RW. Gait Deviations: Slow Ezequiel, Decreased step length, Decreased step height  Distance: 25 ft  Comments: Max encouragement to continue with amb.     Surface: level tile  Ambulation 1  Surface: level tile  Device: Rolling Walker  Other Apparatus: (WC brought up to patient post amb.)  Assistance: Minimal assistance(Max tony bradenjohana to complete sit>stand transfer. Toilet Transfers  Toilet - Technique: (utilizing tony stedy )  Equipment Used: Grab bars  Toilet Transfer: Dependent/Total  Toilet Transfers Comments: AM: with use  tony steady. Guidance and assist to sit due to lack of flexion in knees; Mod A to get up from toilet       Shower Transfers  Shower - Transfer From: (tony stedy )  Shower - Transfer Type: To and From  Shower - Transfer To: Other(shower chair with wheels )  Shower - Technique: Stand pivot  Shower Transfers: Dependent  Shower Transfers Comments: pt completed transfer using TONY Stedy and roll-in shower chair. verbal cueing for transition from surfaces    Wheelchair Bed Transfers  Wheelchair/Bed - Technique: Stand step(TONY Stedy and rasied/tall bed height )  Equipment Used: Bed, Other  Level of Asssistance: Moderate assistance  Wheelchair Transfers Comments: mod x2 for safety     SPEECH:      Objective:  BP (!) 156/74   Pulse 73   Temp 97.4 °F (36.3 °C) (Oral)   Resp 20   Ht 6' (1.829 m)   Wt 190 lb (86.2 kg)   SpO2 99%   BMI 25.77 kg/m²       GEN: Well developed, well nourished, in NAD  HEENT:  NCAT.  PERRL.  EOMI.  Mucous membranes pink and moist.   PULM:  Clear to ausculation. No rales or rhonchi. Respirations WNL and unlabored. CV:  bradycardic rate regular rhythm.  No murmurs or gallops. GI:  Abdomen soft. Nontender. Non-distended.  BS + and equal.    NEUROLOGICAL: A&O x3. Sensation intact to light touch. MSK:  Functional ROM BUE and LLEs. Pain and weakness impairs AROM R hip. Strength 5/5 key muscles BUEs. L hip and knee muscles 4+/5. R hip 3/5, R knee extension 4/5.    SKIN: Warm dry and intact except healing R lateral hip and thigh incisions - well healed, Good turgor. EXTREMITIES:  No calf tenderness to palpation. Proximal RLE edema. No edema LLE.   PSYCH: Mood WNL. Appropriately interactive. Affect WNL.       Diagnostics:     CBC:   Recent Labs     05/18/20  0725   WBC 3.7   RBC 3.75*   HGB 11.1*   HCT 34.1*   MCV 90.7   RDW 15.3*        BMP:   Recent Labs     05/18/20  0725 05/19/20  0635    141   K 4.1 4.2    103   CO2 26 26   BUN 22 21   CREATININE 1.34* 1.21*   GLUCOSE 169* 143*     BNP: No results for input(s): BNP in the last 72 hours. PT/INR: No results for input(s): PROTIME, INR in the last 72 hours. APTT: No results for input(s): APTT in the last 72 hours. CARDIAC ENZYMES: No results for input(s): CKMB, CKMBINDEX, TROPONINT in the last 72 hours.     Invalid input(s): CKTOTAL;3  FASTING LIPID PANEL:  Lab Results   Component Value Date    CHOL 163 10/15/2018    HDL 34 (L) 10/15/2018    TRIG 161 (H) 10/15/2018     LIVER PROFILE:   Recent Labs     05/18/20  0725 05/19/20  0635   AST 13 11   ALT 10 11   BILITOT 0.35 0.35   ALKPHOS 176* 168*        Current Medications:   Current Facility-Administered Medications: insulin glargine (LANTUS) injection vial 52 Units, 52 Units, Subcutaneous, QAM  insulin glargine (LANTUS) injection vial 40 Units, 40 Units, Subcutaneous, Nightly  gabapentin (NEURONTIN) capsule 100 mg, 100 mg, Oral, TID  lidocaine 4 % external patch 1 patch, 1 patch, Transdermal, Daily  senna (SENOKOT) tablet 17.2 mg, 2 tablet, Oral, Nightly PRN  bisacodyl (DULCOLAX) suppository 10 mg, 10 mg, Rectal, Daily PRN  acetaminophen (TYLENOL) tablet 1,000 mg, 1,000 mg, Oral, 3 times per day  sodium chloride flush 0.9 % injection 10 mL, 10 mL, Intravenous, PRN  dextrose 5 % solution, 100 mL/hr, Intravenous, PRN  dextrose 50 % IV solution, 12.5 g, Intravenous, PRN  glucagon (rDNA) injection 1 mg, 1 mg, Intramuscular, PRN  glucose (GLUTOSE) 40 % oral gel 15 g, 15 g, Oral, PRN  aspirin EC tablet 81 mg, 81 mg, Oral, Daily  insulin lispro (HUMALOG) injection vial 0-18 Units, 0-18 Units, Subcutaneous, TID WC  insulin lispro (HUMALOG) injection vial 0-9 Units, 0-9 Units, Subcutaneous, Nightly  metoprolol succinate (TOPROL XL) extended release tablet 50 mg, 50 mg, Oral,

## 2020-05-20 NOTE — PLAN OF CARE
Problem: Pain:  Goal: Control of acute pain  Description: Control of acute pain  Outcome: Ongoing  Note: Pain controlled with prn medications this shift. Will continue to monitor. Problem: Falls - Risk of:  Goal: Will remain free from falls  Description: Will remain free from falls  Outcome: Ongoing  Note: Patient remains free from falls/injuries at this time. Bed locked and in lowest position. Bed alarm activated. Call light within reach. Hourly nursing rounds made. Will continue to monitor. Problem: Falls - Risk of:  Goal: Absence of physical injury  Description: Absence of physical injury  Outcome: Ongoing     Problem: Mobility - Impaired:  Goal: Mobility will improve  Description: Mobility will improve  Outcome: Ongoing     Problem: Skin Integrity:  Goal: Will show no infection signs and symptoms  Description: Will show no infection signs and symptoms  Outcome: Ongoing     Problem: Skin Integrity:  Goal: Absence of new skin breakdown  Description: Absence of new skin breakdown  Outcome: Ongoing  Note: No new skin problems. Skin integrity maintained. Will continue to monitor.        Problem: Musculor/Skeletal Functional Status  Goal: Absence of falls  Outcome: Ongoing

## 2020-05-20 NOTE — PROGRESS NOTES
Thursday AM at 930. Pt feels confident about going home. Educated on continued PT needed at home. Pt reports pain in PM session, prefers to ambulate PM vs AM today. Pain Screening  Patient Currently in Pain: Yes  Pain Assessment  Pain Assessment: 0-10  Pain Level: 7(AM, 5/10 PM)  Pain Type: Acute pain  Pain Location: Hip;Knee  Pain Orientation: Right  Pain Descriptors: Sore  Pain Frequency: Continuous  Pain Onset: On-going  Clinical Progression: Gradually improving  Functional Pain Assessment: Prevents or interferes with all active and some passive activities  Non-Pharmaceutical Pain Intervention(s): Cold applied; Ambulation/Increased Activity; Distraction;Repositioned; Rest  Response to Pain Intervention: Patient Satisfied  Vital Signs  Patient Currently in Pain: Yes  Oxygen Therapy  O2 Device: None (Room air)  Patient Observation  Observations: Patient continues to be guarded at times, needs encouragement. Orientation  Orientation  Overall Orientation Status: Within Normal Limits  Cognition      Objective   Bed mobility  Rolling to Right: Stand by assistance  Supine to Sit: Stand by assistance  Sit to Supine: Minimal assistance(at BLE)  Comment: SBA to EOB, assist with B LE back to bed x2. SBA sitting EOB x10 min AM, 5 minutes PM  Transfers  Sit to Stand: Maximum Assistance;2 Person Assistance; Moderate Assistance(To RW, mod to max x2. Increased time for transfers. )  Stand to sit: Moderate Assistance;2 Person Assistance(From RW to WC/mat)  Lateral Transfers: Minimal Assistance;Contact guard assistance(slideboard; CGA toward strong side, min x1 toward weak side)  Comment: Pt requires cues for sequencing, occasional blocking of L knee to avoid extension with STS and lateral slideboard transfers.   Ambulation  Ambulation?: Yes  WB Status: PWB 25% right LE  Ambulation 1  Surface: level tile  Device: Rolling Walker  Other Apparatus: (WC brought up to patient post amb.)  Assistance: Minimal assistance(2 assist to

## 2020-05-20 NOTE — PROGRESS NOTES
Status: Within Functional Limits  Orientation Level: Oriented X4  Patient Observation  Observations: Patient continues to be guarded and requires cues for relaxtion. Pain Assessment  Pain Assessment: 0-10  Pain Level: 5  Pain Type: Acute pain, Surgical pain  Pain Location: Hip, Knee  Pain Orientation: Right  Pain Descriptors: Sore  Clinical Progression: Not changed  Response to Pain Intervention: Patient Satisfied    Objective  Cognition  Overall Cognitive Status: WFL  Perception  Overall Perceptual Status: WFL  Balance  Sitting Balance: Modified independent   Standing Balance: Contact guard assistance(CGA-SBA)    Bed mobility  Rolling to Left: Modified independent  Rolling to Right: Modified independent  Supine to Sit: Modified independent  Sit to Supine: Stand by assistance(SBA to assist c bringing RLE into bed if needed)  Scooting: Independent  Comment: pt utilizes rails for rolling  Transfers  Sit to stand: Minimal assistance(utilizing aakash stedy, assist varies on surface ht, min A from toilet, CGA from bed)  Stand to sit: Contact guard assistance(from aakash stedy)  Transfer Comments: increased time req 2* BLE placement on aakash stedy 2* limited B knee ROM    Standing Balance  Time: 1 min x4   Activity: transfers, ADL   Comment: utilizing aakash stedy   Functional Mobility  Functional - Mobility Device: Wheelchair  Activity: (around pt room )  Assist Level: Modified independent   Functional Mobility Comments: pt manuvering around bed and into bathroom   Toilet Transfers  Toilet - Technique: (utilizing aakash stedy )  Equipment Used: Grab bars  Toilet Transfer: Dependent/Total  Toilet Transfers Comments: AM: with use  aakash steady. Guidance and assist to sit due to lack of flexion in knees; Mod A to get up from toilet    Shower Transfers  Shower - Transfer From: (aakash stedy )  Shower - Transfer Type: To and From  Shower - Transfer To:  Other(shower chair with wheels )  Shower - Technique: Stand pivot  Shower Transfers: Needed: (TBD)       Patient Education: OT POC, slideboard transfers, activity promotion, sitting up in chair as able  Patient Goals   Patient goals : To have less pain and be able to move  Learner:patient  Method: demonstration and explanation       Outcome: acknowledged understanding, demonstrated understanding and needs reinforcement        Plan  Plan  Times per week: 900 min/week   Times per day: Twice a day  Current Treatment Recommendations: Strengthening, ROM, Functional Mobility Training, Balance Training, Patient/Caregiver Education & Training, Self-Care / ADL, Safety Education & Training, Home Management Training, Positioning, Endurance Training, Pain Management, Equipment Evaluation, Education, & procurement  Plan Comment: continue OT  Patient Goals   Patient goals :  To have less pain and be able to move  Short term goals  Time Frame for Short term goals: in 5-7 days, patient will  Short term goal 1: perform functional transfers during ADL routine with Mod A using DME as appropriate  Short term goal 2: perform toileting routine (transfer/hygiene/clothing management) with Mod A   Short term goal 3: V/D Good understanding of AE/DME/compensatory techniques for self care/mobility tasks   Short term goal 4: V/D Good understanding of PWB (25%) RLE during self care/mobility tasks   Short term goal 5: increase standing tolerance to 5+ minutes with CGA-Min A with 0-1 UE support during functional task   Short term goal 6: actively participate in 30+ minutes of therapeutic exercise/activity to promote increased safety and independence with self care and mobility tasks   Long term goals  Time Frame for Long term goals : by discharge, patient will  Long term goal 1: perform functional transfers/mobility during ADL routine with Modified [place] using DME as appropriate  Long term goal 2: perform BADL tasks with independence/modified independence using AE/compensatory techniques/DME as needed  Long term goal 3: V/D

## 2020-05-21 VITALS
HEART RATE: 64 BPM | SYSTOLIC BLOOD PRESSURE: 123 MMHG | DIASTOLIC BLOOD PRESSURE: 75 MMHG | HEIGHT: 72 IN | BODY MASS INDEX: 25.73 KG/M2 | WEIGHT: 190 LBS | TEMPERATURE: 97.3 F | OXYGEN SATURATION: 99 % | RESPIRATION RATE: 20 BRPM

## 2020-05-21 LAB
ABSOLUTE EOS #: 0.48 K/UL (ref 0–0.4)
ABSOLUTE IMMATURE GRANULOCYTE: ABNORMAL K/UL (ref 0–0.3)
ABSOLUTE LYMPH #: 1.11 K/UL (ref 1–4.8)
ABSOLUTE MONO #: 0.3 K/UL (ref 0.1–1.3)
ALBUMIN SERPL-MCNC: 3.7 G/DL (ref 3.5–5.2)
ALBUMIN/GLOBULIN RATIO: ABNORMAL (ref 1–2.5)
ALP BLD-CCNC: 162 U/L (ref 40–129)
ALT SERPL-CCNC: 9 U/L (ref 5–41)
ANION GAP SERPL CALCULATED.3IONS-SCNC: 11 MMOL/L (ref 9–17)
AST SERPL-CCNC: 10 U/L
BASOPHILS # BLD: 1 % (ref 0–2)
BASOPHILS ABSOLUTE: 0.04 K/UL (ref 0–0.2)
BILIRUB SERPL-MCNC: 0.36 MG/DL (ref 0.3–1.2)
BUN BLDV-MCNC: 25 MG/DL (ref 8–23)
BUN/CREAT BLD: ABNORMAL (ref 9–20)
CALCIUM SERPL-MCNC: 10 MG/DL (ref 8.6–10.4)
CHLORIDE BLD-SCNC: 103 MMOL/L (ref 98–107)
CO2: 27 MMOL/L (ref 20–31)
CREAT SERPL-MCNC: 1.45 MG/DL (ref 0.7–1.2)
DIFFERENTIAL TYPE: ABNORMAL
EOSINOPHILS RELATIVE PERCENT: 13 % (ref 0–4)
GFR AFRICAN AMERICAN: 59 ML/MIN
GFR NON-AFRICAN AMERICAN: 49 ML/MIN
GFR SERPL CREATININE-BSD FRML MDRD: ABNORMAL ML/MIN/{1.73_M2}
GFR SERPL CREATININE-BSD FRML MDRD: ABNORMAL ML/MIN/{1.73_M2}
GLUCOSE BLD-MCNC: 126 MG/DL (ref 75–110)
GLUCOSE BLD-MCNC: 216 MG/DL (ref 75–110)
GLUCOSE BLD-MCNC: 89 MG/DL (ref 70–99)
HCT VFR BLD CALC: 34.3 % (ref 41–53)
HEMOGLOBIN: 11.4 G/DL (ref 13.5–17.5)
IMMATURE GRANULOCYTES: ABNORMAL %
LYMPHOCYTES # BLD: 30 % (ref 24–44)
MCH RBC QN AUTO: 30.1 PG (ref 26–34)
MCHC RBC AUTO-ENTMCNC: 33.2 G/DL (ref 31–37)
MCV RBC AUTO: 90.7 FL (ref 80–100)
MONOCYTES # BLD: 8 % (ref 1–7)
MORPHOLOGY: ABNORMAL
NRBC AUTOMATED: ABNORMAL PER 100 WBC
PDW BLD-RTO: 15.5 % (ref 11.5–14.9)
PLATELET # BLD: 185 K/UL (ref 150–450)
PLATELET ESTIMATE: ABNORMAL
PMV BLD AUTO: 8.3 FL (ref 6–12)
POTASSIUM SERPL-SCNC: 4.7 MMOL/L (ref 3.7–5.3)
RBC # BLD: 3.78 M/UL (ref 4.5–5.9)
RBC # BLD: ABNORMAL 10*6/UL
SEG NEUTROPHILS: 48 % (ref 36–66)
SEGMENTED NEUTROPHILS ABSOLUTE COUNT: 1.77 K/UL (ref 1.3–9.1)
SODIUM BLD-SCNC: 141 MMOL/L (ref 135–144)
TOTAL PROTEIN: 7.1 G/DL (ref 6.4–8.3)
WBC # BLD: 3.7 K/UL (ref 3.5–11)
WBC # BLD: ABNORMAL 10*3/UL

## 2020-05-21 PROCEDURE — 85025 COMPLETE CBC W/AUTO DIFF WBC: CPT

## 2020-05-21 PROCEDURE — 99239 HOSP IP/OBS DSCHRG MGMT >30: CPT | Performed by: PHYSICAL MEDICINE & REHABILITATION

## 2020-05-21 PROCEDURE — 80053 COMPREHEN METABOLIC PANEL: CPT

## 2020-05-21 PROCEDURE — 97530 THERAPEUTIC ACTIVITIES: CPT

## 2020-05-21 PROCEDURE — 82947 ASSAY GLUCOSE BLOOD QUANT: CPT

## 2020-05-21 PROCEDURE — 6370000000 HC RX 637 (ALT 250 FOR IP): Performed by: PHYSICAL MEDICINE & REHABILITATION

## 2020-05-21 PROCEDURE — 6370000000 HC RX 637 (ALT 250 FOR IP): Performed by: ORTHOPAEDIC SURGERY

## 2020-05-21 PROCEDURE — 97116 GAIT TRAINING THERAPY: CPT

## 2020-05-21 PROCEDURE — 6370000000 HC RX 637 (ALT 250 FOR IP): Performed by: FAMILY MEDICINE

## 2020-05-21 PROCEDURE — 6360000002 HC RX W HCPCS: Performed by: PHYSICAL MEDICINE & REHABILITATION

## 2020-05-21 PROCEDURE — 36415 COLL VENOUS BLD VENIPUNCTURE: CPT

## 2020-05-21 RX ORDER — INSULIN GLARGINE 100 [IU]/ML
52 INJECTION, SOLUTION SUBCUTANEOUS NIGHTLY
Qty: 5 PEN | Refills: 0 | Status: SHIPPED | OUTPATIENT
Start: 2020-05-21 | End: 2020-07-17 | Stop reason: DRUGHIGH

## 2020-05-21 RX ORDER — OXYCODONE HYDROCHLORIDE 5 MG/1
5 TABLET ORAL EVERY 4 HOURS PRN
Qty: 42 TABLET | Refills: 0 | Status: SHIPPED | OUTPATIENT
Start: 2020-05-21 | End: 2020-05-28

## 2020-05-21 RX ORDER — INSULIN LISPRO 100 [IU]/ML
INJECTION, SOLUTION INTRAVENOUS; SUBCUTANEOUS
Qty: 1 PEN | Refills: 1 | Status: SHIPPED | OUTPATIENT
Start: 2020-05-21 | End: 2020-07-17

## 2020-05-21 RX ORDER — ASPIRIN 81 MG/1
81 TABLET ORAL DAILY
Qty: 30 TABLET | Refills: 3 | COMMUNITY
Start: 2020-05-21

## 2020-05-21 RX ORDER — POLYETHYLENE GLYCOL 3350 17 G/17G
17 POWDER, FOR SOLUTION ORAL DAILY
Qty: 527 G | Refills: 1 | COMMUNITY
Start: 2020-05-21 | End: 2020-06-20

## 2020-05-21 RX ORDER — INSULIN GLARGINE 100 [IU]/ML
40 INJECTION, SOLUTION SUBCUTANEOUS NIGHTLY
Qty: 5 PEN | Refills: 0 | Status: SHIPPED | OUTPATIENT
Start: 2020-05-21 | End: 2020-07-17 | Stop reason: DRUGHIGH

## 2020-05-21 RX ORDER — INSULIN LISPRO 100 [IU]/ML
INJECTION, SOLUTION INTRAVENOUS; SUBCUTANEOUS
Qty: 1 PEN | Refills: 0 | Status: SHIPPED | OUTPATIENT
Start: 2020-05-21 | End: 2020-07-17

## 2020-05-21 RX ORDER — GABAPENTIN 100 MG/1
100 CAPSULE ORAL 3 TIMES DAILY
Qty: 90 CAPSULE | Refills: 1 | Status: SHIPPED | OUTPATIENT
Start: 2020-05-21 | End: 2021-07-19

## 2020-05-21 RX ORDER — LIDOCAINE 4 G/G
1 PATCH TOPICAL DAILY
COMMUNITY
Start: 2020-05-21 | End: 2020-07-17

## 2020-05-21 RX ORDER — METOPROLOL SUCCINATE 50 MG/1
50 TABLET, EXTENDED RELEASE ORAL 2 TIMES DAILY
Qty: 30 TABLET | Refills: 3 | Status: SHIPPED | OUTPATIENT
Start: 2020-05-21 | End: 2021-06-08

## 2020-05-21 RX ORDER — PANTOPRAZOLE SODIUM 40 MG/1
40 TABLET, DELAYED RELEASE ORAL DAILY
Qty: 30 TABLET | Refills: 3 | Status: SHIPPED | OUTPATIENT
Start: 2020-05-21

## 2020-05-21 RX ADMIN — OXYCODONE HYDROCHLORIDE 10 MG: 5 TABLET ORAL at 05:35

## 2020-05-21 RX ADMIN — PANTOPRAZOLE SODIUM 40 MG: 40 TABLET, DELAYED RELEASE ORAL at 08:27

## 2020-05-21 RX ADMIN — ENOXAPARIN SODIUM 40 MG: 40 INJECTION SUBCUTANEOUS at 08:27

## 2020-05-21 RX ADMIN — INSULIN LISPRO 6 UNITS: 100 INJECTION, SOLUTION INTRAVENOUS; SUBCUTANEOUS at 12:03

## 2020-05-21 RX ADMIN — INSULIN GLARGINE 52 UNITS: 100 INJECTION, SOLUTION SUBCUTANEOUS at 08:27

## 2020-05-21 RX ADMIN — OXYCODONE HYDROCHLORIDE 10 MG: 5 TABLET ORAL at 09:47

## 2020-05-21 RX ADMIN — ASPIRIN 81 MG: 81 TABLET, COATED ORAL at 08:27

## 2020-05-21 RX ADMIN — METRONIDAZOLE 100 MG: 500 TABLET ORAL at 08:26

## 2020-05-21 RX ADMIN — METOPROLOL SUCCINATE 50 MG: 50 TABLET, EXTENDED RELEASE ORAL at 08:26

## 2020-05-21 RX ADMIN — ACETAMINOPHEN 1000 MG: 500 TABLET, FILM COATED ORAL at 05:35

## 2020-05-21 ASSESSMENT — ENCOUNTER SYMPTOMS
ABDOMINAL DISTENTION: 0
APNEA: 0
VOICE CHANGE: 0
RECTAL PAIN: 0
EYE ITCHING: 0
COLOR CHANGE: 0
ANAL BLEEDING: 0
FACIAL SWELLING: 0
CHEST TIGHTNESS: 0
STRIDOR: 0
CHOKING: 0
PHOTOPHOBIA: 0

## 2020-05-21 ASSESSMENT — PAIN DESCRIPTION - LOCATION
LOCATION: HIP
LOCATION: HIP

## 2020-05-21 ASSESSMENT — PAIN SCALES - GENERAL
PAINLEVEL_OUTOF10: 7

## 2020-05-21 ASSESSMENT — PAIN DESCRIPTION - PAIN TYPE
TYPE: ACUTE PAIN
TYPE: ACUTE PAIN

## 2020-05-21 NOTE — DISCHARGE INSTR - COC
Continuity of Care Form    Patient Name: Sanju Rosenthal   :  1955  MRN:  [id number]    Admit date:  2020  Discharge date:  ***    Code Status Order: Full Code   Advance Directives:   Advance Care Flowsheet Documentation     Date/Time Healthcare Directive Type of Healthcare Directive Copy in [address] Agent's Name Healthcare Agent's Phone Number    [phone]  No, patient does not have an advance directive for healthcare treatment -- -- -- -- --          Admitting Physician:  Sera Byrnes MD  PCP: ADEEL Nevarez - CNP    Discharging Nurse: [facility] Unit/Room#: 4297/8718-55  Discharging Unit Phone Number: ***    Emergency Contact:   Extended Emergency Contact Information  Primary Emergency Contact: Victor Hugo Morataya  Address: [facility], [facility] [address] Phone: [phone]  Mobile Phone: [phone]  Relation: Spouse  Secondary Emergency Contact: [address] Phone: [phone]  Relation: Child    Past Surgical History:  Past Surgical History:   Procedure Laterality Date    CHOLECYSTECTOMY      FEMUR FRACTURE SURGERY Right 2020    HIP TFN WITH C-ARM VISUALIZATION performed by Duran Beaulieu MD at [facility] Right     right elbow x 3. Compound fx at work.  FRACTURE SURGERY Bilateral     with hardware    FRACTURE SURGERY Left     with hardware    TONSILLECTOMY AND ADENOIDECTOMY         Immunization History: There is no immunization history on file for this patient.     Active Problems:  Patient Active Problem List   Diagnosis Code    DM w/o complication type II ([facility] E11.9    Essential hypertension I10    Erectile dysfunction N52.9    Microalbuminuria due to type 2 diabetes mellitus ([facility] E11.29, R80.9    Stage 3 chronic kidney disease ([facility] N18.3    Traumatic bilateral lower extremity fractures S82.91XA, S82.92XA    Closed right hip fracture, initial encounter [facility]) S72.001A    Osteogenesis imperfecta Q78.0    Type 2 diabetes mellitus with chronic kidney disease ([facility] E11.22    Type 2 diabetes mellitus with hyperglycemia (H[facility]) E11.65    Gastroesophageal reflux disease without esophagitis K21.9       Isolation/Infection:   Isolation          No Isolation        Patient Infection Status     None to display          Nurse Assessment:  Last Vital Signs: /74   Pulse 80   Temp 98.3 °F (36.8 °C) (Oral)   Resp 18   Ht 6' (1.829 m)   Wt 190 lb (86.2 kg)   SpO2 100%   BMI 25.77 kg/m²     Last documented pain score (0-10 scale): Pain Level: 4  Last Weight:   Wt Readings from Last 1 Encounters:   20 190 lb (86.2 kg)     Mental Status:  {IP PT MENTAL STATUS:}    IV Access:  { ERYN IV ACCESS:566811601}    Nursing Mobility/ADLs:  Walking   {P DME ZLTB:828525865}  Transfer  {[facility] DME LZDD:440365725}  Bathing  {[facility] DME WWHU:362820771}  Dressing  {P DME QBZN:478951560}  Toileting  {P DME UNUJ:585381905}  Feeding  {[facility] DME SFDO:892863839}  Med Admin  {[facility] DME RDSY:571670640}  Med Delivery   { ERYN MED Delivery:486880042}    Wound Care Documentation and Therapy:        Elimination:  Continence:   · Bowel: {YES / J}  · Bladder: {YES / UH:40495}  Urinary Catheter: {Urinary Catheter:760488544}   Colostomy/Ileostomy/Ileal Conduit: {YES / U}       Date of Last BM: ***    Intake/Output Summary (Last 24 hours) at 20201  Last data filed at 2020  Gross per 24 hour   Intake --   Output 2350 ml   Net -2350 ml     I/O last 3 completed shifts:  In: -   Out: 2350 [Urine:2350]    Safety Concerns:     [address] ERYN Safety Concerns:271963890}    Impairments/Disabilities:      [address] [email or web address] Impairments/Disabilities:507479465}    Nutrition Therapy:  Current Nutrition Therapy:   [address] [email or web address] Diet List:696532827}    Routes of Feeding: {CHP DME Other Feedings:057571420}  Liquids: {Slp liquid thickness:40840}  Daily Fluid Restriction: {GILES DME

## 2020-05-21 NOTE — PROGRESS NOTES
Pt discharged home with family. Pt discharged with all belongings, order for outpatient therapy, and medications from [facility]-2-beds. Pt and family verbalize understanding of discharge instructions. No s/s of distress noted.

## 2020-05-21 NOTE — PROGRESS NOTES
Progress Note    5/21/2020   9:31 AM    Name:  Zulay Willard  MRN:    [id number]     Acct:     [de-identified]   Room:  [address] [place] [address] Day: 24     Admit Date: 4/27/2020  6:11 PM  PCP: ADEEL Stewart CNP    Subjective:     C/C:  Hip pain    Interval History: Status: improved. patient's right hip pain is stable    ROS:   all 10 systems reviewed and are negative except as noted    Review of Systems   Constitutional: Negative for appetite change and fatigue. HENT: Negative for drooling, facial swelling, mouth sores, sneezing and voice change. Eyes: Negative for photophobia and itching. Respiratory: Negative for apnea, choking, chest tightness and stridor. Cardiovascular: Negative for chest pain, palpitations and leg swelling. Gastrointestinal: Negative for abdominal distention, anal bleeding and rectal pain. Endocrine: Negative for polydipsia and polyuria. Genitourinary: Negative for difficulty urinating, flank pain, frequency and urgency. Musculoskeletal: Positive for arthralgias. Negative for gait problem, joint swelling, myalgias and neck stiffness. Skin: Negative for color change and wound. Allergic/Immunologic: Negative for food allergies. Neurological: Negative for seizures, facial asymmetry, weakness and headaches. Hematological: Negative for adenopathy. Does not bruise/bleed easily. Medications:      Allergies: No Known Allergies    Current Meds: insulin glargine (LANTUS) injection vial 52 Units, QAM  insulin glargine (LANTUS) injection vial 40 Units, Nightly  gabapentin (NEURONTIN) capsule 100 mg, TID  lidocaine 4 % external patch 1 patch, Daily  senna (SENOKOT) tablet 17.2 mg, Nightly PRN  bisacodyl (DULCOLAX) suppository 10 mg, Daily PRN  acetaminophen (TYLENOL) tablet 1,000 mg, 3 times per day  sodium chloride flush 0.9 % injection 10 mL, PRN  dextrose 5 % solution, PRN  dextrose 50 % IV solution, PRN  glucagon (rDNA) injection 1 mg, PRN  glucose (GLUTOSE) 40 % oral gel 15 g, PRN  aspirin EC tablet 81 mg, Daily  insulin lispro (HUMALOG) injection vial 0-18 Units, TID WC  insulin lispro (HUMALOG) injection vial 0-9 Units, Nightly  metoprolol succinate (TOPROL XL) extended release tablet 50 mg, BID  pantoprazole (PROTONIX) tablet 40 mg, Daily  sertraline (ZOLOFT) tablet 100 mg, Nightly  traZODone (DESYREL) tablet 100 mg, Nightly  polyethylene glycol (GLYCOLAX) packet 17 g, Daily  enoxaparin (LOVENOX) injection 40 mg, Daily  magnesium hydroxide (MILK OF MAGNESIA) 400 MG/5ML suspension 30 mL, Daily PRN  oxyCODONE (ROXICODONE) immediate release tablet 5 mg, Q4H PRN    Or  oxyCODONE (ROXICODONE) immediate release tablet 10 mg, Q4H PRN        Data:     Code Status:  Full Code    Family History   Problem Relation Age of Onset    Diabetes Mother     Stroke Mother     Diabetes Father     Heart Disease Father        Social History     Socioeconomic History    Marital status:      Spouse name: Not on file    Number of children: Not on file    Years of education: Not on file    Highest education level: Not on file   Occupational History    Not on file   Social Needs    Financial resource strain: Not on file    Food insecurity     Worry: Not on file     Inability: Not on file    Transportation needs     Medical: Not on file     Non-medical: Not on file   Tobacco Use    Smoking status: Former Smoker     Packs/day: 2.00     Years: 18.00     Pack years: 36.00     Types: Cigarettes     Last attempt to quit: 12/3/1991     Years since quittin.4    Smokeless tobacco: Never Used   Substance and Sexual Activity    Alcohol use: Not on file    Drug use: Not on file    Sexual activity: Not on file   Lifestyle    Physical activity     Days per week: Not on file     Minutes per session: Not on file    Stress: Not on file   Relationships    Social connections     Talks on phone: Not on file     Gets together: Not on file     Attends Taoism service: Not on file     Active daughter  4.  patient will be discharged home with home care today per rehab physician        Electronically signed by Carly Argueta MD

## 2020-05-21 NOTE — PROGRESS NOTES
Physical Therapy  [address]  Acute Rehabilitation Physical Therapy Progress Note    Date: 20  Patient Name: Regina Reyes       Room: 4110/5583-31  MRN: [id number]   Account: [de-identified]   : 1955  (62 y.o.) Gender: male     Referring Practitioner: Alison Richmond  Diagnosis: Closed R hip fx  Past Medical History:  has a past medical history of Contracture, elbow, Erectile dysfunction, Hypertension, Osteogenesis imperfecta, and Type II or unspecified type diabetes mellitus without mention of complication, not stated as uncontrolled. Past Surgical History:   has a past surgical history that includes Cholecystectomy; Tonsillectomy and adenoidectomy; fracture surgery (Right); fracture surgery (Bilateral); fracture surgery (Left); and Femur fracture surgery (Right, 2020). Additional Pertinent Hx: 58 y/o male admitted to [facility] 20 with R hip pain and fall. Diagnosed with right intertrochanteric femur fracture. Last fractures were of bilateral patella in 2019- surgery by Dr. Fay Braxton. Pt admitted to [facility] 20.     Overall Orientation Status: Within Normal Limits  Restrictions/Precautions  Restrictions/Precautions: Fall Risk;Weight Bearing;General Precautions  Required Braces or Orthoses?: No  Implants present? : Metal implants  Lower Extremity Weight Bearing Restrictions  Right Lower Extremity Weight Bearing: Partial Weight Bearing  Partial Weight Bearing Percentage Or Pounds: 25%   Left Lower Extremity Weight Bearing: Weight Bearing As Tolerated  Partial Weight Bearing Percentage Or Pounds: Reports that he only recently was able to achieve 90+ degrees of flexion in R knee due to past fractures/surgeries/conditions/procedures   Upper Extremity Weight Bearing Restrictions  Right Upper Extremity Weight Bearing: (Reports that he is limited in Active ROM of extension in R elbow due to past fractures/surgeries/conditions/procedures related to

## 2020-05-21 NOTE — DISCHARGE SUMMARY
Physical Medicine & Rehabilitation  Discharge Summary     Patient Identification:  Natividad Dodson  : 1955  Admit date: 2020  Discharge date: 2020   Attending provider: Lucie Umana MD        Primary care provider: ADEEL Funes - CNP     Discharge Diagnoses:   R hip fracture s/p IM nail, L shoulder/arm pain, DM, HTN, GERD, insomnia, depression    Discharge Functional Status:    Physical therapy:  Bed Mobility: Rolling: Rolling Left, Minimal assistance(Bed flat used HR, not able to roll to (R))  Supine to Sit: Moderate assistance(1 person assist to support RLE and assist with trunk prn)  Sit to Supine: Moderate assistance(B LE assist, improved trunk mobility/pivot at hips)  Scooting: Independent  Transfers: Sit to Stand: Maximum Assistance, 2 Person Assistance, Moderate Assistance(To RW, mod to max x2. Increased time for transfers. )  Stand to sit: Moderate Assistance, 2 Person Assistance(From RW to WC/mat)  Bed to Chair: Maximum assistance(Squat pivot to strong side.; Cues for safety.)  Squat Pivot Transfers: Maximum Assistance(To strong side.)  Comment: Breaks given PRN., WB Status: PWB 25% right LE  Ambulation 1  Surface: level tile  Device: Rolling Walker  Other Apparatus: (WC brought up to patient post amb.)  Assistance: Minimal assistance(2 assist to stand, 1 assist to amb.)  Quality of Gait: Antalgic gait pattern. Max cues for gait sequence due to PWB 25% into R LE. Slow belén. No LOB. Patient relies heavily on B UE pushing down into RW. Gait Deviations: Slow Belén, Decreased step length, Decreased step height  Distance: PM: 26', 8'  Comments: Max encouragement to continue with amb. Pt fatigues at end of ambulation. ,    Mobility:  , PT Equipment Recommendations  Equipment Needed: No  Other: Patient has RW and wheelchair at home. , Assessment: Pt requires encouragement and education for mobility.  Discussed with pt and son that pt will need continued therapy at d/c and will need assist at home. Occupational therapy:  , Equipment Recommendations  Equipment Needed: (TBD), Assessment: Pt demonstrated decreased performance with ADLs this morning, citing increased R hip/leg pain with activity this date. Pt attributes increased pain to tough session with physical therapy. Speech therapy:         Inpatient Rehabilitation Course:   Con Brown is a 59 y.o. male admitted to inpatient rehabilitation on 4/27/2020 for rehab for R hip fracture. INITIAL HPI:  He was originally admitted to [facility] on 4/23/2020 for fall at home from standing height with R hip fracture. He has known history of osteogenesis imperfecta with multiple previous fractures. Work-up included x-rays of R hip and femur showing displaced R IT fracture. Treatment included IM nail fixation performed by Dr. Bola Arauz on 4/23/2020. Patient evaluated today:  GEN: Well developed, well nourished, in NAD  HEENT:  NCAT.  PERRL.  EOMI.  Mucous membranes pink and moist.   PULM:  Clear to ausculation. No rales or rhonchi. Respirations WNL and unlabored. CV:  bradycardic rate regular rhythm.  No murmurs or gallops. GI:  Abdomen soft. Nontender. Non-distended.  BS + and equal.    NEUROLOGICAL: A&O x3. Sensation intact to light touch. MSK:  Functional ROM BUE and LLEs. Pain and weakness impairs AROM R hip. Strength 5/5 key muscles BUEs. L hip and knee muscles 4+/5. R hip 3/5, R knee extension 4/5.    SKIN: Warm dry and intact except healing R lateral hip and thigh incisions - well healed, Good turgor. EXTREMITIES:  No calf tenderness to palpation. Proximal RLE edema. No edema LLE.   PSYCH: Mood WNL. Appropriately interactive. Affect WNL. Rehab course:   Patient initially had poor control of his pain. He responded well to short course of OxyContin and addition of gabapentin. Insomnia responded to Trazodone. Internal medicine adjusted insulin doses to manage hyperglycemia.   Chronic conditions were stable on

## 2020-05-21 NOTE — DISCHARGE INSTR - DIET

## 2020-05-26 ENCOUNTER — OFFICE VISIT (OUTPATIENT)
Dept: ORTHOPEDIC SURGERY | Age: 65
End: 2020-05-26

## 2020-05-26 VITALS — WEIGHT: 200 LBS | HEIGHT: 72 IN | BODY MASS INDEX: 27.09 KG/M2

## 2020-05-26 PROCEDURE — 99024 POSTOP FOLLOW-UP VISIT: CPT | Performed by: ORTHOPAEDIC SURGERY

## 2020-05-28 ENCOUNTER — TELEPHONE (OUTPATIENT)
Dept: ORTHOPEDIC SURGERY | Age: 65
End: 2020-05-28

## 2020-05-28 NOTE — TELEPHONE ENCOUNTER
Patients daughter called stating at his last appt a prescription was supposed to be sent in for pain. She states nothing has been sent in yet.

## 2020-05-29 ENCOUNTER — TELEPHONE (OUTPATIENT)
Dept: ORTHOPEDIC SURGERY | Age: 65
End: 2020-05-29

## 2020-05-29 RX ORDER — OXYCODONE HYDROCHLORIDE AND ACETAMINOPHEN 5; 325 MG/1; MG/1
1 TABLET ORAL EVERY 6 HOURS PRN
Qty: 28 TABLET | Refills: 0 | Status: SHIPPED | OUTPATIENT
Start: 2020-05-29 | End: 2020-06-05

## 2020-05-29 NOTE — TELEPHONE ENCOUNTER
Patients daughter called stating at his last appt a percocet prescription was supposed to be sent in for pain. She states nothing has been sent in yet. Last OV 5/26 and a renewal for percocet was mentioned in the office note. Med attached.

## 2020-07-17 ENCOUNTER — HOSPITAL ENCOUNTER (INPATIENT)
Age: 65
LOS: 7 days | Discharge: NURSING, REHAB OR LONG-TERM CARE | DRG: 493 | End: 2020-07-24
Attending: EMERGENCY MEDICINE | Admitting: ORTHOPAEDIC SURGERY
Payer: MEDICARE

## 2020-07-17 ENCOUNTER — APPOINTMENT (OUTPATIENT)
Dept: CT IMAGING | Age: 65
DRG: 493 | End: 2020-07-17
Payer: MEDICARE

## 2020-07-17 ENCOUNTER — APPOINTMENT (OUTPATIENT)
Dept: GENERAL RADIOLOGY | Age: 65
DRG: 493 | End: 2020-07-17
Payer: MEDICARE

## 2020-07-17 PROBLEM — S82.142A TIBIAL PLATEAU FRACTURE, LEFT, CLOSED, INITIAL ENCOUNTER: Status: ACTIVE | Noted: 2020-07-17

## 2020-07-17 LAB
ABSOLUTE EOS #: 0.3 K/UL (ref 0–0.4)
ABSOLUTE IMMATURE GRANULOCYTE: ABNORMAL K/UL (ref 0–0.3)
ABSOLUTE LYMPH #: 0.8 K/UL (ref 1–4.8)
ABSOLUTE MONO #: 0.4 K/UL (ref 0.1–1.3)
ANION GAP SERPL CALCULATED.3IONS-SCNC: 12 MMOL/L (ref 9–17)
BASOPHILS # BLD: 1 % (ref 0–2)
BASOPHILS ABSOLUTE: 0.1 K/UL (ref 0–0.2)
BUN BLDV-MCNC: 21 MG/DL (ref 8–23)
BUN/CREAT BLD: ABNORMAL (ref 9–20)
CALCIUM SERPL-MCNC: 9.6 MG/DL (ref 8.6–10.4)
CHLORIDE BLD-SCNC: 100 MMOL/L (ref 98–107)
CO2: 22 MMOL/L (ref 20–31)
CREAT SERPL-MCNC: 1.45 MG/DL (ref 0.7–1.2)
DIFFERENTIAL TYPE: ABNORMAL
EOSINOPHILS RELATIVE PERCENT: 3 % (ref 0–4)
GFR AFRICAN AMERICAN: 59 ML/MIN
GFR NON-AFRICAN AMERICAN: 49 ML/MIN
GFR SERPL CREATININE-BSD FRML MDRD: ABNORMAL ML/MIN/{1.73_M2}
GFR SERPL CREATININE-BSD FRML MDRD: ABNORMAL ML/MIN/{1.73_M2}
GLUCOSE BLD-MCNC: 185 MG/DL (ref 75–110)
GLUCOSE BLD-MCNC: 205 MG/DL (ref 70–99)
HCT VFR BLD CALC: 42.5 % (ref 41–53)
HEMOGLOBIN: 14.3 G/DL (ref 13.5–17.5)
IMMATURE GRANULOCYTES: ABNORMAL %
LYMPHOCYTES # BLD: 10 % (ref 24–44)
MCH RBC QN AUTO: 29.3 PG (ref 26–34)
MCHC RBC AUTO-ENTMCNC: 33.7 G/DL (ref 31–37)
MCV RBC AUTO: 87 FL (ref 80–100)
MONOCYTES # BLD: 5 % (ref 1–7)
NRBC AUTOMATED: ABNORMAL PER 100 WBC
PDW BLD-RTO: 13.4 % (ref 11.5–14.9)
PLATELET # BLD: 183 K/UL (ref 150–450)
PLATELET ESTIMATE: ABNORMAL
PMV BLD AUTO: 8.1 FL (ref 6–12)
POTASSIUM SERPL-SCNC: 4.3 MMOL/L (ref 3.7–5.3)
RBC # BLD: 4.89 M/UL (ref 4.5–5.9)
RBC # BLD: ABNORMAL 10*6/UL
SEG NEUTROPHILS: 81 % (ref 36–66)
SEGMENTED NEUTROPHILS ABSOLUTE COUNT: 6.6 K/UL (ref 1.3–9.1)
SODIUM BLD-SCNC: 134 MMOL/L (ref 135–144)
WBC # BLD: 8.1 K/UL (ref 3.5–11)
WBC # BLD: ABNORMAL 10*3/UL

## 2020-07-17 PROCEDURE — 36415 COLL VENOUS BLD VENIPUNCTURE: CPT

## 2020-07-17 PROCEDURE — 6360000002 HC RX W HCPCS: Performed by: ORTHOPAEDIC SURGERY

## 2020-07-17 PROCEDURE — 85025 COMPLETE CBC W/AUTO DIFF WBC: CPT

## 2020-07-17 PROCEDURE — 99284 EMERGENCY DEPT VISIT MOD MDM: CPT

## 2020-07-17 PROCEDURE — 2580000003 HC RX 258: Performed by: FAMILY MEDICINE

## 2020-07-17 PROCEDURE — 6360000002 HC RX W HCPCS: Performed by: EMERGENCY MEDICINE

## 2020-07-17 PROCEDURE — 73562 X-RAY EXAM OF KNEE 3: CPT

## 2020-07-17 PROCEDURE — 6370000000 HC RX 637 (ALT 250 FOR IP): Performed by: FAMILY MEDICINE

## 2020-07-17 PROCEDURE — 6370000000 HC RX 637 (ALT 250 FOR IP): Performed by: EMERGENCY MEDICINE

## 2020-07-17 PROCEDURE — 73700 CT LOWER EXTREMITY W/O DYE: CPT

## 2020-07-17 PROCEDURE — 80048 BASIC METABOLIC PNL TOTAL CA: CPT

## 2020-07-17 PROCEDURE — 1200000000 HC SEMI PRIVATE

## 2020-07-17 PROCEDURE — 83036 HEMOGLOBIN GLYCOSYLATED A1C: CPT

## 2020-07-17 PROCEDURE — 2580000003 HC RX 258: Performed by: ORTHOPAEDIC SURGERY

## 2020-07-17 PROCEDURE — 82947 ASSAY GLUCOSE BLOOD QUANT: CPT

## 2020-07-17 RX ORDER — SODIUM CHLORIDE 0.9 % (FLUSH) 0.9 %
10 SYRINGE (ML) INJECTION PRN
Status: DISCONTINUED | OUTPATIENT
Start: 2020-07-17 | End: 2020-07-24 | Stop reason: HOSPADM

## 2020-07-17 RX ORDER — IBUPROFEN 200 MG
1 CAPSULE ORAL NIGHTLY
COMMUNITY

## 2020-07-17 RX ORDER — INSULIN GLARGINE 100 [IU]/ML
40 INJECTION, SOLUTION SUBCUTANEOUS DAILY
Status: DISCONTINUED | OUTPATIENT
Start: 2020-07-18 | End: 2020-07-24 | Stop reason: HOSPADM

## 2020-07-17 RX ORDER — INSULIN GLARGINE 100 [IU]/ML
40 INJECTION, SOLUTION SUBCUTANEOUS DAILY
COMMUNITY
End: 2021-07-19

## 2020-07-17 RX ORDER — MORPHINE SULFATE 4 MG/ML
4 INJECTION, SOLUTION INTRAMUSCULAR; INTRAVENOUS
Status: DISCONTINUED | OUTPATIENT
Start: 2020-07-17 | End: 2020-07-24 | Stop reason: HOSPADM

## 2020-07-17 RX ORDER — TRAZODONE HYDROCHLORIDE 100 MG/1
100 TABLET ORAL NIGHTLY
Status: DISCONTINUED | OUTPATIENT
Start: 2020-07-17 | End: 2020-07-24 | Stop reason: HOSPADM

## 2020-07-17 RX ORDER — SODIUM CHLORIDE 9 MG/ML
INJECTION, SOLUTION INTRAVENOUS CONTINUOUS
Status: DISCONTINUED | OUTPATIENT
Start: 2020-07-17 | End: 2020-07-23

## 2020-07-17 RX ORDER — SERTRALINE HYDROCHLORIDE 100 MG/1
100 TABLET, FILM COATED ORAL NIGHTLY
Status: DISCONTINUED | OUTPATIENT
Start: 2020-07-17 | End: 2020-07-24 | Stop reason: HOSPADM

## 2020-07-17 RX ORDER — DEXTROSE MONOHYDRATE 25 G/50ML
12.5 INJECTION, SOLUTION INTRAVENOUS PRN
Status: DISCONTINUED | OUTPATIENT
Start: 2020-07-17 | End: 2020-07-24 | Stop reason: HOSPADM

## 2020-07-17 RX ORDER — INSULIN ASPART INJECTION 100 [IU]/ML
8 INJECTION, SOLUTION SUBCUTANEOUS
COMMUNITY
End: 2021-07-19

## 2020-07-17 RX ORDER — DEXTROSE MONOHYDRATE 50 MG/ML
100 INJECTION, SOLUTION INTRAVENOUS PRN
Status: DISCONTINUED | OUTPATIENT
Start: 2020-07-17 | End: 2020-07-24 | Stop reason: HOSPADM

## 2020-07-17 RX ORDER — MORPHINE SULFATE 4 MG/ML
INJECTION, SOLUTION INTRAMUSCULAR; INTRAVENOUS
Status: DISPENSED
Start: 2020-07-17 | End: 2020-07-18

## 2020-07-17 RX ORDER — OXYCODONE HYDROCHLORIDE AND ACETAMINOPHEN 5; 325 MG/1; MG/1
2 TABLET ORAL EVERY 4 HOURS PRN
Status: DISCONTINUED | OUTPATIENT
Start: 2020-07-17 | End: 2020-07-21

## 2020-07-17 RX ORDER — CALCIUM CARBONATE 500(1250)
1250 TABLET ORAL NIGHTLY
Status: DISCONTINUED | OUTPATIENT
Start: 2020-07-17 | End: 2020-07-24 | Stop reason: HOSPADM

## 2020-07-17 RX ORDER — OXYCODONE HYDROCHLORIDE AND ACETAMINOPHEN 5; 325 MG/1; MG/1
2 TABLET ORAL ONCE
Status: COMPLETED | OUTPATIENT
Start: 2020-07-17 | End: 2020-07-17

## 2020-07-17 RX ORDER — ACETAMINOPHEN 325 MG/1
650 TABLET ORAL EVERY 4 HOURS PRN
Status: DISCONTINUED | OUTPATIENT
Start: 2020-07-17 | End: 2020-07-24 | Stop reason: HOSPADM

## 2020-07-17 RX ORDER — INSULIN ASPART INJECTION 100 [IU]/ML
INJECTION, SOLUTION SUBCUTANEOUS
COMMUNITY
End: 2021-07-19

## 2020-07-17 RX ORDER — IBUPROFEN 200 MG
400 TABLET ORAL EVERY 6 HOURS PRN
COMMUNITY
End: 2021-07-19

## 2020-07-17 RX ORDER — MORPHINE SULFATE 2 MG/ML
2 INJECTION, SOLUTION INTRAMUSCULAR; INTRAVENOUS
Status: DISCONTINUED | OUTPATIENT
Start: 2020-07-17 | End: 2020-07-24 | Stop reason: HOSPADM

## 2020-07-17 RX ORDER — METOPROLOL SUCCINATE 50 MG/1
50 TABLET, EXTENDED RELEASE ORAL 2 TIMES DAILY
Status: DISCONTINUED | OUTPATIENT
Start: 2020-07-17 | End: 2020-07-19

## 2020-07-17 RX ORDER — INSULIN GLARGINE 100 [IU]/ML
60 INJECTION, SOLUTION SUBCUTANEOUS NIGHTLY
Status: DISCONTINUED | OUTPATIENT
Start: 2020-07-17 | End: 2020-07-24 | Stop reason: HOSPADM

## 2020-07-17 RX ORDER — SODIUM CHLORIDE 0.9 % (FLUSH) 0.9 %
10 SYRINGE (ML) INJECTION EVERY 12 HOURS SCHEDULED
Status: DISCONTINUED | OUTPATIENT
Start: 2020-07-17 | End: 2020-07-24 | Stop reason: HOSPADM

## 2020-07-17 RX ORDER — PANTOPRAZOLE SODIUM 40 MG/1
40 TABLET, DELAYED RELEASE ORAL DAILY
Status: DISCONTINUED | OUTPATIENT
Start: 2020-07-17 | End: 2020-07-24 | Stop reason: HOSPADM

## 2020-07-17 RX ORDER — NICOTINE POLACRILEX 4 MG
15 LOZENGE BUCCAL PRN
Status: DISCONTINUED | OUTPATIENT
Start: 2020-07-17 | End: 2020-07-24 | Stop reason: HOSPADM

## 2020-07-17 RX ORDER — MORPHINE SULFATE 4 MG/ML
4 INJECTION, SOLUTION INTRAMUSCULAR; INTRAVENOUS EVERY 4 HOURS PRN
Status: DISCONTINUED | OUTPATIENT
Start: 2020-07-17 | End: 2020-07-18

## 2020-07-17 RX ORDER — MORPHINE SULFATE 4 MG/ML
4 INJECTION, SOLUTION INTRAMUSCULAR; INTRAVENOUS ONCE
Status: COMPLETED | OUTPATIENT
Start: 2020-07-17 | End: 2020-07-17

## 2020-07-17 RX ORDER — GABAPENTIN 100 MG/1
100 CAPSULE ORAL 3 TIMES DAILY
Status: DISCONTINUED | OUTPATIENT
Start: 2020-07-17 | End: 2020-07-24 | Stop reason: HOSPADM

## 2020-07-17 RX ORDER — INSULIN GLARGINE 100 [IU]/ML
60 INJECTION, SOLUTION SUBCUTANEOUS NIGHTLY
COMMUNITY

## 2020-07-17 RX ADMIN — OXYCODONE AND ACETAMINOPHEN 2 TABLET: 5; 325 TABLET ORAL at 16:17

## 2020-07-17 RX ADMIN — SODIUM CHLORIDE: 9 INJECTION, SOLUTION INTRAVENOUS at 23:17

## 2020-07-17 RX ADMIN — MORPHINE SULFATE 2 MG: 2 INJECTION, SOLUTION INTRAMUSCULAR; INTRAVENOUS at 21:53

## 2020-07-17 RX ADMIN — GABAPENTIN 100 MG: 100 CAPSULE ORAL at 21:47

## 2020-07-17 RX ADMIN — INSULIN GLARGINE 60 UNITS: 100 INJECTION, SOLUTION SUBCUTANEOUS at 21:47

## 2020-07-17 RX ADMIN — TRAZODONE HYDROCHLORIDE 100 MG: 100 TABLET ORAL at 21:47

## 2020-07-17 RX ADMIN — Medication 10 ML: at 23:20

## 2020-07-17 RX ADMIN — SERTRALINE HYDROCHLORIDE 100 MG: 100 TABLET ORAL at 21:47

## 2020-07-17 RX ADMIN — INSULIN LISPRO 1 UNITS: 100 INJECTION, SOLUTION INTRAVENOUS; SUBCUTANEOUS at 21:47

## 2020-07-17 RX ADMIN — MORPHINE SULFATE 4 MG: 4 INJECTION, SOLUTION INTRAMUSCULAR; INTRAVENOUS at 19:16

## 2020-07-17 RX ADMIN — CALCIUM 1250 MG: 500 TABLET ORAL at 21:46

## 2020-07-17 ASSESSMENT — ENCOUNTER SYMPTOMS
COUGH: 0
NAUSEA: 0
ABDOMINAL PAIN: 0
SHORTNESS OF BREATH: 0
VOMITING: 0

## 2020-07-17 ASSESSMENT — PAIN SCALES - GENERAL
PAINLEVEL_OUTOF10: 5
PAINLEVEL_OUTOF10: 6
PAINLEVEL_OUTOF10: 5
PAINLEVEL_OUTOF10: 5
PAINLEVEL_OUTOF10: 7

## 2020-07-17 ASSESSMENT — PAIN DESCRIPTION - PAIN TYPE
TYPE: ACUTE PAIN
TYPE: ACUTE PAIN

## 2020-07-17 ASSESSMENT — PAIN DESCRIPTION - DESCRIPTORS: DESCRIPTORS: THROBBING

## 2020-07-17 ASSESSMENT — PAIN DESCRIPTION - ORIENTATION
ORIENTATION: RIGHT;LEFT
ORIENTATION: RIGHT;LEFT

## 2020-07-17 ASSESSMENT — PAIN DESCRIPTION - LOCATION
LOCATION: KNEE
LOCATION: KNEE

## 2020-07-17 NOTE — ED NOTES
Pt states he will \"freak out\" if he can't get his C-Collar removed.      Mario Barcaly RN  07/17/20 0638

## 2020-07-17 NOTE — ED NOTES
Pt given sandwich, chips, and diet ginger ale. Pt informed that Dr. Nora Avalos would like pt to try standing after he eats. Pt and wife verbalized understanding.      Mario Barclay RN  07/17/20 2762

## 2020-07-17 NOTE — ED PROVIDER NOTES
[address]  eMERGENCY dEPARTMENT eNCOUnter      Pt Name: Sundar De La Garza  MRN: [id number]  [place] 1955  Date of evaluation: 7/17/20      CHIEF COMPLAINT       Chief Complaint   Patient presents with    Fall    Head Injury    Knee Pain     right and left    Abrasion     left elbow     HISTORY OF PRESENT ILLNESS   HPI 59 y.o. male presents with c/o bilateral knee pain. Symptoms started around 2:15pm.  He was going to his car, he was walking down his porch steps, his walker slid out from under him, he fell forward on his biltaterl knee and hit his left elbow. Also \"brushed\" his head against his car and scraped his elbow on the concrete. Last tetanus was 2019. Pain is described as throbbing in character, moderate in severity (rating it 4 / 10). The pain is located primarily in the bilateral knee with no radiation. The pain has been constant in course in course. The patient was treated with fentanyl by ems prior to arrival with some relief of symptoms. REVIEW OF SYSTEMS       Review of Systems   Constitutional: Negative for fever. HENT: Negative for nosebleeds. Eyes: Negative for visual disturbance. Respiratory: Negative for cough and shortness of breath. Cardiovascular: Negative for chest pain. Gastrointestinal: Negative for abdominal pain, nausea and vomiting. Genitourinary: Negative for hematuria. Musculoskeletal: Positive for arthralgias and gait problem. Negative for neck pain. Skin: Positive for wound. Neurological: Negative for headaches. Hematological: Negative for adenopathy. Psychiatric/Behavioral: Negative for confusion. PAST MEDICAL HISTORY     Past Medical History:   Diagnosis Date    Contracture, elbow     h/o right elbow fx, compound fracture.  Erectile dysfunction     Hypertension     Osteogenesis imperfecta     DX 6 months.  30 broken bones in past.     Type II or unspecified type diabetes mellitus without mention of complication, not stated as uncontrolled        SURGICAL HISTORY       Past Surgical History:   Procedure Laterality Date    CHOLECYSTECTOMY      FEMUR FRACTURE SURGERY Right 4/23/2020    HIP TFN WITH C-ARM VISUALIZATION performed by Cornell Ibarra MD at [facility] Right     right elbow x 3. Compound fx at work.  FRACTURE SURGERY Bilateral     with hardware    FRACTURE SURGERY Left     with hardware    TONSILLECTOMY AND ADENOIDECTOMY         CURRENT MEDICATIONS       Previous Medications    ASPIRIN 81 MG EC TABLET    Take 1 tablet by mouth daily    BLOOD GLUCOSE MONITORING SUPPL (ACURA BLOOD GLUCOSE METER) W/DEVICE KIT    1 kit by Does not apply route 2 times daily A1C of 9.0. NIDDM. CALCIUM CARBONATE (OYSTER SHELL CALCIUM 500 MG) 1250 (500 CA) MG TABLET    Take 1 tablet by mouth nightly    GABAPENTIN (NEURONTIN) 100 MG CAPSULE    Take 1 capsule by mouth 3 times daily for 60 days. IBUPROFEN (ADVIL;MOTRIN) 200 MG TABLET    Take 400 mg by mouth every 6 hours as needed for Pain    INSULIN ASPART, W/NIACINAMIDE, (FIASP FLEXTOUCH) 100 UNIT/ML SOPN    Inject 8 Units into the skin 3 times daily (before meals) Plus sliding scale    INSULIN ASPART, W/NIACINAMIDE, (FIASP FLEXTOUCH) 100 UNIT/ML SOPN    Inject into the skin 3 times daily (before meals) Sliding scale in addition to base of 8 units    INSULIN GLARGINE (LANTUS SOLOSTAR) 100 UNIT/ML INJECTION PEN    Inject 40 Units into the skin daily    INSULIN GLARGINE (LANTUS SOLOSTAR) 100 UNIT/ML INJECTION PEN    Inject 60 Units into the skin nightly    METOPROLOL SUCCINATE (TOPROL XL) 50 MG EXTENDED RELEASE TABLET    Take 1 tablet by mouth 2 times daily    PANTOPRAZOLE (PROTONIX) 40 MG TABLET    Take 1 tablet by mouth daily    SERTRALINE (ZOLOFT) 100 MG TABLET    Take 100 mg by mouth nightly    TRAZODONE (DESYREL) 100 MG TABLET    Take 100 mg by mouth nightly       ALLERGIES     has No Known Allergies.     FAMILY HISTORY     He indicated that his mother is . He indicated that his father is . SOCIAL HISTORY      reports that he quit smoking about 28 years ago. His smoking use included cigarettes. He has a 36.00 pack-year smoking history. He has never used smokeless tobacco.    PHYSICAL EXAM     INITIAL VITALS: BP (!) 140/81   Pulse 72   Temp 99.5 °F (37.5 °C) (Oral)   Resp 19   Ht 6' (1.829 m)   Wt 190 lb (86.2 kg)   SpO2 97%   BMI 25.77 kg/m²   Gen: NAD  Head: Normocephalic, atraumatic  Eye: Pupils equal round reactive to light, no conjunctivitis, no conjunctival hemorrhage  Neck: No cervical spine TTP  Heart: Regular rate and rhythm no murmurs  Lungs: Clear to auscultation bilaterally, no respiratory distress  Chest wall: No crepitus, no tenderness palpation  Abdomen: Soft, nontender, nondistended, with no peritoneal signs  MSK: Previous surgical scars to bilateral knees. There is severe tenderness with passive range of motion at bilateral knees. There is no joint laxity. No open wounds. No tenderness in the hips. Neurologic: Patient is alert and oriented x3, range of motion is limited secondary to pain. Sensation is intact. Speech is fluent. Extremities: Patient does have a skin tear to the left elbow but there is no pain    MEDICAL DECISION MAKING:     MDM  This is an elderly gentleman with osteogenesis imperfecta presenting with bilateral knee pain and gait impairment. We will get x-rays of the knees, will provide analgesia and reassess. The patient's fall was mechanical in nature. I do not suspect a an emergent medical condition as the etiology of the patient's fall. Emergency Department course:  X-ray showing a right tibial plateau fracture that is nondisplaced. The left x-ray is not showing any abnormalities. Patient still having very severe pain in his knee. He can get up and walk.     I discussed with the orthopedic surgery service, will put the patient in the hospital for pain control PT and OT evaluation and possible placement. We will get a CT scan of the right knee that did not show a fracture just in case there is an occult fracture there. Updated the patient he is in agreement with the plan. I also discussed the case with Dr. Beau Celeste who will evaluate the patient and assist with medical management. DIAGNOSTIC RESULTS     RADIOLOGY:All plain film, CT, MRI, and formal ultrasound images (except ED bedside ultrasound) are read by the radiologist and the images and interpretations are directly viewed by the emergency physician. XR KNEE RIGHT (3 VIEWS)   Final Result   No acute findings. XR KNEE LEFT (3 VIEWS)   Final Result   Depressed lateral tibial plateau fracture with moderate to large   lipohemarthrosis. LABS: All lab results were reviewed by myself, and all abnormals are listed below.   Labs Reviewed   CBC WITH AUTO DIFFERENTIAL - Abnormal; Notable for the following components:       Result Value    Seg Neutrophils 81 (*)     Lymphocytes 10 (*)     Absolute Lymph # 0.80 (*)     All other components within normal limits   BASIC METABOLIC PANEL - Abnormal; Notable for the following components:    Glucose 205 (*)     CREATININE 1.45 (*)     Sodium 134 (*)     GFR Non- 49 (*)     GFR  59 (*)     All other components within normal limits       EMERGENCY DEPARTMENT COURSE:   Vitals:    Vitals:    07/17/20 1800 07/17/20 1815 07/17/20 1830 07/17/20 1845   BP: (!) 130/94 (!) 154/113 (!) 140/77 (!) 140/81   Pulse: 72 75 69 72   Resp: 16 16 18 19   Temp:       TempSrc:       SpO2: 97% 98% 98% 97%   Weight:       Height:           The patient was given the following medications while in the emergency department:  Orders Placed This Encounter   Medications    oxyCODONE-acetaminophen (PERCOCET) 5-325 MG per tablet 2 tablet    morphine sulfate (PF) injection 4 mg     -------------------------  CRITICAL CARE:   CONSULTS: IP CONSULT TO ORTHOPEDIC SURGERY  IP CONSULT TO

## 2020-07-17 NOTE — ED NOTES
[facility] [employer] Report method by phone   The following was reviewed with receiving RN:   Current vital signs:  BP (!) 140/81   Pulse 72   Temp 99.5 °F (37.5 °C) (Oral)   Resp 19   Ht 6' (1.829 m)   Wt 190 lb (86.2 kg)   SpO2 97%   BMI 25.77 kg/m²                MEWS Score: 1     RN told pt has bilateral knee pain, right more than left. RN told pt did not tolerate knee immobilizer. RN told that pt did not want to get off the vacuum mattress from EMS. RN told that pt's VSS. RN told that pt has had multiple ortho surgeries and was on his way to therapy when he fell. Any medication or safety alerts were reviewed. Any pending diagnostics and notifications were also reviewed, as well as any safety concerns or issues, abnormal labs, abnormal imaging, and abnormal assessment findings. Questions were answered.             Neida Celestin RN  07/17/20 9886

## 2020-07-17 NOTE — ED NOTES
Bed: 02  Expected date:   Expected time:   Means of arrival:   Comments:     Ziggy Saxena RN  07/17/20 2959

## 2020-07-18 ENCOUNTER — APPOINTMENT (OUTPATIENT)
Dept: CT IMAGING | Age: 65
DRG: 493 | End: 2020-07-18
Payer: MEDICARE

## 2020-07-18 LAB
ABSOLUTE EOS #: 0.2 K/UL (ref 0–0.4)
ABSOLUTE IMMATURE GRANULOCYTE: ABNORMAL K/UL (ref 0–0.3)
ABSOLUTE LYMPH #: 0.6 K/UL (ref 1–4.8)
ABSOLUTE MONO #: 0.5 K/UL (ref 0.1–1.3)
ANION GAP SERPL CALCULATED.3IONS-SCNC: 10 MMOL/L (ref 9–17)
BASOPHILS # BLD: 1 % (ref 0–2)
BASOPHILS ABSOLUTE: 0 K/UL (ref 0–0.2)
BUN BLDV-MCNC: 17 MG/DL (ref 8–23)
BUN/CREAT BLD: ABNORMAL (ref 9–20)
CALCIUM SERPL-MCNC: 9.3 MG/DL (ref 8.6–10.4)
CHLORIDE BLD-SCNC: 102 MMOL/L (ref 98–107)
CO2: 24 MMOL/L (ref 20–31)
CREAT SERPL-MCNC: 1.36 MG/DL (ref 0.7–1.2)
DIFFERENTIAL TYPE: ABNORMAL
EOSINOPHILS RELATIVE PERCENT: 3 % (ref 0–4)
GFR AFRICAN AMERICAN: >60 ML/MIN
GFR NON-AFRICAN AMERICAN: 53 ML/MIN
GFR SERPL CREATININE-BSD FRML MDRD: ABNORMAL ML/MIN/{1.73_M2}
GFR SERPL CREATININE-BSD FRML MDRD: ABNORMAL ML/MIN/{1.73_M2}
GLUCOSE BLD-MCNC: 157 MG/DL (ref 75–110)
GLUCOSE BLD-MCNC: 207 MG/DL (ref 75–110)
GLUCOSE BLD-MCNC: 221 MG/DL (ref 75–110)
GLUCOSE BLD-MCNC: 227 MG/DL (ref 75–110)
GLUCOSE BLD-MCNC: 259 MG/DL (ref 70–99)
HCT VFR BLD CALC: 40 % (ref 41–53)
HEMOGLOBIN: 13.3 G/DL (ref 13.5–17.5)
IMMATURE GRANULOCYTES: ABNORMAL %
LYMPHOCYTES # BLD: 12 % (ref 24–44)
MCH RBC QN AUTO: 29.1 PG (ref 26–34)
MCHC RBC AUTO-ENTMCNC: 33.2 G/DL (ref 31–37)
MCV RBC AUTO: 87.5 FL (ref 80–100)
MONOCYTES # BLD: 9 % (ref 1–7)
NRBC AUTOMATED: ABNORMAL PER 100 WBC
PDW BLD-RTO: 13.3 % (ref 11.5–14.9)
PLATELET # BLD: 142 K/UL (ref 150–450)
PLATELET ESTIMATE: ABNORMAL
PMV BLD AUTO: 8.4 FL (ref 6–12)
POTASSIUM SERPL-SCNC: 4.6 MMOL/L (ref 3.7–5.3)
RBC # BLD: 4.57 M/UL (ref 4.5–5.9)
RBC # BLD: ABNORMAL 10*6/UL
SEG NEUTROPHILS: 75 % (ref 36–66)
SEGMENTED NEUTROPHILS ABSOLUTE COUNT: 4 K/UL (ref 1.3–9.1)
SODIUM BLD-SCNC: 136 MMOL/L (ref 135–144)
WBC # BLD: 5.4 K/UL (ref 3.5–11)
WBC # BLD: ABNORMAL 10*3/UL

## 2020-07-18 PROCEDURE — 6370000000 HC RX 637 (ALT 250 FOR IP): Performed by: INTERNAL MEDICINE

## 2020-07-18 PROCEDURE — 6360000002 HC RX W HCPCS: Performed by: ORTHOPAEDIC SURGERY

## 2020-07-18 PROCEDURE — 2580000003 HC RX 258: Performed by: FAMILY MEDICINE

## 2020-07-18 PROCEDURE — 1200000000 HC SEMI PRIVATE

## 2020-07-18 PROCEDURE — 6370000000 HC RX 637 (ALT 250 FOR IP): Performed by: FAMILY MEDICINE

## 2020-07-18 PROCEDURE — 73700 CT LOWER EXTREMITY W/O DYE: CPT

## 2020-07-18 PROCEDURE — 36415 COLL VENOUS BLD VENIPUNCTURE: CPT

## 2020-07-18 PROCEDURE — 82947 ASSAY GLUCOSE BLOOD QUANT: CPT

## 2020-07-18 PROCEDURE — 80048 BASIC METABOLIC PNL TOTAL CA: CPT

## 2020-07-18 PROCEDURE — 99223 1ST HOSP IP/OBS HIGH 75: CPT | Performed by: ORTHOPAEDIC SURGERY

## 2020-07-18 PROCEDURE — 85025 COMPLETE CBC W/AUTO DIFF WBC: CPT

## 2020-07-18 RX ORDER — DIPHENHYDRAMINE HCL 25 MG
50 TABLET ORAL EVERY 6 HOURS PRN
Status: DISCONTINUED | OUTPATIENT
Start: 2020-07-18 | End: 2020-07-24 | Stop reason: HOSPADM

## 2020-07-18 RX ADMIN — OXYCODONE AND ACETAMINOPHEN 2 TABLET: 5; 325 TABLET ORAL at 00:58

## 2020-07-18 RX ADMIN — SODIUM CHLORIDE: 9 INJECTION, SOLUTION INTRAVENOUS at 12:25

## 2020-07-18 RX ADMIN — OXYCODONE AND ACETAMINOPHEN 2 TABLET: 5; 325 TABLET ORAL at 15:22

## 2020-07-18 RX ADMIN — MORPHINE SULFATE 4 MG: 4 INJECTION, SOLUTION INTRAMUSCULAR; INTRAVENOUS at 12:25

## 2020-07-18 RX ADMIN — GABAPENTIN 100 MG: 100 CAPSULE ORAL at 08:55

## 2020-07-18 RX ADMIN — INSULIN GLARGINE 40 UNITS: 100 INJECTION, SOLUTION SUBCUTANEOUS at 08:49

## 2020-07-18 RX ADMIN — OXYCODONE AND ACETAMINOPHEN 2 TABLET: 5; 325 TABLET ORAL at 22:14

## 2020-07-18 RX ADMIN — PANTOPRAZOLE SODIUM 40 MG: 40 TABLET, DELAYED RELEASE ORAL at 08:55

## 2020-07-18 RX ADMIN — MORPHINE SULFATE 4 MG: 4 INJECTION, SOLUTION INTRAMUSCULAR; INTRAVENOUS at 03:20

## 2020-07-18 RX ADMIN — INSULIN LISPRO 2 UNITS: 100 INJECTION, SOLUTION INTRAVENOUS; SUBCUTANEOUS at 17:13

## 2020-07-18 RX ADMIN — OXYCODONE AND ACETAMINOPHEN 2 TABLET: 5; 325 TABLET ORAL at 10:43

## 2020-07-18 RX ADMIN — CALCIUM 1250 MG: 500 TABLET ORAL at 20:31

## 2020-07-18 RX ADMIN — INSULIN LISPRO 8 UNITS: 100 INJECTION, SOLUTION INTRAVENOUS; SUBCUTANEOUS at 08:50

## 2020-07-18 RX ADMIN — DIPHENHYDRAMINE HCL 50 MG: 25 TABLET ORAL at 19:43

## 2020-07-18 RX ADMIN — GABAPENTIN 100 MG: 100 CAPSULE ORAL at 20:31

## 2020-07-18 RX ADMIN — MORPHINE SULFATE 2 MG: 2 INJECTION, SOLUTION INTRAMUSCULAR; INTRAVENOUS at 18:21

## 2020-07-18 RX ADMIN — OXYCODONE AND ACETAMINOPHEN 2 TABLET: 5; 325 TABLET ORAL at 05:51

## 2020-07-18 RX ADMIN — INSULIN LISPRO 2 UNITS: 100 INJECTION, SOLUTION INTRAVENOUS; SUBCUTANEOUS at 11:57

## 2020-07-18 RX ADMIN — INSULIN GLARGINE 60 UNITS: 100 INJECTION, SOLUTION SUBCUTANEOUS at 20:33

## 2020-07-18 RX ADMIN — INSULIN LISPRO 8 UNITS: 100 INJECTION, SOLUTION INTRAVENOUS; SUBCUTANEOUS at 17:12

## 2020-07-18 RX ADMIN — TRAZODONE HYDROCHLORIDE 100 MG: 100 TABLET ORAL at 20:31

## 2020-07-18 RX ADMIN — INSULIN LISPRO 8 UNITS: 100 INJECTION, SOLUTION INTRAVENOUS; SUBCUTANEOUS at 11:56

## 2020-07-18 RX ADMIN — SERTRALINE HYDROCHLORIDE 100 MG: 100 TABLET ORAL at 20:32

## 2020-07-18 RX ADMIN — INSULIN LISPRO 4 UNITS: 100 INJECTION, SOLUTION INTRAVENOUS; SUBCUTANEOUS at 08:53

## 2020-07-18 RX ADMIN — METOPROLOL SUCCINATE 50 MG: 50 TABLET, EXTENDED RELEASE ORAL at 20:32

## 2020-07-18 ASSESSMENT — PAIN DESCRIPTION - LOCATION: LOCATION: KNEE

## 2020-07-18 ASSESSMENT — PAIN DESCRIPTION - PAIN TYPE
TYPE: ACUTE PAIN
TYPE: ACUTE PAIN

## 2020-07-18 ASSESSMENT — PAIN SCALES - GENERAL
PAINLEVEL_OUTOF10: 5
PAINLEVEL_OUTOF10: 4
PAINLEVEL_OUTOF10: 7
PAINLEVEL_OUTOF10: 0
PAINLEVEL_OUTOF10: 7
PAINLEVEL_OUTOF10: 4
PAINLEVEL_OUTOF10: 0
PAINLEVEL_OUTOF10: 5
PAINLEVEL_OUTOF10: 6
PAINLEVEL_OUTOF10: 4
PAINLEVEL_OUTOF10: 4

## 2020-07-18 ASSESSMENT — PAIN DESCRIPTION - ORIENTATION: ORIENTATION: RIGHT;LEFT

## 2020-07-18 NOTE — CARE COORDINATION
CASE MANAGEMENT NOTE:    Admission Date:  7/17/2020 Harry Zamudio is a 59 y.o.  male    Admitted for : Tibial plateau fracture, left, closed, initial encounter [S80.730A]    Met with:  Patient and Family - Daughter at bedside    PCP:  Geni Purvis NP                                Insurance:  [facility]      Current Residence/ Living Arrangements:  independently at home             Current Services PTA:  No    Is patient agreeable to VNS: Yes    Freedom of choice provided:  Yes    List of [address] provided: Yes    VNS chosen:  Yes - [facility] as he has had in the past - Referral sent    DME:  bedside commode, straight cane, walker, wheelchair and shower chair    Home Oxygen: No    Nebulizer: No    CPAP/BIPAP: No    Supplier: N/A    Potential Assistance Needed: Yes    SNF needed: Would be interested in [address] of choice and list provided: Yes    [address]  [address]       Does Patient want to use MEDS to BEDS? No    Is the Patient an IdeaOffer with Readmission Risk Score greater than 14%? No  If yes, pt needs a follow up appointment made within 7 days. Family Members/Caregivers that pt would like involved in their care:    Yes    If yes, list name here:  Belia Zaragoza    Transportation Provider:  Family             Is patient in Isolation/One on One/Altered Mental Status? No  If yes, skip next question. If no, would they like an I-Pad to  use? No  If yes, call [phone]. Discharge Plan:  7/18: [facility] - Patient is from mobile home with wife who travels every week. [facility] will [facility] to [facility] ([address] DME - BSC, cane, walker, WC, SC. Would like [facility] - Referral sent. Also interested in [facility] [address] notified to make referral - PM&R consult placed. Ortho consult for tibial fracture - Possible surgery - Awaiting CT scan.  ERYN NEEDS SIGNED/COMPLETED. Orlando Portillo                 Electronically signed by: Jesica Robles RN on 7/18/2020 at 11:42 AM

## 2020-07-18 NOTE — PROGRESS NOTES
Admitted to room 2044 from ED per cart. Oriented to room and call light. Vitals and assessment completed. No distress noted. VSS and admission database complete.

## 2020-07-18 NOTE — PROGRESS NOTES
Talked to Dr. Rafia Graham Whom is on call for Emanuel Betancur. He is unable to do this weekend, but can do on Monday.

## 2020-07-18 NOTE — CARE COORDINATION
Social Work-Made referral to ARU. Spoke with patient's daughter,Anne. Discussed other options if Micheline denies ARU. She does not want SNF. She would like patient to stay with her, if [facility] denies.  Faheem Soni

## 2020-07-18 NOTE — CONSULTS
[facility]    Consult Note. Date:   7/18/2020  Patient name:  Miguel Walter  Date of admission:  7/17/2020  3:25 PM  MRN:   [id number]  YOB: 1955    Pt seen at the request of Tyler Don MD    CHIEF COMPLAINT:     History Obtained From:  Patient and chart review. HISTORY OF PRESENT ILLNESS:      The patient is a 59 y.o.  male who is admitted to the hospital for bilateral tibial plateau fractures. Patient has a history of osteogenesis imperfecta. He has had multiple fractures and surgeries on both lower extremities as well as upper extremities. Patient recently had work done on his right hip per Dr. Mindy Lira with an intramedullary christiano. This is on the right side. Patient also has had a patellar fractures on both sides. He had hardware removal from his patella on the left side. Patient states that he had a fall yesterday and landed on his knees when he slipped with his walker. Past Medical History:   has a past medical history of Contracture, elbow, Erectile dysfunction, Hypertension, Osteogenesis imperfecta, and Type II or unspecified type diabetes mellitus without mention of complication, not stated as uncontrolled. Past Surgical History:   has a past surgical history that includes Cholecystectomy; Tonsillectomy and adenoidectomy; fracture surgery (Right); fracture surgery (Bilateral); fracture surgery (Left); and Femur fracture surgery (Right, 4/23/2020). Home Medications:    Prior to Admission medications    Medication Sig Start Date End Date Taking?  Authorizing Provider   insulin glargine (LANTUS SOLOSTAR) 100 UNIT/ML injection pen Inject 40 Units into the skin daily   Yes Historical Provider, MD   insulin glargine (LANTUS SOLOSTAR) 100 UNIT/ML injection pen Inject 60 Units into the skin nightly   Yes Historical Provider, MD   Insulin Aspart, w/Niacinamide, (FIASP FLEXTOUCH) 100 UNIT/ML SOPN Inject 8 Units into the skin 3 times daily (before meals) Plus sliding scale   Yes Historical Provider, MD   Insulin Aspart, w/Niacinamide, (FIASP FLEXTOUCH) 100 UNIT/ML SOPN Inject into the skin 3 times daily (before meals) Sliding scale in addition to base of 8 units   Yes Historical Provider, MD   ibuprofen (ADVIL;MOTRIN) 200 MG tablet Take 400 mg by mouth every 6 hours as needed for Pain   Yes Historical Provider, MD   calcium carbonate (OYSTER SHELL CALCIUM 500 MG) 1250 (500 Ca) MG tablet Take 1 tablet by mouth nightly   Yes Historical Provider, MD   aspirin 81 MG EC tablet Take 1 tablet by mouth daily 5/21/20  Yes Samy Ferguson MD   gabapentin (NEURONTIN) 100 MG capsule Take 1 capsule by mouth 3 times daily for 60 days. 5/21/20 7/20/20 Yes Samy Ferguson MD   pantoprazole (PROTONIX) 40 MG tablet Take 1 tablet by mouth daily 5/21/20  Yes Samy Ferguson MD   sertraline (ZOLOFT) 100 MG tablet Take 100 mg by mouth nightly   Yes Historical Provider, MD   traZODone (DESYREL) 100 MG tablet Take 100 mg by mouth nightly   Yes Historical Provider, MD   metoprolol succinate (TOPROL XL) 50 MG extended release tablet Take 1 tablet by mouth 2 times daily 5/21/20   Samy Ferguson MD   Blood Glucose Monitoring Suppl [facility] BLOOD GLUCOSE METER) w/Device KIT 1 kit by Does not apply route 2 times daily A1C of 9.0. NIDDM. 10/23/18   Tere Garcia PA-C       Allergies:  Patient has no known allergies. Social History:   reports that he quit smoking about 28 years ago. His smoking use included cigarettes. He has a 36.00 pack-year smoking history. He has never used smokeless tobacco.     Family History: family history includes Diabetes in his father and mother; Heart Disease in his father; Stroke in his mother. REVIEW OF SYSTEMS:    · Constitutional: Negative for weight loss  · Eyes: Negative for visual changes, diplopia, scleral icterus.   · ENT: Negative for Headaches, hearing loss, vertigo, mouth sores, sore throat. · Cardiovascular: Negative for lightheadedness/orthostatic symptoms ,chest pain, dyspnea on exertion, palpitations or loss of consciousness. · Respiratory: Negative for cough or wheezing, sputum production, hemoptysis, pleuritic pain. · Gastrointestinal: Negative for nausea/vomiting, change in bowel habits, abdominal pain, dysphagia/appetite loss, hematemesis, blood in stools. · Genitourinary:Negative for change in bladder habits, dysuria, trouble voiding, hematuria. · Musculoskeletal: pos for gait disturbance, weakness, joint complaints. Tender over both tibia  · Integumentary: Negative for rash, pruritis. · Neurological: Negative for headache, dizziness, change in muscle strength, numbness/tingling, change in gait, balance, coordination,   · Endocrine: negative for temperature intolerance, excessive thirst, fluid intake, or urination, tremor. · Hematologic/Lymphatic: negative for abnormal bruising or bleeding, blood clots, swollen lymph nodes. · Allergic/Immunologic: negative for nasal congestion, pruritis, hives. PHYSICAL EXAM:    /65   Pulse 87   Temp 98.4 °F (36.9 °C) (Oral)   Resp 18   Ht 6' (1.829 m)   Wt 213 lb 13.5 oz (97 kg)   SpO2 97%   BMI 29.00 kg/m²      · General appearance: well nourished  · HEENT: Head: Normocephalic, no lesions, without obvious abnormality. · Lungs: clear to auscultation bilaterally  · Heart: regular rate and rhythm, S1, S2 normal, no murmur, click, rub or gallop  · Abdomen: soft, non-tender; bowel sounds normal; no masses,  no organomegaly  · Extremitie multiple tender points  · Bilateral tibial tenderness  · Bilateral knee swelling  ·   · Neurological: Gait normal. Reflexes normal and symmetric.  Sensation grossly normal  · Skin - no rash, no lump   · Eye no icterus no redness  · Lymphatic system-no lymphadenopathy no splenomegaly       DIAGNOSTICS:    Laboratory Testing:  CBC:   Recent Labs     07/18/20  0528   WBC 5.4   HGB 7/17/2020  EXAMINATION: THREE XRAY VIEWS OF THE RIGHT KNEE 7/17/2020 4:19 pm COMPARISON: Right femur radiographs May 26, 2029 HISTORY: ORDERING SYSTEM PROVIDED HISTORY: fall, pain TECHNOLOGIST PROVIDED HISTORY: fall, pain Reason for Exam: Pt fell today, pain to bilateral knee Acuity: Acute Type of Exam: Initial FINDINGS: Intramedullary christiano within the distal femur. Patella hardware fixation. No acute fracture. Mild medial and lateral compartment narrowing with subchondral sclerosis and small osteophytes. Medial and lateral compartment chondrocalcinosis. No large joint effusion. No acute findings. Ct Knee Left Wo Contrast    Result Date: 7/18/2020  EXAMINATION: CT OF THE LEFT KNEE WITHOUT CONTRAST 7/18/2020 12:20 pm TECHNIQUE: CT of the left knee was performed without the administration of intravenous contrast.  Multiplanar reformatted images are provided for review. Dose modulation, iterative reconstruction, and/or weight based adjustment of the mA/kV was utilized to reduce the radiation dose to as low as reasonably achievable. COMPARISON: Radiographs from 07/17/2020 HISTORY ORDERING SYSTEM PROVIDED HISTORY: assess fracture, previous surgery, history of osteogenesis imperfecta TECHNOLOGIST PROVIDED HISTORY: assess fracture, previous surgery, history of osteogenesis imperfecta Reason for Exam: assess fracture, previous surgery, history of osteogenesis imperfecta Acuity: Chronic Type of Exam: Ongoing FINDINGS: Acute depressed lateral tibial plateau fracture. Articular depression measures about 0.7 cm with multiple punctate bodies. Fracture extends into the tibial spines. The proximal fibula is intact. The distal femur is intact. The medial tibial plateau is intact. Lateral subluxation of the patella relative to the trochlea. Please correlate for patellar tracking disorder. Lipohemarthrosis compatible with intra-articular fracture. Prepatellar swelling. Atrophy of the muscles.   The perineural fat is of the mediolateral compartments of the knee. 1. Acute fracture of the lateral tibial plateau with intra-articular extension of fracture lines and mild displacement of the major fracture fragment. 2. Small knee effusion. 3. Moderate to severe medial and mild to moderate lateral patellofemoral compartment osteoarthritis. 4. Severe osteopenia. ASSESSMENT:    Patient Active Problem List   Diagnosis    DM w/o complication type II ([address]    Essential hypertension    Erectile dysfunction    Microalbuminuria due to type 2 diabetes mellitus ([address]    Stage 3 chronic kidney disease ([address]    Traumatic bilateral lower extremity fractures    Closed right [facility] fracture, initial encounter [facility]    Osteogenesis imperfecta    Type 2 diabetes mellitus with chronic kidney disease ([facility]    Type 2 diabetes mellitus with hyperglycemia ([facility]    Gastroesophageal reflux disease without esophagitis    Tibial plateau fracture, left, closed, initial encounter     Patient with osteogenesis imperfecta  Multiple fractures in the past  Now has bilateral tibial plateau fractures  From fall    Orthopedic evaluation noted      Diabetes type 2  Blood sugars are controlled    CKD 3  Diabetic nephropathy creatinine 1.38  Improving    Hypertension blood pressure is controlled    Potassium normal      PLAN:  Continue home medications  Lantus  Insulin sliding scale  Continue Toprol  Decrease Lantus to half dose once patient is n.p.o. for OR    Medically cleared for surgery  Moderate risk due to diabetes    Follow renal function        MD CAROL Carney[address], [address] [place] [address].    Phone [phone]   Fax: [phone]  Answering Service: [phone]

## 2020-07-18 NOTE — FLOWSHEET NOTE
SC visit with pt and daughter David Bess who are known to [address] [place] [address]. Pt is frustrated with situation and can't believe he has broken his legs again. He has excellent support from his son and daughter. Patient's goal is to get back to golfing, and he is beginning to doubt that will happen. 07/18/20 1225   Encounter Summary   Services provided to: Patient and family together   Referral/Consult From: [address]; Children   Continue Visiting   (7-18-20)   Complexity of Encounter Moderate   Length of Encounter 15 minutes   Routine   Type Initial   Spiritual/Jew   Type Spiritual support   Assessment Anxious; Approachable   Intervention Active listening;Prayer;Sustaining presence/ Ministry of presence; Explored feelings, thoughts, concerns; Discussed illness/injury and it's impact   Outcome Expressed gratitude;Engaged in conversation;Expressed feelings/needs/concerns;Receptive

## 2020-07-18 NOTE — H&P
ORTHOPEDIC PATIENT EVALUATION      HPI / Chief Complaint  Riccardo Torres is a 59 y.o. male who presents for bilateral tibial plateau fractures. Patient has a history of osteogenesis imperfecta. He has had multiple fractures and surgeries on both lower extremities as well as upper extremities. Patient recently had work done on his right hip per Dr. Radha Cee with an intramedullary christiano. This is on the right side. Patient also has had a patellar fractures on both sides. He had hardware removal from his patella on the left side. Patient states that he had a fall yesterday and landed on his knees when he slipped with his walker. Past Medical History  Leti Love  has a past medical history of Contracture, elbow, Erectile dysfunction, Hypertension, Osteogenesis imperfecta, and Type II or unspecified type diabetes mellitus without mention of complication, not stated as uncontrolled. Past Surgical History  Leti Love  has a past surgical history that includes Cholecystectomy; Tonsillectomy and adenoidectomy; fracture surgery (Right); fracture surgery (Bilateral); fracture surgery (Left); and Femur fracture surgery (Right, 4/23/2020).     Current Medications  Current Facility-Administered Medications   Medication Dose Route Frequency Provider Last Rate Last Dose    sodium chloride flush 0.9 % injection 10 mL  10 mL Intravenous 2 times per day Paula Johnson MD   10 mL at 07/17/20 2320    sodium chloride flush 0.9 % injection 10 mL  10 mL Intravenous PRN Paula Johnson MD        acetaminophen (TYLENOL) tablet 650 mg  650 mg Oral Q4H PRN Paula Johnson MD        enoxaparin (LOVENOX) injection 40 mg  40 mg Subcutaneous Daily Paula Johnson MD        morphine (PF) injection 2 mg  2 mg Intravenous Q2H PRN Paula Johnson MD   2 mg at 07/17/20 2153    Or    morphine sulfate (PF) injection 4 mg  4 mg Intravenous Q2H PRN Paula Johnosn MD   4 mg at 07/18/20 0320    calcium elemental (OSCAL) tablet 1,250 mg  1,250 included cigarettes. He has a 36.00 pack-year smoking history. He has never used smokeless tobacco.    Family History  Miles's family history includes Diabetes in his father and mother; Heart Disease in his father; Stroke in his mother. Review of Systems   History obtained from the patient. REVIEW OF SYSTEMS:   Constitution: negative for fever, chills, weight loss or malaise   Musculoskeletal: As noted in the HPI   Neurologic: As noted in the HPI    Physical Exam  /71   Pulse 91   Temp 98.1 °F (36.7 °C) (Oral)   Resp 18   Ht 6' (1.829 m)   Wt 213 lb 13.5 oz (97 kg)   SpO2 95%   BMI 29.00 kg/m²    General Appearance: alert, well appearing, and in no distress  Mental Status: alert, oriented to person, place, and time  Examination of the patient's right lower extremity notes large anterior scar from previous surgeries. He has decreased motion of 15 to 90 degrees. He does have proximal tibial tenderness. No pain or rotation of his hip. Examination of patient's left lower extremity notes that he has tenderness throughout motion. Also a large anterior scar. Ligamentously grossly intact. Imaging Studies  Xrays of the both knees obtained yesterday in the ER reviewed were independently reviewed demonstrating on the right knee a large patellar cage from fracture repair. Patient also appears to have a nondisplaced tibial plateau fracture the distal extent of the intramedullary christiano is visible. X-rays of the left knee note a question of a lateral tibial plateau fracture with some depression although not sure whether this represents a new or old injury. Previous hardware placement in the patella is and subsequent removal is visible. Patient did have a CT scan of the right knee which is positive for a nondisplaced tibial plateau fracture. Diagnostics and Labs  Relevant diagnostic, laboratory and radiological studies have been reviewed in the Electronic Medical Record.     Assessment and

## 2020-07-18 NOTE — PROGRESS NOTES
Spoke to Dr Darrell Ayala regarding patient admission and informed him of xrays and CT of right knee. Dr Darrell Ayala states he will need to be in two knee immobilizers because this is a non operative case and he may need placement. Writer already placed order for  for [employer]. No orders from Dr Darrell Ayala at this time.

## 2020-07-18 NOTE — DISCHARGE INSTR - COC
fracture, initial encounter ([facility] S72.001A    Osteogenesis imperfecta Q78.0    Type 2 diabetes mellitus with chronic kidney disease ([facility] E11.22    Type 2 diabetes mellitus with hyperglycemia ([facility]) E11.65    Gastroesophageal reflux disease without esophagitis K21.9    Tibial plateau fracture, left, closed, initial encounter S82.142A       Isolation/Infection:   Isolation          No Isolation        Patient Infection Status     None to display          Nurse Assessment:  Last Vital Signs: /65   Pulse 87   Temp 98.4 °F (36.9 °C) (Oral)   Resp 18   Ht 6' (1.829 m)   Wt 213 lb 13.5 oz (97 kg)   SpO2 97%   BMI 29.00 kg/m²     Last documented pain score (0-10 scale): Pain Level: 4  Last Weight:   Wt Readings from Last 1 Encounters:   07/18/20 213 lb 13.5 oz (97 kg)     Mental Status:  oriented and alert    IV Access:  - None    Nursing Mobility/ADLs:  Walking   Independent  Transfer  Independent  Bathing  Independent  Dressing  Independent  Toileting  Independent  Feeding  Independent  Med Admin  Independent  Med Delivery   none    Wound Care Documentation and Therapy:        Elimination:  Continence:   · Bowel: Yes  · Bladder: Yes  Urinary Catheter: None   Colostomy/Ileostomy/Ileal Conduit: No           Intake/Output Summary (Last 24 hours) at 7/18/2020 1754  Last data filed at 7/18/2020 1736  Gross per 24 hour   Intake 2210 ml   Output 1275 ml   Net 935 ml     I/O last 3 completed shifts: In: 8523 [P.O.:720; I.V.:500]  Out: [facility] [Urine:1275]    Safety Concerns:      At Risk for Falls    Impairments/Disabilities:      None    Nutrition Therapy:  Current Nutrition Therapy:   - Oral Diet:  General and Renal    Routes of Feeding: Oral  Liquids: {Slp liquid thickness:16631}  Daily Fluid Restriction: yes - amount 1000 ml  Last Modified Barium Swallow with Video (Video Swallowing Test): not done    Treatments at the Time of Hospital Discharge:   Respiratory Treatments: ***  Oxygen Therapy:  is not on home oxygen therapy. Ventilator:    - No ventilator support    Rehab Therapies: Physical Therapy and Occupational Therapy  Weight Bearing Status/Restrictions: No weight bearing restirctions  Other Medical Equipment (for information only, NOT a DME order):  walker  Other Treatments: Skilled Nursing Assessment Per Protocol. Medication Education & Monitoring. Pt is S/P ORIF. Patient's personal belongings (please select all that are sent with patient):  Glasses    RN SIGNATURE:  Electronically signed by Michelle Andino RN on 7/24/2020 at 11:49 AM      CASE MANAGEMENT/SOCIAL WORK SECTION    Inpatient Status Date: ***    Readmission Risk Assessment Score:  Readmission Risk              Risk of Unplanned Readmission:        14           Discharging to Facility/ Agency   [place] [address]  Phone [phone]  Fax [phone]  · Evening fax [phone]     [address] [facility]  [address] [address]  Phone [phone]  Fax  [phone]      Dialysis Facility (if applicable)   · Name:  · Address:  · Dialysis Schedule:  · Phone:  · Fax:    / signature: Electronically signed by Leatha Moura RN on 7/23/20 at 2:30 PM EDT    PHYSICIAN SECTION    Prognosis: Good    Condition at Discharge: Stable    Rehab Potential (if transferring to Rehab): Good    Recommended Labs or Other Treatments After Discharge:     Physician Certification: I certify the above information and transfer of Branden Perez  is necessary for the continuing treatment of the diagnosis listed and that he requires [facility] for less 30 days.      Update Admission H&P: No change in H&P    PHYSICIAN SIGNATURE:  Electronically signed by Leola Flanagan MD on 7/21/20 at 7:35 AM EDT

## 2020-07-18 NOTE — PROGRESS NOTES
Spoke to Dr Henry Antonio regarding patient admission.  Telephone orders with read back for NS @ 75mL/hr and CBC with diff and BMP in AM.

## 2020-07-19 LAB
ESTIMATED AVERAGE GLUCOSE: 200 MG/DL
GLUCOSE BLD-MCNC: 169 MG/DL (ref 75–110)
GLUCOSE BLD-MCNC: 186 MG/DL (ref 75–110)
GLUCOSE BLD-MCNC: 214 MG/DL (ref 75–110)
GLUCOSE BLD-MCNC: 269 MG/DL (ref 75–110)
HBA1C MFR BLD: 8.6 % (ref 4–6)
SARS-COV-2, PCR: NORMAL
SARS-COV-2, RAPID: NOT DETECTED
SARS-COV-2: NORMAL
SOURCE: NORMAL

## 2020-07-19 PROCEDURE — 99231 SBSQ HOSP IP/OBS SF/LOW 25: CPT | Performed by: ORTHOPAEDIC SURGERY

## 2020-07-19 PROCEDURE — U0002 COVID-19 LAB TEST NON-CDC: HCPCS

## 2020-07-19 PROCEDURE — 82947 ASSAY GLUCOSE BLOOD QUANT: CPT

## 2020-07-19 PROCEDURE — 1200000000 HC SEMI PRIVATE

## 2020-07-19 PROCEDURE — 2580000003 HC RX 258: Performed by: ORTHOPAEDIC SURGERY

## 2020-07-19 PROCEDURE — 6370000000 HC RX 637 (ALT 250 FOR IP): Performed by: INTERNAL MEDICINE

## 2020-07-19 PROCEDURE — 6370000000 HC RX 637 (ALT 250 FOR IP): Performed by: FAMILY MEDICINE

## 2020-07-19 PROCEDURE — 6360000002 HC RX W HCPCS: Performed by: ORTHOPAEDIC SURGERY

## 2020-07-19 RX ORDER — METOPROLOL SUCCINATE 100 MG/1
100 TABLET, EXTENDED RELEASE ORAL 2 TIMES DAILY
Status: DISCONTINUED | OUTPATIENT
Start: 2020-07-19 | End: 2020-07-20

## 2020-07-19 RX ORDER — INSULIN GLARGINE 100 [IU]/ML
40 INJECTION, SOLUTION SUBCUTANEOUS ONCE
Status: COMPLETED | OUTPATIENT
Start: 2020-07-19 | End: 2020-07-19

## 2020-07-19 RX ADMIN — SERTRALINE HYDROCHLORIDE 100 MG: 100 TABLET ORAL at 21:18

## 2020-07-19 RX ADMIN — TRAZODONE HYDROCHLORIDE 100 MG: 100 TABLET ORAL at 21:18

## 2020-07-19 RX ADMIN — INSULIN GLARGINE 40 UNITS: 100 INJECTION, SOLUTION SUBCUTANEOUS at 08:16

## 2020-07-19 RX ADMIN — GABAPENTIN 100 MG: 100 CAPSULE ORAL at 14:32

## 2020-07-19 RX ADMIN — INSULIN LISPRO 8 UNITS: 100 INJECTION, SOLUTION INTRAVENOUS; SUBCUTANEOUS at 12:11

## 2020-07-19 RX ADMIN — OXYCODONE AND ACETAMINOPHEN 2 TABLET: 5; 325 TABLET ORAL at 04:28

## 2020-07-19 RX ADMIN — INSULIN LISPRO 8 UNITS: 100 INJECTION, SOLUTION INTRAVENOUS; SUBCUTANEOUS at 17:06

## 2020-07-19 RX ADMIN — INSULIN GLARGINE 40 UNITS: 100 INJECTION, SOLUTION SUBCUTANEOUS at 21:22

## 2020-07-19 RX ADMIN — METOPROLOL SUCCINATE 50 MG: 50 TABLET, EXTENDED RELEASE ORAL at 08:14

## 2020-07-19 RX ADMIN — DIPHENHYDRAMINE HCL 50 MG: 25 TABLET ORAL at 22:23

## 2020-07-19 RX ADMIN — GABAPENTIN 100 MG: 100 CAPSULE ORAL at 21:18

## 2020-07-19 RX ADMIN — DIPHENHYDRAMINE HCL 50 MG: 25 TABLET ORAL at 01:49

## 2020-07-19 RX ADMIN — DIPHENHYDRAMINE HCL 50 MG: 25 TABLET ORAL at 08:13

## 2020-07-19 RX ADMIN — OXYCODONE AND ACETAMINOPHEN 2 TABLET: 5; 325 TABLET ORAL at 12:15

## 2020-07-19 RX ADMIN — OXYCODONE AND ACETAMINOPHEN 2 TABLET: 5; 325 TABLET ORAL at 17:02

## 2020-07-19 RX ADMIN — GABAPENTIN 100 MG: 100 CAPSULE ORAL at 08:13

## 2020-07-19 RX ADMIN — CALCIUM 1250 MG: 500 TABLET ORAL at 21:18

## 2020-07-19 RX ADMIN — INSULIN LISPRO 2 UNITS: 100 INJECTION, SOLUTION INTRAVENOUS; SUBCUTANEOUS at 17:03

## 2020-07-19 RX ADMIN — Medication 10 ML: at 08:23

## 2020-07-19 RX ADMIN — ENOXAPARIN SODIUM 40 MG: 40 INJECTION SUBCUTANEOUS at 08:14

## 2020-07-19 RX ADMIN — DIPHENHYDRAMINE HCL 50 MG: 25 TABLET ORAL at 17:23

## 2020-07-19 RX ADMIN — PANTOPRAZOLE SODIUM 40 MG: 40 TABLET, DELAYED RELEASE ORAL at 08:13

## 2020-07-19 RX ADMIN — INSULIN LISPRO 8 UNITS: 100 INJECTION, SOLUTION INTRAVENOUS; SUBCUTANEOUS at 08:31

## 2020-07-19 RX ADMIN — METOPROLOL SUCCINATE 100 MG: 100 TABLET, EXTENDED RELEASE ORAL at 21:18

## 2020-07-19 RX ADMIN — INSULIN LISPRO 1 UNITS: 100 INJECTION, SOLUTION INTRAVENOUS; SUBCUTANEOUS at 08:32

## 2020-07-19 RX ADMIN — OXYCODONE AND ACETAMINOPHEN 2 TABLET: 5; 325 TABLET ORAL at 21:18

## 2020-07-19 RX ADMIN — INSULIN LISPRO 6 UNITS: 100 INJECTION, SOLUTION INTRAVENOUS; SUBCUTANEOUS at 12:13

## 2020-07-19 ASSESSMENT — PAIN SCALES - GENERAL
PAINLEVEL_OUTOF10: 4
PAINLEVEL_OUTOF10: 4
PAINLEVEL_OUTOF10: 5
PAINLEVEL_OUTOF10: 4
PAINLEVEL_OUTOF10: 7
PAINLEVEL_OUTOF10: 7
PAINLEVEL_OUTOF10: 5

## 2020-07-19 ASSESSMENT — PAIN DESCRIPTION - ORIENTATION
ORIENTATION: RIGHT;LEFT

## 2020-07-19 ASSESSMENT — PAIN DESCRIPTION - PROGRESSION: CLINICAL_PROGRESSION: NOT CHANGED

## 2020-07-19 ASSESSMENT — PAIN DESCRIPTION - PAIN TYPE
TYPE: ACUTE PAIN

## 2020-07-19 ASSESSMENT — PAIN - FUNCTIONAL ASSESSMENT: PAIN_FUNCTIONAL_ASSESSMENT: PREVENTS OR INTERFERES WITH MANY ACTIVE NOT PASSIVE ACTIVITIES

## 2020-07-19 ASSESSMENT — PAIN DESCRIPTION - DESCRIPTORS: DESCRIPTORS: ACHING;THROBBING

## 2020-07-19 ASSESSMENT — PAIN DESCRIPTION - LOCATION
LOCATION: KNEE

## 2020-07-19 ASSESSMENT — PAIN DESCRIPTION - ONSET: ONSET: SUDDEN

## 2020-07-19 ASSESSMENT — PAIN DESCRIPTION - FREQUENCY: FREQUENCY: INTERMITTENT

## 2020-07-19 NOTE — CARE COORDINATION
DISCHARGE PLANNING NOTE:    Plan is for this patient to have tibial ORIF tomorrow. We will await PT/OT recs post-op, but patient has been to [address] [facility] in the past and is interested again if needed. If patient does not go to [facility], then patient will go to [facility] and have services through [facility]. Will continue to follow along.      Electronically signed by Jayden Soares RN on 7/19/2020 at 9:36 AM

## 2020-07-19 NOTE — PROGRESS NOTES
Insulin Aspart, w/Niacinamide, (FIASP FLEXTOUCH) 100 UNIT/ML SOPN Inject 8 Units into the skin 3 times daily (before meals) Plus sliding scale   Yes Historical Provider, MD   Insulin Aspart, w/Niacinamide, (FIASP FLEXTOUCH) 100 UNIT/ML SOPN Inject into the skin 3 times daily (before meals) Sliding scale in addition to base of 8 units   Yes Historical Provider, MD   ibuprofen (ADVIL;MOTRIN) 200 MG tablet Take 400 mg by mouth every 6 hours as needed for Pain   Yes Historical Provider, MD   calcium carbonate (OYSTER SHELL CALCIUM 500 MG) 1250 (500 Ca) MG tablet Take 1 tablet by mouth nightly   Yes Historical Provider, MD   aspirin 81 MG EC tablet Take 1 tablet by mouth daily 5/21/20  Yes Zaid Iniguez MD   gabapentin (NEURONTIN) 100 MG capsule Take 1 capsule by mouth 3 times daily for 60 days. 5/21/20 7/20/20 Yes Zaid Iniguez MD   pantoprazole (PROTONIX) 40 MG tablet Take 1 tablet by mouth daily 5/21/20  Yes Zaid Iniguez MD   sertraline (ZOLOFT) 100 MG tablet Take 100 mg by mouth nightly   Yes Historical Provider, MD   traZODone (DESYREL) 100 MG tablet Take 100 mg by mouth nightly   Yes Historical Provider, MD   metoprolol succinate (TOPROL XL) 50 MG extended release tablet Take 1 tablet by mouth 2 times daily 5/21/20   Zaid Iniguez MD   Blood Glucose Monitoring Suppl [facility] BLOOD GLUCOSE METER) w/Device KIT 1 kit by Does not apply route 2 times daily A1C of 9.0. NIDDM. 10/23/18   Lacho Rayo PA-C       Allergies:  Patient has no known allergies. Social History:   reports that he quit smoking about 28 years ago. His smoking use included cigarettes. He has a 36.00 pack-year smoking history. He has never used smokeless tobacco.     Family History: family history includes Diabetes in his father and mother; Heart Disease in his father; Stroke in his mother.     REVIEW OF SYSTEMS:    · Constitutional: Negative for weight loss  · Eyes: Negative for visual changes, diplopia, scleral icterus. · ENT: Negative for Headaches, hearing loss, vertigo, mouth sores, sore throat. · Cardiovascular: Negative for lightheadedness/orthostatic symptoms ,chest pain, dyspnea on exertion, palpitations or loss of consciousness. · Respiratory: Negative for cough or wheezing, sputum production, hemoptysis, pleuritic pain. · Gastrointestinal: Negative for nausea/vomiting, change in bowel habits, abdominal pain, dysphagia/appetite loss, hematemesis, blood in stools. · Genitourinary:Negative for change in bladder habits, dysuria, trouble voiding, hematuria. · Musculoskeletal: pos for gait disturbance, weakness, joint complaints. Tender over both tibia  · Integumentary: Negative for rash, pruritis. · Neurological: Negative for headache, dizziness, change in muscle strength, numbness/tingling, change in gait, balance, coordination,   · Endocrine: negative for temperature intolerance, excessive thirst, fluid intake, or urination, tremor. · Hematologic/Lymphatic: negative for abnormal bruising or bleeding, blood clots, swollen lymph nodes. · Allergic/Immunologic: negative for nasal congestion, pruritis, hives. PHYSICAL EXAM:    BP (!) 144/97   Pulse 79   Temp 98.8 °F (37.1 °C) (Oral)   Resp 16   Ht 6' (1.829 m)   Wt 213 lb 13.5 oz (97 kg)   SpO2 98%   BMI 29.00 kg/m²      · General appearance: well nourished  · HEENT: Head: Normocephalic, no lesions, without obvious abnormality. · Lungs: clear to auscultation bilaterally  · Heart: regular rate and rhythm, S1, S2 normal, no murmur, click, rub or gallop  · Abdomen: soft, non-tender; bowel sounds normal; no masses,  no organomegaly  · Extremitie multiple tender points  · Bilateral tibial tenderness  · Bilateral knee swelling  ·   · Neurological: Gait normal. Reflexes normal and symmetric.  Sensation grossly normal  · Skin - no rash, no lump   · Eye no icterus no redness  · Lymphatic system-no lymphadenopathy no splenomegaly DIAGNOSTICS:    Laboratory Testing:  CBC:   Recent Labs     07/18/20 0528   WBC 5.4   HGB 13.3*   *     BMP:    Recent Labs     07/17/20 1915 07/18/20 0528   * 136   K 4.3 4.6    102   CO2 22 24   BUN 21 17   CREATININE 1.45* 1.36*   GLUCOSE 205* 259*     S. Calcium:  Recent Labs     07/18/20 0528   CALCIUM 9.3     S. Ionized Calcium:No results for input(s): IONCA in the last 72 hours. S. Magnesium:No results for input(s): MG in the last 72 hours. S. Phosphorus:No results for input(s): PHOS in the last 72 hours. S. Glucose:  Recent Labs     07/18/20 2028 07/19/20 0619 07/19/20  1121   POCGLU 157* 186* 269*     Glycosylated hemoglobin A1C:   Recent Labs     07/17/20 1915   LABA1C 8.6*     INR: No results for input(s): INR in the last 72 hours. Hepatic functions: No results for input(s): ALKPHOS, ALT, AST, PROT, BILITOT, BILIDIR, LABALBU in the last 72 hours. Pancreatic functions:No results for input(s): LACTA, AMYLASE in the last 72 hours. S. Lactic Acid: No results for input(s): LACTA in the last 72 hours. Cardiac enzymes:No results for input(s): CKTOTAL, CKMB, CKMBINDEX, TROPONINI in the last 72 hours. BNP:No results for input(s): BNP in the last 72 hours. Lipid profile: No results for input(s): CHOL, TRIG, HDL, LDLCALC in the last 72 hours. Invalid input(s): LDL  Blood Gases: No results found for: PH, PCO2, PO2, HCO3, O2SAT  Thyroid functions: No results found for: TSH     Imaging/Diagonstics:    Xr Knee Left (3 Views)    Result Date: 7/17/2020  EXAMINATION: THREE XRAY VIEWS OF THE LEFT KNEE 7/17/2020 4:19 pm COMPARISON: None. HISTORY: ORDERING SYSTEM PROVIDED HISTORY: pain TECHNOLOGIST PROVIDED HISTORY: pain Reason for Exam: Pt fell today, pain to bilateral knee Acuity: Acute Type of Exam: Initial FINDINGS: Depressed lateral tibial plateau fracture with comminution. Lipohemarthrosis. Enthesophyte of the patellar tendon.      Depressed lateral tibial plateau fracture with moderate to large lipohemarthrosis. Xr Knee Right (3 Views)    Result Date: 7/17/2020  EXAMINATION: THREE XRAY VIEWS OF THE RIGHT KNEE 7/17/2020 4:19 pm COMPARISON: Right femur radiographs May 26, 2029 HISTORY: ORDERING SYSTEM PROVIDED HISTORY: fall, pain TECHNOLOGIST PROVIDED HISTORY: fall, pain Reason for Exam: Pt fell today, pain to bilateral knee Acuity: Acute Type of Exam: Initial FINDINGS: Intramedullary christiano within the distal femur. Patella hardware fixation. No acute fracture. Mild medial and lateral compartment narrowing with subchondral sclerosis and small osteophytes. Medial and lateral compartment chondrocalcinosis. No large joint effusion. No acute findings. Ct Knee Left Wo Contrast    Result Date: 7/18/2020  EXAMINATION: CT OF THE LEFT KNEE WITHOUT CONTRAST 7/18/2020 12:20 pm TECHNIQUE: CT of the left knee was performed without the administration of intravenous contrast.  Multiplanar reformatted images are provided for review. Dose modulation, iterative reconstruction, and/or weight based adjustment of the mA/kV was utilized to reduce the radiation dose to as low as reasonably achievable. COMPARISON: Radiographs from 07/17/2020 HISTORY ORDERING SYSTEM PROVIDED HISTORY: assess fracture, previous surgery, history of osteogenesis imperfecta TECHNOLOGIST PROVIDED HISTORY: assess fracture, previous surgery, history of osteogenesis imperfecta Reason for Exam: assess fracture, previous surgery, history of osteogenesis imperfecta Acuity: Chronic Type of Exam: Ongoing FINDINGS: Acute depressed lateral tibial plateau fracture. Articular depression measures about 0.7 cm with multiple punctate bodies. Fracture extends into the tibial spines. The proximal fibula is intact. The distal femur is intact. The medial tibial plateau is intact. Lateral subluxation of the patella relative to the trochlea. Please correlate for patellar tracking disorder.  Lipohemarthrosis compatible with intra-articular fracture. Prepatellar swelling. Atrophy of the muscles. The perineural fat is maintained. No unexpected radiopaque foreign bodies. Heterotopic ossification along the patellar retinaculum most likely sequela of prior injury. Acute impacted lateral tibial plateau fracture with extension into the tibial spines. Low moderate to large lipohemarthrosis. No additional fractures are apparent. Lateral subluxation of the patella relative to the trochlea. Please correlate for patellar tracking disorder. Ct Knee Right Wo Contrast    Result Date: 7/17/2020  EXAMINATION: CT OF THE RIGHT KNEE WITHOUT CONTRAST 7/17/2020 8:42 pm TECHNIQUE: CT of the right knee was performed without the administration of intravenous contrast.  Multiplanar reformatted images are provided for review. Dose modulation, iterative reconstruction, and/or weight based adjustment of the mA/kV was utilized to reduce the radiation dose to as low as reasonably achievable. COMPARISON: Right knee radiograph July 17, 2020 HISTORY ORDERING SYSTEM PROVIDED HISTORY: fall, pain, r/o fracture TECHNOLOGIST PROVIDED HISTORY: fall, pain, r/o fracture Reason for Exam: fell today Additional signs and symptoms: history of multiple previous knee surgeries FINDINGS: Bones: Postop changes prior femur ORIF with partially imaged intramedullary nail in place. Distal interlocking screws are also present. Postoperative changes of the patella with screws, cerclage wires, and malleable plate in place. The imaged hardware appears intact. There is an acute fracture lateral tibial plateau intra-articular extension of fracture lines. The major fracture fragment is only minimally displaced by approximately 2 mm. Soft Tissue:  Mild subcutaneous edema. No organized collection or subcutaneous gas identified. Scattered atherosclerotic vascular calcifications are present.  Joint:  Anatomic alignment of the knee with moderate to severe medial and mild-to-moderate patellofemoral and lateral compartment osteoarthritis. Small knee effusion. Chondrocalcinosis of the mediolateral compartments of the knee. 1. Acute fracture of the lateral tibial plateau with intra-articular extension of fracture lines and mild displacement of the major fracture fragment. 2. Small knee effusion. 3. Moderate to severe medial and mild to moderate lateral patellofemoral compartment osteoarthritis. 4. Severe osteopenia. ASSESSMENT:    Patient Active Problem List   Diagnosis    DM w/o complication type II ([address]    Essential hypertension    Erectile dysfunction    Microalbuminuria due to type 2 diabetes mellitus ([address]    Stage 3 chronic kidney disease ([address]    Traumatic bilateral lower extremity fractures    [facility]    Osteogenesis imperfecta    Type 2 diabetes mellitus with chronic kidney disease ([facility]    Type 2 diabetes mellitus with hyperglycemia ([facility])    Gastroesophageal reflux disease without esophagitis    Tibial plateau fracture, left, closed, initial encounter     Patient with osteogenesis imperfecta  Multiple fractures in the past  Now has bilateral tibial plateau fractures  From fall    Orthopedic evaluation noted      Diabetes type 2  Blood sugars are controlled    CKD 3  Diabetic nephropathy creatinine 1.38  Improving    Hypertension blood pressure is controlled    Potassium normal      PLAN:  Continue home medications  Lantus  Insulin sliding scale  Continue Toprol  Decrease Lantus to half dose once patient is n.p.o. for OR    Medically cleared for surgery  Moderate risk due to diabetes    Follow renal function    July 19  Osteogenesis imperfecta  Bilateral tibial plateau fractures  Plan for the OR in the a.m.     Blood pressure is elevated  Increase Toprol to 100 mg    Diabetes with CKD 3    Creatinine stable    Check BMP CBC  Blood sugars controlled  Adequate pain control  Cleared for surgery      Mathew Ahumada, MD  [facility]

## 2020-07-19 NOTE — PLAN OF CARE
Problem: Falls - Risk of:  Goal: Will remain free from falls  Description: Will remain free from falls  7/19/2020 1555 by Estuardo Mahmood RN  Outcome: Ongoing  7/19/2020 1039 by Estuardo Mahmood RN  Outcome: Ongoing  7/19/2020 0528 by Sonya Chacon RN  Outcome: Ongoing  Goal: Absence of physical injury  Description: Absence of physical injury  7/19/2020 1555 by Estuardo Mahmood RN  Outcome: Ongoing  7/19/2020 1039 by Estuardo Mahmood RN  Outcome: Ongoing  7/19/2020 0528 by Sonya Chacon RN  Outcome: Ongoing     Problem: Skin Integrity:  Goal: Will show no infection signs and symptoms  Description: Will show no infection signs and symptoms  7/19/2020 1555 by Estuardo Mahmood RN  Outcome: Ongoing  7/19/2020 1039 by Estuardo Mahmood RN  Outcome: Ongoing  7/19/2020 0528 by Sonya Chacon RN  Outcome: Ongoing  Goal: Absence of new skin breakdown  Description: Absence of new skin breakdown  Waffle mattress in place. Patient encouraged to reposition. Barrier ointment applied. 7/19/2020 1555 by Estuardo Mahmood RN  Outcome: Ongoing  7/19/2020 1039 by Estuardo Mahmood RN  Outcome: Ongoing  7/19/2020 0528 by Sonya Chacon RN  Outcome: Ongoing     Problem: Pain:  Goal: Pain level will decrease  Description: Pain level will decrease  7/19/2020 1555 by Estuardo Mahmood RN  Outcome: Ongoing  7/19/2020 1039 by Estuardo Mahmood RN  Outcome: Ongoing  7/19/2020 0528 by Sonya Chacon RN  Outcome: Ongoing  Goal: Control of acute pain  Description: Control of acute pain  Distraction techniques encouraged.    7/19/2020 1555 by Estuardo Mahmood RN  Outcome: Ongoing  7/19/2020 1039 by Estuardo Mahmood RN  Outcome: Ongoing  7/19/2020 0528 by Sonya Chacon RN  Outcome: Ongoing  Goal: Control of chronic pain  Description: Control of chronic pain  7/19/2020 1555 by Estuardo Mahmood RN  Outcome: Ongoing  7/19/2020 1039 by Estuardo Mahmood RN  Outcome: Ongoing  7/19/2020 0528 by Sonya Chacon RN  Outcome: Ongoing

## 2020-07-19 NOTE — PLAN OF CARE
Problem: Falls - Risk of:  Goal: Will remain free from falls  Description: Will remain free from falls  7/19/2020 0528 by Ricardo Keyes RN  Outcome: Ongoing  7/18/2020 1623 by Natividad High RN  Outcome: Met This Shift  Note: Patient free of falls this shift. Safety precautions maintained. Problem: Skin Integrity:  Goal: Will show no infection signs and symptoms  Description: Will show no infection signs and symptoms  7/19/2020 0528 by Ricardo Keyes RN  Outcome: Ongoing  7/18/2020 1623 by Natividad High RN  Outcome: Met This Shift  Note: Skin assessments completed this shift. No new areas of breakdown noted. Problem: Pain:  Goal: Pain level will decrease  Description: Pain level will decrease  7/19/2020 0528 by Ricardo Keyes RN  Outcome: Ongoing  7/18/2020 1623 by Natividad High RN  Outcome: Ongoing  Note: Pain medication given as ordered for pain control this shift.

## 2020-07-19 NOTE — PROGRESS NOTES
I had a lengthy discussion with the patient today regarding the results of the CT scan of his left knee. Again I am good to review this with my partner Dr. Becky Ortiz however in the meantime my concern is that because he has injuries to both knees and with his history of osteogenesis imperfecta it would likely behoove us for the patient to have fixation of these in one side allowed to be at least partial weightbearing. I did inform the patient that with this with the depressed splitting cracked lateral tibial plateau that elevation of this to improve the joint line and alignment would likely require some bone grafting to fill the void as well as plating.   Patient is understanding of this and wished to proceed    Physical examination remains unchanged    As noted the patient does have his custom braces    Plan at this line for patient to undergo ORIF tomorrow

## 2020-07-19 NOTE — CONSULTS
Physical Medicine & Rehabilitation    CHART REVIEW      Admitting Physician: Erasmo Burnett MD    Primary Care Provider: ADEEL Bobby - CNP     Reason for Consult:  Acute Inpatient Rehabilitation    Chief Complaint: Bilateral tibial plateau fracture secondary to osteogenesis imperfecta    History of Present Illness:  Referring Provider is requesting an evaluation for appropriate placement upon discharge from acute care. Mr. Shad Richards is a 59 y.o. male who was admitted to [facility] on 7/17/2020 with Fall; Head Injury; Knee Pain (right and left); and Abrasion (left elbow)    49-year-old male with history of osteogenesis imperfecta has had multiple fractures and surgeries to lower extremities as well as upper extremity. Recently had worked on his right hip by Dr. Millie Gonzalez with [facility]. Patient also had patellar fractures on both sides. He had hardware removal from his patella on the left. He had a fall landing on his knees when he slipped with his walker. He was found to have right tibial plateau fracture nondisplaced as well as a sustained tibial plateau fracture on the left with some depression in determinate    Orthopedics-patient does not want proceed with surgery, however discussion was had regarding bilateral possible fractures-recommendation for surgery at least 1 side to allow for some partial weightbearing    Functional History:  PTA: Independent with all activities. Current: Pending  PT:   OT:   ST:      Past Medical History:        Diagnosis Date    Contracture, elbow     h/o right elbow fx, compound fracture.  Erectile dysfunction     Hypertension     Osteogenesis imperfecta     DX 6 months.  30 broken bones in past.     Type II or unspecified type diabetes mellitus without mention of complication, not stated as uncontrolled        Past Surgical History:        Procedure Laterality Date    CHOLECYSTECTOMY      FEMUR FRACTURE SURGERY Right 4/23/2020    HIP TFN WITH C-ARM VISUALIZATION performed by Jazzmine Herndon MD at [address] Right     right elbow x 3. Compound fx at work.      FRACTURE SURGERY Bilateral     with hardware    FRACTURE SURGERY Left     with hardware    TONSILLECTOMY AND ADENOIDECTOMY         Allergies:    No Known Allergies     Current Medications:   Current Facility-Administered Medications: diphenhydrAMINE (BENADRYL) tablet 50 mg, 50 mg, Oral, Q6H PRN  sodium chloride flush 0.9 % injection 10 mL, 10 mL, Intravenous, 2 times per day  sodium chloride flush 0.9 % injection 10 mL, 10 mL, Intravenous, PRN  acetaminophen (TYLENOL) tablet 650 mg, 650 mg, Oral, Q4H PRN  enoxaparin (LOVENOX) injection 40 mg, 40 mg, Subcutaneous, Daily  morphine (PF) injection 2 mg, 2 mg, Intravenous, Q2H PRN **OR** morphine sulfate (PF) injection 4 mg, 4 mg, Intravenous, Q2H PRN  calcium elemental (OSCAL) tablet 1,250 mg, 1,250 mg, Oral, Nightly  gabapentin (NEURONTIN) capsule 100 mg, 100 mg, Oral, TID  insulin glargine (LANTUS) injection vial 40 Units, 40 Units, Subcutaneous, Daily  insulin glargine (LANTUS) injection vial 60 Units, 60 Units, Subcutaneous, Nightly  metoprolol succinate (TOPROL XL) extended release tablet 50 mg, 50 mg, Oral, BID  pantoprazole (PROTONIX) tablet 40 mg, 40 mg, Oral, Daily  sertraline (ZOLOFT) tablet 100 mg, 100 mg, Oral, Nightly  traZODone (DESYREL) tablet 100 mg, 100 mg, Oral, Nightly  insulin lispro (HUMALOG) injection vial 0-12 Units, 0-12 Units, Subcutaneous, TID WC  insulin lispro (HUMALOG) injection vial 0-6 Units, 0-6 Units, Subcutaneous, Nightly  glucose (GLUTOSE) 40 % oral gel 15 g, 15 g, Oral, PRN  dextrose 50 % IV solution, 12.5 g, Intravenous, PRN  glucagon (rDNA) injection 1 mg, 1 mg, Intramuscular, PRN  dextrose 5 % solution, 100 mL/hr, Intravenous, PRN  oxyCODONE-acetaminophen (PERCOCET) 5-325 MG per tablet 2 tablet, 2 tablet, Oral, Q4H PRN  insulin lispro (HUMALOG) injection vial 8 Units, 8 Units, Subcutaneous, TID WC  0.9 % sodium chloride infusion, , Intravenous, Continuous    Social History:  Social History     Socioeconomic History    Marital status:      Spouse name: Not on file    Number of children: Not on file    Years of education: Not on file    Highest education level: Not on file   Occupational History    Not on file   Social Needs    Financial resource strain: Not on file    Food insecurity     Worry: Not on file     Inability: Not on file    Transportation needs     Medical: Not on file     Non-medical: Not on file   Tobacco Use    Smoking status: Former Smoker     Packs/day: 2.00     Years: 18.00     Pack years: 36.00     Types: Cigarettes     Last attempt to quit: 12/3/1991     Years since quittin.6    Smokeless tobacco: Never Used   Substance and Sexual Activity    Alcohol use: Not on file    Drug use: Not on file    Sexual activity: Not on file   Lifestyle    Physical activity     Days per week: Not on file     Minutes per session: Not on file    Stress: Not on file   Relationships    Social connections     Talks on phone: Not on file     Gets together: Not on file     Attends [employer] service: Not on file     Active member of club or organization: Not on file     Attends meetings of clubs or organizations: Not on file     Relationship status: Not on file    Intimate partner violence     Fear of current or ex partner: Not on file     Emotionally abused: Not on file     Physically abused: Not on file     Forced sexual activity: Not on file   Other Topics Concern    Not on file   Social History Narrative    Not on file         Family History:       Problem Relation Age of Onset    Diabetes Mother     Stroke Mother     Diabetes Father     Heart Disease Father            BP (!) 152/99   Pulse 75   Temp 98.2 °F (36.8 °C) (Oral)   Resp 16   Ht 6' (1.829 m)   Wt 213 lb 13.5 oz (97 kg)   SpO2 92%   BMI 29.00 kg/m²       Diagnostics:  CBC   Lab Results   Component Value Date    WBC 5.4 07/18/2020    RBC 4.57 07/18/2020    HGB 13.3 07/18/2020    HCT 40.0 07/18/2020    MCV 87.5 07/18/2020    RDW 13.3 07/18/2020     07/18/2020     BMP    Lab Results   Component Value Date     07/18/2020    K 4.6 07/18/2020     07/18/2020    CO2 24 07/18/2020    BUN 17 07/18/2020     Uric Acid  No components found for: URIC  VITAMIN B12 No components found for: B12  PT/INR  No results found for: PTINR    Radiology:     Impression: Mr. Alexandria Swan is a 59 y.o. male with a history of Tibial plateau fracture, left, closed, initial encounter    1. Right tibial plateau fracture nondisplaced, sustained tibial plateau fracture left indeterminate age-awaiting decision on surgery  2. Recent right hip IM nailing  3. Osteogenesis imperfecta -history of multiple fractures including patellar fractures bilaterally and upper extremity fractures, elbow contracture, 30 broken bones in the past  4. Type 2 diabetes-insulin  5. Hypertension-metoprolol  6. Pain-morphine, Percocet  7. Depression/insomnia-Zoloft, Desyrel    Recommendations:  1. Diagnosis: Osteogenesis imperfecta factor with bilateral tibial plateau fractures-awaiting decision surgeries and therapies  2. Therapy: Await therapies post surgery  3. Medical  Necessity: As above  4. Support: Clarify, will need 24/7  5. Rehab recommendation: Recommendation to follow post therapy, however even with partial weightbearing on one side suspect will be mostly wheelchair level. Clarify wheelchair accessibility and support at home. 6. DVT proph: Lovenox     Please call with questions. Wilian Gan MD          This note is created with the assistance of a speech recognition program.  While intending to generate a document that actually reflects the content of the visit, the document can still have some errors including those of syntax and sound a like substitutions which may escape proof reading.   In such instances, actual meaning can be extrapolated by contextual diversion

## 2020-07-20 ENCOUNTER — ANESTHESIA EVENT (OUTPATIENT)
Dept: OPERATING ROOM | Age: 65
DRG: 493 | End: 2020-07-20
Payer: MEDICARE

## 2020-07-20 ENCOUNTER — APPOINTMENT (OUTPATIENT)
Dept: GENERAL RADIOLOGY | Age: 65
DRG: 493 | End: 2020-07-20
Payer: MEDICARE

## 2020-07-20 ENCOUNTER — TELEPHONE (OUTPATIENT)
Dept: ORTHOPEDIC SURGERY | Age: 65
End: 2020-07-20

## 2020-07-20 ENCOUNTER — ANESTHESIA (OUTPATIENT)
Dept: OPERATING ROOM | Age: 65
DRG: 493 | End: 2020-07-20
Payer: MEDICARE

## 2020-07-20 ENCOUNTER — APPOINTMENT (OUTPATIENT)
Dept: ULTRASOUND IMAGING | Age: 65
DRG: 493 | End: 2020-07-20
Payer: MEDICARE

## 2020-07-20 VITALS — OXYGEN SATURATION: 97 % | TEMPERATURE: 97 F | SYSTOLIC BLOOD PRESSURE: 133 MMHG | DIASTOLIC BLOOD PRESSURE: 61 MMHG

## 2020-07-20 LAB
-: ABNORMAL
ABSOLUTE EOS #: 0.3 K/UL (ref 0–0.4)
ABSOLUTE IMMATURE GRANULOCYTE: ABNORMAL K/UL (ref 0–0.3)
ABSOLUTE LYMPH #: 0.5 K/UL (ref 1–4.8)
ABSOLUTE MONO #: 0.4 K/UL (ref 0.1–1.3)
AMORPHOUS: ABNORMAL
ANION GAP SERPL CALCULATED.3IONS-SCNC: 10 MMOL/L (ref 9–17)
ANION GAP SERPL CALCULATED.3IONS-SCNC: 10 MMOL/L (ref 9–17)
BACTERIA: ABNORMAL
BASOPHILS # BLD: 1 % (ref 0–2)
BASOPHILS ABSOLUTE: 0 K/UL (ref 0–0.2)
BILIRUBIN URINE: ABNORMAL
BUN BLDV-MCNC: 23 MG/DL (ref 8–23)
BUN BLDV-MCNC: 24 MG/DL (ref 8–23)
BUN/CREAT BLD: ABNORMAL (ref 9–20)
BUN/CREAT BLD: ABNORMAL (ref 9–20)
CALCIUM SERPL-MCNC: 9.1 MG/DL (ref 8.6–10.4)
CALCIUM SERPL-MCNC: 9.6 MG/DL (ref 8.6–10.4)
CASTS UA: ABNORMAL /LPF
CASTS UA: ABNORMAL /LPF
CHLORIDE BLD-SCNC: 101 MMOL/L (ref 98–107)
CHLORIDE BLD-SCNC: 98 MMOL/L (ref 98–107)
CO2: 24 MMOL/L (ref 20–31)
CO2: 26 MMOL/L (ref 20–31)
COLOR: ABNORMAL
COMMENT UA: ABNORMAL
CREAT SERPL-MCNC: 1.68 MG/DL (ref 0.7–1.2)
CREAT SERPL-MCNC: 1.73 MG/DL (ref 0.7–1.2)
CRYSTALS, UA: ABNORMAL /HPF
DIFFERENTIAL TYPE: ABNORMAL
EOSINOPHIL,URINE: NORMAL
EOSINOPHILS RELATIVE PERCENT: 4 % (ref 0–4)
EPITHELIAL CELLS UA: ABNORMAL /HPF
GFR AFRICAN AMERICAN: 48 ML/MIN
GFR AFRICAN AMERICAN: 50 ML/MIN
GFR NON-AFRICAN AMERICAN: 40 ML/MIN
GFR NON-AFRICAN AMERICAN: 41 ML/MIN
GFR SERPL CREATININE-BSD FRML MDRD: ABNORMAL ML/MIN/{1.73_M2}
GLUCOSE BLD-MCNC: 102 MG/DL (ref 75–110)
GLUCOSE BLD-MCNC: 133 MG/DL (ref 75–110)
GLUCOSE BLD-MCNC: 143 MG/DL (ref 75–110)
GLUCOSE BLD-MCNC: 146 MG/DL (ref 70–99)
GLUCOSE BLD-MCNC: 168 MG/DL (ref 75–110)
GLUCOSE BLD-MCNC: 280 MG/DL (ref 75–110)
GLUCOSE BLD-MCNC: 340 MG/DL (ref 70–99)
GLUCOSE URINE: NEGATIVE
HCT VFR BLD CALC: 36.9 % (ref 41–53)
HCT VFR BLD CALC: 40 % (ref 41–53)
HEMOGLOBIN: 12.2 G/DL (ref 13.5–17.5)
HEMOGLOBIN: 13.2 G/DL (ref 13.5–17.5)
IMMATURE GRANULOCYTES: ABNORMAL %
KETONES, URINE: NEGATIVE
LEUKOCYTE ESTERASE, URINE: NEGATIVE
LYMPHOCYTES # BLD: 8 % (ref 24–44)
MCH RBC QN AUTO: 29 PG (ref 26–34)
MCH RBC QN AUTO: 29 PG (ref 26–34)
MCHC RBC AUTO-ENTMCNC: 33 G/DL (ref 31–37)
MCHC RBC AUTO-ENTMCNC: 33 G/DL (ref 31–37)
MCV RBC AUTO: 87.9 FL (ref 80–100)
MCV RBC AUTO: 87.9 FL (ref 80–100)
MONOCYTES # BLD: 7 % (ref 1–7)
MUCUS: ABNORMAL
NITRITE, URINE: NEGATIVE
NRBC AUTOMATED: ABNORMAL PER 100 WBC
NRBC AUTOMATED: ABNORMAL PER 100 WBC
OTHER OBSERVATIONS UA: ABNORMAL
PDW BLD-RTO: 13.1 % (ref 11.5–14.9)
PDW BLD-RTO: 13.4 % (ref 11.5–14.9)
PH UA: 5.5 (ref 5–8)
PLATELET # BLD: 138 K/UL (ref 150–450)
PLATELET # BLD: 150 K/UL (ref 150–450)
PLATELET ESTIMATE: ABNORMAL
PMV BLD AUTO: 7.7 FL (ref 6–12)
PMV BLD AUTO: 8 FL (ref 6–12)
POTASSIUM SERPL-SCNC: 5 MMOL/L (ref 3.7–5.3)
POTASSIUM SERPL-SCNC: 5.2 MMOL/L (ref 3.7–5.3)
PROTEIN UA: ABNORMAL
RBC # BLD: 4.2 M/UL (ref 4.5–5.9)
RBC # BLD: 4.55 M/UL (ref 4.5–5.9)
RBC # BLD: ABNORMAL 10*6/UL
RBC UA: ABNORMAL /HPF
RENAL EPITHELIAL, UA: ABNORMAL /HPF
SEG NEUTROPHILS: 80 % (ref 36–66)
SEGMENTED NEUTROPHILS ABSOLUTE COUNT: 4.9 K/UL (ref 1.3–9.1)
SODIUM BLD-SCNC: 134 MMOL/L (ref 135–144)
SODIUM BLD-SCNC: 135 MMOL/L (ref 135–144)
SODIUM,UR: 50 MMOL/L
SPECIFIC GRAVITY UA: 1.03 (ref 1–1.03)
TRICHOMONAS: ABNORMAL
TURBIDITY: ABNORMAL
URINE HGB: NEGATIVE
UROBILINOGEN, URINE: NORMAL
WBC # BLD: 5.3 K/UL (ref 3.5–11)
WBC # BLD: 6.1 K/UL (ref 3.5–11)
WBC # BLD: ABNORMAL 10*3/UL
WBC UA: ABNORMAL /HPF
YEAST: ABNORMAL

## 2020-07-20 PROCEDURE — 36415 COLL VENOUS BLD VENIPUNCTURE: CPT

## 2020-07-20 PROCEDURE — 2580000003 HC RX 258: Performed by: FAMILY MEDICINE

## 2020-07-20 PROCEDURE — 2580000003 HC RX 258: Performed by: ORTHOPAEDIC SURGERY

## 2020-07-20 PROCEDURE — 82570 ASSAY OF URINE CREATININE: CPT

## 2020-07-20 PROCEDURE — 27599 UNLISTED PX FEMUR/KNEE: CPT | Performed by: ORTHOPAEDIC SURGERY

## 2020-07-20 PROCEDURE — C1769 GUIDE WIRE: HCPCS | Performed by: ORTHOPAEDIC SURGERY

## 2020-07-20 PROCEDURE — 73560 X-RAY EXAM OF KNEE 1 OR 2: CPT

## 2020-07-20 PROCEDURE — 7100000001 HC PACU RECOVERY - ADDTL 15 MIN: Performed by: ORTHOPAEDIC SURGERY

## 2020-07-20 PROCEDURE — 73590 X-RAY EXAM OF LOWER LEG: CPT

## 2020-07-20 PROCEDURE — 81001 URINALYSIS AUTO W/SCOPE: CPT

## 2020-07-20 PROCEDURE — 82947 ASSAY GLUCOSE BLOOD QUANT: CPT

## 2020-07-20 PROCEDURE — 76000 FLUOROSCOPY <1 HR PHYS/QHP: CPT

## 2020-07-20 PROCEDURE — 7100000000 HC PACU RECOVERY - FIRST 15 MIN: Performed by: ORTHOPAEDIC SURGERY

## 2020-07-20 PROCEDURE — 76770 US EXAM ABDO BACK WALL COMP: CPT

## 2020-07-20 PROCEDURE — 6370000000 HC RX 637 (ALT 250 FOR IP): Performed by: INTERNAL MEDICINE

## 2020-07-20 PROCEDURE — 6360000002 HC RX W HCPCS: Performed by: NURSE ANESTHETIST, CERTIFIED REGISTERED

## 2020-07-20 PROCEDURE — 3700000001 HC ADD 15 MINUTES (ANESTHESIA): Performed by: ORTHOPAEDIC SURGERY

## 2020-07-20 PROCEDURE — 80048 BASIC METABOLIC PNL TOTAL CA: CPT

## 2020-07-20 PROCEDURE — 27535 TREAT KNEE FRACTURE: CPT | Performed by: ORTHOPAEDIC SURGERY

## 2020-07-20 PROCEDURE — 3600000004 HC SURGERY LEVEL 4 BASE: Performed by: ORTHOPAEDIC SURGERY

## 2020-07-20 PROCEDURE — 1200000000 HC SEMI PRIVATE

## 2020-07-20 PROCEDURE — 2500000003 HC RX 250 WO HCPCS: Performed by: NURSE ANESTHETIST, CERTIFIED REGISTERED

## 2020-07-20 PROCEDURE — 6360000002 HC RX W HCPCS: Performed by: ANESTHESIOLOGY

## 2020-07-20 PROCEDURE — 3600000014 HC SURGERY LEVEL 4 ADDTL 15MIN: Performed by: ORTHOPAEDIC SURGERY

## 2020-07-20 PROCEDURE — 85027 COMPLETE CBC AUTOMATED: CPT

## 2020-07-20 PROCEDURE — 2720000010 HC SURG SUPPLY STERILE: Performed by: ORTHOPAEDIC SURGERY

## 2020-07-20 PROCEDURE — 3700000000 HC ANESTHESIA ATTENDED CARE: Performed by: ORTHOPAEDIC SURGERY

## 2020-07-20 PROCEDURE — 2709999900 HC NON-CHARGEABLE SUPPLY: Performed by: ORTHOPAEDIC SURGERY

## 2020-07-20 PROCEDURE — C1713 ANCHOR/SCREW BN/BN,TIS/BN: HCPCS | Performed by: ORTHOPAEDIC SURGERY

## 2020-07-20 PROCEDURE — 84300 ASSAY OF URINE SODIUM: CPT

## 2020-07-20 PROCEDURE — 6370000000 HC RX 637 (ALT 250 FOR IP): Performed by: FAMILY MEDICINE

## 2020-07-20 PROCEDURE — 6360000002 HC RX W HCPCS: Performed by: ORTHOPAEDIC SURGERY

## 2020-07-20 PROCEDURE — 82043 UR ALBUMIN QUANTITATIVE: CPT

## 2020-07-20 PROCEDURE — 6370000000 HC RX 637 (ALT 250 FOR IP): Performed by: ORTHOPAEDIC SURGERY

## 2020-07-20 PROCEDURE — 85025 COMPLETE CBC W/AUTO DIFF WBC: CPT

## 2020-07-20 PROCEDURE — 0QSH04Z REPOSITION LEFT TIBIA WITH INTERNAL FIXATION DEVICE, OPEN APPROACH: ICD-10-PCS | Performed by: ORTHOPAEDIC SURGERY

## 2020-07-20 PROCEDURE — 87205 SMEAR GRAM STAIN: CPT

## 2020-07-20 DEVICE — SCREW BNE L54MM DIA3.5MM PROX TIB S STL ST FULL THRD T15: Type: IMPLANTABLE DEVICE | Site: KNEE | Status: FUNCTIONAL

## 2020-07-20 DEVICE — SCREW BNE L85MM DIA3.5MM PROX TIB S STL ST FULL THRD W/ T15: Type: IMPLANTABLE DEVICE | Site: KNEE | Status: FUNCTIONAL

## 2020-07-20 DEVICE — WASHER ORTH DIA13MM FOR CANN SCR: Type: IMPLANTABLE DEVICE | Site: KNEE | Status: FUNCTIONAL

## 2020-07-20 DEVICE — SCREW BNE L44MM DIA3.5MM CORT S STL ST NONCANNULATED LOK: Type: IMPLANTABLE DEVICE | Site: KNEE | Status: FUNCTIONAL

## 2020-07-20 DEVICE — GRAFT BNE SUB 5CC CA PHSPTE INJ DRILLABLE VOID FILL NORIAN: Type: IMPLANTABLE DEVICE | Site: KNEE | Status: FUNCTIONAL

## 2020-07-20 DEVICE — SCREW BNE L80MM DIA7.3MM THRD L32MM CANC S STL SELF DRL ST: Type: IMPLANTABLE DEVICE | Site: KNEE | Status: FUNCTIONAL

## 2020-07-20 DEVICE — SCREW BNE L80MM DIA3.5MM PROX TIB S STL ST FULL THRD T15: Type: IMPLANTABLE DEVICE | Site: KNEE | Status: FUNCTIONAL

## 2020-07-20 DEVICE — SCREW BNE L50MM DIA3.5MM CORT S STL ST NONCANNULATED LOK: Type: IMPLANTABLE DEVICE | Site: KNEE | Status: FUNCTIONAL

## 2020-07-20 RX ORDER — METOPROLOL SUCCINATE 25 MG/1
25 TABLET, EXTENDED RELEASE ORAL 2 TIMES DAILY
Status: DISCONTINUED | OUTPATIENT
Start: 2020-07-20 | End: 2020-07-24 | Stop reason: HOSPADM

## 2020-07-20 RX ORDER — KETAMINE HYDROCHLORIDE 50 MG/ML
INJECTION, SOLUTION, CONCENTRATE INTRAMUSCULAR; INTRAVENOUS PRN
Status: DISCONTINUED | OUTPATIENT
Start: 2020-07-20 | End: 2020-07-20 | Stop reason: SDUPTHER

## 2020-07-20 RX ORDER — PHENYLEPHRINE HYDROCHLORIDE 10 MG/ML
INJECTION INTRAVENOUS PRN
Status: DISCONTINUED | OUTPATIENT
Start: 2020-07-20 | End: 2020-07-20 | Stop reason: SDUPTHER

## 2020-07-20 RX ORDER — MIDAZOLAM HYDROCHLORIDE 1 MG/ML
INJECTION INTRAMUSCULAR; INTRAVENOUS PRN
Status: DISCONTINUED | OUTPATIENT
Start: 2020-07-20 | End: 2020-07-20 | Stop reason: SDUPTHER

## 2020-07-20 RX ORDER — LIDOCAINE HYDROCHLORIDE 10 MG/ML
INJECTION, SOLUTION EPIDURAL; INFILTRATION; INTRACAUDAL; PERINEURAL PRN
Status: DISCONTINUED | OUTPATIENT
Start: 2020-07-20 | End: 2020-07-20 | Stop reason: SDUPTHER

## 2020-07-20 RX ORDER — ROCURONIUM BROMIDE 10 MG/ML
INJECTION, SOLUTION INTRAVENOUS PRN
Status: DISCONTINUED | OUTPATIENT
Start: 2020-07-20 | End: 2020-07-20 | Stop reason: SDUPTHER

## 2020-07-20 RX ORDER — MORPHINE SULFATE 10 MG/ML
INJECTION, SOLUTION INTRAMUSCULAR; INTRAVENOUS PRN
Status: DISCONTINUED | OUTPATIENT
Start: 2020-07-20 | End: 2020-07-20 | Stop reason: SDUPTHER

## 2020-07-20 RX ORDER — PROPOFOL 10 MG/ML
INJECTION, EMULSION INTRAVENOUS PRN
Status: DISCONTINUED | OUTPATIENT
Start: 2020-07-20 | End: 2020-07-20 | Stop reason: SDUPTHER

## 2020-07-20 RX ORDER — ONDANSETRON 2 MG/ML
4 INJECTION INTRAMUSCULAR; INTRAVENOUS
Status: DISCONTINUED | OUTPATIENT
Start: 2020-07-20 | End: 2020-07-20 | Stop reason: HOSPADM

## 2020-07-20 RX ORDER — ONDANSETRON 2 MG/ML
INJECTION INTRAMUSCULAR; INTRAVENOUS PRN
Status: DISCONTINUED | OUTPATIENT
Start: 2020-07-20 | End: 2020-07-20 | Stop reason: SDUPTHER

## 2020-07-20 RX ORDER — GLYCOPYRROLATE 1 MG/5 ML
SYRINGE (ML) INTRAVENOUS PRN
Status: DISCONTINUED | OUTPATIENT
Start: 2020-07-20 | End: 2020-07-20 | Stop reason: SDUPTHER

## 2020-07-20 RX ORDER — MEPERIDINE HYDROCHLORIDE 25 MG/ML
12.5 INJECTION INTRAMUSCULAR; INTRAVENOUS; SUBCUTANEOUS EVERY 5 MIN PRN
Status: DISCONTINUED | OUTPATIENT
Start: 2020-07-20 | End: 2020-07-20 | Stop reason: HOSPADM

## 2020-07-20 RX ORDER — FENTANYL CITRATE 50 UG/ML
INJECTION, SOLUTION INTRAMUSCULAR; INTRAVENOUS PRN
Status: DISCONTINUED | OUTPATIENT
Start: 2020-07-20 | End: 2020-07-20 | Stop reason: SDUPTHER

## 2020-07-20 RX ORDER — DIPHENHYDRAMINE HYDROCHLORIDE 50 MG/ML
12.5 INJECTION INTRAMUSCULAR; INTRAVENOUS
Status: DISCONTINUED | OUTPATIENT
Start: 2020-07-20 | End: 2020-07-20 | Stop reason: HOSPADM

## 2020-07-20 RX ORDER — EPHEDRINE SULFATE/0.9% NACL/PF 50 MG/5 ML
SYRINGE (ML) INTRAVENOUS PRN
Status: DISCONTINUED | OUTPATIENT
Start: 2020-07-20 | End: 2020-07-20 | Stop reason: SDUPTHER

## 2020-07-20 RX ORDER — NEOSTIGMINE METHYLSULFATE 5 MG/5 ML
SYRINGE (ML) INTRAVENOUS PRN
Status: DISCONTINUED | OUTPATIENT
Start: 2020-07-20 | End: 2020-07-20 | Stop reason: SDUPTHER

## 2020-07-20 RX ORDER — MORPHINE SULFATE 2 MG/ML
2 INJECTION, SOLUTION INTRAMUSCULAR; INTRAVENOUS EVERY 5 MIN PRN
Status: DISCONTINUED | OUTPATIENT
Start: 2020-07-20 | End: 2020-07-20 | Stop reason: HOSPADM

## 2020-07-20 RX ORDER — LABETALOL 20 MG/4 ML (5 MG/ML) INTRAVENOUS SYRINGE
5 EVERY 10 MIN PRN
Status: DISCONTINUED | OUTPATIENT
Start: 2020-07-20 | End: 2020-07-20 | Stop reason: HOSPADM

## 2020-07-20 RX ADMIN — PROPOFOL 50 MG: 10 INJECTION, EMULSION INTRAVENOUS at 12:51

## 2020-07-20 RX ADMIN — HYDROMORPHONE HYDROCHLORIDE 0.5 MG: 1 INJECTION, SOLUTION INTRAMUSCULAR; INTRAVENOUS; SUBCUTANEOUS at 13:48

## 2020-07-20 RX ADMIN — Medication 10 ML: at 08:42

## 2020-07-20 RX ADMIN — MORPHINE SULFATE 5 MG: 10 INJECTION, SOLUTION INTRAMUSCULAR; INTRAVENOUS at 13:09

## 2020-07-20 RX ADMIN — Medication 3 MG: at 13:00

## 2020-07-20 RX ADMIN — SODIUM CHLORIDE: 9 INJECTION, SOLUTION INTRAVENOUS at 11:00

## 2020-07-20 RX ADMIN — CALCIUM 1250 MG: 500 TABLET ORAL at 22:18

## 2020-07-20 RX ADMIN — SODIUM CHLORIDE: 9 INJECTION, SOLUTION INTRAVENOUS at 12:33

## 2020-07-20 RX ADMIN — OXYCODONE AND ACETAMINOPHEN 2 TABLET: 5; 325 TABLET ORAL at 09:32

## 2020-07-20 RX ADMIN — SODIUM CHLORIDE: 9 INJECTION, SOLUTION INTRAVENOUS at 19:02

## 2020-07-20 RX ADMIN — Medication 10 MG: at 12:03

## 2020-07-20 RX ADMIN — ROCURONIUM BROMIDE 10 MG: 10 INJECTION INTRAVENOUS at 11:46

## 2020-07-20 RX ADMIN — FENTANYL CITRATE 50 MCG: 50 INJECTION, SOLUTION INTRAMUSCULAR; INTRAVENOUS at 12:11

## 2020-07-20 RX ADMIN — PROPOFOL 150 MG: 10 INJECTION, EMULSION INTRAVENOUS at 11:30

## 2020-07-20 RX ADMIN — PHENYLEPHRINE HYDROCHLORIDE 100 MCG: 10 INJECTION INTRAVENOUS at 11:38

## 2020-07-20 RX ADMIN — FENTANYL CITRATE 50 MCG: 50 INJECTION, SOLUTION INTRAMUSCULAR; INTRAVENOUS at 11:30

## 2020-07-20 RX ADMIN — ACETAMINOPHEN 650 MG: 325 TABLET, FILM COATED ORAL at 20:19

## 2020-07-20 RX ADMIN — PHENYLEPHRINE HYDROCHLORIDE 100 MCG: 10 INJECTION INTRAVENOUS at 11:50

## 2020-07-20 RX ADMIN — PHENYLEPHRINE HYDROCHLORIDE 100 MCG: 10 INJECTION INTRAVENOUS at 13:00

## 2020-07-20 RX ADMIN — PHENYLEPHRINE HYDROCHLORIDE 100 MCG: 10 INJECTION INTRAVENOUS at 11:57

## 2020-07-20 RX ADMIN — MIDAZOLAM 2 MG: 1 INJECTION INTRAMUSCULAR; INTRAVENOUS at 11:19

## 2020-07-20 RX ADMIN — OXYCODONE AND ACETAMINOPHEN 2 TABLET: 5; 325 TABLET ORAL at 17:02

## 2020-07-20 RX ADMIN — ROCURONIUM BROMIDE 50 MG: 10 INJECTION INTRAVENOUS at 11:30

## 2020-07-20 RX ADMIN — PHENYLEPHRINE HYDROCHLORIDE 100 MCG: 10 INJECTION INTRAVENOUS at 11:52

## 2020-07-20 RX ADMIN — INSULIN LISPRO 8 UNITS: 100 INJECTION, SOLUTION INTRAVENOUS; SUBCUTANEOUS at 17:11

## 2020-07-20 RX ADMIN — PHENYLEPHRINE HYDROCHLORIDE 100 MCG: 10 INJECTION INTRAVENOUS at 11:40

## 2020-07-20 RX ADMIN — MORPHINE SULFATE 5 MG: 10 INJECTION, SOLUTION INTRAMUSCULAR; INTRAVENOUS at 12:54

## 2020-07-20 RX ADMIN — ONDANSETRON 4 MG: 2 INJECTION INTRAMUSCULAR; INTRAVENOUS at 12:45

## 2020-07-20 RX ADMIN — TRAZODONE HYDROCHLORIDE 100 MG: 100 TABLET ORAL at 22:18

## 2020-07-20 RX ADMIN — GABAPENTIN 100 MG: 100 CAPSULE ORAL at 20:19

## 2020-07-20 RX ADMIN — PHENYLEPHRINE HYDROCHLORIDE 100 MCG: 10 INJECTION INTRAVENOUS at 11:59

## 2020-07-20 RX ADMIN — Medication 10 MG: at 12:00

## 2020-07-20 RX ADMIN — METOPROLOL SUCCINATE 100 MG: 100 TABLET, EXTENDED RELEASE ORAL at 08:46

## 2020-07-20 RX ADMIN — OXYCODONE AND ACETAMINOPHEN 2 TABLET: 5; 325 TABLET ORAL at 22:18

## 2020-07-20 RX ADMIN — CEFAZOLIN 2 G: 10 INJECTION, POWDER, FOR SOLUTION INTRAVENOUS at 11:40

## 2020-07-20 RX ADMIN — Medication 0.4 MG: at 13:00

## 2020-07-20 RX ADMIN — OXYCODONE AND ACETAMINOPHEN 2 TABLET: 5; 325 TABLET ORAL at 01:48

## 2020-07-20 RX ADMIN — INSULIN LISPRO 2 UNITS: 100 INJECTION, SOLUTION INTRAVENOUS; SUBCUTANEOUS at 17:12

## 2020-07-20 RX ADMIN — SODIUM CHLORIDE: 9 INJECTION, SOLUTION INTRAVENOUS at 10:47

## 2020-07-20 RX ADMIN — SERTRALINE HYDROCHLORIDE 100 MG: 100 TABLET ORAL at 22:18

## 2020-07-20 RX ADMIN — KETAMINE HYDROCHLORIDE 50 MG: 50 INJECTION, SOLUTION INTRAMUSCULAR; INTRAVENOUS at 11:57

## 2020-07-20 RX ADMIN — PHENYLEPHRINE HYDROCHLORIDE 100 MCG: 10 INJECTION INTRAVENOUS at 11:30

## 2020-07-20 RX ADMIN — METOPROLOL SUCCINATE 25 MG: 25 TABLET, EXTENDED RELEASE ORAL at 22:18

## 2020-07-20 RX ADMIN — LIDOCAINE HYDROCHLORIDE 50 MG: 10 INJECTION, SOLUTION EPIDURAL; INFILTRATION; INTRACAUDAL; PERINEURAL at 11:30

## 2020-07-20 ASSESSMENT — PULMONARY FUNCTION TESTS
PIF_VALUE: 21
PIF_VALUE: 4
PIF_VALUE: 22
PIF_VALUE: 1
PIF_VALUE: 22
PIF_VALUE: 17
PIF_VALUE: 21
PIF_VALUE: 20
PIF_VALUE: 15
PIF_VALUE: 23
PIF_VALUE: 15
PIF_VALUE: 17
PIF_VALUE: 21
PIF_VALUE: 17
PIF_VALUE: 3
PIF_VALUE: 18
PIF_VALUE: 16
PIF_VALUE: 21
PIF_VALUE: 15
PIF_VALUE: 25
PIF_VALUE: 3
PIF_VALUE: 20
PIF_VALUE: 21
PIF_VALUE: 17
PIF_VALUE: 2
PIF_VALUE: 2
PIF_VALUE: 18
PIF_VALUE: 15
PIF_VALUE: 15
PIF_VALUE: 1
PIF_VALUE: 17
PIF_VALUE: 19
PIF_VALUE: 4
PIF_VALUE: 2
PIF_VALUE: 18
PIF_VALUE: 18
PIF_VALUE: 17
PIF_VALUE: 15
PIF_VALUE: 17
PIF_VALUE: 21
PIF_VALUE: 1
PIF_VALUE: 21
PIF_VALUE: 22
PIF_VALUE: 21
PIF_VALUE: 15
PIF_VALUE: 17
PIF_VALUE: 16
PIF_VALUE: 2
PIF_VALUE: 21
PIF_VALUE: 21
PIF_VALUE: 22
PIF_VALUE: 22
PIF_VALUE: 21
PIF_VALUE: 21
PIF_VALUE: 4
PIF_VALUE: 17
PIF_VALUE: 15
PIF_VALUE: 20
PIF_VALUE: 15
PIF_VALUE: 21
PIF_VALUE: 20
PIF_VALUE: 17
PIF_VALUE: 22
PIF_VALUE: 1
PIF_VALUE: 21
PIF_VALUE: 18
PIF_VALUE: 2
PIF_VALUE: 21
PIF_VALUE: 15
PIF_VALUE: 21
PIF_VALUE: 1
PIF_VALUE: 17
PIF_VALUE: 15
PIF_VALUE: 17
PIF_VALUE: 20
PIF_VALUE: 21
PIF_VALUE: 1
PIF_VALUE: 20
PIF_VALUE: 21
PIF_VALUE: 15
PIF_VALUE: 17
PIF_VALUE: 22
PIF_VALUE: 21
PIF_VALUE: 20
PIF_VALUE: 21
PIF_VALUE: 22
PIF_VALUE: 4
PIF_VALUE: 15
PIF_VALUE: 15
PIF_VALUE: 1
PIF_VALUE: 20
PIF_VALUE: 15
PIF_VALUE: 17
PIF_VALUE: 3
PIF_VALUE: 21
PIF_VALUE: 21
PIF_VALUE: 20
PIF_VALUE: 20
PIF_VALUE: 22
PIF_VALUE: 20
PIF_VALUE: 21
PIF_VALUE: 20

## 2020-07-20 ASSESSMENT — PAIN SCALES - GENERAL
PAINLEVEL_OUTOF10: 0
PAINLEVEL_OUTOF10: 6
PAINLEVEL_OUTOF10: 7
PAINLEVEL_OUTOF10: 4
PAINLEVEL_OUTOF10: 8
PAINLEVEL_OUTOF10: 7
PAINLEVEL_OUTOF10: 4
PAINLEVEL_OUTOF10: 7
PAINLEVEL_OUTOF10: 3
PAINLEVEL_OUTOF10: 4
PAINLEVEL_OUTOF10: 3
PAINLEVEL_OUTOF10: 4
PAINLEVEL_OUTOF10: 7
PAINLEVEL_OUTOF10: 4
PAINLEVEL_OUTOF10: 3

## 2020-07-20 ASSESSMENT — PAIN DESCRIPTION - LOCATION
LOCATION: KNEE
LOCATION: LEG;KNEE
LOCATION: KNEE

## 2020-07-20 ASSESSMENT — PAIN DESCRIPTION - ORIENTATION
ORIENTATION: LEFT
ORIENTATION: LEFT
ORIENTATION: RIGHT;LEFT
ORIENTATION: RIGHT;LEFT

## 2020-07-20 ASSESSMENT — PAIN DESCRIPTION - DESCRIPTORS
DESCRIPTORS: BURNING;ACHING
DESCRIPTORS: BURNING
DESCRIPTORS: DULL
DESCRIPTORS: ACHING
DESCRIPTORS: ACHING;THROBBING
DESCRIPTORS: BURNING

## 2020-07-20 ASSESSMENT — PAIN - FUNCTIONAL ASSESSMENT
PAIN_FUNCTIONAL_ASSESSMENT: PREVENTS OR INTERFERES SOME ACTIVE ACTIVITIES AND ADLS
PAIN_FUNCTIONAL_ASSESSMENT: 0-10
PAIN_FUNCTIONAL_ASSESSMENT: PREVENTS OR INTERFERES SOME ACTIVE ACTIVITIES AND ADLS

## 2020-07-20 ASSESSMENT — ENCOUNTER SYMPTOMS
STRIDOR: 0
SHORTNESS OF BREATH: 0

## 2020-07-20 ASSESSMENT — PAIN DESCRIPTION - FREQUENCY
FREQUENCY: CONTINUOUS
FREQUENCY: CONTINUOUS

## 2020-07-20 ASSESSMENT — LIFESTYLE VARIABLES: SMOKING_STATUS: 0

## 2020-07-20 ASSESSMENT — PAIN DESCRIPTION - ONSET
ONSET: ON-GOING
ONSET: AWAKENED FROM SLEEP
ONSET: ON-GOING

## 2020-07-20 ASSESSMENT — PAIN DESCRIPTION - PAIN TYPE
TYPE: SURGICAL PAIN
TYPE: SURGICAL PAIN
TYPE: ACUTE PAIN
TYPE: SURGICAL PAIN
TYPE: SURGICAL PAIN

## 2020-07-20 ASSESSMENT — PAIN DESCRIPTION - PROGRESSION
CLINICAL_PROGRESSION: NOT CHANGED
CLINICAL_PROGRESSION: NOT CHANGED

## 2020-07-20 NOTE — FLOWSHEET NOTE
Patient has gone through traumatic injuries and is struggling to cope.      07/20/20 1942   Encounter Summary   Services provided to: Patient   Referral/Consult From: Rounding   Continue Visiting   (7-20-20)   Complexity of Encounter Moderate   Length of Encounter 15 minutes   Routine   Type Post-procedure   Assessment Approachable   Intervention Active listening;Explored feelings, thoughts, concerns;Explored coping resources;Prayer;Sustaining presence/ Ministry of presence; Discussed illness/injury and it's impact   Outcome Expressed gratitude;Engaged in conversation;Expressed feelings/needs/concerns

## 2020-07-20 NOTE — OP NOTE
Operative Note      Patient: Audrey Rodriguez  YOB: 1955  MRN: [id number]    Date of Procedure: 7/20/2020    Pre-Op Diagnosis: LEFT TIBIAL PLATEAU FRACTURE, split depression. Also nondisplaced split fracture right lateral tibial plateau. History of osteogenesis imperfecta    Post-Op Diagnosis: Same       Procedure(s):  TIBIAL PLATEAU OPEN REDUCTION INTERNAL FIXATION LEFT WITH Norian grafting AND 7.3 HELEN RIGHT KNEE    Surgeon(s):  Colton Andrade MD    Assistant:   Resident: DO Nile Rice CST  Anesthesia: General    Estimated Blood Loss (mL): Minimal    Complications: None    Specimens:   * No specimens in log *    Implants:  Implant Name Type Inv. Item Serial No.  Lot No. LRB No. Used Action   FILLER BONE VOID NORIAN DRILL INJ 5ML Bone/Graft/Tissue/Human/Synth FILLER BONE VOID NORIAN DRILL INJ 5ML  SYNTHES [id number] Left 1 Implanted   SCREW HELEN 32MM THRD SS 7.3X80MM Screw/Plate/Nail/Oscar SCREW HELEN 32MM THRD SS 7.3X80MM  SYNTHES  Right 2 Implanted   WASHER SCRW HELEN SS 6.5X13MM Screw/Plate/Nail/Oscar WASHER SCRW HELEN SS 6.5X13MM  SYNTHES  Right 2 Implanted   SCREW 3.5MM KIKI ANGL LK 54MM Screw/Plate/Nail/Oscar SCREW 3.5MM KIKI ANGL LK 54MM  SYNTHES  Left 1 Implanted   SCREW 3.5MM LOCK ANGLE 80MM Screw/Plate/Nail/Oscar SCREW 3.5MM LOCK ANGLE 80MM  SYNTHES  Left 1 Implanted         Drains: * No LDAs found *    Findings: Severe osteoporosis    Detailed Description of Procedure:   Patient is a 60-year-old gentleman who is a history of osteogenesis imperfecta who is had multiple fractures in the past.  Patient has had of sustained a fall recently and had pain to both knees. Patient is noted to have significant hardware to the right knee with a cage for his right knee CT scan revealed a nondisplaced tibial plateau fracture on the right and a split depression tibial plateau fracture on the left.   It was felt that the patient would benefit from open reduction internal fixation to the left knee were than Vitor was noted to be hardened. We then placed additional all cortical screws and locking screws proximally and distally until adequate adequate fixation was noted. AP lateral views revealed near anatomic reduction except a little bit step-off anteriorly but felt to be acceptable. The bone graft could be appreciated.   This wound was irrigated copiously and closed with an 0 Vicryl suture subcu was closed with 2-0 Vicryl skin staples for the skin a sterile dressing was applied and patient placed immobilizers on both sides tourniquet was deflated and the left side as well as the right at the time of closure on the right side and the patient was awakened taken postanesthesia care unit in good condition    Electronically signed by Chikis Zamudio MD on 7/20/2020 at 12:42 PM

## 2020-07-20 NOTE — CONSULTS
Reason for Consult:  Acute kidney injury. Requesting Physician:  Glendy Bella MD    HISTORY OF PRESENT ILLNESS:    The patient is a 59 y.o. male who presents with bilateral tibial fracture after an accidental fall at home. On previous labs his serum creatinines have been between 1.3 to 1.4 with eGFR of 50s ml/min. On admission his creatinine was 1.45 that has risen to 1.73  Today. His lowest recorded BP was 87/52 mmHg. Denies any problems with nausea, vomiting, appetite, diarrhea or difficulty with urination. Denies any recent use of NSAIDs or iv contrast.    Review Of Systems:   Constitutional: No fever, chills, lethargy, weakness or wt loss. HEENT:  No headache, nasal discharge or sore throat. Cardiac:  No chest pain, dyspnea, orthopnea or PND. Chest:              No cough, phlegm or wheezing. Abdomen:  No abdominal pain, nausea, vomiting or diarrhea. Neuro:   No gross focal weakness, numbness, abnormal movements or seizure like activity. Skin:   No rashes or itching. :   No hematuria, pyuria, dysuria or flank pain. Extremities:  No swelling or joint pains. Endocrine: No polyuria, polydypsia, or thyroid problems. Hematology:    No bleeding disorders, bruising or anemia. All other ROS is negative. Past Medical History:   Diagnosis Date    Contracture, elbow     h/o right elbow fx, compound fracture.  Erectile dysfunction     Hypertension     Osteogenesis imperfecta     DX 6 months. 30 broken bones in past.     Type II or unspecified type diabetes mellitus without mention of complication, not stated as uncontrolled        Past Surgical History:   Procedure Laterality Date    CHOLECYSTECTOMY      FEMUR FRACTURE SURGERY Right 4/23/2020    HIP TFN WITH C-ARM VISUALIZATION performed by Sandy Fontenot MD at [address] [place] [address] Right     right elbow x 3. Compound fx at work.      FRACTURE SURGERY Bilateral     with hardware    FRACTURE SURGERY Left     with hardware    TONSILLECTOMY AND ADENOIDECTOMY         Prior to Admission medications    Medication Sig Start Date End Date Taking? Authorizing Provider   insulin glargine (LANTUS SOLOSTAR) 100 UNIT/ML injection pen Inject 40 Units into the skin daily   Yes Historical Provider, MD   insulin glargine (LANTUS SOLOSTAR) 100 UNIT/ML injection pen Inject 60 Units into the skin nightly   Yes Historical Provider, MD   Insulin Aspart, w/Niacinamide, (FIASP FLEXTOUCH) 100 UNIT/ML SOPN Inject 8 Units into the skin 3 times daily (before meals) Plus sliding scale   Yes Historical Provider, MD   Insulin Aspart, w/Niacinamide, (FIASP FLEXTOUCH) 100 UNIT/ML SOPN Inject into the skin 3 times daily (before meals) Sliding scale in addition to base of 8 units   Yes Historical Provider, MD   ibuprofen (ADVIL;MOTRIN) 200 MG tablet Take 400 mg by mouth every 6 hours as needed for Pain   Yes Historical Provider, MD   calcium carbonate (OYSTER SHELL CALCIUM 500 MG) 1250 (500 Ca) MG tablet Take 1 tablet by mouth nightly   Yes Historical Provider, MD   aspirin 81 MG EC tablet Take 1 tablet by mouth daily 5/21/20  Yes Lidia Mercer MD   gabapentin (NEURONTIN) 100 MG capsule Take 1 capsule by mouth 3 times daily for 60 days. 5/21/20 7/20/20 Yes Lidia Mercer MD   pantoprazole (PROTONIX) 40 MG tablet Take 1 tablet by mouth daily 5/21/20  Yes Lidia Mercer MD   sertraline (ZOLOFT) 100 MG tablet Take 100 mg by mouth nightly   Yes Historical Provider, MD   traZODone (DESYREL) 100 MG tablet Take 100 mg by mouth nightly   Yes Historical Provider, MD   metoprolol succinate (TOPROL XL) 50 MG extended release tablet Take 1 tablet by mouth 2 times daily 5/21/20   Lidia Mercer MD   Blood Glucose Monitoring Suppl [facility] BLOOD GLUCOSE METER) w/Device KIT 1 kit by Does not apply route 2 times daily A1C of 9.0. NIDDM.  10/23/18   Kaden Damon PA-C       Scheduled Meds:   metoprolol succinate  25 mg Oral BID    sodium chloride flush  10 mL Intravenous 2 times per day    enoxaparin  40 mg Subcutaneous Daily    calcium elemental  1,250 mg Oral Nightly    gabapentin  100 mg Oral TID    insulin glargine  40 Units Subcutaneous Daily    insulin glargine  60 Units Subcutaneous Nightly    pantoprazole  40 mg Oral Daily    sertraline  100 mg Oral Nightly    traZODone  100 mg Oral Nightly    insulin lispro  0-12 Units Subcutaneous TID WC    insulin lispro  0-6 Units Subcutaneous Nightly    insulin lispro  8 Units Subcutaneous TID WC     Continuous Infusions:   dextrose      sodium chloride 75 mL/hr at 20 1047     PRN Meds:diphenhydrAMINE, sodium chloride flush, acetaminophen, morphine **OR** morphine, glucose, dextrose, glucagon (rDNA), dextrose, oxyCODONE-acetaminophen    No Known Allergies    Social History     Socioeconomic History    Marital status:      Spouse name: Not on file    Number of children: Not on file    Years of education: Not on file    Highest education level: Not on file   Occupational History    Not on file   Social Needs    Financial resource strain: Not on file    Food insecurity     Worry: Not on file     Inability: Not on file    Transportation needs     Medical: Not on file     Non-medical: Not on file   Tobacco Use    Smoking status: Former Smoker     Packs/day: 2.00     Years: 18.00     Pack years: 36.00     Types: Cigarettes     Last attempt to quit: 12/3/1991     Years since quittin.6    Smokeless tobacco: Never Used   Substance and Sexual Activity    Alcohol use: Not on file    Drug use: Not on file    Sexual activity: Not on file   Lifestyle    Physical activity     Days per week: Not on file     Minutes per session: Not on file    Stress: Not on file   Relationships    Social connections     Talks on phone: Not on file     Gets together: Not on file     Attends [employer] service: Not on file     Active member of club or organization: Not on file     Attends meetings of clubs or organizations: Not on file     Relationship status: Not on file    Intimate partner violence     Fear of current or ex partner: Not on file     Emotionally abused: Not on file     Physically abused: Not on file     Forced sexual activity: Not on file   Other Topics Concern    Not on file   Social History Narrative    Not on file       Family History   Problem Relation Age of Onset    Diabetes Mother     Stroke Mother     Diabetes Father     Heart Disease Father          Physical Exam:  Vitals:    07/20/20 1400 07/20/20 1410 07/20/20 1420 07/20/20 1445   BP: 119/74 104/70 112/72 114/64   Pulse: 71 75 74 65   Resp: 12 19 13 16   Temp:    98 °F (36.7 °C)   TempSrc:    Oral   SpO2: 93% 95% 93% 100%   Weight:       Height:         I/O last 3 completed shifts: In: 1400 [I.V.:1400]  Out: 425 [Urine:400; Blood:25]    General:  Awake, alert, not in distress. Appears to be stated age. HEENT: Atraumatic, normocephalic. Anicteric sclera. Pink and moist oral mucosa. No carotid bruit. No JVD. Chest: Bilateral air entry, clear to auscultation, no wheezing, rhonchi or rales. Cardiovascular: RRR, S1S2, no murmur, rub or gallop. No lower extremity edema. Abdomen: Soft, non tender to palpation. Active bowel sounds x 4 quadrants. Musculoskeletal: Active ROM x 4 extremities. No cyanosis or clubbing. Integumentary: Pink, warm and dry. Free from rash or lesions. Skin turgor normal.  CNS: Oriented to person, place and time. Speech clear. Face symmetrical. No tremor.      Data:  CBC:   Lab Results   Component Value Date    WBC 5.3 07/20/2020    HGB 13.2 (L) 07/20/2020    HCT 40.0 (L) 07/20/2020    MCV 87.9 07/20/2020     07/20/2020     BMP:    Lab Results   Component Value Date     (L) 07/20/2020     07/18/2020     (L) 07/17/2020    K 5.0 07/20/2020    K 4.6 07/18/2020    K 4.3 07/17/2020    CL 98 07/20/2020     07/18/2020     07/17/2020    CO2 26 07/20/2020    CO2 24 07/18/2020    CO2 22 07/17/2020    BUN 23 07/20/2020    BUN 17 07/18/2020    BUN 21 07/17/2020    CREATININE 1.73 (H) 07/20/2020    CREATININE 1.36 (H) 07/18/2020    CREATININE 1.45 (H) 07/17/2020    GLUCOSE 340 (H) 07/20/2020    GLUCOSE 259 (H) 07/18/2020    GLUCOSE 205 (H) 07/17/2020     CMP:   Lab Results   Component Value Date     07/20/2020    K 5.0 07/20/2020    CL 98 07/20/2020    CO2 26 07/20/2020    BUN 23 07/20/2020    CREATININE 1.73 07/20/2020    GLUCOSE 340 07/20/2020    GLUCOSE 165 10/13/2011    CALCIUM 9.6 07/20/2020    PROT 7.1 05/21/2020    LABALBU 3.7 05/21/2020    BILITOT 0.36 05/21/2020    ALKPHOS 162 05/21/2020    AST 10 05/21/2020    ALT 9 05/21/2020      Hepatic:   Lab Results   Component Value Date    AST 10 05/21/2020    AST 11 05/19/2020    AST 13 05/18/2020    ALT 9 05/21/2020    ALT 11 05/19/2020    ALT 10 05/18/2020    BILITOT 0.36 05/21/2020    BILITOT 0.35 05/19/2020    BILITOT 0.35 05/18/2020    ALKPHOS 162 (H) 05/21/2020    ALKPHOS 168 (H) 05/19/2020    ALKPHOS 176 (H) 05/18/2020     BNP: No results found for: BNP  Lipids:   Lab Results   Component Value Date    CHOL 163 10/15/2018    HDL 34 (L) 10/15/2018     INR: No results found for: INR  PTH: No results found for: PTH  Phosphorus:  No results found for: PHOS  Ionized Calcium: No results found for: IONCA  Magnesium: No results found for: MG  Albumin:   Lab Results   Component Value Date    LABALBU 3.7 05/21/2020     Last 3 CK, CKMB, Troponin: @LABRCNT(CKTOTAL:3,CKMB:3,TROPONINI:3)       URINE:)No results found for: Judy Cosby     Radiology:   Reviewed. Assessment:  1. Acute kidney injury, appears to be hemodynamically related. Creatinine is rising. 2. Underlying CKD stage 3 with baseline GFR of 50s ml/min. 3. Hyponatremia and relative hypererkalemia. 4. Anemia. 5. History of osteogenesis imperfecta. Plan:  1. We will decrease dose of Metoprolol. We will continue IVF as prescribed.   2. Comprehensive urine testing including urinalysis, urine sodium, creatinine (FENa), eosinophils and urine protein and creatinine to quantify any proteinuria ordered. 3. We will check renal and bladder ultrasound to r/o element of obstruction and to assess the kidney size/echotexture. 4. Renal diet with oral fluid restriction of 1000 ml/24 hours. 5. Avoid hypotension, nephrotoxic drugs, Lovenox, Fleets enema and IV contrast exposure. 6. Follow up chemistries ordered for later today and for AM.    Thank you for the consultation. Please do not hesitate to contact us for any further questions/concerns. We will continue to follow along with you. Electronically signed by [place] MD Bhupendra  on 7/20/2020 at 3:19 PM   [facility]"" [facility].   Ph: [phone]

## 2020-07-20 NOTE — PROGRESS NOTES
Patient wanted waffle mattress removed. \"I have been sweating and itching! \"  Removed. Minimal red rash noted over buttocks. , but no open areas.

## 2020-07-20 NOTE — PROGRESS NOTES
[address]                Date:   7/20/2020  Patient name:  Estela Dickson  Date of admission:  7/17/2020  3:25 PM  MRN:   [id number]  YOB: 1955    Pt seen at the request of Jeanette Evans MD    CHIEF COMPLAINT:     History Obtained From:  Patient and chart review. HISTORY OF PRESENT ILLNESS:      The patient is a 59 y.o.  male who is admitted to the hospital for bilateral tibial plateau fractures. Patient has a history of osteogenesis imperfecta. He has had multiple fractures and surgeries on both lower extremities as well as upper extremities. Patient recently had work done on his right hip per Dr. Allen Adams with an intramedullary christiano. This is on the right side. Patient also has had a patellar fractures on both sides. He had hardware removal from his patella on the left side. Patient states that he had a fall yesterday and landed on his knees when he slipped with his walker. July 19  Good pain control  No chest pain shortness of breath  Plan for OR in a.m. July 20  Creatinine has increased to 1.7  OR today  Hemodynamically stable      Past Medical History:   has a past medical history of Contracture, elbow, Erectile dysfunction, Hypertension, Osteogenesis imperfecta, and Type II or unspecified type diabetes mellitus without mention of complication, not stated as uncontrolled. Past Surgical History:   has a past surgical history that includes Cholecystectomy; Tonsillectomy and adenoidectomy; fracture surgery (Right); fracture surgery (Bilateral); fracture surgery (Left); and Femur fracture surgery (Right, 4/23/2020). Home Medications:    Prior to Admission medications    Medication Sig Start Date End Date Taking?  Authorizing Provider   insulin glargine (LANTUS SOLOSTAR) 100 UNIT/ML injection pen Inject 40 Units into the skin daily   Yes Historical Provider, MD   insulin glargine (LANTUS SOLOSTAR) 100 UNIT/ML injection pen Inject 60 Units into the skin nightly   Yes Historical Provider, MD   Insulin Aspart, w/Niacinamide, (FIASP FLEXTOUCH) 100 UNIT/ML SOPN Inject 8 Units into the skin 3 times daily (before meals) Plus sliding scale   Yes Historical Provider, MD   Insulin Aspart, w/Niacinamide, (FIASP FLEXTOUCH) 100 UNIT/ML SOPN Inject into the skin 3 times daily (before meals) Sliding scale in addition to base of 8 units   Yes Historical Provider, MD   ibuprofen (ADVIL;MOTRIN) 200 MG tablet Take 400 mg by mouth every 6 hours as needed for Pain   Yes Historical Provider, MD   calcium carbonate (OYSTER SHELL CALCIUM 500 MG) 1250 (500 Ca) MG tablet Take 1 tablet by mouth nightly   Yes Historical Provider, MD   aspirin 81 MG EC tablet Take 1 tablet by mouth daily 5/21/20  Yes Marco Antonio Joy MD   gabapentin (NEURONTIN) 100 MG capsule Take 1 capsule by mouth 3 times daily for 60 days. 5/21/20 7/20/20 Yes Marco Antonio Joy MD   pantoprazole (PROTONIX) 40 MG tablet Take 1 tablet by mouth daily 5/21/20  Yes Marco Antonio Joy MD   sertraline (ZOLOFT) 100 MG tablet Take 100 mg by mouth nightly   Yes Historical Provider, MD   traZODone (DESYREL) 100 MG tablet Take 100 mg by mouth nightly   Yes Historical Provider, MD   metoprolol succinate (TOPROL XL) 50 MG extended release tablet Take 1 tablet by mouth 2 times daily 5/21/20   Marco Antonio Joy MD   Blood Glucose Monitoring Suppl [facility] BLOOD GLUCOSE METER) w/Device KIT 1 kit by Does not apply route 2 times daily A1C of 9.0. NIDDM. 10/23/18   Gilma Terry PA-C       Allergies:  Patient has no known allergies. Social History:   reports that he quit smoking about 28 years ago. His smoking use included cigarettes. He has a 36.00 pack-year smoking history. He has never used smokeless tobacco.     Family History: family history includes Diabetes in his father and mother; Heart Disease in his father; Stroke in his mother.     REVIEW OF SYSTEMS:    · Constitutional: Negative for weight loss  · Eyes: Negative for visual changes, diplopia, scleral icterus. · ENT: Negative for Headaches, hearing loss, vertigo, mouth sores, sore throat. · Cardiovascular: Negative for lightheadedness/orthostatic symptoms ,chest pain, dyspnea on exertion, palpitations or loss of consciousness. · Respiratory: Negative for cough or wheezing, sputum production, hemoptysis, pleuritic pain. · Gastrointestinal: Negative for nausea/vomiting, change in bowel habits, abdominal pain, dysphagia/appetite loss, hematemesis, blood in stools. · Genitourinary:Negative for change in bladder habits, dysuria, trouble voiding, hematuria. · Musculoskeletal: pos for gait disturbance, weakness, joint complaints. Tender over both tibia  · Integumentary: Negative for rash, pruritis. · Neurological: Negative for headache, dizziness, change in muscle strength, numbness/tingling, change in gait, balance, coordination,   · Endocrine: negative for temperature intolerance, excessive thirst, fluid intake, or urination, tremor. · Hematologic/Lymphatic: negative for abnormal bruising or bleeding, blood clots, swollen lymph nodes. · Allergic/Immunologic: negative for nasal congestion, pruritis, hives. PHYSICAL EXAM:    /75   Pulse 67   Temp 97.3 °F (36.3 °C) (Infrared)   Resp 18   Ht 6' (1.829 m)   Wt 213 lb 13.5 oz (97 kg)   SpO2 94%   BMI 29.00 kg/m²      · General appearance: well nourished  · HEENT: Head: Normocephalic, no lesions, without obvious abnormality. · Lungs: clear to auscultation bilaterally  · Heart: regular rate and rhythm, S1, S2 normal, no murmur, click, rub or gallop  · Abdomen: soft, non-tender; bowel sounds normal; no masses,  no organomegaly  · Extremitie multiple tender points  · Bilateral tibial tenderness  · Bilateral knee swelling  ·   · Neurological: Gait normal. Reflexes normal and symmetric.  Sensation grossly normal  · Skin - no rash, no lump   · Eye no icterus no redness  · Lymphatic system-no Enthesophyte of the patellar tendon. Depressed lateral tibial plateau fracture with moderate to large lipohemarthrosis. Xr Knee Right (3 Views)    Result Date: 7/17/2020  EXAMINATION: THREE XRAY VIEWS OF THE RIGHT KNEE 7/17/2020 4:19 pm COMPARISON: Right femur radiographs May 26, 2029 HISTORY: ORDERING SYSTEM PROVIDED HISTORY: fall, pain TECHNOLOGIST PROVIDED HISTORY: fall, pain Reason for Exam: Pt fell today, pain to bilateral knee Acuity: Acute Type of Exam: Initial FINDINGS: Intramedullary christiano within the distal femur. Patella hardware fixation. No acute fracture. Mild medial and lateral compartment narrowing with subchondral sclerosis and small osteophytes. Medial and lateral compartment chondrocalcinosis. No large joint effusion. No acute findings. Ct Knee Left Wo Contrast    Result Date: 7/18/2020  EXAMINATION: CT OF THE LEFT KNEE WITHOUT CONTRAST 7/18/2020 12:20 pm TECHNIQUE: CT of the left knee was performed without the administration of intravenous contrast.  Multiplanar reformatted images are provided for review. Dose modulation, iterative reconstruction, and/or weight based adjustment of the mA/kV was utilized to reduce the radiation dose to as low as reasonably achievable. COMPARISON: Radiographs from 07/17/2020 HISTORY ORDERING SYSTEM PROVIDED HISTORY: assess fracture, previous surgery, history of osteogenesis imperfecta TECHNOLOGIST PROVIDED HISTORY: assess fracture, previous surgery, history of osteogenesis imperfecta Reason for Exam: assess fracture, previous surgery, history of osteogenesis imperfecta Acuity: Chronic Type of Exam: Ongoing FINDINGS: Acute depressed lateral tibial plateau fracture. Articular depression measures about 0.7 cm with multiple punctate bodies. Fracture extends into the tibial spines. The proximal fibula is intact. The distal femur is intact. The medial tibial plateau is intact. Lateral subluxation of the patella relative to the trochlea.   Please correlate for patellar tracking disorder. Lipohemarthrosis compatible with intra-articular fracture. Prepatellar swelling. Atrophy of the muscles. The perineural fat is maintained. No unexpected radiopaque foreign bodies. Heterotopic ossification along the patellar retinaculum most likely sequela of prior injury. Acute impacted lateral tibial plateau fracture with extension into the tibial spines. Low moderate to large lipohemarthrosis. No additional fractures are apparent. Lateral subluxation of the patella relative to the trochlea. Please correlate for patellar tracking disorder. Ct Knee Right Wo Contrast    Result Date: 7/17/2020  EXAMINATION: CT OF THE RIGHT KNEE WITHOUT CONTRAST 7/17/2020 8:42 pm TECHNIQUE: CT of the right knee was performed without the administration of intravenous contrast.  Multiplanar reformatted images are provided for review. Dose modulation, iterative reconstruction, and/or weight based adjustment of the mA/kV was utilized to reduce the radiation dose to as low as reasonably achievable. COMPARISON: Right knee radiograph July 17, 2020 HISTORY ORDERING SYSTEM PROVIDED HISTORY: fall, pain, r/o fracture TECHNOLOGIST PROVIDED HISTORY: fall, pain, r/o fracture Reason for Exam: fell today Additional signs and symptoms: history of multiple previous knee surgeries FINDINGS: Bones: Postop changes prior femur ORIF with partially imaged intramedullary nail in place. Distal interlocking screws are also present. Postoperative changes of the patella with screws, cerclage wires, and malleable plate in place. The imaged hardware appears intact. There is an acute fracture lateral tibial plateau intra-articular extension of fracture lines. The major fracture fragment is only minimally displaced by approximately 2 mm. Soft Tissue:  Mild subcutaneous edema. No organized collection or subcutaneous gas identified. Scattered atherosclerotic vascular calcifications are present.  Joint: Anatomic alignment of the knee with moderate to severe medial and mild-to-moderate patellofemoral and lateral compartment osteoarthritis. Small knee effusion. Chondrocalcinosis of the mediolateral compartments of the knee. 1. Acute fracture of the lateral tibial plateau with intra-articular extension of fracture lines and mild displacement of the major fracture fragment. 2. Small knee effusion. 3. Moderate to severe medial and mild to moderate lateral patellofemoral compartment osteoarthritis. 4. Severe osteopenia. ASSESSMENT:    Patient Active Problem List   Diagnosis    [facility]/o complication type II [facility]    Essential hypertension    Erectile dysfunction    Microalbuminuria due to type 2 diabetes mellitus ([facility]    Stage 3 chronic kidney disease ([facility]    Traumatic bilateral lower extremity fractures    Closed right hip fracture, initial encounter ([facility]    Osteogenesis imperfecta    Type 2 diabetes mellitus with chronic kidney disease ([facility]    Type 2 diabetes mellitus with hyperglycemia (HCC)    Gastroesophageal reflux disease without esophagitis    Tibial plateau fracture, left, closed, initial encounter     Patient with osteogenesis imperfecta  Multiple fractures in the past  Now has bilateral tibial plateau fractures  From fall    Orthopedic evaluation noted      Diabetes type 2  Blood sugars are controlled    CKD 3  Diabetic nephropathy creatinine 1.38  Improving    Hypertension blood pressure is controlled    Potassium normal      PLAN:  Continue home medications  Lantus  Insulin sliding scale  Continue Toprol  Decrease Lantus to half dose once patient is n.p.o. for OR    Medically cleared for surgery  Moderate risk due to diabetes    Follow renal function    July 19  Osteogenesis imperfecta  Bilateral tibial plateau fractures  Plan for the OR in the a.m.     Blood pressure is elevated  Increase Toprol to 100 mg    Diabetes with CKD 3    Creatinine stable    Check BMP CBC  Blood sugars controlled  Adequate pain control  Cleared for surgery    July 28  OR today for repair of bilateral tibial plateau fractures with osteogenesis imperfecta    Creatinine has increased to 1.7  Diabetic nephropathy  Blood sugars controlled  Continue beta-blocker  Nephrology to see    Montgomery Burkitt, MD [address], [address] [place] [address].    Phone [phone]   Fax: [phone]  Answering Service: [phone]

## 2020-07-20 NOTE — ANESTHESIA PRE PROCEDURE
Department of Anesthesiology  Preprocedure Note       Name:  Matthieu Farmer   Age:  59 y.o.  :  1955                                          MRN:  [id number]         Date:  2020      Surgeon: Niall Fagan):  Lidia Hernandez MD    Procedure: Procedure(s):  TIBIAL PLATEAU OPEN REDUCTION INTERNAL FIXATION LEFT AND 7.3 HELEN RIGHT KNEE    Medications prior to admission:   Prior to Admission medications    Medication Sig Start Date End Date Taking? Authorizing Provider   insulin glargine (LANTUS SOLOSTAR) 100 UNIT/ML injection pen Inject 40 Units into the skin daily   Yes Historical Provider, MD   insulin glargine (LANTUS SOLOSTAR) 100 UNIT/ML injection pen Inject 60 Units into the skin nightly   Yes Historical Provider, MD   Insulin Aspart, w/Niacinamide, (FIASP FLEXTOUCH) 100 UNIT/ML SOPN Inject 8 Units into the skin 3 times daily (before meals) Plus sliding scale   Yes Historical Provider, MD   Insulin Aspart, w/Niacinamide, (FIASP FLEXTOUCH) 100 UNIT/ML SOPN Inject into the skin 3 times daily (before meals) Sliding scale in addition to base of 8 units   Yes Historical Provider, MD   ibuprofen (ADVIL;MOTRIN) 200 MG tablet Take 400 mg by mouth every 6 hours as needed for Pain   Yes Historical Provider, MD   calcium carbonate (OYSTER SHELL CALCIUM 500 MG) 1250 (500 Ca) MG tablet Take 1 tablet by mouth nightly   Yes Historical Provider, MD   aspirin 81 MG EC tablet Take 1 tablet by mouth daily 20  Yes Bianka Ruiz MD   gabapentin (NEURONTIN) 100 MG capsule Take 1 capsule by mouth 3 times daily for 60 days.  20 Yes Bianka Ruiz MD   pantoprazole (PROTONIX) 40 MG tablet Take 1 tablet by mouth daily 20  Yes Bianka Ruiz MD   sertraline (ZOLOFT) 100 MG tablet Take 100 mg by mouth nightly   Yes Historical Provider, MD   traZODone (DESYREL) 100 MG tablet Take 100 mg by mouth nightly   Yes Historical Provider, MD   metoprolol succinate (TOPROL XL) 50 MG extended release tablet Take 1 tablet by mouth 2 times daily 5/21/20   Trinidad Birch MD   Blood Glucose Monitoring Suppl [facility] BLOOD GLUCOSE METER) w/Device KIT 1 kit by Does not apply route 2 times daily A1C of 9.0. NIDDM.  10/23/18   Anya Simon PA-C       Current medications:    Current Facility-Administered Medications   Medication Dose Route Frequency Provider Last Rate Last Dose    ceFAZolin (ANCEF) 2 g in dextrose 5 % 50 mL IVPB  2 g Intravenous Once Bhavesh Lira MD        [facility] Hold] metoprolol succinate (TOPROL XL) extended release tablet 100 mg  100 mg Oral BID [place] La Echeverria MD   100 mg at 07/20/20 0846    [MAR Hold] diphenhydrAMINE (BENADRYL) tablet 50 mg  50 mg Oral Q6H PRN Zach Odonnell MD   50 mg at 07/19/20 2223    [MAR Hold] sodium chloride flush 0.9 % injection 10 mL  10 mL Intravenous 2 times per day Bhavesh Lira MD   10 mL at 07/20/20 0842    [MAR Hold] sodium chloride flush 0.9 % injection 10 mL  10 mL Intravenous PRN Bhavesh Lira MD        [facility] Hold] acetaminophen (TYLENOL) tablet 650 mg  650 mg Oral Q4H PRN Bhavesh Lira MD        [facility]] enoxaparin (LOVENOX) injection 40 mg  40 mg Subcutaneous Daily Bhavesh Lira MD   Stopped at 07/20/20 0900    [MAR Hold] morphine (PF) injection 2 mg  2 mg Intravenous Q2H PRN Bhavesh Lira MD   2 mg at 07/18/20 1821    Or    [MAR Hold] morphine sulfate (PF) injection 4 mg  4 mg Intravenous Q2H PRN Bhavesh Lira MD   4 mg at 07/18/20 1225    [MAR Hold] calcium elemental (OSCAL) tablet 1,250 mg  1,250 mg Oral Nightly Willistine Lombard, MD   1,250 mg at 07/19/20 2118    [MAR Hold] gabapentin (NEURONTIN) capsule 100 mg  100 mg Oral TID Willistine Lombard, MD   100 mg at 07/19/20 2118    [MAR Hold] insulin glargine (LANTUS) injection vial 40 Units  40 Units Subcutaneous Daily Willistine Lombard, MD   40 Units at 07/19/20 0816    [MAR Hold] insulin glargine (LANTUS) injection vial 60 Units  60 Units Subcutaneous Nightly Willistine Lombard, MD   60 Units at 07/18/20 2033    [MAR Hold] pantoprazole (PROTONIX) tablet 40 mg  40 mg Oral Daily Isaura Schwartz MD   40 mg at 07/19/20 0813    [MAR Hold] sertraline (ZOLOFT) tablet 100 mg  100 mg Oral Nightly Isaura Schwartz MD   100 mg at 07/19/20 2118    [MAR Hold] traZODone (DESYREL) tablet 100 mg  100 mg Oral Nightly Isaura Schwartz MD   100 mg at 07/19/20 2118    [MAR Hold] insulin lispro (HUMALOG) injection vial 0-12 Units  0-12 Units Subcutaneous TID  Isaura Schwartz MD   2 Units at 07/19/20 1703    [MAR Hold] insulin lispro (HUMALOG) injection vial 0-6 Units  0-6 Units Subcutaneous Nightly Isaura Schwartz MD   1 Units at 07/17/20 2147    [MAR Hold] glucose (GLUTOSE) 40 % oral gel 15 g  15 g Oral PRN Isaura Schwartz MD        [facility] Hold] dextrose 50 % IV solution  12.5 g Intravenous PRN Isaura Schwartz MD        [facility] Hold] glucagon (rDNA) injection 1 mg  1 mg Intramuscular PRN Isaura Schwartz MD        [facility] Hold] dextrose 5 % solution  100 mL/hr Intravenous PRN Isaura Schwartz MD        [facility] Hold] oxyCODONE-acetaminophen (PERCOCET) 5-325 MG per tablet 2 tablet  2 tablet Oral Q4H PRN Isaura Schwartz MD   2 tablet at 07/20/20 0932    [MAR Hold] insulin lispro (HUMALOG) injection vial 8 Units  8 Units Subcutaneous TID  Isaura Schwartz MD   8 Units at 07/19/20 1706    [MAR Hold] 0.9 % sodium chloride infusion   Intravenous Continuous Isaura Schwartz MD 75 mL/hr at 07/20/20 1047         Allergies:  No Known Allergies    Problem List:    Patient Active Problem List   Diagnosis Code    DM w/o complication type II ([facility] E11.9    Essential hypertension I10    Erectile dysfunction N52.9    Microalbuminuria due to type 2 diabetes mellitus ([facility] E11.29, R80.9    Stage 3 chronic kidney disease ([facility] N18.3    Traumatic bilateral lower extremity fractures S82.91XA, S82.92XA    Closed right hip fracture, initial encounter ([facility] S72.001A    Osteogenesis imperfecta Q78.0    Type 2 diabetes mellitus with chronic kidney disease [facility]) E11.22    Type 2 diabetes mellitus with hyperglycemia (HCC) E11.65    Gastroesophageal reflux disease without esophagitis K21.9    Tibial plateau fracture, left, closed, initial encounter F65.985B       Past Medical History:        Diagnosis Date    Contracture, elbow     h/o right elbow fx, compound fracture.  Erectile dysfunction     Hypertension     Osteogenesis imperfecta     DX 6 months. 30 broken bones in past.     Type II or unspecified type diabetes mellitus without mention of complication, not stated as uncontrolled        Past Surgical History:        Procedure Laterality Date    CHOLECYSTECTOMY      FEMUR FRACTURE SURGERY Right 2020    HIP TFN WITH C-ARM VISUALIZATION performed by David Lambert MD at [address] [place] [address] Right     right elbow x 3. Compound fx at work.      FRACTURE SURGERY Bilateral     with hardware    FRACTURE SURGERY Left     with hardware    TONSILLECTOMY AND ADENOIDECTOMY         Social History:    Social History     Tobacco Use    Smoking status: Former Smoker     Packs/day: 2.00     Years: 18.00     Pack years: 36.00     Types: Cigarettes     Last attempt to quit: 12/3/1991     Years since quittin.6    Smokeless tobacco: Never Used   Substance Use Topics    Alcohol use: Not on file                                Counseling given: Not Answered      Vital Signs (Current):   Vitals:    20 0747 20 1436 20 0708 20 1039   BP:  (!) 144/97 124/70 119/75   Pulse: 75 79 65 67   Resp:  16 18 18   Temp:  98.8 °F (37.1 °C) 98.5 °F (36.9 °C) 97.3 °F (36.3 °C)   TempSrc:  Oral Oral Infrared   SpO2:  98% 99% 94%   Weight:       Height:                                                  BP Readings from Last 3 Encounters:   20 119/75   20 123/75   20 131/65       NPO Status: Time of last liquid consumption:                         Time of last solid consumption:                         Date of last liquid hypertension: no interval change,     (-) pacemaker, valvular problems/murmurs, past MI, CAD, CABG/stent, dysrhythmias,  angina,  CHF, orthopnea, PND,  CUEVAS, murmur, weak pulses,  friction rub, systolic click, carotid bruit,  JVD and peripheral edema    ECG reviewed  Rhythm: regular  Rate: normal           Beta Blocker:  Dose within 24 Hrs         Neuro/Psych:   Negative Neuro/Psych ROS     (-) seizures, neuromuscular disease, TIA, CVA, headaches, psychiatric history and depression/anxiety            GI/Hepatic/Renal:   (+) GERD: no interval change,      (-) hiatal hernia, PUD, hepatitis, liver disease, no renal disease, bowel prep and no morbid obesity       Endo/Other:    (+) DiabetesType II DM, using insulin, no malignancy/cancer. (-) hypothyroidism, hyperthyroidism, blood dyscrasia, arthritis, no electrolyte abnormalities, no malignancy/cancer               Abdominal:           Vascular: negative vascular ROS. - PVD, DVT and PE. Anesthesia Plan      general     ASA 3       Induction: intravenous. MIPS: Postoperative opioids intended and Prophylactic antiemetics administered. Anesthetic plan and risks discussed with patient. Plan discussed with CRNA.                   Christie Powers MD   7/20/2020

## 2020-07-20 NOTE — PROGRESS NOTES
Patient has now moderate amount of drainage on posterior knee and leaking on to pad. Dr. Fredo Ring called. He gives order to reinforce dressing.

## 2020-07-20 NOTE — CARE COORDINATION
DISCHARGE PLANNING NOTE:    Plan is for this patient to go to [facility] is following     Patient had Left tibial ORIF today    Awaiting PT/OT recs post-op. New nephro consult.      Electronically signed by Purnima Frank RN on 7/20/2020 at 4:27 PM

## 2020-07-20 NOTE — ANESTHESIA POSTPROCEDURE EVALUATION
POST- ANESTHESIA EVALUATION       Pt Name: Devyn Roca  MRN: [id number]  YOB: 1955  Date of evaluation: 7/20/2020  Time:  2:56 PM      /72   Pulse 74   Temp 97.1 °F (36.2 °C)   Resp 13   Ht 6' (1.829 m)   Wt 213 lb 13.5 oz (97 kg)   SpO2 93%   BMI 29.00 kg/m²      Consciousness Level  Awake  Cardiopulmonary Status  Stable  Pain Adequately Treated YES  Nausea / Vomiting  NO  Adequate Hydration  YES  Anesthesia Related Complications NONE      Electronically signed by Akash Hadley MD on 7/20/2020 at 2:56 PM       Department of Anesthesiology  Postprocedure Note    Patient: Devyn Roca  MRN: [id number]  YOB: 1955  Date of evaluation: 7/20/2020  Time:  2:56 PM     Procedure Summary     Date:  07/20/20 Room / Location:  [address] [place] [address] Ruben Schultz 03 / [facility]: [facility]    Anesthesia Start:  1119 Anesthesia Stop:  9014    Procedure:  TIBIAL PLATEAU OPEN REDUCTION INTERNAL FIXATION LEFT AND 7.3 HELEN RIGHT KNEE (Bilateral Knee) Diagnosis:  (LEFT TIBIAL PLATEAU FRACTURE)    Surgeon:  Aayush Garrison MD Responsible Provider:  Ebony Turner MD    Anesthesia Type:  general ASA Status:  3          Anesthesia Type: No value filed. Ovidio Phase I: Ovidio Score: 10    Ovidio Phase II:      Last vitals: Reviewed and per EMR flowsheets.        Anesthesia Post Evaluation

## 2020-07-20 NOTE — PROGRESS NOTES
Kerlix fluffs and roll applied to left knee area to reinforce dressing. No increase in drainage noted from 1600.

## 2020-07-21 LAB
ABSOLUTE EOS #: 0.3 K/UL (ref 0–0.4)
ABSOLUTE IMMATURE GRANULOCYTE: ABNORMAL K/UL (ref 0–0.3)
ABSOLUTE LYMPH #: 0.4 K/UL (ref 1–4.8)
ABSOLUTE MONO #: 0.5 K/UL (ref 0.1–1.3)
ANION GAP SERPL CALCULATED.3IONS-SCNC: 11 MMOL/L (ref 9–17)
ANION GAP SERPL CALCULATED.3IONS-SCNC: 9 MMOL/L (ref 9–17)
BASOPHILS # BLD: 1 % (ref 0–2)
BASOPHILS ABSOLUTE: 0 K/UL (ref 0–0.2)
BUN BLDV-MCNC: 26 MG/DL (ref 8–23)
BUN BLDV-MCNC: 26 MG/DL (ref 8–23)
BUN/CREAT BLD: ABNORMAL (ref 9–20)
BUN/CREAT BLD: ABNORMAL (ref 9–20)
CALCIUM SERPL-MCNC: 8.9 MG/DL (ref 8.6–10.4)
CALCIUM SERPL-MCNC: 9 MG/DL (ref 8.6–10.4)
CHLORIDE BLD-SCNC: 101 MMOL/L (ref 98–107)
CHLORIDE BLD-SCNC: 101 MMOL/L (ref 98–107)
CO2: 24 MMOL/L (ref 20–31)
CO2: 24 MMOL/L (ref 20–31)
CREAT SERPL-MCNC: 1.77 MG/DL (ref 0.7–1.2)
CREAT SERPL-MCNC: 1.78 MG/DL (ref 0.7–1.2)
CREATININE URINE: 245.3 MG/DL (ref 39–259)
DIFFERENTIAL TYPE: ABNORMAL
EOSINOPHILS RELATIVE PERCENT: 5 % (ref 0–4)
GFR AFRICAN AMERICAN: 47 ML/MIN
GFR AFRICAN AMERICAN: 47 ML/MIN
GFR NON-AFRICAN AMERICAN: 39 ML/MIN
GFR NON-AFRICAN AMERICAN: 39 ML/MIN
GFR SERPL CREATININE-BSD FRML MDRD: ABNORMAL ML/MIN/{1.73_M2}
GLUCOSE BLD-MCNC: 118 MG/DL (ref 70–99)
GLUCOSE BLD-MCNC: 130 MG/DL (ref 75–110)
GLUCOSE BLD-MCNC: 145 MG/DL (ref 70–99)
GLUCOSE BLD-MCNC: 169 MG/DL (ref 75–110)
GLUCOSE BLD-MCNC: 243 MG/DL (ref 75–110)
GLUCOSE BLD-MCNC: 262 MG/DL (ref 75–110)
HCT VFR BLD CALC: 36 % (ref 41–53)
HEMOGLOBIN: 11.8 G/DL (ref 13.5–17.5)
IMMATURE GRANULOCYTES: ABNORMAL %
LYMPHOCYTES # BLD: 6 % (ref 24–44)
MAGNESIUM: 1.5 MG/DL (ref 1.6–2.6)
MCH RBC QN AUTO: 28.8 PG (ref 26–34)
MCHC RBC AUTO-ENTMCNC: 32.8 G/DL (ref 31–37)
MCV RBC AUTO: 87.7 FL (ref 80–100)
MICROALBUMIN/CREAT 24H UR: 35 MG/L
MICROALBUMIN/CREAT UR-RTO: 14 MCG/MG CREAT
MONOCYTES # BLD: 8 % (ref 1–7)
NRBC AUTOMATED: ABNORMAL PER 100 WBC
PDW BLD-RTO: 13.2 % (ref 11.5–14.9)
PHOSPHORUS: 2.9 MG/DL (ref 2.5–4.5)
PLATELET # BLD: 141 K/UL (ref 150–450)
PLATELET ESTIMATE: ABNORMAL
PMV BLD AUTO: 7.8 FL (ref 6–12)
POTASSIUM SERPL-SCNC: 4.3 MMOL/L (ref 3.7–5.3)
POTASSIUM SERPL-SCNC: 4.6 MMOL/L (ref 3.7–5.3)
RBC # BLD: 4.1 M/UL (ref 4.5–5.9)
RBC # BLD: ABNORMAL 10*6/UL
SEG NEUTROPHILS: 80 % (ref 36–66)
SEGMENTED NEUTROPHILS ABSOLUTE COUNT: 5.4 K/UL (ref 1.3–9.1)
SODIUM BLD-SCNC: 134 MMOL/L (ref 135–144)
SODIUM BLD-SCNC: 136 MMOL/L (ref 135–144)
TOTAL CK: 317 U/L (ref 39–308)
WBC # BLD: 6.8 K/UL (ref 3.5–11)
WBC # BLD: ABNORMAL 10*3/UL

## 2020-07-21 PROCEDURE — 99024 POSTOP FOLLOW-UP VISIT: CPT | Performed by: ORTHOPAEDIC SURGERY

## 2020-07-21 PROCEDURE — 36415 COLL VENOUS BLD VENIPUNCTURE: CPT

## 2020-07-21 PROCEDURE — 6370000000 HC RX 637 (ALT 250 FOR IP): Performed by: INTERNAL MEDICINE

## 2020-07-21 PROCEDURE — 82947 ASSAY GLUCOSE BLOOD QUANT: CPT

## 2020-07-21 PROCEDURE — 6370000000 HC RX 637 (ALT 250 FOR IP): Performed by: ORTHOPAEDIC SURGERY

## 2020-07-21 PROCEDURE — 97530 THERAPEUTIC ACTIVITIES: CPT

## 2020-07-21 PROCEDURE — 97110 THERAPEUTIC EXERCISES: CPT

## 2020-07-21 PROCEDURE — 84100 ASSAY OF PHOSPHORUS: CPT

## 2020-07-21 PROCEDURE — 6360000002 HC RX W HCPCS: Performed by: ORTHOPAEDIC SURGERY

## 2020-07-21 PROCEDURE — 6360000002 HC RX W HCPCS: Performed by: INTERNAL MEDICINE

## 2020-07-21 PROCEDURE — 2580000003 HC RX 258: Performed by: ORTHOPAEDIC SURGERY

## 2020-07-21 PROCEDURE — 97162 PT EVAL MOD COMPLEX 30 MIN: CPT

## 2020-07-21 PROCEDURE — 1200000000 HC SEMI PRIVATE

## 2020-07-21 PROCEDURE — 97166 OT EVAL MOD COMPLEX 45 MIN: CPT

## 2020-07-21 PROCEDURE — 80048 BASIC METABOLIC PNL TOTAL CA: CPT

## 2020-07-21 PROCEDURE — 82550 ASSAY OF CK (CPK): CPT

## 2020-07-21 PROCEDURE — 85025 COMPLETE CBC W/AUTO DIFF WBC: CPT

## 2020-07-21 PROCEDURE — 83735 ASSAY OF MAGNESIUM: CPT

## 2020-07-21 RX ORDER — OXYCODONE HYDROCHLORIDE AND ACETAMINOPHEN 5; 325 MG/1; MG/1
1 TABLET ORAL EVERY 4 HOURS PRN
Status: DISCONTINUED | OUTPATIENT
Start: 2020-07-21 | End: 2020-07-21

## 2020-07-21 RX ORDER — HEPARIN SODIUM 5000 [USP'U]/ML
5000 INJECTION, SOLUTION INTRAVENOUS; SUBCUTANEOUS 2 TIMES DAILY
Status: DISCONTINUED | OUTPATIENT
Start: 2020-07-21 | End: 2020-07-21

## 2020-07-21 RX ORDER — OXYCODONE HYDROCHLORIDE AND ACETAMINOPHEN 5; 325 MG/1; MG/1
1-2 TABLET ORAL EVERY 4 HOURS PRN
Qty: 40 TABLET | Refills: 0 | Status: SHIPPED | OUTPATIENT
Start: 2020-07-21 | End: 2020-07-28

## 2020-07-21 RX ORDER — MAGNESIUM SULFATE 1 G/100ML
1 INJECTION INTRAVENOUS ONCE
Status: COMPLETED | OUTPATIENT
Start: 2020-07-21 | End: 2020-07-21

## 2020-07-21 RX ORDER — HEPARIN SODIUM 5000 [USP'U]/ML
5000 INJECTION, SOLUTION INTRAVENOUS; SUBCUTANEOUS 2 TIMES DAILY
Status: DISCONTINUED | OUTPATIENT
Start: 2020-07-22 | End: 2020-07-24 | Stop reason: HOSPADM

## 2020-07-21 RX ORDER — OXYCODONE HYDROCHLORIDE AND ACETAMINOPHEN 5; 325 MG/1; MG/1
2 TABLET ORAL EVERY 6 HOURS PRN
Status: DISCONTINUED | OUTPATIENT
Start: 2020-07-21 | End: 2020-07-22 | Stop reason: DRUGHIGH

## 2020-07-21 RX ADMIN — SODIUM CHLORIDE: 9 INJECTION, SOLUTION INTRAVENOUS at 20:48

## 2020-07-21 RX ADMIN — INSULIN LISPRO 8 UNITS: 100 INJECTION, SOLUTION INTRAVENOUS; SUBCUTANEOUS at 13:24

## 2020-07-21 RX ADMIN — ACETAMINOPHEN 650 MG: 325 TABLET, FILM COATED ORAL at 06:42

## 2020-07-21 RX ADMIN — SERTRALINE HYDROCHLORIDE 100 MG: 100 TABLET ORAL at 20:44

## 2020-07-21 RX ADMIN — DIPHENHYDRAMINE HCL 50 MG: 25 TABLET ORAL at 20:44

## 2020-07-21 RX ADMIN — INSULIN GLARGINE 40 UNITS: 100 INJECTION, SOLUTION SUBCUTANEOUS at 08:08

## 2020-07-21 RX ADMIN — ENOXAPARIN SODIUM 40 MG: 40 INJECTION SUBCUTANEOUS at 08:07

## 2020-07-21 RX ADMIN — INSULIN GLARGINE 60 UNITS: 100 INJECTION, SOLUTION SUBCUTANEOUS at 23:23

## 2020-07-21 RX ADMIN — OXYCODONE AND ACETAMINOPHEN 2 TABLET: 5; 325 TABLET ORAL at 10:51

## 2020-07-21 RX ADMIN — METOPROLOL SUCCINATE 25 MG: 25 TABLET, EXTENDED RELEASE ORAL at 20:44

## 2020-07-21 RX ADMIN — INSULIN LISPRO 2 UNITS: 100 INJECTION, SOLUTION INTRAVENOUS; SUBCUTANEOUS at 17:09

## 2020-07-21 RX ADMIN — PANTOPRAZOLE SODIUM 40 MG: 40 TABLET, DELAYED RELEASE ORAL at 08:04

## 2020-07-21 RX ADMIN — GABAPENTIN 100 MG: 100 CAPSULE ORAL at 13:54

## 2020-07-21 RX ADMIN — OXYCODONE HYDROCHLORIDE AND ACETAMINOPHEN 1 TABLET: 5; 325 TABLET ORAL at 16:22

## 2020-07-21 RX ADMIN — MAGNESIUM SULFATE HEPTAHYDRATE 1 G: 1 INJECTION, SOLUTION INTRAVENOUS at 14:59

## 2020-07-21 RX ADMIN — INSULIN LISPRO 3 UNITS: 100 INJECTION, SOLUTION INTRAVENOUS; SUBCUTANEOUS at 23:22

## 2020-07-21 RX ADMIN — INSULIN LISPRO 4 UNITS: 100 INJECTION, SOLUTION INTRAVENOUS; SUBCUTANEOUS at 12:07

## 2020-07-21 RX ADMIN — OXYCODONE HYDROCHLORIDE AND ACETAMINOPHEN 2 TABLET: 5; 325 TABLET ORAL at 20:43

## 2020-07-21 RX ADMIN — ACETAMINOPHEN 650 MG: 325 TABLET, FILM COATED ORAL at 02:30

## 2020-07-21 RX ADMIN — TRAZODONE HYDROCHLORIDE 100 MG: 100 TABLET ORAL at 20:44

## 2020-07-21 RX ADMIN — GABAPENTIN 100 MG: 100 CAPSULE ORAL at 08:04

## 2020-07-21 RX ADMIN — CALCIUM 1250 MG: 500 TABLET ORAL at 20:44

## 2020-07-21 RX ADMIN — INSULIN LISPRO 8 UNITS: 100 INJECTION, SOLUTION INTRAVENOUS; SUBCUTANEOUS at 08:08

## 2020-07-21 RX ADMIN — INSULIN LISPRO 8 UNITS: 100 INJECTION, SOLUTION INTRAVENOUS; SUBCUTANEOUS at 17:12

## 2020-07-21 RX ADMIN — GABAPENTIN 100 MG: 100 CAPSULE ORAL at 20:44

## 2020-07-21 ASSESSMENT — PAIN DESCRIPTION - PAIN TYPE
TYPE: CHRONIC PAIN;ACUTE PAIN;SURGICAL PAIN
TYPE: ACUTE PAIN
TYPE: CHRONIC PAIN;ACUTE PAIN
TYPE: SURGICAL PAIN

## 2020-07-21 ASSESSMENT — PAIN DESCRIPTION - ONSET
ONSET: ON-GOING
ONSET: ON-GOING

## 2020-07-21 ASSESSMENT — PAIN DESCRIPTION - PROGRESSION
CLINICAL_PROGRESSION: NOT CHANGED

## 2020-07-21 ASSESSMENT — PAIN SCALES - GENERAL
PAINLEVEL_OUTOF10: 4
PAINLEVEL_OUTOF10: 5
PAINLEVEL_OUTOF10: 5
PAINLEVEL_OUTOF10: 9
PAINLEVEL_OUTOF10: 5
PAINLEVEL_OUTOF10: 3
PAINLEVEL_OUTOF10: 8
PAINLEVEL_OUTOF10: 5
PAINLEVEL_OUTOF10: 8
PAINLEVEL_OUTOF10: 5

## 2020-07-21 ASSESSMENT — PAIN DESCRIPTION - LOCATION
LOCATION: KNEE

## 2020-07-21 ASSESSMENT — PAIN DESCRIPTION - ORIENTATION
ORIENTATION: RIGHT;LEFT

## 2020-07-21 ASSESSMENT — PAIN DESCRIPTION - FREQUENCY
FREQUENCY: CONTINUOUS
FREQUENCY: CONTINUOUS

## 2020-07-21 ASSESSMENT — PAIN DESCRIPTION - DESCRIPTORS
DESCRIPTORS: ACHING;THROBBING
DESCRIPTORS: ACHING

## 2020-07-21 ASSESSMENT — PAIN - FUNCTIONAL ASSESSMENT
PAIN_FUNCTIONAL_ASSESSMENT: PREVENTS OR INTERFERES SOME ACTIVE ACTIVITIES AND ADLS
PAIN_FUNCTIONAL_ASSESSMENT: PREVENTS OR INTERFERES SOME ACTIVE ACTIVITIES AND ADLS

## 2020-07-21 NOTE — PROGRESS NOTES
Post Operative Progress Note    7/21/2020 7:34 AM  Info:   Admit Date: 7/17/2020  PCP: ADEEL Lemons CNP      Medications:   Scheduled Meds:   metoprolol succinate  25 mg Oral BID    sodium chloride flush  10 mL Intravenous 2 times per day    enoxaparin  40 mg Subcutaneous Daily    calcium elemental  1,250 mg Oral Nightly    gabapentin  100 mg Oral TID    insulin glargine  40 Units Subcutaneous Daily    insulin glargine  60 Units Subcutaneous Nightly    pantoprazole  40 mg Oral Daily    sertraline  100 mg Oral Nightly    traZODone  100 mg Oral Nightly    insulin lispro  0-12 Units Subcutaneous TID WC    insulin lispro  0-6 Units Subcutaneous Nightly    insulin lispro  8 Units Subcutaneous TID WC     Continuous Infusions:   dextrose      sodium chloride 75 mL/hr at 07/21/20 0303     PRN Meds:diphenhydrAMINE, sodium chloride flush, acetaminophen, [Held by provider] morphine **OR** [Held by provider] morphine, glucose, dextrose, glucagon (rDNA), dextrose, [Held by provider] oxyCODONE-acetaminophen    Diet:   Diet: DIET CARB CONTROL; Daily Fluid Restriction: 1000 ml; Low Potassium    Subjective:   Systemic or Specific Complaints:No Complaints    Objective:     Patient Vitals for the past 24 hrs:   BP Temp Temp src Pulse Resp SpO2 Weight   07/21/20 0653 105/81 99.2 °F (37.3 °C) Oral 83 16 -- --   07/21/20 0445 -- -- -- -- -- -- 210 lb 15.7 oz (95.7 kg)   07/20/20 2359 (!) 147/57 99.7 °F (37.6 °C) Oral 94 15 95 % --   07/20/20 2105 128/70 98 °F (36.7 °C) Oral 65 16 96 % --   07/20/20 1702 111/73 -- -- 80 -- -- --   07/20/20 1445 114/64 98 °F (36.7 °C) Oral 65 16 100 % --   07/20/20 1420 112/72 -- -- 74 13 93 % --   07/20/20 1410 104/70 -- -- 75 19 95 % --   07/20/20 1400 119/74 -- -- 71 12 93 % --   07/20/20 1350 127/74 97.1 °F (36.2 °C) -- 72 12 95 % --   07/20/20 1340 133/72 -- -- 75 13 97 % --   07/20/20 1330 (!) 93/59 -- -- 72 13 95 % --   07/20/20 1320 (!) 87/52 -- -- 73 16 95 % --   07/20/20 1318 (!) 93/49 97.1 °F (36.2 °C) Infrared 70 12 95 % --   07/20/20 1039 119/75 97.3 °F (36.3 °C) Infrared 67 18 94 % --         Wound: Blood on left knee dressing   Motion: expected discomfort with range of motion in affected extremity   DVT Exam:  no evidence of DVT on physical examination         Data Review  CBC:   Recent Labs     07/20/20  0605 07/20/20  2343 07/21/20  0609   WBC 5.3 6.1 6.8   HGB 13.2* 12.2* 11.8*    138* 141*           Assessment:   Patient is S/P bilateral tibial plateau fractures  Pain is well controlled. He has no nausea and no vomiting.     Current activity is up with assistance, WBAT right, PWB left        Plan:      1:  Continue Physical Therapy  2:  Continue Deep venous thrombosis prophylaxis  3:  Continue Pain Control  4:  Discharge plans SNF       Electronically signed by Candice Cam MD on 7/21/2020 at 7:34 AM

## 2020-07-21 NOTE — PROGRESS NOTES
When assessing pt, writer noted that the pt has disorientation to place. This is differing from previous assessments. Writer attempted to re-orient the pt, but the pt is disoriented immediately. Pt is unable to name building or town that he is in. At end of assessment, Pt also thought that he had not received his medication, but they were administered less than a half hour prior. Pt is disoriented to immediate situation, but can state that he had surgery on his knees. Pt is oriented to self and year. Page sent to [facility] regarding changes and UA results. Will continue to monitor.

## 2020-07-21 NOTE — PROGRESS NOTES
Pt continues to be disoriented to place. PT is able to identify year but not month. IV has been pulled out, pt was aware but did not notify anyone. Unable to tell if it was pulled out by pt or not. Will continue to monitor.

## 2020-07-21 NOTE — CARE COORDINATION
ONGOING DISCHARGE PLAN:    Plan remains for LSW to continue to follow for Possible DC to ARU. Pt. Is POD #1,  LT.ORIF. Awaiting PT/OT rec. Per Notes, Pt will DC to [facility] when ready. Pt. Will have VNS, [facility]. Will continue to follow for additional discharge needs.     Electronically signed by Susie Quinones RN on 7/21/2020 at 2:55 PM

## 2020-07-21 NOTE — PROGRESS NOTES
Pt discharge plan for today was ARU vs home. As of this time, we have not received an answer from ARU-- pt worked with PT and was barely able to get up from bed. Pt not safe to discharge home tonight.

## 2020-07-21 NOTE — PROGRESS NOTES
Reason for Follow up: VALARIE. HISTORY:    Feels better. Review Of Systems:   Constitutional: No fever, chills, lethargy, weakness or wt loss. Cardiac:  No chest pain, dyspnea, orthopnea or PND. Pulmonary:  No cough, phlegm or wheezing. Abdomen:  No abdominal pain, nausea, vomiting or diarrhea. :   No hematuria, pyuria, dysuria or flank pain. Extremities:  No swelling, has knee pains. Scheduled Meds:   magnesium sulfate  1 g Intravenous Once    [START ON 7/22/2020] heparin (porcine)  5,000 Units Subcutaneous BID    metoprolol succinate  25 mg Oral BID    sodium chloride flush  10 mL Intravenous 2 times per day    calcium elemental  1,250 mg Oral Nightly    gabapentin  100 mg Oral TID    insulin glargine  40 Units Subcutaneous Daily    insulin glargine  60 Units Subcutaneous Nightly    pantoprazole  40 mg Oral Daily    sertraline  100 mg Oral Nightly    traZODone  100 mg Oral Nightly    insulin lispro  0-12 Units Subcutaneous TID WC    insulin lispro  0-6 Units Subcutaneous Nightly    insulin lispro  8 Units Subcutaneous TID WC   Continuous Infusions:   dextrose      sodium chloride 75 mL/hr at 07/21/20 0303     PRN Meds:diphenhydrAMINE, sodium chloride flush, acetaminophen, [Held by provider] morphine **OR** [Held by provider] morphine, glucose, dextrose, glucagon (rDNA), dextrose, oxyCODONE-acetaminophen    No Known Allergies    Physical Exam:  Blood pressure 129/76, pulse 81, temperature 98.5 °F (36.9 °C), temperature source Oral, resp. rate 16, height 6' (1.829 m), weight 210 lb 15.7 oz (95.7 kg), SpO2 95 %. In: 927 [I.V.:927]  Out: 150   In: 927   Out: 150 [Urine:150]    General:  Awake, alert, not in distress. Appears to be stated age. HEENT: Atraumatic, normocephalic. Anicteric sclera. Pink and moist oral mucosa. No carotid bruit. No JVD. Chest: Bilateral air entry, clear to auscultation, no wheezing, rhonchi or rales. Cardiovascular: RRR, S1S2, no murmur, rub or gallop.  No lower extremity edema. Abdomen: Soft, non tender to palpation. Active bowel sounds x 4 quadrants. Musculoskeletal: Active ROM x 4 extremities. No cyanosis or clubbing. Integumentary: Pink, warm and dry. Free from rash or lesions. Skin turgor normal.  CNS: Oriented to person, place and time. Speech clear. Face symmetrical. No tremor. Data:  CBC:   Lab Results   Component Value Date    WBC 6.8 07/21/2020    HGB 11.8 (L) 07/21/2020    HCT 36.0 (L) 07/21/2020    MCV 87.7 07/21/2020     (L) 07/21/2020     BMP:    Lab Results   Component Value Date     (L) 07/21/2020    K 4.3 07/21/2020     07/21/2020    CO2 24 07/21/2020    BUN 26 (H) 07/21/2020    CREATININE 1.77 (H) 07/21/2020    GLUCOSE 145 (H) 07/21/2020     CMP:   Lab Results   Component Value Date     (L) 07/21/2020    K 4.3 07/21/2020     07/21/2020    CO2 24 07/21/2020    BUN 26 (H) 07/21/2020    CREATININE 1.77 (H) 07/21/2020    GLUCOSE 145 (H) 07/21/2020    CALCIUM 9.0 07/21/2020    PROT 7.1 05/21/2020    LABALBU 3.7 05/21/2020    BILITOT 0.36 05/21/2020    ALKPHOS 162 (H) 05/21/2020    AST 10 05/21/2020    ALT 9 05/21/2020      Hepatic:   Lab Results   Component Value Date    AST 10 05/21/2020    ALT 9 05/21/2020    BILITOT 0.36 05/21/2020    ALKPHOS 162 (H) 05/21/2020     BNP: No results found for: BNP  Lipids:   Lab Results   Component Value Date    CHOL 163 10/15/2018    HDL 34 (L) 10/15/2018     INR: No results found for: INR  PTH: No results found for: PTH  Phosphorus:    Lab Results   Component Value Date    PHOS 2.9 07/21/2020     Ionized Calcium: No results found for: IONCA  Magnesium:   Lab Results   Component Value Date    MG 1.5 (L) 07/21/2020     Albumin:   Lab Results   Component Value Date    LABALBU 3.7 05/21/2020     Last 3 CK, CKMB, Troponin: @LABRCNT(CKTOTAL:3,CKMB:3,TROPONINI:3)       URINE:)No results found for: Art Ro    Radiology:   Reviewed. Assessment:  1.  Acute kidney injury, FeNa was

## 2020-07-21 NOTE — PROGRESS NOTES
[address]                Date:   7/21/2020  Patient name:  Reagan Grady  Date of admission:  7/17/2020  3:25 PM  MRN:   [id number]  YOB: 1955    Pt seen at the request of Aicha Miranda MD    CHIEF COMPLAINT:     History Obtained From:  Patient and chart review. HISTORY OF PRESENT ILLNESS:      The patient is a 59 y.o.  male who is admitted to the hospital for bilateral tibial plateau fractures. Patient has a history of osteogenesis imperfecta. He has had multiple fractures and surgeries on both lower extremities as well as upper extremities. Patient recently had work done on his right hip per Dr. Ellis Bean with an intramedullary christiano. This is on the right side. Patient also has had a patellar fractures on both sides. He had hardware removal from his patella on the left side. Patient states that he had a fall yesterday and landed on his knees when he slipped with his walker. July 19  Good pain control  No chest pain shortness of breath  Plan for OR in a.m. July 20  Creatinine has increased to 1.7  OR today  Hemodynamically stable   7/21  Post op doing well cr increased to 1.7   ambulating    Past Medical History:   has a past medical history of Contracture, elbow, Erectile dysfunction, Hypertension, Osteogenesis imperfecta, and Type II or unspecified type diabetes mellitus without mention of complication, not stated as uncontrolled. Past Surgical History:   has a past surgical history that includes Cholecystectomy; Tonsillectomy and adenoidectomy; fracture surgery (Right); fracture surgery (Bilateral); fracture surgery (Left); Femur fracture surgery (Right, 4/23/2020); and ORIF PROXIMAL TIBIAL PLATEAU FRACTURE (Bilateral, 7/20/2020). Home Medications:    Prior to Admission medications    Medication Sig Start Date End Date Taking?  Authorizing Provider   oxyCODONE-acetaminophen (PERCOCET) 5-325 MG per tablet Take 1-2 tablets by mouth every 4 hours as needed for Pain for up to 7 days. 7/21/20 7/28/20 Yes Toribio Martinez MD   insulin glargine (LANTUS SOLOSTAR) 100 UNIT/ML injection pen Inject 40 Units into the skin daily   Yes Historical Provider, MD   insulin glargine (LANTUS SOLOSTAR) 100 UNIT/ML injection pen Inject 60 Units into the skin nightly   Yes Historical Provider, MD   Insulin Aspart, w/Niacinamide, (FIASP FLEXTOUCH) 100 UNIT/ML SOPN Inject 8 Units into the skin 3 times daily (before meals) Plus sliding scale   Yes Historical Provider, MD   Insulin Aspart, w/Niacinamide, (FIASP FLEXTOUCH) 100 UNIT/ML SOPN Inject into the skin 3 times daily (before meals) Sliding scale in addition to base of 8 units   Yes Historical Provider, MD   ibuprofen (ADVIL;MOTRIN) 200 MG tablet Take 400 mg by mouth every 6 hours as needed for Pain   Yes Historical Provider, MD   calcium carbonate (OYSTER SHELL CALCIUM 500 MG) 1250 (500 Ca) MG tablet Take 1 tablet by mouth nightly   Yes Historical Provider, MD   aspirin 81 MG EC tablet Take 1 tablet by mouth daily 5/21/20  Yes Zafar Liang MD   gabapentin (NEURONTIN) 100 MG capsule Take 1 capsule by mouth 3 times daily for 60 days. 5/21/20 7/20/20 Yes Zafar Liang MD   pantoprazole (PROTONIX) 40 MG tablet Take 1 tablet by mouth daily 5/21/20  Yes Zafar Liang MD   sertraline (ZOLOFT) 100 MG tablet Take 100 mg by mouth nightly   Yes Historical Provider, MD   traZODone (DESYREL) 100 MG tablet Take 100 mg by mouth nightly   Yes Historical Provider, MD   metoprolol succinate (TOPROL XL) 50 MG extended release tablet Take 1 tablet by mouth 2 times daily 5/21/20   Zafar Liang MD   Blood Glucose Monitoring Suppl [facility] BLOOD GLUCOSE METER) w/Device KIT 1 kit by Does not apply route 2 times daily A1C of 9.0. NIDDM. 10/23/18   Charles Maier PA-C       Allergies:  Patient has no known allergies. Social History:   reports that he quit smoking about 28 years ago.  His smoking use included cigarettes. He has a 36.00 pack-year smoking history. He has never used smokeless tobacco.     Family History: family history includes Diabetes in his father and mother; Heart Disease in his father; Stroke in his mother. REVIEW OF SYSTEMS:    · Constitutional: Negative for weight loss  · Eyes: Negative for visual changes, diplopia, scleral icterus. · ENT: Negative for Headaches, hearing loss, vertigo, mouth sores, sore throat. · Cardiovascular: Negative for lightheadedness/orthostatic symptoms ,chest pain, dyspnea on exertion, palpitations or loss of consciousness. · Respiratory: Negative for cough or wheezing, sputum production, hemoptysis, pleuritic pain. · Gastrointestinal: Negative for nausea/vomiting, change in bowel habits, abdominal pain, dysphagia/appetite loss, hematemesis, blood in stools. · Genitourinary:Negative for change in bladder habits, dysuria, trouble voiding, hematuria. · Musculoskeletal: pos for gait disturbance, weakness, joint complaints. Tender over both tibia  · Integumentary: Negative for rash, pruritis. · Neurological: Negative for headache, dizziness, change in muscle strength, numbness/tingling, change in gait, balance, coordination,   · Endocrine: negative for temperature intolerance, excessive thirst, fluid intake, or urination, tremor. · Hematologic/Lymphatic: negative for abnormal bruising or bleeding, blood clots, swollen lymph nodes. · Allergic/Immunologic: negative for nasal congestion, pruritis, hives. PHYSICAL EXAM:    /81   Pulse 83   Temp 99.2 °F (37.3 °C) (Oral)   Resp 16   Ht 6' (1.829 m)   Wt 210 lb 15.7 oz (95.7 kg)   SpO2 95%   BMI 28.61 kg/m²      · General appearance: well nourished  · HEENT: Head: Normocephalic, no lesions, without obvious abnormality.   · Lungs: clear to auscultation bilaterally  · Heart: regular rate and rhythm, S1, S2 normal, no murmur, click, rub or gallop  · Abdomen: soft, non-tender; bowel sounds normal; no masses,  no organomegaly  · Extremitie multiple tender points  · Bilateral tibial tenderness  · Bilateral knee swelling  ·   · Neurological: Gait normal. Reflexes normal and symmetric. Sensation grossly normal  · Skin - no rash, no lump   · Eye no icterus no redness  · Lymphatic system-no lymphadenopathy no splenomegaly       DIAGNOSTICS:    Laboratory Testing:  CBC:   Recent Labs     07/21/20  0609   WBC 6.8   HGB 11.8*   *     BMP:    Recent Labs     07/20/20  1640 07/20/20  2343 07/21/20  0609    136 134*   K 5.2 4.6 4.3    101 101   CO2 24 24 24   BUN 24* 26* 26*   CREATININE 1.68* 1.78* 1.77*   GLUCOSE 146* 118* 145*     S. Calcium:  Recent Labs     07/21/20  0609   CALCIUM 9.0     S. Ionized Calcium:No results for input(s): IONCA in the last 72 hours. S. Magnesium:  Recent Labs     07/21/20  0609   MG 1.5*     S. Phosphorus:  Recent Labs     07/21/20  0609   PHOS 2.9     S. Glucose:  Recent Labs     07/20/20  2121 07/21/20  0604 07/21/20  1122   POCGLU 102 130* 243*     Glycosylated hemoglobin A1C:   No results for input(s): LABA1C in the last 72 hours. INR: No results for input(s): INR in the last 72 hours. Hepatic functions: No results for input(s): ALKPHOS, ALT, AST, PROT, BILITOT, BILIDIR, LABALBU in the last 72 hours. Pancreatic functions:No results for input(s): LACTA, AMYLASE in the last 72 hours. S. Lactic Acid: No results for input(s): LACTA in the last 72 hours. Cardiac enzymes:No results for input(s): CKTOTAL, CKMB, CKMBINDEX, TROPONINI in the last 72 hours. BNP:No results for input(s): BNP in the last 72 hours. Lipid profile: No results for input(s): CHOL, TRIG, HDL, LDLCALC in the last 72 hours.     Invalid input(s): LDL  Blood Gases: No results found for: PH, PCO2, PO2, HCO3, O2SAT  Thyroid functions: No results found for: TSH     Imaging/Diagonstics:    Xr Knee Left (3 Views)    Result Date: 7/17/2020  EXAMINATION: THREE XRAY VIEWS OF THE LEFT KNEE 7/17/2020 4:19 pm COMPARISON: None. HISTORY: ORDERING SYSTEM PROVIDED HISTORY: pain TECHNOLOGIST PROVIDED HISTORY: pain Reason for Exam: Pt fell today, pain to bilateral knee Acuity: Acute Type of Exam: Initial FINDINGS: Depressed lateral tibial plateau fracture with comminution. Lipohemarthrosis. Enthesophyte of the patellar tendon. Depressed lateral tibial plateau fracture with moderate to large lipohemarthrosis. Xr Knee Right (3 Views)    Result Date: 7/17/2020  EXAMINATION: THREE XRAY VIEWS OF THE RIGHT KNEE 7/17/2020 4:19 pm COMPARISON: Right femur radiographs May 26, 2029 HISTORY: ORDERING SYSTEM PROVIDED HISTORY: fall, pain TECHNOLOGIST PROVIDED HISTORY: fall, pain Reason for Exam: Pt fell today, pain to bilateral knee Acuity: Acute Type of Exam: Initial FINDINGS: Intramedullary christiano within the distal femur. Patella hardware fixation. No acute fracture. Mild medial and lateral compartment narrowing with subchondral sclerosis and small osteophytes. Medial and lateral compartment chondrocalcinosis. No large joint effusion. No acute findings. Ct Knee Left Wo Contrast    Result Date: 7/18/2020  EXAMINATION: CT OF THE LEFT KNEE WITHOUT CONTRAST 7/18/2020 12:20 pm TECHNIQUE: CT of the left knee was performed without the administration of intravenous contrast.  Multiplanar reformatted images are provided for review. Dose modulation, iterative reconstruction, and/or weight based adjustment of the mA/kV was utilized to reduce the radiation dose to as low as reasonably achievable. COMPARISON: Radiographs from 07/17/2020 HISTORY ORDERING SYSTEM PROVIDED HISTORY: assess fracture, previous surgery, history of osteogenesis imperfecta TECHNOLOGIST PROVIDED HISTORY: assess fracture, previous surgery, history of osteogenesis imperfecta Reason for Exam: assess fracture, previous surgery, history of osteogenesis imperfecta Acuity: Chronic Type of Exam: Ongoing FINDINGS: Acute depressed lateral tibial plateau fracture. Articular depression measures about 0.7 cm with multiple punctate bodies. Fracture extends into the tibial spines. The proximal fibula is intact. The distal femur is intact. The medial tibial plateau is intact. Lateral subluxation of the patella relative to the trochlea. Please correlate for patellar tracking disorder. Lipohemarthrosis compatible with intra-articular fracture. Prepatellar swelling. Atrophy of the muscles. The perineural fat is maintained. No unexpected radiopaque foreign bodies. Heterotopic ossification along the patellar retinaculum most likely sequela of prior injury. Acute impacted lateral tibial plateau fracture with extension into the tibial spines. Low moderate to large lipohemarthrosis. No additional fractures are apparent. Lateral subluxation of the patella relative to the trochlea. Please correlate for patellar tracking disorder. Ct Knee Right Wo Contrast    Result Date: 7/17/2020  EXAMINATION: CT OF THE RIGHT KNEE WITHOUT CONTRAST 7/17/2020 8:42 pm TECHNIQUE: CT of the right knee was performed without the administration of intravenous contrast.  Multiplanar reformatted images are provided for review. Dose modulation, iterative reconstruction, and/or weight based adjustment of the mA/kV was utilized to reduce the radiation dose to as low as reasonably achievable. COMPARISON: Right knee radiograph July 17, 2020 HISTORY ORDERING SYSTEM PROVIDED HISTORY: fall, pain, r/o fracture TECHNOLOGIST PROVIDED HISTORY: fall, pain, r/o fracture Reason for Exam: fell today Additional signs and symptoms: history of multiple previous knee surgeries FINDINGS: Bones: Postop changes prior femur ORIF with partially imaged intramedullary nail in place. Distal interlocking screws are also present. Postoperative changes of the patella with screws, cerclage wires, and malleable plate in place. The imaged hardware appears intact.   There is an acute fracture lateral tibial plateau intra-articular extension of fracture lines. The major fracture fragment is only minimally displaced by approximately 2 mm. Soft Tissue:  Mild subcutaneous edema. No organized collection or subcutaneous gas identified. Scattered atherosclerotic vascular calcifications are present. Joint:  Anatomic alignment of the knee with moderate to severe medial and mild-to-moderate patellofemoral and lateral compartment osteoarthritis. Small knee effusion. Chondrocalcinosis of the mediolateral compartments of the knee. 1. Acute fracture of the lateral tibial plateau with intra-articular extension of fracture lines and mild displacement of the major fracture fragment. 2. Small knee effusion. 3. Moderate to severe medial and mild to moderate lateral patellofemoral compartment osteoarthritis. 4. Severe osteopenia.          ASSESSMENT:    Patient Active Problem List   Diagnosis    DM w/o complication type II ([facility]    Essential hypertension    Erectile dysfunction    Microalbuminuria due to type 2 diabetes mellitus ([facility]    Stage 3 chronic kidney disease ([facility]    Traumatic bilateral lower extremity fractures    Closed right hip fracture, initial encounter ([facility]    Osteogenesis imperfecta    Type 2 diabetes mellitus with chronic kidney disease ([facility]    Type 2 diabetes mellitus with hyperglycemia ([facility])    Gastroesophageal reflux disease without esophagitis    Tibial plateau fracture, left, closed, initial encounter     Patient with osteogenesis imperfecta  Multiple fractures in the past  Now has bilateral tibial plateau fractures  From fall    Orthopedic evaluation noted      Diabetes type 2  Blood sugars are controlled    CKD 3  Diabetic nephropathy creatinine 1.38  Improving    Hypertension blood pressure is controlled    Potassium normal      PLAN:  Continue home medications  Lantus  Insulin sliding scale  Continue Toprol  Decrease Lantus to half dose once patient is n.p.o. for OR    Medically cleared for surgery  Moderate risk due to diabetes    Follow renal function    July 19  Osteogenesis imperfecta  Bilateral tibial plateau fractures  Plan for the OR in the a.m. Blood pressure is elevated  Increase Toprol to 100 mg    Diabetes with CKD 3    Creatinine stable    Check BMP CBC  Blood sugars controlled  Adequate pain control  Cleared for surgery    July 28  OR today for repair of bilateral tibial plateau fractures with osteogenesis imperfecta    Creatinine has increased to 1.7  Diabetic nephropathy  Blood sugars controlled  Continue beta-blocker  Nephrology to see      7/21   post op b/l tibial fx orif    sbp 105   CONFUSION RESOLVED     cr increased to 1.7   On ivf   dm2  bs > 200   cont coverGE   AMBULATING   ON IVF AT 75   HR 89   CONT TOPROL    NEPHRO SEEING      CONTB LANTUS WITH [employer] COVERAGE     CHANGE TO HEPARIN SQ DUE TO CKD  Kash An MD  [address], [address] [place] [address].    Phone [phone]   Fax: [phone]  Answering Service: [phone]

## 2020-07-21 NOTE — PROGRESS NOTES
[address]   Occupational Therapy Evaluation  Date: 20  Patient Name: Preet Herndon       Room: 0679/3453-47  MRN: [id number]  Account: [de-identified]   : 1955  (62 y.o.) Gender: male     Discharge Recommendations:  Further Occupational  Therapy is recommended upon facility discharge. OT Equipment Recommendations  Equipment Needed: No(Has many devices at home)    Referring Practitioner: Dr Leisa Wang  Diagnosis: Fall with Bilateral Knee Pain  Additional Pertinent Hx: Multiple admissions and procedures for various fractures - Osteogenesis Imperfecta    Treatment Diagnosis: Altered Ability to care for self related to Bilateral Knee Pain / procedures  Past Medical History:  has a past medical history of Contracture, elbow, Erectile dysfunction, Hypertension, Osteogenesis imperfecta, and Type II or unspecified type diabetes mellitus without mention of complication, not stated as uncontrolled. Past Surgical History:   has a past surgical history that includes Cholecystectomy; Tonsillectomy and adenoidectomy; fracture surgery (Right); fracture surgery (Bilateral); fracture surgery (Left); Femur fracture surgery (Right, 2020); and ORIF PROXIMAL TIBIAL PLATEAU FRACTURE (Bilateral, 2020).     Restrictions  Restrictions/Precautions: Weight Bearing, General Precautions, Surgical Protocols  Right Lower Extremity Weight Bearing: Weight Bearing As Tolerated(Non-displaced Tibia Fracture - 2 Cannulated screw fixation)  Left Lower Extremity Weight Bearing: Partial Weight Bearing(ORIF for Displaced Tibia Plateau Fracture)  Right Upper Extremity Weight Bearing: Weight Bearing As Tolerated(Elbow extension block)     Vitals  Temp: 99.2 °F (37.3 °C)  Pulse: 83  Resp: 16  BP: 105/81  Height: 6' (182.9 cm)  Weight: 210 lb 15.7 oz (95.7 kg)  BMI (Calculated): 28.7  Oxygen Therapy  SpO2: 95 %  Pulse Oximeter Device Mode: Continuous  Pulse Oximeter Device Location: Left, Finger  O2 Device: None (Room air)  O2 Flow Rate (L/min): 95 L/min  Level of Consciousness: Alert    Subjective  Subjective: Alert and cooperative  Overall Orientation Status: Within Functional Limits(Mental Status has cleared and improved)  Vision  Vision: Impaired  Vision Exceptions: Wears glasses at all times  Hearing  Hearing: Within functional limits  Social/Functional History  Lives With: Spouse(Spouse works outside of the home - Sunday-Thursday in [address] [place] [address])  Type of Home: Mobile home  Home Layout: One level, Performs ADL's on one level, Able to Live on Main level with bedroom/bathroom  Home Access: Stairs to enter with rails(2 Entrances)  Entrance Stairs - Number of Steps: 2 Full Height Steps with Bilateral hand rails ;  4 Half-height platform steps at other entrance  Entrance Stairs - Rails: Both  Bathroom Shower/Tub: Walk-in shower  Bathroom Toilet: Standard  Bathroom Equipment: Toilet raiser, Grab bars around toilet, Grab bars in shower, Shower chair(Stands in shower as he cannot bend his knees enought to sit on a showre chair)  Bathroom Accessibility: Marietta Beltran accessible, Not accessible(Has to turn walker sideways to access bathroom)  Home Equipment: [employer], Sock aid, Reacher, Rolling walker, Long-handled shoehorn(Bed height is tall at home)  Receives Help From: Outpatient therapy, Family(Was going to Outpatient Physical Therapy for his Right Knee (needs flexion))  ADL Assistance: Independent(Using Adaptive Devices)  Homemaking Assistance: Independent  Homemaking Responsibilities: Yes(Shared with spouse)  Ambulation Assistance: Independent  Transfer Assistance: Independent  Active : Yes  Mode of Transportation: [facility]  Occupation: Retired  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, shooting pool  Additional Comments: After past procedures, has stayed at [facility] that is more accessible at wheelchair level after geting up the 2 steps at the entrance  Pain Assessment  Pain Assessment: 0-10  Pain Level: 5  Pain Type: Chronic pain, Acute pain, Surgical pain  Pain Location: Knee  Pain Orientation: Right, Left    Objective      Cognition  Overall Cognitive Status: WFL  Cognition Comment: Remembers names of staff that worked with him on the therapy unit last spring (2020)       ADL  Feeding: Setup, Increased time to complete  Grooming: Minimal assistance  UE Bathing: Minimal assistance  LE Bathing: Maximum assistance  UE Dressing: Minimal assistance  LE Dressing: Maximum assistance(While in bed - Assist over feet  and to hike up legs and over buttocks)  Toileting: Contact guard assistance(Using Urinal;  No Bowel Movement since admission to hospital)  Additional Comments: Care provided in Bed - nursing did medicate patient, but pain relief not  effective yet    UE Function  Hand Dominance  Hand Dominance: Left(Hand writes with left hand, plays sports right handed)        LUE Strength  L Hand General: 4+/5  Left Hand Strength -  (lbs)  Handle Setting 2: 60# (norms # )  LUE Tone: Normotonic     LUE AROM (degrees)  LUE AROM : WFL     Left Hand AROM (degrees)  Left Hand AROM: WFL  RUE Strength  R Hand General: 4+/5   Right Hand Strength -  (lbs)  Handle Setting 2: 66# (norms #)  RUE Tone: Normotonic     RUE AROM (degrees)  RUE AROM : Exceptions  RUE General AROM: Impaired Elbow Extension  Right Hand PROM (degrees)  Right Hand PROM: [facility]       Fine Motor Skills  Coordination  Movements Are Fluid And Coordinated: No  Coordination and Movement description: Gross motor impairments, Right UE   Comment: History of Right Elbow contracture - lacking full extension               Quality of Movement Other  Comment: History of Right Elbow contracture - lacking full extension       Mobility    Just medicated - needs assist for Leg repositioning in bed                             Assessment  Performance deficits / Impairments: Decreased safe awareness, Decreased ADL status, Decreased endurance, Decreased strength, Decreased functional mobility   Treatment Diagnosis: Altered Ability to care for self related to Bilateral Knee Pain / procedures  Prognosis: Fair, Good  Decision Making: Medium Complexity  History:  Moderate to extensive chart review of past treatment and new findings  Exam: 5 areas of altered performance  Assistance / Modification: Assist with mobility and self cares  REQUIRES OT FOLLOW UP: Yes  Discharge Recommendations: Patient would benefit from continued therapy after discharge  Activity Tolerance: Patient limited by pain, Patient limited by fatigue         Functional Outcome Measures  AM-PAC Daily Activity Inpatient   How much help for Bathing?: A Lot  How much help for Toileting?: A Lot  How much help for putting on and taking off regular upper body clothing?: A Little  How much help for taking care of personal grooming?: A Little  How much help for eating meals?: None       Goals  Patient Goals   Patient goals : Return home  Short term goals  Time Frame for Short term goals: 2-5 days  Short term goal 1: Tolerate 10-25 minutes of Genral Care / UB Exercise to support self care and mobiltiy needs  Short term goal 2: D/V understanding of PWB for Left LE with application to self-care tasks and mobility  Short term goal 3: Participate in seated side of bed self care tasks with Mod Assist after setup    Plan  Safety Devices  Safety Devices in place: Yes  Type of devices: Call light within reach, Nurse notified, Left in bed, Patient at risk for falls     Plan  Times per week: 2-5 sessions  Times per day: Daily  Current Treatment Recommendations: Strengthening, Endurance Training, Patient/Caregiver Education & Training, Safety Education & Training, Functional Mobility Training, Self-Care / ADL  OT Education  OT Education: OT Role, Plan of Care, Precautions, ADL Adaptive Strategies, Equipment, Transfer Training    OT Equipment Recommendations  Equipment Needed: No(Has many devices at home)  OT Individual Minutes  Time In: 1050  Time Out: 1110  Minutes: 20    Electronically signed by Chika Cerna OT on 7/21/20 at 12:10 PM EDT

## 2020-07-21 NOTE — PROGRESS NOTES
Physical Therapy    Facility/Department: [facility] MED SURG  Initial Assessment    NAME: Sundar De La Garza  : 1955  MRN: [id number]    Date of Service: 2020    Discharge Recommendations:  Patient would benefit from continued therapy after discharge        Assessment   Body structures, Functions, Activity limitations: Decreased functional mobility ; Decreased ROM; Decreased strength;Decreased endurance;Decreased balance; Increased pain;Decreased posture  Assessment: Pt's mobility severely impaired due to impaired ROM B knees, high pain level, and weakness B LE. Will cont POC to maximise function. Treatment Diagnosis: Impaired fucntion  Specific instructions for Next Treatment: Co-tx with MESA, coordinate pain meds for therapy session. Prognosis: Fair  Decision Making: Medium Complexity  History: Osteogenesis imperfecta-multiple fracture, most recent T Hip fracture/ s/p repair 20. REQUIRES PT FOLLOW UP: Yes  Activity Tolerance  Activity Tolerance: Patient limited by pain       Patient Diagnosis(es): The primary encounter diagnosis was Closed fracture of left tibial plateau, initial encounter. A diagnosis of Acute pain of right knee was also pertinent to this visit. has a past medical history of Contracture, elbow, Erectile dysfunction, Hypertension, Osteogenesis imperfecta, and Type II or unspecified type diabetes mellitus without mention of complication, not stated as uncontrolled. has a past surgical history that includes Cholecystectomy; Tonsillectomy and adenoidectomy; fracture surgery (Right); fracture surgery (Bilateral); fracture surgery (Left); Femur fracture surgery (Right, 2020); and ORIF PROXIMAL TIBIAL PLATEAU FRACTURE (Bilateral, 2020).     Restrictions  Restrictions/Precautions  Restrictions/Precautions: Weight Bearing, General Precautions, Surgical Protocols  Required Braces or Orthoses?: Yes  Implants present? : Metal implants(Multiple B LE surgeries)  Lower Extremity Weight Bearing Restrictions  Right Lower Extremity Weight Bearing: Weight Bearing As Tolerated(Non-displaced Tibia Fracture - 2 Cannulated screw fixation)  Left Lower Extremity Weight Bearing: Partial Weight Bearing(ORIF for Displaced Tibia Plateau Fracture)  Upper Extremity Weight Bearing Restrictions  Right Upper Extremity Weight Bearing: Weight Bearing As Tolerated(Elbow extension block)  Required Braces or Orthoses  Right Lower Extremity Brace: Knee Brace  RLE Brace Type: Hinged brace  Left Lower Extremity Brace: Knee Brace  LLE Brace Type: Hinged brace. Position Activity Restriction  Other position/activity restrictions: WBAT RLE with hinged brace, PWB L LE with hinged braces, OK for ROM. Vision/Hearing  Vision: Impaired  Vision Exceptions: Wears glasses at all times  Hearing: Within functional limits     Subjective  General  Patient assessed for rehabilitation services?: Yes  Additional Pertinent Hx: Barbaraann Mohs is a 59 y.o. male who presents for bilateral tibial plateau fractures. Patient has a history of osteogenesis imperfecta. He has had multiple fractures and surgeries on both lower extremities as well as upper extremities. Patient recently had work done on his right hip per Dr. Emily Encarnacion with an intramedullary christiano. Multiple admissions and procedures for various fractures - Osteogenesis Imperfecta. ORIF PROXIMAL TIBIAL PLATEAU FRACTURE (Bilateral, 7/20/2020). Referring Practitioner: Dr Andrew Mccollum. Referral Date : 07/21/20  Diagnosis: B Tibial plateau fracture. Follows Commands: Within Functional Limits  Subjective  Subjective: \" I was doing so good at home, and now I am back here again. \"  Pain Screening  Patient Currently in Pain: Yes  Pain Assessment  Pain Assessment: 0-10  Pain Level: 8(with mobility/ROM)  Patient's Stated Pain Goal: 3  Pain Type: Chronic pain;Acute pain  Pain Location: Knee  Pain Orientation: Right;Left(R > L )  Clinical Progression: Not changed  Response to Pain Intervention: Patient Satisfied  Vital Signs  BP Location: Left Arm  Level of Consciousness: Alert  Patient Currently in Pain: Yes  Oxygen Therapy  O2 Device: None (Room air)       Orientation  Orientation  Overall Orientation Status: Within Normal Limits  Social/Functional History  Social/Functional History  Lives With: Spouse(Spouse works outside of the home - Sunday-Thursday in [address] [place] [address])  Type of Home: Mobile home  Home Layout: One level, Performs ADL's on one level, Able to Live on Main level with bedroom/bathroom  Home Access: Stairs to enter with rails(2 Entrances)  Entrance Stairs - Number of Steps: 2 Full Height Steps with Bilateral hand rails ;  4 Half-height platform steps at other entrance  Entrance Stairs - Rails: Both  Bathroom Shower/Tub: Walk-in shower  Bathroom Toilet: Standard  Bathroom Equipment: Toilet raiser, Grab bars around toilet, Grab bars in shower, Shower chair(Stands in shower as he cannot bend his knees enought to sit on a showre chair)  Bathroom Accessibility: Yelitza Cadena accessible, Not accessible(Has to turn walker sideways to access bathroom)  Home Equipment: [employer], Sock aid, Reacher, Rolling walker, Long-handled shoehorn(Bed height is tall at home)  Receives Help From: Outpatient therapy, Family(Was going to Outpatient Physical Therapy for his Right Knee (needs flexion))  ADL Assistance: Independent(Using Adaptive Devices)  Homemaking Assistance: Independent  Homemaking Responsibilities: Yes(Shared with spouse)  Ambulation Assistance: Independent  Transfer Assistance: Independent  Active : Yes  Mode of Transportation: [facility]  Occupation: Retired  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 Recent R Femur fracture/surgery on 4/23/20, ankle WFL. R LE pain limiting R knee ROM. PROM LLE (degrees)  LLE General PROM: Left knee Flexion 10 to 50 degrees, Hip/ankle WFL. AROM RUE (degrees)  RUE General AROM: See OT eval  AROM LUE (degrees)  LUE General AROM: See OT eval.  Strength RLE  Comment: Unable to perform MMT R knee due to pain, limited ROM at Knee joint, Hip grossly 2+/5  Strength LLE  Comment: Hip grossly 2+/5, knee Grossly 2/5, ankle WFL. Sensation  Overall Sensation Status: Impaired(C/O neuropathy bilateral feet)  Bed mobility  Rolling to Left: 2 Person assistance; Moderate assistance  Rolling to Right: 2 Person assistance; Moderate assistance  Supine to Sit: 2 Person assistance;Maximum assistance  Sit to Supine: Maximum assistance(Max Ax 2 to 3 person, pt at very EOB, needed third person for safety to assit pt back in bed.)  Scooting: Dependent/Total  Comment: Pt sat at EOB for 7 to 8 minutes, no dizzyness, c/o of severe pain B LE. Assited needed at runk as welll as B LE to dangle. Pt SBA while sitting at EOB. Minimal ROM R Knee to bring foot underneath, to safely assist with sit to stand. Pt tolerating L knee ROM better than R Knee. Transfers  Sit to Stand: Dependent/Total(Attempted sit to stand with 3 A with bed raised-unsuccessful)  Comment: Bed height raised up, attempted sit to stand with 3 person assist, unsuccessful. Pt allowed WBAT R LE, but poor ROM on R Knee prevents pt from able to stand, pt able to flex L knee better , but allowed only PWB on L LE,.   Ambulation  Ambulation?: No     Balance  Posture: Fair  Sitting - Static: Fair  Sitting - Dynamic: Poor  Standing - Static: (NA)  Standing - Dynamic: (NA)  Other exercises  Other exercises?: Yes  Other exercises 1: quad sets and ankle pumps  Other exercises 2: Gentle PROM R Knee, poor tolerance due to pain, PROM/AAROM L knee, slight better tolerance compared to R

## 2020-07-21 NOTE — PROGRESS NOTES
Woo Masterson notified of UA results and new onset confusion. Order put in to hold pain meds until further observation. STAT CBC and BMP ordered as well. Will continue to monitor pt.

## 2020-07-21 NOTE — PLAN OF CARE
Problem: Falls - Risk of:  Goal: Will remain free from falls  Description: Will remain free from falls  7/21/2020 0316 by Meagan Danielson RN  Outcome: Met This Shift  7/20/2020 1956 by Yazan Matamoros RN  Outcome: Met This Shift  Goal: Absence of physical injury  Description: Absence of physical injury  7/21/2020 0316 by Meagan Danielson RN  Outcome: Met This Shift  7/20/2020 1956 by Yazan Matamoros RN  Outcome: Met This Shift     Problem: Skin Integrity:  Goal: Absence of new skin breakdown  Description: Absence of new skin breakdown  7/21/2020 0316 by Meagan Danielson RN  Outcome: Met This Shift  7/20/2020 1956 by Yazan Matamoros RN  Outcome: Met This Shift     Problem: Skin Integrity:  Goal: Will show no infection signs and symptoms  Description: Will show no infection signs and symptoms  7/21/2020 0316 by Meagan Danielson RN  Outcome: Ongoing  7/20/2020 1956 by Yazan Matamoros RN  Outcome: Met This Shift     Problem: Pain:  Goal: Pain level will decrease  Description: Pain level will decrease  7/21/2020 0316 by Meagan Danielson RN  Outcome: Ongoing  7/20/2020 1956 by Yazan Matamoros RN  Outcome: Met This Shift  Goal: Control of acute pain  Description: Control of acute pain  7/21/2020 0316 by Meagan Danielson RN  Outcome: Ongoing  7/20/2020 1956 by Yazan Matamoros RN  Outcome: Met This Shift  Goal: Control of chronic pain  Description: Control of chronic pain  7/21/2020 0316 by Meagan Danielson RN  Outcome: Ongoing  7/20/2020 1956 by Yazan Matamoros RN  Outcome: Met This Shift     Problem: Infection - Surgical Site:  Goal: Will show no infection signs and symptoms  Description: Will show no infection signs and symptoms  7/21/2020 0316 by Meagan Danielson RN  Outcome: Ongoing  7/20/2020 1956 by Yazan Matamoros RN  Outcome: Met This Shift

## 2020-07-22 LAB
ABSOLUTE EOS #: 0.3 K/UL (ref 0–0.4)
ABSOLUTE IMMATURE GRANULOCYTE: ABNORMAL K/UL (ref 0–0.3)
ABSOLUTE LYMPH #: 0.5 K/UL (ref 1–4.8)
ABSOLUTE MONO #: 0.5 K/UL (ref 0.1–1.3)
ANION GAP SERPL CALCULATED.3IONS-SCNC: 11 MMOL/L (ref 9–17)
BASOPHILS # BLD: 1 % (ref 0–2)
BASOPHILS ABSOLUTE: 0 K/UL (ref 0–0.2)
BUN BLDV-MCNC: 19 MG/DL (ref 8–23)
BUN/CREAT BLD: ABNORMAL (ref 9–20)
CALCIUM SERPL-MCNC: 9.7 MG/DL (ref 8.6–10.4)
CHLORIDE BLD-SCNC: 99 MMOL/L (ref 98–107)
CO2: 24 MMOL/L (ref 20–31)
CREAT SERPL-MCNC: 1.43 MG/DL (ref 0.7–1.2)
DIFFERENTIAL TYPE: ABNORMAL
EOSINOPHILS RELATIVE PERCENT: 6 % (ref 0–4)
GFR AFRICAN AMERICAN: >60 ML/MIN
GFR NON-AFRICAN AMERICAN: 50 ML/MIN
GFR SERPL CREATININE-BSD FRML MDRD: ABNORMAL ML/MIN/{1.73_M2}
GFR SERPL CREATININE-BSD FRML MDRD: ABNORMAL ML/MIN/{1.73_M2}
GLUCOSE BLD-MCNC: 183 MG/DL (ref 70–99)
GLUCOSE BLD-MCNC: 188 MG/DL (ref 75–110)
GLUCOSE BLD-MCNC: 253 MG/DL (ref 75–110)
GLUCOSE BLD-MCNC: 294 MG/DL (ref 75–110)
HCT VFR BLD CALC: 34.9 % (ref 41–53)
HEMOGLOBIN: 11.5 G/DL (ref 13.5–17.5)
IMMATURE GRANULOCYTES: ABNORMAL %
LYMPHOCYTES # BLD: 9 % (ref 24–44)
MAGNESIUM: 1.6 MG/DL (ref 1.6–2.6)
MCH RBC QN AUTO: 28.7 PG (ref 26–34)
MCHC RBC AUTO-ENTMCNC: 32.9 G/DL (ref 31–37)
MCV RBC AUTO: 87.2 FL (ref 80–100)
MONOCYTES # BLD: 8 % (ref 1–7)
NRBC AUTOMATED: ABNORMAL PER 100 WBC
PDW BLD-RTO: 13.2 % (ref 11.5–14.9)
PHOSPHORUS: 2.4 MG/DL (ref 2.5–4.5)
PLATELET # BLD: 153 K/UL (ref 150–450)
PLATELET ESTIMATE: ABNORMAL
PMV BLD AUTO: 8 FL (ref 6–12)
POTASSIUM SERPL-SCNC: 4.3 MMOL/L (ref 3.7–5.3)
RBC # BLD: 4 M/UL (ref 4.5–5.9)
RBC # BLD: ABNORMAL 10*6/UL
SEG NEUTROPHILS: 76 % (ref 36–66)
SEGMENTED NEUTROPHILS ABSOLUTE COUNT: 4.3 K/UL (ref 1.3–9.1)
SODIUM BLD-SCNC: 134 MMOL/L (ref 135–144)
WBC # BLD: 5.6 K/UL (ref 3.5–11)
WBC # BLD: ABNORMAL 10*3/UL

## 2020-07-22 PROCEDURE — 80048 BASIC METABOLIC PNL TOTAL CA: CPT

## 2020-07-22 PROCEDURE — 97535 SELF CARE MNGMENT TRAINING: CPT

## 2020-07-22 PROCEDURE — 6360000002 HC RX W HCPCS: Performed by: INTERNAL MEDICINE

## 2020-07-22 PROCEDURE — 99024 POSTOP FOLLOW-UP VISIT: CPT | Performed by: ORTHOPAEDIC SURGERY

## 2020-07-22 PROCEDURE — 6370000000 HC RX 637 (ALT 250 FOR IP): Performed by: INTERNAL MEDICINE

## 2020-07-22 PROCEDURE — 97530 THERAPEUTIC ACTIVITIES: CPT

## 2020-07-22 PROCEDURE — 84100 ASSAY OF PHOSPHORUS: CPT

## 2020-07-22 PROCEDURE — 36415 COLL VENOUS BLD VENIPUNCTURE: CPT

## 2020-07-22 PROCEDURE — 6370000000 HC RX 637 (ALT 250 FOR IP): Performed by: ORTHOPAEDIC SURGERY

## 2020-07-22 PROCEDURE — 97110 THERAPEUTIC EXERCISES: CPT

## 2020-07-22 PROCEDURE — 1200000000 HC SEMI PRIVATE

## 2020-07-22 PROCEDURE — 85025 COMPLETE CBC W/AUTO DIFF WBC: CPT

## 2020-07-22 PROCEDURE — 2580000003 HC RX 258: Performed by: ORTHOPAEDIC SURGERY

## 2020-07-22 PROCEDURE — 82947 ASSAY GLUCOSE BLOOD QUANT: CPT

## 2020-07-22 PROCEDURE — 83735 ASSAY OF MAGNESIUM: CPT

## 2020-07-22 RX ORDER — OXYCODONE HYDROCHLORIDE AND ACETAMINOPHEN 5; 325 MG/1; MG/1
2 TABLET ORAL EVERY 4 HOURS PRN
Status: DISCONTINUED | OUTPATIENT
Start: 2020-07-22 | End: 2020-07-24 | Stop reason: HOSPADM

## 2020-07-22 RX ORDER — MAGNESIUM SULFATE 1 G/100ML
1 INJECTION INTRAVENOUS ONCE
Status: COMPLETED | OUTPATIENT
Start: 2020-07-22 | End: 2020-07-22

## 2020-07-22 RX ADMIN — MAGNESIUM SULFATE 1 G: 1 INJECTION INTRAVENOUS at 16:43

## 2020-07-22 RX ADMIN — METOPROLOL SUCCINATE 25 MG: 25 TABLET, EXTENDED RELEASE ORAL at 08:59

## 2020-07-22 RX ADMIN — INSULIN LISPRO 6 UNITS: 100 INJECTION, SOLUTION INTRAVENOUS; SUBCUTANEOUS at 11:29

## 2020-07-22 RX ADMIN — PANTOPRAZOLE SODIUM 40 MG: 40 TABLET, DELAYED RELEASE ORAL at 08:59

## 2020-07-22 RX ADMIN — OXYCODONE AND ACETAMINOPHEN 2 TABLET: 5; 325 TABLET ORAL at 14:13

## 2020-07-22 RX ADMIN — SODIUM CHLORIDE: 9 INJECTION, SOLUTION INTRAVENOUS at 10:40

## 2020-07-22 RX ADMIN — METOPROLOL SUCCINATE 25 MG: 25 TABLET, EXTENDED RELEASE ORAL at 21:08

## 2020-07-22 RX ADMIN — OXYCODONE HYDROCHLORIDE AND ACETAMINOPHEN 2 TABLET: 5; 325 TABLET ORAL at 04:17

## 2020-07-22 RX ADMIN — HEPARIN SODIUM 5000 UNITS: 5000 INJECTION INTRAVENOUS; SUBCUTANEOUS at 09:00

## 2020-07-22 RX ADMIN — INSULIN LISPRO 1 UNITS: 100 INJECTION, SOLUTION INTRAVENOUS; SUBCUTANEOUS at 21:08

## 2020-07-22 RX ADMIN — GABAPENTIN 100 MG: 100 CAPSULE ORAL at 14:14

## 2020-07-22 RX ADMIN — OXYCODONE AND ACETAMINOPHEN 2 TABLET: 5; 325 TABLET ORAL at 09:17

## 2020-07-22 RX ADMIN — GABAPENTIN 100 MG: 100 CAPSULE ORAL at 08:59

## 2020-07-22 RX ADMIN — INSULIN LISPRO 2 UNITS: 100 INJECTION, SOLUTION INTRAVENOUS; SUBCUTANEOUS at 09:00

## 2020-07-22 RX ADMIN — GABAPENTIN 100 MG: 100 CAPSULE ORAL at 21:08

## 2020-07-22 RX ADMIN — CALCIUM 1250 MG: 500 TABLET ORAL at 21:07

## 2020-07-22 RX ADMIN — SERTRALINE HYDROCHLORIDE 100 MG: 100 TABLET ORAL at 21:08

## 2020-07-22 RX ADMIN — INSULIN LISPRO 8 UNITS: 100 INJECTION, SOLUTION INTRAVENOUS; SUBCUTANEOUS at 16:43

## 2020-07-22 RX ADMIN — OXYCODONE AND ACETAMINOPHEN 2 TABLET: 5; 325 TABLET ORAL at 20:48

## 2020-07-22 RX ADMIN — HEPARIN SODIUM 5000 UNITS: 5000 INJECTION INTRAVENOUS; SUBCUTANEOUS at 21:08

## 2020-07-22 RX ADMIN — INSULIN GLARGINE 60 UNITS: 100 INJECTION, SOLUTION SUBCUTANEOUS at 21:08

## 2020-07-22 RX ADMIN — TRAZODONE HYDROCHLORIDE 100 MG: 100 TABLET ORAL at 21:08

## 2020-07-22 RX ADMIN — INSULIN GLARGINE 40 UNITS: 100 INJECTION, SOLUTION SUBCUTANEOUS at 08:59

## 2020-07-22 ASSESSMENT — PAIN DESCRIPTION - ORIENTATION
ORIENTATION: RIGHT;LEFT
ORIENTATION: LEFT;RIGHT
ORIENTATION: LEFT;RIGHT
ORIENTATION: RIGHT;LEFT

## 2020-07-22 ASSESSMENT — PAIN SCALES - GENERAL
PAINLEVEL_OUTOF10: 6
PAINLEVEL_OUTOF10: 4
PAINLEVEL_OUTOF10: 4
PAINLEVEL_OUTOF10: 7
PAINLEVEL_OUTOF10: 5
PAINLEVEL_OUTOF10: 7
PAINLEVEL_OUTOF10: 3
PAINLEVEL_OUTOF10: 4

## 2020-07-22 ASSESSMENT — PAIN DESCRIPTION - LOCATION
LOCATION: KNEE

## 2020-07-22 ASSESSMENT — PAIN DESCRIPTION - PAIN TYPE
TYPE: ACUTE PAIN
TYPE: CHRONIC PAIN

## 2020-07-22 ASSESSMENT — PAIN DESCRIPTION - DESCRIPTORS
DESCRIPTORS: ACHING

## 2020-07-22 NOTE — PROGRESS NOTES
Notified Louise, [address], that per Dr Xenia Ambrose pt is approp for SNF at this time. ARU can continue to follow if pt makes improvement in LOF.

## 2020-07-22 NOTE — PROGRESS NOTES
[facility]                Date:   7/22/2020  Patient name:  Lee Dutta  Date of admission:  7/17/2020  3:25 PM  MRN:   [id number]  YOB: 1955    Pt seen at the request of Benedicto Green MD    CHIEF COMPLAINT:     History Obtained From:  Patient and chart review. HISTORY OF PRESENT ILLNESS:      The patient is a 59 y.o.  male who is admitted to the hospital for bilateral tibial plateau fractures. Patient has a history of osteogenesis imperfecta. He has had multiple fractures and surgeries on both lower extremities as well as upper extremities. Patient recently had work done on his right hip per Dr. Perez Both with an intramedullary christiano. This is on the right side. Patient also has had a patellar fractures on both sides. He had hardware removal from his patella on the left side. Patient states that he had a fall yesterday and landed on his knees when he slipped with his walker. July 19  Good pain control  No chest pain shortness of breath  Plan for OR in a.m. July 20  Creatinine has increased to 1.7  OR today  Hemodynamically stable   7/21  Post op doing well cr increased to 1.7   ambulating      July the 22nd    Postoperatively patient is stable  There are some issues with pain control  This is now improved  No chest pain or shortness of breath  No fever or chills  No urinary retention  Past Medical History:   has a past medical history of Contracture, elbow, Erectile dysfunction, Hypertension, Osteogenesis imperfecta, and Type II or unspecified type diabetes mellitus without mention of complication, not stated as uncontrolled. Past Surgical History:   has a past surgical history that includes Cholecystectomy; Tonsillectomy and adenoidectomy; fracture surgery (Right); fracture surgery (Bilateral); fracture surgery (Left);  Femur fracture surgery (Right, 4/23/2020); and ORIF PROXIMAL TIBIAL PLATEAU FRACTURE (Bilateral, 7/20/2020). Home Medications:    Prior to Admission medications    Medication Sig Start Date End Date Taking? Authorizing Provider   oxyCODONE-acetaminophen (PERCOCET) 5-325 MG per tablet Take 1-2 tablets by mouth every 4 hours as needed for Pain for up to 7 days. 7/21/20 7/28/20 Yes Matilda Ashley MD   insulin glargine (LANTUS SOLOSTAR) 100 UNIT/ML injection pen Inject 40 Units into the skin daily   Yes Historical Provider, MD   insulin glargine (LANTUS SOLOSTAR) 100 UNIT/ML injection pen Inject 60 Units into the skin nightly   Yes Historical Provider, MD   Insulin Aspart, w/Niacinamide, (FIASP FLEXTOUCH) 100 UNIT/ML SOPN Inject 8 Units into the skin 3 times daily (before meals) Plus sliding scale   Yes Historical Provider, MD   Insulin Aspart, w/Niacinamide, (FIASP FLEXTOUCH) 100 UNIT/ML SOPN Inject into the skin 3 times daily (before meals) Sliding scale in addition to base of 8 units   Yes Historical Provider, MD   ibuprofen (ADVIL;MOTRIN) 200 MG tablet Take 400 mg by mouth every 6 hours as needed for Pain   Yes Historical Provider, MD   calcium carbonate (OYSTER SHELL CALCIUM 500 MG) 1250 (500 Ca) MG tablet Take 1 tablet by mouth nightly   Yes Historical Provider, MD   aspirin 81 MG EC tablet Take 1 tablet by mouth daily 5/21/20  Yes Jerrell Tamez MD   gabapentin (NEURONTIN) 100 MG capsule Take 1 capsule by mouth 3 times daily for 60 days.  5/21/20 7/20/20 Yes Jerrell Tamez MD   pantoprazole (PROTONIX) 40 MG tablet Take 1 tablet by mouth daily 5/21/20  Yes Jerrell Tamez MD   sertraline (ZOLOFT) 100 MG tablet Take 100 mg by mouth nightly   Yes Historical Provider, MD   traZODone (DESYREL) 100 MG tablet Take 100 mg by mouth nightly   Yes Historical Provider, MD   metoprolol succinate (TOPROL XL) 50 MG extended release tablet Take 1 tablet by mouth 2 times daily 5/21/20   Jerrell Tamez MD   Blood Glucose Monitoring Suppl (ACURA BLOOD GLUCOSE METER) w/Device KIT 1 kit by Does not apply route 2 times daily A1C of 9.0. NIDDM. 10/23/18   Adam Mccormick PA-C       Allergies:  Patient has no known allergies. Social History:   reports that he quit smoking about 28 years ago. His smoking use included cigarettes. He has a 36.00 pack-year smoking history. He has never used smokeless tobacco.     Family History: family history includes Diabetes in his father and mother; Heart Disease in his father; Stroke in his mother. REVIEW OF SYSTEMS:    · Constitutional: Negative for weight loss  · Eyes: Negative for visual changes, diplopia, scleral icterus. · ENT: Negative for Headaches, hearing loss, vertigo, mouth sores, sore throat. · Cardiovascular: Negative for lightheadedness/orthostatic symptoms ,chest pain, dyspnea on exertion, palpitations or loss of consciousness. · Respiratory: Negative for cough or wheezing, sputum production, hemoptysis, pleuritic pain. · Gastrointestinal: Negative for nausea/vomiting, change in bowel habits, abdominal pain, dysphagia/appetite loss, hematemesis, blood in stools. · Genitourinary:Negative for change in bladder habits, dysuria, trouble voiding, hematuria. · Musculoskeletal: pos for gait disturbance, weakness, joint complaints. Tender over both tibia  · Integumentary: Negative for rash, pruritis. · Neurological: Negative for headache, dizziness, change in muscle strength, numbness/tingling, change in gait, balance, coordination,   · Endocrine: negative for temperature intolerance, excessive thirst, fluid intake, or urination, tremor. · Hematologic/Lymphatic: negative for abnormal bruising or bleeding, blood clots, swollen lymph nodes. · Allergic/Immunologic: negative for nasal congestion, pruritis, hives.       PHYSICAL EXAM:    /82   Pulse 89   Temp 98.3 °F (36.8 °C) (Oral)   Resp 15   Ht 6' (1.829 m)   Wt 210 lb 15.7 oz (95.7 kg)   SpO2 98%   BMI 28.61 kg/m²      · General appearance: well nourished  · HEENT: Head: Normocephalic, no lesions, functions: No results found for: TSH     Imaging/Diagonstics:    Xr Knee Left (3 Views)    Result Date: 7/17/2020  EXAMINATION: THREE XRAY VIEWS OF THE LEFT KNEE 7/17/2020 4:19 pm COMPARISON: None. HISTORY: ORDERING SYSTEM PROVIDED HISTORY: pain TECHNOLOGIST PROVIDED HISTORY: pain Reason for Exam: Pt fell today, pain to bilateral knee Acuity: Acute Type of Exam: Initial FINDINGS: Depressed lateral tibial plateau fracture with comminution. Lipohemarthrosis. Enthesophyte of the patellar tendon. Depressed lateral tibial plateau fracture with moderate to large lipohemarthrosis. Xr Knee Right (3 Views)    Result Date: 7/17/2020  EXAMINATION: THREE XRAY VIEWS OF THE RIGHT KNEE 7/17/2020 4:19 pm COMPARISON: Right femur radiographs May 26, 2029 HISTORY: ORDERING SYSTEM PROVIDED HISTORY: fall, pain TECHNOLOGIST PROVIDED HISTORY: fall, pain Reason for Exam: Pt fell today, pain to bilateral knee Acuity: Acute Type of Exam: Initial FINDINGS: Intramedullary christiano within the distal femur. Patella hardware fixation. No acute fracture. Mild medial and lateral compartment narrowing with subchondral sclerosis and small osteophytes. Medial and lateral compartment chondrocalcinosis. No large joint effusion. No acute findings. Ct Knee Left Wo Contrast    Result Date: 7/18/2020  EXAMINATION: CT OF THE LEFT KNEE WITHOUT CONTRAST 7/18/2020 12:20 pm TECHNIQUE: CT of the left knee was performed without the administration of intravenous contrast.  Multiplanar reformatted images are provided for review. Dose modulation, iterative reconstruction, and/or weight based adjustment of the mA/kV was utilized to reduce the radiation dose to as low as reasonably achievable.  COMPARISON: Radiographs from 07/17/2020 HISTORY ORDERING SYSTEM PROVIDED HISTORY: assess fracture, previous surgery, history of osteogenesis imperfecta TECHNOLOGIST PROVIDED HISTORY: assess fracture, previous surgery, history of osteogenesis imperfecta Reason for Exam: assess fracture, previous surgery, history of osteogenesis imperfecta Acuity: Chronic Type of Exam: Ongoing FINDINGS: Acute depressed lateral tibial plateau fracture. Articular depression measures about 0.7 cm with multiple punctate bodies. Fracture extends into the tibial spines. The proximal fibula is intact. The distal femur is intact. The medial tibial plateau is intact. Lateral subluxation of the patella relative to the trochlea. Please correlate for patellar tracking disorder. Lipohemarthrosis compatible with intra-articular fracture. Prepatellar swelling. Atrophy of the muscles. The perineural fat is maintained. No unexpected radiopaque foreign bodies. Heterotopic ossification along the patellar retinaculum most likely sequela of prior injury. Acute impacted lateral tibial plateau fracture with extension into the tibial spines. Low moderate to large lipohemarthrosis. No additional fractures are apparent. Lateral subluxation of the patella relative to the trochlea. Please correlate for patellar tracking disorder. Ct Knee Right Wo Contrast    Result Date: 7/17/2020  EXAMINATION: CT OF THE RIGHT KNEE WITHOUT CONTRAST 7/17/2020 8:42 pm TECHNIQUE: CT of the right knee was performed without the administration of intravenous contrast.  Multiplanar reformatted images are provided for review. Dose modulation, iterative reconstruction, and/or weight based adjustment of the mA/kV was utilized to reduce the radiation dose to as low as reasonably achievable. COMPARISON: Right knee radiograph July 17, 2020 HISTORY ORDERING SYSTEM PROVIDED HISTORY: fall, pain, r/o fracture TECHNOLOGIST PROVIDED HISTORY: fall, pain, r/o fracture Reason for Exam: fell today Additional signs and symptoms: history of multiple previous knee surgeries FINDINGS: Bones: Postop changes prior femur ORIF with partially imaged intramedullary nail in place. Distal interlocking screws are also present.   Postoperative changes of the patella with screws, cerclage wires, and malleable plate in place. The imaged hardware appears intact. There is an acute fracture lateral tibial plateau intra-articular extension of fracture lines. The major fracture fragment is only minimally displaced by approximately 2 mm. Soft Tissue:  Mild subcutaneous edema. No organized collection or subcutaneous gas identified. Scattered atherosclerotic vascular calcifications are present. Joint:  Anatomic alignment of the knee with moderate to severe medial and mild-to-moderate patellofemoral and lateral compartment osteoarthritis. Small knee effusion. Chondrocalcinosis of the mediolateral compartments of the knee. 1. Acute fracture of the lateral tibial plateau with intra-articular extension of fracture lines and mild displacement of the major fracture fragment. 2. Small knee effusion. 3. Moderate to severe medial and mild to moderate lateral patellofemoral compartment osteoarthritis. 4. Severe osteopenia.          ASSESSMENT:    Patient Active Problem List   Diagnosis    DM w/o complication type II ([address]    Essential hypertension    Erectile dysfunction    Microalbuminuria due to type 2 diabetes mellitus ([address]    Stage 3 chronic kidney disease ([address]    Traumatic bilateral lower extremity fractures    Closed right hip fracture, initial encounter ([address]    Osteogenesis imperfecta    Type 2 diabetes mellitus with chronic kidney disease ([address]    Type 2 diabetes mellitus with hyperglycemia (HCC)    Gastroesophageal reflux disease without esophagitis    Tibial plateau fracture, left, closed, initial encounter     Patient with osteogenesis imperfecta  Multiple fractures in the past  Now has bilateral tibial plateau fractures  From fall    Orthopedic evaluation noted      Diabetes type 2  Blood sugars are controlled    CKD 3  Diabetic nephropathy creatinine 1.38  Improving    Hypertension blood pressure is controlled    Potassium normal      PLAN:  Continue home medications  Lantus  Insulin sliding scale  Continue Toprol  Decrease Lantus to half dose once patient is n.p.o. for OR    Medically cleared for surgery  Moderate risk due to diabetes    Follow renal function    July 19  Osteogenesis imperfecta  Bilateral tibial plateau fractures  Plan for the OR in the a.m. Blood pressure is elevated  Increase Toprol to 100 mg    Diabetes with CKD 3    Creatinine stable    Check BMP CBC  Blood sugars controlled  Adequate pain control  Cleared for surgery    July 28  OR today for repair of bilateral tibial plateau fractures with osteogenesis imperfecta    Creatinine has increased to 1.7  Diabetic nephropathy  Blood sugars controlled  Continue beta-blocker  Nephrology to see      7/21   post op b/l tibial fx orif    sbp 105   CONFUSION RESOLVED     cr increased to 1.7   On ivf   dm2  bs > 200   cont coverGE   AMBULATING   ON IVF AT 75   HR 89   CONT TOPROL    NEPHRO SEEING      CONTB LANTUS WITH SS COVERAGE     CHANGE TO HEPARIN SQ DUE TO CKD    July 22  Postoperative bilateral tibial fracture ORIF  Doing well  Pain is better controlled with 2 Percocets every 4 hours    Acute renal insufficiency has improved  Creatinine 1.43 today    Blood pressure stable      Sugar control has improved  Episodically above 200    Continue coverage    Can Hep-Lock IV fluids    Patient will need ECF as he has been refused by rehab    Medication reviewed continue the same      Minh MD CAROL Archibald[address], [address] [place] [address].    Phone [phone]   Fax: [phone]  Answering Service: [phone]

## 2020-07-22 NOTE — PROGRESS NOTES
Physical Medicine & Rehabilitation  F/u Chart review note    7/22/2020 9:49 AM     CC: Ambulatory and ADL dysfunction due to     Brief history    51-year-old male with history of osteogenesis imperfecta with multiple fractures and surgeries of upper and lower extremities. Status post fall-had patellar fractures bilaterally. .  Also recently had a right hip surgery. Underwent tibial plateau ORIF on the left and stabilization right knee    Rehabilitation:   PT:  Restrictions/Precautions: Weight Bearing, General Precautions, Surgical Protocols  Implants present? : Metal implants(Multiple B LE surgeries)  Other position/activity restrictions: WBAT RLE with hinged brace, PWB L LE with hinged braces, OK for ROM. Right Lower Extremity Weight Bearing: Weight Bearing As Tolerated(Non-displaced Tibia Fracture - 2 Cannulated screw fixation)  Left Lower Extremity Weight Bearing: Partial Weight Bearing(ORIF for Displaced Tibia Plateau Fracture)  Right Upper Extremity Weight Bearing: Weight Bearing As Tolerated(Elbow extension block)  Required Braces or Orthoses  Right Lower Extremity Brace: Knee Brace  RLE Brace Type: Hinged brace  Left Lower Extremity Brace: Knee Brace  LLE Brace Type: Hinged brace. Transfers  Sit to Stand: Dependent/Total(3-4 assist from elevated bed, use of RW. Unsafe to attempt with aakash stedy)  Stand to sit: Dependent/Total  Bed to Chair: Unable to assess(Not safe to attempt at this time, may benefit from attempt with slide board or maxi move next session)  Comment: Bed height raised up, attempted sit to stand with 3 person assist, unsuccessful. Pt allowed WBAT R LE, but poor ROM on R Knee prevents pt from able to stand, pt able to flex L knee better , but allowed only PWB on L LE,. Transfers  Sit to Stand: Dependent/Total(3-4 assist from elevated bed, use of RW.  Unsafe to attempt with aakash stedy)  Stand to sit: Dependent/Total  Bed to Chair: Unable to assess(Not safe to attempt at this time, may benefit from attempt with slide board or maxi move next session)  Comment: Bed height raised up, attempted sit to stand with 3 person assist, unsuccessful. Pt allowed WBAT R LE, but poor ROM on R Knee prevents pt from able to stand, pt able to flex L knee better , but allowed only PWB on L LE,. Ambulation  Ambulation?: No                   OT:  ADL  Feeding: Setup, Increased time to complete  Grooming: Minimal assistance  UE Bathing: Minimal assistance  LE Bathing: Maximum assistance  UE Dressing: Minimal assistance  LE Dressing: Maximum assistance(While in bed - Assist over feet  and to hike up legs and over buttocks)  Toileting: Contact guard assistance(Using Urinal;  No Bowel Movement since admission to hospital)  Additional Comments: Grooming EOB c SBA for safety. Trialled LBD EOB utilizing lateral weightshifting as well as trialled bridging; pt unable to tolerate either tech and will required TA for LBD at this time. He is able to A with hinge braces within his reach; RUE elbow contracted ~90 extension, able to flex 0-90.          Balance  Sitting Balance: Stand by assistance  Standing Balance: Dependent/Total   Standing Balance  Time: <10sec  Activity: static stand EOB  Comment: trialled 2 stand c RW, unable to achieve first stand with Ax3, 2nd trial with Ax4, able to clear buttocks from fully rasied bed, R compensatory lean; requires BLE stabilization to prevent slipping, poor stand tolerance, baseline dififculty extending LLE; F use of PWB to RLE, tactile input provided to increase insight and writer to ID compliance/tolerance  Functional Mobility  Functional Mobility Comments: RW and Sarastedy not safe or appropriate at this time: pain, weight bearing precautions, baseline contractures; to assess slide board next treament     Bed mobility  Bridging: Unable to assess  Rolling to Left: Moderate assistance, 2 Person assistance  Rolling to Right: Moderate assistance, 2 Person assistance  Supine to Sit: Maximum 141* 153     BMP:   Recent Labs     07/20/20  2343 07/21/20  0609 07/22/20  0646    134* 134*   K 4.6 4.3 4.3    101 99   CO2 24 24 24   PHOS  --  2.9 2.4*   BUN 26* 26* 19   CREATININE 1.78* 1.77* 1.43*     BNP: No results for input(s): BNP in the last 72 hours. PT/INR: No results for input(s): PROTIME, INR in the last 72 hours. APTT: No results for input(s): APTT in the last 72 hours. CARDIAC ENZYMES: No results for input(s): CKMB, CKMBINDEX, TROPONINT in the last 72 hours. Invalid input(s): CKTOTAL;3  FASTING LIPID PANEL:  Lab Results   Component Value Date    CHOL 163 10/15/2018    HDL 34 (L) 10/15/2018    TRIG 161 (H) 10/15/2018     LIVER PROFILE: No results for input(s): AST, ALT, ALB, BILIDIR, BILITOT, ALKPHOS in the last 72 hours. I/O (24Hr): Intake/Output Summary (Last 24 hours) at 7/22/2020 0949  Last data filed at 7/22/2020 0640  Gross per 24 hour   Intake 1450 ml   Output 1650 ml   Net -200 ml       Glu last 24 hour  Recent Labs     07/21/20  0604 07/21/20  1122 07/21/20  1610 07/21/20  2240   POCGLU 130* 243* 169* 262*       Recent Labs     07/20/20  2159   COLORU DARK YELLOW*   PHUR 5.5   SPECGRAV 1.030   PROTEINU 1+*   RBCUA 0 TO 2   BACTERIA MODERATE*   NITRU NEGATIVE   WBCUA 2 TO 5   LEUKOCYTESUR NEGATIVE   YEAST NOT REPORTED   GLUCOSEU NEGATIVE   BILIRUBINUR Presumptive positive. Unable to confirm due to unavailability of reagent. *         Impression: Mr. Alexandria Swan is a 59 y.o. male with a history of Tibial plateau fracture, left, closed, initial encounter     1. Right tibial plateau fracture nondisplaced, sustained tibial plateau fracture left indeterminate age- ORIF left tibial plateau and stabilization right knee-weightbearing as tolerated right lower extreme with hinged brace, partial weightbearing left lower extremity with hinged brace  2. Recent right hip IM nailing  3.  Osteogenesis imperfecta -history of multiple fractures including patellar fractures bilaterally and upper extremity fractures, elbow contracture, 30 broken bones in the past  4. Type 2 diabetes-insulin  5. Hypertension-metoprolol  6. Pain- Percocet Neurontin  7. Depression/insomnia-Zoloft, Desyrel     Recommendations:  1. Diagnosis: Osteogenesis imperfecta factor with bilateral tibial plateau fractures-awaiting decision surgeries and therapies  2. Therapy: Mod assist 2 person rolling, max assist 2 person supine to sit, sit to stand dependent/total 3-4 assist unsafe to attempt Chrystie Ends steady, max assist 2 person lower extremity  3. Medical  Necessity: As above  4. Support: Clarify, will need 24/7  5. Rehab recommendation:  Currently low level for acute inpatient rehabilitation, recommend skilled nursing facility. However will follow if patient makes improvement and able to attempt sliding board transfers then may benefit from acute inpatient rehabilitation for Wheelchair transfers and wheelchair mobility if home wheelchair accessible. Patient also need 24/7 care  6. DVT proph:  Heparin      Please call with questions. Victoriano Ludny. Mu Zambrano MD       This note is created with the assistance of a speech recognition program.  While intending to generate a document that actually reflects the content of the visit, the document can still have some errors including those of syntax and sound a like substitutions which may escape proof reading.   In such instances, actual meaning can be extrapolated by contextual diversion

## 2020-07-22 NOTE — PROGRESS NOTES
[facility]   INPATIENT OCCUPATIONAL THERAPY  PROGRESS NOTE  Date: 2020  Patient Name: Harry Zamudio      Room: 6637/7497-15  MRN: [id number]    : 1955  (62 y.o.) Gender: male     Discharge Recommendations:  Further Occupational  Therapy is recommended upon facility discharge. OT Equipment Recommendations  Equipment Needed: No(Has many devices at home)    Referring Practitioner: Dr Hali Adkins  Diagnosis: Fall with Bilateral Knee Pain  General  Chart Reviewed: Yes, Operative Notes, Previous Admission, Orders, Progress Notes, History and Physical, Imaging  Patient assessed for rehabilitation services?: Yes  Additional Pertinent Hx: Multiple admissions and procedures for various fractures - Osteogenesis Imperfecta  Family / Caregiver Present: No  Referring Practitioner: Dr Hali Adkins  Diagnosis: Fall with Bilateral Knee Pain    Restrictions  Restrictions/Precautions: Weight Bearing, General Precautions, Surgical Protocols  Implants present? : Metal implants(Multiple B LE surgeries)  Other position/activity restrictions: WBAT RLE with hinged brace, PWB L LE with hinged braces, OK for ROM. Right Lower Extremity Weight Bearing: Weight Bearing As Tolerated(Non-displaced Tibia Fracture - 2 Cannulated screw fixation)  Left Lower Extremity Weight Bearing: Partial Weight Bearing(ORIF for Displaced Tibia Plateau Fracture)  Right Upper Extremity Weight Bearing: Weight Bearing As Tolerated(Elbow extension block)  Required Braces or Orthoses  Right Lower Extremity Brace: Knee Brace  RLE Brace Type: Hinged brace  Left Lower Extremity Brace: Knee Brace  LLE Brace Type: Hinged brace. Required Braces or Orthoses?: Yes      Subjective  Subjective: Alert and cooperative  Comments: Pt heavily motivated; strongly wanting to DC home, adamant to not DC to SNF, recpetive to ARU. Was unable to tolerate standing yesterday, required Ax4 this date c RW from hospital bed fully elevated.  He is unable to complete LBD tasks at this time but can assist with upper half management of his BLE braces. At this time he requires a vidal to transfer out of bed. Planning to trial slide board next treatment as pt has one available as well as 2 human assist at home. Patient Currently in Pain: Yes  Pain Level: 7  Pain Location: Knee  Pain Orientation: Right;Left  Overall Orientation Status: Within Functional Limits(Mental Status has cleared and improved)  Patient Observation  Observations: LE olimpia braces applied at tx initiation by PT dept, removed at treatment completion; pt upright in bed ~40-50 degrees during manangement  Pain Assessment  Pain Level: 7  Pain Type: Chronic pain  Pain Location: Knee  Pain Orientation: Right, Left  Pain Descriptors: Aching    Objective     Bed mobility  Bridging: Unable to assess  Rolling to Left: 2 Person assistance; Moderate assistance  Rolling to Right: 2 Person assistance; Moderate assistance  Supine to Sit: 2 Person assistance;Maximum assistance  Sit to Supine: Maximum assistance  Scooting: Dependent/Total  Comment: VC for hand placement/wrist joint protection, does assist as able, heavy assistance to LB d/y brace/pain; tolerates EOB ~25min completing ADLs/standing tasks/trialling positions. Required SBA for management/repositioning of RLE d/t pain.   Balance  Sitting Balance: Stand by assistance  Standing Balance: Dependent/Total  Standing Balance  Time: <10sec  Activity: static stand EOB  Comment: trialled 2 stand c RW, unable to achieve first stand with Ax3, 2nd trial with Ax4, able to clear buttocks from fully rasied bed, R compensatory lean; requires BLE stabilization to prevent slipping, poor stand tolerance, baseline dififculty extending LLE; F use of PWB to RLE, tactile input provided to increase insight and writer to ID compliance/tolerance  Functional Mobility  Functional Mobility Comments: RW and Sarastedy not safe or appropriate at this time: pain, weight bearing precautions, baseline contractures; to assess slide board next treament   ADL  Feeding: Setup; Increased time to complete  Grooming: Minimal assistance  UE Bathing: Minimal assistance  LE Bathing: Maximum assistance  UE Dressing: Minimal assistance  LE Dressing: Maximum assistance(While in bed - Assist over feet  and to hike up legs and over buttocks)  Toileting: Contact guard assistance(Using Urinal;  No Bowel Movement since admission to hospital)  Additional Comments: Grooming EOB c SBA for safety. Trialled LBD EOB utilizing lateral weightshifting as well as trialled bridging; pt unable to tolerate either tech and will required TA for LBD at this time. He is able to A with hinge braces within his reach; RUE elbow contracted ~90 extension, able to flex 0-90. Transfers  Sit to stand: Dependent/Total  Stand to sit: Dependent/Total  Transfer Comments: Bed fully raised, elevated bed at home; Assist 3-4x, keeps BUE on RW at this time, focus on standing not functional hand placement this date           Additional Activities: Review of need for placement/insight to decreased ability to tend to self cares         Positioning  Adaptations: Coban to IV site for increased stability; adhesive removed for skin protection/integrity; bed adaptations utilized for improved ease/tech        Breathing: holds breath, VC for breathing particularly on exertion, F carryover  Pain mgt: denied need for ice at treatment completion,  Reviewed benefits  Assessment  Performance deficits / Impairments: Decreased safe awareness;Decreased ADL status; Decreased endurance;Decreased strength;Decreased functional mobility   Assessment: still unable to functionally stand despite motivation, efforts and treatment arranged around pain med schedule; to trial slide board next date for fxl mobility tasks as well as increasing independence with LB ADLs  Prognosis: Fair;Good  Discharge Recommendations: Patient would benefit from continued therapy after discharge  Activity Tolerance: Patient limited by pain; Patient limited by fatigue  Safety Devices in place: Yes  Type of devices: Call light within reach;Nurse notified; Left in bed;Patient at risk for falls             Patient Education:   ARU need, deficits, DME for functional mobility  Learner:patient  Method: demonstration and explanation       Outcome: needs reinforcement for insight to needs/potential need for placement    Plan  Safety Devices  Safety Devices in place: Yes  Type of devices: Call light within reach, Nurse notified, Left in bed, Patient at risk for falls  Plan  Times per week: 2-5 sessions  Times per day: Daily  Current Treatment Recommendations: Strengthening, Endurance Training, Patient/Caregiver Education & Training, Safety Education & Training, Functional Mobility Training, Self-Care / ADL      Goals  Short term goals  Time Frame for Short term goals: 2-5 days  Short term goal 1: Tolerate 10-25 minutes of Genral Care / UB Exercise to support self care and mobiltiy needs  Short term goal 2: D/V understanding of PWB for Left LE with application to self-care tasks and mobility  Short term goal 3: Participate in seated side of bed self care tasks with Mod Assist after setup    OT Individual Minutes  Time In: 0810  Time Out: 3722  Minutes: 34      Electronically signed by MARLENE Morales on 7/22/20 at 9:08 AM EDT

## 2020-07-22 NOTE — PROGRESS NOTES
Physical Therapy  Facility/Department: [facility] MED SURG  Daily Treatment Note  NAME: Luis Fernando Arguello  : 1955  MRN: [id number]    Date of Service: 2020    Discharge Recommendations:  Patient would benefit from continued therapy after discharge      Assessment   Body structures, Functions, Activity limitations: Decreased functional mobility ; Decreased ROM; Decreased strength;Decreased endurance;Decreased balance; Increased pain;Decreased posture  Assessment: Pt's mobility severely impaired due to impaired ROM B knees, high pain level, and weakness B LE. Will cont POC to maximize function. Treatment Diagnosis: Impaired fucntion  REQUIRES PT FOLLOW UP: Yes  Activity Tolerance  Activity Tolerance: Patient limited by pain     Patient Diagnosis(es): The primary encounter diagnosis was Closed fracture of left tibial plateau, initial encounter. A diagnosis of Acute pain of right knee was also pertinent to this visit. has a past medical history of Contracture, elbow, Erectile dysfunction, Hypertension, Osteogenesis imperfecta, and Type II or unspecified type diabetes mellitus without mention of complication, not stated as uncontrolled. has a past surgical history that includes Cholecystectomy; Tonsillectomy and adenoidectomy; fracture surgery (Right); fracture surgery (Bilateral); fracture surgery (Left); Femur fracture surgery (Right, 2020); and ORIF PROXIMAL TIBIAL PLATEAU FRACTURE (Bilateral, 2020).     Restrictions  Restrictions/Precautions  Restrictions/Precautions: Weight Bearing, General Precautions, Surgical Protocols  Required Braces or Orthoses?: Yes  Implants present? : Metal implants(Multiple B LE surgeries)  Lower Extremity Weight Bearing Restrictions  Right Lower Extremity Weight Bearing: Weight Bearing As Tolerated(Non-displaced Tibia Fracture - 2 Cannulated screw fixation)  Left Lower Extremity Weight Bearing: Partial Weight Bearing(ORIF for Displaced Tibia Plateau Fracture)  Upper Extremity Weight Bearing Restrictions  Right Upper Extremity Weight Bearing: Weight Bearing As Tolerated(Elbow extension block)  Required Braces or Orthoses  Right Lower Extremity Brace: Knee Brace  RLE Brace Type: Hinged brace  Left Lower Extremity Brace: Knee Brace  LLE Brace Type: Hinged brace. Position Activity Restriction  Other position/activity restrictions: WBAT RLE with hinged brace, PWB L LE with hinged braces, OK for ROM. Subjective   General  Chart Reviewed: Yes  Additional Pertinent Hx: Riccardo Torres is a 59 y.o. male who presents for bilateral tibial plateau fractures. Patient has a history of osteogenesis imperfecta. He has had multiple fractures and surgeries on both lower extremities as well as upper extremities. Patient recently had work done on his right hip per Dr. Radha Cee with an intramedullary christiano. Multiple admissions and procedures for various fractures - Osteogenesis Imperfecta. ORIF PROXIMAL TIBIAL PLATEAU FRACTURE (Bilateral, 7/20/2020). Family / Caregiver Present: No  Referring Practitioner: Dr Gloria Jones. Subjective  Subjective: (B) hinged braced donned by therapist at start of session. General Comment  Comments: Poor pain tolerance. Requiring 3-4 people to stand from elevated EOB for safety with RW, unsafe to attempt with aakash felciianojohana due to limited (B) LE ROM and PWBing on LLE the \"good knee. \" Co-tx with Alecia Roberts. RN x2 assisting with sit to stand transfers.   Pain Screening  Patient Currently in Pain: Yes  Pain Assessment  Pain Assessment: 0-10  Pain Level: 4  Patient's Stated Pain Goal: (4/10 at rest, increasing to 10/10 with activity)  Pain Type: Acute pain  Pain Location: Knee  Pain Orientation: Right;Left(Distal of patella bilaterally)  Vital Signs  Patient Currently in Pain: Yes       Orientation  Orientation  Overall Orientation Status: Within Normal Limits  Cognition      Objective   Bed mobility  Rolling to Left: Moderate assistance;2 Person assistance  Rolling to Right: Training, Safety Education & Training  Safety Devices  Type of devices:  All fall risk precautions in place, Call light within reach, Gait belt, Patient at risk for falls, Left in bed, Bed alarm in place, Nurse notified        07/22/20 0938 07/22/20 1321   PT Individual Minutes   Time In [facility] [place]    Time Out [id number]   Minutes 35 [address] [place], [place]

## 2020-07-22 NOTE — PROGRESS NOTES
Post Operative Progress Note    7/22/2020 8:22 AM  Info:   Admit Date: 7/17/2020  PCP: ADEEL Santos CNP      Medications:   Scheduled Meds:   heparin (porcine)  5,000 Units Subcutaneous BID    metoprolol succinate  25 mg Oral BID    sodium chloride flush  10 mL Intravenous 2 times per day    calcium elemental  1,250 mg Oral Nightly    gabapentin  100 mg Oral TID    insulin glargine  40 Units Subcutaneous Daily    insulin glargine  60 Units Subcutaneous Nightly    pantoprazole  40 mg Oral Daily    sertraline  100 mg Oral Nightly    traZODone  100 mg Oral Nightly    insulin lispro  0-12 Units Subcutaneous TID WC    insulin lispro  0-6 Units Subcutaneous Nightly    insulin lispro  8 Units Subcutaneous TID WC     Continuous Infusions:   dextrose      sodium chloride 75 mL/hr at 07/21/20 2048     PRN Meds:oxyCODONE-acetaminophen, diphenhydrAMINE, sodium chloride flush, acetaminophen, [Held by provider] morphine **OR** [Held by provider] morphine, glucose, dextrose, glucagon (rDNA), dextrose    Diet:   Diet: DIET CARB CONTROL; Daily Fluid Restriction: 1000 ml; Low Potassium    Subjective:   Systemic or Specific Complaints:No Complaints and Pain Control    Objective:     Patient Vitals for the past 24 hrs:   BP Temp Temp src Pulse Resp SpO2   07/22/20 0640 127/82 98.3 °F (36.8 °C) Oral 89 15 98 %   07/22/20 0114 129/86 98.3 °F (36.8 °C) Oral 83 15 97 %   07/21/20 2000 (!) 170/73 98.5 °F (36.9 °C) Oral 104 16 99 %   07/21/20 1252 129/76 98.5 °F (36.9 °C) Oral 81 16 --         Wound: incision clean and dry without sign of infection   Motion: expected discomfort with range of motion in affected extremity   DVT Exam:  no evidence of DVT on physical examination         Data Review  CBC:   Recent Labs     07/20/20  2343 07/21/20  0609 07/22/20  0646   WBC 6.1 6.8 5.6   HGB 12.2* 11.8* 11.5*   * 141* 153           Assessment:   Patient is S/P bilateral  Tibial plateau fractures  Pain is not well

## 2020-07-22 NOTE — PROGRESS NOTES
Reason for Follow up: VALARIE. HISTORY:    Feels better. Cr is improving towards baseline    Review Of Systems:   Constitutional: No fever, chills, lethargy, weakness or wt loss. Cardiac:  No chest pain, dyspnea, orthopnea or PND. Pulmonary:  No cough, phlegm or wheezing. Abdomen:  No abdominal pain, nausea, vomiting or diarrhea. :   No hematuria, pyuria, dysuria or flank pain. Extremities:  No swelling, has knee pains. Scheduled Meds:   heparin (porcine)  5,000 Units Subcutaneous BID    metoprolol succinate  25 mg Oral BID    sodium chloride flush  10 mL Intravenous 2 times per day    calcium elemental  1,250 mg Oral Nightly    gabapentin  100 mg Oral TID    insulin glargine  40 Units Subcutaneous Daily    insulin glargine  60 Units Subcutaneous Nightly    pantoprazole  40 mg Oral Daily    sertraline  100 mg Oral Nightly    traZODone  100 mg Oral Nightly    insulin lispro  0-12 Units Subcutaneous TID WC    insulin lispro  0-6 Units Subcutaneous Nightly    insulin lispro  8 Units Subcutaneous TID WC   Continuous Infusions:   dextrose      sodium chloride 75 mL/hr at 07/22/20 1040     PRN Meds:oxyCODONE-acetaminophen, diphenhydrAMINE, sodium chloride flush, acetaminophen, [Held by provider] morphine **OR** [Held by provider] morphine, glucose, dextrose, glucagon (rDNA), dextrose    No Known Allergies    Physical Exam:  Blood pressure (!) 163/69, pulse 86, temperature 99.5 °F (37.5 °C), temperature source Oral, resp. rate 16, height 6' (1.829 m), weight 210 lb 15.7 oz (95.7 kg), SpO2 99 %. In: 1560 [P.O.:360; I.V.:1200]  Out: 1250   In: 1560   Out: 1250 [Urine:1250]    General:  Awake, alert, not in distress. Appears to be stated age. HEENT: Atraumatic, normocephalic. Anicteric sclera. Pink and moist oral mucosa. No carotid bruit. No JVD. Chest: Bilateral air entry, clear to auscultation, no wheezing, rhonchi or rales. Cardiovascular: RRR, S1S2, no murmur, rub or gallop.  No lower extremity

## 2020-07-23 LAB
ABSOLUTE EOS #: 0.3 K/UL (ref 0–0.4)
ABSOLUTE IMMATURE GRANULOCYTE: ABNORMAL K/UL (ref 0–0.3)
ABSOLUTE LYMPH #: 0.5 K/UL (ref 1–4.8)
ABSOLUTE MONO #: 0.3 K/UL (ref 0.1–1.3)
ANION GAP SERPL CALCULATED.3IONS-SCNC: 10 MMOL/L (ref 9–17)
BASOPHILS # BLD: 1 % (ref 0–2)
BASOPHILS ABSOLUTE: 0 K/UL (ref 0–0.2)
BUN BLDV-MCNC: 20 MG/DL (ref 8–23)
BUN/CREAT BLD: ABNORMAL (ref 9–20)
CALCIUM SERPL-MCNC: 9.9 MG/DL (ref 8.6–10.4)
CHLORIDE BLD-SCNC: 101 MMOL/L (ref 98–107)
CO2: 26 MMOL/L (ref 20–31)
CREAT SERPL-MCNC: 1.42 MG/DL (ref 0.7–1.2)
DIFFERENTIAL TYPE: ABNORMAL
EOSINOPHILS RELATIVE PERCENT: 8 % (ref 0–4)
GFR AFRICAN AMERICAN: >60 ML/MIN
GFR NON-AFRICAN AMERICAN: 50 ML/MIN
GFR SERPL CREATININE-BSD FRML MDRD: ABNORMAL ML/MIN/{1.73_M2}
GFR SERPL CREATININE-BSD FRML MDRD: ABNORMAL ML/MIN/{1.73_M2}
GLUCOSE BLD-MCNC: 125 MG/DL (ref 75–110)
GLUCOSE BLD-MCNC: 142 MG/DL (ref 70–99)
GLUCOSE BLD-MCNC: 171 MG/DL (ref 75–110)
GLUCOSE BLD-MCNC: 233 MG/DL (ref 75–110)
GLUCOSE BLD-MCNC: 261 MG/DL (ref 75–110)
HCT VFR BLD CALC: 35 % (ref 41–53)
HEMOGLOBIN: 11.7 G/DL (ref 13.5–17.5)
IMMATURE GRANULOCYTES: ABNORMAL %
LYMPHOCYTES # BLD: 12 % (ref 24–44)
MAGNESIUM: 2 MG/DL (ref 1.6–2.6)
MCH RBC QN AUTO: 29.3 PG (ref 26–34)
MCHC RBC AUTO-ENTMCNC: 33.4 G/DL (ref 31–37)
MCV RBC AUTO: 87.5 FL (ref 80–100)
MONOCYTES # BLD: 8 % (ref 1–7)
NRBC AUTOMATED: ABNORMAL PER 100 WBC
PDW BLD-RTO: 13.1 % (ref 11.5–14.9)
PHOSPHORUS: 2.8 MG/DL (ref 2.5–4.5)
PLATELET # BLD: 173 K/UL (ref 150–450)
PLATELET ESTIMATE: ABNORMAL
PMV BLD AUTO: 8 FL (ref 6–12)
POTASSIUM SERPL-SCNC: 4.3 MMOL/L (ref 3.7–5.3)
RBC # BLD: 3.99 M/UL (ref 4.5–5.9)
RBC # BLD: ABNORMAL 10*6/UL
SARS-COV-2, PCR: NORMAL
SARS-COV-2, RAPID: NORMAL
SARS-COV-2: NOT DETECTED
SEG NEUTROPHILS: 71 % (ref 36–66)
SEGMENTED NEUTROPHILS ABSOLUTE COUNT: 3.1 K/UL (ref 1.3–9.1)
SODIUM BLD-SCNC: 137 MMOL/L (ref 135–144)
SOURCE: NORMAL
WBC # BLD: 4.3 K/UL (ref 3.5–11)
WBC # BLD: ABNORMAL 10*3/UL

## 2020-07-23 PROCEDURE — 6370000000 HC RX 637 (ALT 250 FOR IP): Performed by: ORTHOPAEDIC SURGERY

## 2020-07-23 PROCEDURE — 6370000000 HC RX 637 (ALT 250 FOR IP): Performed by: INTERNAL MEDICINE

## 2020-07-23 PROCEDURE — 97110 THERAPEUTIC EXERCISES: CPT

## 2020-07-23 PROCEDURE — 82947 ASSAY GLUCOSE BLOOD QUANT: CPT

## 2020-07-23 PROCEDURE — 97530 THERAPEUTIC ACTIVITIES: CPT

## 2020-07-23 PROCEDURE — 85025 COMPLETE CBC W/AUTO DIFF WBC: CPT

## 2020-07-23 PROCEDURE — 1200000000 HC SEMI PRIVATE

## 2020-07-23 PROCEDURE — 6360000002 HC RX W HCPCS: Performed by: INTERNAL MEDICINE

## 2020-07-23 PROCEDURE — 36415 COLL VENOUS BLD VENIPUNCTURE: CPT

## 2020-07-23 PROCEDURE — 83735 ASSAY OF MAGNESIUM: CPT

## 2020-07-23 PROCEDURE — U0003 INFECTIOUS AGENT DETECTION BY NUCLEIC ACID (DNA OR RNA); SEVERE ACUTE RESPIRATORY SYNDROME CORONAVIRUS 2 (SARS-COV-2) (CORONAVIRUS DISEASE [COVID-19]), AMPLIFIED PROBE TECHNIQUE, MAKING USE OF HIGH THROUGHPUT TECHNOLOGIES AS DESCRIBED BY CMS-2020-01-R: HCPCS

## 2020-07-23 PROCEDURE — 84100 ASSAY OF PHOSPHORUS: CPT

## 2020-07-23 PROCEDURE — 80048 BASIC METABOLIC PNL TOTAL CA: CPT

## 2020-07-23 RX ADMIN — OXYCODONE AND ACETAMINOPHEN 2 TABLET: 5; 325 TABLET ORAL at 12:33

## 2020-07-23 RX ADMIN — OXYCODONE AND ACETAMINOPHEN 2 TABLET: 5; 325 TABLET ORAL at 06:24

## 2020-07-23 RX ADMIN — INSULIN LISPRO 2 UNITS: 100 INJECTION, SOLUTION INTRAVENOUS; SUBCUTANEOUS at 21:37

## 2020-07-23 RX ADMIN — METOPROLOL SUCCINATE 25 MG: 25 TABLET, EXTENDED RELEASE ORAL at 21:36

## 2020-07-23 RX ADMIN — INSULIN GLARGINE 60 UNITS: 100 INJECTION, SOLUTION SUBCUTANEOUS at 21:37

## 2020-07-23 RX ADMIN — INSULIN LISPRO 8 UNITS: 100 INJECTION, SOLUTION INTRAVENOUS; SUBCUTANEOUS at 17:58

## 2020-07-23 RX ADMIN — HEPARIN SODIUM 5000 UNITS: 5000 INJECTION INTRAVENOUS; SUBCUTANEOUS at 08:21

## 2020-07-23 RX ADMIN — GABAPENTIN 100 MG: 100 CAPSULE ORAL at 21:36

## 2020-07-23 RX ADMIN — SERTRALINE HYDROCHLORIDE 100 MG: 100 TABLET ORAL at 21:36

## 2020-07-23 RX ADMIN — INSULIN LISPRO 6 UNITS: 100 INJECTION, SOLUTION INTRAVENOUS; SUBCUTANEOUS at 17:59

## 2020-07-23 RX ADMIN — METOPROLOL SUCCINATE 25 MG: 25 TABLET, EXTENDED RELEASE ORAL at 11:28

## 2020-07-23 RX ADMIN — OXYCODONE AND ACETAMINOPHEN 2 TABLET: 5; 325 TABLET ORAL at 21:36

## 2020-07-23 RX ADMIN — GABAPENTIN 100 MG: 100 CAPSULE ORAL at 15:02

## 2020-07-23 RX ADMIN — PANTOPRAZOLE SODIUM 40 MG: 40 TABLET, DELAYED RELEASE ORAL at 11:27

## 2020-07-23 RX ADMIN — TRAZODONE HYDROCHLORIDE 100 MG: 100 TABLET ORAL at 21:36

## 2020-07-23 RX ADMIN — INSULIN GLARGINE 40 UNITS: 100 INJECTION, SOLUTION SUBCUTANEOUS at 08:21

## 2020-07-23 RX ADMIN — INSULIN LISPRO 8 UNITS: 100 INJECTION, SOLUTION INTRAVENOUS; SUBCUTANEOUS at 08:21

## 2020-07-23 RX ADMIN — OXYCODONE AND ACETAMINOPHEN 2 TABLET: 5; 325 TABLET ORAL at 16:00

## 2020-07-23 RX ADMIN — CALCIUM 1250 MG: 500 TABLET ORAL at 21:36

## 2020-07-23 RX ADMIN — INSULIN LISPRO 8 UNITS: 100 INJECTION, SOLUTION INTRAVENOUS; SUBCUTANEOUS at 11:34

## 2020-07-23 RX ADMIN — HEPARIN SODIUM 5000 UNITS: 5000 INJECTION INTRAVENOUS; SUBCUTANEOUS at 21:36

## 2020-07-23 RX ADMIN — GABAPENTIN 100 MG: 100 CAPSULE ORAL at 09:00

## 2020-07-23 RX ADMIN — OXYCODONE AND ACETAMINOPHEN 2 TABLET: 5; 325 TABLET ORAL at 00:51

## 2020-07-23 ASSESSMENT — PAIN SCALES - GENERAL
PAINLEVEL_OUTOF10: 4
PAINLEVEL_OUTOF10: 4
PAINLEVEL_OUTOF10: 5
PAINLEVEL_OUTOF10: 4
PAINLEVEL_OUTOF10: 6
PAINLEVEL_OUTOF10: 5
PAINLEVEL_OUTOF10: 5
PAINLEVEL_OUTOF10: 0
PAINLEVEL_OUTOF10: 5
PAINLEVEL_OUTOF10: 5
PAINLEVEL_OUTOF10: 6
PAINLEVEL_OUTOF10: 3

## 2020-07-23 ASSESSMENT — PAIN DESCRIPTION - PAIN TYPE
TYPE: SURGICAL PAIN
TYPE: SURGICAL PAIN;CHRONIC PAIN
TYPE: SURGICAL PAIN
TYPE: ACUTE PAIN;SURGICAL PAIN
TYPE: SURGICAL PAIN

## 2020-07-23 ASSESSMENT — PAIN DESCRIPTION - ORIENTATION
ORIENTATION: RIGHT;LEFT
ORIENTATION: LEFT;RIGHT

## 2020-07-23 ASSESSMENT — PAIN DESCRIPTION - DESCRIPTORS
DESCRIPTORS: ACHING

## 2020-07-23 ASSESSMENT — PAIN DESCRIPTION - LOCATION
LOCATION: KNEE

## 2020-07-23 ASSESSMENT — PAIN DESCRIPTION - FREQUENCY
FREQUENCY: CONTINUOUS
FREQUENCY: CONTINUOUS

## 2020-07-23 ASSESSMENT — PAIN SCALES - WONG BAKER: WONGBAKER_NUMERICALRESPONSE: 0

## 2020-07-23 ASSESSMENT — PAIN DESCRIPTION - PROGRESSION: CLINICAL_PROGRESSION: NOT CHANGED

## 2020-07-23 NOTE — PLAN OF CARE
Problem: Falls - Risk of:  Goal: Will remain free from falls  Description: Will remain free from falls  7/23/2020 0407 by Yusuf Dupont RN  Outcome: Ongoing  Note: Pt remained absent from falls. Call light within reach. Bed locked and in lowest position. Problem: Falls - Risk of:  Goal: Absence of physical injury  Description: Absence of physical injury  Outcome: Ongoing  Note: Patient remains free of injury. safe environment maintained       Problem: Skin Integrity:  Goal: Will show no infection signs and symptoms  Description: Will show no infection signs and symptoms  7/23/2020 0407 by Yusuf Dupont RN  Outcome: Ongoing  Note: No new s/s of infection. Will continue to monitor        Problem: Skin Integrity:  Goal: Absence of new skin breakdown  Description: Absence of new skin breakdown  Outcome: Ongoing  Note: Pt skin integrity remained intact, no new alterations noted. Head to toe completed, see chart assessment. Problem: Pain:  Goal: Pain level will decrease  Description: Pain level will decrease  7/23/2020 0407 by Yusuf Dupont RN  Outcome: Ongoing  Note: Pain in bilateral knees. Medicated as ordered. Patient tolerating      Problem: Pain:  Goal: Control of acute pain  Description: Control of acute pain  Outcome: Ongoing  Note: Pain in bilateral knees. Medicated as ordered. Patient tolerating      Problem: Pain:  Goal: Control of chronic pain  Description: Control of chronic pain  Outcome: Ongoing  Note: Pain in bilateral knees. Medicated as ordered. Patient tolerating      Problem: Infection - Surgical Site:  Goal: Will show no infection signs and symptoms  Description: Will show no infection signs and symptoms  7/23/2020 0407 by Yusuf Dupont RN  Outcome: Ongoing  Note: No new s/s of infection.  Will continue to monitor

## 2020-07-23 NOTE — PROGRESS NOTES
[facility]    OCCUPATIONAL THERAPY MISSED TREATMENT NOTE   INPATIENT   Date: 20  Patient Name: Christine Moise       Room: 3468/3484-12  MRN: [id number]   Account #: [de-identified]    : 1955  (62 y.o.)  Gender: male   Referring Practitioner: Dr Parviz Knight  Diagnosis: Fall with Bilateral Knee Pain             REASON FOR MISSED TREATMENT:  Patient unable to participate   -   pt placed on bed pan prior to writer arriving. will attempt again time permitting.          Jenn Gan MARLENE

## 2020-07-23 NOTE — PROGRESS NOTES
Physical Therapy  DATE: 2020  NAME: Ross Bella  MRN: [id number]   : 1955    Discharge Recommendations: Further Physical  Therapy is recommended upon facility discharge. Subjective: Patient refuses out of bed this AM, requesting bilateral LE range of motion and stretching. Patient reports he will be agreeable to attempt stand and slide board transfer during afternoon session. C/o increased soreness this date. Pain: /10 (bilateral knees, R >L)  Patient follows: All Commands  Is patient on ventilator: No  Is patient on sedation: No  Restrictions  Restrictions/Precautions  Restrictions/Precautions: Weight Bearing, General Precautions, Surgical Protocols  Required Braces or Orthoses?: Yes  Implants present? : Metal implants(Multiple B LE surgeries)  Lower Extremity Weight Bearing Restrictions  Right Lower Extremity Weight Bearing: Weight Bearing As Tolerated(Non-displaced Tibia Fracture - 2 Cannulated screw fixation)  Left Lower Extremity Weight Bearing: Partial Weight Bearing(ORIF for Displaced Tibia Plateau Fracture)  Upper Extremity Weight Bearing Restrictions  Right Upper Extremity Weight Bearing: Weight Bearing As Tolerated(Elbow extension block)  Required Braces or Orthoses  Right Lower Extremity Brace: Knee Brace  RLE Brace Type: Hinged brace  Left Lower Extremity Brace: Knee Brace  LLE Brace Type: Hinged brace. Position Activity Restriction  Other position/activity restrictions: WBAT RLE with hinged brace, PWB L LE with hinged braces, OK for ROM. Therapeutic exercises:  LE(s)  Bilateral Active-assist range of motion/Passive stretching all planes x 15 reps  bilateral gastrocnemius stretching 3 reps x 20 seconds    Comments: Limitations to all LE ROM bilaterally, right with greater impairments vs. Left. Use of orange easy slide to maintain skin integrity and avoid sheering forces. Increased time to allow for appropriate stretching. Rest breaks PRN due to increased pain.  Verbal cues for breathing, patient tends to hold breath. Goals  Short Term Goals  Short term goal 1: Pt able to perform supine >sit at max A   Short term goal 2:  Pt able to perform sit to supine at mod a x 2   Short term goal 3:  Pt able to tolerate ROM ex's B knee to improve flexion by 10 to 20 degrees. Short term goal 4: Pt able to perform sit to stand with aakash stedy with bed height raised up, max a x 2  Short term goal 5: Pt able to tolerate standing in aakash stedy for ~ 3o to 50 secs. Plan: Progress functional mobility as medically appropriate.    Time In: 0915  Time Out: 7231  Time Coded Minutes (treatment minutes): 27  Rehab Potential: Fair  Treatments/week: PANFILO Andrade, PTA

## 2020-07-23 NOTE — CARE COORDINATION
ONGOING DISCHARGE PLAN:    Plan remains for LSW to continue to follow for DC to SNF, [facility]. Awaiting Pre-cert. Hopeful to get dale. Pt. Is POD #3, LT. ORIF. PT/OT on board. Pt. Will DC to Daughter [facility] after, w/ [facility], [facility]. Will continue to follow for additional discharge needs.     Electronically signed by Roro Huerta RN on 7/23/2020 at 2:28 PM

## 2020-07-23 NOTE — PROGRESS NOTES
[address] [facility]    INPATIENT OCCUPATIONAL THERAPY  PROGRESS NOTE  Date: 2020  Patient Name: Sundar De La Garza      Room: 0727/5185-27  MRN: [id number]    : 1955  (62 y.o.) Gender: male     Discharge Recommendations:  Further Occupational  Therapy is recommended upon facility discharge. OT Equipment Recommendations  Equipment Needed: No(Has many devices at home)    Referring Practitioner: Dr Kwame Pinzon  Diagnosis: Fall with Bilateral Knee Pain  General  Chart Reviewed: Yes, Operative Notes, Previous Admission, Orders, Progress Notes, History and Physical, Imaging  Patient assessed for rehabilitation services?: Yes  Additional Pertinent Hx: Multiple admissions and procedures for various fractures - Osteogenesis Imperfecta  Family / Caregiver Present: No  Referring Practitioner: Dr Kwame Pinzon  Diagnosis: Fall with Bilateral Knee Pain    Restrictions  Restrictions/Precautions: Weight Bearing, General Precautions, Surgical Protocols  Implants present? : Metal implants(Multiple B LE surgeries)  Other position/activity restrictions: WBAT RLE with hinged brace, PWB L LE with hinged braces, OK for ROM. Right Lower Extremity Weight Bearing: Weight Bearing As Tolerated(Non-displaced Tibia Fracture - 2 Cannulated screw fixation)  Left Lower Extremity Weight Bearing: Partial Weight Bearing(ORIF for Displaced Tibia Plateau Fracture)  Right Upper Extremity Weight Bearing: Weight Bearing As Tolerated(Elbow extension block)  Required Braces or Orthoses  Right Lower Extremity Brace: Knee Brace  RLE Brace Type: Hinged brace  Left Lower Extremity Brace: Knee Brace  LLE Brace Type: Hinged brace.   Required Braces or Orthoses?: Yes      Subjective  Subjective: pt states that he is ready for tx  Comments: Alert and motivated, co-tx sukumar uCellar PTA  Patient Currently in Pain: Yes  Pain Level: 5  Pain Location: Knee  Pain Orientation: Left;Right  Overall Orientation Status: Within Functional Limits  Patient Observation  Observations: LE hinge braces applied at tx initiation by PT dept, removed at treatment completion; pt upright in bed ~40-50 degrees during manangement  Pain Assessment  Pain Assessment: 0-10  Pain Level: 5  Pain Type: Surgical pain  Pain Location: Knee  Pain Orientation: Left, Right  Pain Descriptors: Aching  Clinical Progression: Not changed    Objective     Bed mobility  Bridging: Unable to assess  Rolling to Right: Minimal assistance  Supine to Sit: Moderate assistance; (assistance c bilateral LE)  Scooting: maximum assistance  Balance  Sitting Balance: Stand by assistance         ADL  Feeding: Setup; Increased time to complete  Grooming: Minimal assistance  UE Bathing: Stand by assistance;Setup; Increased time to complete  LE Bathing: Maximum assistance  UE Dressing: Minimal assistance  LE Dressing: Maximum assistance; Increased time to complete(TA to mike/doff bilateral leg braces)  Toileting: Moderate assistance(Using Urinal/bedpan, req assist for bedpan placement and emp)  Additional Comments: answers are based on skilled observation unless otherwise noted                 Additional Activities: tx focused on bed mobility and slideboard transfers Use of slide board from elevated bed to wheelchair, Maxi move sling placed under patient in wheelchair for RN to assist him back to bed. No attempt to stand this date, takes significant time to perform mobility due to high pain levels. Assessment  Performance deficits / Impairments: Decreased safe awareness;Decreased ADL status; Decreased endurance;Decreased strength;Decreased functional mobility   Assessment: still unable to functionally stand despite motivation, efforts and treatment arranged around pain med schedule; to trial slide board next date for fxl mobility tasks as well as increasing independence with LB ADLs  Prognosis: Fair;Good  Discharge Recommendations: Patient would benefit from continued therapy after discharge  Activity Tolerance: Patient Tolerated treatment well  Safety Devices in place: Yes  Type of devices: Call light within reach;Nurse notified; Patient at risk for falls; Left in chair             Patient Education: OT POC, slide board tranfers safety and tech, bed mobility, left LE PWB precaution     Learner:patient and significant other  Method: demonstration and explanation       Outcome: acknowledged understanding and demonstrated understanding     Plan  Safety Devices  Safety Devices in place: Yes  Type of devices: Call light within reach, Nurse notified, Patient at risk for falls, Left in chair  Plan  Times per week: 2-5 sessions  Times per day: Daily  Current Treatment Recommendations: Strengthening, Endurance Training, Patient/Caregiver Education & Training, Safety Education & Training, Functional Mobility Training, Self-Care / ADL      Goals  Short term goals  Time Frame for Short term goals: 2-5 days  Short term goal 1: Tolerate 10-25 minutes of Genral Care / UB Exercise to support self care and mobiltiy needs  Short term goal 2: D/V understanding of PWB for Left LE with application to self-care tasks and mobility  Short term goal 3: Participate in seated side of bed self care tasks with Mod Assist after setup    OT Individual Minutes  Time In: 1311  Time Out: 1350  Minutes: 39      Electronically signed by MARLENE Alves on 7/23/20 at 3:26 PM EDT

## 2020-07-23 NOTE — PROGRESS NOTES
Reason for Follow up: VALARIE. HISTORY:    Feels better. Cr is stable 1.42. SNa improved to 137    Review Of Systems:   Constitutional: No fever, chills, lethargy, weakness or wt loss. Cardiac:  No chest pain, dyspnea, orthopnea or PND. Pulmonary:  No cough, phlegm or wheezing. Abdomen:  No abdominal pain, nausea, vomiting or diarrhea. :   No hematuria, pyuria, dysuria or flank pain. Extremities:  No swelling, has knee pains. Scheduled Meds:   heparin (porcine)  5,000 Units Subcutaneous BID    metoprolol succinate  25 mg Oral BID    sodium chloride flush  10 mL Intravenous 2 times per day    calcium elemental  1,250 mg Oral Nightly    gabapentin  100 mg Oral TID    insulin glargine  40 Units Subcutaneous Daily    insulin glargine  60 Units Subcutaneous Nightly    pantoprazole  40 mg Oral Daily    sertraline  100 mg Oral Nightly    traZODone  100 mg Oral Nightly    insulin lispro  0-12 Units Subcutaneous TID WC    insulin lispro  0-6 Units Subcutaneous Nightly    insulin lispro  8 Units Subcutaneous TID WC   Continuous Infusions:   dextrose      sodium chloride 75 mL/hr at 07/22/20 1040     PRN Meds:oxyCODONE-acetaminophen, diphenhydrAMINE, sodium chloride flush, acetaminophen, [Held by provider] morphine **OR** [Held by provider] morphine, glucose, dextrose, glucagon (rDNA), dextrose    No Known Allergies    Physical Exam:  Blood pressure 130/79, pulse 76, temperature 98 °F (36.7 °C), temperature source Oral, resp. rate 18, height 6' (1.829 m), weight 210 lb 15.7 oz (95.7 kg), SpO2 99 %. In: 2126 [P.O.:400; I.V.:1726]  Out: 2400   In: 2126   Out: 2400 [Urine:2400]    General:  Awake, alert, not in distress. Appears to be stated age. HEENT: Atraumatic, normocephalic. Anicteric sclera. Pink and moist oral mucosa. No JVD. Chest: Bilateral air entry, clear to auscultation, no wheezing, rhonchi or rales. Cardiovascular: RRR, S1S2, no murmur, rub or gallop. No lower extremity edema. Abdomen: Soft, non tender to palpation. Musculoskeletal: . No cyanosis or clubbing. Integumentary: Pink, warm and dry. Free from rash or lesions. Skin turgor normal.  CNS: Oriented to person, place and time. Speech clear. Face symmetrical. No tremor. Data:  CBC:   Lab Results   Component Value Date    WBC 4.3 07/23/2020    HGB 11.7 (L) 07/23/2020    HCT 35.0 (L) 07/23/2020    MCV 87.5 07/23/2020     07/23/2020     BMP:    Lab Results   Component Value Date     07/23/2020    K 4.3 07/23/2020     07/23/2020    CO2 26 07/23/2020    BUN 20 07/23/2020    CREATININE 1.42 (H) 07/23/2020    GLUCOSE 142 (H) 07/23/2020     CMP:   Lab Results   Component Value Date     07/23/2020    K 4.3 07/23/2020     07/23/2020    CO2 26 07/23/2020    BUN 20 07/23/2020    CREATININE 1.42 (H) 07/23/2020    GLUCOSE 142 (H) 07/23/2020    CALCIUM 9.9 07/23/2020    PROT 7.1 05/21/2020    LABALBU 3.7 05/21/2020    BILITOT 0.36 05/21/2020    ALKPHOS 162 (H) 05/21/2020    AST 10 05/21/2020    ALT 9 05/21/2020      Hepatic:   Lab Results   Component Value Date    AST 10 05/21/2020    ALT 9 05/21/2020    BILITOT 0.36 05/21/2020    ALKPHOS 162 (H) 05/21/2020     BNP: No results found for: BNP  Lipids:   Lab Results   Component Value Date    CHOL 163 10/15/2018    HDL 34 (L) 10/15/2018     INR: No results found for: INR  PTH: No results found for: PTH  Phosphorus:    Lab Results   Component Value Date    PHOS 2.8 07/23/2020     Ionized Calcium: No results found for: IONCA  Magnesium:   Lab Results   Component Value Date    MG 2.0 07/23/2020     Albumin:   Lab Results   Component Value Date    LABALBU 3.7 05/21/2020     Last 3 CK, CKMB, Troponin: @LABRCNT(CKTOTAL:3,CKMB:3,TROPONINI:3)       URINE:)No results found for: Belinda Dienes    Radiology:   Reviewed. Assessment:  1. Acute kidney injury, FeNa was 0.25% appears to be hemodynamically related and related to NSAID use. Creatinine is plateuing.   2. Underlying CKD stage 3 with baseline GFR of 50s ml/min. 3. Hyponatremia and relative hypererkalemia. 4. Anemia. 5. History of osteogenesis imperfecta. 6. Hypomagnesemia    Plan:  Renal function is improving towards baseline   Mg is better today. Advised to avoid all NSAIDs in the future. Continue lower dose Metoprolol. BP stable. DC IVF. Good urine output. Renal diet with oral fluid restriction of 1000 ml/24 hours. Avoid hypotension, nephrotoxic drugs, Lovenox, Fleets enema and IV contrast exposure. Follow up labs ordered for AM.     Please do not hesitate to call with questions. We will follow with you. Pt seen in collaboration with Dr. Grace Diaz. Electronically signed by ADEEL Mcnulty CNP  on 7/23/2020 at 10:19 AM  [facility]. Ph: [phone]    Physician Addendum  I have seen and examined pt at bed side.    I reviewed and agree with CNP's note. I performed all key and critical portions of this evaluation.  I agree with the plan of care as noted above.     Electronically signed by Evans Drake MD on 07/23/20 2:18 PM

## 2020-07-23 NOTE — PROGRESS NOTES
Physical Therapy  Facility/Department: [facility] MED SURG  Daily Treatment Note  NAME: Jackie Denny  : 1955  MRN: [id number]    Date of Service: 2020    Discharge Recommendations:  Patient would benefit from continued therapy after discharge      Assessment   Body structures, Functions, Activity limitations: Decreased functional mobility ; Decreased ROM; Decreased strength;Decreased endurance;Decreased balance; Increased pain;Decreased posture  Treatment Diagnosis: Impaired fucntion  History: Osteogenesis imperfecta-multiple fracture, most recent T Hip fracture/ s/p repair 20. REQUIRES PT FOLLOW UP: Yes  Activity Tolerance  Activity Tolerance: Patient limited by pain  Activity Tolerance: Functional mobility severely limited by decreased ROM     Patient Diagnosis(es): The primary encounter diagnosis was Closed fracture of left tibial plateau, initial encounter. A diagnosis of Acute pain of right knee was also pertinent to this visit. has a past medical history of Contracture, elbow, Erectile dysfunction, Hypertension, Osteogenesis imperfecta, and Type II or unspecified type diabetes mellitus without mention of complication, not stated as uncontrolled. has a past surgical history that includes Cholecystectomy; Tonsillectomy and adenoidectomy; fracture surgery (Right); fracture surgery (Bilateral); fracture surgery (Left); Femur fracture surgery (Right, 2020); and ORIF PROXIMAL TIBIAL PLATEAU FRACTURE (Bilateral, 2020).     Restrictions  Restrictions/Precautions  Restrictions/Precautions: Weight Bearing, General Precautions, Surgical Protocols  Required Braces or Orthoses?: Yes  Implants present? : Metal implants(Multiple B LE surgeries)  Lower Extremity Weight Bearing Restrictions  Right Lower Extremity Weight Bearing: Weight Bearing As Tolerated(Non-displaced Tibia Fracture - 2 Cannulated screw fixation)  Left Lower Extremity Weight Bearing: Partial Weight Bearing(ORIF for Displaced Tibia Plateau Fracture)  Upper Extremity Weight Bearing Restrictions  Right Upper Extremity Weight Bearing: Weight Bearing As Tolerated(Elbow extension block)  Required Braces or Orthoses  Right Lower Extremity Brace: Knee Brace  RLE Brace Type: Hinged brace  Left Lower Extremity Brace: Knee Brace  LLE Brace Type: Hinged brace. Position Activity Restriction  Other position/activity restrictions: WBAT RLE with hinged brace, PWB L LE with hinged braces, OK for ROM. Subjective   General  Chart Reviewed: Yes  Additional Pertinent Hx: Mine Degroot is a 59 y.o. male who presents for bilateral tibial plateau fractures. Patient has a history of osteogenesis imperfecta. He has had multiple fractures and surgeries on both lower extremities as well as upper extremities. Patient recently had work done on his right hip per Dr. Shayna Roberto with an intramedullary christiano. Multiple admissions and procedures for various fractures - Osteogenesis Imperfecta. ORIF PROXIMAL TIBIAL PLATEAU FRACTURE (Bilateral, 7/20/2020). Family / Caregiver Present: Yes(Wife)  Referring Practitioner: Dr Darrell Ayala. Subjective  Subjective: (B) hinged braced donned by therapist at start of session. General Comment  Comments: Poor pain tolerance  Pain Screening  Patient Currently in Pain: Yes  Pain Assessment  Pain Assessment: 0-10  Pain Level: 5  Pain Type: Surgical pain;Chronic pain  Pain Location: Knee  Pain Orientation: Right;Left  Vital Signs  Patient Currently in Pain: Yes       Orientation  Orientation  Overall Orientation Status: Within Normal Limits  Objective   Bed mobility  Supine to Sit: Moderate assistance(Assist with (B) LE)  Sit to Supine: Unable to assess  Comment: Verbal cues for sequencing and safety. Supine > sit transfer improved from previous session  Transfers  Lateral Transfers: Maximum Assistance;2 Person Assistance  Comment: Use of slide board from elevated bed to w/c. W/c arm rest removed to allow for easier transition.  Maxi move sling placed under patient in w/c for RN to assist him back to bed. No attempt to stand this date, takes significant time to perform mobility due to high pain levels. Ambulation  Ambulation?: No     Other exercises  Other exercises?: No(ROM and stretching performed in AM)      Other Activities: (Paul/doff of bilateral hinged braces)  Cryotherapy (Minutes\Location): Ice packs placed to (B) anterior knee. Goals  Short term goals  Time Frame for Short term goals: 4 t0 6 visits  Short term goal 1: Pt able to perform supine >sit at max A   Short term goal 2:  Pt able to perform sit to supine at mod a x 2   Short term goal 3:  Pt able to tolerate ROM ex's B knee to improve flexion by 10 to 20 degrees. Short term goal 4: Pt able to perform sit to stand with aakash stedy with bed height raised up, max a x 2  Short term goal 5: Pt able to tolerate standing in aakash stedy for ~ 3o to 50 secs. Patient Goals   Patient goals : Able to go to rehab here at [address] per week: BID  Specific instructions for Next Treatment: Co-tx with MESA, coordinate pain meds for therapy session. Current Treatment Recommendations: Strengthening, ROM, Balance Training, Functional Mobility Training, Transfer Training, Endurance Training, Home Exercise Program, Patient/Caregiver Education & Training, Safety Education & Training  Safety Devices  Type of devices:  All fall risk precautions in place, Call light within reach, Gait belt, Patient at risk for falls, Nurse notified, Left in chair     Therapy Time   Individual Concurrent Group Co-treatment   Time In 1313         Time Out 1349         Minutes [address]

## 2020-07-23 NOTE — PROGRESS NOTES
[facility] (formerly [facility])                Date:   7/23/2020  Patient name:  Christine Moise  Date of admission:  7/17/2020  3:25 PM  MRN:   [id number]  YOB: 1955    Pt seen at the request of Radha Arrington MD    CHIEF COMPLAINT:     History Obtained From:  Patient and chart review. HISTORY OF PRESENT ILLNESS:      The patient is a 59 y.o.  male who is admitted to the hospital for bilateral tibial plateau fractures. Patient has a history of osteogenesis imperfecta. He has had multiple fractures and surgeries on both lower extremities as well as upper extremities. Patient recently had work done on his right hip per Dr. Contreras with an intramedullary christiano. This is on the right side. Patient also has had a patellar fractures on both sides. He had hardware removal from his patella on the left side. Patient states that he had a fall yesterday and landed on his knees when he slipped with his walker. July 19  Good pain control  No chest pain shortness of breath  Plan for OR in a.m. July 20  Creatinine has increased to 1.7  OR today  Hemodynamically stable   7/21  Post op doing well cr increased to 1.7   ambulating      July the 22nd    Postoperatively patient is stable  There are some issues with pain control  This is now improved  No chest pain or shortness of breath  No fever or chills  No urinary retention    July 23  Patient is doing well  Adequate pain control\  No chest pain or shortness of breath    Past Medical History:   has a past medical history of Contracture, elbow, Erectile dysfunction, Hypertension, Osteogenesis imperfecta, and Type II or unspecified type diabetes mellitus without mention of complication, not stated as uncontrolled. Past Surgical History:   has a past surgical history that includes Cholecystectomy; Tonsillectomy and adenoidectomy; fracture surgery (Right); fracture surgery (Bilateral); fracture surgery (Left);  Femur Monitoring Suppl [facility] BLOOD GLUCOSE METER) w/Device KIT 1 kit by Does not apply route 2 times daily A1C of 9.0. NIDDM. 10/23/18   Kerrie Hughes PA-C       Allergies:  Patient has no known allergies. Social History:   reports that he quit smoking about 28 years ago. His smoking use included cigarettes. He has a 36.00 pack-year smoking history. He has never used smokeless tobacco.     Family History: family history includes Diabetes in his father and mother; Heart Disease in his father; Stroke in his mother. REVIEW OF SYSTEMS:    · Constitutional: Negative for weight loss  · Eyes: Negative for visual changes, diplopia, scleral icterus. · ENT: Negative for Headaches, hearing loss, vertigo, mouth sores, sore throat. · Cardiovascular: Negative for lightheadedness/orthostatic symptoms ,chest pain, dyspnea on exertion, palpitations or loss of consciousness. · Respiratory: Negative for cough or wheezing, sputum production, hemoptysis, pleuritic pain. · Gastrointestinal: Negative for nausea/vomiting, change in bowel habits, abdominal pain, dysphagia/appetite loss, hematemesis, blood in stools. · Genitourinary:Negative for change in bladder habits, dysuria, trouble voiding, hematuria. · Musculoskeletal: pos for gait disturbance, weakness, joint complaints. Tender over both tibia  · Integumentary: Negative for rash, pruritis. · Neurological: Negative for headache, dizziness, change in muscle strength, numbness/tingling, change in gait, balance, coordination,   · Endocrine: negative for temperature intolerance, excessive thirst, fluid intake, or urination, tremor. · Hematologic/Lymphatic: negative for abnormal bruising or bleeding, blood clots, swollen lymph nodes. · Allergic/Immunologic: negative for nasal congestion, pruritis, hives.       PHYSICAL EXAM:    BP (!) 140/80   Pulse 90   Temp 98 °F (36.7 °C) (Oral)   Resp 18   Ht 6' (1.829 m)   Wt 210 lb 15.7 oz (95.7 kg)   SpO2 99%   BMI 28.61 kg/m²      · General appearance: well nourished  · HEENT: Head: Normocephalic, no lesions, without obvious abnormality. · Lungs: clear to auscultation bilaterally  · Heart: regular rate and rhythm, S1, S2 normal, no murmur, click, rub or gallop  · Abdomen: soft, non-tender; bowel sounds normal; no masses,  no organomegaly  · Extremitie multiple tender points  · Bilateral tibial tenderness  · Bilateral knee swelling  ·   · Neurological: Gait normal. Reflexes normal and symmetric. Sensation grossly normal  · Skin - no rash, no lump   · Eye no icterus no redness  · Lymphatic system-no lymphadenopathy no splenomegaly       DIAGNOSTICS:    Laboratory Testing:  CBC:   Recent Labs     07/23/20  0608   WBC 4.3   HGB 11.7*        BMP:    Recent Labs     07/21/20  0609 07/22/20  0646 07/23/20  0608   * 134* 137   K 4.3 4.3 4.3    99 101   CO2 24 24 26   BUN 26* 19 20   CREATININE 1.77* 1.43* 1.42*   GLUCOSE 145* 183* 142*     S. Calcium:  Recent Labs     07/23/20  0608   CALCIUM 9.9     S. Ionized Calcium:No results for input(s): IONCA in the last 72 hours. S. Magnesium:  Recent Labs     07/23/20  0608   MG 2.0     S. Phosphorus:  Recent Labs     07/23/20  0608   PHOS 2.8     S. Glucose:  Recent Labs     07/22/20  2055 07/23/20  0630 07/23/20  1118   POCGLU 188* 125* 171*     Glycosylated hemoglobin A1C:   No results for input(s): LABA1C in the last 72 hours. INR: No results for input(s): INR in the last 72 hours. Hepatic functions: No results for input(s): ALKPHOS, ALT, AST, PROT, BILITOT, BILIDIR, LABALBU in the last 72 hours. Pancreatic functions:No results for input(s): LACTA, AMYLASE in the last 72 hours. S. Lactic Acid: No results for input(s): LACTA in the last 72 hours. Cardiac enzymes:  Recent Labs     07/21/20  0609   CKTOTAL 317*     BNP:No results for input(s): BNP in the last 72 hours. Lipid profile: No results for input(s): CHOL, TRIG, HDL, LDLCALC in the last 72 hours.     Invalid HISTORY: assess fracture, previous surgery, history of osteogenesis imperfecta Reason for Exam: assess fracture, previous surgery, history of osteogenesis imperfecta Acuity: Chronic Type of Exam: Ongoing FINDINGS: Acute depressed lateral tibial plateau fracture. Articular depression measures about 0.7 cm with multiple punctate bodies. Fracture extends into the tibial spines. The proximal fibula is intact. The distal femur is intact. The medial tibial plateau is intact. Lateral subluxation of the patella relative to the trochlea. Please correlate for patellar tracking disorder. Lipohemarthrosis compatible with intra-articular fracture. Prepatellar swelling. Atrophy of the muscles. The perineural fat is maintained. No unexpected radiopaque foreign bodies. Heterotopic ossification along the patellar retinaculum most likely sequela of prior injury. Acute impacted lateral tibial plateau fracture with extension into the tibial spines. Low moderate to large lipohemarthrosis. No additional fractures are apparent. Lateral subluxation of the patella relative to the trochlea. Please correlate for patellar tracking disorder. Ct Knee Right Wo Contrast    Result Date: 7/17/2020  EXAMINATION: CT OF THE RIGHT KNEE WITHOUT CONTRAST 7/17/2020 8:42 pm TECHNIQUE: CT of the right knee was performed without the administration of intravenous contrast.  Multiplanar reformatted images are provided for review. Dose modulation, iterative reconstruction, and/or weight based adjustment of the mA/kV was utilized to reduce the radiation dose to as low as reasonably achievable.  COMPARISON: Right knee radiograph July 17, 2020 HISTORY ORDERING SYSTEM PROVIDED HISTORY: fall, pain, r/o fracture TECHNOLOGIST PROVIDED HISTORY: fall, pain, r/o fracture Reason for Exam: fell today Additional signs and symptoms: history of multiple previous knee surgeries FINDINGS: Bones: Postop changes prior femur ORIF with partially imaged creatinine 1.38  Improving    Hypertension blood pressure is controlled    Potassium normal      PLAN:  Continue home medications  Lantus  Insulin sliding scale  Continue Toprol  Decrease Lantus to half dose once patient is n.p.o. for OR    Medically cleared for surgery  Moderate risk due to diabetes    Follow renal function    July 19  Osteogenesis imperfecta  Bilateral tibial plateau fractures  Plan for the OR in the a.m. Blood pressure is elevated  Increase Toprol to 100 mg    Diabetes with CKD 3    Creatinine stable    Check BMP CBC  Blood sugars controlled  Adequate pain control  Cleared for surgery    July 28  OR today for repair of bilateral tibial plateau fractures with osteogenesis imperfecta    Creatinine has increased to 1.7  Diabetic nephropathy  Blood sugars controlled  Continue beta-blocker  Nephrology to see      7/21   post op b/l tibial fx orif    sbp 105   CONFUSION RESOLVED     cr increased to 1.7   On ivf   dm2  bs > 200   cont coverGE   AMBULATING   ON IVF AT 75   HR 89   CONT TOPROL    NEPHRO SEEING      CONTB LANTUS WITH SS COVERAGE     CHANGE TO HEPARIN SQ DUE TO CKD    July 22  Postoperative bilateral tibial fracture ORIF  Doing well  Pain is better controlled with 2 Percocets every 4 hours    Acute renal insufficiency has improved  Creatinine 1.43 today    Blood pressure stable      Sugar control has improved  Episodically above 200    Continue coverage    Can Hep-Lock IV fluids    Patient will need ECF as he has been refused by rehab    Medication reviewed continue the same    July 23  Patient is doing well  Adequate pain control  Blood pressure is controlled  VALARIE  Creatinine has improved to 1.43  He does have CKD stage III  Blood sugars are controlled  Continue same medications  Medically ready for discharge        MD CAROL Novak [address], [address] [place] [address].    Phone [phone]   Fax: [phone]  Answering Service: [phone]

## 2020-07-24 ENCOUNTER — TELEPHONE (OUTPATIENT)
Dept: ORTHOPEDIC SURGERY | Age: 65
End: 2020-07-24

## 2020-07-24 VITALS
BODY MASS INDEX: 28.58 KG/M2 | OXYGEN SATURATION: 100 % | RESPIRATION RATE: 16 BRPM | HEIGHT: 72 IN | DIASTOLIC BLOOD PRESSURE: 97 MMHG | TEMPERATURE: 97.9 F | WEIGHT: 210.98 LBS | SYSTOLIC BLOOD PRESSURE: 139 MMHG | HEART RATE: 72 BPM

## 2020-07-24 LAB
ABSOLUTE EOS #: 0.4 K/UL (ref 0–0.4)
ABSOLUTE IMMATURE GRANULOCYTE: ABNORMAL K/UL (ref 0–0.3)
ABSOLUTE LYMPH #: 0.6 K/UL (ref 1–4.8)
ABSOLUTE MONO #: 0.3 K/UL (ref 0.1–1.3)
ANION GAP SERPL CALCULATED.3IONS-SCNC: 12 MMOL/L (ref 9–17)
BASOPHILS # BLD: 1 % (ref 0–2)
BASOPHILS ABSOLUTE: 0 K/UL (ref 0–0.2)
BUN BLDV-MCNC: 22 MG/DL (ref 8–23)
BUN/CREAT BLD: ABNORMAL (ref 9–20)
CALCIUM SERPL-MCNC: 10 MG/DL (ref 8.6–10.4)
CHLORIDE BLD-SCNC: 100 MMOL/L (ref 98–107)
CO2: 25 MMOL/L (ref 20–31)
CREAT SERPL-MCNC: 1.49 MG/DL (ref 0.7–1.2)
DIFFERENTIAL TYPE: ABNORMAL
EOSINOPHILS RELATIVE PERCENT: 10 % (ref 0–4)
GFR AFRICAN AMERICAN: 58 ML/MIN
GFR NON-AFRICAN AMERICAN: 47 ML/MIN
GFR SERPL CREATININE-BSD FRML MDRD: ABNORMAL ML/MIN/{1.73_M2}
GFR SERPL CREATININE-BSD FRML MDRD: ABNORMAL ML/MIN/{1.73_M2}
GLUCOSE BLD-MCNC: 150 MG/DL (ref 75–110)
GLUCOSE BLD-MCNC: 172 MG/DL (ref 70–99)
GLUCOSE BLD-MCNC: 196 MG/DL (ref 75–110)
HCT VFR BLD CALC: 35.1 % (ref 41–53)
HEMOGLOBIN: 11.7 G/DL (ref 13.5–17.5)
IMMATURE GRANULOCYTES: ABNORMAL %
LYMPHOCYTES # BLD: 16 % (ref 24–44)
MAGNESIUM: 1.8 MG/DL (ref 1.6–2.6)
MCH RBC QN AUTO: 29.2 PG (ref 26–34)
MCHC RBC AUTO-ENTMCNC: 33.4 G/DL (ref 31–37)
MCV RBC AUTO: 87.5 FL (ref 80–100)
MONOCYTES # BLD: 8 % (ref 1–7)
NRBC AUTOMATED: ABNORMAL PER 100 WBC
PDW BLD-RTO: 13.3 % (ref 11.5–14.9)
PHOSPHORUS: 3.3 MG/DL (ref 2.5–4.5)
PLATELET # BLD: 207 K/UL (ref 150–450)
PLATELET ESTIMATE: ABNORMAL
PMV BLD AUTO: 8.1 FL (ref 6–12)
POTASSIUM SERPL-SCNC: 4.2 MMOL/L (ref 3.7–5.3)
RBC # BLD: 4.02 M/UL (ref 4.5–5.9)
RBC # BLD: ABNORMAL 10*6/UL
SEG NEUTROPHILS: 65 % (ref 36–66)
SEGMENTED NEUTROPHILS ABSOLUTE COUNT: 2.6 K/UL (ref 1.3–9.1)
SODIUM BLD-SCNC: 137 MMOL/L (ref 135–144)
WBC # BLD: 4 K/UL (ref 3.5–11)
WBC # BLD: ABNORMAL 10*3/UL

## 2020-07-24 PROCEDURE — 6370000000 HC RX 637 (ALT 250 FOR IP): Performed by: ORTHOPAEDIC SURGERY

## 2020-07-24 PROCEDURE — 82947 ASSAY GLUCOSE BLOOD QUANT: CPT

## 2020-07-24 PROCEDURE — 80048 BASIC METABOLIC PNL TOTAL CA: CPT

## 2020-07-24 PROCEDURE — 36415 COLL VENOUS BLD VENIPUNCTURE: CPT

## 2020-07-24 PROCEDURE — 6360000002 HC RX W HCPCS: Performed by: INTERNAL MEDICINE

## 2020-07-24 PROCEDURE — 2580000003 HC RX 258: Performed by: ORTHOPAEDIC SURGERY

## 2020-07-24 PROCEDURE — 85025 COMPLETE CBC W/AUTO DIFF WBC: CPT

## 2020-07-24 PROCEDURE — 84100 ASSAY OF PHOSPHORUS: CPT

## 2020-07-24 PROCEDURE — 6370000000 HC RX 637 (ALT 250 FOR IP): Performed by: INTERNAL MEDICINE

## 2020-07-24 PROCEDURE — 83735 ASSAY OF MAGNESIUM: CPT

## 2020-07-24 RX ADMIN — INSULIN LISPRO 2 UNITS: 100 INJECTION, SOLUTION INTRAVENOUS; SUBCUTANEOUS at 08:58

## 2020-07-24 RX ADMIN — PANTOPRAZOLE SODIUM 40 MG: 40 TABLET, DELAYED RELEASE ORAL at 08:55

## 2020-07-24 RX ADMIN — GABAPENTIN 100 MG: 100 CAPSULE ORAL at 08:54

## 2020-07-24 RX ADMIN — OXYCODONE AND ACETAMINOPHEN 2 TABLET: 5; 325 TABLET ORAL at 11:00

## 2020-07-24 RX ADMIN — INSULIN LISPRO 2 UNITS: 100 INJECTION, SOLUTION INTRAVENOUS; SUBCUTANEOUS at 12:16

## 2020-07-24 RX ADMIN — OXYCODONE AND ACETAMINOPHEN 2 TABLET: 5; 325 TABLET ORAL at 01:57

## 2020-07-24 RX ADMIN — GABAPENTIN 100 MG: 100 CAPSULE ORAL at 12:16

## 2020-07-24 RX ADMIN — INSULIN LISPRO 8 UNITS: 100 INJECTION, SOLUTION INTRAVENOUS; SUBCUTANEOUS at 08:55

## 2020-07-24 RX ADMIN — INSULIN GLARGINE 40 UNITS: 100 INJECTION, SOLUTION SUBCUTANEOUS at 08:55

## 2020-07-24 RX ADMIN — INSULIN LISPRO 8 UNITS: 100 INJECTION, SOLUTION INTRAVENOUS; SUBCUTANEOUS at 12:14

## 2020-07-24 RX ADMIN — OXYCODONE AND ACETAMINOPHEN 2 TABLET: 5; 325 TABLET ORAL at 05:58

## 2020-07-24 RX ADMIN — HEPARIN SODIUM 5000 UNITS: 5000 INJECTION INTRAVENOUS; SUBCUTANEOUS at 08:58

## 2020-07-24 RX ADMIN — Medication 10 ML: at 08:58

## 2020-07-24 RX ADMIN — METOPROLOL SUCCINATE 25 MG: 25 TABLET, EXTENDED RELEASE ORAL at 08:54

## 2020-07-24 ASSESSMENT — PAIN SCALES - GENERAL
PAINLEVEL_OUTOF10: 7
PAINLEVEL_OUTOF10: 4
PAINLEVEL_OUTOF10: 4
PAINLEVEL_OUTOF10: 6
PAINLEVEL_OUTOF10: 6
PAINLEVEL_OUTOF10: 0

## 2020-07-24 ASSESSMENT — PAIN SCALES - WONG BAKER: WONGBAKER_NUMERICALRESPONSE: 0

## 2020-07-24 NOTE — PROGRESS NOTES
Report called to Yuko Archuleta RN at [facility]. Spoke with Benedict Morales, states EMS transport is delayed for patient transport. RN at [facility] made aware.      Electronically signed by Lashae Watson RN on 7/24/2020 at 1:41 PM

## 2020-07-24 NOTE — PROGRESS NOTES
Physical Therapy  DATE: 2020    NAME: Lee Dutta  MRN: [id number]   : 1955    Patient not seen this date for Physical Therapy due to:  [] Blood transfusion in progress  [] Hemodialysis  []  Patient Declined  [] Spine Precautions   [] Strict Bedrest  [] Surgery/ Procedure  [] Testing      [x] Other Pt declined at this time, pt reports too much pain from using bedpan, pt requests come back later, pt's wife reports pt is leaving at 1:00pm today, CEASAR Andrade aware of pt declining tx at this time. [] PT being discontinued at this time. Patient independent. No further needs. [] PT being discontinued at this time as the patient has been transferred to palliative care. No further needs.     Jazzy Mixon, PTA

## 2020-07-24 NOTE — PLAN OF CARE
Problem: Falls - Risk of:  Goal: Will remain free from falls  Description: Will remain free from falls  Outcome: Ongoing  Note: Pt remained absent from falls. Call light within reach. Bed locked and in lowest position. Problem: Falls - Risk of:  Goal: Absence of physical injury  Description: Absence of physical injury  Outcome: Ongoing  Note: Patient remains free of injury. safe environment maintained       Problem: Skin Integrity:  Goal: Will show no infection signs and symptoms  Description: Will show no infection signs and symptoms  Outcome: Ongoing  Note: No new s/s of infection. Will continue to monitor        Problem: Skin Integrity:  Goal: Absence of new skin breakdown  Description: Absence of new skin breakdown  Outcome: Ongoing  Note: Pt skin integrity remained intact, no new alterations noted. Head to toe completed, see chart assessment. Problem: Pain:  Goal: Pain level will decrease  Description: Pain level will decrease  Outcome: Ongoing  Note: Bilateral acute surgical pain. Medicated as ordered. Patient tolerating      Problem: Pain:  Goal: Control of acute pain  Description: Control of acute pain  Outcome: Ongoing  Note: Bilateral acute surgical pain. Medicated as ordered. Patient tolerating      Problem: Pain:  Goal: Control of chronic pain  Description: Control of chronic pain  Outcome: Ongoing  Note: Bilateral acute surgical pain. Medicated as ordered. Patient tolerating      Problem: Infection - Surgical Site:  Goal: Will show no infection signs and symptoms  Description: Will show no infection signs and symptoms  Outcome: Ongoing  Note: No new s/s of infection.  Will continue to monitor

## 2020-07-24 NOTE — PROGRESS NOTES
Attempted to call report to [facility].      Electronically signed by Carrie Mcintyre RN on 7/24/2020 at 12:30 PM

## 2020-07-24 NOTE — PROGRESS NOTES
[address]                Date:   7/24/2020  Patient name:  Mirna Severino  Date of admission:  7/17/2020  3:25 PM  MRN:   [id number]  YOB: 1955    Pt seen at the request of Gabbie Davis MD    CHIEF COMPLAINT:     History Obtained From:  Patient and chart review. HISTORY OF PRESENT ILLNESS:      The patient is a 59 y.o.  male who is admitted to the hospital for bilateral tibial plateau fractures. Patient has a history of osteogenesis imperfecta. He has had multiple fractures and surgeries on both lower extremities as well as upper extremities. Patient recently had work done on his right hip per Dr. Brittney Mejias with an intramedullary christiano. This is on the right side. Patient also has had a patellar fractures on both sides. He had hardware removal from his patella on the left side. Patient states that he had a fall yesterday and landed on his knees when he slipped with his walker. July 19  Good pain control  No chest pain shortness of breath  Plan for OR in a.m. July 20  Creatinine has increased to 1.7  OR today  Hemodynamically stable   7/21  Post op doing well cr increased to 1.7   ambulating      July the 22nd    Postoperatively patient is stable  There are some issues with pain control  This is now improved  No chest pain or shortness of breath  No fever or chills  No urinary retention    July 23  Patient is doing well  Adequate pain control\  No chest pain or shortness of breath    July 24  Patient is doing well  Good pain control  No cardiac problems  Blood sugars controlled    Past Medical History:   has a past medical history of Contracture, elbow, Erectile dysfunction, Hypertension, Osteogenesis imperfecta, and Type II or unspecified type diabetes mellitus without mention of complication, not stated as uncontrolled. Past Surgical History:   has a past surgical history that includes Cholecystectomy;  Tonsillectomy and adenoidectomy; fracture surgery (Right); fracture surgery (Bilateral); fracture surgery (Left); Femur fracture surgery (Right, 4/23/2020); and ORIF PROXIMAL TIBIAL PLATEAU FRACTURE (Bilateral, 7/20/2020). Home Medications:    Prior to Admission medications    Medication Sig Start Date End Date Taking? Authorizing Provider   oxyCODONE-acetaminophen (PERCOCET) 5-325 MG per tablet Take 1-2 tablets by mouth every 4 hours as needed for Pain for up to 7 days. 7/21/20 7/28/20 Yes Aayush Garrison MD   insulin glargine (LANTUS SOLOSTAR) 100 UNIT/ML injection pen Inject 40 Units into the skin daily   Yes Historical Provider, MD   insulin glargine (LANTUS SOLOSTAR) 100 UNIT/ML injection pen Inject 60 Units into the skin nightly   Yes Historical Provider, MD   Insulin Aspart, w/Niacinamide, (FIASP FLEXTOUCH) 100 UNIT/ML SOPN Inject 8 Units into the skin 3 times daily (before meals) Plus sliding scale   Yes Historical Provider, MD   Insulin Aspart, w/Niacinamide, (FIASP FLEXTOUCH) 100 UNIT/ML SOPN Inject into the skin 3 times daily (before meals) Sliding scale in addition to base of 8 units   Yes Historical Provider, MD   ibuprofen (ADVIL;MOTRIN) 200 MG tablet Take 400 mg by mouth every 6 hours as needed for Pain   Yes Historical Provider, MD   calcium carbonate (OYSTER SHELL CALCIUM 500 MG) 1250 (500 Ca) MG tablet Take 1 tablet by mouth nightly   Yes Historical Provider, MD   aspirin 81 MG EC tablet Take 1 tablet by mouth daily 5/21/20  Yes Yumiko Leo MD   gabapentin (NEURONTIN) 100 MG capsule Take 1 capsule by mouth 3 times daily for 60 days.  5/21/20 7/20/20 Yes Yumiko Leo MD   pantoprazole (PROTONIX) 40 MG tablet Take 1 tablet by mouth daily 5/21/20  Yes Yumiko Leo MD   sertraline (ZOLOFT) 100 MG tablet Take 100 mg by mouth nightly   Yes Historical Provider, MD   traZODone (DESYREL) 100 MG tablet Take 100 mg by mouth nightly   Yes Historical Provider, MD   metoprolol succinate (TOPROL XL) 50 MG extended release tablet Take 1 tablet by mouth 2 times daily 5/21/20   Elpidio Horvath MD   Blood Glucose Monitoring Suppl [facility] BLOOD GLUCOSE METER) w/Device KIT 1 kit by Does not apply route 2 times daily A1C of 9.0. NIDDM. 10/23/18   Cecilia Degroot PA-C       Allergies:  Patient has no known allergies. Social History:   reports that he quit smoking about 28 years ago. His smoking use included cigarettes. He has a 36.00 pack-year smoking history. He has never used smokeless tobacco.     Family History: family history includes Diabetes in his father and mother; Heart Disease in his father; Stroke in his mother. REVIEW OF SYSTEMS:    · Constitutional: Negative for weight loss  · Eyes: Negative for visual changes, diplopia, scleral icterus. · ENT: Negative for Headaches, hearing loss, vertigo, mouth sores, sore throat. · Cardiovascular: Negative for lightheadedness/orthostatic symptoms ,chest pain, dyspnea on exertion, palpitations or loss of consciousness. · Respiratory: Negative for cough or wheezing, sputum production, hemoptysis, pleuritic pain. · Gastrointestinal: Negative for nausea/vomiting, change in bowel habits, abdominal pain, dysphagia/appetite loss, hematemesis, blood in stools. · Genitourinary:Negative for change in bladder habits, dysuria, trouble voiding, hematuria. · Musculoskeletal: pos for gait disturbance, weakness, joint complaints. Tender over both tibia  · Integumentary: Negative for rash, pruritis. · Neurological: Negative for headache, dizziness, change in muscle strength, numbness/tingling, change in gait, balance, coordination,   · Endocrine: negative for temperature intolerance, excessive thirst, fluid intake, or urination, tremor. · Hematologic/Lymphatic: negative for abnormal bruising or bleeding, blood clots, swollen lymph nodes. · Allergic/Immunologic: negative for nasal congestion, pruritis, hives.       PHYSICAL EXAM:    BP (!) 139/97   Pulse 72   Temp 97.9 °F (36.6 °C)   Resp 16   Ht 6' (1.829 m)   Wt 210 lb 15.7 oz (95.7 kg)   SpO2 100%   BMI 28.61 kg/m²      · General appearance: well nourished  · HEENT: Head: Normocephalic, no lesions, without obvious abnormality. · Lungs: clear to auscultation bilaterally  · Heart: regular rate and rhythm, S1, S2 normal, no murmur, click, rub or gallop  · Abdomen: soft, non-tender; bowel sounds normal; no masses,  no organomegaly  · Extremitie multiple tender points  · Bilateral tibial tenderness  · Bilateral knee swelling  ·   · Neurological: Gait normal. Reflexes normal and symmetric. Sensation grossly normal  · Skin - no rash, no lump   · Eye no icterus no redness  · Lymphatic system-no lymphadenopathy no splenomegaly       DIAGNOSTICS:    Laboratory Testing:  CBC:   Recent Labs     07/24/20  0608   WBC 4.0   HGB 11.7*        BMP:    Recent Labs     07/22/20  0646 07/23/20  0608 07/24/20  0608   * 137 137   K 4.3 4.3 4.2   CL 99 101 100   CO2 24 26 25   BUN 19 20 22   CREATININE 1.43* 1.42* 1.49*   GLUCOSE 183* 142* 172*     S. Calcium:  Recent Labs     07/24/20  0608   CALCIUM 10.0     S. Ionized Calcium:No results for input(s): IONCA in the last 72 hours. S. Magnesium:  Recent Labs     07/24/20  0608   MG 1.8     S. Phosphorus:  Recent Labs     07/24/20  0608   PHOS 3.3     S. Glucose:  Recent Labs     07/23/20  2054 07/24/20  0625 07/24/20  1107   POCGLU 233* 150* 196*     Glycosylated hemoglobin A1C:   No results for input(s): LABA1C in the last 72 hours. INR: No results for input(s): INR in the last 72 hours. Hepatic functions: No results for input(s): ALKPHOS, ALT, AST, PROT, BILITOT, BILIDIR, LABALBU in the last 72 hours. Pancreatic functions:No results for input(s): LACTA, AMYLASE in the last 72 hours. S. Lactic Acid: No results for input(s): LACTA in the last 72 hours. Cardiac enzymes:  No results for input(s): CKTOTAL, CKMB, CKMBINDEX, TROPONINI in the last 72 hours.   BNP:No results for input(s): BNP in the last 72 hours. Lipid profile: No results for input(s): CHOL, TRIG, HDL, LDLCALC in the last 72 hours. Invalid input(s): LDL  Blood Gases: No results found for: PH, PCO2, PO2, HCO3, O2SAT  Thyroid functions: No results found for: TSH     Imaging/Diagonstics:    Xr Knee Left (3 Views)    Result Date: 7/17/2020  EXAMINATION: THREE XRAY VIEWS OF THE LEFT KNEE 7/17/2020 4:19 pm COMPARISON: None. HISTORY: ORDERING SYSTEM PROVIDED HISTORY: pain TECHNOLOGIST PROVIDED HISTORY: pain Reason for Exam: Pt fell today, pain to bilateral knee Acuity: Acute Type of Exam: Initial FINDINGS: Depressed lateral tibial plateau fracture with comminution. Lipohemarthrosis. Enthesophyte of the patellar tendon. Depressed lateral tibial plateau fracture with moderate to large lipohemarthrosis. Xr Knee Right (3 Views)    Result Date: 7/17/2020  EXAMINATION: THREE XRAY VIEWS OF THE RIGHT KNEE 7/17/2020 4:19 pm COMPARISON: Right femur radiographs May 26, 2029 HISTORY: ORDERING SYSTEM PROVIDED HISTORY: fall, pain TECHNOLOGIST PROVIDED HISTORY: fall, pain Reason for Exam: Pt fell today, pain to bilateral knee Acuity: Acute Type of Exam: Initial FINDINGS: Intramedullary christiano within the distal femur. Patella hardware fixation. No acute fracture. Mild medial and lateral compartment narrowing with subchondral sclerosis and small osteophytes. Medial and lateral compartment chondrocalcinosis. No large joint effusion. No acute findings. Ct Knee Left Wo Contrast    Result Date: 7/18/2020  EXAMINATION: CT OF THE LEFT KNEE WITHOUT CONTRAST 7/18/2020 12:20 pm TECHNIQUE: CT of the left knee was performed without the administration of intravenous contrast.  Multiplanar reformatted images are provided for review. Dose modulation, iterative reconstruction, and/or weight based adjustment of the mA/kV was utilized to reduce the radiation dose to as low as reasonably achievable.  COMPARISON: Radiographs from 07/17/2020 HISTORY ORDERING SYSTEM PROVIDED HISTORY: assess fracture, previous surgery, history of osteogenesis imperfecta TECHNOLOGIST PROVIDED HISTORY: assess fracture, previous surgery, history of osteogenesis imperfecta Reason for Exam: assess fracture, previous surgery, history of osteogenesis imperfecta Acuity: Chronic Type of Exam: Ongoing FINDINGS: Acute depressed lateral tibial plateau fracture. Articular depression measures about 0.7 cm with multiple punctate bodies. Fracture extends into the tibial spines. The proximal fibula is intact. The distal femur is intact. The medial tibial plateau is intact. Lateral subluxation of the patella relative to the trochlea. Please correlate for patellar tracking disorder. Lipohemarthrosis compatible with intra-articular fracture. Prepatellar swelling. Atrophy of the muscles. The perineural fat is maintained. No unexpected radiopaque foreign bodies. Heterotopic ossification along the patellar retinaculum most likely sequela of prior injury. Acute impacted lateral tibial plateau fracture with extension into the tibial spines. Low moderate to large lipohemarthrosis. No additional fractures are apparent. Lateral subluxation of the patella relative to the trochlea. Please correlate for patellar tracking disorder. Ct Knee Right Wo Contrast    Result Date: 7/17/2020  EXAMINATION: CT OF THE RIGHT KNEE WITHOUT CONTRAST 7/17/2020 8:42 pm TECHNIQUE: CT of the right knee was performed without the administration of intravenous contrast.  Multiplanar reformatted images are provided for review. Dose modulation, iterative reconstruction, and/or weight based adjustment of the mA/kV was utilized to reduce the radiation dose to as low as reasonably achievable.  COMPARISON: Right knee radiograph July 17, 2020 HISTORY ORDERING SYSTEM PROVIDED HISTORY: fall, pain, r/o fracture TECHNOLOGIST PROVIDED HISTORY: fall, pain, r/o fracture Reason for Exam: fell today Additional signs and symptoms: history of multiple previous knee surgeries FINDINGS: Bones: Postop changes prior femur ORIF with partially imaged intramedullary nail in place. Distal interlocking screws are also present. Postoperative changes of the patella with screws, cerclage wires, and malleable plate in place. The imaged hardware appears intact. There is an acute fracture lateral tibial plateau intra-articular extension of fracture lines. The major fracture fragment is only minimally displaced by approximately 2 mm. Soft Tissue:  Mild subcutaneous edema. No organized collection or subcutaneous gas identified. Scattered atherosclerotic vascular calcifications are present. Joint:  Anatomic alignment of the knee with moderate to severe medial and mild-to-moderate patellofemoral and lateral compartment osteoarthritis. Small knee effusion. Chondrocalcinosis of the mediolateral compartments of the knee. 1. Acute fracture of the lateral tibial plateau with intra-articular extension of fracture lines and mild displacement of the major fracture fragment. 2. Small knee effusion. 3. Moderate to severe medial and mild to moderate lateral patellofemoral compartment osteoarthritis. 4. Severe osteopenia.          ASSESSMENT:    Patient Active Problem List   Diagnosis    DM w/o complication type II ([address]    Essential hypertension    Erectile dysfunction    Microalbuminuria due to type 2 diabetes mellitus ([address]    Stage 3 chronic kidney disease ([address]    Traumatic bilateral lower extremity fractures    Closed right hip fracture, initial encounter ([address]    Osteogenesis imperfecta    Type 2 diabetes mellitus with chronic kidney disease ([address]    Type 2 diabetes mellitus with hyperglycemia (HCC)    Gastroesophageal reflux disease without esophagitis    Tibial plateau fracture, left, closed, initial encounter     Patient with osteogenesis imperfecta  Multiple fractures in the past  Now has bilateral tibial plateau fractures  From fall    Orthopedic evaluation noted      Diabetes type 2  Blood sugars are controlled    CKD 3  Diabetic nephropathy creatinine 1.38  Improving    Hypertension blood pressure is controlled    Potassium normal      PLAN:  Continue home medications  Lantus  Insulin sliding scale  Continue Toprol  Decrease Lantus to half dose once patient is n.p.o. for OR    Medically cleared for surgery  Moderate risk due to diabetes    Follow renal function    July 19  Osteogenesis imperfecta  Bilateral tibial plateau fractures  Plan for the OR in the a.m.     Blood pressure is elevated  Increase Toprol to 100 mg    Diabetes with CKD 3    Creatinine stable    Check BMP CBC  Blood sugars controlled  Adequate pain control  Cleared for surgery    July 28  OR today for repair of bilateral tibial plateau fractures with osteogenesis imperfecta    Creatinine has increased to 1.7  Diabetic nephropathy  Blood sugars controlled  Continue beta-blocker  Nephrology to see      7/21   post op b/l tibial fx orif    sbp 105   CONFUSION RESOLVED     cr increased to 1.7   On ivf   dm2  bs > 200   cont coverGE   AMBULATING   ON IVF AT 75   HR 89   CONT TOPROL    NEPHRO SEEING      CONTB LANTUS WITH SS COVERAGE     CHANGE TO HEPARIN SQ DUE TO CKD    July 22  Postoperative bilateral tibial fracture ORIF  Doing well  Pain is better controlled with 2 Percocets every 4 hours    Acute renal insufficiency has improved  Creatinine 1.43 today    Blood pressure stable      Sugar control has improved  Episodically above 200    Continue coverage    Can Hep-Lock IV fluids    Patient will need ECF as he has been refused by rehab    Medication reviewed continue the same    July 23  Patient is doing well  Adequate pain control  Blood pressure is controlled  VALARIE  Creatinine has improved to 1.43  He does have CKD stage III  Blood sugars are controlled  Continue same medications  Medically ready for discharge    July 24  Patient is doing well  Adequate pain control post

## 2020-07-24 NOTE — PROGRESS NOTES
EMS transport at bedside. Patient transported off unit in stable condition.      Electronically signed by Melani Mars RN on 7/24/2020 at 2:23 PM

## 2020-07-24 NOTE — PROGRESS NOTES
Comprehensive Nutrition Assessment    Type and Reason for Visit:  Initial(LOS day 7)    Nutrition Recommendations/Plan: Continue Carb control diet and fluid restriction as ordered. Nutrition Assessment:  Patient admitted for bilateral tibial plateau fractures, has a history of osteogenesis imperfecta,  has had multiple fractures and surgeries of lower and upper extremities. Currently on Carb Control diet, consuming % of meals. Weight is stable with admission weight. Patient is consisdered to be at low nutrition risk at this time. Malnutrition Assessment:  Malnutrition Status:  No malnutrition    Context:  Acute Illness     Findings of the 6 clinical characteristics of malnutrition:  Energy Intake:  No significant decrease in energy intake  Weight Loss:  No significant weight loss     Body Fat Loss:  Unable to assess     Muscle Mass Loss:  Unable to assess    Fluid Accumulation:  No significant fluid accumulation     Strength:  Not Performed    Estimated Daily Nutrient Needs:  Energy (kcal):  2315 kcal based on Craig - St. Jeor and 1.3 factor; Weight Used for Energy Requirements:  Admission     Protein (g):  113-129 gm based on 1.4-1.6 gm/kg of ideal body weight; Weight Used for Protein Requirements:  Ideal        Fluid (ml/day):  1000 ml per MD;       Nutrition Related Findings:         Wounds:  (Healing fractures)       Current Nutrition Therapies:    DIET CARB CONTROL; Daily Fluid Restriction: 1000 ml; Low Potassium    Anthropometric Measures:  · Height: 6' (182.9 cm)  · Current Body Weight: 210 lb (95.3 kg)   · Admission Body Weight: 210 lb (95.3 kg)    · Usual Body Weight: 200 lb (90.7 kg)     · Ideal Body Weight: 178 lbs; % Ideal Body Weight 118 %   · BMI: 28.5  · BMI Categories: Overweight (BMI 25.0-29. 9)       Nutrition Diagnosis:   · Increased nutrient needs related to other (comment)(healing fractures) as evidenced by wounds    Nutrition Interventions:   Food and/or Nutrient Delivery:  Continue Current Diet  Nutrition Education/Counseling:  Education not indicated   Coordination of Nutrition Care:  No recommendation at this time    Goals:  PO intakes to meet % of estimated nutrition needs       Nutrition Monitoring and Evaluation:   Food/Nutrient Intake Outcomes:  Food and Nutrient Intake  Physical Signs/Symptoms Outcomes:  Biochemical Data, Weight, Skin     Discharge Planning:    Continue current diet       Some areas of assessment may be incomplete due to COVID-19 precautions    Sydnee Leone RD, LD  Office phone [phone]

## 2020-07-24 NOTE — CARE COORDINATION
Social Work-Sara Nye received precert and COVID results are negative. HENS completed. Nurse to call report to [phone]. Orders faxed. Discussed with patient, wife, and daughter. They are agreeable.  Anselmo Adams

## 2020-07-24 NOTE — PROGRESS NOTES
Reason for Follow up: VALARIE. HISTORY:    Feels better. Cr is stable around baseline  Serum Na improved to 137    Review Of Systems:   Constitutional: No fever, chills, lethargy, weakness or wt loss. Cardiac:  No chest pain, dyspnea, orthopnea or PND. Pulmonary:  No cough, phlegm or wheezing. Abdomen:  No abdominal pain, nausea, vomiting or diarrhea. :   No hematuria, pyuria, dysuria or flank pain. Extremities:  No swelling, has knee pains. Scheduled Meds:   heparin (porcine)  5,000 Units Subcutaneous BID    metoprolol succinate  25 mg Oral BID    sodium chloride flush  10 mL Intravenous 2 times per day    calcium elemental  1,250 mg Oral Nightly    gabapentin  100 mg Oral TID    insulin glargine  40 Units Subcutaneous Daily    insulin glargine  60 Units Subcutaneous Nightly    pantoprazole  40 mg Oral Daily    sertraline  100 mg Oral Nightly    traZODone  100 mg Oral Nightly    insulin lispro  0-12 Units Subcutaneous TID WC    insulin lispro  0-6 Units Subcutaneous Nightly    insulin lispro  8 Units Subcutaneous TID WC   Continuous Infusions:   dextrose       PRN Meds:oxyCODONE-acetaminophen, diphenhydrAMINE, sodium chloride flush, acetaminophen, [Held by provider] morphine **OR** [Held by provider] morphine, glucose, dextrose, glucagon (rDNA), dextrose    No Known Allergies    Physical Exam:  Blood pressure (!) 139/97, pulse 72, temperature 97.9 °F (36.6 °C), resp. rate 16, height 6' (1.829 m), weight 210 lb 15.7 oz (95.7 kg), SpO2 100 %. In: 440 [P.O.:440]  Out: 1250   In: 440   Out: 1250 [Urine:1250]    General:  Awake, alert, not in distress. Appears to be stated age. HEENT: Atraumatic, normocephalic. Anicteric sclera. Pink and moist oral mucosa. No JVD. Chest: Bilateral air entry, clear to auscultation, no wheezing, rhonchi or rales. Cardiovascular: RRR, S1S2, no murmur, rub or gallop. No lower extremity edema. Abdomen: Soft, non tender to palpation. Musculoskeletal: .  No cyanosis or clubbing. Integumentary: Pink, warm and dry. Free from rash or lesions. Skin turgor normal.  CNS: Oriented to person, place and time. Speech clear. Face symmetrical. No tremor. Data:  CBC:   Lab Results   Component Value Date    WBC 4.0 07/24/2020    HGB 11.7 (L) 07/24/2020    HCT 35.1 (L) 07/24/2020    MCV 87.5 07/24/2020     07/24/2020     BMP:    Lab Results   Component Value Date     07/24/2020    K 4.2 07/24/2020     07/24/2020    CO2 25 07/24/2020    BUN 22 07/24/2020    CREATININE 1.49 (H) 07/24/2020    GLUCOSE 172 (H) 07/24/2020     CMP:   Lab Results   Component Value Date     07/24/2020    K 4.2 07/24/2020     07/24/2020    CO2 25 07/24/2020    BUN 22 07/24/2020    CREATININE 1.49 (H) 07/24/2020    GLUCOSE 172 (H) 07/24/2020    CALCIUM 10.0 07/24/2020    PROT 7.1 05/21/2020    LABALBU 3.7 05/21/2020    BILITOT 0.36 05/21/2020    ALKPHOS 162 (H) 05/21/2020    AST 10 05/21/2020    ALT 9 05/21/2020      Hepatic:   Lab Results   Component Value Date    AST 10 05/21/2020    ALT 9 05/21/2020    BILITOT 0.36 05/21/2020    ALKPHOS 162 (H) 05/21/2020     BNP: No results found for: BNP  Lipids:   Lab Results   Component Value Date    CHOL 163 10/15/2018    HDL 34 (L) 10/15/2018     INR: No results found for: INR  PTH: No results found for: PTH  Phosphorus:    Lab Results   Component Value Date    PHOS 3.3 07/24/2020     Ionized Calcium: No results found for: IONCA  Magnesium:   Lab Results   Component Value Date    MG 1.8 07/24/2020     Albumin:   Lab Results   Component Value Date    LABALBU 3.7 05/21/2020     Last 3 CK, CKMB, Troponin: @LABRCNT(CKTOTAL:3,CKMB:3,TROPONINI:3)       URINE:)No results found for: Silke Foote    Radiology:   Reviewed. Assessment:  1. Acute kidney injury, FeNa was 0.25% appears to be hemodynamically related and related to NSAID use. Creatinine is plateuing. 2. Underlying CKD stage 3 with baseline GFR of 50s ml/min.   3. Hyponatremia and relative hypererkalemia. 4. Anemia. 5. History of osteogenesis imperfecta. 6. Hypomagnesemia    Plan:  Renal function is improving towards baseline   Mg is better today. [id number] Briana Schultz for discharge from nephrology prospective  Advised to avoid all NSAIDs in the future. Continue lower dose Metoprolol. BP stable. Please do not hesitate to call with questions. Electronically signed by Shannan Brooks MD  on 7/24/2020 at 10:53 AM  [facility].   Ph: [phone]

## 2020-07-24 NOTE — TELEPHONE ENCOUNTER
Patient has 2 Hinge Knee Braces.     Need to know should they be locked or unlocked    Should they be worn all the time or only when the patient is up

## 2020-07-24 NOTE — PROGRESS NOTES
[address]   OCCUPATIONAL THERAPY MISSED TREATMENT NOTE   INPATIENT   Date: 20  Patient Name: Eva Deluna       Room: 4159/1564-17  MRN: [id number]   Account #: [de-identified]    : 1955  (59 y.o.)  Gender: male   Referring Practitioner: Dr Yudith Dover  Diagnosis: Fall with Bilateral Knee Pain             REASON FOR MISSED TREATMENT:  pt D/C.          MARLENE White

## 2020-07-26 NOTE — DISCHARGE SUMMARY
Take 1 capsule by mouth 3 times daily for 60 days. , Disp-90 capsule, R-1Normal      metoprolol succinate (TOPROL XL) 50 MG extended release tablet Take 1 tablet by mouth 2 times daily, Disp-30 tablet, R-3Normal      pantoprazole (PROTONIX) 40 MG tablet Take 1 tablet by mouth daily, Disp-30 tablet, R-3Normal      sertraline (ZOLOFT) 100 MG tablet Take 100 mg by mouth nightlyHistorical Med      traZODone (DESYREL) 100 MG tablet Take 100 mg by mouth nightlyHistorical Med      Blood Glucose Monitoring Suppl (ACURA BLOOD GLUCOSE METER) w/Device KIT 2 TIMES DAILY Starting Tue 10/23/2018, Disp-1 kit, R-0, [id number] of 9.0. NIDDM.       !! - Potential duplicate medications found. Please discuss with provider. STOP taking these medications       lidocaine 4 % external patch Comments:   Reason for Stopping:         insulin lispro, 1 Unit Dial, (HUMALOG KWIKPEN) 100 UNIT/ML SOPN Comments:   Reason for Stopping:         insulin lispro, 1 Unit Dial, (HUMALOG KWIKPEN) 100 UNIT/ML SOPN Comments:   Reason for Stopping:             Activity: WBAT right left, PWB left leg, with braces  Diet: regular diet  Wound Care: keep wound clean and dry    Follow-up with Dr Stefan Chadwick in 2 weeks.     Electronically signed by Maria L Song MD on 7/26/2020 at 4:52 PM

## 2020-07-29 ENCOUNTER — HOSPITAL ENCOUNTER (OUTPATIENT)
Age: 65
Setting detail: SPECIMEN
Discharge: HOME | End: 2020-07-29
Payer: MEDICARE

## 2020-07-29 LAB
ANION GAP SERPL CALCULATED.3IONS-SCNC: 12 MMOL/L (ref 9–17)
BUN BLDV-MCNC: 31 MG/DL (ref 8–23)
BUN/CREAT BLD: ABNORMAL (ref 9–20)
CALCIUM SERPL-MCNC: 9.9 MG/DL (ref 8.6–10.4)
CHLORIDE BLD-SCNC: 98 MMOL/L (ref 98–107)
CO2: 25 MMOL/L (ref 20–31)
CREAT SERPL-MCNC: 1.37 MG/DL (ref 0.7–1.2)
GFR AFRICAN AMERICAN: >60 ML/MIN
GFR NON-AFRICAN AMERICAN: 52 ML/MIN
GFR SERPL CREATININE-BSD FRML MDRD: ABNORMAL ML/MIN/{1.73_M2}
GFR SERPL CREATININE-BSD FRML MDRD: ABNORMAL ML/MIN/{1.73_M2}
GLUCOSE BLD-MCNC: 224 MG/DL (ref 70–99)
HCT VFR BLD CALC: 38.8 % (ref 40.7–50.3)
HEMOGLOBIN: 12.6 G/DL (ref 13–17)
MCH RBC QN AUTO: 29 PG (ref 25.2–33.5)
MCHC RBC AUTO-ENTMCNC: 32.5 G/DL (ref 28.4–34.8)
MCV RBC AUTO: 89.4 FL (ref 82.6–102.9)
NRBC AUTOMATED: 0 PER 100 WBC
PDW BLD-RTO: 12.1 % (ref 11.8–14.4)
PLATELET # BLD: 318 K/UL (ref 138–453)
PMV BLD AUTO: 10.3 FL (ref 8.1–13.5)
POTASSIUM SERPL-SCNC: 4.5 MMOL/L (ref 3.7–5.3)
RBC # BLD: 4.34 M/UL (ref 4.21–5.77)
SODIUM BLD-SCNC: 135 MMOL/L (ref 135–144)
WBC # BLD: 6.5 K/UL (ref 3.5–11.3)

## 2020-07-29 PROCEDURE — 80048 BASIC METABOLIC PNL TOTAL CA: CPT

## 2020-07-29 PROCEDURE — 36415 COLL VENOUS BLD VENIPUNCTURE: CPT

## 2020-07-29 PROCEDURE — 85027 COMPLETE CBC AUTOMATED: CPT

## 2020-07-29 PROCEDURE — P9603 ONE-WAY ALLOW PRORATED MILES: HCPCS

## 2020-08-05 ENCOUNTER — HOSPITAL ENCOUNTER (OUTPATIENT)
Age: 65
Setting detail: SPECIMEN
Discharge: HOME | End: 2020-08-05
Payer: MEDICARE

## 2020-08-05 LAB
ANION GAP SERPL CALCULATED.3IONS-SCNC: 13 MMOL/L (ref 9–17)
BUN BLDV-MCNC: 22 MG/DL (ref 8–23)
BUN/CREAT BLD: ABNORMAL (ref 9–20)
CALCIUM SERPL-MCNC: 10.1 MG/DL (ref 8.6–10.4)
CHLORIDE BLD-SCNC: 98 MMOL/L (ref 98–107)
CO2: 25 MMOL/L (ref 20–31)
CREAT SERPL-MCNC: 1.35 MG/DL (ref 0.7–1.2)
GFR AFRICAN AMERICAN: >60 ML/MIN
GFR NON-AFRICAN AMERICAN: 53 ML/MIN
GFR SERPL CREATININE-BSD FRML MDRD: ABNORMAL ML/MIN/{1.73_M2}
GFR SERPL CREATININE-BSD FRML MDRD: ABNORMAL ML/MIN/{1.73_M2}
GLUCOSE BLD-MCNC: 220 MG/DL (ref 70–99)
POTASSIUM SERPL-SCNC: 4.2 MMOL/L (ref 3.7–5.3)
SODIUM BLD-SCNC: 136 MMOL/L (ref 135–144)

## 2020-08-05 PROCEDURE — 36415 COLL VENOUS BLD VENIPUNCTURE: CPT

## 2020-08-05 PROCEDURE — P9603 ONE-WAY ALLOW PRORATED MILES: HCPCS

## 2020-08-05 PROCEDURE — 80048 BASIC METABOLIC PNL TOTAL CA: CPT

## 2020-08-12 ENCOUNTER — HOSPITAL ENCOUNTER (OUTPATIENT)
Age: 65
Discharge: HOME | End: 2020-08-12
Payer: MEDICARE

## 2020-08-12 ENCOUNTER — OFFICE VISIT (OUTPATIENT)
Dept: ORTHOPEDIC SURGERY | Age: 65
End: 2020-08-12

## 2020-08-12 LAB
ALBUMIN SERPL-MCNC: 4 G/DL (ref 3.5–5.2)
ALBUMIN/GLOBULIN RATIO: ABNORMAL (ref 1–2.5)
ALP BLD-CCNC: 233 U/L (ref 40–129)
ALT SERPL-CCNC: 11 U/L (ref 5–41)
ANION GAP SERPL CALCULATED.3IONS-SCNC: 14 MMOL/L (ref 9–17)
AST SERPL-CCNC: 11 U/L
BILIRUB SERPL-MCNC: 0.42 MG/DL (ref 0.3–1.2)
BUN BLDV-MCNC: 21 MG/DL (ref 8–23)
BUN/CREAT BLD: ABNORMAL (ref 9–20)
CALCIUM SERPL-MCNC: 10.4 MG/DL (ref 8.6–10.4)
CHLORIDE BLD-SCNC: 103 MMOL/L (ref 98–107)
CHOLESTEROL, FASTING: 138 MG/DL
CHOLESTEROL/HDL RATIO: 3.5
CO2: 25 MMOL/L (ref 20–31)
CREAT SERPL-MCNC: 1.47 MG/DL (ref 0.7–1.2)
CREATININE URINE: 113.4 MG/DL (ref 39–259)
GFR AFRICAN AMERICAN: 58 ML/MIN
GFR NON-AFRICAN AMERICAN: 48 ML/MIN
GFR SERPL CREATININE-BSD FRML MDRD: ABNORMAL ML/MIN/{1.73_M2}
GFR SERPL CREATININE-BSD FRML MDRD: ABNORMAL ML/MIN/{1.73_M2}
GLUCOSE BLD-MCNC: 67 MG/DL (ref 70–99)
HDLC SERPL-MCNC: 39 MG/DL
LDL CHOLESTEROL: 74 MG/DL (ref 0–130)
MICROALBUMIN/CREAT 24H UR: 31 MG/L
MICROALBUMIN/CREAT UR-RTO: 27 MCG/MG CREAT
POTASSIUM SERPL-SCNC: 3.7 MMOL/L (ref 3.7–5.3)
SODIUM BLD-SCNC: 142 MMOL/L (ref 135–144)
TOTAL PROTEIN: 7.6 G/DL (ref 6.4–8.3)
TRIGLYCERIDE, FASTING: 127 MG/DL
VLDLC SERPL CALC-MCNC: ABNORMAL MG/DL (ref 1–30)

## 2020-08-12 PROCEDURE — 82570 ASSAY OF URINE CREATININE: CPT

## 2020-08-12 PROCEDURE — 80053 COMPREHEN METABOLIC PANEL: CPT

## 2020-08-12 PROCEDURE — 80061 LIPID PANEL: CPT

## 2020-08-12 PROCEDURE — 99024 POSTOP FOLLOW-UP VISIT: CPT | Performed by: ORTHOPAEDIC SURGERY

## 2020-08-12 PROCEDURE — 82043 UR ALBUMIN QUANTITATIVE: CPT

## 2020-08-12 PROCEDURE — 36415 COLL VENOUS BLD VENIPUNCTURE: CPT

## 2020-08-12 RX ORDER — OXYCODONE HYDROCHLORIDE AND ACETAMINOPHEN 5; 325 MG/1; MG/1
1 TABLET ORAL EVERY 6 HOURS PRN
Qty: 28 TABLET | Refills: 0 | Status: SHIPPED | OUTPATIENT
Start: 2020-08-12 | End: 2020-08-25 | Stop reason: SDUPTHER

## 2020-08-12 NOTE — PROGRESS NOTES
Marco Mccray M.D.            [address] [place] [address]., [address] [facility],[address], [address] [facility]           Dept Phone: [phone]           Dept Fax:  [phone], [place]          Dept Phone: [phone]           Dept Fax:  [phone]      Chief Compliant:  Chief Complaint   Patient presents with    Post-Op Check     Bilateral Tibial Plateau fx        History of Present Illness: This is a 59 y.o. male who presents to the clinic today for evaluation / follow up of bilateral tibial plateau fractures history of osteogenesis imperfecta. Previous multiple orthopedic interventions due to his OI    Patient states that his left knee is feeling fine his right knee is painful but is always been chronically painful since his previous surgeries especially of his patella which has a cage surrounding it. .       Review of Systems   Constitutional: Negative for fever, chills, sweats. Eyes: Negative for changes in vision, or pain. HENT: Negative for ear ache, epistaxis, or sore throat. Respiratory/Cardio: Negative for Chest pain, palpitations, SOB, or cough. Gastrointestinal: Negative for abdominal pain, N/V/D. Genitourinary: Negative for dysuria, frequency, urgency, or hematuria. Neurological: Negative for headache, numbness, or weakness. Integumentary: Negative for rash, itching, laceration, or abrasion. Musculoskeletal: Positive for Post-Op Check (Bilateral Tibial Plateau fx)       Physical Exam:  Constitutional: Patient is oriented to person, place, and time. Patient appears well-developed and well nourished. HENT: Negative otherwise noted  Head: Normocephalic and Atraumatic  Nose: Normal  Eyes: Conjunctivae and EOM are normal  Neck: Normal range of motion Neck supple. Respiratory/Cardio: Effort normal. No respiratory distress.   Musculoskeletal: Examination of patient's left knee notes that his tibial plateau and incision looks good no redness or drainage. He has pretty much pain-free motion motion is 7 to about 90 degrees. He is no obvious discomfort. Examination of the right knee notes his lateral stab incisions are good. He has painless varus valgus stressing however flexion extension is painful most likely due to his previous patella ORIF/cage    Patient has no hip rotational pain. He had previous hip IM rodding per Dr. Aye Medellin this past April  Neurological: Patient is alert and oriented to person, place, and time. Normal strenght. No sensory deficit. Skin: Skin is warm and dry  Psychiatric: Behavior is normal. Thought content normal.  Nursing note and vitals reviewed. Labs and Imaging:     XR taken today:  Xr Knee Bilateral Standing    Result Date: 8/12/2020  X-rays taken today reviewed by me show standing AP of both knees and lateral both knees. Patient is status post ORIF with bone cement substitution left tibial plateau fracture. Patient also status post percutaneous pinning for nondisplaced split fracture tibial plateau right. Patient also has caged augmentation around his patella and the distal end of the femoral christiano is visualized. No orders of the defined types were placed in this encounter. Assessment and Plan:  1. Tibial plateau fracture, left, closed, initial encounter    2. Closed fracture of right tibial plateau, initial encounter    3. History of osteogenesis imperfecta with multiple orthopedic interventions        This is a 59 y.o. male who presents to the clinic today for evaluation / follow up of bilateral tibial plateau fractures. Past History:    Current Outpatient Medications:     oxyCODONE-acetaminophen (PERCOCET) 5-325 MG per tablet, Take 1 tablet by mouth every 6 hours as needed for Pain for up to 7 days. Intended supply: 7 days.  Take lowest dose possible to manage pain, Disp: 28 tablet, Rfl: 0   insulin glargine (LANTUS SOLOSTAR) 100 UNIT/ML injection pen, Inject 40 Units into the skin daily, Disp: , Rfl:     insulin glargine (LANTUS SOLOSTAR) 100 UNIT/ML injection pen, Inject 60 Units into the skin nightly, Disp: , Rfl:     Insulin Aspart, w/Niacinamide, (FIASP FLEXTOUCH) 100 UNIT/ML SOPN, Inject 8 Units into the skin 3 times daily (before meals) Plus sliding scale, Disp: , Rfl:     Insulin Aspart, w/Niacinamide, (FIASP FLEXTOUCH) 100 UNIT/ML SOPN, Inject into the skin 3 times daily (before meals) Sliding scale in addition to base of 8 units, Disp: , Rfl:     ibuprofen (ADVIL;MOTRIN) 200 MG tablet, Take 400 mg by mouth every 6 hours as needed for Pain, Disp: , Rfl:     calcium carbonate (OYSTER SHELL CALCIUM 500 MG) 1250 (500 Ca) MG tablet, Take 1 tablet by mouth nightly, Disp: , Rfl:     aspirin 81 MG EC tablet, Take 1 tablet by mouth daily, Disp: 30 tablet, Rfl: 3    gabapentin (NEURONTIN) 100 MG capsule, Take 1 capsule by mouth 3 times daily for 60 days. , Disp: 90 capsule, Rfl: 1    metoprolol succinate (TOPROL XL) 50 MG extended release tablet, Take 1 tablet by mouth 2 times daily, Disp: 30 tablet, Rfl: 3    pantoprazole (PROTONIX) 40 MG tablet, Take 1 tablet by mouth daily, Disp: 30 tablet, Rfl: 3    sertraline (ZOLOFT) 100 MG tablet, Take 100 mg by mouth nightly, Disp: , Rfl:     traZODone (DESYREL) 100 MG tablet, Take 100 mg by mouth nightly, Disp: , Rfl:     Blood Glucose Monitoring Suppl (ACURA BLOOD GLUCOSE METER) w/Device KIT, 1 kit by Does not apply route 2 times daily A1C of 9.0.  NIDDM., Disp: 1 kit, Rfl: 0  No Known Allergies  Social History     Socioeconomic History    Marital status:      Spouse name: Not on file    Number of children: Not on file    Years of education: Not on file    Highest education level: Not on file   Occupational History    Not on file   Social Needs    Financial resource strain: Not on file    Food insecurity     Worry: Not on file FIXATION LEFT AND 7.3 HELEN RIGHT KNEE performed by Chikis Zamudio MD at [facility]       Family History   Problem Relation Age of Onset    Diabetes Mother     Stroke Mother     Diabetes Father     Heart Disease Father    Plan  To help the patient assist in his ambulatory status I think he is at this point able to go weightbearing as tolerated on both sides with braces as he is a difficulty just maintaining a partial weightbearing status on the left. He is presently at home. He is doing home therapy. Would like to see the patient back here in 1 month for repeat x-rays also at that time obtain an x-ray of the patient's right hip to follow-up with that procedure per Dr. Denisse Garcia      Provider Attestation:  Danial Contreras, personally performed the services described in this documentation. All medical record entries made by the scribe were at my direction and in my presence. I have reviewed the chart and discharge instructions and agree that the records reflect my personal performance and is accurate and complete. Chikis Zamudio MD. 08/12/20      Please note that this chart was generated using voice recognition Dragon dictation software. Although every effort was made to ensure the accuracy of this automated transcription, some errors in transcription may have occurred.

## 2020-08-13 PROBLEM — M81.0 OSTEOPOROSIS: Status: ACTIVE | Noted: 2020-08-13

## 2020-08-13 RX ORDER — EPINEPHRINE 1 MG/ML
0.3 INJECTION, SOLUTION, CONCENTRATE INTRAVENOUS PRN
Status: CANCELLED | OUTPATIENT
Start: 2020-08-14

## 2020-08-13 RX ORDER — METHYLPREDNISOLONE SODIUM SUCCINATE 125 MG/2ML
125 INJECTION, POWDER, LYOPHILIZED, FOR SOLUTION INTRAMUSCULAR; INTRAVENOUS ONCE
Status: CANCELLED | OUTPATIENT
Start: 2020-08-14

## 2020-08-13 RX ORDER — DIPHENHYDRAMINE HYDROCHLORIDE 50 MG/ML
50 INJECTION INTRAMUSCULAR; INTRAVENOUS ONCE
Status: CANCELLED | OUTPATIENT
Start: 2020-08-14

## 2020-08-13 RX ORDER — SODIUM CHLORIDE 9 MG/ML
INJECTION, SOLUTION INTRAVENOUS CONTINUOUS
Status: CANCELLED | OUTPATIENT
Start: 2020-08-14

## 2020-08-25 RX ORDER — OXYCODONE HYDROCHLORIDE AND ACETAMINOPHEN 5; 325 MG/1; MG/1
1 TABLET ORAL EVERY 6 HOURS PRN
Qty: 28 TABLET | Refills: 0 | Status: SHIPPED | OUTPATIENT
Start: 2020-08-25 | End: 2020-09-01

## 2020-08-28 ENCOUNTER — HOSPITAL ENCOUNTER (OUTPATIENT)
Dept: INPATIENT UNIT | Age: 65
Setting detail: THERAPIES SERIES
Discharge: HOME | End: 2020-08-28
Payer: MEDICARE

## 2020-08-28 VITALS
SYSTOLIC BLOOD PRESSURE: 107 MMHG | OXYGEN SATURATION: 96 % | TEMPERATURE: 97 F | HEART RATE: 68 BPM | DIASTOLIC BLOOD PRESSURE: 72 MMHG | RESPIRATION RATE: 16 BRPM

## 2020-08-28 PROCEDURE — 6360000002 HC RX W HCPCS: Performed by: NURSE PRACTITIONER

## 2020-08-28 PROCEDURE — 96372 THER/PROPH/DIAG INJ SC/IM: CPT

## 2020-08-28 RX ORDER — SODIUM CHLORIDE 9 MG/ML
INJECTION, SOLUTION INTRAVENOUS CONTINUOUS
Status: CANCELLED | OUTPATIENT
Start: 2021-02-26

## 2020-08-28 RX ORDER — EPINEPHRINE 1 MG/ML
0.3 INJECTION, SOLUTION, CONCENTRATE INTRAVENOUS PRN
Status: CANCELLED | OUTPATIENT
Start: 2021-02-26

## 2020-08-28 RX ORDER — DIPHENHYDRAMINE HYDROCHLORIDE 50 MG/ML
50 INJECTION INTRAMUSCULAR; INTRAVENOUS ONCE
Status: CANCELLED | OUTPATIENT
Start: 2021-02-26

## 2020-08-28 RX ORDER — METHYLPREDNISOLONE SODIUM SUCCINATE 125 MG/2ML
125 INJECTION, POWDER, LYOPHILIZED, FOR SOLUTION INTRAMUSCULAR; INTRAVENOUS ONCE
Status: CANCELLED | OUTPATIENT
Start: 2021-02-26

## 2020-08-28 RX ADMIN — DENOSUMAB 60 MG: 60 INJECTION SUBCUTANEOUS at 15:00

## 2020-08-28 NOTE — PROGRESS NOTES
Pt arrived for Prolia injection. Vitals obtained and previous labs reviewed. Labs within written parameters. Injection given in R arm. Pt tolerated well; no s/s reaction noted. Pt discharged home with daughter via wheelchair.

## 2020-09-11 ENCOUNTER — OFFICE VISIT (OUTPATIENT)
Dept: ORTHOPEDIC SURGERY | Age: 65
End: 2020-09-11

## 2020-09-11 PROCEDURE — 99024 POSTOP FOLLOW-UP VISIT: CPT | Performed by: ORTHOPAEDIC SURGERY

## 2020-09-11 RX ORDER — OXYCODONE HYDROCHLORIDE AND ACETAMINOPHEN 5; 325 MG/1; MG/1
1 TABLET ORAL EVERY 6 HOURS PRN
Qty: 40 TABLET | Refills: 0 | Status: SHIPPED | OUTPATIENT
Start: 2020-09-11 | End: 2020-09-25

## 2020-09-11 NOTE — PROGRESS NOTES
Aline Marcial M.D.            [address] [place] [address]., [address] [facility],[address], [address] [facility]           Dept Phone: [phone]           Dept Fax:  [phone]           [place]          Dept Phone: [phone]           Dept Fax:  [phone]      Chief Compliant:  Chief Complaint   Patient presents with    Pain     bilateral knee        History of Present Illness: This is a 59 y.o. male who presents to the clinic today for evaluation / follow up of status post ORIF bilateral tibial plateau fractures as well as a previous intertrochanteric fracture TFN per Dr. Doris Alonso patient states that he has a little bit of discomfort along the anterior aspect of his right knee. He is also expressing concerns with his braces catching each other on the upper thigh areas from ambulating which gives him sensation of possibly falling which should obviously be detrimental with this patient with a history of osteogenesis imperfecta. Review of Systems   Constitutional: Negative for fever, chills, sweats. Eyes: Negative for changes in vision, or pain. HENT: Negative for ear ache, epistaxis, or sore throat. Respiratory/Cardio: Negative for Chest pain, palpitations, SOB, or cough. Gastrointestinal: Negative for abdominal pain, N/V/D. Genitourinary: Negative for dysuria, frequency, urgency, or hematuria. Neurological: Negative for headache, numbness, or weakness. Integumentary: Negative for rash, itching, laceration, or abrasion. Musculoskeletal: Positive for Pain (bilateral knee)       Physical Exam:  Constitutional: Patient is oriented to person, place, and time. Patient appears well-developed and well nourished.    HENT: Negative otherwise noted  Head: Normocephalic and Atraumatic  Nose: Normal  Eyes: Conjunctivae and EOM are normal  Neck: Normal range of motion Neck supple. Respiratory/Cardio: Effort normal. No respiratory distress. Musculoskeletal: Examination of patient's left knee notes that incisions well-healed. His motion is decent at 5 to about 125 degrees he has good varus valgus stability with minimal discomfort. Examination of the patient's right hip is unremarkable for regards to pain with with internal/external rotation. Examination of patient's right knee notes the stab wounds are healed. There is a large scar from previous surgical invention of his patella. 1 of the pins is slightly prominent from this patella but certainly not through the skin. She has painless he has no obvious medial lateral instability with varus valgus stressing  Neurological: Patient is alert and oriented to person, place, and time. Normal strenght. No sensory deficit. Skin: Skin is warm and dry  Psychiatric: Behavior is normal. Thought content normal.  Nursing note and vitals reviewed. Labs and Imaging:     XR taken today:  Xr Knee Left (1-2 Views)    Result Date: 9/11/2020  X-rays taken today and reviewed by me show AP lateral views of the patient's left knee. He status post ORIF left lateral tibial plateau with plate and screw fixation. Also has evidence for Norian augmentation for bony defect. Overall alignment looks good with a slight step-off of the tibial plateau most mostly posteriorly but overall adequate alignment. There is been no change in the hardware    Xr Knee Right (1-2 Views)    Result Date: 9/11/2020  X-rays taken today reviewed by me shows AP lateral views the patient's right knee. Patient is status post percutaneous pinning for nondisplaced tibial plateau fracture on the side and overall alignment looks excellent on both views. Patient had previous cage fixation of his comminuted patellar fracture done elsewhere.   The distal portion of his TFN with a locking screw was visualized as well    Xr Hip 1 Vw W Pelvis Right    Result Date: 9/11/2020  X-rays taken today reviewed by me showed AP of the pelvis. Patient status post TFN for intertrochanteric fracture. The alignment overall looks really good with interval change from bony healing present. Patient has cement extruded into the femoral head to enhance fixation which appears to be adequate. Overall alignment is adequate as well. No orders of the defined types were placed in this encounter. Assessment and Plan:  1. Traumatic bilateral lower extremity fractures            This is a 59 y.o. male who presents to the clinic today for evaluation / follow up of osteogenesis imperfecta bilateral tibial plateau fractures and previous intertrochanteric fracture right hip. Past History:    Current Outpatient Medications:     oxyCODONE-acetaminophen (PERCOCET) 5-325 MG per tablet, Take 1 tablet by mouth every 6 hours as needed for Pain for up to 14 days. Intended supply: 5 days.  Take lowest dose possible to manage pain, Disp: 40 tablet, Rfl: 0    insulin glargine (LANTUS SOLOSTAR) 100 UNIT/ML injection pen, Inject 40 Units into the skin daily, Disp: , Rfl:     insulin glargine (LANTUS SOLOSTAR) 100 UNIT/ML injection pen, Inject 60 Units into the skin nightly, Disp: , Rfl:     Insulin Aspart, w/Niacinamide, (FIASP FLEXTOUCH) 100 UNIT/ML SOPN, Inject 8 Units into the skin 3 times daily (before meals) Plus sliding scale, Disp: , Rfl:     Insulin Aspart, w/Niacinamide, (FIASP FLEXTOUCH) 100 UNIT/ML SOPN, Inject into the skin 3 times daily (before meals) Sliding scale in addition to base of 8 units, Disp: , Rfl:     ibuprofen (ADVIL;MOTRIN) 200 MG tablet, Take 400 mg by mouth every 6 hours as needed for Pain, Disp: , Rfl:     calcium carbonate (OYSTER SHELL CALCIUM 500 MG) 1250 (500 Ca) MG tablet, Take 1 tablet by mouth nightly, Disp: , Rfl:     aspirin 81 MG EC tablet, Take 1 tablet by mouth daily, Disp: 30 tablet, Rfl: 3    gabapentin (NEURONTIN) 100 MG capsule, Take 1 capsule by mouth 3 times daily for 60 days. , Disp: 90 capsule, Rfl: 1    metoprolol succinate (TOPROL XL) 50 MG extended release tablet, Take 1 tablet by mouth 2 times daily, Disp: 30 tablet, Rfl: 3    pantoprazole (PROTONIX) 40 MG tablet, Take 1 tablet by mouth daily, Disp: 30 tablet, Rfl: 3    sertraline (ZOLOFT) 100 MG tablet, Take 100 mg by mouth nightly, Disp: , Rfl:     traZODone (DESYREL) 100 MG tablet, Take 100 mg by mouth nightly, Disp: , Rfl:     Blood Glucose Monitoring Suppl (ACURA BLOOD GLUCOSE METER) w/Device KIT, 1 kit by Does not apply route 2 times daily A1C of 9.0.  NIDDM., Disp: 1 kit, Rfl: 0  No Known Allergies  Social History     Socioeconomic History    Marital status:      Spouse name: Not on file    Number of children: Not on file    Years of education: Not on file    Highest education level: Not on file   Occupational History    Not on file   Social Needs    Financial resource strain: Not on file    Food insecurity     Worry: Not on file     Inability: Not on file    Transportation needs     Medical: Not on file     Non-medical: Not on file   Tobacco Use    Smoking status: Former Smoker     Packs/day: 2.00     Years: 18.00     Pack years: 36.00     Types: Cigarettes     Last attempt to quit: 12/3/1991     Years since quittin.7    Smokeless tobacco: Never Used   Substance and Sexual Activity    Alcohol use: Not on file    Drug use: Not on file    Sexual activity: Not on file   Lifestyle    Physical activity     Days per week: Not on file     Minutes per session: Not on file    Stress: Not on file   Relationships    Social connections     Talks on phone: Not on file     Gets together: Not on file     Attends [employer] service: Not on file     Active member of club or organization: Not on file     Attends meetings of clubs or organizations: Not on file     Relationship status: Not on file    Intimate partner violence     Fear of current or ex partner: Not on file

## 2020-09-27 ENCOUNTER — APPOINTMENT (OUTPATIENT)
Dept: GENERAL RADIOLOGY | Age: 65
DRG: 536 | End: 2020-09-27
Payer: MEDICARE

## 2020-09-27 ENCOUNTER — HOSPITAL ENCOUNTER (INPATIENT)
Age: 65
LOS: 3 days | Discharge: NURSING, REHAB OR LONG-TERM CARE | DRG: 536 | End: 2020-09-30
Attending: EMERGENCY MEDICINE | Admitting: ORTHOPAEDIC SURGERY
Payer: MEDICARE

## 2020-09-27 PROBLEM — S32.591A FRACTURE OF MULTIPLE PUBIC RAMI, RIGHT, CLOSED, INITIAL ENCOUNTER (HCC): Status: ACTIVE | Noted: 2020-09-27

## 2020-09-27 LAB
GLUCOSE BLD-MCNC: 131 MG/DL (ref 75–110)
GLUCOSE BLD-MCNC: 208 MG/DL (ref 75–110)

## 2020-09-27 PROCEDURE — 73502 X-RAY EXAM HIP UNI 2-3 VIEWS: CPT

## 2020-09-27 PROCEDURE — 82947 ASSAY GLUCOSE BLOOD QUANT: CPT

## 2020-09-27 PROCEDURE — 6360000002 HC RX W HCPCS: Performed by: EMERGENCY MEDICINE

## 2020-09-27 PROCEDURE — G0378 HOSPITAL OBSERVATION PER HR: HCPCS

## 2020-09-27 PROCEDURE — 99283 EMERGENCY DEPT VISIT LOW MDM: CPT

## 2020-09-27 PROCEDURE — 1200000000 HC SEMI PRIVATE

## 2020-09-27 PROCEDURE — 6370000000 HC RX 637 (ALT 250 FOR IP): Performed by: INTERNAL MEDICINE

## 2020-09-27 PROCEDURE — 96374 THER/PROPH/DIAG INJ IV PUSH: CPT

## 2020-09-27 PROCEDURE — 6370000000 HC RX 637 (ALT 250 FOR IP): Performed by: ORTHOPAEDIC SURGERY

## 2020-09-27 PROCEDURE — 96376 TX/PRO/DX INJ SAME DRUG ADON: CPT

## 2020-09-27 PROCEDURE — 2580000003 HC RX 258: Performed by: ORTHOPAEDIC SURGERY

## 2020-09-27 PROCEDURE — 99284 EMERGENCY DEPT VISIT MOD MDM: CPT

## 2020-09-27 RX ORDER — TRAZODONE HYDROCHLORIDE 100 MG/1
100 TABLET ORAL NIGHTLY
Status: DISCONTINUED | OUTPATIENT
Start: 2020-09-27 | End: 2020-09-30 | Stop reason: HOSPADM

## 2020-09-27 RX ORDER — INSULIN GLARGINE 100 [IU]/ML
40 INJECTION, SOLUTION SUBCUTANEOUS DAILY
Status: DISCONTINUED | OUTPATIENT
Start: 2020-09-28 | End: 2020-09-29

## 2020-09-27 RX ORDER — ACETAMINOPHEN 325 MG/1
650 TABLET ORAL EVERY 4 HOURS PRN
Status: DISCONTINUED | OUTPATIENT
Start: 2020-09-27 | End: 2020-09-30 | Stop reason: HOSPADM

## 2020-09-27 RX ORDER — OXYCODONE HYDROCHLORIDE AND ACETAMINOPHEN 5; 325 MG/1; MG/1
2 TABLET ORAL EVERY 4 HOURS PRN
Status: DISCONTINUED | OUTPATIENT
Start: 2020-09-27 | End: 2020-09-30 | Stop reason: HOSPADM

## 2020-09-27 RX ORDER — ASPIRIN 81 MG/1
81 TABLET ORAL DAILY
Status: DISCONTINUED | OUTPATIENT
Start: 2020-09-28 | End: 2020-09-30 | Stop reason: HOSPADM

## 2020-09-27 RX ORDER — PANTOPRAZOLE SODIUM 40 MG/1
40 TABLET, DELAYED RELEASE ORAL DAILY
Status: DISCONTINUED | OUTPATIENT
Start: 2020-09-28 | End: 2020-09-30 | Stop reason: HOSPADM

## 2020-09-27 RX ORDER — METOPROLOL SUCCINATE 50 MG/1
50 TABLET, EXTENDED RELEASE ORAL 2 TIMES DAILY
Status: DISCONTINUED | OUTPATIENT
Start: 2020-09-27 | End: 2020-09-30 | Stop reason: HOSPADM

## 2020-09-27 RX ORDER — DEXTROSE MONOHYDRATE 50 MG/ML
100 INJECTION, SOLUTION INTRAVENOUS PRN
Status: DISCONTINUED | OUTPATIENT
Start: 2020-09-27 | End: 2020-09-30 | Stop reason: HOSPADM

## 2020-09-27 RX ORDER — SERTRALINE HYDROCHLORIDE 100 MG/1
100 TABLET, FILM COATED ORAL NIGHTLY
Status: DISCONTINUED | OUTPATIENT
Start: 2020-09-27 | End: 2020-09-30 | Stop reason: HOSPADM

## 2020-09-27 RX ORDER — NICOTINE POLACRILEX 4 MG
15 LOZENGE BUCCAL PRN
Status: DISCONTINUED | OUTPATIENT
Start: 2020-09-27 | End: 2020-09-30 | Stop reason: HOSPADM

## 2020-09-27 RX ORDER — SODIUM CHLORIDE 0.9 % (FLUSH) 0.9 %
10 SYRINGE (ML) INJECTION EVERY 12 HOURS SCHEDULED
Status: DISCONTINUED | OUTPATIENT
Start: 2020-09-27 | End: 2020-09-30 | Stop reason: HOSPADM

## 2020-09-27 RX ORDER — SODIUM CHLORIDE 0.9 % (FLUSH) 0.9 %
10 SYRINGE (ML) INJECTION PRN
Status: DISCONTINUED | OUTPATIENT
Start: 2020-09-27 | End: 2020-09-30 | Stop reason: HOSPADM

## 2020-09-27 RX ORDER — DEXTROSE MONOHYDRATE 25 G/50ML
12.5 INJECTION, SOLUTION INTRAVENOUS PRN
Status: DISCONTINUED | OUTPATIENT
Start: 2020-09-27 | End: 2020-09-30 | Stop reason: HOSPADM

## 2020-09-27 RX ADMIN — TRAZODONE HYDROCHLORIDE 100 MG: 100 TABLET ORAL at 22:38

## 2020-09-27 RX ADMIN — OXYCODONE HYDROCHLORIDE AND ACETAMINOPHEN 2 TABLET: 5; 325 TABLET ORAL at 23:49

## 2020-09-27 RX ADMIN — SERTRALINE HYDROCHLORIDE 100 MG: 100 TABLET ORAL at 22:38

## 2020-09-27 RX ADMIN — METOPROLOL SUCCINATE 50 MG: 50 TABLET, EXTENDED RELEASE ORAL at 22:39

## 2020-09-27 RX ADMIN — Medication 10 ML: at 22:38

## 2020-09-27 RX ADMIN — HYDROMORPHONE HYDROCHLORIDE 1 MG: 1 INJECTION, SOLUTION INTRAMUSCULAR; INTRAVENOUS; SUBCUTANEOUS at 10:40

## 2020-09-27 RX ADMIN — OXYCODONE HYDROCHLORIDE AND ACETAMINOPHEN 2 TABLET: 5; 325 TABLET ORAL at 15:52

## 2020-09-27 RX ADMIN — HYDROMORPHONE HYDROCHLORIDE 1 MG: 1 INJECTION, SOLUTION INTRAMUSCULAR; INTRAVENOUS; SUBCUTANEOUS at 13:01

## 2020-09-27 RX ADMIN — OXYCODONE HYDROCHLORIDE AND ACETAMINOPHEN 2 TABLET: 5; 325 TABLET ORAL at 20:04

## 2020-09-27 ASSESSMENT — PAIN DESCRIPTION - ORIENTATION
ORIENTATION: RIGHT
ORIENTATION: RIGHT

## 2020-09-27 ASSESSMENT — PAIN DESCRIPTION - DESCRIPTORS
DESCRIPTORS: ACHING
DESCRIPTORS: ACHING

## 2020-09-27 ASSESSMENT — PAIN DESCRIPTION - LOCATION
LOCATION: HIP
LOCATION: HIP
LOCATION: HIP;LEG

## 2020-09-27 ASSESSMENT — PAIN DESCRIPTION - FREQUENCY
FREQUENCY: CONTINUOUS
FREQUENCY: CONTINUOUS

## 2020-09-27 ASSESSMENT — PAIN SCALES - GENERAL
PAINLEVEL_OUTOF10: 7
PAINLEVEL_OUTOF10: 3
PAINLEVEL_OUTOF10: 7
PAINLEVEL_OUTOF10: 1
PAINLEVEL_OUTOF10: 7
PAINLEVEL_OUTOF10: 6
PAINLEVEL_OUTOF10: 3
PAINLEVEL_OUTOF10: 3
PAINLEVEL_OUTOF10: 7
PAINLEVEL_OUTOF10: 6

## 2020-09-27 ASSESSMENT — PAIN DESCRIPTION - PAIN TYPE
TYPE: ACUTE PAIN

## 2020-09-27 ASSESSMENT — PAIN DESCRIPTION - PROGRESSION: CLINICAL_PROGRESSION: RAPIDLY IMPROVING

## 2020-09-27 NOTE — PROGRESS NOTES
Report received from [facility] from ER Dept.      Electronically signed by Jace Olszewski, RN on 9/27/20 at 1:14 PM EDT

## 2020-09-27 NOTE — ED NOTES
Report given to Carson Berg RN from Med/Surg. Report method by phone. The following was reviewed with receiving RN:   Current vital signs:  /76   Pulse 66   Temp 98.5 °F (36.9 °C)   Resp 14   Ht 6' (1.829 m)   Wt 200 lb (90.7 kg)   SpO2 98%   BMI 27.12 kg/m²                MEWS Score: 0     Any medication or safety alerts were reviewed. Any pending diagnostics and notifications were also reviewed, as well as any safety concerns or issues, abnormal labs, abnormal imaging, and abnormal assessment findings. Questions were answered.             Carmen Alcocer RN  09/27/20 [id number]

## 2020-09-27 NOTE — PLAN OF CARE
Problem: Falls - Risk of:  Goal: Will remain free from falls  Description: Will remain free from falls  Outcome: Ongoing  Note: Patient had no falls this shift. Call light within reach. Side rails up x2. Bed in lowest position. Patient safety is maintain. Goal: Absence of physical injury  Description: Absence of physical injury  Outcome: Ongoing  Note: Patient had no injuries this shift. Patient safety maintain this shift. Problem: Skin Integrity:  Goal: Will show no infection signs and symptoms  Description: Will show no infection signs and symptoms  Outcome: Ongoing  Note: Skin assessment as charted.    Goal: Absence of new skin breakdown  Description: Absence of new skin breakdown  Outcome: Ongoing

## 2020-09-27 NOTE — ED NOTES
Transported by cart to 2042.  [facility]  09/27/20 [facility]  09/27/20 1423

## 2020-09-27 NOTE — ED PROVIDER NOTES
[address]  eMERGENCY dEPARTMENT eNCOUnter      Pt Name: Anurag Page  MRN: [id number]  [place] 1955  Date of evaluation: 9/27/20      CHIEF COMPLAINT       Chief Complaint   Patient presents with    Hip Pain     Rt hip pain s/p fall    Pelvic Pain     Rt pelvic pain s/p fall     HISTORY OF PRESENT ILLNESS   HPI 59 y.o. male presents with c/o r. Hip pain. Symptoms started 5 days ago. Was going down the steps, lost balance, fell onto his buttock. Pain is described as throbbing in character, moderate to severe in severity (rating it 7 / 10). The pain is located primarily in the r. Hip and pelvis with no radiation. The pain has been constant in course progressively worsening. The patient tried percocet prior to arrival with minimal relief of symptoms. The patient has multiple previous falls/fractures. REVIEW OF SYSTEMS     Review of Systems   Constitutional: Negative for fever. HENT: Negative for congestion. Eyes: Negative for visual disturbance. Respiratory: Negative for cough. Cardiovascular: Negative for chest pain. Gastrointestinal: Negative for abdominal pain. Genitourinary: Negative for difficulty urinating. Musculoskeletal: Positive for arthralgias and gait problem. Negative for neck pain. Skin: Negative for rash. Neurological: Positive for weakness (generalized). Negative for headaches. Psychiatric/Behavioral: Negative for confusion. PAST MEDICAL HISTORY     Past Medical History:   Diagnosis Date    Contracture, elbow     h/o right elbow fx, compound fracture.  Erectile dysfunction     Hypertension     Osteogenesis imperfecta     DX 6 months.  30 broken bones in past.     Type II or unspecified type diabetes mellitus without mention of complication, not stated as uncontrolled        SURGICAL HISTORY       Past Surgical History:   Procedure Laterality Date    CHOLECYSTECTOMY      FEMUR FRACTURE SURGERY Right 4/23/2020    HIP TFN WITH C-ARM VISUALIZATION performed by Sally Pang MD at [address] Right     right elbow x 3. Compound fx at work.  FRACTURE SURGERY Bilateral     with hardware    FRACTURE SURGERY Left     with hardware    ORIF PROXIMAL TIBIAL PLATEAU FRACTURE Bilateral 7/20/2020    TIBIAL PLATEAU OPEN REDUCTION INTERNAL FIXATION LEFT AND 7.3 HELEN RIGHT KNEE performed by Madhavi Carvajal MD at [facility]       Current Discharge Medication List      CONTINUE these medications which have NOT CHANGED    Details   !! insulin glargine (LANTUS SOLOSTAR) 100 UNIT/ML injection pen Inject 40 Units into the skin daily      !! insulin glargine (LANTUS SOLOSTAR) 100 UNIT/ML injection pen Inject 60 Units into the skin nightly      !! Insulin Aspart, w/Niacinamide, (FIASP FLEXTOUCH) 100 UNIT/ML SOPN Inject 8 Units into the skin 3 times daily (before meals) Plus sliding scale      !! Insulin Aspart, w/Niacinamide, (FIASP FLEXTOUCH) 100 UNIT/ML SOPN Inject into the skin 3 times daily (before meals) Sliding scale in addition to base of 8 units      ibuprofen (ADVIL;MOTRIN) 200 MG tablet Take 400 mg by mouth every 6 hours as needed for Pain      calcium carbonate (OYSTER SHELL CALCIUM 500 MG) 1250 (500 Ca) MG tablet Take 1 tablet by mouth nightly      metoprolol succinate (TOPROL XL) 50 MG extended release tablet Take 1 tablet by mouth 2 times daily  Qty: 30 tablet, Refills: 3      pantoprazole (PROTONIX) 40 MG tablet Take 1 tablet by mouth daily  Qty: 30 tablet, Refills: 3      sertraline (ZOLOFT) 100 MG tablet Take 100 mg by mouth nightly      traZODone (DESYREL) 100 MG tablet Take 100 mg by mouth nightly      Blood Glucose Monitoring Suppl (ACURA BLOOD GLUCOSE METER) w/Device KIT 1 kit by Does not apply route 2 times daily A1C of 9.0. NIDDM.   Qty: 1 kit, Refills: 0    Associated Diagnoses: Type 2 diabetes mellitus without complication, without long-term current use of insulin ([facility] [address] Microalbuminuria due to type 2 diabetes mellitus ([facility] [address] Neuropathy      aspirin 81 MG EC tablet Take 1 tablet by mouth daily  Qty: 30 tablet, Refills: 3      gabapentin (NEURONTIN) 100 MG capsule Take 1 capsule by mouth 3 times daily for 60 days. Qty: 90 capsule, Refills: 1       !! - Potential duplicate medications found. Please discuss with provider. ALLERGIES     has No Known Allergies. FAMILY HISTORY     He indicated that his mother is . He indicated that his father is . SOCIAL HISTORY      reports that he quit smoking about 28 years ago. His smoking use included cigarettes. He has a 36.00 pack-year smoking history. He has never used smokeless tobacco.    PHYSICAL EXAM     INITIAL VITALS: BP (!) 112/91   Pulse 60   Temp 97.9 °F (36.6 °C) (Oral)   Resp 16   Ht 6' (1.829 m)   Wt 200 lb (90.7 kg)   SpO2 97%   BMI 27.12 kg/m²   Gen; Appears uncomfortable. Head: Normocephalic, atraumatic  Eye: Pupils equal round reactive to light, no conjunctivitis  Neck: No c-spine ttp  Heart: Regular rate and rhythm no murmurs  Lungs: Clear to auscultation bilaterally, no respiratory distress  Chest wall: No crepitus, no tenderness palpation  Abdomen: Soft, nontender, nondistended, with no peritoneal signs  : Tenderness to palpation in the suprapubic area. MSK: no t or L spine ttp. Neurologic: Patient is alert and oriented x3,sensation is intact in all 4 extremities,speech is fluent. Extremities: bilateral knee immobilizers in place. No tenderness with flex/ext. No tenderness with internal or external rotation. MEDICAL DECISION MAKING:     MDM  60 yo M with pelvic pain and hip pain after a fall from standing. No sign of head or neck injury. Obtaining xrays, providing analgesia and we'll reassess. Emergency Department course:  Xray show multiple pelvic rim fractures. Pt with uncontrolled pain. Pt unable to ambulate.   Pt asks to be admitted to hospital.  D/w Dr. Loraine Cao, we'll place in the hospital for pain control, PT/OT evaluations. Dr. Cheryl Hood consulted for medical management. DIAGNOSTIC RESULTS     RADIOLOGY:All plain film, CT, MRI, and formal ultrasound images (except ED bedside ultrasound) are read by the radiologist and the images and interpretations are directly viewed by the emergency physician. XR HIP 2-3 VW W PELVIS RIGHT   Final Result   Acute right superior and inferior pubic rami fractures.            ABS: All lab results were reviewed by myself,   EMERGENCY DEPARTMENT COURSE:   Vitals:    Vitals:    09/27/20 1258 09/27/20 1417 09/27/20 1809 09/28/20 0803   BP: 131/76 (!) 147/70 138/80 (!) 112/91   Pulse: 66 64 69 60   Resp: 14 16 18 16   Temp: 98.5 °F (36.9 °C) 98.4 °F (36.9 °C) 97.9 °F (36.6 °C) 97.9 °F (36.6 °C)   TempSrc:  Oral Oral Oral   SpO2: 98% 97% 97% 97%   Weight:       Height:           The patient was given the following medications while in the emergency department:  Orders Placed This Encounter   Medications    HYDROmorphone (DILAUDID) injection 1 mg    sodium chloride flush 0.9 % injection 10 mL    sodium chloride flush 0.9 % injection 10 mL    acetaminophen (TYLENOL) tablet 650 mg    enoxaparin (LOVENOX) injection 40 mg    OR Linked Order Group     HYDROmorphone (DILAUDID) injection 0.25 mg     HYDROmorphone (DILAUDID) injection 0.5 mg    HYDROmorphone (DILAUDID) injection 1 mg    oxyCODONE-acetaminophen (PERCOCET) 5-325 MG per tablet 2 tablet    aspirin EC tablet 81 mg    DISCONTD: Insulin Aspart (w/Niacinamide) SOPN 8 Units    insulin glargine (LANTUS) injection vial 40 Units    metoprolol succinate (TOPROL XL) extended release tablet 50 mg    pantoprazole (PROTONIX) tablet 40 mg    sertraline (ZOLOFT) tablet 100 mg    traZODone (DESYREL) tablet 100 mg    insulin lispro (HUMALOG) injection vial 0-18 Units    insulin lispro (HUMALOG) injection vial 0-9 Units    glucose (GLUTOSE) 40 % oral gel 15 g    dextrose 50 % IV solution    glucagon (rDNA) injection 1 mg    dextrose 5 % solution    insulin lispro (HUMALOG) injection vial 8 Units    gabapentin (NEURONTIN) capsule 100 mg     -------------------------  CRITICAL CARE:   CONSULTS: IP CONSULT TO ORTHOPEDIC SURGERY  IP CONSULT TO FAMILY MEDICINE  PROCEDURES: Procedures     FINAL IMPRESSION      1. Closed bilateral fracture of pubic rami, initial encounter [facility])          DISPOSITION/PLAN   DISPOSITION Admitted 09/27/2020 12:26:25 PM      PATIENT REFERRED TO:  No follow-up provider specified.     DISCHARGE MEDICATIONS:  Current Discharge Medication List            Fabian Good MD  Attending Emergency Physician                      Fabian Good MD  09/28/20 2053

## 2020-09-27 NOTE — PROGRESS NOTES
Patient arrived on Med-Surg. Assessment and VS completed and WNL. Writer spoke with Dr. Woo Lew, PRN pain meds ordered. Family at bedside. Will monitor.      Electronically signed by Yelitza Flynn RN on 9/27/20 at 2:18 PM EDT

## 2020-09-27 NOTE — FLOWSHEET NOTE
Patient and daughter Cristy Encinas are known to [employer]. Patient is very anxious as he has had multiple breaks and loss of mobility and inability to perform ADL easily since he originally was injured last November. Although patient is , Cristy Encinas provides most of his caregiving; she is exhausted and overwhelmed. Patient is interested in completing advance directives as he wants to name Cristy Encinas as his primary agent and son Emeka Meyer as 1nd. Writer told patient she could complete with him, either at home or if he is admitted. Writer provided listening presence, emotional support, and prayer. 09/27/20 1209   Encounter Summary   Services provided to: Patient and family together   Referral/Consult From: [address] [place] [address] Visiting   (9-27-20)   Complexity of Encounter Moderate   Length of Encounter 30 minutes   Spiritual Assessment Completed Yes   Advance Care Planning Yes   Routine   Type Initial   Spiritual/Anabaptist   Type Spiritual support   Grief and Life Adjustment   Type Adjustment to illness   Assessment Anxious   Intervention Active listening;Explored feelings, thoughts, concerns;Explored coping resources;Prayer;Sustaining presence/ Ministry of presence; Discussed illness/injury and it's impact; Discussed meaning/purpose   Outcome Expressed gratitude;Engaged in conversation;Expressed feelings/needs/concerns;Receptive;Venting emotion   Advance Directives (For Healthcare)   Healthcare Directive No, patient does not have an advance directive for healthcare treatment   Advance Directives Documents explained

## 2020-09-28 LAB
CREAT SERPL-MCNC: 1.15 MG/DL (ref 0.7–1.2)
GFR AFRICAN AMERICAN: >60 ML/MIN
GFR NON-AFRICAN AMERICAN: >60 ML/MIN
GFR SERPL CREATININE-BSD FRML MDRD: NORMAL ML/MIN/{1.73_M2}
GFR SERPL CREATININE-BSD FRML MDRD: NORMAL ML/MIN/{1.73_M2}
GLUCOSE BLD-MCNC: 115 MG/DL (ref 75–110)
GLUCOSE BLD-MCNC: 161 MG/DL (ref 75–110)
GLUCOSE BLD-MCNC: 190 MG/DL (ref 75–110)
GLUCOSE BLD-MCNC: 194 MG/DL (ref 75–110)

## 2020-09-28 PROCEDURE — 99223 1ST HOSP IP/OBS HIGH 75: CPT | Performed by: ORTHOPAEDIC SURGERY

## 2020-09-28 PROCEDURE — G0378 HOSPITAL OBSERVATION PER HR: HCPCS

## 2020-09-28 PROCEDURE — 36415 COLL VENOUS BLD VENIPUNCTURE: CPT

## 2020-09-28 PROCEDURE — 6370000000 HC RX 637 (ALT 250 FOR IP): Performed by: INTERNAL MEDICINE

## 2020-09-28 PROCEDURE — 96372 THER/PROPH/DIAG INJ SC/IM: CPT

## 2020-09-28 PROCEDURE — 97162 PT EVAL MOD COMPLEX 30 MIN: CPT

## 2020-09-28 PROCEDURE — 1200000000 HC SEMI PRIVATE

## 2020-09-28 PROCEDURE — 6360000002 HC RX W HCPCS: Performed by: ORTHOPAEDIC SURGERY

## 2020-09-28 PROCEDURE — 97166 OT EVAL MOD COMPLEX 45 MIN: CPT

## 2020-09-28 PROCEDURE — 2580000003 HC RX 258: Performed by: ORTHOPAEDIC SURGERY

## 2020-09-28 PROCEDURE — 82565 ASSAY OF CREATININE: CPT

## 2020-09-28 PROCEDURE — 82947 ASSAY GLUCOSE BLOOD QUANT: CPT

## 2020-09-28 PROCEDURE — 6370000000 HC RX 637 (ALT 250 FOR IP): Performed by: ORTHOPAEDIC SURGERY

## 2020-09-28 PROCEDURE — 97530 THERAPEUTIC ACTIVITIES: CPT

## 2020-09-28 PROCEDURE — 27197 CLSD TX PELVIC RING FX: CPT | Performed by: ORTHOPAEDIC SURGERY

## 2020-09-28 RX ORDER — KETOROLAC TROMETHAMINE 10 MG/1
10 TABLET, FILM COATED ORAL EVERY 6 HOURS PRN
Status: DISCONTINUED | OUTPATIENT
Start: 2020-09-28 | End: 2020-09-30 | Stop reason: HOSPADM

## 2020-09-28 RX ORDER — GABAPENTIN 100 MG/1
100 CAPSULE ORAL 3 TIMES DAILY
Status: DISCONTINUED | OUTPATIENT
Start: 2020-09-28 | End: 2020-09-30 | Stop reason: HOSPADM

## 2020-09-28 RX ADMIN — INSULIN LISPRO 8 UNITS: 100 INJECTION, SOLUTION INTRAVENOUS; SUBCUTANEOUS at 11:43

## 2020-09-28 RX ADMIN — METOPROLOL SUCCINATE 50 MG: 50 TABLET, EXTENDED RELEASE ORAL at 08:23

## 2020-09-28 RX ADMIN — OXYCODONE HYDROCHLORIDE AND ACETAMINOPHEN 2 TABLET: 5; 325 TABLET ORAL at 08:22

## 2020-09-28 RX ADMIN — INSULIN LISPRO 8 UNITS: 100 INJECTION, SOLUTION INTRAVENOUS; SUBCUTANEOUS at 08:26

## 2020-09-28 RX ADMIN — INSULIN LISPRO 3 UNITS: 100 INJECTION, SOLUTION INTRAVENOUS; SUBCUTANEOUS at 08:28

## 2020-09-28 RX ADMIN — KETOROLAC TROMETHAMINE 10 MG: 10 TABLET, FILM COATED ORAL at 19:45

## 2020-09-28 RX ADMIN — PANTOPRAZOLE SODIUM 40 MG: 40 TABLET, DELAYED RELEASE ORAL at 08:23

## 2020-09-28 RX ADMIN — INSULIN LISPRO 2 UNITS: 100 INJECTION, SOLUTION INTRAVENOUS; SUBCUTANEOUS at 22:13

## 2020-09-28 RX ADMIN — GABAPENTIN 100 MG: 100 CAPSULE ORAL at 15:26

## 2020-09-28 RX ADMIN — ASPIRIN 81 MG: 81 TABLET, COATED ORAL at 08:23

## 2020-09-28 RX ADMIN — OXYCODONE HYDROCHLORIDE AND ACETAMINOPHEN 2 TABLET: 5; 325 TABLET ORAL at 12:11

## 2020-09-28 RX ADMIN — Medication 10 ML: at 21:49

## 2020-09-28 RX ADMIN — OXYCODONE HYDROCHLORIDE AND ACETAMINOPHEN 2 TABLET: 5; 325 TABLET ORAL at 21:56

## 2020-09-28 RX ADMIN — TRAZODONE HYDROCHLORIDE 100 MG: 100 TABLET ORAL at 21:56

## 2020-09-28 RX ADMIN — OXYCODONE HYDROCHLORIDE AND ACETAMINOPHEN 2 TABLET: 5; 325 TABLET ORAL at 16:06

## 2020-09-28 RX ADMIN — ENOXAPARIN SODIUM 40 MG: 40 INJECTION SUBCUTANEOUS at 08:25

## 2020-09-28 RX ADMIN — GABAPENTIN 100 MG: 100 CAPSULE ORAL at 21:56

## 2020-09-28 RX ADMIN — OXYCODONE HYDROCHLORIDE AND ACETAMINOPHEN 2 TABLET: 5; 325 TABLET ORAL at 04:19

## 2020-09-28 RX ADMIN — SERTRALINE HYDROCHLORIDE 100 MG: 100 TABLET ORAL at 21:56

## 2020-09-28 RX ADMIN — GABAPENTIN 100 MG: 100 CAPSULE ORAL at 09:53

## 2020-09-28 RX ADMIN — INSULIN GLARGINE 40 UNITS: 100 INJECTION, SOLUTION SUBCUTANEOUS at 08:25

## 2020-09-28 RX ADMIN — Medication 10 ML: at 08:35

## 2020-09-28 RX ADMIN — METOPROLOL SUCCINATE 50 MG: 50 TABLET, EXTENDED RELEASE ORAL at 21:51

## 2020-09-28 ASSESSMENT — PAIN SCALES - GENERAL
PAINLEVEL_OUTOF10: 6
PAINLEVEL_OUTOF10: 7
PAINLEVEL_OUTOF10: 7
PAINLEVEL_OUTOF10: 2
PAINLEVEL_OUTOF10: 4
PAINLEVEL_OUTOF10: 4
PAINLEVEL_OUTOF10: 7
PAINLEVEL_OUTOF10: 10
PAINLEVEL_OUTOF10: 4
PAINLEVEL_OUTOF10: 7
PAINLEVEL_OUTOF10: 6
PAINLEVEL_OUTOF10: 6
PAINLEVEL_OUTOF10: 3

## 2020-09-28 ASSESSMENT — PAIN DESCRIPTION - PROGRESSION: CLINICAL_PROGRESSION: GRADUALLY IMPROVING

## 2020-09-28 ASSESSMENT — PAIN DESCRIPTION - LOCATION
LOCATION: HIP;LEG
LOCATION: PELVIS;HIP
LOCATION: HIP
LOCATION: HIP;LEG

## 2020-09-28 ASSESSMENT — PAIN - FUNCTIONAL ASSESSMENT: PAIN_FUNCTIONAL_ASSESSMENT: PREVENTS OR INTERFERES WITH ALL ACTIVE AND SOME PASSIVE ACTIVITIES

## 2020-09-28 ASSESSMENT — ENCOUNTER SYMPTOMS
COUGH: 0
ABDOMINAL PAIN: 0

## 2020-09-28 ASSESSMENT — PAIN DESCRIPTION - PAIN TYPE
TYPE: ACUTE PAIN

## 2020-09-28 ASSESSMENT — PAIN DESCRIPTION - DESCRIPTORS
DESCRIPTORS: THROBBING;SHARP
DESCRIPTORS: ACHING

## 2020-09-28 ASSESSMENT — PAIN DESCRIPTION - ORIENTATION
ORIENTATION: RIGHT

## 2020-09-28 ASSESSMENT — PAIN DESCRIPTION - FREQUENCY
FREQUENCY: CONTINUOUS
FREQUENCY: CONTINUOUS

## 2020-09-28 ASSESSMENT — PAIN DESCRIPTION - ONSET: ONSET: ON-GOING

## 2020-09-28 NOTE — H&P
Internal Medicine  History and Physical    CHIEF COMPLAINT:    Chief Complaint   Patient presents with    Hip Pain     Rt hip pain s/p fall    Pelvic Pain     Rt pelvic pain s/p fall       History Obtained From:  {HISTORY SOURCE:[id number]::\"patient\"}  PCP: ADEEL Mendoza CNP    HISTORY OF PRESENT ILLNESS:   The patient is a 59 y.o. male who presents with ***    Past Medical History:        Diagnosis Date    Contracture, elbow     h/o right elbow fx, compound fracture.  Erectile dysfunction     Hypertension     Osteogenesis imperfecta     DX 6 months. 30 broken bones in past.     Type II or unspecified type diabetes mellitus without mention of complication, not stated as uncontrolled        Past Surgical History:        Procedure Laterality Date    CHOLECYSTECTOMY      FEMUR FRACTURE SURGERY Right 4/23/2020    HIP TFN WITH C-ARM VISUALIZATION performed by Luba Nolen MD at [address] Right     right elbow x 3. Compound fx at work.  FRACTURE SURGERY Bilateral     with hardware    FRACTURE SURGERY Left     with hardware    ORIF PROXIMAL TIBIAL PLATEAU FRACTURE Bilateral 7/20/2020    TIBIAL PLATEAU OPEN REDUCTION INTERNAL FIXATION LEFT AND 7.3 HELEN RIGHT KNEE performed by Madhu Pichardo MD at [address]         Medications Prior to Admission:    Prior to Admission medications    Medication Sig Start Date End Date Taking?  Authorizing Provider   insulin glargine (LANTUS SOLOSTAR) 100 UNIT/ML injection pen Inject 40 Units into the skin daily   Yes Historical Provider, MD   insulin glargine (LANTUS SOLOSTAR) 100 UNIT/ML injection pen Inject 60 Units into the skin nightly   Yes Historical Provider, MD   Insulin Aspart, w/Niacinamide, (FIASP FLEXTOUCH) 100 UNIT/ML SOPN Inject 8 Units into the skin 3 times daily (before meals) Plus sliding scale   Yes Historical Provider, MD   Insulin Aspart, w/Niacinamide, (FIASP FLEXTOUCH) 100 UNIT/ML SOPN Inject into the skin 3 times daily (before meals) Sliding scale in addition to base of 8 units   Yes Historical Provider, MD   ibuprofen (ADVIL;MOTRIN) 200 MG tablet Take 400 mg by mouth every 6 hours as needed for Pain   Yes Historical Provider, MD   calcium carbonate (OYSTER SHELL CALCIUM 500 MG) 1250 (500 Ca) MG tablet Take 1 tablet by mouth nightly   Yes Historical Provider, MD   metoprolol succinate (TOPROL XL) 50 MG extended release tablet Take 1 tablet by mouth 2 times daily 5/21/20  Yes David Burris MD   pantoprazole (PROTONIX) 40 MG tablet Take 1 tablet by mouth daily 5/21/20  Yes David Burris MD   sertraline (ZOLOFT) 100 MG tablet Take 100 mg by mouth nightly   Yes Historical Provider, MD   traZODone (DESYREL) 100 MG tablet Take 100 mg by mouth nightly   Yes Historical Provider, MD   Blood Glucose Monitoring Suppl (ACURA BLOOD GLUCOSE METER) w/Device KIT 1 kit by Does not apply route 2 times daily A1C of 9.0. NIDDM. 10/23/18  Yes Colette Murrell PA-C   aspirin 81 MG EC tablet Take 1 tablet by mouth daily 5/21/20   David Burris MD   gabapentin (NEURONTIN) 100 MG capsule Take 1 capsule by mouth 3 times daily for 60 days. 5/21/20 7/20/20  David Burris MD       Allergies:  No Known Allergies    Social History:   TOBACCO:   reports that he quit smoking about 28 years ago. His smoking use included cigarettes. He has a 36.00 pack-year smoking history. He has never used smokeless tobacco.  ETOH:   has no history on file for alcohol.   OCCUPATION:      Family History:       Problem Relation Age of Onset    Diabetes Mother     Stroke Mother     Diabetes Father     Heart Disease Father

## 2020-09-28 NOTE — ACP (ADVANCE CARE PLANNING)
patient to consider either: creating a new advance directive that complies with state-specific requirements; or, if that is not possible, orally revoking that prior directive in accordance with state-specific requirements, which must be documented in the EHR. [x] Yes   [] No   Educated Patient / Nicolasa Bumk regarding differences between Advance Directives and portable DNR orders.     Length of ACP Conversation in minutes:      Conversation Outcomes:  [x] ACP discussion completed  [] Existing advance directive reviewed with patient; no changes to patient's previously recorded wishes  [x] New Advance Directive completed  [] Portable Do Not Rescitate prepared for Provider review and signature  [] POLST/POST/MOLST/MOST prepared for Provider review and signature      Follow-up plan:    [] Schedule follow-up conversation to continue planning  [] Referred individual to Provider for additional questions/concerns   [] Advised patient/agent/surrogate to review completed ACP document and update if needed with changes in condition, patient preferences or care setting    [] This note routed to one or more involved healthcare providers

## 2020-09-28 NOTE — CARE COORDINATION
CASE MANAGEMENT NOTE:    Admission Date:  9/27/2020 Neal Chavez is a 59 y.o.  male    Admitted for : Fracture of multiple pubic rami, right, closed, initial encounter ([facility] [address] Rennyjoni Farrar    Met with:  Patient    PCP:  Allie Jones NP                                Insurance:  [employer] [place]      Current Residence/ Living Arrangements:  independently at home with spouse            Current Services PTA:  Yes, current with [facility]    Is patient agreeable to [facility]: Yes    Freedom of choice provided:  Yes    List of [address] provided: No, already current with [facility] and satisfied with their care    [facility] chosen:  [facility]    DME:  bedside commode, straight cane, walker, wheelchair, tub transfer bench and glucometer    Home Oxygen: No    Nebulizer: No    CPAP/BIPAP: No    Supplier: N/A    Potential Assistance Needed: Pt may need SNF. Awaiting PT/OT rec's. Pt states he will do whatever his daughter wants him to do upon discharge. SNF needed: Possibly, awaiting PT/OT rec's    [place] of choice and list provided: No    Pharmacy:  [facility] in [place]       Does Patient want to use MEDS to BEDS? No    Is patient currently receiving oral anticoagulation therapy? No    Is the Patient an [facility] with Readmission Risk Score greater than 14%? No  If yes, pt needs a follow up appointment made within 7 days. Family Members/Caregivers that pt would like involved in their care:    Yes    If yes, list name here:  Asher Stone    Transportation Provider:  Family                   Discharge Plan:  Home with [facility] VS. SNF. Awaiting PT/OT rec's. Electronically signed by: Samella Romberg, RN on 9/28/2020 at 9:44 AM     Spoke with PT. They plan to do eval after pt receives his next dose of pain medication which is around 1230. Electronically signed by Samella Romberg, RN on 9/28/2020 at 11:02 AM    Spoke with PT, and recommendation is ARU at this time.   Called and spoke with pt's daughter, Chivo Daliy. She states she is agreeable to ARU, and if pt is not accepted, she would like [place] [facility]. Notified LSW of this information and PM&R consult placed.     Electronically signed by Montrell Lopez RN on 9/28/2020 at 2:15 PM

## 2020-09-28 NOTE — PLAN OF CARE
Problem: Falls - Risk of:  Goal: Will remain free from falls  Description: Will remain free from falls  9/28/2020 0516 by Olesya Marino RN  Outcome: Ongoing  Note: No falls this shift. Call light is within reach, side rails up x2, and bed in lowest position. Pt safety is maintained. 9/27/2020 1625 by Mesha Armando RN  Outcome: Ongoing  Note: Patient had no falls this shift. Call light within reach. Side rails up x2. Bed in lowest position. Patient safety is maintain. Goal: Absence of physical injury  Description: Absence of physical injury  9/28/2020 0516 by Olesya Marino RN  Outcome: Ongoing  Note: No injury this shift. Pt safety maintained. 9/27/2020 1625 by Mesha Armando RN  Outcome: Ongoing  Note: Patient had no injuries this shift. Patient safety maintain this shift. Problem: Skin Integrity:  Goal: Will show no infection signs and symptoms  Description: Will show no infection signs and symptoms  9/28/2020 0516 by Olesya Marino RN  Outcome: Ongoing  Note: Pt remains free from infection. No signs and symptoms of infection. 9/27/2020 1625 by Mesha Armando RN  Outcome: Ongoing  Note: Skin assessment as charted. Goal: Absence of new skin breakdown  Description: Absence of new skin breakdown  9/28/2020 0516 by Olesya Marino RN  Outcome: Ongoing  Note: Skin assessment as charted.     9/27/2020 1625 by Mesha Armando RN  Outcome: Ongoing

## 2020-09-28 NOTE — FLOWSHEET NOTE
09/28/20 1923   Encounter Summary   Services provided to: Patient   Referral/Consult From: Patient   Continue Visiting   (9-28-20)   Complexity of Encounter Moderate   Length of Encounter 30 minutes   Advance Care Planning Yes   Advance Directives (For Healthcare)   Healthcare Directive Yes, patient has an advance directive for healthcare treatment   Type of Healthcare Directive Durable power of  for health care;Living will   Copy in Chart Yes, copy in chart   Chart Copy Status : Active;Current   Date Reviewed and Current: 09/28/20   Advance Directives Living will completed; Healthcare power of attornery completed   Healthcare Agent Appointed Adult [address] [place] [address] Agent's Name [id number] Agent's Phone Number [phone]   If you are unable to speak for yourself, does your Healthcare Agent or Legal Spokesperson know your healthcare wishes?  Yes

## 2020-09-28 NOTE — DISCHARGE INSTR - COC
Continuity of Care Form    Patient Name: Neal Chavez   :  1955  MRN:  [id number]    Admit date:  2020  Discharge date:  2020    Code Status Order: Full Code   Advance Directives:   Advance Care Flowsheet Documentation       Date/Time Healthcare Directive Type of Healthcare Directive Copy in [address] Agent's Name Healthcare Agent's Phone Number    20 1352  No, patient does not have an advance directive for healthcare treatment -- -- -- -- --    20 1209  No, patient does not have an advance directive for healthcare treatment -- -- -- -- --            Admitting Physician:  Faby Campo MD  PCP: ADEEL Ivan - CNP    Discharging Nurse: Maria R Masterson [facility] Unit/Room#: 2042/2042-01  Discharging Unit Phone Number: [phone]    Emergency Contact:   Extended Emergency Contact Information  Primary Emergency Contact: [address] Phone: [phone]  Relation: Child    Past Surgical History:  Past Surgical History:   Procedure Laterality Date    CHOLECYSTECTOMY      FEMUR FRACTURE SURGERY Right 2020    HIP TFN WITH C-ARM VISUALIZATION performed by Lazarus Suazo MD at [address] Right     right elbow x 3. Compound fx at work.  FRACTURE SURGERY Bilateral     with hardware    FRACTURE SURGERY Left     with hardware    ORIF PROXIMAL TIBIAL PLATEAU FRACTURE Bilateral 2020    TIBIAL PLATEAU OPEN REDUCTION INTERNAL FIXATION LEFT AND 7.3 HELEN RIGHT KNEE performed by Faby Campo MD at [address]         Immunization History: There is no immunization history on file for this patient.     Active Problems:  Patient Active Problem List   Diagnosis Code    DM w/o complication type II ([facility]) E11.9    Essential hypertension I10    Erectile dysfunction N52.9    Microalbuminuria due to type 2 diabetes mellitus ([facility]) E11.29, R80.9    Stage 3 chronic kidney disease ([facility] N18.3    Traumatic bilateral lower extremity fractures S82.91XA, S82.92XA    Closed right hip fracture, initial encounter ([facility] S72.001A    Osteogenesis imperfecta Q78.0    Type 2 diabetes mellitus with chronic kidney disease ([facility] E11.22    Type 2 diabetes mellitus with hyperglycemia ([facility]) E11.65    Gastroesophageal reflux disease without esophagitis K21.9    Tibial plateau fracture, left, closed, initial encounter S82.142A    Osteoporosis M81.0    Fracture of multiple pubic rami, right, closed, initial encounter ([facility] S32.591A       Isolation/Infection:   Isolation            No Isolation          Patient Infection Status       None to display            Nurse Assessment:  Last Vital Signs: BP (!) 112/91   Pulse 60   Temp 97.9 °F (36.6 °C) (Oral)   Resp 16   Ht 6' (1.829 m)   Wt 200 lb (90.7 kg)   SpO2 97%   BMI 27.12 kg/m²     Last documented pain score (0-10 scale): Pain Level: 7  Last Weight:   Wt Readings from Last 1 Encounters:   09/27/20 200 lb (90.7 kg)     Mental Status:  oriented and alert    IV Access:  - None    Nursing Mobility/ADLs:  Walking   Assisted  Transfer  Assisted  Bathing  Assisted  Dressing  Assisted  Toileting  Independent  Feeding  Independent  Med Admin  Independent  Med Delivery   whole    Wound Care Documentation and Therapy:  Wound 07/20/20 Radial Left;Posterior (Active)   Number of days: 69        Elimination:  Continence:   · Bowel: Yes  · Bladder: Yes  Urinary Catheter: None   Colostomy/Ileostomy/Ileal Conduit: No       Date of Last BM: 09/30/2020  No intake or output data in the 24 hours ending 09/28/20 0920  No intake/output data recorded. Safety Concerns:      At Risk for Falls    Impairments/Disabilities:      limited mobility bilateral lower extremities - wears knee brace    Nutrition Therapy:  Current Nutrition Therapy:   - Oral Diet:  Carb Control 4 carbs/meal (1800kcals/day)    Routes of Feeding: Oral  Liquids: No Restrictions  Daily Fluid Restriction: no  Last Modified Barium Swallow with Video (Video Swallowing Test): not done    Treatments at the Time of Hospital Discharge:   Respiratory Treatments:   Oxygen Therapy:  is not on home oxygen therapy. Ventilator:    - No ventilator support    Rehab Therapies: Physical Therapy and Occupational Therapy  Weight Bearing Status/Restrictions: No weight bearing restirctions  Other Medical Equipment (for information only, NOT a DME order):  walker and braces bilateral knee  Other Treatments:     Patient's personal belongings (please select all that are sent with patient):  None    RN SIGNATURE:  Electronically signed by Michael Lenz RN on 9/30/20 at 2:02 PM EDT    CASE MANAGEMENT/SOCIAL WORK SECTION    Inpatient Status Date: ***    Readmission Risk Assessment Score:  Readmission Risk              Risk of Unplanned Readmission:        13           Discharging to Facility/ Agency   · Name:   · Address:  · Phone:  · Fax:    Dialysis Facility (if applicable)   · Name:  · Address:  · Dialysis Schedule:  · Phone:  · Fax:    / signature: Electronically signed by ANDREW Negrete on 9/30/20 at 2:37 PM EDT    PHYSICIAN SECTION    Prognosis: Good    Condition at Discharge: Stable    Rehab Potential (if transferring to Rehab): Good    Recommended Labs or Other Treatments After Discharge: na    Physician Certification: I certify the above information and transfer of Saul Geiger  is necessary for the continuing treatment of the diagnosis listed and that he requires [facility] for {GREATER/LESS:[id number]} 30 days.      Update Admission H&P: No change in H&P    PHYSICIAN SIGNATURE:  Electronically signed by Leandro Chase MD on 9/28/20 at 9:20 AM EDT

## 2020-09-28 NOTE — PROGRESS NOTES
Education  OT Education: OT Role, Plan of Care, Precautions, ADL Adaptive Strategies, Transfer Training, Equipment    OT Equipment Recommendations  Equipment Needed: No(Has Multiple items at home)  OT Individual Minutes  Time In: 1030  Time Out: Kelly 38  Minutes: 20    Electronically signed by Alberto Valencia OT on 9/28/20 at 3:23 PM EDT

## 2020-09-28 NOTE — H&P
EC tablet 81 mg  81 mg Oral Daily Pat Florez MD   81 mg at 09/28/20 0823    insulin glargine (LANTUS) injection vial 40 Units  40 Units Subcutaneous Daily Pat Florez MD   40 Units at 09/28/20 0825    metoprolol succinate (TOPROL XL) extended release tablet 50 mg  50 mg Oral BID Pat Florez MD   50 mg at 09/28/20 0823    pantoprazole (PROTONIX) tablet 40 mg  40 mg Oral Daily Pat Florez MD   40 mg at 09/28/20 1470    sertraline (ZOLOFT) tablet 100 mg  100 mg Oral Nightly Pat Florez MD   100 mg at 09/27/20 2238    traZODone (DESYREL) tablet 100 mg  100 mg Oral Nightly Pat Florez MD   100 mg at 09/27/20 2238    insulin lispro (HUMALOG) injection vial 0-18 Units  0-18 Units Subcutaneous TID IRISH Florez MD   3 Units at 09/28/20 0828    insulin lispro (HUMALOG) injection vial 0-9 Units  0-9 Units Subcutaneous Nightly Pat Florez MD        glucose (GLUTOSE) 40 % oral gel 15 g  15 g Oral PRN Pat Florez MD        dextrose 50 % IV solution  12.5 g Intravenous PRN Pat Florez MD        glucagon (rDNA) injection 1 mg  1 mg Intramuscular PRN Pat Florez MD        dextrose 5 % solution  100 mL/hr Intravenous PRN Pat Florez MD        insulin lispro (HUMALOG) injection vial 8 Units  8 Units Subcutaneous TID IRISH Florez MD   8 Units at 09/28/20 9193       Allergies  Allergies have been reviewed. Raz Hardwick has No Known Allergies. Social History  Raz Hardwick  reports that he quit smoking about 28 years ago. His smoking use included cigarettes. He has a 36.00 pack-year smoking history. He has never used smokeless tobacco.    Family History  Miles's family history includes Diabetes in his father and mother; Heart Disease in his father; Stroke in his mother. Review of Systems   History obtained from the patient.    REVIEW OF SYSTEMS:   Constitution: negative for fever, chills, weight loss or malaise   Musculoskeletal: As noted in the HPI   Neurologic: As noted in the HPI    Physical Exam  BP (!) 112/91   Pulse 60 Temp 97.9 °F (36.6 °C) (Oral)   Resp 16   Ht 6' (1.829 m)   Wt 200 lb (90.7 kg)   SpO2 97%   BMI 27.12 kg/m²    General Appearance: alert, well appearing, and in no distress  Mental Status: alert, oriented to person, place, and time  Musculoskeletal lamination notes patient with multiple scars of his lower extremities from previous surgical interventions. Patient has acute tenderness in the right pubic area. He has no pain or rotation of his hips. He has previous TFN on the right hip    Imaging Studies  Xrays of the pelvis obtained on 9/27/2020 were independently reviewed demonstrating visualized a nondisplaced superior and inferior pubic rami fracture. Patient has a retained TFN on the right side    Diagnostics and Labs  Relevant diagnostic, laboratory and radiological studies have been reviewed in the Electronic Medical Record. Assessment and Plan  Doron Benjamin is a 59 y.o. old male with a history of osteogenesis imperfecta. Patient sustained an acute right superior inferior pubic rami fracture. Patient will be allowed to be weightbearing as tolerated. We will have  to evaluate for home needs versus skilled nursing facility.   Patient can be discharged anytime

## 2020-09-28 NOTE — PROGRESS NOTES
Physical Therapy    Facility/Department: [facility] MED SURG  Initial Assessment    NAME: Radhames Malik  : 1955  MRN: [id number]    Date of Service: 2020    Discharge Recommendations: The patient would benefit from an intensive level of therapy after discharge from the facility. They should be able to tolerate 3-hours of Combined OT/PT/ST over 5 days/week or at least 900 minutes of  Combined Therapy over 7 days. PT Equipment Recommendations  Equipment Needed: No    Assessment   Body structures, Functions, Activity limitations: Decreased ADL status; Decreased functional mobility ; Decreased strength;Decreased ROM; Decreased endurance;Decreased balance; Increased pain;Decreased posture;Decreased sensation  Assessment: Pt requires 1 assist this date - pt unable to clear buttocks using RW due to heavily relying on B UE. Pt was 1 assist with aakash cherry. Pt's main limitation to therapy today was pain. Pt is known to writer and is moving better than he was in 2020. Pt reports he was improving and almost IND at home and he was moving very well. Pt has the ability and motivation to improve. Pt would benefit from intense PT to maximize his independence and return to [facility] where he was returning to prior to this fall. Pt has good family support. Treatment Diagnosis: Impaired functional mobility 2* pelvic fx  Specific instructions for Next Treatment: progress transfers, sitting/standing tolerance, therex  Prognosis: Good  Decision Making: Medium Complexity  History: 59 y.o. male presents with c/o r. Hip pain. Symptoms started 5 days ago. Was going down the steps, lost balance, fell onto his buttock. Pain is described as throbbing in character, moderate to severe in severity (rating it 7 / 10). The pain is located primarily in the r. Hip and pelvis with no radiation. The pain has been constant in course progressively worsening. The patient tried percocet prior to arrival with minimal relief of symptoms. The patient has multiple previous falls/fractures. Exam: ROM, MMT, bed mobility, transfers, balance, activity tolerance  Clinical Presentation: Pt alert, pleasant, motivated for PT. Barriers to Learning: pain  REQUIRES PT FOLLOW UP: Yes  Activity Tolerance  Activity Tolerance: Patient Tolerated treatment well;Patient limited by pain       Patient Diagnosis(es): The encounter diagnosis was Closed bilateral fracture of pubic rami, initial encounter ([facility] has a past medical history of Contracture, elbow, Erectile dysfunction, Hypertension, Osteogenesis imperfecta, and Type II or unspecified type diabetes mellitus without mention of complication, not stated as uncontrolled. has a past surgical history that includes Cholecystectomy; Tonsillectomy and adenoidectomy; fracture surgery (Right); fracture surgery (Bilateral); fracture surgery (Left); Femur fracture surgery (Right, 4/23/2020); and ORIF PROXIMAL TIBIAL PLATEAU FRACTURE (Bilateral, 7/20/2020).     Restrictions  Restrictions/Precautions  Restrictions/Precautions: General Precautions, Weight Bearing, Fall Risk  Required Braces or Orthoses?: Yes  Implants present? : Metal implants(R hip TFN April 2020, B tibial plateau ORIF July 2915)  Lower Extremity Weight Bearing Restrictions  Right Lower Extremity Weight Bearing: Weight Bearing As Tolerated  Left Lower Extremity Weight Bearing: Weight Bearing As Tolerated  Required Braces or Orthoses  Right Lower Extremity Brace: Knee Brace  RLE Brace Type: hinged  Left Lower Extremity Brace: Knee Brace  LLE Brace Type: hinged  Position Activity Restriction  Other position/activity restrictions: Full weight bearing - activity as tolerated related to Pelvic rami fractures  Vision/Hearing  Vision: Impaired  Vision Exceptions: Wears glasses at all times  Hearing: Within functional limits     Subjective  General  Chart Reviewed: Yes  Patient assessed for rehabilitation services?: Yes  Additional Pertinent Hx: R hip/pelvis xray: Acute right superior and inferior pubic rami fractures. Family / Caregiver Present: No  Referring Practitioner: Dr Lennox Pearson  Referral Date : 09/28/20  Diagnosis: fx of multiple pubic rami, right  Follows Commands: Within Functional Limits  Subjective  Subjective: Pt in bed, agreeable to PT. CEASAR Wilks Master - pt had pain meds 1 hour ago. Pain Screening  Patient Currently in Pain: Yes  Pain Assessment  Pain Assessment: 0-10  Pain Level: 6  Pain Type: Acute pain  Pain Location: Hip  Pain Orientation: Right  Pain Descriptors: Throbbing; Sharp  Pain Frequency: Continuous  Pain Onset: On-going  Clinical Progression: Gradually improving  Functional Pain Assessment: Prevents or interferes with all active and some passive activities  Non-Pharmaceutical Pain Intervention(s): Ambulation/Increased Activity;Cold applied;Distraction;Repositioned; Rest  Response to Pain Intervention: Patient Satisfied  Vital Signs  Patient Currently in Pain: Yes  Oxygen Therapy  O2 Device: None (Room air)  Patient Observation  Observations: B feet slightly supinated with increased arches, pain with R LE movement, R elbow/R knee flexion contractures       Orientation  Orientation  Overall Orientation Status: Within Normal Limits  Social/Functional History  Social/Functional History  Lives With: Spouse(Spouse works out side of home [place])  Type of Home: Mobile home  Home Layout: One level  Home Access: Stairs to enter with rails  Entrance Stairs - Number of Steps: 5 steps  Entrance Stairs - Rails: Both  Bathroom Shower/Tub: Walk-in shower, Doors  Bathroom Toilet: Handicap height  Bathroom Equipment: Shower chair, Grab bars in shower, Hand-held shower, Grab bars around toilet, Toilet raiser(Stands in shower as he is unable to sit inside the tub space)  Bathroom Accessibility: Walker accessible(sideways with walker)  Home Equipment: Wheelchair-manual, Rolling walker, Reacher, Sock aid, Alert [employer] Help From: Family, Home health  ADL Assistance: Independent  Homemaking Assistance: (wife does)  Homemaking Responsibilities: No(spouse / family perform)  Ambulation Assistance: Independent  Transfer Assistance: Independent  Active : No  Patient's  Info: family  Mode of Transportation: Family, SUV  Occupation: On disability  IADL Comments: sleep in flat bed  Additional Comments: Wife works Sunday-Thursday in [facility]. After B tibial plateau fx in July - pt went to SNF then daughter's. Got back to his house with his wife 2-3 weeks ago. Son in law works 12 hour days, has 1 week off a month. Daughter works at [employer] working from home - 1 day a week in office. Pt was getting home therapy 1-2x/week. Cognition        Objective          PROM RLE (degrees)  RLE PROM: WFL  RLE General PROM: hip  AROM RLE (degrees)  RLE AROM: WFL  RLE General AROM: hip limited due to pain, at knee/ankle, slight knee flexion contracture  AROM LLE (degrees)  LLE AROM : WFL  AROM RUE (degrees)  RUE General AROM: See OT  AROM LUE (degrees)  LUE General AROM: See OT  Strength RLE  Strength RLE: Exception  Comment: supine  R Hip Flexion: 2-/5  R Knee Flexion: 2+/5  R Knee Extension: 3/5  R Ankle Dorsiflexion: 4-/5  R Ankle Plantar flexion: 4-/5  Strength LLE  Strength LLE: WFL  Comment: Grossly 4-/5   Strength RUE  Comment: See OT  Strength LUE  Comment: See OT     Sensation  Overall Sensation Status: Impaired(intermittent numbness/tingling in B feet)  Bed mobility  Supine to Sit: Moderate assistance  Sit to Supine: Unable to assess  Scooting: Moderate assistance  Comment: Pt requiring additional assist for bed mobility from flat surface. Pt having difficulty rolling, educated on long sitting and advancing B LE to edge of bed. Pt requiring additional assist at trunk and R LE. Transfers  Sit to Stand:  Moderate Assistance;Maximum Assistance  Stand to sit: Moderate Assistance;Maximum Assistance  Bed to Chair: Dependent/Total(aakash carey)  Comment: First transfer at bedside with RW - attempted to stand max x1 - pt unable to clear buttocks from bed due to high levels of pain. 2nd attempt, transferring 1 assist to [address] [place] [address] with use of B UE. Increased time to stand and assist with B foot placement on platform. B knee braces on for all mobility. Pt able to stand x2 in aakash stedy. Recommend 2 assist with aakash stedy for nursing transfers. Ambulation  Ambulation?: No  Stairs/Curb  Stairs?: No     Balance  Posture: Fair  Sitting - Static: Good  Sitting - Dynamic: Good  Standing - Static: Fair;-  Standing - Dynamic: (AKIRA)  Comments: Pt sits edge of bed SBA - increased pain sitting upright on R side. Standing in [address] [place] [address] with 2 UE support. Other exercises  Other exercises?: Yes  Other exercises 1: 2 ice packs on anterior and medial R hip     Plan   Plan  Times per week: 6-7x/week  Specific instructions for Next Treatment: progress transfers, sitting/standing tolerance, therex  Current Treatment Recommendations: Strengthening, ROM, Balance Training, Functional Mobility Training, Transfer Training, Endurance Training, Equipment Evaluation, Education, & procurement, Patient/Caregiver Education & Training, Safety Education & Training, Positioning, Pain Management  Safety Devices  Type of devices: All fall risk precautions in place, Call light within reach, Gait belt, Patient at risk for falls, Left in chair, Nurse notified(CEASAR Tran)  Restraints  Initially in place: No    G-Code       OutComes Score                                                  AM-PAC Score     AM-PAC Inpatient Mobility without Stair Climbing Raw Score : 6 (09/28/20 1341)  AM-PAC Inpatient without Stair Climbing T-Scale Score : 26.48 (09/28/20 1341)  Mobility Inpatient CMS 0-100% Score: 92.18 (09/28/20 1341)  Mobility Inpatient without Stair CMS G-Code Modifier : CM (09/28/20 1341)       Goals  Short term goals  Time Frame for Short term goals: 4-5 days  Short term goal 1: Pt to demo min x1 bed mobility.   Short term goal 2: Pt to perform min x1 transfers in aakash stedy, progressing to RW when tolerable. Short term goal 3: Pt to reduce pain to 3/10 to improve mobility and independence. Short term goal 4: Pt to improve B LE strength by 1/2 MMG. Short term goal 5: Pt to tolerate static standing x2 minutes, 1 assist in aakash stedy. Short term goal 6: Pt to tolerate seated program at edge of bed for 10-15 minutes, with reduced pain. Short term goal 7: PT to progress ambulation as able  Patient Goals   Patient goals :  To reduce pain/feel better       Therapy Time   Individual Concurrent Group Co-treatment   Time In 1341         Time Out 1415         Minutes 34         Timed Code Treatment Minutes: 15 Minutes       Rich Townsend, PT

## 2020-09-29 LAB
GLUCOSE BLD-MCNC: 162 MG/DL (ref 75–110)
GLUCOSE BLD-MCNC: 165 MG/DL (ref 75–110)
GLUCOSE BLD-MCNC: 166 MG/DL (ref 75–110)
GLUCOSE BLD-MCNC: 176 MG/DL (ref 75–110)

## 2020-09-29 PROCEDURE — 6370000000 HC RX 637 (ALT 250 FOR IP): Performed by: INTERNAL MEDICINE

## 2020-09-29 PROCEDURE — 96372 THER/PROPH/DIAG INJ SC/IM: CPT

## 2020-09-29 PROCEDURE — 82947 ASSAY GLUCOSE BLOOD QUANT: CPT

## 2020-09-29 PROCEDURE — 6360000002 HC RX W HCPCS: Performed by: ORTHOPAEDIC SURGERY

## 2020-09-29 PROCEDURE — 2580000003 HC RX 258: Performed by: ORTHOPAEDIC SURGERY

## 2020-09-29 PROCEDURE — 6370000000 HC RX 637 (ALT 250 FOR IP): Performed by: ORTHOPAEDIC SURGERY

## 2020-09-29 PROCEDURE — G0378 HOSPITAL OBSERVATION PER HR: HCPCS

## 2020-09-29 PROCEDURE — 1200000000 HC SEMI PRIVATE

## 2020-09-29 RX ORDER — INSULIN GLARGINE 100 [IU]/ML
40 INJECTION, SOLUTION SUBCUTANEOUS 2 TIMES DAILY
Status: DISCONTINUED | OUTPATIENT
Start: 2020-09-29 | End: 2020-09-30 | Stop reason: HOSPADM

## 2020-09-29 RX ADMIN — INSULIN GLARGINE 40 UNITS: 100 INJECTION, SOLUTION SUBCUTANEOUS at 08:01

## 2020-09-29 RX ADMIN — TRAZODONE HYDROCHLORIDE 100 MG: 100 TABLET ORAL at 20:38

## 2020-09-29 RX ADMIN — OXYCODONE HYDROCHLORIDE AND ACETAMINOPHEN 2 TABLET: 5; 325 TABLET ORAL at 06:41

## 2020-09-29 RX ADMIN — PANTOPRAZOLE SODIUM 40 MG: 40 TABLET, DELAYED RELEASE ORAL at 08:05

## 2020-09-29 RX ADMIN — KETOROLAC TROMETHAMINE 10 MG: 10 TABLET, FILM COATED ORAL at 22:40

## 2020-09-29 RX ADMIN — INSULIN LISPRO 8 UNITS: 100 INJECTION, SOLUTION INTRAVENOUS; SUBCUTANEOUS at 11:41

## 2020-09-29 RX ADMIN — GABAPENTIN 100 MG: 100 CAPSULE ORAL at 20:38

## 2020-09-29 RX ADMIN — OXYCODONE HYDROCHLORIDE AND ACETAMINOPHEN 2 TABLET: 5; 325 TABLET ORAL at 13:16

## 2020-09-29 RX ADMIN — GABAPENTIN 100 MG: 100 CAPSULE ORAL at 14:14

## 2020-09-29 RX ADMIN — Medication 10 ML: at 20:38

## 2020-09-29 RX ADMIN — METOPROLOL SUCCINATE 50 MG: 50 TABLET, EXTENDED RELEASE ORAL at 08:04

## 2020-09-29 RX ADMIN — GABAPENTIN 100 MG: 100 CAPSULE ORAL at 08:05

## 2020-09-29 RX ADMIN — INSULIN LISPRO 3 UNITS: 100 INJECTION, SOLUTION INTRAVENOUS; SUBCUTANEOUS at 11:42

## 2020-09-29 RX ADMIN — SERTRALINE HYDROCHLORIDE 100 MG: 100 TABLET ORAL at 20:38

## 2020-09-29 RX ADMIN — INSULIN GLARGINE 40 UNITS: 100 INJECTION, SOLUTION SUBCUTANEOUS at 21:47

## 2020-09-29 RX ADMIN — INSULIN LISPRO 3 UNITS: 100 INJECTION, SOLUTION INTRAVENOUS; SUBCUTANEOUS at 08:03

## 2020-09-29 RX ADMIN — KETOROLAC TROMETHAMINE 10 MG: 10 TABLET, FILM COATED ORAL at 14:14

## 2020-09-29 RX ADMIN — KETOROLAC TROMETHAMINE 10 MG: 10 TABLET, FILM COATED ORAL at 08:06

## 2020-09-29 RX ADMIN — INSULIN LISPRO 8 UNITS: 100 INJECTION, SOLUTION INTRAVENOUS; SUBCUTANEOUS at 17:09

## 2020-09-29 RX ADMIN — INSULIN LISPRO 2 UNITS: 100 INJECTION, SOLUTION INTRAVENOUS; SUBCUTANEOUS at 21:47

## 2020-09-29 RX ADMIN — METOPROLOL SUCCINATE 50 MG: 50 TABLET, EXTENDED RELEASE ORAL at 20:38

## 2020-09-29 RX ADMIN — OXYCODONE HYDROCHLORIDE AND ACETAMINOPHEN 2 TABLET: 5; 325 TABLET ORAL at 20:38

## 2020-09-29 RX ADMIN — INSULIN LISPRO 8 UNITS: 100 INJECTION, SOLUTION INTRAVENOUS; SUBCUTANEOUS at 08:03

## 2020-09-29 RX ADMIN — INSULIN LISPRO 3 UNITS: 100 INJECTION, SOLUTION INTRAVENOUS; SUBCUTANEOUS at 17:11

## 2020-09-29 RX ADMIN — ENOXAPARIN SODIUM 40 MG: 40 INJECTION SUBCUTANEOUS at 08:04

## 2020-09-29 RX ADMIN — Medication 10 ML: at 08:06

## 2020-09-29 RX ADMIN — ASPIRIN 81 MG: 81 TABLET, COATED ORAL at 08:05

## 2020-09-29 ASSESSMENT — PAIN SCALES - GENERAL
PAINLEVEL_OUTOF10: 0
PAINLEVEL_OUTOF10: 6
PAINLEVEL_OUTOF10: 5
PAINLEVEL_OUTOF10: 6
PAINLEVEL_OUTOF10: 5
PAINLEVEL_OUTOF10: 7
PAINLEVEL_OUTOF10: 7
PAINLEVEL_OUTOF10: 4

## 2020-09-29 ASSESSMENT — PAIN DESCRIPTION - DESCRIPTORS: DESCRIPTORS: SHOOTING;ACHING

## 2020-09-29 ASSESSMENT — PAIN DESCRIPTION - ORIENTATION: ORIENTATION: RIGHT

## 2020-09-29 ASSESSMENT — PAIN DESCRIPTION - LOCATION: LOCATION: HIP

## 2020-09-29 ASSESSMENT — PAIN DESCRIPTION - DIRECTION: RADIATING_TOWARDS: GROIN

## 2020-09-29 ASSESSMENT — PAIN DESCRIPTION - PAIN TYPE: TYPE: ACUTE PAIN

## 2020-09-29 ASSESSMENT — ENCOUNTER SYMPTOMS
COUGH: 0
SHORTNESS OF BREATH: 0

## 2020-09-29 ASSESSMENT — PAIN DESCRIPTION - FREQUENCY: FREQUENCY: CONTINUOUS

## 2020-09-29 NOTE — PROGRESS NOTES
Nutrition Education    · Verbally reviewed information with PATIENT  · Educated on Carbohydrate counting, gave handout and explained how he could include his favorite foods in his diet. · Written educational materials provided. · Contact name and number provided. · Refer to Patient Education activity for more details.       Some areas of assessment may be incomplete due to COVID-19 precautions    Jose Tan RD, LD  Office phone [phone]

## 2020-09-29 NOTE — PROGRESS NOTES
Progress Note  Date:2020       Room:/-01  Patient Karthik Bland     YOB: 1955     Age:64 y.o. Subjective    Subjective:  Symptoms:  No shortness of breath, cough or chest pain. Doing ok. Pain about same. No CP or SOB. Review of Systems   Constitutional: Positive for activity change. Negative for appetite change. Respiratory: Negative for cough and shortness of breath. Cardiovascular: Negative for chest pain. Musculoskeletal: Positive for arthralgias. Neurological: Negative for dizziness. Objective         Vitals Last 24 Hours:  TEMPERATURE:  Temp  Av.2 °F (36.8 °C)  Min: 97.5 °F (36.4 °C)  Max: 98.8 °F (37.1 °C)  RESPIRATIONS RANGE: Resp  Av  Min: 16  Max: 18  PULSE OXIMETRY RANGE: SpO2  Av.3 %  Min: 97 %  Max: 98 %  PULSE RANGE: Pulse  Av.5  Min: 56  Max: 76  BLOOD PRESSURE RANGE: Systolic (18ADP), OUP:508 , Min:112 , EMV:936   ; Diastolic (71EGY), RUB:80, Min:67, Max:99    I/O (24Hr): Intake/Output Summary (Last 24 hours) at 2020 0743  Last data filed at 2020 0645  Gross per 24 hour   Intake 1190 ml   Output 2150 ml   Net -960 ml     Objective:  General Appearance: In no acute distress. Vital signs: (most recent): Blood pressure 133/67, pulse 56, temperature 97.5 °F (36.4 °C), resp. rate 18, height 6' (1.829 m), weight 200 lb (90.7 kg), SpO2 97 %. Vital signs are normal.    Output: Producing urine. HEENT: Normal HEENT exam.    Lungs:  Normal effort. Breath sounds clear to auscultation. He is not in respiratory distress. No rales. Heart: Normal rate. Regular rhythm. S1 normal and S2 normal.    Chest: No chest wall tenderness. Abdomen: Abdomen is soft and non-distended. Bowel sounds are normal.   There is generalized tenderness. Extremities: There is deformity (of knees). There is no dependent edema. Neurological: Patient is alert and oriented to person, place and time. Normal strength.

## 2020-09-29 NOTE — CONSULTS
Physical Medicine & Rehabilitation  Consult Note - RECORD REVIEW ONLY      Admitting Physician:   Yvon Cooper MD    Primary Care Provider:   ADEEL Cerrato - CNP     Reason for Consult:  Acute Inpatient Rehabilitation    Chief Complaint: Fall with pelvic fracture    History of Present Illness:  Referring Provider is requesting an evaluation for appropriate placement upon discharge from acute care. History from chart review. Rabia Roa is a 59 y.o. male admitted to [facility] on 9/27/2020. Patient with known osteogenesis imperfecta who had fall going up the stairs causing pelvic fractures. X-rays confirmed nondisplaced superior and inferior pubic rami fractures. He is being treated conservatively. Review of Systems:  Record Review Only     Premorbid function:  Modified independent    Current function:    PT:  Restrictions/Precautions: General Precautions, Weight Bearing, Fall Risk  Implants present? : Metal implants(R hip TFN April 2020, B tibial plateau ORIF July 6763)  Other position/activity restrictions: Full weight bearing - activity as tolerated related to Pelvic rami fractures  Right Lower Extremity Weight Bearing: Weight Bearing As Tolerated  Left Lower Extremity Weight Bearing: Weight Bearing As Tolerated  Required Braces or Orthoses  Right Lower Extremity Brace: Knee Brace  RLE Brace Type: hinged  Left Lower Extremity Brace: Knee Brace  LLE Brace Type: hinged   Transfers  Sit to Stand: Moderate Assistance, Maximum Assistance  Stand to sit: Moderate Assistance, Maximum Assistance  Bed to Chair: Dependent/Total([facility])  Comment: First transfer at bedside with RW - attempted to stand max x1 - pt unable to clear buttocks from bed due to high levels of pain. 2nd attempt, transferring 1 assist to [address] [place] [address] with use of B UE. Increased time to stand and assist with B foot placement on platform. B knee braces on for all mobility. Pt able to stand x2 in [facility].  Recommend 2 assist with aakash carey for nursing transfers. Transfers  Sit to Stand: Moderate Assistance, Maximum Assistance  Stand to sit: Moderate Assistance, Maximum Assistance  Bed to Chair: Dependent/Total([facility])  Comment: First transfer at bedside with RW - attempted to stand max x1 - pt unable to clear buttocks from bed due to high levels of pain. 2nd attempt, transferring 1 assist to [address] [place] [address] with use of B UE. Increased time to stand and assist with B foot placement on platform. B knee braces on for all mobility. Pt able to stand x2 in [facility]. Recommend 2 assist with aakash carey for nursing transfers. Ambulation  Ambulation?: No                 OT:   ADL  Feeding: Setup  Grooming: Minimal assistance  UE Bathing: Minimal assistance  LE Bathing: Moderate assistance  UE Dressing: Minimal assistance  LE Dressing: Maximum assistance  Toileting: Contact guard assistance(Using a Urinal)                      Bed mobility  Supine to Sit: Moderate assistance  Sit to Supine: Unable to assess  Scooting: Moderate assistance  Comment: Pt requiring additional assist for bed mobility from flat surface. Pt having difficulty rolling, educated on long sitting and advancing B LE to edge of bed. Pt requiring additional assist at trunk and R LE.                    SLP:      Past Medical History:        Diagnosis Date    Contracture, elbow     h/o right elbow fx, compound fracture.  Erectile dysfunction     Hypertension     Osteogenesis imperfecta     DX 6 months. 30 broken bones in past.     Type II or unspecified type diabetes mellitus without mention of complication, not stated as uncontrolled        Past Surgical History:        Procedure Laterality Date    CHOLECYSTECTOMY      FEMUR FRACTURE SURGERY Right 4/23/2020    HIP TFN WITH C-ARM VISUALIZATION performed by Lazara Hardy MD at [facility] Right     right elbow x 3. Compound fx at work.      FRACTURE SURGERY Bilateral     with hardware    FRACTURE SURGERY Left     with hardware    ORIF PROXIMAL TIBIAL PLATEAU FRACTURE Bilateral 7/20/2020    TIBIAL PLATEAU OPEN REDUCTION INTERNAL FIXATION LEFT AND 7.3 HELEN RIGHT KNEE performed by Arthur Troncoso MD at [address]         Allergies:    No Known Allergies     Current Medications:   Current Facility-Administered Medications: insulin glargine (LANTUS) injection vial 40 Units, 40 Units, Subcutaneous, BID  gabapentin (NEURONTIN) capsule 100 mg, 100 mg, Oral, TID  ketorolac (TORADOL) tablet 10 mg, 10 mg, Oral, Q6H PRN  sodium chloride flush 0.9 % injection 10 mL, 10 mL, Intravenous, 2 times per day  sodium chloride flush 0.9 % injection 10 mL, 10 mL, Intravenous, PRN  acetaminophen (TYLENOL) tablet 650 mg, 650 mg, Oral, Q4H PRN  enoxaparin (LOVENOX) injection 40 mg, 40 mg, Subcutaneous, Daily  HYDROmorphone (DILAUDID) injection 0.25 mg, 0.25 mg, Intravenous, Q3H PRN **OR** HYDROmorphone (DILAUDID) injection 0.5 mg, 0.5 mg, Intravenous, Q3H PRN  oxyCODONE-acetaminophen (PERCOCET) 5-325 MG per tablet 2 tablet, 2 tablet, Oral, Q4H PRN  aspirin EC tablet 81 mg, 81 mg, Oral, Daily  metoprolol succinate (TOPROL XL) extended release tablet 50 mg, 50 mg, Oral, BID  pantoprazole (PROTONIX) tablet 40 mg, 40 mg, Oral, Daily  sertraline (ZOLOFT) tablet 100 mg, 100 mg, Oral, Nightly  traZODone (DESYREL) tablet 100 mg, 100 mg, Oral, Nightly  insulin lispro (HUMALOG) injection vial 0-18 Units, 0-18 Units, Subcutaneous, TID WC  insulin lispro (HUMALOG) injection vial 0-9 Units, 0-9 Units, Subcutaneous, Nightly  glucose (GLUTOSE) 40 % oral gel 15 g, 15 g, Oral, PRN  dextrose 50 % IV solution, 12.5 g, Intravenous, PRN  glucagon (rDNA) injection 1 mg, 1 mg, Intramuscular, PRN  dextrose 5 % solution, 100 mL/hr, Intravenous, PRN  insulin lispro (HUMALOG) injection vial 8 Units, 8 Units, Subcutaneous, TID WC    Social History:  Lives With: Spouse(Spouse works out side of home [place])  Type of Home: Mobile home  Home Layout: One level  Home Access: Stairs to enter with rails  Entrance Stairs - Number of Steps: 5 steps  Entrance Stairs - Rails: Both  Bathroom Shower/Tub: Walk-in shower, Doors  Bathroom Toilet: Handicap height  Bathroom Equipment: Shower chair, Grab bars in shower, Hand-held shower, Grab bars around toilet, Toilet raiser(Stands in shower as he is unable to sit inside the tub space)  Bathroom Accessibility: Walker accessible(sideways with walker)  Home Equipment: Wheelchair-manual, Rolling walker, Reacher, Sock aid, Alert [employer] Help From: Family, Home health  ADL Assistance: Independent  Homemaking Assistance: (wife does)  Homemaking Responsibilities: No(spouse / family perform)  Ambulation Assistance: Independent  Transfer Assistance: Independent  Active : No  Patient's  Info: family  Mode of Transportation: Family, [employer]  Occupation: On disability  IADL Comments: sleep in flat bed  Additional Comments: Wife works -Thursday in [facility] [employer]. After B tibial plateau fx in July - pt went to SNF then daughter's. Got back to his house with his wife 2-3 weeks ago. Son in law works 12 hour days, has 1 week off a month. Daughter works at [employer] working from home - 1 day a week in office. Pt was getting home therapy 1-2x/week.   Social History     Socioeconomic History    Marital status:      Spouse name: None    Number of children: None    Years of education: None    Highest education level: None   Occupational History    None   Social Needs    Financial resource strain: None    Food insecurity     Worry: None     Inability: None    Transportation needs     Medical: None     Non-medical: None   Tobacco Use    Smoking status: Former Smoker     Packs/day: 2.00     Years: 18.00     Pack years: 36.00     Types: Cigarettes     Last attempt to quit: 12/3/1991     Years since quittin.8    Smokeless tobacco: Never Used   Substance and Sexual Activity    Alcohol INR in the last 72 hours. APTT: No results for input(s): APTT in the last 72 hours. CARDIAC ENZYMES: No results for input(s): CKMB, CKMBINDEX, TROPONINT in the last 72 hours. Invalid input(s): CKTOTAL;3  FASTING LIPID PANEL:  Lab Results   Component Value Date    CHOL 163 10/15/2018    HDL 39 (L) 08/12/2020    TRIG 161 (H) 10/15/2018     LIVER PROFILE: No results for input(s): AST, ALT, ALB, BILIDIR, BILITOT, ALKPHOS in the last 72 hours. Physical Exam:  /67   Pulse 56   Temp 97.5 °F (36.4 °C)   Resp 18   Ht 6' (1.829 m)   Wt 200 lb (90.7 kg)   SpO2 97%   BMI 27.12 kg/m²     Deferred - Record review only    Impression:  1. Pelvic fractures after fall  2. History osteogenesis imperfecta with multiple fractures  3. HTN    Recommendations:    1. Diagnosis:  Pelvic fracture  2. Therapy: Has PT/OT needs  3. Medical Necessity: As above  4. Support: Supportive family who all work. 5. Rehab Recommendation: Patient has had to be discharged to SNF in the recent past for fractures. Given his current level of assist and likely duration of treatment, I would recommend SNF level of care at this time. 6. DVT Prophylaxis: on Lovenox    It was my pleasure to evaluate the chart of Anurag Page today. Please call with questions. Dereje Harris MD        This note is created with the assistance of a speech recognition program.  While intending to generate a document that actually reflects the content of the visit, the document can still have some errors including those of syntax and sound a like substitutions which may escape proof reading. In such instances, actual meaning can be extrapolated by contextual diversion.

## 2020-09-30 VITALS
BODY MASS INDEX: 27.09 KG/M2 | OXYGEN SATURATION: 95 % | SYSTOLIC BLOOD PRESSURE: 152 MMHG | WEIGHT: 200 LBS | TEMPERATURE: 98.1 F | HEIGHT: 72 IN | HEART RATE: 60 BPM | RESPIRATION RATE: 18 BRPM | DIASTOLIC BLOOD PRESSURE: 79 MMHG

## 2020-09-30 LAB
GLUCOSE BLD-MCNC: 141 MG/DL (ref 75–110)
GLUCOSE BLD-MCNC: 213 MG/DL (ref 75–110)

## 2020-09-30 PROCEDURE — 97530 THERAPEUTIC ACTIVITIES: CPT

## 2020-09-30 PROCEDURE — 97110 THERAPEUTIC EXERCISES: CPT

## 2020-09-30 PROCEDURE — 97116 GAIT TRAINING THERAPY: CPT

## 2020-09-30 PROCEDURE — 6360000002 HC RX W HCPCS: Performed by: ORTHOPAEDIC SURGERY

## 2020-09-30 PROCEDURE — G0378 HOSPITAL OBSERVATION PER HR: HCPCS

## 2020-09-30 PROCEDURE — 6370000000 HC RX 637 (ALT 250 FOR IP): Performed by: INTERNAL MEDICINE

## 2020-09-30 PROCEDURE — 6370000000 HC RX 637 (ALT 250 FOR IP): Performed by: ORTHOPAEDIC SURGERY

## 2020-09-30 PROCEDURE — 82947 ASSAY GLUCOSE BLOOD QUANT: CPT

## 2020-09-30 PROCEDURE — 2580000003 HC RX 258: Performed by: ORTHOPAEDIC SURGERY

## 2020-09-30 PROCEDURE — 96372 THER/PROPH/DIAG INJ SC/IM: CPT

## 2020-09-30 RX ORDER — DOCUSATE SODIUM 100 MG/1
100 CAPSULE, LIQUID FILLED ORAL 2 TIMES DAILY
Qty: 60 CAPSULE | Refills: 0
Start: 2020-09-30 | End: 2020-10-30

## 2020-09-30 RX ORDER — INSULIN GLARGINE 100 [IU]/ML
40 INJECTION, SOLUTION SUBCUTANEOUS 2 TIMES DAILY
Qty: 1 VIAL | Refills: 3 | Status: SHIPPED | OUTPATIENT
Start: 2020-09-30

## 2020-09-30 RX ORDER — OXYCODONE HYDROCHLORIDE AND ACETAMINOPHEN 5; 325 MG/1; MG/1
1 TABLET ORAL EVERY 6 HOURS PRN
Qty: 42 TABLET | Refills: 0 | Status: SHIPPED | OUTPATIENT
Start: 2020-09-30 | End: 2020-10-11

## 2020-09-30 RX ADMIN — METOPROLOL SUCCINATE 50 MG: 50 TABLET, EXTENDED RELEASE ORAL at 09:07

## 2020-09-30 RX ADMIN — GABAPENTIN 100 MG: 100 CAPSULE ORAL at 14:33

## 2020-09-30 RX ADMIN — KETOROLAC TROMETHAMINE 10 MG: 10 TABLET, FILM COATED ORAL at 10:14

## 2020-09-30 RX ADMIN — OXYCODONE HYDROCHLORIDE AND ACETAMINOPHEN 2 TABLET: 5; 325 TABLET ORAL at 11:46

## 2020-09-30 RX ADMIN — ENOXAPARIN SODIUM 40 MG: 40 INJECTION SUBCUTANEOUS at 09:07

## 2020-09-30 RX ADMIN — OXYCODONE HYDROCHLORIDE AND ACETAMINOPHEN 2 TABLET: 5; 325 TABLET ORAL at 05:54

## 2020-09-30 RX ADMIN — ASPIRIN 81 MG: 81 TABLET, COATED ORAL at 09:07

## 2020-09-30 RX ADMIN — OXYCODONE HYDROCHLORIDE AND ACETAMINOPHEN 2 TABLET: 5; 325 TABLET ORAL at 16:18

## 2020-09-30 RX ADMIN — INSULIN LISPRO 6 UNITS: 100 INJECTION, SOLUTION INTRAVENOUS; SUBCUTANEOUS at 11:52

## 2020-09-30 RX ADMIN — PANTOPRAZOLE SODIUM 40 MG: 40 TABLET, DELAYED RELEASE ORAL at 09:07

## 2020-09-30 RX ADMIN — INSULIN LISPRO 8 UNITS: 100 INJECTION, SOLUTION INTRAVENOUS; SUBCUTANEOUS at 11:49

## 2020-09-30 RX ADMIN — GABAPENTIN 100 MG: 100 CAPSULE ORAL at 09:07

## 2020-09-30 RX ADMIN — INSULIN GLARGINE 40 UNITS: 100 INJECTION, SOLUTION SUBCUTANEOUS at 09:11

## 2020-09-30 RX ADMIN — Medication 10 ML: at 10:15

## 2020-09-30 ASSESSMENT — PAIN SCALES - GENERAL
PAINLEVEL_OUTOF10: 0
PAINLEVEL_OUTOF10: 6
PAINLEVEL_OUTOF10: 10
PAINLEVEL_OUTOF10: 5
PAINLEVEL_OUTOF10: 4
PAINLEVEL_OUTOF10: 7
PAINLEVEL_OUTOF10: 2

## 2020-09-30 ASSESSMENT — PAIN DESCRIPTION - DIRECTION: RADIATING_TOWARDS: GROIN AREA

## 2020-09-30 ASSESSMENT — ENCOUNTER SYMPTOMS: SHORTNESS OF BREATH: 0

## 2020-09-30 ASSESSMENT — PAIN DESCRIPTION - PAIN TYPE: TYPE: ACUTE PAIN

## 2020-09-30 ASSESSMENT — PAIN DESCRIPTION - ORIENTATION: ORIENTATION: RIGHT

## 2020-09-30 ASSESSMENT — PAIN DESCRIPTION - PROGRESSION: CLINICAL_PROGRESSION: GRADUALLY IMPROVING

## 2020-09-30 ASSESSMENT — PAIN DESCRIPTION - LOCATION: LOCATION: HIP

## 2020-09-30 NOTE — PLAN OF CARE
Problem: Falls - Risk of:  Goal: Will remain free from falls  Description: Will remain free from falls  9/30/2020 0511 by Micky Good RN  Outcome: Ongoing  Note: Patient makes no attempts to get out of bed per self, alert and oriented and calls for assistance   9/29/2020 1754 by Julius Ndiaye RN  Outcome: Ongoing  Goal: Absence of physical injury  Description: Absence of physical injury  9/30/2020 0511 by Micky Good RN  Outcome: Ongoing  9/29/2020 1754 by Julius Ndiaye RN  Outcome: Ongoing     Problem: Skin Integrity:  Goal: Will show no infection signs and symptoms  Description: Will show no infection signs and symptoms  9/30/2020 0511 by Micky Good RN  Outcome: Ongoing  Note: Patient tolerates frequent repositioning, skin care precautions continued,  no signs of breakdown noted   9/29/2020 1754 by Julius Ndiaye RN  Outcome: Ongoing  Goal: Absence of new skin breakdown  Description: Absence of new skin breakdown  9/30/2020 0511 by Micky Good RN  Outcome: Ongoing  9/29/2020 1754 by Julius Ndiaye RN  Outcome: Ongoing     Problem: Pain:  Goal: Pain level will decrease  Description: Pain level will decrease  9/30/2020 0511 by Micky Good RN  Outcome: Ongoing  Note: Patient medicated for pain as ordered with effectivenss   9/29/2020 1754 by Julius Ndiaye RN  Outcome: Ongoing  Goal: Control of acute pain  Description: Control of acute pain  9/30/2020 0511 by Micky Good RN  Outcome: Ongoing  9/29/2020 1754 by Julius Ndiaye RN  Outcome: Ongoing  Goal: Control of chronic pain  Description: Control of chronic pain  9/30/2020 0511 by Micky Good RN  Outcome: Ongoing  9/29/2020 1754 by Julius Ndiaye RN  Outcome: Ongoing

## 2020-09-30 NOTE — PROGRESS NOTES
elbow/R knee flexion contractures  Pain Assessment  Clinical Progression: Gradually improving  Response to Pain Intervention: Asleep with RR greater than 10    Objective     Bed mobility  Rolling to Right: Stand by assistance  Supine to Sit: Stand by assistance(Patient does a good job advancing both L/E from edge of bed.)  Sit to Supine: (AKIRA, pt remained up in w/c following therapy.)  Scooting: Stand by assistance(Bed flat and elevated for patient height, pt was able to scoot to edge using both U/E to assist.)  Balance  Sitting Balance: Supervision  Standing Balance: Minimal assistance(min-CGA)  Standing Balance  Time: 2-3 min c bilateral support on RW  Activity: func mobility  Comment: no LOB noted,  Functional Mobility  Functional Mobility Comments: max VCs for sequencing, no LOB noted,   ADL  Feeding: Setup  Grooming: Minimal assistance  UE Bathing: Setup; Increased time to complete  LE Bathing: Moderate assistance  UE Dressing: Minimal assistance  LE Dressing: Maximum assistance  Toileting: Contact guard assistance(Using a Urinal)  Additional Comments: answers baed on clinical reasoning/skilled observation     Transfers  Sit to stand: Moderate assistance;2 Person assistance  Stand to sit: 2 Person assistance;Minimal assistance  Transfer Comments: VCs for hand placement  Type of ROM/Therapeutic Exercise  Type of ROM/Therapeutic Exercise: Resistive Bands(lime)  Comment: x 15 reps to support mobility and transfers  Exercises  Shoulder Flexion: X  Shoulder Extension: X  Horizontal ABduction: X  Horizontal ADduction: X  Elbow Flexion: X  Elbow Extension: X                          Assessment  Performance deficits / Impairments: Decreased functional mobility ; Decreased ADL status; Decreased safe awareness;Decreased endurance  Prognosis: Fair;Good  Discharge Recommendations: Patient would benefit from continued therapy after discharge  Activity Tolerance: Patient limited by pain; Patient limited by fatigue  Safety Devices in place: Yes  Type of devices: Call light within reach; Left in chair;Nurse notified             Patient Education: OT POC, HOME safety/fall prevention, RW safety and tech during transfers and func mobility     Learner:patient  Method: demonstration and explanation       Outcome: acknowledged understanding and demonstrated understanding     Plan  Safety Devices  Safety Devices in place: Yes  Type of devices: Call light within reach, Left in chair, Nurse notified  Plan  Times per week: 1-4 sessions  Times per day: Daily  Current Treatment Recommendations: Functional Mobility Training, Endurance Training, Safety Education & Training, Patient/Caregiver Education & Training, Self-Care / ADL      Goals  Short term goals  Time Frame for Short term goals: 1-3 days  Short term goal 1: Tolerate 10-25 minutes of general activity to support self care and Mobility task performance  Short term goal 2: D/V understanding of Fall Prevention and Safety strategies with application to care tasks  Short term goal 3: Participate in care using safe techniques for self care and mobility tasks  Short term goal 4: D/V understanding of Modified care strategies for self care needs    OT Individual Minutes  Time In: 0915  Time Out: 6536  Minutes: 41      Electronically signed by MARLENE Sheikh on 9/30/20 at 2:31 PM EDT

## 2020-09-30 NOTE — PROGRESS NOTES
LONG TERM ACUTE [facility] was able to get pt approved for one day and then insurance expires. Pt will then have medicare. Transport set for [facility]. Orders sent.

## 2020-09-30 NOTE — PROGRESS NOTES
Adam Chavez here to  patient at this time, patient discharged with belongings. Daughter to transport wheelchair.

## 2020-09-30 NOTE — PROGRESS NOTES
Physical Therapy  Facility/Department: [facility] MED SURG  Daily Treatment Note  NAME: Doron Benjamin  : 1955  MRN: [id number]    Date of Service: 2020    Discharge Recommendations:  Patient would benefit from continued therapy after discharge   PT Equipment Recommendations  Equipment Needed: No    Assessment   Treatment Diagnosis: Impaired functional mobility 2* pelvic fx  Specific instructions for Next Treatment: progress transfers, sitting/standing tolerance, therex  Prognosis: Good  Barriers to Learning: pain  REQUIRES PT FOLLOW UP: Yes  Activity Tolerance  Activity Tolerance: Patient Tolerated treatment well;Patient limited by pain     Patient Diagnosis(es): The encounter diagnosis was Closed bilateral fracture of pubic rami, initial encounter ([facility] [address] has a past medical history of Contracture, elbow, Erectile dysfunction, Hypertension, Osteogenesis imperfecta, and Type II or unspecified type diabetes mellitus without mention of complication, not stated as uncontrolled. has a past surgical history that includes Cholecystectomy; Tonsillectomy and adenoidectomy; fracture surgery (Right); fracture surgery (Bilateral); fracture surgery (Left); Femur fracture surgery (Right, 2020); and ORIF PROXIMAL TIBIAL PLATEAU FRACTURE (Bilateral, 2020).     Restrictions  Restrictions/Precautions  Restrictions/Precautions: General Precautions, Weight Bearing, Fall Risk  Required Braces or Orthoses?: Yes  Implants present? : Metal implants(R hip TFN 2020, B tibial plateau ORIF 4897)  Lower Extremity Weight Bearing Restrictions  Right Lower Extremity Weight Bearing: Weight Bearing As Tolerated  Left Lower Extremity Weight Bearing: Weight Bearing As Tolerated  Required Braces or Orthoses  Right Lower Extremity Brace: Knee Brace  RLE Brace Type: hinged (unlocked as of 20 Dr Gabbie Kat note)  Left Lower Extremity Brace: Knee Brace  LLE Brace Type: hinged (unlocked as of 20 Dr Gabbie Kat note)  Position Activity Restriction  Other position/activity restrictions: Full weight bearing - activity as tolerated  Subjective   Subjective  Subjective: Patient in bed upon entry, agreeable to therapy. General Comment  Comments: Patient very pleasant and cooperative throughout entire session. Pain Assessment  Pain Assessment: 0-10  Pain Level: 5  Patient's Stated Pain Goal: No pain  Pain Type: Acute pain  Pain Location: Hip  Pain Orientation: Right  Pain Radiating Towards: groin area       Orientation  Orientation  Overall Orientation Status: Within Normal Limits  Cognition      Objective      Transfers  Sit to Stand: Moderate Assistance;2 Person Assistance  Stand to sit: Moderate Assistance;2 Person Assistance  Bed to Chair: Moderate assistance;2 Person Assistance(w/c)  Comment: Patient required to wear both knee brace for all mobility. Ambulation  Ambulation?: Yes  More Ambulation?: No  Ambulation 1  Surface: level tile  Device: Rolling Walker  Other Apparatus: (knee brace for both L/E)  Assistance: Contact guard assistance;Minimal assistance(x 2 for safety)  Quality of Gait: slow, with slightly supinated in both feet, with increased pain with Right L/E movement. Gait Deviations: Decreased step length;Decreased step height;Slow Belén  Distance: 5\"(cues for wtshifting.)  Comments: Patient easily fatigue, with increased pain with prolong standing. Stairs/Curb  Stairs?: No     Balance  Posture: Fair  Sitting - Static: Good  Sitting - Dynamic: Good  Standing - Static: Fair  Standing - Dynamic: Fair;-(AKIRA)  Comments: Pt performed standing w/ RW  Other exercises  Other exercises?: Yes  Other exercises 1: Glut/quad sets x 5 hold 5 secs Progress as jacqueline reps and hold 10 secs. Other exercises 2: Ther ex both L/E seated x 10 including MOD (Lime) T-band x 10 for knee flexion.   Other exercises 3: sit<>Stand x 2(cues for hand placement)         Other Activities: Dangling protocol  Comment: Instructed patient to use ice therapy prn to assist w/ pain and swelling. G-Code     OutComes Score                                                     AM-PAC Score             Goals  Short term goals  Time Frame for Short term goals: 4-5 days  Short term goal 1: Pt to demo min x1 bed mobility. Short term goal 2: Pt to perform min x1 transfers in aakash stedy, progressing to RW when tolerable. Short term goal 3: Pt to reduce pain to 3/10 to improve mobility and independence. Short term goal 4: Pt to improve B LE strength by 1/2 MMG. Short term goal 5: Pt to tolerate static standing x2 minutes, 1 assist in aakash stedy. Short term goal 6: Pt to tolerate seated program at edge of bed for 10-15 minutes, with reduced pain. Short term goal 7: PT to progress ambulation as able  Patient Goals   Patient goals : To reduce pain/feel better    Plan    Plan  Times per week: 6-7x/week  Specific instructions for Next Treatment: progress transfers, sitting/standing tolerance, therex  Current Treatment Recommendations: Strengthening, ROM, Balance Training, Functional Mobility Training, Transfer Training, Endurance Training, Equipment Evaluation, Education, & procurement, Patient/Caregiver Education & Training, Safety Education & Training, Positioning, Pain Management  Safety Devices  Type of devices:  All fall risk precautions in place, Call light within reach, Gait belt, Patient at risk for falls, Left in chair, Nurse notified(Polo RN)  Restraints  Initially in place: No     Therapy Time   Individual Concurrent Group Co-treatment   Time In 0916         Time Out 0956         Minutes [address]13 [address]   Electronically signed by Serena Richmond PTA on 9/30/2020 at 1:53 PM

## 2020-09-30 NOTE — PROGRESS NOTES
Progress Note  Date:2020       Room:/2-01  Patient Viviano Goodpasture     YOB: 1955     Age:64 y.o. Subjective    Subjective:  Symptoms:  Improved. He reports weakness. No shortness of breath. Pain:  He complains of pain that is moderate. Review of Systems   Constitutional: Positive for activity change. Respiratory: Negative for shortness of breath. Musculoskeletal: Positive for arthralgias. Neurological: Positive for weakness. Objective         Vitals Last 24 Hours:  TEMPERATURE:  Temp  Av °F (36.7 °C)  Min: 97.7 °F (36.5 °C)  Max: 98.2 °F (36.8 °C)  RESPIRATIONS RANGE: Resp  Av.5  Min: 16  Max: 18  PULSE OXIMETRY RANGE: SpO2  Av %  Min: 95 %  Max: 99 %  PULSE RANGE: Pulse  Av  Min: 57  Max: 64  BLOOD PRESSURE RANGE: Systolic (93RKN), SKS:298 , Min:146 , HLI:138   ; Diastolic (10JLL), BOP:04, Min:79, Max:87    I/O (24Hr): Intake/Output Summary (Last 24 hours) at 2020 1311  Last data filed at 2020 0554  Gross per 24 hour   Intake --   Output 1100 ml   Net -1100 ml     Objective:  General Appearance: In no acute distress. Vital signs: (most recent): Blood pressure (!) 152/79, pulse 60, temperature 98.1 °F (36.7 °C), temperature source Oral, resp. rate 18, height 6' (1.829 m), weight 200 lb (90.7 kg), SpO2 95 %. Vital signs are normal.    Output: Producing urine. HEENT: Normal HEENT exam.    Lungs:  Normal effort. Breath sounds clear to auscultation. He is not in respiratory distress. No rales. Heart: Normal rate. Regular rhythm. S1 normal and S2 normal.    Chest: No chest wall tenderness. Abdomen: Abdomen is soft and non-distended. Bowel sounds are normal.   There is no abdominal tenderness. Extremities: There is deformity. There is no dependent edema. (Scars over knees, red. ROM)  Neurological: Patient is alert and oriented to person, place and time. Normal strength. Skin:  Warm and dry.   No rash or cyanosis. Labs/Imaging/Diagnostics    Labs:  CBC:No results for input(s): WBC, RBC, HGB, HCT, MCV, RDW, PLT in the last 72 hours. CHEMISTRIES:  Recent Labs     09/28/20  0623   CREATININE 1.15     PT/INR:No results for input(s): PROTIME, INR in the last 72 hours. APTT:No results for input(s): APTT in the last 72 hours. LIVER PROFILE:No results for input(s): AST, ALT, BILIDIR, BILITOT, ALKPHOS in the last 72 hours. Imaging Last 24 Hours:  No results found. Assessment//Plan           Hospital Problems           Last Modified POA    * (Principal) Fracture of multiple pubic rami, right, closed, initial encounter ([facility] [address] 9/29/2020 Yes    Essential hypertension 9/29/2020 Yes    Stage 3 chronic kidney disease ([facility] [address] 9/29/2020 Yes    Type 2 diabetes mellitus with chronic kidney disease ([facility] [address] 9/29/2020 Yes        Assessment:    Condition: In stable condition. Improving. (Principal Problem:    Fracture of multiple pubic rami, right, closed, initial encounter [facility])  Active Problems:    Essential hypertension    Stage 3 chronic kidney disease (HCC)    Type 2 diabetes mellitus with chronic kidney disease ([facility] [address]  Resolved Problems:    * No resolved hospital problems. *   ). Plan:   Administer medications as ordered. (May be D/C'd from my standpoint. Advise another 4 days of lovenox, until more mobile. Home meds recon'd. ).        Electronically signed by Tracey Summers MD on 9/30/20 at 1:11 PM EDT

## 2020-10-01 NOTE — DISCHARGE SUMMARY
Discharge Summary     Patient ID:  Alex Childs  [id number]  51 y.o.  1955    Admit date: 9/27/2020    Discharge date and time: 9/30/2020  4:42 PM     Admitting Physician: Robert Tejeda MD     Admission Diagnoses: Fracture of multiple pubic rami, right, closed, initial encounter [facility]) [S32.591A]    Discharge Diagnoses: same    Admission Condition: fair    Discharged Condition: fair    Indication for Admission: pubic rami fxs    Hospital Course: No complications    Consults: none    Treatments: surgery and PT    Disposition: SNF    Patient Instructions:   Discharge Medication List as of 9/30/2020  2:38 PM      START taking these medications    Details   enoxaparin (LOVENOX) 40 MG/0.4ML injection Inject 0.4 mLs into the skin daily, Disp-4 Syringe,R-0Print      insulin glargine (LANTUS) 100 UNIT/ML injection vial Inject 40 Units into the skin 2 times daily, Disp-1 vial,R-3Print      docusate sodium (COLACE) 100 MG capsule Take 1 capsule by mouth 2 times daily, Disp-60 capsule,R-0NO PRINT         CONTINUE these medications which have NOT CHANGED    Details   !! insulin glargine (LANTUS SOLOSTAR) 100 UNIT/ML injection pen Inject 40 Units into the skin dailyHistorical Med      !! insulin glargine (LANTUS SOLOSTAR) 100 UNIT/ML injection pen Inject 60 Units into the skin nightlyHistorical Med      !! Insulin Aspart, w/Niacinamide, (FIASP FLEXTOUCH) 100 UNIT/ML SOPN Inject 8 Units into the skin 3 times daily (before meals) Plus sliding scaleHistorical Med      !!  Insulin Aspart, w/Niacinamide, (FIASP FLEXTOUCH) 100 UNIT/ML SOPN Inject into the skin 3 times daily (before meals) Sliding scale in addition to base of 8 unitsHistorical Med      ibuprofen (ADVIL;MOTRIN) 200 MG tablet Take 400 mg by mouth every 6 hours as needed for PainHistorical Med      calcium carbonate (OYSTER SHELL CALCIUM 500 MG) 1250 (500 Ca) MG tablet Take 1 tablet by mouth nightlyHistorical Med      aspirin 81 MG EC tablet Take 1 tablet by mouth daily, Disp-30 tablet, R-3OTC      gabapentin (NEURONTIN) 100 MG capsule Take 1 capsule by mouth 3 times daily for 60 days. , Disp-90 capsule, R-1Normal      metoprolol succinate (TOPROL XL) 50 MG extended release tablet Take 1 tablet by mouth 2 times daily, Disp-30 tablet, R-3Normal      pantoprazole (PROTONIX) 40 MG tablet Take 1 tablet by mouth daily, Disp-30 tablet, R-3Normal      sertraline (ZOLOFT) 100 MG tablet Take 100 mg by mouth nightlyHistorical Med      traZODone (DESYREL) 100 MG tablet Take 100 mg by mouth nightlyHistorical Med       !! - Potential duplicate medications found. Please discuss with provider. STOP taking these medications       Blood Glucose Monitoring Suppl (ACURA BLOOD GLUCOSE METER) w/Device KIT Comments:   Reason for Stopping:             Activity: activity as tolerated  Diet: regular diet  Wound Care: none needed    Follow-up with Dr. Kushal Celis in 3 weeks.     Electronically signed by Madhu Pichardo MD on 10/1/2020 at 8:27 AM

## 2020-10-15 ENCOUNTER — OFFICE VISIT (OUTPATIENT)
Dept: ORTHOPEDIC SURGERY | Age: 65
End: 2020-10-15

## 2020-10-15 PROCEDURE — 99024 POSTOP FOLLOW-UP VISIT: CPT | Performed by: ORTHOPAEDIC SURGERY

## 2020-10-15 NOTE — PROGRESS NOTES
Leonel Mckeon M.D.            [address] [place] [address]., [address] [facility],[address], [address] [facility]           Dept Phone: [phone]           Dept Fax:  [phone] [facility]          Dept Phone: [phone]           Dept Fax:  [phone]      Chief Compliant:  Chief Complaint   Patient presents with    Fracture     Pelvis ./ bilateral klnees        History of Present Illness: This is a 72 y.o. male who presents to the clinic today for evaluation / follow up of right superior and inferior pubic rami fracture. Patient has a history of osteogenesis imperfecta. He has had multiple orthopedic interventions in the past.  He also status post bilateral tibial plateau fractures that were done this past July. Patient had a fall this past September which rendered him with pubic rami fractures with some extension towards his acetabulum. He also has an IM rodding of his femur that was done this past May per Dr. Gutierrez Sears. Review of Systems   Constitutional: Negative for fever, chills, sweats. Eyes: Negative for changes in vision, or pain. HENT: Negative for ear ache, epistaxis, or sore throat. Respiratory/Cardio: Negative for Chest pain, palpitations, SOB, or cough. Gastrointestinal: Negative for abdominal pain, N/V/D. Genitourinary: Negative for dysuria, frequency, urgency, or hematuria. Neurological: Negative for headache, numbness, or weakness. Integumentary: Negative for rash, itching, laceration, or abrasion. Musculoskeletal: Positive for Fracture (Pelvis ./ bilateral klnees)       Physical Exam:  Constitutional: Patient is oriented to person, place, and time. Patient appears well-developed and well nourished.    HENT: Negative otherwise noted  Head: Normocephalic and Atraumatic  Nose: Normal  Eyes: Conjunctivae and EOM are normal  Neck: Normal range of motion Neck supple. Respiratory/Cardio: Effort normal. No respiratory distress. Musculoskeletal: Examination notes patient's left knee notes a little bit of valgus disposition but I can flex him and extend him from 0 to 100 degrees with minimal discomfort discomfort. Patient's right knee is 0 to 100 degrees with again minimal discomfort he does have a lot of patellar crepitus from a previous ORIF with a cage implant. Patient is still has a little bit of discomfort on internal ex rotation of his right hip with tenderness in the right pubic area as well. Neurological: Patient is alert and oriented to person, place, and time. Normal strenght. No sensory deficit. Skin: Skin is warm and dry  Psychiatric: Behavior is normal. Thought content normal.  Nursing note and vitals reviewed. Labs and Imaging:     XR taken today:  Xr Pelvis (1-2 Views)    Result Date: 10/15/2020  X-rays taken they reviewed by me show AP of the pelvis. Patient is status post previous IM rodding of the right femur which appears to be good. Patient has had a recent right superior and inferior pubic rami fractures the right superior 1 does extend towards the acetabulum. Appears to be some consolidation present although difficult to differentiate. Patient also appears to have may have had a superior pubic rami fracture of the left which is subacute    Xr Knee Bilateral Standing    Result Date: 10/15/2020  X-rays taken today reviewed by me shows AP lateral views of the patient's both knees. Patient has a history of bilateral tibial plateau fractures. The right side was a percutaneous pinning looks very good and overall alignment. He is also status post caging fracture just of the patella as well as a intramedullary christiano of his femur. Patient is status post ORIF lateral tibial plateau as well. He has cement spacing on the lateral side. He has slightly decreased joint space with overall valgus disposition.   Overall hardware looks good. No orders of the defined types were placed in this encounter. Assessment and Plan:  1. Closed nondisplaced fracture of pelvis, unspecified part of pelvis, initial encounter ([facility] [address]    2. Tibial plateau chondromalacia    3. History of osteogenesis imperfecta with multiple orthopedic interventions        This is a 72 y.o. male who presents to the clinic today for evaluation / follow up of right pubic rami fracture as well as bilateral tibial plateau ORIF. Past History:    Current Outpatient Medications:     enoxaparin (LOVENOX) 40 MG/0.4ML injection, Inject 0.4 mLs into the skin daily, Disp: 4 Syringe, Rfl: 0    insulin glargine (LANTUS) 100 UNIT/ML injection vial, Inject 40 Units into the skin 2 times daily, Disp: 1 vial, Rfl: 3    docusate sodium (COLACE) 100 MG capsule, Take 1 capsule by mouth 2 times daily, Disp: 60 capsule, Rfl: 0    insulin glargine (LANTUS SOLOSTAR) 100 UNIT/ML injection pen, Inject 40 Units into the skin daily, Disp: , Rfl:     insulin glargine (LANTUS SOLOSTAR) 100 UNIT/ML injection pen, Inject 60 Units into the skin nightly, Disp: , Rfl:     Insulin Aspart, w/Niacinamide, (FIASP FLEXTOUCH) 100 UNIT/ML SOPN, Inject 8 Units into the skin 3 times daily (before meals) Plus sliding scale, Disp: , Rfl:     Insulin Aspart, w/Niacinamide, (FIASP FLEXTOUCH) 100 UNIT/ML SOPN, Inject into the skin 3 times daily (before meals) Sliding scale in addition to base of 8 units, Disp: , Rfl:     ibuprofen (ADVIL;MOTRIN) 200 MG tablet, Take 400 mg by mouth every 6 hours as needed for Pain, Disp: , Rfl:     calcium carbonate (OYSTER SHELL CALCIUM 500 MG) 1250 (500 Ca) MG tablet, Take 1 tablet by mouth nightly, Disp: , Rfl:     aspirin 81 MG EC tablet, Take 1 tablet by mouth daily, Disp: 30 tablet, Rfl: 3    gabapentin (NEURONTIN) 100 MG capsule, Take 1 capsule by mouth 3 times daily for 60 days. , Disp: 90 capsule, Rfl: 1    metoprolol succinate (TOPROL XL) 50 MG extended release tablet, Take 1 tablet by mouth 2 times daily, Disp: 30 tablet, Rfl: 3    pantoprazole (PROTONIX) 40 MG tablet, Take 1 tablet by mouth daily, Disp: 30 tablet, Rfl: 3    sertraline (ZOLOFT) 100 MG tablet, Take 100 mg by mouth nightly, Disp: , Rfl:     traZODone (DESYREL) 100 MG tablet, Take 100 mg by mouth nightly, Disp: , Rfl:   No Known Allergies  Social History     Socioeconomic History    Marital status:      Spouse name: Not on file    Number of children: Not on file    Years of education: Not on file    Highest education level: Not on file   Occupational History    Not on file   Social Needs    Financial resource strain: Not on file    Food insecurity     Worry: Not on file     Inability: Not on file    Transportation needs     Medical: Not on file     Non-medical: Not on file   Tobacco Use    Smoking status: Former Smoker     Packs/day: 2.00     Years: 18.00     Pack years: 36.00     Types: Cigarettes     Last attempt to quit: 12/3/1991     Years since quittin.8    Smokeless tobacco: Never Used   Substance and Sexual Activity    Alcohol use: Not on file    Drug use: Not on file    Sexual activity: Not on file   Lifestyle    Physical activity     Days per week: Not on file     Minutes per session: Not on file    Stress: Not on file   Relationships    Social connections     Talks on phone: Not on file     Gets together: Not on file     Attends Restoration service: Not on file     Active member of club or organization: Not on file     Attends meetings of clubs or organizations: Not on file     Relationship status: Not on file    Intimate partner violence     Fear of current or ex partner: Not on file     Emotionally abused: Not on file     Physically abused: Not on file     Forced sexual activity: Not on file   Other Topics Concern    Not on file   Social History Narrative    Not on file     Past Medical History:   Diagnosis Date    Contracture, elbow     h/o right

## 2020-10-15 NOTE — PROGRESS NOTES
Araseli Flores M.D.  [facility]     1. Follow-up in office seven to ten days after surgery. Call for appointment if not already made. ([phone]). 2. Take pain medication as ordered. 3. Keep the dressing/ace wrap on for 72 hours (3 days). After the 72 hours, may remove ace wrap and dressing, place Band-Aids over sutures and then if desired reapply ace wrap. Start wrapping at the ankle and wrap up to the top of the leg. 4. You may shower, but keep the dressing & wounds dry with plastic bag around knee/leg until seen by Dr. Woo Lew. 5. After surgery, it is weight bearing as tolerated, unless instructed differently by Dr. Woo Lew after surgery. Use crutches or a walker as needed based on how your knee feels. 6. Be sure to keep your leg elevated when sitting or lying down. Use 2-3 pillows under leg. 7. Ice to surgical site for 20 to 30 minutes four times a day for 48 hours. 8. Dr. Woo Lew recommends taking one Aspirin 325 mg daily for 7 days after surgery to help prevent blood clots. 9. Be sure to do your leg exercises daily. Examples of these exercises are in the knee arthroscopy booklet given in Dr. Wing Feliz office. Call Dr. Wing Feliz office if you experience any of the followin. Temperature above 101° F.  2. Persistent nausea and vomiting. 3. Severe swelling in the knee, calf, or foot. 4. Severe pain that is not relieved by pain medications.

## 2020-12-14 ENCOUNTER — OFFICE VISIT (OUTPATIENT)
Dept: ORTHOPEDIC SURGERY | Age: 65
End: 2020-12-14

## 2020-12-14 VITALS — TEMPERATURE: 96.6 F

## 2020-12-14 PROCEDURE — 99024 POSTOP FOLLOW-UP VISIT: CPT | Performed by: ORTHOPAEDIC SURGERY

## 2020-12-14 RX ORDER — OXYCODONE HYDROCHLORIDE AND ACETAMINOPHEN 5; 325 MG/1; MG/1
1 TABLET ORAL EVERY 6 HOURS PRN
Qty: 28 TABLET | Refills: 0 | Status: SHIPPED | OUTPATIENT
Start: 2020-12-14 | End: 2021-01-21 | Stop reason: SDUPTHER

## 2020-12-14 NOTE — PROGRESS NOTES
and EOM are normal  Neck: Normal range of motion Neck supple. Respiratory/Cardio: Effort normal. No respiratory distress. Musculoskeletal: Physical examination notes the patient has very minimal pain on internal ex rotation of his right hip. He has little bit of tenderness in his pubic area but otherwise nothing remarkable. Knee examination notes the patient has valgus knees of both sides but he has much less pain with this motion and otherwise decent stability given the patient's overall previous history. Neurological: Patient is alert and oriented to person, place, and time. Normal strenght. No sensory deficit. Skin: Skin is warm and dry  Psychiatric: Behavior is normal. Thought content normal.  Nursing note and vitals reviewed. Labs and Imaging:     XR taken today:  Xr Pelvis (1-2 Views)    Result Date: 12/14/2020  X-rays taken today reviewed by me show AP of the pelvis. Patient status post TFN right hip. Patient has a right superior inferior pubic rami fracture. His superior fracture is near the anterior acetabular column but there is no obvious displacement. Xr Knee Bilateral Standing    Result Date: 12/14/2020  X-rays taken today reviewed by me show AP views of the patient's knees. Patient status post multiple orthopedic procedures including ORIF of both tibial plateaus. Also a large cage prosthesis around the patient's patella. Patient with reported history of osteogenesis imperfecta. No interval changes noted        No orders of the defined types were placed in this encounter. Assessment and Plan:  1. Osteogenesis imperfecta    2. Multiple orthopedic interventions        This is a 72 y.o. male who presents to the clinic today for evaluation / follow up of status post pubic rami fracture. Past History:    Current Outpatient Medications:     oxyCODONE-acetaminophen (PERCOCET) 5-325 MG per tablet, Take 1 tablet by mouth every 6 hours as needed for Pain for up to 7 days. Intended supply: 7 days. Take lowest dose possible to manage pain, Disp: 28 tablet, Rfl: 0    enoxaparin (LOVENOX) 40 MG/0.4ML injection, Inject 0.4 mLs into the skin daily, Disp: 4 Syringe, Rfl: 0    insulin glargine (LANTUS) 100 UNIT/ML injection vial, Inject 40 Units into the skin 2 times daily, Disp: 1 vial, Rfl: 3    insulin glargine (LANTUS SOLOSTAR) 100 UNIT/ML injection pen, Inject 40 Units into the skin daily, Disp: , Rfl:     insulin glargine (LANTUS SOLOSTAR) 100 UNIT/ML injection pen, Inject 60 Units into the skin nightly, Disp: , Rfl:     Insulin Aspart, w/Niacinamide, (FIASP FLEXTOUCH) 100 UNIT/ML SOPN, Inject 8 Units into the skin 3 times daily (before meals) Plus sliding scale, Disp: , Rfl:     Insulin Aspart, w/Niacinamide, (FIASP FLEXTOUCH) 100 UNIT/ML SOPN, Inject into the skin 3 times daily (before meals) Sliding scale in addition to base of 8 units, Disp: , Rfl:     ibuprofen (ADVIL;MOTRIN) 200 MG tablet, Take 400 mg by mouth every 6 hours as needed for Pain, Disp: , Rfl:     calcium carbonate (OYSTER SHELL CALCIUM 500 MG) 1250 (500 Ca) MG tablet, Take 1 tablet by mouth nightly, Disp: , Rfl:     aspirin 81 MG EC tablet, Take 1 tablet by mouth daily, Disp: 30 tablet, Rfl: 3    gabapentin (NEURONTIN) 100 MG capsule, Take 1 capsule by mouth 3 times daily for 60 days. , Disp: 90 capsule, Rfl: 1    metoprolol succinate (TOPROL XL) 50 MG extended release tablet, Take 1 tablet by mouth 2 times daily, Disp: 30 tablet, Rfl: 3    pantoprazole (PROTONIX) 40 MG tablet, Take 1 tablet by mouth daily, Disp: 30 tablet, Rfl: 3    sertraline (ZOLOFT) 100 MG tablet, Take 100 mg by mouth nightly, Disp: , Rfl:     traZODone (DESYREL) 100 MG tablet, Take 100 mg by mouth nightly, Disp: , Rfl:   No Known Allergies  Social History     Socioeconomic History    Marital status:      Spouse name: Not on file    Number of children: Not on file    Years of education: Not on file    Highest education level: Not on file   Occupational History    Not on file   Social Needs    Financial resource strain: Not on file    Food insecurity     Worry: Not on file     Inability: Not on file    Transportation needs     Medical: Not on file     Non-medical: Not on file   Tobacco Use    Smoking status: Former Smoker     Packs/day: 2.00     Years: 18.00     Pack years: 36.00     Types: Cigarettes     Quit date: 12/3/1991     Years since quittin.0    Smokeless tobacco: Never Used   Substance and Sexual Activity    Alcohol use: Not on file    Drug use: Not on file    Sexual activity: Not on file   Lifestyle    Physical activity     Days per week: Not on file     Minutes per session: Not on file    Stress: Not on file   Relationships    Social connections     Talks on phone: Not on file     Gets together: Not on file     Attends Orthodox service: Not on file     Active member of club or organization: Not on file     Attends meetings of clubs or organizations: Not on file     Relationship status: Not on file    Intimate partner violence     Fear of current or ex partner: Not on file     Emotionally abused: Not on file     Physically abused: Not on file     Forced sexual activity: Not on file   Other Topics Concern    Not on file   Social History Narrative    Not on file     Past Medical History:   Diagnosis Date    Contracture, elbow     h/o right elbow fx, compound fracture.  Erectile dysfunction     Hypertension     Osteogenesis imperfecta     DX 6 months. 30 broken bones in past.     Type II or unspecified type diabetes mellitus without mention of complication, not stated as uncontrolled      Past Surgical History:   Procedure Laterality Date    CHOLECYSTECTOMY      FEMUR FRACTURE SURGERY Right 2020    HIP TFN WITH C-ARM VISUALIZATION performed by Mariluz Castillo MD at [address] [place] [address] Right     right elbow x 3. Compound fx at work.      FRACTURE SURGERY Bilateral     with hardware  FRACTURE SURGERY Left     with hardware    ORIF PROXIMAL TIBIAL PLATEAU FRACTURE Bilateral 7/20/2020    TIBIAL PLATEAU OPEN REDUCTION INTERNAL FIXATION LEFT AND 7.3 HELEN RIGHT KNEE [facility]       Family History   Problem Relation Age of Onset    Diabetes Mother     Stroke Mother     Diabetes Father     Heart Disease Father           Provider Attestation:  Arnav Jackson, personally performed the services described in this documentation. All medical record entries made by the scribe were at my direction and in my presence. I have reviewed the chart and discharge instructions and agree that the records reflect my personal performance and is accurate and complete. Mp Logan MD. 12/14/20      Please note that this chart was generated using voice recognition Dragon dictation software. Although every effort was made to ensure the accuracy of this automated transcription, some errors in transcription may have occurred.

## 2021-01-21 DIAGNOSIS — Q78.0 OSTEOGENESIS IMPERFECTA: ICD-10-CM

## 2021-01-21 RX ORDER — OXYCODONE HYDROCHLORIDE AND ACETAMINOPHEN 5; 325 MG/1; MG/1
1 TABLET ORAL EVERY 6 HOURS PRN
Qty: 28 TABLET | Refills: 0 | Status: SHIPPED | OUTPATIENT
Start: 2021-01-21 | End: 2021-01-28

## 2021-01-21 NOTE — TELEPHONE ENCOUNTER
Patient is wanting to see if you will refill his percocet. He had fractures back in September. Last rx looks like December. Are you willing to give him anymore this far out?

## 2021-01-25 ENCOUNTER — TELEPHONE (OUTPATIENT)
Dept: INFUSION THERAPY | Age: 66
End: 2021-01-25

## 2021-03-01 ENCOUNTER — HOSPITAL ENCOUNTER (OUTPATIENT)
Dept: INFUSION THERAPY | Age: 66
Setting detail: INFUSION SERIES
Discharge: HOME | End: 2021-03-01
Payer: MEDICARE

## 2021-03-01 VITALS
RESPIRATION RATE: 17 BRPM | TEMPERATURE: 96.8 F | SYSTOLIC BLOOD PRESSURE: 141 MMHG | OXYGEN SATURATION: 97 % | DIASTOLIC BLOOD PRESSURE: 95 MMHG | HEART RATE: 90 BPM

## 2021-03-01 DIAGNOSIS — M81.0 AGE-RELATED OSTEOPOROSIS WITHOUT CURRENT PATHOLOGICAL FRACTURE: Primary | ICD-10-CM

## 2021-03-01 LAB
CALCIUM SERPL-MCNC: 9.6 MG/DL (ref 8.6–10.4)
CREAT SERPL-MCNC: 1.61 MG/DL (ref 0.7–1.2)
GFR AFRICAN AMERICAN: 52 ML/MIN
GFR NON-AFRICAN AMERICAN: 43 ML/MIN
GFR SERPL CREATININE-BSD FRML MDRD: ABNORMAL ML/MIN/{1.73_M2}
GFR SERPL CREATININE-BSD FRML MDRD: ABNORMAL ML/MIN/{1.73_M2}

## 2021-03-01 PROCEDURE — 96372 THER/PROPH/DIAG INJ SC/IM: CPT

## 2021-03-01 PROCEDURE — 82565 ASSAY OF CREATININE: CPT

## 2021-03-01 PROCEDURE — 36415 COLL VENOUS BLD VENIPUNCTURE: CPT

## 2021-03-01 PROCEDURE — 82310 ASSAY OF CALCIUM: CPT

## 2021-03-01 PROCEDURE — 6360000002 HC RX W HCPCS: Performed by: NURSE PRACTITIONER

## 2021-03-01 PROCEDURE — 96401 CHEMO ANTI-NEOPL SQ/IM: CPT

## 2021-03-01 RX ORDER — METHYLPREDNISOLONE SODIUM SUCCINATE 125 MG/2ML
125 INJECTION, POWDER, LYOPHILIZED, FOR SOLUTION INTRAMUSCULAR; INTRAVENOUS ONCE
Status: CANCELLED | OUTPATIENT
Start: 2021-08-23 | End: 2021-08-23

## 2021-03-01 RX ORDER — SODIUM CHLORIDE 9 MG/ML
INJECTION, SOLUTION INTRAVENOUS CONTINUOUS
Status: CANCELLED | OUTPATIENT
Start: 2021-08-23

## 2021-03-01 RX ORDER — EPINEPHRINE 1 MG/ML
0.3 INJECTION, SOLUTION, CONCENTRATE INTRAVENOUS PRN
Status: CANCELLED | OUTPATIENT
Start: 2021-08-23

## 2021-03-01 RX ORDER — DIPHENHYDRAMINE HYDROCHLORIDE 50 MG/ML
50 INJECTION INTRAMUSCULAR; INTRAVENOUS ONCE
Status: CANCELLED | OUTPATIENT
Start: 2021-08-23 | End: 2021-08-23

## 2021-03-01 RX ADMIN — DENOSUMAB 60 MG: 60 INJECTION SUBCUTANEOUS at 13:43

## 2021-05-29 NOTE — PLAN OF CARE
Problem: Falls - Risk of:  Goal: Will remain free from falls  Description: Will remain free from falls  Outcome: Ongoing  Goal: Absence of physical injury  Description: Absence of physical injury  Outcome: Ongoing   Pt remains free of falls. Call light within reach. Bed in lowest position. Nonskid footwear applied. Bedwheels locked. Bed alarm on and fall visual posted. Problem: Skin Integrity:  Goal: Will show no infection signs and symptoms  Description: Will show no infection signs and symptoms  Outcome: Ongoing  Goal: Absence of new skin breakdown  Description: Absence of new skin breakdown  Outcome: Ongoing   Repositioned, skin integrity maintained this shift. Waffle mattress applied. Problem: Pain:  Goal: Pain level will decrease  Description: Pain level will decrease  Outcome: Ongoing  Goal: Control of acute pain  Description: Control of acute pain  Outcome: Ongoing  Goal: Control of chronic pain  Description: Control of chronic pain  Outcome: Ongoing   Adequate pain control achieved this shift. See MAR. no

## 2021-06-08 PROBLEM — N18.32 STAGE 3B CHRONIC KIDNEY DISEASE (HCC): Status: ACTIVE | Noted: 2018-11-16

## 2021-07-19 PROBLEM — S32.9XXA PELVIS FRACTURE (HCC): Status: ACTIVE | Noted: 2021-07-19

## 2021-07-19 PROBLEM — M24.529 CONTRACTURE OF ELBOW: Status: ACTIVE | Noted: 2021-07-19

## 2021-10-28 RX ORDER — SODIUM CHLORIDE 9 MG/ML
INJECTION, SOLUTION INTRAVENOUS CONTINUOUS
Status: CANCELLED | OUTPATIENT
Start: 2021-10-28

## 2021-10-28 RX ORDER — METHYLPREDNISOLONE SODIUM SUCCINATE 125 MG/2ML
125 INJECTION, POWDER, LYOPHILIZED, FOR SOLUTION INTRAMUSCULAR; INTRAVENOUS ONCE
Status: CANCELLED | OUTPATIENT
Start: 2021-10-28 | End: 2021-10-28

## 2021-10-28 RX ORDER — DIPHENHYDRAMINE HYDROCHLORIDE 50 MG/ML
50 INJECTION INTRAMUSCULAR; INTRAVENOUS ONCE
Status: CANCELLED | OUTPATIENT
Start: 2021-10-28 | End: 2021-10-28

## 2021-10-28 RX ORDER — EPINEPHRINE 1 MG/ML
0.3 INJECTION, SOLUTION, CONCENTRATE INTRAVENOUS PRN
Status: CANCELLED | OUTPATIENT
Start: 2021-10-28

## 2021-10-29 ENCOUNTER — TELEPHONE (OUTPATIENT)
Dept: INFUSION THERAPY | Age: 66
End: 2021-10-29

## 2021-11-11 ENCOUNTER — HOSPITAL ENCOUNTER (OUTPATIENT)
Dept: INFUSION THERAPY | Age: 66
Setting detail: INFUSION SERIES
Discharge: HOME | End: 2021-11-11
Payer: MEDICARE

## 2021-11-11 VITALS
HEART RATE: 55 BPM | OXYGEN SATURATION: 97 % | TEMPERATURE: 97 F | DIASTOLIC BLOOD PRESSURE: 92 MMHG | SYSTOLIC BLOOD PRESSURE: 154 MMHG | RESPIRATION RATE: 16 BRPM

## 2021-11-11 DIAGNOSIS — M81.0 AGE-RELATED OSTEOPOROSIS WITHOUT CURRENT PATHOLOGICAL FRACTURE: Primary | ICD-10-CM

## 2021-11-11 LAB
CALCIUM SERPL-MCNC: 10 MG/DL (ref 8.6–10.4)
CREAT SERPL-MCNC: 1.59 MG/DL (ref 0.7–1.2)
GFR AFRICAN AMERICAN: 53 ML/MIN
GFR NON-AFRICAN AMERICAN: 44 ML/MIN
GFR SERPL CREATININE-BSD FRML MDRD: ABNORMAL ML/MIN/{1.73_M2}
GFR SERPL CREATININE-BSD FRML MDRD: ABNORMAL ML/MIN/{1.73_M2}
MAGNESIUM: 1.6 MG/DL (ref 1.6–2.6)
PHOSPHORUS: 2.8 MG/DL (ref 2.5–4.5)

## 2021-11-11 PROCEDURE — 83735 ASSAY OF MAGNESIUM: CPT

## 2021-11-11 PROCEDURE — 6360000002 HC RX W HCPCS: Performed by: NURSE PRACTITIONER

## 2021-11-11 PROCEDURE — 84100 ASSAY OF PHOSPHORUS: CPT

## 2021-11-11 PROCEDURE — 36415 COLL VENOUS BLD VENIPUNCTURE: CPT

## 2021-11-11 PROCEDURE — 82310 ASSAY OF CALCIUM: CPT

## 2021-11-11 PROCEDURE — 96372 THER/PROPH/DIAG INJ SC/IM: CPT

## 2021-11-11 PROCEDURE — 82565 ASSAY OF CREATININE: CPT

## 2021-11-11 RX ORDER — EPINEPHRINE 1 MG/ML
0.3 INJECTION, SOLUTION, CONCENTRATE INTRAVENOUS PRN
Status: CANCELLED | OUTPATIENT
Start: 2022-05-12

## 2021-11-11 RX ORDER — DIPHENHYDRAMINE HYDROCHLORIDE 50 MG/ML
50 INJECTION INTRAMUSCULAR; INTRAVENOUS ONCE
Status: CANCELLED | OUTPATIENT
Start: 2022-05-12 | End: 2022-05-12

## 2021-11-11 RX ORDER — SODIUM CHLORIDE 9 MG/ML
INJECTION, SOLUTION INTRAVENOUS CONTINUOUS
Status: CANCELLED | OUTPATIENT
Start: 2022-05-12

## 2021-11-11 RX ORDER — METHYLPREDNISOLONE SODIUM SUCCINATE 125 MG/2ML
125 INJECTION, POWDER, LYOPHILIZED, FOR SOLUTION INTRAMUSCULAR; INTRAVENOUS ONCE
Status: CANCELLED | OUTPATIENT
Start: 2022-05-12 | End: 2022-05-12

## 2021-11-11 RX ADMIN — DENOSUMAB 60 MG: 60 INJECTION SUBCUTANEOUS at 13:29

## 2021-11-11 NOTE — PROGRESS NOTES
Pt arrived for Prolia injection. Vitals obtained and labs drawn. Labs within written parameters. Injection given in R arm. Pt tolerated well. No s/s adverse reaction noted. Pt discharged home, ambulatory per self using walker with daughter.

## 2021-12-10 ENCOUNTER — APPOINTMENT (OUTPATIENT)
Dept: CT IMAGING | Age: 66
DRG: 184 | End: 2021-12-10
Payer: MEDICARE

## 2021-12-10 ENCOUNTER — HOSPITAL ENCOUNTER (INPATIENT)
Age: 66
LOS: 4 days | Discharge: NURSING, REHAB OR LONG-TERM CARE | DRG: 184 | End: 2021-12-14
Attending: EMERGENCY MEDICINE | Admitting: SURGERY
Payer: MEDICARE

## 2021-12-10 DIAGNOSIS — S22.41XA CLOSED FRACTURE OF MULTIPLE RIBS OF RIGHT SIDE, INITIAL ENCOUNTER: Primary | ICD-10-CM

## 2021-12-10 PROBLEM — Q78.0 OSTEOGENESIS IMPERFECTA: Status: ACTIVE | Noted: 2021-12-10
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
## 2021-12-10 LAB
ABO/RH: NORMAL
ALLEN TEST: ABNORMAL
ANION GAP SERPL CALCULATED.3IONS-SCNC: 16 MMOL/L (ref 9–17)
ANTIBODY SCREEN: NEGATIVE
ARM BAND NUMBER: NORMAL
BLOOD BANK SPECIMEN: ABNORMAL
BUN BLDV-MCNC: 20 MG/DL (ref 8–23)
CARBOXYHEMOGLOBIN: 0.8 % (ref 0–5)
CHLORIDE BLD-SCNC: 95 MMOL/L (ref 98–107)
CO2: 21 MMOL/L (ref 20–31)
CREAT SERPL-MCNC: 1.81 MG/DL (ref 0.7–1.2)
ETHANOL PERCENT: <0.01 %
ETHANOL: <10 MG/DL
EXPIRATION DATE: NORMAL
FIO2: ABNORMAL
GFR AFRICAN AMERICAN: 46 ML/MIN
GFR NON-AFRICAN AMERICAN: 38 ML/MIN
GFR SERPL CREATININE-BSD FRML MDRD: ABNORMAL ML/MIN/{1.73_M2}
GFR SERPL CREATININE-BSD FRML MDRD: ABNORMAL ML/MIN/{1.73_M2}
GLUCOSE BLD-MCNC: 397 MG/DL (ref 70–99)
HCG QUALITATIVE: ABNORMAL
HCO3 VENOUS: 24.3 MMOL/L (ref 24–30)
HCT VFR BLD CALC: 41.9 % (ref 40.7–50.3)
HEMOGLOBIN: 14.4 G/DL (ref 13–17)
INR BLD: 1.1
MCH RBC QN AUTO: 29.4 PG (ref 25.2–33.5)
MCHC RBC AUTO-ENTMCNC: 34.4 G/DL (ref 28.4–34.8)
MCV RBC AUTO: 85.5 FL (ref 82.6–102.9)
METHEMOGLOBIN: ABNORMAL % (ref 0–1.5)
MODE: ABNORMAL
MYOGLOBIN: 127 NG/ML (ref 28–72)
NEGATIVE BASE EXCESS, VEN: 2.5 MMOL/L (ref 0–2)
NOTIFICATION TIME: ABNORMAL
NOTIFICATION: ABNORMAL
NRBC AUTOMATED: 0 PER 100 WBC
O2 DEVICE/FLOW/%: ABNORMAL
O2 SAT, VEN: 42.8 % (ref 60–85)
OXYHEMOGLOBIN: ABNORMAL % (ref 95–98)
PARTIAL THROMBOPLASTIN TIME: 23.3 SEC (ref 20.5–30.5)
PATIENT TEMP: 37
PCO2, VEN, TEMP ADJ: ABNORMAL MMHG (ref 39–55)
PCO2, VEN: 52.1 (ref 39–55)
PDW BLD-RTO: 12.7 % (ref 11.8–14.4)
PEEP/CPAP: ABNORMAL
PH VENOUS: 7.29 (ref 7.32–7.42)
PH, VEN, TEMP ADJ: ABNORMAL (ref 7.32–7.42)
PLATELET # BLD: 175 K/UL (ref 138–453)
PMV BLD AUTO: 10.4 FL (ref 8.1–13.5)
PO2, VEN, TEMP ADJ: ABNORMAL MMHG (ref 30–50)
PO2, VEN: 25.5 (ref 30–50)
POSITIVE BASE EXCESS, VEN: ABNORMAL MMOL/L (ref 0–2)
POTASSIUM SERPL-SCNC: 4.8 MMOL/L (ref 3.7–5.3)
PROTHROMBIN TIME: 11.3 SEC (ref 9.1–12.3)
PSV: ABNORMAL
PT. POSITION: ABNORMAL
RBC # BLD: 4.9 M/UL (ref 4.21–5.77)
RESPIRATORY RATE: ABNORMAL
SAMPLE SITE: ABNORMAL
SARS-COV-2, RAPID: NOT DETECTED
SET RATE: ABNORMAL
SODIUM BLD-SCNC: 132 MMOL/L (ref 135–144)
SPECIMEN DESCRIPTION: NORMAL
TEXT FOR RESPIRATORY: ABNORMAL
TOTAL HB: ABNORMAL G/DL (ref 12–16)
TOTAL RATE: ABNORMAL
TROPONIN INTERP: ABNORMAL
TROPONIN T: ABNORMAL NG/ML
TROPONIN, HIGH SENSITIVITY: 25 NG/L (ref 0–22)
VT: ABNORMAL
WBC # BLD: 14 K/UL (ref 3.5–11.3)

## 2021-12-10 PROCEDURE — 71260 CT THORAX DX C+: CPT

## 2021-12-10 PROCEDURE — 85730 THROMBOPLASTIN TIME PARTIAL: CPT

## 2021-12-10 PROCEDURE — 84484 ASSAY OF TROPONIN QUANT: CPT

## 2021-12-10 PROCEDURE — 82565 ASSAY OF CREATININE: CPT

## 2021-12-10 PROCEDURE — 6360000004 HC RX CONTRAST MEDICATION: Performed by: STUDENT IN AN ORGANIZED HEALTH CARE EDUCATION/TRAINING PROGRAM

## 2021-12-10 PROCEDURE — 82805 BLOOD GASES W/O2 SATURATION: CPT

## 2021-12-10 PROCEDURE — 85027 COMPLETE CBC AUTOMATED: CPT

## 2021-12-10 PROCEDURE — G0480 DRUG TEST DEF 1-7 CLASSES: HCPCS

## 2021-12-10 PROCEDURE — 84520 ASSAY OF UREA NITROGEN: CPT

## 2021-12-10 PROCEDURE — 72125 CT NECK SPINE W/O DYE: CPT

## 2021-12-10 PROCEDURE — 2060000002 HC BURN ICU INTERMEDIATE R&B

## 2021-12-10 PROCEDURE — 87635 SARS-COV-2 COVID-19 AMP PRB: CPT

## 2021-12-10 PROCEDURE — 6810039001 HC L1 TRAUMA PRIORITY

## 2021-12-10 PROCEDURE — 70450 CT HEAD/BRAIN W/O DYE: CPT

## 2021-12-10 PROCEDURE — 3209999900 CT THORACIC SPINE TRAUMA RECONSTRUCTION

## 2021-12-10 PROCEDURE — 86901 BLOOD TYPING SEROLOGIC RH(D): CPT

## 2021-12-10 PROCEDURE — 83874 ASSAY OF MYOGLOBIN: CPT

## 2021-12-10 PROCEDURE — 80051 ELECTROLYTE PANEL: CPT

## 2021-12-10 PROCEDURE — 86850 RBC ANTIBODY SCREEN: CPT

## 2021-12-10 PROCEDURE — 84703 CHORIONIC GONADOTROPIN ASSAY: CPT

## 2021-12-10 PROCEDURE — 3209999900 CT LUMBAR SPINE TRAUMA RECONSTRUCTION

## 2021-12-10 PROCEDURE — 82947 ASSAY GLUCOSE BLOOD QUANT: CPT

## 2021-12-10 PROCEDURE — 99284 EMERGENCY DEPT VISIT MOD MDM: CPT

## 2021-12-10 PROCEDURE — 85610 PROTHROMBIN TIME: CPT

## 2021-12-10 PROCEDURE — 86900 BLOOD TYPING SEROLOGIC ABO: CPT

## 2021-12-10 RX ORDER — FENTANYL CITRATE 50 UG/ML
INJECTION, SOLUTION INTRAMUSCULAR; INTRAVENOUS
Status: DISPENSED
Start: 2021-12-10 | End: 2021-12-11

## 2021-12-10 RX ORDER — MORPHINE SULFATE 2 MG/ML
4 INJECTION, SOLUTION INTRAMUSCULAR; INTRAVENOUS ONCE
Status: DISCONTINUED | OUTPATIENT
Start: 2021-12-11 | End: 2021-12-13

## 2021-12-10 RX ORDER — FENTANYL CITRATE 50 UG/ML
50 INJECTION, SOLUTION INTRAMUSCULAR; INTRAVENOUS ONCE
Status: DISCONTINUED | OUTPATIENT
Start: 2021-12-10 | End: 2021-12-13

## 2021-12-10 RX ADMIN — IOPAMIDOL 130 ML: 755 INJECTION, SOLUTION INTRAVENOUS at 22:30

## 2021-12-10 ASSESSMENT — ENCOUNTER SYMPTOMS
EYE REDNESS: 0
SHORTNESS OF BREATH: 0
BACK PAIN: 0
SINUS PAIN: 0
COUGH: 0
SHORTNESS OF BREATH: 1
ABDOMINAL DISTENTION: 0
ABDOMINAL PAIN: 0
EYE DISCHARGE: 0
RHINORRHEA: 0
FACIAL SWELLING: 0
VOMITING: 0
NAUSEA: 0

## 2021-12-10 NOTE — Clinical Note
Patient Class: Inpatient [101]   REQUIRED: Diagnosis: Fall at home, initial encounter [[id number]]   Estimated Length of Stay: Estimated stay of more than 2 midnights

## 2021-12-11 PROBLEM — S22.49XA TRAUMATIC CLOSED DISPLACED FRACTURE OF MULTIPLE RIBS: Status: ACTIVE | Noted: 2021-12-11

## 2021-12-11 LAB
-: NORMAL
ABSOLUTE EOS #: 0 K/UL (ref 0–0.44)
ABSOLUTE IMMATURE GRANULOCYTE: 0 K/UL (ref 0–0.3)
ABSOLUTE LYMPH #: 0.42 K/UL (ref 1.1–3.7)
ABSOLUTE MONO #: 0.32 K/UL (ref 0.1–1.2)
AMORPHOUS: NORMAL
ANION GAP SERPL CALCULATED.3IONS-SCNC: 13 MMOL/L (ref 9–17)
BACTERIA: NORMAL
BASOPHILS # BLD: 0 % (ref 0–2)
BASOPHILS ABSOLUTE: 0 K/UL (ref 0–0.2)
BILIRUBIN URINE: NEGATIVE
BUN BLDV-MCNC: 17 MG/DL (ref 8–23)
BUN/CREAT BLD: ABNORMAL (ref 9–20)
CALCIUM SERPL-MCNC: 9.5 MG/DL (ref 8.6–10.4)
CASTS UA: NORMAL /LPF (ref 0–8)
CHLORIDE BLD-SCNC: 97 MMOL/L (ref 98–107)
CO2: 21 MMOL/L (ref 20–31)
COLOR: YELLOW
COMMENT UA: ABNORMAL
CREAT SERPL-MCNC: 1.52 MG/DL (ref 0.7–1.2)
CRYSTALS, UA: NORMAL /HPF
DIFFERENTIAL TYPE: ABNORMAL
EOSINOPHILS RELATIVE PERCENT: 0 % (ref 1–4)
EPITHELIAL CELLS UA: NORMAL /HPF (ref 0–5)
GFR AFRICAN AMERICAN: 56 ML/MIN
GFR NON-AFRICAN AMERICAN: 46 ML/MIN
GFR SERPL CREATININE-BSD FRML MDRD: ABNORMAL ML/MIN/{1.73_M2}
GFR SERPL CREATININE-BSD FRML MDRD: ABNORMAL ML/MIN/{1.73_M2}
GLUCOSE BLD-MCNC: 207 MG/DL (ref 75–110)
GLUCOSE BLD-MCNC: 208 MG/DL (ref 75–110)
GLUCOSE BLD-MCNC: 211 MG/DL (ref 75–110)
GLUCOSE BLD-MCNC: 231 MG/DL (ref 75–110)
GLUCOSE BLD-MCNC: 238 MG/DL (ref 75–110)
GLUCOSE BLD-MCNC: 289 MG/DL (ref 75–110)
GLUCOSE BLD-MCNC: 327 MG/DL (ref 70–99)
GLUCOSE BLD-MCNC: 343 MG/DL (ref 75–110)
GLUCOSE BLD-MCNC: 390 MG/DL (ref 75–110)
GLUCOSE URINE: ABNORMAL
HCT VFR BLD CALC: 40.5 % (ref 40.7–50.3)
HEMOGLOBIN: 14 G/DL (ref 13–17)
IMMATURE GRANULOCYTES: 0 %
KETONES, URINE: NEGATIVE
LEUKOCYTE ESTERASE, URINE: NEGATIVE
LYMPHOCYTES # BLD: 4 % (ref 24–43)
MCH RBC QN AUTO: 29.5 PG (ref 25.2–33.5)
MCHC RBC AUTO-ENTMCNC: 34.6 G/DL (ref 28.4–34.8)
MCV RBC AUTO: 85.4 FL (ref 82.6–102.9)
MONOCYTES # BLD: 3 % (ref 3–12)
MORPHOLOGY: NORMAL
MUCUS: NORMAL
NITRITE, URINE: NEGATIVE
NRBC AUTOMATED: 0 PER 100 WBC
OTHER OBSERVATIONS UA: NORMAL
PDW BLD-RTO: 12.7 % (ref 11.8–14.4)
PH UA: 5 (ref 5–8)
PLATELET # BLD: 153 K/UL (ref 138–453)
PLATELET ESTIMATE: ABNORMAL
PMV BLD AUTO: 10.5 FL (ref 8.1–13.5)
POTASSIUM SERPL-SCNC: 5.4 MMOL/L (ref 3.7–5.3)
PROTEIN UA: ABNORMAL
RBC # BLD: 4.74 M/UL (ref 4.21–5.77)
RBC # BLD: ABNORMAL 10*6/UL
RBC UA: NORMAL /HPF (ref 0–4)
RENAL EPITHELIAL, UA: NORMAL /HPF
SEG NEUTROPHILS: 93 % (ref 36–65)
SEGMENTED NEUTROPHILS ABSOLUTE COUNT: 9.76 K/UL (ref 1.5–8.1)
SODIUM BLD-SCNC: 131 MMOL/L (ref 135–144)
SPECIFIC GRAVITY UA: 1.05 (ref 1–1.03)
TRICHOMONAS: NORMAL
TROPONIN INTERP: ABNORMAL
TROPONIN T: ABNORMAL NG/ML
TROPONIN, HIGH SENSITIVITY: 24 NG/L (ref 0–22)
TURBIDITY: CLEAR
URINE HGB: ABNORMAL
UROBILINOGEN, URINE: NORMAL
WBC # BLD: 10.5 K/UL (ref 3.5–11.3)
WBC # BLD: ABNORMAL 10*3/UL
WBC UA: NORMAL /HPF (ref 0–5)
YEAST: NORMAL

## 2021-12-11 PROCEDURE — 6370000000 HC RX 637 (ALT 250 FOR IP): Performed by: STUDENT IN AN ORGANIZED HEALTH CARE EDUCATION/TRAINING PROGRAM

## 2021-12-11 PROCEDURE — 2580000003 HC RX 258: Performed by: STUDENT IN AN ORGANIZED HEALTH CARE EDUCATION/TRAINING PROGRAM

## 2021-12-11 PROCEDURE — 2060000002 HC BURN ICU INTERMEDIATE R&B

## 2021-12-11 PROCEDURE — 2500000003 HC RX 250 WO HCPCS: Performed by: STUDENT IN AN ORGANIZED HEALTH CARE EDUCATION/TRAINING PROGRAM

## 2021-12-11 PROCEDURE — 80048 BASIC METABOLIC PNL TOTAL CA: CPT

## 2021-12-11 PROCEDURE — 82947 ASSAY GLUCOSE BLOOD QUANT: CPT

## 2021-12-11 PROCEDURE — 81001 URINALYSIS AUTO W/SCOPE: CPT

## 2021-12-11 PROCEDURE — 6360000002 HC RX W HCPCS: Performed by: STUDENT IN AN ORGANIZED HEALTH CARE EDUCATION/TRAINING PROGRAM

## 2021-12-11 PROCEDURE — 85025 COMPLETE CBC W/AUTO DIFF WBC: CPT

## 2021-12-11 PROCEDURE — 84484 ASSAY OF TROPONIN QUANT: CPT

## 2021-12-11 RX ORDER — ACETAMINOPHEN 500 MG
1000 TABLET ORAL EVERY 8 HOURS SCHEDULED
Status: DISCONTINUED | OUTPATIENT
Start: 2021-12-11 | End: 2021-12-14 | Stop reason: HOSPADM

## 2021-12-11 RX ORDER — IBUPROFEN 400 MG/1
400 TABLET ORAL EVERY 6 HOURS
Status: DISCONTINUED | OUTPATIENT
Start: 2021-12-11 | End: 2021-12-12

## 2021-12-11 RX ORDER — GABAPENTIN 300 MG/1
300 CAPSULE ORAL 3 TIMES DAILY
Status: DISCONTINUED | OUTPATIENT
Start: 2021-12-11 | End: 2021-12-12

## 2021-12-11 RX ORDER — LABETALOL HYDROCHLORIDE 5 MG/ML
10 INJECTION, SOLUTION INTRAVENOUS ONCE
Status: COMPLETED | OUTPATIENT
Start: 2021-12-11 | End: 2021-12-11

## 2021-12-11 RX ORDER — CHOLECALCIFEROL (VITAMIN D3) 125 MCG
5 CAPSULE ORAL NIGHTLY PRN
Status: DISCONTINUED | OUTPATIENT
Start: 2021-12-11 | End: 2021-12-14 | Stop reason: HOSPADM

## 2021-12-11 RX ORDER — FENTANYL CITRATE 50 UG/ML
INJECTION, SOLUTION INTRAMUSCULAR; INTRAVENOUS
Status: DISPENSED
Start: 2021-12-11 | End: 2021-12-11

## 2021-12-11 RX ORDER — FENTANYL CITRATE 50 UG/ML
50 INJECTION, SOLUTION INTRAMUSCULAR; INTRAVENOUS ONCE
Status: DISCONTINUED | OUTPATIENT
Start: 2021-12-11 | End: 2021-12-13

## 2021-12-11 RX ORDER — FENTANYL CITRATE 50 UG/ML
50 INJECTION, SOLUTION INTRAMUSCULAR; INTRAVENOUS ONCE
Status: COMPLETED | OUTPATIENT
Start: 2021-12-11 | End: 2021-12-11

## 2021-12-11 RX ORDER — ONDANSETRON 2 MG/ML
4 INJECTION INTRAMUSCULAR; INTRAVENOUS EVERY 6 HOURS PRN
Status: DISCONTINUED | OUTPATIENT
Start: 2021-12-11 | End: 2021-12-14 | Stop reason: HOSPADM

## 2021-12-11 RX ORDER — SODIUM CHLORIDE 9 MG/ML
25 INJECTION, SOLUTION INTRAVENOUS PRN
Status: DISCONTINUED | OUTPATIENT
Start: 2021-12-11 | End: 2021-12-14 | Stop reason: HOSPADM

## 2021-12-11 RX ORDER — METHOCARBAMOL 500 MG/1
750 TABLET, FILM COATED ORAL 4 TIMES DAILY
Status: DISCONTINUED | OUTPATIENT
Start: 2021-12-11 | End: 2021-12-14 | Stop reason: HOSPADM

## 2021-12-11 RX ORDER — SODIUM CHLORIDE 0.9 % (FLUSH) 0.9 %
5-40 SYRINGE (ML) INJECTION EVERY 12 HOURS SCHEDULED
Status: DISCONTINUED | OUTPATIENT
Start: 2021-12-11 | End: 2021-12-14 | Stop reason: HOSPADM

## 2021-12-11 RX ORDER — OXYCODONE HYDROCHLORIDE 5 MG/1
5 TABLET ORAL EVERY 6 HOURS PRN
Status: DISCONTINUED | OUTPATIENT
Start: 2021-12-11 | End: 2021-12-11

## 2021-12-11 RX ORDER — ONDANSETRON 4 MG/1
4 TABLET, ORALLY DISINTEGRATING ORAL EVERY 8 HOURS PRN
Status: DISCONTINUED | OUTPATIENT
Start: 2021-12-11 | End: 2021-12-14 | Stop reason: HOSPADM

## 2021-12-11 RX ORDER — OXYCODONE HYDROCHLORIDE 5 MG/1
5 TABLET ORAL EVERY 4 HOURS PRN
Status: DISCONTINUED | OUTPATIENT
Start: 2021-12-11 | End: 2021-12-12

## 2021-12-11 RX ORDER — GINSENG 100 MG
CAPSULE ORAL 3 TIMES DAILY
Status: DISCONTINUED | OUTPATIENT
Start: 2021-12-11 | End: 2021-12-13

## 2021-12-11 RX ORDER — SODIUM CHLORIDE 0.9 % (FLUSH) 0.9 %
5-40 SYRINGE (ML) INJECTION PRN
Status: DISCONTINUED | OUTPATIENT
Start: 2021-12-11 | End: 2021-12-14 | Stop reason: HOSPADM

## 2021-12-11 RX ORDER — POLYETHYLENE GLYCOL 3350 17 G/17G
17 POWDER, FOR SOLUTION ORAL DAILY
Status: DISCONTINUED | OUTPATIENT
Start: 2021-12-11 | End: 2021-12-14 | Stop reason: HOSPADM

## 2021-12-11 RX ADMIN — METHOCARBAMOL TABLETS 750 MG: 500 TABLET, COATED ORAL at 17:34

## 2021-12-11 RX ADMIN — ONDANSETRON 4 MG: 4 TABLET, ORALLY DISINTEGRATING ORAL at 09:46

## 2021-12-11 RX ADMIN — IBUPROFEN 400 MG: 400 TABLET, FILM COATED ORAL at 01:44

## 2021-12-11 RX ADMIN — IBUPROFEN 400 MG: 400 TABLET, FILM COATED ORAL at 13:27

## 2021-12-11 RX ADMIN — ACETAMINOPHEN 1000 MG: 325 TABLET ORAL at 05:38

## 2021-12-11 RX ADMIN — ACETAMINOPHEN 1000 MG: 325 TABLET ORAL at 21:06

## 2021-12-11 RX ADMIN — OXYCODONE 5 MG: 5 TABLET ORAL at 18:11

## 2021-12-11 RX ADMIN — OXYCODONE 5 MG: 5 TABLET ORAL at 13:27

## 2021-12-11 RX ADMIN — METHOCARBAMOL TABLETS 750 MG: 500 TABLET, COATED ORAL at 21:06

## 2021-12-11 RX ADMIN — MYCOPHENOLATE MOFETIL 300 MG: 500 TABLET ORAL at 09:29

## 2021-12-11 RX ADMIN — BACITRACIN: 500 OINTMENT TOPICAL at 21:15

## 2021-12-11 RX ADMIN — Medication 5 MG: at 22:10

## 2021-12-11 RX ADMIN — INSULIN LISPRO 12 UNITS: 100 INJECTION, SOLUTION INTRAVENOUS; SUBCUTANEOUS at 13:45

## 2021-12-11 RX ADMIN — OXYCODONE 5 MG: 5 TABLET ORAL at 01:44

## 2021-12-11 RX ADMIN — OXYCODONE 5 MG: 5 TABLET ORAL at 07:57

## 2021-12-11 RX ADMIN — MYCOPHENOLATE MOFETIL 300 MG: 500 TABLET ORAL at 21:06

## 2021-12-11 RX ADMIN — SODIUM CHLORIDE, PRESERVATIVE FREE 10 ML: 5 INJECTION INTRAVENOUS at 09:55

## 2021-12-11 RX ADMIN — SODIUM CHLORIDE 5.13 UNITS/HR: 9 INJECTION, SOLUTION INTRAVENOUS at 18:07

## 2021-12-11 RX ADMIN — METHOCARBAMOL TABLETS 750 MG: 500 TABLET, COATED ORAL at 13:27

## 2021-12-11 RX ADMIN — Medication 10 MG: at 07:57

## 2021-12-11 RX ADMIN — SODIUM CHLORIDE, PRESERVATIVE FREE 10 ML: 5 INJECTION INTRAVENOUS at 21:07

## 2021-12-11 RX ADMIN — BACITRACIN: 500 OINTMENT TOPICAL at 13:35

## 2021-12-11 RX ADMIN — INSULIN LISPRO 15 UNITS: 100 INJECTION, SOLUTION INTRAVENOUS; SUBCUTANEOUS at 09:29

## 2021-12-11 RX ADMIN — IBUPROFEN 400 MG: 400 TABLET, FILM COATED ORAL at 21:06

## 2021-12-11 RX ADMIN — INSULIN LISPRO 5 UNITS: 100 INJECTION, SOLUTION INTRAVENOUS; SUBCUTANEOUS at 01:45

## 2021-12-11 RX ADMIN — Medication 10 MG: at 17:49

## 2021-12-11 RX ADMIN — SODIUM CHLORIDE 9.06 UNITS/HR: 9 INJECTION, SOLUTION INTRAVENOUS at 22:12

## 2021-12-11 RX ADMIN — POLYETHYLENE GLYCOL 3350 17 G: 17 POWDER, FOR SOLUTION ORAL at 09:29

## 2021-12-11 RX ADMIN — BACITRACIN: 500 OINTMENT TOPICAL at 09:29

## 2021-12-11 RX ADMIN — MYCOPHENOLATE MOFETIL 300 MG: 500 TABLET ORAL at 13:27

## 2021-12-11 RX ADMIN — SODIUM CHLORIDE 7.4 UNITS/HR: 9 INJECTION, SOLUTION INTRAVENOUS at 21:05

## 2021-12-11 RX ADMIN — FENTANYL CITRATE 50 MCG: 50 INJECTION, SOLUTION INTRAMUSCULAR; INTRAVENOUS at 04:59

## 2021-12-11 ASSESSMENT — PAIN SCALES - GENERAL
PAINLEVEL_OUTOF10: 4
PAINLEVEL_OUTOF10: 7
PAINLEVEL_OUTOF10: 6
PAINLEVEL_OUTOF10: 6
PAINLEVEL_OUTOF10: 7
PAINLEVEL_OUTOF10: 9
PAINLEVEL_OUTOF10: 6
PAINLEVEL_OUTOF10: 7
PAINLEVEL_OUTOF10: 5

## 2021-12-11 ASSESSMENT — PAIN DESCRIPTION - PAIN TYPE
TYPE: DEEP SOMATIC PAIN
TYPE: DEEP SOMATIC PAIN

## 2021-12-11 ASSESSMENT — PAIN DESCRIPTION - ORIENTATION
ORIENTATION: RIGHT
ORIENTATION: RIGHT

## 2021-12-11 ASSESSMENT — PAIN DESCRIPTION - PROGRESSION: CLINICAL_PROGRESSION: NOT CHANGED

## 2021-12-11 ASSESSMENT — PAIN DESCRIPTION - FREQUENCY: FREQUENCY: CONTINUOUS

## 2021-12-11 ASSESSMENT — PAIN DESCRIPTION - LOCATION
LOCATION: RIB CAGE
LOCATION: RIB CAGE

## 2021-12-11 ASSESSMENT — PAIN DESCRIPTION - ONSET: ONSET: ON-GOING

## 2021-12-11 ASSESSMENT — PAIN DESCRIPTION - DESCRIPTORS: DESCRIPTORS: ACHING

## 2021-12-11 NOTE — ED NOTES
The following labs labeled with pt sticker and tubed to lab:     [] Blue     [x] Lavender   [] on ice  [x] Green/yellow  [] Green/black [] on ice  [] Yellow  [] Red  [] Pink      [] COVID-19 swab    [] Rapid  [] PCR  [] Peds Viral Panel     [] Urine Sample  [] Pelvic Cultures  [] Blood Cultures            Gloria Aquino RN  12/11/21 1520

## 2021-12-11 NOTE — ED NOTES
Called [phone], to handoff pt. RN busy with report, advised will call back in 10.      Jackie Syed  12/11/21 0109

## 2021-12-11 NOTE — ED PROVIDER NOTES
[facility]  ED  Emergency Department Encounter  Emergency Medicine Resident     Pt Name: Trauma Karel Kendall  MRN: [id number]  Flowergfpoonam 1955  Date of evaluation: 12/10/21  PCP:  No primary care provider on file. CHIEF COMPLAINT       Fall    HISTORY OFPRESENT ILLNESS  (Location/Symptom, Timing/Onset, Context/Setting, Quality, Duration, Modifying Factors,Severity.)      Trauma A Lyla Weems is a 77 y. o.yo male who presents with fall. Patient came in as a trauma priority has history of osteogenic imperfecta and type 2 diabetes, states that he was in the bathroom fell on the right side landing on the tub states that the right axillary landed on the top, did not pass out is currently not on anticoagulation, states that he is feeling 10 out of 10 pain on the right mid axillary area and thinks there might be rib fractures there. Denies pain in his back, chest, abdomen or pelvis. Denies recent fevers or chills, Covid exposure. States that he is on insulin at home.      PAST MEDICAL / SURGICAL / SOCIAL / FAMILY HISTORY     Past medical history:  History of diabetes, osteogenic imperfecta    History:  Multiple orthopedic surgeries    Social History     Socioeconomic History    Marital status:      Spouse name: Not on file    Number of children: Not on file    Years of education: Not on file    Highest education level: Not on file   Occupational History    Not on file   Tobacco Use    Smoking status: Not on file    Smokeless tobacco: Not on file   Substance and Sexual Activity    Alcohol use: Not on file    Drug use: Not on file    Sexual activity: Not on file   Other Topics Concern    Not on file   Social History Narrative    Not on file     Social Determinants of Health     Financial Resource Strain:     Difficulty of Paying Living Expenses: Not on file   Food Insecurity:     Worried About Running Out of Food in the Last Year: Not on file    [address] in the Last Year: Not on file   Transportation Needs:     Lack of Transportation (Medical): Not on file    Lack of Transportation (Non-Medical): Not on file   Physical Activity:     Days of Exercise per Week: Not on file    Minutes of Exercise per Session: Not on file   Stress:     Feeling of Stress : Not on file   Social Connections:     Frequency of Communication with Friends and Family: Not on file    Frequency of Social Gatherings with Friends and Family: Not on file    Attends [employer] Services: Not on file    Active Member of [address] [place] [address] [employer] or Organizations: Not on file    Attends Club or Organization Meetings: Not on file    Marital Status: Not on file   Intimate Partner Violence:     Fear of Current or Ex-Partner: Not on file    Emotionally Abused: Not on file    Physically Abused: Not on file    Sexually Abused: Not on file   Housing Stability:     Unable to Pay for Housing in the Last Year: Not on file    Number of [place] in the Last Year: Not on file    Unstable Housing in the Last Year: Not on file       No family history on file. Allergies:  Patient has no allergy information on record. Home Medications:  Prior to Admission medications    Not on File       REVIEW OFSYSTEMS    (2-9 systems for level 4, 10 or more for level 5)      Review of Systems   Constitutional: Negative for diaphoresis and fever. HENT: Negative for congestion. Eyes: Negative for visual disturbance. Respiratory: Negative for shortness of breath. Cardiovascular: Positive for chest pain. Chest wall, rib pain     Gastrointestinal: Negative for abdominal pain, nausea and vomiting. Endocrine: Negative for polyuria. Genitourinary: Negative for dysuria. Musculoskeletal: Negative for back pain. Skin: Positive for wound. Neurological: Negative for headaches. Psychiatric/Behavioral: Negative for confusion.        PHYSICAL EXAM   (up to 7 for level 4, 8 or more forlevel 5)      ED TRIAGE VITALS BP: (!) imperfecta  Appears to have right sided chest and rib fractures  Breathing well bilateral breath sounds  Placed on nonrebreather  Came in as a trauma priority trauma team at bedside  Hematoma to the right chest  Rib score greater than 4 expecting admission  Pain control    Disposition:  Admitted to trauma service      DIAGNOSTIC RESULTS / EMERGENCYDEPARTMENT COURSE / MDM     LABS:  Results for orders placed or performed during the hospital encounter of 12/10/21   COVID-19, Rapid    Specimen: Nasopharyngeal Swab   Result Value Ref Range    Specimen Description . NASOPHARYNGEAL SWAB     SARS-CoV-2, Rapid Not Detected Not Detected   TROP/MYOGLOBIN   Result Value Ref Range    Troponin, High Sensitivity 25 (H) 0 - 22 ng/L    Troponin T NOT REPORTED <0.03 ng/mL    Troponin Interp NOT REPORTED     Myoglobin 127 (H) 28 - 72 ng/mL   Trauma Panel   Result Value Ref Range    Ethanol <10 <10 mg/dL    Ethanol percent <0.010 <0.010 %    Blood Bank Specimen BILL FOR SERVICES PERFORMED     BUN 20 8 - 23 mg/dL    WBC 14.0 (H) 3.5 - 11.3 k/uL    RBC 4.90 4.21 - 5.77 m/uL    Hemoglobin 14.4 13.0 - 17.0 g/dL    Hematocrit 41.9 40.7 - 50.3 %    MCV 85.5 82.6 - 102.9 fL    MCH 29.4 25.2 - 33.5 pg    MCHC 34.4 28.4 - 34.8 g/dL    RDW 12.7 11.8 - 14.4 %    Platelets 512 175 - 257 k/uL    MPV 10.4 8.1 - 13.5 fL    NRBC Automated 0.0 0.0 per 100 WBC    CREATININE 1.81 (H) 0.70 - 1.20 mg/dL    GFR Non- 38 (L) >60 mL/min    GFR  46 (L) >60 mL/min    GFR Comment          GFR Staging NOT REPORTED     Glucose 397 (H) 70 - 99 mg/dL    hCG Qual  CANCEL PT MALE NEGATIVE    Sodium 132 (L) 135 - 144 mmol/L    Potassium 4.8 3.7 - 5.3 mmol/L    Chloride 95 (L) 98 - 107 mmol/L    CO2 21 20 - 31 mmol/L    Anion Gap 16 9 - 17 mmol/L    Protime 11.3 9.1 - 12.3 sec    INR 1.1     PTT 23.3 20.5 - 30.5 sec    pH, José Luis 7.290 (L) 7.320 - 7.420    pCO2, José Luis 52.1 39 - 55    pO2, José Luis 25.5 (L) 30 - 50    HCO3, Venous 24.3 24 - 30 mmol/L Positive Base Excess, José Luis NOT REPORTED 0.0 - 2.0 mmol/L    Negative Base Excess, José Luis 2.5 (H) 0.0 - 2.0 mmol/L    O2 Sat, José Luis 42.8 (L) 60.0 - 85.0 %    Total Hb NOT REPORTED 12.0 - 16.0 g/dl    Oxyhemoglobin NOT REPORTED 95.0 - 98.0 %    Carboxyhemoglobin 0.8 0 - 5 %    Methemoglobin NOT REPORTED 0.0 - 1.5 %    Pt Temp 37.0     pH, José Luis, Temp Adj NOT REPORTED 7.320 - 7.420    pCO2, José Luis, Temp Adj NOT REPORTED 39 - 55 mmHg    pO2, José Luis, Temp Adj NOT REPORTED 30 - 50 mmHg    O2 Device/Flow/% NOT REPORTED     Respiratory Rate NOT REPORTED     Jeremy Test NOT REPORTED     Sample Site NOT REPORTED     Pt. Position NOT REPORTED     Mode NOT REPORTED     Set Rate NOT REPORTED     Total Rate NOT REPORTED     VT NOT REPORTED     FIO2 INFORMATION NOT PROVIDED     Peep/Cpap NOT REPORTED     PSV NOT REPORTED     Text for Respiratory NOT REPORTED     NOTIFICATION NOT REPORTED     NOTIFICATION TIME NOT REPORTED    TYPE AND SCREEN   Result Value Ref Range    Expiration Date 12/13/2021,2359     Arm Band Number [id number]     ABO/Rh A POSITIVE     Antibody Screen NEGATIVE        RADIOLOGY:  CT LUMBAR SPINE TRAUMA RECONSTRUCTION   Preliminary Result   Age-indeterminate mild superior endplate concavity at T8 and T9 are favored   to be chronic. If the patient endorses pain in this region, thoracic spine   MRI can be performed to better evaluate the chronicity of these findings. Chronic L2 compression fracture status post vertebroplasty. CT THORACIC SPINE TRAUMA RECONSTRUCTION   Preliminary Result   Age-indeterminate mild superior endplate concavity at T8 and T9 are favored   to be chronic. If the patient endorses pain in this region, thoracic spine   MRI can be performed to better evaluate the chronicity of these findings. Chronic L2 compression fracture status post vertebroplasty. CT CHEST ABDOMEN PELVIS W CONTRAST   Final Result   CT OF THE HEAD:      1. No skull fracture or acute intracranial abnormality. CT OF THE CERVICAL SPINE:      1. No fracture or joint dislocation is seen. 2. Degenerative changes, as described. CT OF THE CHEST:      1. Acute fractures of the right posterior 8th, 9th, 10th, 11th and 12th ribs. 2.  No acute cardiopulmonary abnormality. CT OF THE ABDOMEN AND PELVIS:      1. No traumatic or other acute abnormality. 2. Chronic compression fracture deformity of the L2 vertebral body with   kyphoplasty changes. 3. Intramedullary christiano in the right femur. CT HEAD WO CONTRAST   Final Result   CT OF THE HEAD:      1. No skull fracture or acute intracranial abnormality. CT OF THE CERVICAL SPINE:      1. No fracture or joint dislocation is seen. 2. Degenerative changes, as described. CT OF THE CHEST:      1. Acute fractures of the right posterior 8th, 9th, 10th, 11th and 12th ribs. 2.  No acute cardiopulmonary abnormality. CT OF THE ABDOMEN AND PELVIS:      1. No traumatic or other acute abnormality. 2. Chronic compression fracture deformity of the L2 vertebral body with   kyphoplasty changes. 3. Intramedullary christiano in the right femur. CT CERVICAL SPINE WO CONTRAST   Final Result   CT OF THE HEAD:      1. No skull fracture or acute intracranial abnormality. CT OF THE CERVICAL SPINE:      1. No fracture or joint dislocation is seen. 2. Degenerative changes, as described. CT OF THE CHEST:      1. Acute fractures of the right posterior 8th, 9th, 10th, 11th and 12th ribs. 2.  No acute cardiopulmonary abnormality. CT OF THE ABDOMEN AND PELVIS:      1. No traumatic or other acute abnormality. 2. Chronic compression fracture deformity of the L2 vertebral body with   kyphoplasty changes. 3. Intramedullary christiano in the right femur. EMERGENCY DEPARTMENT COURSE:  ED Course as of 12/10/21 2308   Fri Dec 10, 2021   2208 Patient seen and assessed in the emergency department no acute respiratory cardiovascular distress.   Patient came in as a trauma priority has history of osteogenic imperfecta and type 2 diabetes, states that he was in the bathroom fell on the right side landing on the tub states that the right axillary landed on the top, did not pass out is currently not on anticoagulation, states that he is feeling 10 out of 10 pain on the right mid axillary area and thinks there might be rib fractures there. Denies pain in his back, chest, abdomen or pelvis. Denies recent fevers or chills, Covid exposure. States that he is on insulin at home. [PS]      ED Course User Index  [PS] Ole Gordon MD          PROCEDURES:  None    CONSULTS:  None    CRITICAL CARE:  Please see attending note    FINAL IMPRESSION      1. Closed fracture of multiple ribs of right side, initial encounter          DISPOSITION / Josh [address] Admitted 12/10/2021 11:02:36 PM       PATIENT REFERRED TO:  No follow-up provider specified.     DISCHARGE MEDICATIONS:  New Prescriptions    No medications on file       Ole Gordon MD  Emergency Medicine Resident    (Please note that portions of this note were completed with a voice recognition program.Efforts were made to edit the dictations but occasionally words are mis-transcribed.)       Ole Gordon MD  Resident  12/10/21 7058

## 2021-12-11 NOTE — ED PROVIDER NOTES
[facility] ED  Emergency Department  Emergency Medicine Resident Sign-out     Care of Trauma A Tamiko Look was assumed from Dr. Graciela Arguello and is being seen for No chief complaint on file. .  The patient's initial evaluation and plan have been discussed with the prior provider who initially evaluated the patient. EMERGENCY DEPARTMENT COURSE / MEDICAL DECISION MAKING:       MEDICATIONS GIVEN:  Orders Placed This Encounter   Medications    fentaNYL (SUBLIMAZE) 100 MCG/2ML injection     Tere Llanos: cabinet override    fentaNYL (SUBLIMAZE) injection 50 mcg    iopamidol (ISOVUE-370) 76 % injection 130 mL       LABS / RADIOLOGY:     Labs Reviewed   TROP/MYOGLOBIN - Abnormal; Notable for the following components:       Result Value    Troponin, High Sensitivity 25 (*)     Myoglobin 127 (*)     All other components within normal limits   TRAUMA PANEL - Abnormal; Notable for the following components:    WBC 14.0 (*)     CREATININE 1.81 (*)     GFR Non- 38 (*)     GFR African American 46 (*)     Glucose 397 (*)     Sodium 132 (*)     Chloride 95 (*)     pH, José Luis 7.290 (*)     pO2, José Luis 25.5 (*)     Negative Base Excess, José Luis 2.5 (*)     O2 Sat, José Luis 42.8 (*)     All other components within normal limits   COVID-19, RAPID   URINALYSIS   TYPE AND SCREEN       CT LUMBAR SPINE TRAUMA RECONSTRUCTION   Preliminary Result   Age-indeterminate mild superior endplate concavity at T8 and T9 are favored   to be chronic. If the patient endorses pain in this region, thoracic spine   MRI can be performed to better evaluate the chronicity of these findings. Chronic L2 compression fracture status post vertebroplasty. CT THORACIC SPINE TRAUMA RECONSTRUCTION   Preliminary Result   Age-indeterminate mild superior endplate concavity at T8 and T9 are favored   to be chronic.   If the patient endorses pain in this region, thoracic spine   MRI can be performed to better evaluate the chronicity of these findings. Chronic L2 compression fracture status post vertebroplasty. CT CHEST ABDOMEN PELVIS W CONTRAST   Final Result   CT OF THE HEAD:      1. No skull fracture or acute intracranial abnormality. CT OF THE CERVICAL SPINE:      1. No fracture or joint dislocation is seen. 2. Degenerative changes, as described. CT OF THE CHEST:      1. Acute fractures of the right posterior 8th, 9th, 10th, 11th and 12th ribs. 2.  No acute cardiopulmonary abnormality. CT OF THE ABDOMEN AND PELVIS:      1. No traumatic or other acute abnormality. 2. Chronic compression fracture deformity of the L2 vertebral body with   kyphoplasty changes. 3. Intramedullary christiano in the right femur. CT HEAD WO CONTRAST   Final Result   CT OF THE HEAD:      1. No skull fracture or acute intracranial abnormality. CT OF THE CERVICAL SPINE:      1. No fracture or joint dislocation is seen. 2. Degenerative changes, as described. CT OF THE CHEST:      1. Acute fractures of the right posterior 8th, 9th, 10th, 11th and 12th ribs. 2.  No acute cardiopulmonary abnormality. CT OF THE ABDOMEN AND PELVIS:      1. No traumatic or other acute abnormality. 2. Chronic compression fracture deformity of the L2 vertebral body with   kyphoplasty changes. 3. Intramedullary christiano in the right femur. CT CERVICAL SPINE WO CONTRAST   Final Result   CT OF THE HEAD:      1. No skull fracture or acute intracranial abnormality. CT OF THE CERVICAL SPINE:      1. No fracture or joint dislocation is seen. 2. Degenerative changes, as described. CT OF THE CHEST:      1. Acute fractures of the right posterior 8th, 9th, 10th, 11th and 12th ribs. 2.  No acute cardiopulmonary abnormality. CT OF THE ABDOMEN AND PELVIS:      1. No traumatic or other acute abnormality. 2. Chronic compression fracture deformity of the L2 vertebral body with   kyphoplasty changes. 3. Intramedullary christiano in the right femur. RECENT VITALS:     Temp: 98.6 °F (37 °C),  Pulse: 79, Resp: 20, BP: (!) 144/83, SpO2: 97 %    This patient is a 77 y.o. Male with fall rib fractures hx oi rib score 8 admitted  OUTSTANDING TASKS / RECOMMENDATIONS:      1. Await Bed placement  2. FINAL IMPRESSION:     1. Closed fracture of multiple ribs of right side, initial encounter        DISPOSITION:       DISPOSITION:  []  Discharge   []  Transfer -    [x]  Admission -     []  Against Medical Advice   []  Eloped   FOLLOW-UP: No follow-up provider specified.    DISCHARGE MEDICATIONS: New Prescriptions    No medications on file          Frankie Myers DO  Emergency Medicine Resident  [facility]       Joni IngramNorth [facility] Specialty Hospitalsabrina  Resident  12/10/21 8938

## 2021-12-11 NOTE — ED NOTES
Pt educatedd to keep nasal cannula on and use of incentive spirometer     July More RN  12/11/21 4701

## 2021-12-11 NOTE — PROGRESS NOTES
Trauma Tertiary Survey    Admit Date: 12/10/2021  Hospital day 1    [facility]     No past medical history on file. Scheduled Meds:   sodium chloride flush  5-40 mL IntraVENous 2 times per day    polyethylene glycol  17 g Oral Daily    acetaminophen  1,000 mg Oral 3 times per day    ibuprofen  400 mg Oral Q6H    methocarbamol  750 mg Oral 4x Daily    gabapentin  300 mg Oral TID    bacitracin   Topical TID    insulin lispro  0-18 Units SubCUTAneous TID WC    insulin lispro  0-9 Units SubCUTAneous Nightly    fentanNYL  50 mcg IntraVENous Once    fentaNYL        fentanNYL  50 mcg IntraVENous Once    fentaNYL        fentanNYL  50 mcg IntraVENous Once    morphine  4 mg IntraVENous Once     Continuous Infusions:   sodium chloride       PRN Meds:sodium chloride flush, sodium chloride, ondansetron **OR** ondansetron, oxyCODONE    Subjective:     Patient complaints of right side rib pain. Pain is severe, worsens with movement, and some relief by rest.  There is not associated numbness, tingling, weakness.     Objective:     Patient Vitals for the past 8 hrs:   BP Pulse Resp SpO2 Height Weight   12/11/21 0823 (!) 174/130 89 -- (!) 89 % -- --   12/11/21 0817 (!) 159/91 85 -- 91 % -- --   12/11/21 0758 (!) 179/100 92 -- 91 % -- --   12/11/21 0757 (!) 172/102 92 -- 93 % -- --   12/11/21 0756 (!) 172/110 93 -- 92 % -- --   12/11/21 0755 (!) 166/94 90 -- 93 % -- --   12/11/21 0715 (!) 182/153 88 17 96 % 6' 2\" (1.88 m) 200 lb (90.7 kg)   12/11/21 0654 -- 78 -- 99 % -- --   12/11/21 0651 -- 80 -- 96 % -- --   12/11/21 0648 (!) 182/153 83 16 96 % -- --   12/11/21 0643 -- 86 18 97 % -- --   12/11/21 0633 (!) 172/115 83 20 93 % -- --   12/11/21 0631 -- 78 25 95 % -- --   12/11/21 0616 (!) 170/139 77 17 92 % -- --   12/11/21 0602 -- 75 -- 96 % -- --   12/11/21 0601 (!) 182/96 73 -- 92 % -- --   12/11/21 0546 (!) 169/96 76 24 95 % -- --   12/11/21 0544 (!) 171/84 76 15 97 % -- --   12/11/21 0521 -- 75 -- 91 % -- -- 12/11/21 0511 -- 75 -- 92 % -- --   12/11/21 0501 -- 72 -- 92 % -- --   12/11/21 0458 -- 81 -- 97 % -- --   12/11/21 0356 -- 76 -- 92 % -- --   12/11/21 0354 -- 79 -- 93 % -- --   12/11/21 0351 -- 77 -- 93 % -- --   12/11/21 0338 -- 76 -- 92 % -- --   12/11/21 0334 -- 79 -- 93 % -- --   12/11/21 0303 -- 74 -- 91 % -- --   12/11/21 0258 -- 71 -- 92 % -- --   12/11/21 0245 -- 72 -- 92 % -- --       I/O last 3 completed shifts:  In: -   Out: 150 [Urine:150]  I/O this shift:  In: -   Out: 260 [Urine:260]    Radiology:  CT LUMBAR SPINE TRAUMA RECONSTRUCTION   Final Result   Age-indeterminate mild superior endplate concavity at T8 and T9 are favored   to be chronic. If the patient endorses pain in this region, thoracic spine   MRI can be performed to better evaluate the chronicity of these findings. Chronic L2 compression fracture status post vertebroplasty. CT THORACIC SPINE TRAUMA RECONSTRUCTION   Final Result   Age-indeterminate mild superior endplate concavity at T8 and T9 are favored   to be chronic. If the patient endorses pain in this region, thoracic spine   MRI can be performed to better evaluate the chronicity of these findings. Chronic L2 compression fracture status post vertebroplasty. CT CHEST ABDOMEN PELVIS W CONTRAST   Final Result   CT OF THE HEAD:      1. No skull fracture or acute intracranial abnormality. CT OF THE CERVICAL SPINE:      1. No fracture or joint dislocation is seen. 2. Degenerative changes, as described. CT OF THE CHEST:      1. Acute fractures of the right posterior 8th, 9th, 10th, 11th and 12th ribs. 2.  No acute cardiopulmonary abnormality. CT OF THE ABDOMEN AND PELVIS:      1. No traumatic or other acute abnormality. 2. Chronic compression fracture deformity of the L2 vertebral body with   kyphoplasty changes. 3. Intramedullary christiano in the right femur.          CT HEAD WO CONTRAST   Final Result   CT OF THE HEAD:      1. No skull fracture or acute intracranial abnormality. CT OF THE CERVICAL SPINE:      1. No fracture or joint dislocation is seen. 2. Degenerative changes, as described. CT OF THE CHEST:      1. Acute fractures of the right posterior 8th, 9th, 10th, 11th and 12th ribs. 2.  No acute cardiopulmonary abnormality. CT OF THE ABDOMEN AND PELVIS:      1. No traumatic or other acute abnormality. 2. Chronic compression fracture deformity of the L2 vertebral body with   kyphoplasty changes. 3. Intramedullary christiano in the right femur. CT CERVICAL SPINE WO CONTRAST   Final Result   CT OF THE HEAD:      1. No skull fracture or acute intracranial abnormality. CT OF THE CERVICAL SPINE:      1. No fracture or joint dislocation is seen. 2. Degenerative changes, as described. CT OF THE CHEST:      1. Acute fractures of the right posterior 8th, 9th, 10th, 11th and 12th ribs. 2.  No acute cardiopulmonary abnormality. CT OF THE ABDOMEN AND PELVIS:      1. No traumatic or other acute abnormality. 2. Chronic compression fracture deformity of the L2 vertebral body with   kyphoplasty changes. 3. Intramedullary christiano in the right femur. PHYSICAL EXAM:   GCS:  4 - Opens eyes on own   6 - Follows simple motor commands  5 - Alert and oriented    Pupil size:  Left 3 mm Right 3 mm  Pupil reaction: Yes  Wiggles fingers: Left Yes Right Yes  Hand grasp:   Left normal   Right normal  Wiggles toes: Left Yes    Right Yes  Plantar flexion: Left normal  Right normal    BP (!) 174/130   Pulse 90   Temp 98.6 °F (37 °C)   Resp 20   Ht 6' 2\" (1.88 m)   Wt 200 lb (90.7 kg)   SpO2 96%   BMI 25.68 kg/m²   General appearance: alert, appears stated age and cooperative  Head: Normocephalic, without obvious abnormality, atraumatic  Eyes: conjunctivae/corneas clear. PERRL, EOM's intact. Fundi benign. Ears: normal TM's and external ear canals both ears  Nose: Nares normal. Septum midline.  Mucosa normal. No drainage or sinus tenderness. Throat: lips, mucosa, and tongue normal; teeth and gums normal  Neck: no adenopathy, no carotid bruit, no JVD, supple, symmetrical, trachea midline and thyroid not enlarged, symmetric, no tenderness/mass/nodules  Back: symmetric, no curvature. ROM normal. No CVA tenderness. Lungs: clear to auscultation bilaterally  Chest wall: no tenderness, right sided chest wall tenderness  Heart: regular rate and rhythm, S1, S2 normal, no murmur, click, rub or gallop  Abdomen: soft, non-tender; bowel sounds normal; no masses,  no organomegaly  Extremities: extremities normal, atraumatic, no cyanosis or edema  Pulses: 2+ and symmetric  Skin: Skin color, texture, turgor normal. No rashes or lesions  Neurologic: Grossly normal        Spine:     Spine Tenderness ROM   Cervical 0 /10 Normal   Thoracic 0 /10 Normal   Lumbar 0 /10 Normal     Musculoskeletal    Joint Tenderness Swelling ROM   Right shoulder absent absent normal   Left shoulder absent absent normal   Right elbow absent absent normal   Left elbow absent absent normal   Right wrist absent absent normal   Left wrist absent absent normal   Right hand grasp absent absent normal   Left hand grasp absent absent normal   Right hip absent absent normal   Left hip absent absent normal   Right knee absent absent normal   Left knee absent absent normal   Right ankle absent absent normal   Left ankle absent absent normal   Right foot absent absent normal   Left foot absent absent normal           CONSULTS: None    PROCEDURES: none    INJURIES:        Patient Active Problem List   Diagnosis    Fall at home, initial encounter    Osteogenesis imperfecta    Traumatic closed displaced fracture of multiple ribs         Assessment/Plan:     See progress note for full plan. On tertiary exam, right wall tenderness on palpation, no bruising, swelling or ecchymosis. Imaging on admission already revealed rib fx in this area.  No other injuries found, no need for further imaging.

## 2021-12-11 NOTE — ED NOTES
The following labs labeled with pt sticker and tubed to lab:     [] Blue     [] Lavender   [] on ice  [x] Green/yellow  [] Green/black [] on ice  [] Yellow  [] Red  [] Pink      [] COVID-19 swab    [] Rapid  [] PCR  [] Peds Viral Panel     [] Urine Sample  [] Pelvic Cultures  [] Blood Cultures            Adam Beach RN  12/11/21 0023

## 2021-12-11 NOTE — ED PROVIDER NOTES
[address] [facility]     Emergency Department     Faculty Attestation    I performed a history and physical examination of the patient and discussed management with the resident. I reviewed the residents note and agree with the documented findings including all diagnostic interpretations and plan of care. Any areas of disagreement are noted on the chart. I was personally present for the key portions of any procedures. I have documented in the chart those procedures where I was not present during the key portions. I have reviewed the emergency nurses triage note. I agree with the chief complaint, past medical history, past surgical history, allergies, medications, social and family history as documented unless otherwise noted below. Documentation of the HPI, Physical Exam and Medical Decision Making performed by scribella is based on my personal performance of the HPI, PE and MDM. For Physician Assistant/ Nurse Practitioner cases/documentation I have personally evaluated this patient and have completed at least one if not all key elements of the E/M (history, physical exam, and MDM). Additional findings are as noted. This patient was evaluated in the Emergency Department for symptoms described in the history of present illness. He/she was evaluated in the context of the global COVID-19 pandemic, which necessitated consideration that the patient might be at risk for infection with the SARS-CoV-2 virus that causes COVID-19. Institutional protocols and algorithms that pertain to the evaluation of patients at risk for COVID-19 are in a state of rapid change based on information released by regulatory bodies including the CDC and federal and state organizations. These policies and algorithms were followed during the patient's care in the ED. Primary Care Physician: No primary care provider on file. History:  This is a 80 y.o. male who presents to the Emergency Department with complaint of fall. History of osteogenesis imperfecta. Attempted to stand up from the toilet and missed the handrail striking the right side of his chest on his bathroom tub. Has difficulty taking a deep breath due to pain. Physical:     temperature is 98.6 °F (37 °C). His blood pressure is 181/121 (abnormal) and his pulse is 80. His oxygen saturation is 96%. 80 y.o. male appears significantly uncomfortable, airway intact, equal breath sounds bilaterally but shallow breathing. No obvious deformities to the extremities. There is a large area of swelling in the right posterior chest wall with focal tenderness. Impression: Fall, suspected fractures given history of osteogenesis imperfecta    Plan: Trauma priority based on past medical history and injury reported by EMS. Imaging per trauma. Likely admission. CRITICAL CARE: There was a high probability of clinically significant/life threatening deterioration in this patient's condition which required my urgent intervention. Total critical care time was 21 minutes. This excludes any time for separately reportable procedures.      Dago Franks MD, Edel Love  Attending Emergency Physician         Maile Anglin MD  12/10/21 3158

## 2021-12-11 NOTE — PROGRESS NOTES
PROGRESS NOTE          PATIENT NAME: Trauma A [facility]  MEDICAL RECORD NO. [id number]  DATE: 2021  SURGEON: Dr. Sherlyn Livingston: No primary care provider on file. HD: # 1    ASSESSMENT    Patient Active Problem List   Diagnosis    Fall at home, initial encounter    Osteogenesis imperfecta    Traumatic closed displaced fracture of multiple ribs       MEDICAL DECISION MAKING AND PLAN    Right posterior rib fractures 8-12 s/p fall from [facility]  Rib score of 9  IS 1500  O2 sats 98% on 5L NC  MMPT - Fentanyl pushes, increased to Humaira q4 initially to get pain under control  Hx of osteogenesis imperfecta  Stepdown  PT/OT    Hypertensive in ED   -Labetalol 10mg    Hyperglycemia   -hx of uncontrolled DM   -high dose correction for now, will consider insulin gtt  glucose remains above 300    -restart home meds      Chief Complaint: \"right side hurts\"    SUBJECTIVE    Trauma A [facility] was seen and examined at bedside. Patient lying supine in bed appears to be uncomfortable, however is not in distress. Patient complaining of right-sided rib pain at the location of the fractures. He states he feels short of breath due to the pain, is able to pull [id number] on IS. O2 sats remaining around 98% SPO2 on 5 L nasal cannula. Blood sugars remain elevated since admission above 300, he is currently on high-dose correction insulin, will consider insulin drip if they remain above 300. Patient is hypertensive in ED as well, with a history of hypertension and takes metoprolol at home. This could be likely due to the pain, however will administer labetalol 10 mg as patient may have missed home medication dose. Will restart home medications.        OBJECTIVE  VITALS: Temp: Temp: 98.6 °F (37 °C)Temp  Av.6 °F (37 °C)  Min: 98.6 °F (37 °C)  Max: 98.6 °F (37 °C) BP Systolic (53FKO), EKY:558 , Min:143 , EKO:654   Diastolic (60FYJ), YQT:845, Min:82, Max:153   Pulse Pulse  Av.9  Min: 70  Max: 110 Resp Resp  Av.2  Min: 15  Max: 25 Pulse ox SpO2  Av.7 %  Min: 89 %  Max: 99 %  GENERAL: alert, no distress  NEURO: ANO x4, able to move all 4 extremities, cranial nerves intact  HEENT: NCAT  : deferred  LUNGS: clear to ausculation, without wheezes, rales or rhonci  HEART: normal rate and regular rhythm  ABDOMEN: soft, non-tender, non-distended, bowel sounds present in all 4 quadrants and no guarding or peritoneal signs present  EXTREMITY: no cyanosis, clubbing or edema    I/O last 3 completed shifts:  In: -   Out: 150 [Urine:150]    Drain/tube output: In: -   Out: 410 [Urine:410]    LAB:  CBC:   Recent Labs     12/10/21  2200 12/11/21  0541   WBC 14.0* 10.5   HGB 14.4 14.0   HCT 41.9 40.5*   MCV 85.5 85.4    153     BMP:   Recent Labs     12/10/21  2200 12/11/21  0541   * 131*   K 4.8 5.4*   CL 95* 97*   CO2    BUN 20 17   CREATININE 1.81* 1.52*   GLUCOSE 397* 327*     COAGS:   Recent Labs     12/10/21  2200   APTT 23.3   INR 1.1       RADIOLOGY:  CT LUMBAR SPINE TRAUMA RECONSTRUCTION   Final Result   Age-indeterminate mild superior endplate concavity at T8 and T9 are favored   to be chronic. If the patient endorses pain in this region, thoracic spine   MRI can be performed to better evaluate the chronicity of these findings. Chronic L2 compression fracture status post vertebroplasty. CT THORACIC SPINE TRAUMA RECONSTRUCTION   Final Result   Age-indeterminate mild superior endplate concavity at T8 and T9 are favored   to be chronic. If the patient endorses pain in this region, thoracic spine   MRI can be performed to better evaluate the chronicity of these findings. Chronic L2 compression fracture status post vertebroplasty. CT CHEST ABDOMEN PELVIS W CONTRAST   Final Result   CT OF THE HEAD:      1. No skull fracture or acute intracranial abnormality. CT OF THE CERVICAL SPINE:      1. No fracture or joint dislocation is seen.    2. Degenerative changes, as findings, established the care plan and recommendations with the trauma team.  Restart home meds, monitor glucose/resp status.     Marah Ann MD  12/11/2021  11:02 AM

## 2021-12-11 NOTE — FLOWSHEET NOTE
Assessment: Patient is a 77 y.o. male who arrived to the ED as an \"Adult Trauma Priority,\" due to a \"Fall. \" Patient was in CT Scan, when  arrived. Patient's spouse, Volodymyr Ham, was present in trauma room, with previous shift . Intervention:  visited patient per previous shift  referral. Sanjayoni Quintero introduced herself to patient's spouse and later to patient, once he returned from the CT Scan.  learned about patient's well-being and also learned that family had arrived to the waiting room.  escorted patient's spouse to family to discuss visitation. Per ED Nurse Coordinator,  can bring one more visitor to room, upon a negative COVID test result.  escorted patient's daughter, Angeles Dubon, back to room, when bedside nurse confirmed results.  provided support and care to family. Outcome: Patient and family were receptive of 's visit and support. Plan: Chaplains can make follow-up visit, per request. Shoshana Blue can be reached 24/7 via [employer]. Clary Magana     12/10/21 1619   Encounter Summary   Services provided to: Patient and family together   Referral/Consult From: Other    Support System Spouse; Children; Family members   Continue Visiting   (12/10/2021)   Complexity of Encounter High   Length of Encounter 1 hour   Spiritual Assessment Completed Yes   Crisis   Type Follow up   Spiritual/Sikhism   Type Spiritual support   Assessment Approachable; Anxious; Fearful; Helplessness   Intervention Active listening; Explored feelings, thoughts, concerns; Explored coping resources; Nurtured hope; Sustaining presence/ Ministry of presence;  Discussed illness/injury and it's impact   Outcome Comfort; Expressed gratitude; Receptive

## 2021-12-11 NOTE — ED NOTES
Pt writhing in pain, and huffing before meds, now is 5/6. Pt ate 3/4 turkey sandwich.       Mell Shai  12/11/21 0806

## 2021-12-11 NOTE — ED NOTES
The following labs labeled with pt sticker and tubed to lab:     [] Blue     [] Lavender   [] on ice  [] Green/yellow  [] Green/black [] on ice  [] Yellow  [] Red  [] Pink      [] COVID-19 swab    [] Rapid  [] PCR  [] Peds Viral Panel     [x] Urine Sample  [] Pelvic Cultures  [] Blood Cultures            Meron Diaz RN  12/11/21 0127

## 2021-12-11 NOTE — ED NOTES
Pt to ed via EMS. Pt fell from standing, hitting chest on bathtub. Denies hitting head/LOC. Denies blood thinners. Hx DM, osteogenesis. Per ems, pt has R hematoma on chest. PTA pt c/o sob, ems placed 3L nc. Pt c/o 8/10 back pain. Last meal at 1600. Per ems, pt BS in 400's. R upper and R forearm skin tear noted.  Pt is a/o, resting on stretcher, wife at bedside, RR even and non labored, incentive spirometer at bedside     Denilson Akins RN  12/11/21 7284

## 2021-12-11 NOTE — ED NOTES
Handoff completed @ 1430 hrs to [phone], for xfer to trauma/burn.      Tay Ornelas  12/11/21 5146

## 2021-12-11 NOTE — H&P
TRAUMA HISTORY AND PHYSICAL EXAMINATION    PATIENT NAME: Trauma A Becca  YOB: 1955  MEDICAL RECORD NO. [id number]   DATE: 12/11/2021  PRIMARY CARE PHYSICIAN: No primary care provider on file. PATIENT EVALUATED AT THE REQUEST OF DR.: Dr. Alem Lobo     [] Trauma Priority     []Trauma Consult. IMPRESSION:     Patient Active Problem List   Diagnosis    Fall at home, initial encounter    Osteogenesis imperfecta       MEDICAL DECISION MAKING AND PLAN:       Fall Standing height, complains of right chest pain  -LOC, -AC    Inj: Fractures of R posterior 8-12th ribs, chronic L2 compression fx deformity noted with kyphoplasty changes    Trauma completion scans  MMPT  Incentive spirometry   Rib score of 9   Admit patient to step down        CONSULT SERVICES    [] Neurosurgery     [] Orthopedic Surgery    [] Cardiothoracic     [] Facial Trauma    [] Plastic Surgery (Burn)    [] Pediatric Surgery     [] Internal Medicine    [] Pulmonary Medicine    [] Other:     HISTORY:     Chief Complaint:  \" Fall SH\"    INJURY SUMMARY  Fractures of R posterior 8-12th ribs  chronic L2 compression fx deformity noted with kyphoplasty changes    If intracranial hemorrhage is present, is it a BIG 1 category: [] YES  []NO    GENERAL DATA  Age 77 y.o.  male   Patient information was obtained from patient and EMS personnel. History/Exam limitations: none.   Patient presented to the Emergency Department by ambulance where the patient received see Ambulance Run Sheet prior to arrival.  Injury Date: 12/11/2021   Approximate Injury Time: 9:45pm        Transport mode:   [x]Ambulance      [] Helicopter     []Car       [] Other  Referring Hospital: [address] [place] [address], (e.g., home, farm, industry, street)  Specific Details of Location (e.g., bedroom, kitchen, garage): bathroom  Type of Residence (if occurred in home setting) (e.g., apartment, mobile home, single family home): home    MECHANISM OF INJURY    [] Motor Vehicle Collision   Specific vehicle type involved (e.g., sedan, minivan, SUV, pickup truck):   Collision with (e.g., type of vehicle, building, barn, ditch, tree):     Type of collision  [] Single Vehicle Collision  []Multiple Vehicle Collision  [] unknown collision type    Mechanism considerations  [] Fatality in Same Vehicle      []Ejected       []Rollover          []Extricated    Internal Compartment   []                      []Passenger:      []Front Seat        []Rear Seat     Personal Restraints  [] Unrestrained   []Lap Belt Only Restrained   [] Shoulder Belt Only Restrained  [] 3 Point Restrained  [] unknown     Air Bags  [] Front Air Bag  []Side Air Bag  []Curtain Airbag []Air Bag Not Deployed    []No Air Bag equipped in vehicle      Pediatric Consideration:      [] Booster Seat  []Infant Car Seat  [] Child Car Seat      [] Motorcycle Collision   Wearing Helmet     []Yes     []No    []Unknown    [] ATV crash  Wearing Helmet     []Yes     []No    []Unknown    [] Bicycle Collision Wearing Helmet     []Yes     []No    []Unknown    [] Pedestrian Struck         [x] Fall    [x]From Standing     []From Height  Ft     []Down Stairs ___steps    [] Assault    [] Gunshot  Specify caliber / type of gun: ____________________________    [] Stabbing  Specify weapon type, size: _____________________________    [] Burn  []Flame   []Scald   []Electrical   []Chemical  []Inhalation   []House fire    [] Other ______________________________________________________    [] Other protective devices used / worn ___________________________    HISTORY:     Trauma ABEL Rodriguez is a 77 y.o. male that presented to the Emergency Department following a fall from standing height in his bathroom. He did not have loss of consciousness and denies hitting his head. He complains of right chest wall pain.  Patient has history of Osteogenesis imperfecta and has broken more than 40 bones in the past. History of diabetes palpitations. Gastrointestinal: Negative for abdominal distention and abdominal pain. Endocrine: Negative for cold intolerance. Genitourinary: Negative for hematuria and testicular pain. Skin: Negative for rash. Neurological: Negative for facial asymmetry. Hematological: Bruises/bleeds easily. Psychiatric/Behavioral: Negative for confusion and hallucinations. PHYSICAL EXAMINATION:     GLASCOW COMA SCALE  NEUROMUSCULAR BLOCKADE PRIOR TO ARRIVAL     [x]No        []Yes      Variable  Score   Variable  Score  Eye opening [x]Spontaneous 4 Verbal  [x]Oriented  5     []To voice  3   []Confused  4    []To pain  2   []Inapp words  3    []None  1   []Incomp words 2       []None  1   Motor   [x]Obeys  6    []Localizes pain 5    []Withdraws(pain) 4    []Flexion(pain) 3  []Extension(pain) 2    []None  1     GCS Total = 15    PHYSICAL EXAMINATION    VITAL SIGNS:   Vitals:    12/10/21 2316   BP: (!) 144/83   Pulse: 79   Resp:    Temp:    SpO2: 97%       Physical Exam  Constitutional:       General: He is in acute distress. HENT:      Head: Normocephalic and atraumatic. Right Ear: Tympanic membrane and external ear normal.      Left Ear: Tympanic membrane and external ear normal.      Nose: Nose normal.      Mouth/Throat:      Pharynx: Oropharynx is clear. No oropharyngeal exudate. Eyes:      Pupils: Pupils are equal, round, and reactive to light. Comments: Blue sclera bilaterally   Cardiovascular:      Rate and Rhythm: Normal rate and regular rhythm. Pulses: Normal pulses. Pulmonary:      Effort: No respiratory distress. Chest:      Chest wall: Tenderness present. Abdominal:      General: There is no distension. Tenderness: There is no abdominal tenderness. There is no guarding. Musculoskeletal:         General: Tenderness present. Cervical back: Normal range of motion. No tenderness.       Comments: Unable to fully extend right upper extremity   Skin:     Findings: Bruising and lesion present. Comments: Skin tear right upper extremity at bicep and forearm    Neurological:      General: No focal deficit present. Mental Status: He is alert and oriented to person, place, and time. Psychiatric:         Mood and Affect: Mood normal.          FOCUSED ABDOMINAL SONOGRAM FOR TRAUMA (FAST): A good  quality examination was performed by Dr. Suma Forrest and representative images were obtained. [x] No free fluid in the abdomen   [] Free fluid in RUQ   [] Free fluid in LUQ  [] Free fluid in Pelvis  [] Pericardial fluid  [] Other:        RADIOLOGY  CT LUMBAR SPINE TRAUMA RECONSTRUCTION   Preliminary Result   Age-indeterminate mild superior endplate concavity at T8 and T9 are favored   to be chronic. If the patient endorses pain in this region, thoracic spine   MRI can be performed to better evaluate the chronicity of these findings. Chronic L2 compression fracture status post vertebroplasty. CT THORACIC SPINE TRAUMA RECONSTRUCTION   Preliminary Result   Age-indeterminate mild superior endplate concavity at T8 and T9 are favored   to be chronic. If the patient endorses pain in this region, thoracic spine   MRI can be performed to better evaluate the chronicity of these findings. Chronic L2 compression fracture status post vertebroplasty. CT CHEST ABDOMEN PELVIS W CONTRAST   Final Result   CT OF THE HEAD:      1. No skull fracture or acute intracranial abnormality. CT OF THE CERVICAL SPINE:      1. No fracture or joint dislocation is seen. 2. Degenerative changes, as described. CT OF THE CHEST:      1. Acute fractures of the right posterior 8th, 9th, 10th, 11th and 12th ribs. 2.  No acute cardiopulmonary abnormality. CT OF THE ABDOMEN AND PELVIS:      1. No traumatic or other acute abnormality. 2. Chronic compression fracture deformity of the L2 vertebral body with   kyphoplasty changes. 3. Intramedullary christiano in the right femur.          CT Trauma Attending Attestation      I have reviewed the above GCS note(s) and confirmed the key elements of the medical history and physical exam. I have seen and examined the pt. I have discussed the findings, established the care plan and recommendations with Resident, GCS RN, bedside nurse.         Timbo Dover DO  12/12/2021  10:24 PM

## 2021-12-11 NOTE — ED NOTES
Bed: 17  Expected date:   Expected time:   Means of arrival:   Comments:     Patel Bains RN  12/11/21 0112

## 2021-12-11 NOTE — ED NOTES
Report to [facility], all questions addressed     Rebekah Bojorquez RN  12/11/21 1596

## 2021-12-11 NOTE — ED NOTES
LSW responded to trauma priority for male fall. Per EMS, patient went to get off toilet, missed the grab bar, lost his balance and fell into the bathtub. Patient's wife is at the hospital. Patient's information: Barney Allen [birth year]. No social work concerns at this time.  present.      [facility]  12/10/21 7213

## 2021-12-11 NOTE — ED NOTES
Pt called out requesting more pain medications, on call resident Dr. Tao Buchanan notified via perfect yung Dewitt RN  12/11/21 3439

## 2021-12-11 NOTE — FLOWSHEET NOTE
[facility] CARE DEPARTMENT - [address]     Emergency/Trauma Note    PATIENT NAME: Trauma A Becca    Shift date: 12/10/2021  Shift day: Friday   Shift # 2    Room # TRAUMA A/TRAUMAA   Name: Trauma ABEL Hudson            Age: 77 y.o. Gender: male          [employer]: [address] [facility] [employer]: [address] [facility] [address], [address] [facility]    Trauma/Incident type: Adult Trauma Alert  Admit Date & Time: 12/10/2021  9:40 PM  TRAUMA NAME: Talon    ADVANCE DIRECTIVES IN CHART? No    NAME OF DECISION MAKER: Pt.    RELATIONSHIP OF DECISION MAKER TO PATIENT: Self    PATIENT/EVENT DESCRIPTION:  Trauma ABEL Lin is a 77 y.o. male who arrived via ambulance from home where he fell as an adult trauma priority. Pt. Appears to have broken ribs. Pt to be admitted to TRAUMA A/TRAUMAA. SPIRITUAL ASSESSMENT/INTERVENTION:  Pt's spouse Mignon Rey arrived along with pt. Spouse Nasrin at bedside and reports fall at home in bathroom. Mignon Rey works for [employer] in [place] and is aware of hospital procedure and reports both have been vaccinated for covid.  offers prayer for pt. And spouse and  [address] assists family in ED waiting room. Family thought pt. Was at [facility] and have finally found their way to correct ED.  gets ice water for pt's spouse as CT scans are read. PATIENT BELONGINGS:  Given to Family    ANY BELONGINGS OF SIGNIFICANT VALUE NOTED:  This  did not handle belongings. REGISTRATION STAFF NOTIFIED? Yes      WHAT IS YOUR SPIRITUAL CARE PLAN FOR THIS PATIENT?:   Provide emotional support and spiritual support for pt. And family during stay at [facility]).     Electronically signed by Anthony Fields on 12/10/2021 at 10:49 PM.  [facility] [employer]  [phone]

## 2021-12-11 NOTE — CARE COORDINATION
SBIRT- Completed  Met with pt and wife at bedside with consent  Pt states he smokes marijuana and has a medical card  Denies any alcohol use. No previous rehab  Denies any feelings of depression or suicidal ideations          Alcohol Screening and Brief Intervention        Recent Labs     12/10/21  2200   ALC <10       Alcohol Pre-screening  (MEN ONLY) How many times in the past year have you had 5 or more drinks in a day?: None       Alcohol Screening Audit       Drug Pre-Screening   How many times in the past year have you used a recreational drug or used a prescription medication for nonmedical reasons?: None    Drug Screening DAST       Mood Pre-Screening (PHQ-2)  During the past two weeks, have you been bothered by little interest or pleasure in doing things?: No  During the past two weeks, have you been bothered by feeling down, depressed, or hopeless?: No    Mood Pre-Screening (PHQ-9)         I have interviewed Mary Thornton, [id number] regarding  His alcohol consumption/drug use and risk for excessive use. Screenings were negative. Patient  N/A intervention at this time.      Deferred []    Completed on: 12/11/2021   [address] [facility]

## 2021-12-12 LAB
ANION GAP SERPL CALCULATED.3IONS-SCNC: 16 MMOL/L (ref 9–17)
BUN BLDV-MCNC: 22 MG/DL (ref 8–23)
BUN/CREAT BLD: ABNORMAL (ref 9–20)
CALCIUM SERPL-MCNC: 9.1 MG/DL (ref 8.6–10.4)
CHLORIDE BLD-SCNC: 96 MMOL/L (ref 98–107)
CO2: 22 MMOL/L (ref 20–31)
CREAT SERPL-MCNC: 1.84 MG/DL (ref 0.7–1.2)
GFR AFRICAN AMERICAN: 45 ML/MIN
GFR NON-AFRICAN AMERICAN: 37 ML/MIN
GFR SERPL CREATININE-BSD FRML MDRD: ABNORMAL ML/MIN/{1.73_M2}
GFR SERPL CREATININE-BSD FRML MDRD: ABNORMAL ML/MIN/{1.73_M2}
GLUCOSE BLD-MCNC: 105 MG/DL (ref 75–110)
GLUCOSE BLD-MCNC: 117 MG/DL (ref 75–110)
GLUCOSE BLD-MCNC: 136 MG/DL (ref 75–110)
GLUCOSE BLD-MCNC: 138 MG/DL (ref 75–110)
GLUCOSE BLD-MCNC: 143 MG/DL (ref 75–110)
GLUCOSE BLD-MCNC: 146 MG/DL (ref 75–110)
GLUCOSE BLD-MCNC: 152 MG/DL (ref 70–99)
GLUCOSE BLD-MCNC: 152 MG/DL (ref 75–110)
GLUCOSE BLD-MCNC: 161 MG/DL (ref 75–110)
GLUCOSE BLD-MCNC: 162 MG/DL (ref 75–110)
GLUCOSE BLD-MCNC: 162 MG/DL (ref 75–110)
GLUCOSE BLD-MCNC: 164 MG/DL (ref 75–110)
GLUCOSE BLD-MCNC: 166 MG/DL (ref 75–110)
GLUCOSE BLD-MCNC: 166 MG/DL (ref 75–110)
GLUCOSE BLD-MCNC: 168 MG/DL (ref 75–110)
GLUCOSE BLD-MCNC: 170 MG/DL (ref 75–110)
GLUCOSE BLD-MCNC: 172 MG/DL (ref 75–110)
GLUCOSE BLD-MCNC: 173 MG/DL (ref 75–110)
GLUCOSE BLD-MCNC: 173 MG/DL (ref 75–110)
GLUCOSE BLD-MCNC: 174 MG/DL (ref 75–110)
GLUCOSE BLD-MCNC: 175 MG/DL (ref 75–110)
GLUCOSE BLD-MCNC: 175 MG/DL (ref 75–110)
GLUCOSE BLD-MCNC: 189 MG/DL (ref 75–110)
MAGNESIUM: 1.6 MG/DL (ref 1.6–2.6)
POTASSIUM SERPL-SCNC: 3.5 MMOL/L (ref 3.7–5.3)
SODIUM BLD-SCNC: 134 MMOL/L (ref 135–144)

## 2021-12-12 PROCEDURE — 6370000000 HC RX 637 (ALT 250 FOR IP): Performed by: STUDENT IN AN ORGANIZED HEALTH CARE EDUCATION/TRAINING PROGRAM

## 2021-12-12 PROCEDURE — 97162 PT EVAL MOD COMPLEX 30 MIN: CPT

## 2021-12-12 PROCEDURE — 2580000003 HC RX 258: Performed by: STUDENT IN AN ORGANIZED HEALTH CARE EDUCATION/TRAINING PROGRAM

## 2021-12-12 PROCEDURE — 97530 THERAPEUTIC ACTIVITIES: CPT

## 2021-12-12 PROCEDURE — 2060000002 HC BURN ICU INTERMEDIATE R&B

## 2021-12-12 PROCEDURE — 6360000002 HC RX W HCPCS: Performed by: STUDENT IN AN ORGANIZED HEALTH CARE EDUCATION/TRAINING PROGRAM

## 2021-12-12 PROCEDURE — 82947 ASSAY GLUCOSE BLOOD QUANT: CPT

## 2021-12-12 PROCEDURE — 80048 BASIC METABOLIC PNL TOTAL CA: CPT

## 2021-12-12 PROCEDURE — 36415 COLL VENOUS BLD VENIPUNCTURE: CPT

## 2021-12-12 PROCEDURE — 83735 ASSAY OF MAGNESIUM: CPT

## 2021-12-12 RX ORDER — INSULIN GLARGINE 100 [IU]/ML
30 INJECTION, SOLUTION SUBCUTANEOUS 2 TIMES DAILY
Status: DISCONTINUED | OUTPATIENT
Start: 2021-12-12 | End: 2021-12-13

## 2021-12-12 RX ORDER — TRAZODONE HYDROCHLORIDE 100 MG/1
100 TABLET ORAL NIGHTLY
COMMUNITY

## 2021-12-12 RX ORDER — DEXTROSE MONOHYDRATE 50 MG/ML
100 INJECTION, SOLUTION INTRAVENOUS PRN
Status: DISCONTINUED | OUTPATIENT
Start: 2021-12-12 | End: 2021-12-13

## 2021-12-12 RX ORDER — MAGNESIUM SULFATE HEPTAHYDRATE 40 MG/ML
4000 INJECTION, SOLUTION INTRAVENOUS ONCE
Status: COMPLETED | OUTPATIENT
Start: 2021-12-12 | End: 2021-12-12

## 2021-12-12 RX ORDER — HEPARIN SODIUM 5000 [USP'U]/ML
5000 INJECTION, SOLUTION INTRAVENOUS; SUBCUTANEOUS EVERY 8 HOURS SCHEDULED
Status: DISCONTINUED | OUTPATIENT
Start: 2021-12-12 | End: 2021-12-14 | Stop reason: HOSPADM

## 2021-12-12 RX ORDER — DEXTROSE MONOHYDRATE 25 G/50ML
12.5 INJECTION, SOLUTION INTRAVENOUS PRN
Status: DISCONTINUED | OUTPATIENT
Start: 2021-12-12 | End: 2021-12-14 | Stop reason: HOSPADM

## 2021-12-12 RX ORDER — GABAPENTIN 100 MG/1
200 CAPSULE ORAL 2 TIMES DAILY
Status: DISCONTINUED | OUTPATIENT
Start: 2021-12-12 | End: 2021-12-14 | Stop reason: HOSPADM

## 2021-12-12 RX ORDER — SODIUM CHLORIDE, SODIUM LACTATE, POTASSIUM CHLORIDE, AND CALCIUM CHLORIDE .6; .31; .03; .02 G/100ML; G/100ML; G/100ML; G/100ML
1000 INJECTION, SOLUTION INTRAVENOUS ONCE
Status: COMPLETED | OUTPATIENT
Start: 2021-12-12 | End: 2021-12-12

## 2021-12-12 RX ORDER — TRAZODONE HYDROCHLORIDE 100 MG/1
100 TABLET ORAL NIGHTLY
Status: DISCONTINUED | OUTPATIENT
Start: 2021-12-12 | End: 2021-12-14 | Stop reason: HOSPADM

## 2021-12-12 RX ORDER — GABAPENTIN 100 MG/1
100 CAPSULE ORAL 2 TIMES DAILY
COMMUNITY

## 2021-12-12 RX ORDER — NICOTINE POLACRILEX 4 MG
15 LOZENGE BUCCAL PRN
Status: DISCONTINUED | OUTPATIENT
Start: 2021-12-12 | End: 2021-12-14 | Stop reason: HOSPADM

## 2021-12-12 RX ORDER — OXYCODONE HYDROCHLORIDE 5 MG/1
5 TABLET ORAL EVERY 6 HOURS PRN
Status: DISCONTINUED | OUTPATIENT
Start: 2021-12-12 | End: 2021-12-13

## 2021-12-12 RX ORDER — SERTRALINE HYDROCHLORIDE 100 MG/1
100 TABLET, FILM COATED ORAL DAILY
COMMUNITY

## 2021-12-12 RX ORDER — POTASSIUM CHLORIDE 20 MEQ/1
40 TABLET, EXTENDED RELEASE ORAL ONCE
Status: COMPLETED | OUTPATIENT
Start: 2021-12-12 | End: 2021-12-12

## 2021-12-12 RX ADMIN — POTASSIUM CHLORIDE 40 MEQ: 1500 TABLET, EXTENDED RELEASE ORAL at 17:12

## 2021-12-12 RX ADMIN — ACETAMINOPHEN 1000 MG: 325 TABLET ORAL at 14:05

## 2021-12-12 RX ADMIN — GABAPENTIN 200 MG: 100 CAPSULE ORAL at 20:04

## 2021-12-12 RX ADMIN — METHOCARBAMOL TABLETS 750 MG: 500 TABLET, COATED ORAL at 20:04

## 2021-12-12 RX ADMIN — MAGNESIUM SULFATE IN WATER 4000 MG: 40 INJECTION, SOLUTION INTRAVENOUS at 11:21

## 2021-12-12 RX ADMIN — SODIUM CHLORIDE 4.4 UNITS/HR: 9 INJECTION, SOLUTION INTRAVENOUS at 20:05

## 2021-12-12 RX ADMIN — ACETAMINOPHEN 1000 MG: 325 TABLET ORAL at 20:04

## 2021-12-12 RX ADMIN — INSULIN GLARGINE 30 UNITS: 100 INJECTION, SOLUTION SUBCUTANEOUS at 10:07

## 2021-12-12 RX ADMIN — INSULIN GLARGINE 30 UNITS: 100 INJECTION, SOLUTION SUBCUTANEOUS at 20:04

## 2021-12-12 RX ADMIN — HEPARIN SODIUM 5000 UNITS: 5000 INJECTION INTRAVENOUS; SUBCUTANEOUS at 17:12

## 2021-12-12 RX ADMIN — SODIUM CHLORIDE, POTASSIUM CHLORIDE, SODIUM LACTATE AND CALCIUM CHLORIDE 1000 ML: 600; 310; 30; 20 INJECTION, SOLUTION INTRAVENOUS at 13:23

## 2021-12-12 RX ADMIN — METHOCARBAMOL TABLETS 750 MG: 500 TABLET, COATED ORAL at 17:12

## 2021-12-12 RX ADMIN — SODIUM CHLORIDE, PRESERVATIVE FREE 10 ML: 5 INJECTION INTRAVENOUS at 07:53

## 2021-12-12 RX ADMIN — OXYCODONE 5 MG: 5 TABLET ORAL at 21:15

## 2021-12-12 RX ADMIN — TRAZODONE HYDROCHLORIDE 100 MG: 100 TABLET ORAL at 20:04

## 2021-12-12 RX ADMIN — SODIUM CHLORIDE, POTASSIUM CHLORIDE, SODIUM LACTATE AND CALCIUM CHLORIDE 1000 ML: 600; 310; 30; 20 INJECTION, SOLUTION INTRAVENOUS at 09:05

## 2021-12-12 RX ADMIN — HEPARIN SODIUM 5000 UNITS: 5000 INJECTION INTRAVENOUS; SUBCUTANEOUS at 20:04

## 2021-12-12 RX ADMIN — Medication 5 MG: at 21:15

## 2021-12-12 RX ADMIN — ACETAMINOPHEN 1000 MG: 325 TABLET ORAL at 06:34

## 2021-12-12 RX ADMIN — MYCOPHENOLATE MOFETIL 300 MG: 500 TABLET ORAL at 08:12

## 2021-12-12 RX ADMIN — SERTRALINE 100 MG: 50 TABLET, FILM COATED ORAL at 15:15

## 2021-12-12 RX ADMIN — IBUPROFEN 400 MG: 400 TABLET, FILM COATED ORAL at 07:53

## 2021-12-12 RX ADMIN — METHOCARBAMOL TABLETS 750 MG: 500 TABLET, COATED ORAL at 12:15

## 2021-12-12 RX ADMIN — METHOCARBAMOL TABLETS 750 MG: 500 TABLET, COATED ORAL at 07:53

## 2021-12-12 RX ADMIN — OXYCODONE 5 MG: 5 TABLET ORAL at 12:15

## 2021-12-12 RX ADMIN — SODIUM CHLORIDE 4.5 UNITS/HR: 9 INJECTION, SOLUTION INTRAVENOUS at 22:11

## 2021-12-12 ASSESSMENT — PAIN SCALES - GENERAL
PAINLEVEL_OUTOF10: 6
PAINLEVEL_OUTOF10: 4
PAINLEVEL_OUTOF10: 0
PAINLEVEL_OUTOF10: 7
PAINLEVEL_OUTOF10: 7
PAINLEVEL_OUTOF10: 2
PAINLEVEL_OUTOF10: 4
PAINLEVEL_OUTOF10: 5

## 2021-12-12 ASSESSMENT — PAIN DESCRIPTION - PAIN TYPE: TYPE: ACUTE PAIN

## 2021-12-12 ASSESSMENT — PAIN DESCRIPTION - ORIENTATION: ORIENTATION: RIGHT

## 2021-12-12 ASSESSMENT — PAIN DESCRIPTION - LOCATION: LOCATION: RIB CAGE

## 2021-12-12 ASSESSMENT — PAIN DESCRIPTION - FREQUENCY: FREQUENCY: CONTINUOUS

## 2021-12-12 ASSESSMENT — PAIN DESCRIPTION - ONSET: ONSET: ON-GOING

## 2021-12-12 ASSESSMENT — PAIN DESCRIPTION - DESCRIPTORS: DESCRIPTORS: ACHING;DISCOMFORT

## 2021-12-12 NOTE — PROGRESS NOTES
C- Spine Evaluation for Spine Clearance:    Pt is a 77 y.o. male who was admitted on 12/11/2021 s/p fall with right sided rib fx. Pt w/ complaints of right sided rib pain. C-Spine precautions of C-collar with spinal neutrality maintained since arrival with current exam directed at further evaluation of spine for clearance purposes. Pt chart and current images reviewed. CT C-Spine negative for acute fracture, subluxation, or traumatic injury. Patient does not have a distracting injury, is not acutely intoxicated and is alert, oriented and fully able to participate in exam.      Pt denies c-spine pain while resting in c-collar. C-collar removed w/ c-spine neutrality maintained. Pt denies midline pain with palpation of spinous processes and axial loading. Pt demonstrated full flexion, extension, and SB ROM without complaints of pain. TLS precautions of supine position maintained since arrival.  Pt denies midline pain with palpation of spinous processes. CT dorsal lumbar negative for acute fracture, subluxation, or traumatic injury. C-spine is considered cleared w/out need for further imaging, evaluation, or continuation of c-collar. TLS considered clear w/out need for further imagine, evaluation, or continuation of supine bedrest precautions.     Electronically signed by Clau Hunter MD on 12/12/2021 at 8:51 AM

## 2021-12-12 NOTE — PROGRESS NOTES
PROGRESS NOTE          PATIENT NAME: Saira Arellano  MEDICAL RECORD NO. [id number]  DATE: 2021  SURGEON: Dr. Susana Frias: No primary care provider on file. HD: # 2    ASSESSMENT    Patient Active Problem List   Diagnosis    Fall at home, initial encounter    Osteogenesis imperfecta    Traumatic closed displaced fracture of multiple ribs       MEDICAL DECISION MAKING AND PLAN    Right posterior rib fractures 8-12 s/p fall from [facility]  Rib score of 9  IS 2000  O2 sats 93% RA  MMPT - Humaira q4, d/ IBU until confirmation of improved creatinine  Hx of osteogenesis imperfecta  Stepdown  PT/OT  Patient unlikely to be able to d/c home due to extreme limited mobility secondary to pain, will f/u PT/OT progress today, states his family could assist at home  VALARIE on CKD likely secondary to acute trauma and uncontrolled DM-II  F/u AM BMP    Hypertensive in ED   -Improving    Hyperglycemia   -hx of uncontrolled DM   -Lantus 30u BID   -continue insulin gtt for now, titrate down after lantus given, will need additional titration and resume HISS when multiplier 0      Chief Complaint: \"Still sore\"    SUBJECTIVE    Saira Arellano was seen and examined at bedside. Vital signs stable, no acute events overnight. Patient unable to reposition in bed due to extreme pain, 2000 on incentive spirometry this morning. Assisted patient to head of bed 30 degrees. Insulin drip still running, urine still dark in color, patient states he is taking and small amounts of food and drink, states he would have help at home if he were cleared from PT and OT standpoint.       OBJECTIVE  VITALS: Temp: Temp: 98.4 °F (36.9 °C)Temp  Av.1 °F (36.7 °C)  Min: 97.7 °F (36.5 °C)  Max: 98.4 °F (18.4 °C) BP Systolic (67PTK), NLD:989 , Min:111 , SNJ:864   Diastolic (87PWZ), QBT:74, Min:64, Max:130   Pulse Pulse  Av.5  Min: 75  Max: 90 Resp Resp  Av.6  Min: 16  Max: 20 Pulse ox SpO2  Av.5 %  Min: 89 %  Max: 100 deformity of the L2 vertebral body with kyphoplasty changes. 3. Intramedullary christiano in the right femur. CT CHEST ABDOMEN PELVIS W CONTRAST    Result Date: 12/10/2021  CT OF THE HEAD: 1. No skull fracture or acute intracranial abnormality. CT OF THE CERVICAL SPINE: 1. No fracture or joint dislocation is seen. 2. Degenerative changes, as described. CT OF THE CHEST: 1. Acute fractures of the right posterior 8th, 9th, 10th, 11th and 12th ribs. 2.  No acute cardiopulmonary abnormality. CT OF THE ABDOMEN AND PELVIS: 1. No traumatic or other acute abnormality. 2. Chronic compression fracture deformity of the L2 vertebral body with kyphoplasty changes. 3. Intramedullary christiano in the right femur. CT LUMBAR SPINE TRAUMA RECONSTRUCTION    Result Date: 12/11/2021  Age-indeterminate mild superior endplate concavity at T8 and T9 are favored to be chronic. If the patient endorses pain in this region, thoracic spine MRI can be performed to better evaluate the chronicity of these findings. Chronic L2 compression fracture status post vertebroplasty. CT THORACIC SPINE TRAUMA RECONSTRUCTION    Result Date: 12/11/2021  Age-indeterminate mild superior endplate concavity at T8 and T9 are favored to be chronic. If the patient endorses pain in this region, thoracic spine MRI can be performed to better evaluate the chronicity of these findings. Chronic L2 compression fracture status post vertebroplasty. Electronically signed by Dasha Masterson DO on 12/12/2021 at 8:19 AM      Attending Note      I have reviewed the above GCS note(s) and I either performed the key elements of the medical history and physical exam or was present with the trauma resident when the key elements of the medical history and physical exam were performed. I have discussed the findings, established the care plan and recommendations with the trauma team.  Glucose controlled on insulin drip. Using IS. Transition off drip. Jonny planning.     Carmelo Jarquin MD Katie  12/12/2021  8:34 AM

## 2021-12-12 NOTE — PROGRESS NOTES
Physical Therapy    Facility/Department: [address] BURN UNIT  Initial Assessment    NAME: Billie Fleischer  : 1955  MRN: [id number]    Date of Service: 2021    Discharge Recommendations: Further therapy recommended at discharge. No chief complaint on file. Trauma ABEL Esqueda is a 77 y. o.yo male who presents with fall. Patient came in as a trauma priority has history of osteogenic imperfecta and type 2 diabetes, states that he was in the bathroom fell on the right side landing on the tub states that the right axillary landed on the top, did not pass out is currently not on anticoagulation, states that he is feeling 10 out of 10 pain on the right mid axillary area and thinks there might be rib fractures there. Denies pain in his back, chest, abdomen or pelvis. Denies recent fevers or chills, Covid exposure. States that he is on insulin at home. PT Equipment Recommendations  Equipment Needed: No (owns  and [facility])    Assessment   Body structures, Functions, Activity limitations: Decreased functional mobility ; Decreased balance; Decreased ADL status; Decreased high-level IADLs; Decreased endurance; Decreased strength  Assessment: Pt ambulates 4 steps at EOB with RW and CGA, mod A for transfers and max A for bed mobility. Pt currently unsafe to return to prior living situation d/t need for skilled assistance with functional mobility. pt would benefit from continued therapy to promote endurance, balance, and strengthening. Prognosis: Good  Decision Making: Medium Complexity  PT Education: Goals; PT Role; Plan of Care  Barriers to Learning: none  REQUIRES PT FOLLOW UP: Yes  Activity Tolerance  Activity Tolerance: Patient limited by endurance; Patient limited by pain; Patient limited by fatigue       Patient Diagnosis(es): The encounter diagnosis was Closed fracture of multiple ribs of right side, initial encounter. has no past medical history on file.    has no past surgical history on file.    Restrictions  Restrictions/Precautions  Restrictions/Precautions: Fall Risk  Required Braces or Orthoses?: No  Position Activity Restriction  Other position/activity restrictions: up with A, CTLS clear, acute fx R posterior 8-12 ribs  Vision/Hearing  Vision: Impaired  Vision Exceptions: Wears glasses at all times  Hearing: Within functional limits     Subjective  General  Patient assessed for rehabilitation services?: Yes  Response To Previous Treatment: Not applicable  Family / Caregiver Present: Yes (daughter and wife)  Follows Commands: Within Functional Limits  Subjective  Subjective: RN and pt agreeable to PT. pt agreeable and pleasant. pt supine in bed at start of session. Pain Screening  Patient Currently in Pain: Yes  Pain Assessment  Pain Assessment: 0-10  Pain Level: 4  Pain Type: Acute pain  Pain Location: Rib cage  Pain Orientation: Right  Pain Descriptors: Aching; Discomfort  Pain Frequency: Continuous  Pain Onset: On-going  Non-Pharmaceutical Pain Intervention(s): Repositioned; Ambulation/Increased Activity  Response to Pain Intervention: Patient Satisfied  Vital Signs  Patient Currently in Pain: Yes       Orientation  Orientation  Overall Orientation Status: Within Functional Limits  Social/Functional History  Social/Functional History  Lives With: Daughter (going to live with daughter post d/c, along with son in law, and their 2 kids)  Type of Home: House  Home Layout: One level  Home Access: Stairs to enter without rails  Entrance Stairs - Number of Steps: 2  Entrance Stairs - Rails: None  Home Equipment: Rolling walker, Cane (used [employer] for mobility at home)  Receives Help From: Family  ADL Assistance: [address]: Needs assistance (wife completed most cooking/cleaning tasks.  Pt able to do some small tasks)  Homemaking Responsibilities: No  Ambulation Assistance: Independent  Transfer Assistance: Independent  Active : Yes  Mode of Transportation: SUV  Occupation: Retired  Type of occupation:   Leisure & Hobbies: golf  Additional Comments: Daughter will be able to provide 24 hr assist post d/c. Cognition   Cognition  Overall Cognitive Status: WFL    Objective          Joint Mobility  Spine: WFL  ROM RLE: WFL, except knee does not flex past 100 deg AROM  ROM LLE: WFL  ROM RUE: WFL, except R elbow unable to achieve AROM elbow extension beyond 30 deg  ROM LUE: WFL  Strength RLE  Strength RLE: Exception  R Hip Flexion: 2+/5  R Knee Flexion: 4-/5  R Knee Extension: 4/5  R Ankle Dorsiflexion: 5/5  R Ankle Plantar flexion: 5/5  Strength LLE  Strength LLE: Exception  L Hip Flexion: 4+/5  L Knee Flexion: 4+/5  L Knee Extension: 4+/5  L Ankle Dorsiflexion: 5/5  L Ankle Plantar Flexion: 5/5  Strength RUE  Strength RUE: Exception  Comment: See OT note  Strength LUE  Strength LUE: Exception  Comment: See OT note  Tone RLE  RLE Tone: Normotonic  Tone LLE  LLE Tone: Normotonic  Motor Control  Gross Motor?: WFL  Sensation  Overall Sensation Status: WFL (pt denies n/t)  Bed mobility  Supine to Sit: Maximum assistance (for trunk and BLE)  Sit to Supine: Maximum assistance (For BLE)  Scooting: Contact guard assistance  Comment: HOB elevated. Pt requires increased time and effort for task. Transfers  Sit to Stand: Moderate Assistance  Stand to sit: Moderate Assistance  Comment: requires 2 attempts to achieve standing. B feet blocked. Cues for hand placement with RW with good return. bed elevated to achieve standing  Ambulation  Ambulation?: Yes  More Ambulation?: No  Ambulation 1  Surface: level tile  Device: Rolling Walker  Assistance: Contact guard assistance  Gait Deviations: Slow Belén; Decreased step length; Decreased step height  Distance: 4 steps laterally at EOB  Comments: no LOB noted.  Cues to progress task and physical assistance with RW  Stairs/Curb  Stairs?: No     Balance  Posture: Good  Sitting - Static: Good  Sitting - Dynamic: Good; -  Standing - Static: Fair  Standing - Dynamic: Fair  Comments: Assessed with RW        Plan   Plan  Times per week: 5-6x  Current Treatment Recommendations: Strengthening, Transfer Training, Endurance Training, Neuromuscular Re-education, Patient/Caregiver Education & Training, ADL/Self-care Training, Equipment Evaluation, Education, & procurement, Balance Training, IADL Training, Gait Training, Home Exercise Program, Functional Mobility Training, Stair training, Safety Education & Training  Safety Devices  Type of devices:  All fall risk precautions in place, Gait belt, Call light within reach, Left in bed, Nurse notified                                                 AM-PAC Score  AM-PAC Inpatient Mobility Raw Score : 12 (12/12/21 1411)  AM-PAC Inpatient T-Scale Score : 35.33 (12/12/21 1411)  Mobility Inpatient CMS 0-100% Score: 68.66 (12/12/21 1411)  Mobility Inpatient CMS G-Code Modifier : CL (12/12/21 1411)          Goals  Short term goals  Time Frame for Short term goals: 14 visits  Short term goal 1: Complete transfers with mod I and least restictive AD  Short term goal 2: Complete 300 ft of gait with mod I and least restrictive AD  Short term goal 3: Negotiate 2 steps no HR and mod I  Short term goal 4: Participate in 30 minutes of therapy to promote endurance       Therapy Time   Individual Concurrent Group Co-treatment   Time In 1033         Time Out 1056         Minutes 23         Timed Code Treatment Minutes: Nadeen 7, PT

## 2021-12-13 PROBLEM — Z96.41 TYPE 2 DIABETES MELLITUS WITHOUT COMPLICATION, WITH LONG TERM CURRENT USE OF INSULIN PUMP (HCC): Chronic | Status: ACTIVE | Noted: 2021-12-13

## 2021-12-13 PROBLEM — G89.29 CHRONIC PAIN: Chronic | Status: ACTIVE | Noted: 2021-12-13

## 2021-12-13 PROBLEM — E11.9 TYPE 2 DIABETES MELLITUS WITHOUT COMPLICATION, WITH LONG TERM CURRENT USE OF INSULIN PUMP (HCC): Chronic | Status: ACTIVE | Noted: 2021-12-13

## 2021-12-13 PROBLEM — S22.41XA MULTIPLE CLOSED FRACTURES OF RIBS OF RIGHT SIDE: Status: ACTIVE | Noted: 2021-12-11

## 2021-12-13 LAB
ABSOLUTE EOS #: 0.39 K/UL (ref 0–0.44)
ABSOLUTE IMMATURE GRANULOCYTE: 0.07 K/UL (ref 0–0.3)
ABSOLUTE LYMPH #: 0.59 K/UL (ref 1.1–3.7)
ABSOLUTE MONO #: 0.46 K/UL (ref 0.1–1.2)
ANION GAP SERPL CALCULATED.3IONS-SCNC: 14 MMOL/L (ref 9–17)
BASOPHILS # BLD: 1 % (ref 0–2)
BASOPHILS ABSOLUTE: 0.07 K/UL (ref 0–0.2)
BUN BLDV-MCNC: 26 MG/DL (ref 8–23)
BUN/CREAT BLD: ABNORMAL (ref 9–20)
CALCIUM SERPL-MCNC: 9.1 MG/DL (ref 8.6–10.4)
CHLORIDE BLD-SCNC: 100 MMOL/L (ref 98–107)
CO2: 19 MMOL/L (ref 20–31)
CREAT SERPL-MCNC: 1.56 MG/DL (ref 0.7–1.2)
DIFFERENTIAL TYPE: ABNORMAL
EOSINOPHILS RELATIVE PERCENT: 6 % (ref 1–4)
ESTIMATED AVERAGE GLUCOSE: 192 MG/DL
GFR AFRICAN AMERICAN: 54 ML/MIN
GFR NON-AFRICAN AMERICAN: 45 ML/MIN
GFR SERPL CREATININE-BSD FRML MDRD: ABNORMAL ML/MIN/{1.73_M2}
GFR SERPL CREATININE-BSD FRML MDRD: ABNORMAL ML/MIN/{1.73_M2}
GLUCOSE BLD-MCNC: 129 MG/DL (ref 75–110)
GLUCOSE BLD-MCNC: 129 MG/DL (ref 75–110)
GLUCOSE BLD-MCNC: 133 MG/DL (ref 75–110)
GLUCOSE BLD-MCNC: 137 MG/DL (ref 75–110)
GLUCOSE BLD-MCNC: 145 MG/DL (ref 75–110)
GLUCOSE BLD-MCNC: 161 MG/DL (ref 75–110)
GLUCOSE BLD-MCNC: 162 MG/DL (ref 75–110)
GLUCOSE BLD-MCNC: 164 MG/DL (ref 75–110)
GLUCOSE BLD-MCNC: 165 MG/DL (ref 75–110)
GLUCOSE BLD-MCNC: 173 MG/DL (ref 75–110)
GLUCOSE BLD-MCNC: 180 MG/DL (ref 75–110)
GLUCOSE BLD-MCNC: 180 MG/DL (ref 75–110)
GLUCOSE BLD-MCNC: 183 MG/DL (ref 75–110)
GLUCOSE BLD-MCNC: 185 MG/DL (ref 70–99)
GLUCOSE BLD-MCNC: 186 MG/DL (ref 75–110)
GLUCOSE BLD-MCNC: 189 MG/DL (ref 75–110)
GLUCOSE BLD-MCNC: 207 MG/DL (ref 75–110)
GLUCOSE BLD-MCNC: 225 MG/DL (ref 75–110)
HBA1C MFR BLD: 8.3 % (ref 4–6)
HCT VFR BLD CALC: 40 % (ref 40.7–50.3)
HEMOGLOBIN: 13.6 G/DL (ref 13–17)
IMMATURE GRANULOCYTES: 1 %
LYMPHOCYTES # BLD: 9 % (ref 24–43)
MCH RBC QN AUTO: 30.4 PG (ref 25.2–33.5)
MCHC RBC AUTO-ENTMCNC: 34 G/DL (ref 28.4–34.8)
MCV RBC AUTO: 89.5 FL (ref 82.6–102.9)
MONOCYTES # BLD: 7 % (ref 3–12)
MORPHOLOGY: NORMAL
NRBC AUTOMATED: 0 PER 100 WBC
PDW BLD-RTO: 12.9 % (ref 11.8–14.4)
PLATELET # BLD: 230 K/UL (ref 138–453)
PLATELET ESTIMATE: ABNORMAL
PMV BLD AUTO: 10.5 FL (ref 8.1–13.5)
POTASSIUM SERPL-SCNC: 4.8 MMOL/L (ref 3.7–5.3)
RBC # BLD: 4.47 M/UL (ref 4.21–5.77)
RBC # BLD: ABNORMAL 10*6/UL
SEG NEUTROPHILS: 76 % (ref 36–65)
SEGMENTED NEUTROPHILS ABSOLUTE COUNT: 4.92 K/UL (ref 1.5–8.1)
SODIUM BLD-SCNC: 133 MMOL/L (ref 135–144)
WBC # BLD: 6.5 K/UL (ref 3.5–11.3)
WBC # BLD: ABNORMAL 10*3/UL

## 2021-12-13 PROCEDURE — 6360000002 HC RX W HCPCS: Performed by: STUDENT IN AN ORGANIZED HEALTH CARE EDUCATION/TRAINING PROGRAM

## 2021-12-13 PROCEDURE — 6370000000 HC RX 637 (ALT 250 FOR IP): Performed by: NURSE PRACTITIONER

## 2021-12-13 PROCEDURE — 6370000000 HC RX 637 (ALT 250 FOR IP): Performed by: STUDENT IN AN ORGANIZED HEALTH CARE EDUCATION/TRAINING PROGRAM

## 2021-12-13 PROCEDURE — 97530 THERAPEUTIC ACTIVITIES: CPT

## 2021-12-13 PROCEDURE — 97535 SELF CARE MNGMENT TRAINING: CPT

## 2021-12-13 PROCEDURE — 97166 OT EVAL MOD COMPLEX 45 MIN: CPT

## 2021-12-13 PROCEDURE — 2580000003 HC RX 258: Performed by: STUDENT IN AN ORGANIZED HEALTH CARE EDUCATION/TRAINING PROGRAM

## 2021-12-13 PROCEDURE — 82947 ASSAY GLUCOSE BLOOD QUANT: CPT

## 2021-12-13 PROCEDURE — 85025 COMPLETE CBC W/AUTO DIFF WBC: CPT

## 2021-12-13 PROCEDURE — 2060000002 HC BURN ICU INTERMEDIATE R&B

## 2021-12-13 PROCEDURE — 80048 BASIC METABOLIC PNL TOTAL CA: CPT

## 2021-12-13 PROCEDURE — 36415 COLL VENOUS BLD VENIPUNCTURE: CPT

## 2021-12-13 PROCEDURE — 83036 HEMOGLOBIN GLYCOSYLATED A1C: CPT

## 2021-12-13 PROCEDURE — 99222 1ST HOSP IP/OBS MODERATE 55: CPT | Performed by: CLINICAL NURSE SPECIALIST

## 2021-12-13 RX ORDER — SODIUM CHLORIDE, SODIUM LACTATE, POTASSIUM CHLORIDE, AND CALCIUM CHLORIDE .6; .31; .03; .02 G/100ML; G/100ML; G/100ML; G/100ML
1000 INJECTION, SOLUTION INTRAVENOUS ONCE
Status: DISCONTINUED | OUTPATIENT
Start: 2021-12-13 | End: 2021-12-13

## 2021-12-13 RX ORDER — INSULIN GLARGINE 100 [IU]/ML
40 INJECTION, SOLUTION SUBCUTANEOUS NIGHTLY
Status: DISCONTINUED | OUTPATIENT
Start: 2021-12-13 | End: 2021-12-14

## 2021-12-13 RX ORDER — OXYCODONE HYDROCHLORIDE 5 MG/1
5 TABLET ORAL ONCE
Status: COMPLETED | OUTPATIENT
Start: 2021-12-13 | End: 2021-12-13

## 2021-12-13 RX ORDER — FENTANYL CITRATE 50 UG/ML
25 INJECTION, SOLUTION INTRAMUSCULAR; INTRAVENOUS ONCE
Status: COMPLETED | OUTPATIENT
Start: 2021-12-13 | End: 2021-12-13

## 2021-12-13 RX ORDER — HYDROXYZINE HYDROCHLORIDE 10 MG/1
10 TABLET, FILM COATED ORAL ONCE
Status: COMPLETED | OUTPATIENT
Start: 2021-12-13 | End: 2021-12-13

## 2021-12-13 RX ORDER — INSULIN GLARGINE 100 [IU]/ML
60 INJECTION, SOLUTION SUBCUTANEOUS DAILY
Status: DISCONTINUED | OUTPATIENT
Start: 2021-12-13 | End: 2021-12-14 | Stop reason: HOSPADM

## 2021-12-13 RX ORDER — GINSENG 100 MG
CAPSULE ORAL 2 TIMES DAILY
Status: DISCONTINUED | OUTPATIENT
Start: 2021-12-13 | End: 2021-12-14 | Stop reason: HOSPADM

## 2021-12-13 RX ORDER — SODIUM CHLORIDE, SODIUM LACTATE, POTASSIUM CHLORIDE, AND CALCIUM CHLORIDE .6; .31; .03; .02 G/100ML; G/100ML; G/100ML; G/100ML
1000 INJECTION, SOLUTION INTRAVENOUS ONCE
Status: COMPLETED | OUTPATIENT
Start: 2021-12-13 | End: 2021-12-13

## 2021-12-13 RX ORDER — OXYCODONE HYDROCHLORIDE 5 MG/1
5 TABLET ORAL EVERY 8 HOURS PRN
Status: DISCONTINUED | OUTPATIENT
Start: 2021-12-13 | End: 2021-12-14 | Stop reason: HOSPADM

## 2021-12-13 RX ADMIN — INSULIN GLARGINE 40 UNITS: 100 INJECTION, SOLUTION SUBCUTANEOUS at 20:07

## 2021-12-13 RX ADMIN — INSULIN LISPRO 3 UNITS: 100 INJECTION, SOLUTION INTRAVENOUS; SUBCUTANEOUS at 17:31

## 2021-12-13 RX ADMIN — ACETAMINOPHEN 1000 MG: 325 TABLET ORAL at 13:33

## 2021-12-13 RX ADMIN — SODIUM CHLORIDE, PRESERVATIVE FREE 10 ML: 5 INJECTION INTRAVENOUS at 08:11

## 2021-12-13 RX ADMIN — GABAPENTIN 200 MG: 100 CAPSULE ORAL at 20:08

## 2021-12-13 RX ADMIN — INSULIN LISPRO 2 UNITS: 100 INJECTION, SOLUTION INTRAVENOUS; SUBCUTANEOUS at 20:11

## 2021-12-13 RX ADMIN — SODIUM CHLORIDE, PRESERVATIVE FREE 10 ML: 5 INJECTION INTRAVENOUS at 20:09

## 2021-12-13 RX ADMIN — HEPARIN SODIUM 5000 UNITS: 5000 INJECTION INTRAVENOUS; SUBCUTANEOUS at 05:33

## 2021-12-13 RX ADMIN — METHOCARBAMOL TABLETS 750 MG: 500 TABLET, COATED ORAL at 20:08

## 2021-12-13 RX ADMIN — INSULIN GLARGINE 60 UNITS: 100 INJECTION, SOLUTION SUBCUTANEOUS at 11:14

## 2021-12-13 RX ADMIN — OXYCODONE 5 MG: 5 TABLET ORAL at 05:33

## 2021-12-13 RX ADMIN — ACETAMINOPHEN 1000 MG: 325 TABLET ORAL at 05:33

## 2021-12-13 RX ADMIN — FENTANYL CITRATE 25 MCG: 50 INJECTION INTRAMUSCULAR; INTRAVENOUS at 01:35

## 2021-12-13 RX ADMIN — SODIUM CHLORIDE, POTASSIUM CHLORIDE, SODIUM LACTATE AND CALCIUM CHLORIDE 1000 ML: 600; 310; 30; 20 INJECTION, SOLUTION INTRAVENOUS at 14:29

## 2021-12-13 RX ADMIN — ACETAMINOPHEN 1000 MG: 325 TABLET ORAL at 20:08

## 2021-12-13 RX ADMIN — POLYETHYLENE GLYCOL 3350 17 G: 17 POWDER, FOR SOLUTION ORAL at 08:11

## 2021-12-13 RX ADMIN — METHOCARBAMOL TABLETS 750 MG: 500 TABLET, COATED ORAL at 13:34

## 2021-12-13 RX ADMIN — OXYCODONE 5 MG: 5 TABLET ORAL at 12:05

## 2021-12-13 RX ADMIN — OXYCODONE 5 MG: 5 TABLET ORAL at 08:10

## 2021-12-13 RX ADMIN — Medication 5 MG: at 20:08

## 2021-12-13 RX ADMIN — METHOCARBAMOL TABLETS 750 MG: 500 TABLET, COATED ORAL at 08:10

## 2021-12-13 RX ADMIN — SERTRALINE 100 MG: 50 TABLET, FILM COATED ORAL at 08:10

## 2021-12-13 RX ADMIN — SODIUM CHLORIDE 4.95 UNITS/HR: 9 INJECTION, SOLUTION INTRAVENOUS at 10:02

## 2021-12-13 RX ADMIN — HEPARIN SODIUM 5000 UNITS: 5000 INJECTION INTRAVENOUS; SUBCUTANEOUS at 13:33

## 2021-12-13 RX ADMIN — HEPARIN SODIUM 5000 UNITS: 5000 INJECTION INTRAVENOUS; SUBCUTANEOUS at 20:07

## 2021-12-13 RX ADMIN — HYDROXYZINE HYDROCHLORIDE 10 MG: 10 TABLET ORAL at 21:48

## 2021-12-13 RX ADMIN — METHOCARBAMOL TABLETS 750 MG: 500 TABLET, COATED ORAL at 17:31

## 2021-12-13 RX ADMIN — OXYCODONE 5 MG: 5 TABLET ORAL at 20:08

## 2021-12-13 RX ADMIN — GABAPENTIN 200 MG: 100 CAPSULE ORAL at 08:10

## 2021-12-13 RX ADMIN — TRAZODONE HYDROCHLORIDE 100 MG: 100 TABLET ORAL at 20:08

## 2021-12-13 ASSESSMENT — PAIN DESCRIPTION - PAIN TYPE
TYPE: ACUTE PAIN

## 2021-12-13 ASSESSMENT — PAIN SCALES - GENERAL
PAINLEVEL_OUTOF10: 7
PAINLEVEL_OUTOF10: 9
PAINLEVEL_OUTOF10: 10
PAINLEVEL_OUTOF10: 7
PAINLEVEL_OUTOF10: 10
PAINLEVEL_OUTOF10: 7
PAINLEVEL_OUTOF10: 5
PAINLEVEL_OUTOF10: 10

## 2021-12-13 ASSESSMENT — ENCOUNTER SYMPTOMS
NAUSEA: 0
DIARRHEA: 0
BACK PAIN: 0
ABDOMINAL PAIN: 0
VOMITING: 0
SHORTNESS OF BREATH: 1
TROUBLE SWALLOWING: 0
SORE THROAT: 0

## 2021-12-13 ASSESSMENT — PAIN DESCRIPTION - DESCRIPTORS
DESCRIPTORS: ACHING;DISCOMFORT
DESCRIPTORS: ACHING;DISCOMFORT

## 2021-12-13 ASSESSMENT — PAIN DESCRIPTION - LOCATION
LOCATION: RIB CAGE

## 2021-12-13 ASSESSMENT — PAIN DESCRIPTION - ONSET: ONSET: ON-GOING

## 2021-12-13 ASSESSMENT — PAIN DESCRIPTION - ORIENTATION
ORIENTATION: RIGHT

## 2021-12-13 ASSESSMENT — PAIN DESCRIPTION - FREQUENCY
FREQUENCY: CONTINUOUS
FREQUENCY: CONTINUOUS

## 2021-12-13 ASSESSMENT — PAIN DESCRIPTION - PROGRESSION: CLINICAL_PROGRESSION: NOT CHANGED

## 2021-12-13 ASSESSMENT — PAIN - FUNCTIONAL ASSESSMENT: PAIN_FUNCTIONAL_ASSESSMENT: PREVENTS OR INTERFERES SOME ACTIVE ACTIVITIES AND ADLS

## 2021-12-13 NOTE — PROGRESS NOTES
Physical Therapy  Facility/Department: [address] BURN UNIT  Daily Treatment Note  NAME: Nirmala Naidu  : 1955  MRN: [id number]    Date of Service: 2021    Discharge Recommendations:  Patient would benefit from continued therapy after discharge   PT Equipment Recommendations  Equipment Needed: No (owns  and [facility])    Assessment   Body structures, Functions, Activity limitations: Decreased functional mobility ; Decreased balance; Decreased ADL status; Decreased high-level IADLs; Decreased endurance; Decreased strength  Assessment: Pt MODx2 for supine to sit, required assist with trunk and BLE profression, able to sit at EOB briefly, than dt pain stretched out leaning posteriorly, 2 attempts to stand with MAXx2, pt not able to bend R LE, therapist blocked BLEs and raised bed, pt unable dt high amiunts of pain in r ribcage. Pt would benefit from continued therapy to promote endurance, balance and strengthing  Prognosis: Good  PT Education: Goals; PT Role; Plan of Care; Transfer Training; General Safety  REQUIRES PT FOLLOW UP: Yes  Activity Tolerance  Activity Tolerance: Patient limited by pain  Activity Tolerance: poor dt high pain     Patient Diagnosis(es): The encounter diagnosis was Closed fracture of multiple ribs of right side, initial encounter. has a past medical history of Chronic pain, Closed fracture of multiple ribs of right side, Diabetes ([address] GERD (gastroesophageal reflux disease), and Osteogenesis imperfecta. has a past surgical history that includes Leg Surgery (Bilateral); hip surgery (Left); Elbow surgery (Right); and Wrist surgery (Left).     Restrictions  Restrictions/Precautions  Restrictions/Precautions: Fall Risk  Required Braces or Orthoses?: No  Position Activity Restriction  Other position/activity restrictions: up with A, CTLS clear, acute fx R posterior 8-12 ribs  Subjective   General  Chart Reviewed: Yes  Response To Previous Treatment: Not applicable  Family / Caregiver Present: No  Subjective  Subjective: RN and pt agreeable to PT. pt agreeable and pleasant. pt supine in bed at start of session with OT.   General Comment  Comments: Pt retired to bed, given callight  Pain Screening  Patient Currently in Pain: Yes  Pain Assessment  Pain Assessment: 0-10  Pain Level: 10 (intially 5-6/10 than increased to 10/10)  Patient's Stated Pain Goal: No pain  Pain Type: Acute pain  Pain Location: Rib cage  Pain Descriptors: Aching; Discomfort  Pain Frequency: Continuous  Vital Signs  Patient Currently in Pain: Yes       Orientation  Orientation  Overall Orientation Status: Within Functional Limits  Cognition   Cognition  Overall Cognitive Status: WFL  Objective   Bed mobility  Supine to Sit: Moderate assistance; 2 Person assistance (for trunk and BLEs)  Sit to Supine: Maximum assistance; 2 Person assistance (MAX 2-3 due to pain)  Scooting: Contact guard assistance  Comment: HOB elevated, Pt requires increased time and effort for task, MAXx2-3 for sit to supine, dt increased pain  Transfers  Sit to Stand: Maximum Assistance; 2 Person Assistance (2 attempts made, pt unable to stand dt increased pain)  Stand to sit: Unable to assess  Bed to Chair: Unable to assess  Comment: Bed elevated, knees blocked, MAXax2 x2 attempts to stand, limited by pain  Ambulation  Ambulation?: No  More Ambulation?: No  Stairs/Curb  Stairs?: No     Balance  Posture: Poor  Sitting - Static: Fair  Sitting - Dynamic: Fair; -  Comments: Pt limited by pain, pt leaning back when sitting to alleviate pain, not tolerating EOB sitting      Goals  Short term goals  Time Frame for Short term goals: 14 visits  Short term goal 1: Complete transfers with mod I and least restictive AD  Short term goal 2: Complete 300 ft of gait with mod I and least restrictive AD  Short term goal 3: Negotiate 2 steps no HR and mod I  Short term goal 4: Participate in 30 minutes of therapy to promote endurance    Plan    Plan  Times per week:

## 2021-12-13 NOTE — CARE COORDINATION
Transitional Planning     Patient requesting referral be sent to [facility]. Referral sent. Richard Arriaga at [facility] reviewing patient's case.

## 2021-12-13 NOTE — PROGRESS NOTES
Physician Progress Note      PATIENT:               Alix Cabrera  CSN #:                  [id number]  :                       1955  ADMIT DATE:       12/10/2021 9:40 PM  [address] [place] DATE:  RESPONDING  PROVIDER #:        Tiffany WILDER DO          QUERY TEXT:    Pt admitted with multiple right rib fractures s/p fall. Pt noted to have   Osteogenesis Imperfecta. If possible, please document if you are evaluating   and/or treating any of the following: The medical record reflects the following:  Risk Factors: Osteogenesis Imperfecta s/p fall  Clinical Indicators: Per H&P: Patient has history of Osteogenesis imperfecta   and has broken more than 40 bones in the past. Per ED physician notes: states   that he was in the bathroom fell on the right side landing on the tub states   that the right axillary landed on the top. Treatment: IS, Roxicodone, CXR, PT eval    Thank-you,  Shima Cedillo RN, JEFFERSON [email or web address]  Options provided:  -- Pathological right rib fractures due to osteogenesis imperfecta following   fall which would not usually break a normal, healthy bone  -- Pathological right rib fractures due to osteogenesis imperfecta  -- Traumatic right rib fractures  -- Other - I will add my own diagnosis  -- Disagree - Not applicable / Not valid  -- Disagree - Clinically unable to determine / Unknown  -- Refer to Clinical Documentation Reviewer    PROVIDER RESPONSE TEXT:    This patient has traumatic right rib fractures.     Query created by: Corey Vallecillo on 2021 6:28 AM      Electronically signed by:  Addis Mejia DO 2021 8:07 AM

## 2021-12-13 NOTE — DISCHARGE INSTR - COC
Continuity of Care Form    Patient Name: Aly Samano   :  1955  MRN:  [id number]    [facility] date:  12/10/2021  Discharge date:  2021    Code Status Order: Full Code   Advance Directives:      Admitting Physician:  Shaan Gonzalez DO  PCP: No primary care provider on file. Discharging Nurse: Frandy Santos Unit/Room#: 2814/9253-22  Discharging Unit Phone Number: [phone]    Emergency Contact:   Extended Emergency Contact Information  Primary Emergency Contact: Anne Whitmore  Home Phone: [phone]  Relation: Child  Secondary Emergency Contact: Ainsley Nguyen, [address] Phone: [phone]  Relation: Child    Past Surgical History:  No past surgical history on file. Immunization History: There is no immunization history for the selected administration types on file for this patient. Active Problems:  Patient Active Problem List   Diagnosis Code    Fall at home, initial encounter [facility] Melissa Lopes, [id number]    Osteogenesis imperfecta Q78.0    Traumatic closed displaced fracture of multiple ribs S22.49XA       Isolation/Infection:   Isolation            No Isolation          Patient Infection Status       Infection Onset Added Last Indicated Last Indicated By Review Planned Expiration Resolved Resolved By    None active    Resolved    COVID-19 (Rule Out) 12/10/21 12/10/21 12/10/21 COVID-19, Rapid (Ordered)   12/10/21 Rule-Out Test Resulted            Nurse Assessment:  Last Vital Signs: BP (!) 144/110   Pulse 87   Temp 98.2 °F (36.8 °C) (Oral)   Resp 17   Ht 6' 2\" (1.88 m)   Wt 200 lb (90.7 kg)   SpO2 94%   BMI 25.68 kg/m²     Last documented pain score (0-10 scale): Pain Level: 7  Last Weight:   Wt Readings from Last 1 Encounters:   21 200 lb (90.7 kg)     Mental Status:  oriented and alert    IV Access:  - None    Nursing Mobility/ADLs:  Walking   Assisted  Transfer  Assisted  Bathing  Assisted  Dressing  Assisted  Toileting  Assisted  Feeding  Independent  Med Admin Independent  Med Delivery   whole    Wound Care Documentation and Therapy:        Elimination:  Continence: Bowel: Yes  Bladder: Yes  Urinary Catheter: None   Colostomy/Ileostomy/Ileal Conduit: No       Date of Last BM: ***    Intake/Output Summary (Last 24 hours) at 12/13/2021 1029  Last data filed at 12/13/2021 0539  Gross per 24 hour   Intake 500 ml   Output 1900 ml   Net -1400 ml     I/O last 3 completed shifts: In: 750 [P.O.:250; IV Piggyback:500]  Out: 1900 [Urine:1900]    Safety Concerns:     History of Falls (last 30 days) and At Risk for Falls    Impairments/Disabilities:      History Osteogenesis Imperfecta. Nutrition Therapy:  Current Nutrition Therapy:   - Oral Diet:  Carb Control 4 carbs/meal (1800kcals/day)    Routes of Feeding: Oral  Liquids: No Restrictions  Daily Fluid Restriction: no  Last Modified Barium Swallow with Video (Video Swallowing Test): not done    Treatments at the Time of Hospital Discharge:   Respiratory Treatments:   Oxygen Therapy:  is not on home oxygen therapy. Ventilator:    - No ventilator support    Rehab Therapies: Physical Therapy and Occupational Therapy  Weight Bearing Status/Restrictions: No weight bearing restirctions  Other Medical Equipment (for information only, NOT a DME order):  walker  Other Treatments: ***    Patient's personal belongings (please select all that are sent with patient):  Pt discharged with all personal belongings. RN SIGNATURE:  Electronically signed by Vicky Schwartz RN on 12/14/21 at 12:46 PM EST    CASE MANAGEMENT/SOCIAL WORK SECTION    Inpatient Status Date: ***    Readmission Risk Assessment Score:  Readmission Risk              Risk of Unplanned Readmission:  10           Discharging to [address] [place] [address] Details  FAX            [address] [place] [address]       Phone: [phone]       Fax: [phone]          Dialysis Facility (if applicable)   Name:  Address:  Dialysis Schedule:  Phone:  Fax:    / signature: Electronically signed by Vale Huber RN on 12/14/21 at 11:05 AM EST    PHYSICIAN SECTION    Prognosis: Good    Condition at Discharge: Stable    Rehab Potential (if transferring to Rehab): Good    Recommended Labs or Other Treatments After Discharge: ***    Physician Certification: I certify the above information and transfer of Sabine Farah  is necessary for the continuing treatment of the diagnosis listed and that he requires [facility] for less 30 days.      Update Admission H&P: No change in H&P    PHYSICIAN SIGNATURE:  Electronically signed by ADEEL Rossi CNP on 12/13/21 at 10:29 AM EST

## 2021-12-13 NOTE — PROGRESS NOTES
PROGRESS NOTE          PATIENT NAME: Sandeep Moscoso  MEDICAL RECORD NO. [id number]  DATE: 2021  SURGEON: Dr. Gera Lee: No primary care provider on file. HD: # 3    ASSESSMENT    Patient Active Problem List   Diagnosis    Fall at home, initial encounter    Osteogenesis imperfecta    Traumatic closed displaced fracture of multiple ribs       MEDICAL DECISION MAKING AND PLAN    1. Right posterior rib fractures 8-12 s/p fall from [address]  1. Rib score of 9  2. IS 1750  3. O2 sats 96% RA  4. MMPT - Humaira 5mg q8  5. Hx of osteogenesis imperfecta  6. PT/OT  7. Patient unlikely to be able to d/c home due to extreme limited mobility secondary to pain, will f/u PT/OT progress today, states his family could assist at home  2. VALARIE on CKD likely secondary to acute trauma and uncontrolled DM-II  1. F/u AM BMP    Hypertensive in ED   -Improving    Hyperglycemia   -hx of uncontrolled DM   -Lantus 60u daily and 40u nightly   -Consult medicine for assistance in management blood glucose      Chief Complaint: \"Still hurting\"    SUBJECTIVE    Sandeep Moscoso was seen and examined at bedside. Vital signs stable, no acute events overnight. Patient doing fairly well this morning, still c/o R rib pain, patient denies N/V, CP or SOB. Weaning insulin gtt. Failed PT eval, is open to placement.       OBJECTIVE  VITALS: Temp: Temp: 98 °F (36.7 °C)Temp  Av.2 °F (36.8 °C)  Min: 98 °F (36.7 °C)  Max: 98.5 °F (68.8 °C) BP Systolic (87BIG), CUN:742 , Min:125 , VSC:380   Diastolic (47EKC), XOQ:27, Min:82, Max:110   Pulse Pulse  Av.3  Min: 77  Max: 100 Resp Resp  Av.8  Min: 15  Max: 20 Pulse ox SpO2  Av.8 %  Min: 92 %  Max: 95 %  GENERAL: alert, no distress  NEURO: ANO x4, able to move all 4 extremities  HEENT: NCAT  : deferred  LUNGS: equal chest rise and fall, no increased wob  HEART: normal rate and regular rhythm  ABDOMEN: soft, non-tender, non-distended  EXTREMITY: no cyanosis, clubbing or edema    I/O last 3 completed shifts: In: 750 [P.O.:250; IV Piggyback:500]  Out: 1900 [Urine:1900]    Drain/tube output: In: 500   Out: 1900 [Urine:1900]    LAB:  CBC:   Recent Labs     12/10/21  2200 12/11/21  0541 12/13/21  0848   WBC 14.0* 10.5 6.5   HGB 14.4 14.0 13.6   HCT 41.9 40.5* 40.0*   MCV 85.5 85.4 89.5    153 230     BMP:   Recent Labs     12/11/21  0541 12/12/21  0828 12/13/21  0848   * 134* 133*   K 5.4* 3.5* 4.8   CL 97* 96* 100   CO2 21 22 19*   BUN 17 22 26*   CREATININE 1.52* 1.84* 1.56*   GLUCOSE 327* 152* 185*     COAGS:   Recent Labs     12/10/21  2200   APTT 23.3   INR 1.1       RADIOLOGY:  No results found.         Electronically signed by Jade Giron DO on 12/13/2021 at 2:02 PM

## 2021-12-13 NOTE — PROGRESS NOTES
Spoke with pt re: discharge planning. He reports he has been struggling to get around due to his many rib fractures. His wife works at [employer] and she stays at their home in [place] when she is working. He stays at their home in [place] by himself during this time and has a daughter who lives close by who helps him out. He understands he will need help when he is discharged and is [facility]. Phillip Gross,  updated and asked to send referral.  Called and left VM with pt's daughter Saira Arthur at pt's request to obtain name of managed Medicare company he is insured by.    [facility] call from Saira Arthur, pt's daughter. She reports pt's insurance is Medicare # [id number]. She is aware of referral to [facility] and reports their second choice would be [facility]. Kelly Orourke informed. [address], pt's daughter emailed copy of her fathers Medicare card.   Secure Email forwarded to  at her request to [email or web address].

## 2021-12-13 NOTE — PLAN OF CARE
Problem: Pain:  Goal: Pain level will decrease  Description: Pain level will decrease  12/13/2021 1854 by Rosy Martinez RN  Outcome: Ongoing  12/13/2021 0517 by Betina Benitez RN  Outcome: Ongoing  Goal: Control of acute pain  Description: Control of acute pain  12/13/2021 1854 by Rosy Martinez RN  Outcome: Ongoing  12/13/2021 0517 by Betina Benitez RN  Outcome: Ongoing  Goal: Control of chronic pain  Description: Control of chronic pain  12/13/2021 1854 by Rosy Martinez RN  Outcome: Ongoing  12/13/2021 0517 by Betina Benitez RN  Outcome: Ongoing     Problem: Falls - Risk of:  Goal: Will remain free from falls  Description: Will remain free from falls  12/13/2021 1854 by Rosy Martinez RN  Outcome: Ongoing  12/13/2021 0517 by Betina Benitez RN  Outcome: Ongoing  Goal: Absence of physical injury  Description: Absence of physical injury  12/13/2021 1854 by Rosy Martinez RN  Outcome: Ongoing  12/13/2021 0517 by Betina Benitez RN  Outcome: Ongoing

## 2021-12-13 NOTE — PROGRESS NOTES
Consulted nephrology due to patient's CKD 3 per his PCP. Per Dr. Amelia Duran, patient's creatinine is stable at this time and patient is okay to follow-up as an outpatient.

## 2021-12-13 NOTE — CONSULTS
[facility]  Office: [address], , Albert Che, DO, Stacy Kellogg, DO, Micky Kearns Blood, DO, Marlo De La O MD, Kristen Tejeda MD, Yudelka Bean MD, Ramon Mi MD, Erasmo No MD, Kervin Tovar MD, Félix Corral MD, Jacobo Kidd, DO, Omid Childs, DO, Leigha Ferrari MD,  Alexi Haas DO, Suzi Cunningham MD, Sandy Jiang MD, Leti Burgess MD, Alexandra Wheat MD, Keegan Broderick MD, Tracy Brothers MD, Yosvany Aldana MD, Humberto Goodson, [facility], [facility], CNP, Ebony Morel, CNP, Kb Pop, CNS, Juliana Curry, [facility], Avril Pinzon, CNP, Ceferino Martin, [facility], Jose Amezcua, [facility], Renay Hardin, CNP, Rowan Oppenheim, PA-C, Claudia Levy, [facility], Lex Wilkerson, CNP, Miroslava Ruggiero, CNP, Ruthann Andujar, CNP, Betty Rubi, CNP, Donny Aguilar, CNP, Mary Kate Nobles, CNP, Jose R Chen, [id number] [facility],[address] / HISTORY AND PHYSICAL EXAMINATION            Date:   12/13/2021  Patient name:  Ras Camarena  Date of admission:  12/10/2021  9:40 PM  MRN:   [id number]  Account:  [de-identified]  YOB: 1955  PCP:    No primary care provider on file. Room:   [facility]0163-01  Code Status:    Full Code    Physician Requesting Consult: Sada Bradley DO    Reason for Consult:  Diabetes management     Chief Complaint:     Fall at home 12/10, mult Rt rib fractures    History Obtained From:     patient, electronic medical record    History of Present Illness:     Ras Camarena is a 77 yr old male admitted Dec 10 by trauma service after falling at home and suffering mult rt rib fx. He has a hx of type 2 DM and osteogenic imperfecta. Pt has been diabetic x 10 yrs. He takes 60 U lantus am, 40 U pm and uses sliding scale ac & hs. His sugars usually range 120-170. Insulin drip was transitioned to home schedule today with accu checks 129-225, gap 14, A1c pending.   His appetite was poor due to pain but now improving. BUN/Creat mildly elevated at 26/1. 56. He is poorly mobile due to pain and plans to dc to [facility] for rehab    Past Medical History:     Past Medical History:   Diagnosis Date    Chronic pain     Closed fracture of multiple ribs of right side 12/10/2021    Diabetes ([address]     GERD (gastroesophageal reflux disease)     Osteogenesis imperfecta         Past Surgical History:     Past Surgical History:   Procedure Laterality Date    ELBOW SURGERY Right     HIP SURGERY Left     LEG SURGERY Bilateral     WRIST SURGERY Left         Medications Prior to Admission:     Prior to Admission medications    Medication Sig Start Date End Date Taking? Authorizing Provider   sertraline (ZOLOFT) 100 MG tablet Take 100 mg by mouth daily   Yes Historical Provider, MD   traZODone (DESYREL) 100 MG tablet Take 100 mg by mouth nightly   Yes Historical Provider, MD   gabapentin (NEURONTIN) 100 MG capsule Take 100 mg by mouth 2 times daily. Yes Historical Provider, MD        Allergies:     Patient has no known allergies. Social History:     Tobacco:    reports that he has quit smoking. He quit after 30.00 years of use. He has never used smokeless tobacco.  Alcohol:      reports previous alcohol use. Drug Use:  reports current drug use. Drug: Marijuana [employer]). Family History:     Family History   Problem Relation Age of Onset    Stroke Mother     Heart Attack Father        Review of Systems:     Positive and Negative as described in HPI. Review of Systems   Constitutional: Positive for activity change. Negative for chills and fever. HENT: Negative for sore throat and trouble swallowing. Respiratory: Positive for shortness of breath (due to pain w deep breathing). Cardiovascular: Positive for chest pain. Negative for palpitations and leg swelling. Gastrointestinal: Negative for abdominal pain, diarrhea, nausea and vomiting. Genitourinary: Negative for dysuria.    Musculoskeletal: Negative for back pain. Neurological: Negative for dizziness, light-headedness and headaches. All other systems reviewed and are negative. Physical Exam:     BP (!) 142/96   Pulse 89   Temp 98 °F (36.7 °C) (Temporal)   Resp 17   Ht 6' 2\" (1.88 m)   Wt 200 lb (90.7 kg)   SpO2 94%   BMI 25.68 kg/m²   Temp (24hrs), Av.2 °F (36.8 °C), Min:98 °F (36.7 °C), Max:98.5 °F (36.9 °C)    Recent Labs     21  1113 21  1142 21  1230 21  1330   POCGLU 189* 173* 164* 129*       Intake/Output Summary (Last 24 hours) at 2021 1423  Last data filed at 2021 0539  Gross per 24 hour   Intake 500 ml   Output 1900 ml   Net -1400 ml       Physical Exam  Vitals and nursing note reviewed. Constitutional:       General: He is in acute distress (due to pain). Appearance: He is ill-appearing. HENT:      Head: Normocephalic. Mouth/Throat:      Mouth: Mucous membranes are moist.   Eyes:      General: No scleral icterus. Conjunctiva/sclera: Conjunctivae normal.      Pupils: Pupils are equal, round, and reactive to light. Cardiovascular:      Rate and Rhythm: Normal rate and regular rhythm. Heart sounds: Normal heart sounds. Pulmonary:      Effort: Pulmonary effort is normal. No respiratory distress. Comments: Diminished rt lower lobe, pain with deep breathing  Abdominal:      General: Bowel sounds are normal.      Palpations: Abdomen is soft. Tenderness: There is no abdominal tenderness. Musculoskeletal:         General: No swelling or tenderness. Comments: Well healed scars to knees   Lymphadenopathy:      Cervical: No cervical adenopathy. Skin:     General: Skin is warm and dry. Capillary Refill: Capillary refill takes less than 2 seconds. Neurological:      General: No focal deficit present. Mental Status: He is oriented to person, place, and time.    Psychiatric:         Behavior: Behavior normal.         Investigations: Basophils Absolute 0.07 0.00 - 0.20 k/uL    Morphology Normal    POC Glucose Fingerstick    Collection Time: 12/13/21  9:14 AM   Result Value Ref Range    POC Glucose 225 (H) 75 - 110 mg/dL   POC Glucose Fingerstick    Collection Time: 12/13/21 10:19 AM   Result Value Ref Range    POC Glucose 207 (H) 75 - 110 mg/dL   POC Glucose Fingerstick    Collection Time: 12/13/21 11:13 AM   Result Value Ref Range    POC Glucose 189 (H) 75 - 110 mg/dL   POC Glucose Fingerstick    Collection Time: 12/13/21 11:42 AM   Result Value Ref Range    POC Glucose 173 (H) 75 - 110 mg/dL   POC Glucose Fingerstick    Collection Time: 12/13/21 12:30 PM   Result Value Ref Range    POC Glucose 164 (H) 75 - 110 mg/dL   POC Glucose Fingerstick    Collection Time: 12/13/21  1:30 PM   Result Value Ref Range    POC Glucose 129 (H) 75 - 110 mg/dL       Imaging/Diagonstics:  CT HEAD WO CONTRAST    Result Date: 12/10/2021  CT OF THE HEAD: 1. No skull fracture or acute intracranial abnormality. CT OF THE CERVICAL SPINE: 1. No fracture or joint dislocation is seen. 2. Degenerative changes, as described. CT OF THE CHEST: 1. Acute fractures of the right posterior 8th, 9th, 10th, 11th and 12th ribs. 2.  No acute cardiopulmonary abnormality. CT OF THE ABDOMEN AND PELVIS: 1. No traumatic or other acute abnormality. 2. Chronic compression fracture deformity of the L2 vertebral body with kyphoplasty changes. 3. Intramedullary christiano in the right femur. CT CERVICAL SPINE WO CONTRAST    Result Date: 12/10/2021  CT OF THE HEAD: 1. No skull fracture or acute intracranial abnormality. CT OF THE CERVICAL SPINE: 1. No fracture or joint dislocation is seen. 2. Degenerative changes, as described. CT OF THE CHEST: 1. Acute fractures of the right posterior 8th, 9th, 10th, 11th and 12th ribs. 2.  No acute cardiopulmonary abnormality. CT OF THE ABDOMEN AND PELVIS: 1. No traumatic or other acute abnormality.  2. Chronic compression fracture deformity of the L2 vertebral body with kyphoplasty changes. 3. Intramedullary christiano in the right femur. CT CHEST ABDOMEN PELVIS W CONTRAST    Result Date: 12/10/2021  CT OF THE HEAD: 1. No skull fracture or acute intracranial abnormality. CT OF THE CERVICAL SPINE: 1. No fracture or joint dislocation is seen. 2. Degenerative changes, as described. CT OF THE CHEST: 1. Acute fractures of the right posterior 8th, 9th, 10th, 11th and 12th ribs. 2.  No acute cardiopulmonary abnormality. CT OF THE ABDOMEN AND PELVIS: 1. No traumatic or other acute abnormality. 2. Chronic compression fracture deformity of the L2 vertebral body with kyphoplasty changes. 3. Intramedullary christiano in the right femur. CT LUMBAR SPINE TRAUMA RECONSTRUCTION    Result Date: 12/11/2021  Age-indeterminate mild superior endplate concavity at T8 and T9 are favored to be chronic. If the patient endorses pain in this region, thoracic spine MRI can be performed to better evaluate the chronicity of these findings. Chronic L2 compression fracture status post vertebroplasty. CT THORACIC SPINE TRAUMA RECONSTRUCTION    Result Date: 12/11/2021  Age-indeterminate mild superior endplate concavity at T8 and T9 are favored to be chronic. If the patient endorses pain in this region, thoracic spine MRI can be performed to better evaluate the chronicity of these findings. Chronic L2 compression fracture status post vertebroplasty. Assessment :      Hospital Problems           Last Modified POA    * (Principal) Fall at home, initial encounter 12/13/2021 Yes    Traumatic closed displaced fracture of multiple ribs 12/13/2021 Yes    Type 2 diabetes mellitus without complication, with long term current use of insulin pump (HCC) (Chronic) 12/13/2021 Yes    Osteogenesis imperfecta 12/13/2021 Yes    Chronic pain (Chronic) 12/13/2021 Yes          Plan:     1. Continue home Lantus and SSI, monitor accuchecks, adjust as needed  2. FU on A1c from today  3. ADA diet  4.  PT/OT per primary service  5. CBC, BMP in am  6. Heparin tid for DVT prophylaxis  7. Pt updated and agreeable    Consultations:   IP CONSULT TO IV TEAM  IP CONSULT TO HOME CARE NEEDS  IP CONSULT TO INTERNAL MEDICINE      Nay Umanzor, ADEEL - CNS  12/13/2021  2:23 PM    Copy sent to Dr. Peyton Hartley primary care provider on file.

## 2021-12-13 NOTE — PROGRESS NOTES
Occupational Therapy   Occupational Therapy Initial Assessment  Date: 2021   Patient Name: Lamine Damon  MRN: [id number]     : 1955    Date of Service: 2021  No chief complaint on file. Copied from chart: Pt is a 77 y.o. male who was admitted on 2021 s/p fall with right sided rib fx. Pt w/ complaints of right sided rib pain. Discharge Recommendations:    Further therapy recommended at discharge. OT Equipment Recommendations  Equipment Needed: Yes  Mobility Devices: ADL Assistive Devices  ADL Assistive Devices: Reacher; Long-handled Sponge    Assessment   Performance deficits / Impairments: Decreased functional mobility ; Decreased ADL status; Decreased high-level IADLs; Decreased balance; Decreased ROM; Decreased strength; Decreased endurance  Assessment: Pt would benefit from acute OT and upon discharge to address the above deficits for increased independence in daily tasks. Pt demo'd decreased strength, ROM, and endurance limiting functional transfers and tasks. Pt significantly impacted by pain during session. Prognosis: Good  Decision Making: Medium Complexity  Patient Education: Pt ed on POC, reason for eval, importance of movement, safety during functional transfers, pursed lip breathing for pain maangement - good/fair return  REQUIRES OT FOLLOW UP: Yes  Activity Tolerance  Activity Tolerance: Patient limited by pain; Patient limited by fatigue  Safety Devices  Safety Devices in place: Yes  Type of devices: Left in bed; Bed alarm in place; Call light within reach; Gait belt  Restraints  Initially in place: No           Patient Diagnosis(es): The encounter diagnosis was Closed fracture of multiple ribs of right side, initial encounter. has a past medical history of Chronic pain, Closed fracture of multiple ribs of right side, Diabetes ([facility] [address] GERD (gastroesophageal reflux disease), and Osteogenesis imperfecta.    has a past surgical history that includes Leg Surgery : Yes  Mode of Transportation: [facility]  Occupation: Retired  Type of occupation:   Leisure & Hobbies: golf, playing pool  Additional Comments: pt reported wife works Mon-Thurs in [facility], can assist when home. Objective   Vision: Impaired  Vision Exceptions: Wears glasses at all times  Hearing: Within functional limits    Orientation  Overall Orientation Status: Within Functional Limits     Balance  Sitting Balance: Contact guard assistance (~12 minutes EOB. Pt reported increased pain upon sitting.)  Standing Balance  Comment: AKIRA due to two failed attempts to stand due to pain and decreased R LE ROM. ADL  Feeding: Modified independent ; Setup  Grooming: Modified independent ; Setup  UE Bathing: Contact guard assistance; Setup  LE Bathing: Maximum assistance; Increased time to complete; Setup  UE Dressing: Contact guard assistance; Increased time to complete; Setup  LE Dressing: Maximum assistance; Increased time to complete; Setup  Toileting: Maximum assistance; Increased time to complete; Setup  Additional Comments: OT facilitated pt in donning B socks sitting EOB with Max A due to pain and decreased ROM. Pt reported use of sock aid at baseline. Tone RUE  RUE Tone: Normotonic  Tone LUE  LUE Tone: Normotonic  Coordination  Movements Are Fluid And Coordinated: Yes  Quality of Movement Other  Comment: pt occasionally demo'd UE spasticity due to pain however did not impact function. Bed mobility  Supine to Sit: Moderate assistance; 2 Person assistance (B LE and trunk progression)  Sit to Supine: Maximum assistance; 2 Person assistance (Max A 2-3 due to pain and pt unable to move L LE.)  Scooting: Contact guard assistance  Comment: HOB elevated. Pt required increased time during bed mobility due to pain. Transfers  Transfer Comments: Pt unable to complete using RW with two attempts due to pain and decreased ROM of R LE.      Cognition  Overall Cognitive Status: WFL        Sensation  Overall Sensation Status: WFL (Pt reported no N/T)        LUE AROM (degrees)  LUE AROM : WFL  Left Hand AROM (degrees)  Left Hand AROM: WFL  RUE AROM (degrees)  RUE AROM : Exceptions  R Shoulder Flexion 0-180: 0-70 due to pain  R Elbow Flexion 0-145:   R Elbow Extension 145-0: 145-90 due to prior injury  Right Hand AROM (degrees)  Right Hand AROM: WFL  LUE Strength  Gross LUE Strength: WFL  L Hand General: 4+/5  RUE Strength  Gross RUE Strength: Exceptions to [facility] (limited due to pain)  R Shoulder Flex: 3-/5  R Elbow Flex: 3/5  R Hand General: 4+/5                   Plan   Plan  Times per week: 3-4x/wk      AM-PAC Score        AM-[facility] Inpatient Daily Activity Raw Score: 17 (12/13/21 1346)  AM-PAC Inpatient ADL T-Scale Score : 37.26 (12/13/21 1346)  ADL Inpatient CMS 0-100% Score: 50.11 (12/13/21 1346)  ADL Inpatient CMS G-Code Modifier : CK (12/13/21 1346)    Goals  Short term goals  Time Frame for Short term goals: Pt will, by discharge  Short term goal 1: complete UB ADLs with SBA, setup, AE, and modified tech PRN. Short term goal 2: complete LB ADLs and toileting with Mod A, setup, AE, and modified tech PRN. Short term goal 3: demo bed mobility with Min A to increase independence. Short term goal 4: demo static/dynamic sitting tolerance for 15+ minutes with SBA during functional tasks. Short term goal 5: demo 25+ minutes functional activity tolerance to increase endurance. Short term goal 6: complete sit to stand transfer with Max A using LRD PRN. Long term goals  Time Frame for Long term goals : Notify OTR to update goals as appropriate.        Therapy Time   Individual Concurrent Group Co-treatment   Time In 1140         Time Out 1211         Minutes 31      partial co tx with PT   Timed Code Treatment Minutes: 20 Minutes       Meka Hawkins S/OT

## 2021-12-14 VITALS
DIASTOLIC BLOOD PRESSURE: 95 MMHG | SYSTOLIC BLOOD PRESSURE: 173 MMHG | BODY MASS INDEX: 25.67 KG/M2 | WEIGHT: 200 LBS | OXYGEN SATURATION: 96 % | TEMPERATURE: 97.7 F | HEIGHT: 74 IN | HEART RATE: 82 BPM | RESPIRATION RATE: 18 BRPM

## 2021-12-14 PROBLEM — I10 PRIMARY HYPERTENSION: Status: ACTIVE | Noted: 2021-12-14

## 2021-12-14 LAB
ABSOLUTE EOS #: 0.24 K/UL (ref 0–0.44)
ABSOLUTE IMMATURE GRANULOCYTE: 0.08 K/UL (ref 0–0.3)
ABSOLUTE LYMPH #: 0.57 K/UL (ref 1.1–3.7)
ABSOLUTE MONO #: 0.49 K/UL (ref 0.1–1.2)
ANION GAP SERPL CALCULATED.3IONS-SCNC: 16 MMOL/L (ref 9–17)
BASOPHILS # BLD: 1 % (ref 0–2)
BASOPHILS ABSOLUTE: 0.08 K/UL (ref 0–0.2)
BUN BLDV-MCNC: 23 MG/DL (ref 8–23)
BUN/CREAT BLD: ABNORMAL (ref 9–20)
CALCIUM SERPL-MCNC: 9.3 MG/DL (ref 8.6–10.4)
CHLORIDE BLD-SCNC: 98 MMOL/L (ref 98–107)
CO2: 15 MMOL/L (ref 20–31)
CREAT SERPL-MCNC: 1.29 MG/DL (ref 0.7–1.2)
DIFFERENTIAL TYPE: ABNORMAL
EOSINOPHILS RELATIVE PERCENT: 3 % (ref 1–4)
GFR AFRICAN AMERICAN: >60 ML/MIN
GFR NON-AFRICAN AMERICAN: 56 ML/MIN
GFR SERPL CREATININE-BSD FRML MDRD: ABNORMAL ML/MIN/{1.73_M2}
GFR SERPL CREATININE-BSD FRML MDRD: ABNORMAL ML/MIN/{1.73_M2}
GLUCOSE BLD-MCNC: 220 MG/DL (ref 75–110)
GLUCOSE BLD-MCNC: 236 MG/DL (ref 70–99)
GLUCOSE BLD-MCNC: 259 MG/DL (ref 75–110)
HCT VFR BLD CALC: 40 % (ref 40.7–50.3)
HEMOGLOBIN: 13.4 G/DL (ref 13–17)
IMMATURE GRANULOCYTES: 1 %
LYMPHOCYTES # BLD: 7 % (ref 24–43)
MCH RBC QN AUTO: 29.3 PG (ref 25.2–33.5)
MCHC RBC AUTO-ENTMCNC: 33.5 G/DL (ref 28.4–34.8)
MCV RBC AUTO: 87.3 FL (ref 82.6–102.9)
MONOCYTES # BLD: 6 % (ref 3–12)
MORPHOLOGY: NORMAL
NRBC AUTOMATED: 0 PER 100 WBC
PDW BLD-RTO: 12.6 % (ref 11.8–14.4)
PLATELET # BLD: 188 K/UL (ref 138–453)
PLATELET ESTIMATE: ABNORMAL
PMV BLD AUTO: 9.9 FL (ref 8.1–13.5)
POTASSIUM SERPL-SCNC: 4.8 MMOL/L (ref 3.7–5.3)
RBC # BLD: 4.58 M/UL (ref 4.21–5.77)
RBC # BLD: ABNORMAL 10*6/UL
SARS-COV-2, RAPID: NOT DETECTED
SEG NEUTROPHILS: 82 % (ref 36–65)
SEGMENTED NEUTROPHILS ABSOLUTE COUNT: 6.64 K/UL (ref 1.5–8.1)
SODIUM BLD-SCNC: 129 MMOL/L (ref 135–144)
SPECIMEN DESCRIPTION: NORMAL
WBC # BLD: 8.1 K/UL (ref 3.5–11.3)
WBC # BLD: ABNORMAL 10*3/UL

## 2021-12-14 PROCEDURE — 2580000003 HC RX 258: Performed by: NURSE PRACTITIONER

## 2021-12-14 PROCEDURE — 97535 SELF CARE MNGMENT TRAINING: CPT

## 2021-12-14 PROCEDURE — 87635 SARS-COV-2 COVID-19 AMP PRB: CPT

## 2021-12-14 PROCEDURE — 2580000003 HC RX 258: Performed by: STUDENT IN AN ORGANIZED HEALTH CARE EDUCATION/TRAINING PROGRAM

## 2021-12-14 PROCEDURE — 6370000000 HC RX 637 (ALT 250 FOR IP): Performed by: STUDENT IN AN ORGANIZED HEALTH CARE EDUCATION/TRAINING PROGRAM

## 2021-12-14 PROCEDURE — 97530 THERAPEUTIC ACTIVITIES: CPT

## 2021-12-14 PROCEDURE — 6370000000 HC RX 637 (ALT 250 FOR IP): Performed by: NURSE PRACTITIONER

## 2021-12-14 PROCEDURE — 99232 SBSQ HOSP IP/OBS MODERATE 35: CPT | Performed by: NURSE PRACTITIONER

## 2021-12-14 PROCEDURE — 6360000002 HC RX W HCPCS: Performed by: SURGERY

## 2021-12-14 PROCEDURE — 82947 ASSAY GLUCOSE BLOOD QUANT: CPT

## 2021-12-14 PROCEDURE — 6360000002 HC RX W HCPCS: Performed by: STUDENT IN AN ORGANIZED HEALTH CARE EDUCATION/TRAINING PROGRAM

## 2021-12-14 PROCEDURE — 90686 IIV4 VACC NO PRSV 0.5 ML IM: CPT | Performed by: SURGERY

## 2021-12-14 PROCEDURE — 80048 BASIC METABOLIC PNL TOTAL CA: CPT

## 2021-12-14 PROCEDURE — G0008 ADMIN INFLUENZA VIRUS VAC: HCPCS | Performed by: SURGERY

## 2021-12-14 PROCEDURE — 2500000003 HC RX 250 WO HCPCS: Performed by: STUDENT IN AN ORGANIZED HEALTH CARE EDUCATION/TRAINING PROGRAM

## 2021-12-14 PROCEDURE — 85025 COMPLETE CBC W/AUTO DIFF WBC: CPT

## 2021-12-14 PROCEDURE — 93005 ELECTROCARDIOGRAM TRACING: CPT | Performed by: NURSE PRACTITIONER

## 2021-12-14 PROCEDURE — 36415 COLL VENOUS BLD VENIPUNCTURE: CPT

## 2021-12-14 RX ORDER — LABETALOL HYDROCHLORIDE 5 MG/ML
10 INJECTION, SOLUTION INTRAVENOUS EVERY 6 HOURS PRN
Status: DISCONTINUED | OUTPATIENT
Start: 2021-12-14 | End: 2021-12-14 | Stop reason: HOSPADM

## 2021-12-14 RX ORDER — INSULIN GLARGINE 100 [IU]/ML
50 INJECTION, SOLUTION SUBCUTANEOUS NIGHTLY
Status: DISCONTINUED | OUTPATIENT
Start: 2021-12-14 | End: 2021-12-14 | Stop reason: HOSPADM

## 2021-12-14 RX ORDER — SENNA PLUS 8.6 MG/1
1 TABLET ORAL NIGHTLY
Status: DISCONTINUED | OUTPATIENT
Start: 2021-12-14 | End: 2021-12-14 | Stop reason: HOSPADM

## 2021-12-14 RX ORDER — FENTANYL CITRATE 50 UG/ML
25 INJECTION, SOLUTION INTRAMUSCULAR; INTRAVENOUS ONCE
Status: COMPLETED | OUTPATIENT
Start: 2021-12-14 | End: 2021-12-14

## 2021-12-14 RX ORDER — AMLODIPINE BESYLATE 5 MG/1
5 TABLET ORAL DAILY
Status: DISCONTINUED | OUTPATIENT
Start: 2021-12-14 | End: 2021-12-14 | Stop reason: HOSPADM

## 2021-12-14 RX ORDER — SODIUM CHLORIDE 9 MG/ML
INJECTION, SOLUTION INTRAVENOUS CONTINUOUS
Status: DISCONTINUED | OUTPATIENT
Start: 2021-12-14 | End: 2021-12-14 | Stop reason: HOSPADM

## 2021-12-14 RX ORDER — METHOCARBAMOL 750 MG/1
750 TABLET, FILM COATED ORAL 3 TIMES DAILY
Qty: 12 TABLET | Refills: 0 | Status: SHIPPED | OUTPATIENT
Start: 2021-12-14 | End: 2021-12-18

## 2021-12-14 RX ORDER — OXYCODONE HYDROCHLORIDE 5 MG/1
5 TABLET ORAL EVERY 8 HOURS PRN
Qty: 12 TABLET | Refills: 0 | Status: SHIPPED | OUTPATIENT
Start: 2021-12-14 | End: 2021-12-18

## 2021-12-14 RX ORDER — OXYCODONE HYDROCHLORIDE 5 MG/1
2.5 TABLET ORAL ONCE
Status: DISCONTINUED | OUTPATIENT
Start: 2021-12-14 | End: 2021-12-14 | Stop reason: HOSPADM

## 2021-12-14 RX ORDER — DOCUSATE SODIUM 100 MG/1
100 CAPSULE, LIQUID FILLED ORAL 2 TIMES DAILY
Status: DISCONTINUED | OUTPATIENT
Start: 2021-12-14 | End: 2021-12-14 | Stop reason: HOSPADM

## 2021-12-14 RX ADMIN — OXYCODONE 5 MG: 5 TABLET ORAL at 04:33

## 2021-12-14 RX ADMIN — METHOCARBAMOL TABLETS 750 MG: 500 TABLET, COATED ORAL at 17:32

## 2021-12-14 RX ADMIN — HEPARIN SODIUM 5000 UNITS: 5000 INJECTION INTRAVENOUS; SUBCUTANEOUS at 08:44

## 2021-12-14 RX ADMIN — INSULIN GLARGINE 60 UNITS: 100 INJECTION, SOLUTION SUBCUTANEOUS at 08:44

## 2021-12-14 RX ADMIN — BACITRACIN: 500 OINTMENT TOPICAL at 10:39

## 2021-12-14 RX ADMIN — ACETAMINOPHEN 1000 MG: 325 TABLET ORAL at 13:00

## 2021-12-14 RX ADMIN — Medication 10 MG: at 17:32

## 2021-12-14 RX ADMIN — ACETAMINOPHEN 1000 MG: 325 TABLET ORAL at 06:20

## 2021-12-14 RX ADMIN — METHOCARBAMOL TABLETS 750 MG: 500 TABLET, COATED ORAL at 08:44

## 2021-12-14 RX ADMIN — SODIUM CHLORIDE, PRESERVATIVE FREE 10 ML: 5 INJECTION INTRAVENOUS at 08:44

## 2021-12-14 RX ADMIN — SODIUM CHLORIDE: 9 INJECTION, SOLUTION INTRAVENOUS at 10:39

## 2021-12-14 RX ADMIN — INSULIN LISPRO 9 UNITS: 100 INJECTION, SOLUTION INTRAVENOUS; SUBCUTANEOUS at 13:02

## 2021-12-14 RX ADMIN — AMLODIPINE BESYLATE 5 MG: 5 TABLET ORAL at 10:39

## 2021-12-14 RX ADMIN — INFLUENZA A VIRUS A/VICTORIA/2570/2019 IVR-215 (H1N1) ANTIGEN (PROPIOLACTONE INACTIVATED), INFLUENZA A VIRUS A/CAMBODIA/E0826360/2020 IVR-224 (H3N2) ANTIGEN (PROPIOLACTONE INACTIVATED), INFLUENZA B VIRUS B/VICTORIA/705/2018 BVR-11 ANTIGEN (PROPIOLACTONE INACTIVATED), INFLUENZA B VIRUS B/PHUKET/3073/2013 BVR-1B ANTIGEN (PROPIOLACTONE INACTIVATED) 0.5 ML: 15; 15; 15; 15 INJECTION, SUSPENSION INTRAMUSCULAR at 13:01

## 2021-12-14 RX ADMIN — INSULIN LISPRO 6 UNITS: 100 INJECTION, SOLUTION INTRAVENOUS; SUBCUTANEOUS at 08:49

## 2021-12-14 RX ADMIN — METHOCARBAMOL TABLETS 750 MG: 500 TABLET, COATED ORAL at 13:00

## 2021-12-14 RX ADMIN — Medication 10 MG: at 01:04

## 2021-12-14 RX ADMIN — FENTANYL CITRATE 25 MCG: 50 INJECTION INTRAMUSCULAR; INTRAVENOUS at 02:48

## 2021-12-14 RX ADMIN — HEPARIN SODIUM 5000 UNITS: 5000 INJECTION INTRAVENOUS; SUBCUTANEOUS at 17:32

## 2021-12-14 RX ADMIN — SERTRALINE 100 MG: 50 TABLET, FILM COATED ORAL at 08:43

## 2021-12-14 RX ADMIN — GABAPENTIN 200 MG: 100 CAPSULE ORAL at 08:44

## 2021-12-14 RX ADMIN — OXYCODONE 5 MG: 5 TABLET ORAL at 15:20

## 2021-12-14 ASSESSMENT — PAIN DESCRIPTION - ORIENTATION
ORIENTATION: RIGHT

## 2021-12-14 ASSESSMENT — PAIN SCALES - GENERAL
PAINLEVEL_OUTOF10: 6
PAINLEVEL_OUTOF10: 7
PAINLEVEL_OUTOF10: 7
PAINLEVEL_OUTOF10: 10
PAINLEVEL_OUTOF10: 5
PAINLEVEL_OUTOF10: 4
PAINLEVEL_OUTOF10: 4
PAINLEVEL_OUTOF10: 6
PAINLEVEL_OUTOF10: 4
PAINLEVEL_OUTOF10: 10

## 2021-12-14 ASSESSMENT — PAIN DESCRIPTION - PAIN TYPE
TYPE: ACUTE PAIN

## 2021-12-14 ASSESSMENT — PAIN - FUNCTIONAL ASSESSMENT: PAIN_FUNCTIONAL_ASSESSMENT: PREVENTS OR INTERFERES SOME ACTIVE ACTIVITIES AND ADLS

## 2021-12-14 ASSESSMENT — PAIN DESCRIPTION - FREQUENCY: FREQUENCY: CONTINUOUS

## 2021-12-14 ASSESSMENT — PAIN DESCRIPTION - LOCATION
LOCATION: RIB CAGE

## 2021-12-14 ASSESSMENT — PAIN DESCRIPTION - PROGRESSION: CLINICAL_PROGRESSION: NOT CHANGED

## 2021-12-14 ASSESSMENT — PAIN DESCRIPTION - DESCRIPTORS: DESCRIPTORS: ACHING;DISCOMFORT

## 2021-12-14 ASSESSMENT — PAIN DESCRIPTION - ONSET: ONSET: ON-GOING

## 2021-12-14 NOTE — CARE COORDINATION
Transition planning  [facility] Updated [facility] at [place] and pt's RN of p/u time. Report # [phone]  Fax # [phone] Faxed AVS and negative Covid results to [facility] [phone].

## 2021-12-14 NOTE — PROGRESS NOTES
schedule today with accu checks 129-225, gap 14, A1c pending. His appetite was poor due to pain but now improving. BUN/Creat mildly elevated at 26/1. 56.       He is poorly mobile due to pain and plans to dc to [facility] for rehab    Review of Systems:     Constitutional:  negative for chills, fevers, sweats, endorses chronic back pain and right-sided rib pain  Respiratory:  negative for cough, dyspnea on exertion, shortness of breath, wheezing  Cardiovascular:  negative for chest pain, chest pressure/discomfort, lower extremity edema, palpitations, endorses right sided superficial chest wall pain  Gastrointestinal:  negative for abdominal pain, constipation, diarrhea, nausea, vomiting  Neurological:  negative for dizziness, headache, endorses resting tremor    Medications:      Allergies:  No Known Allergies    Current Meds:   Scheduled Meds:    oxyCODONE  2.5 mg Oral Once    docusate sodium  100 mg Oral BID    senna  1 tablet Oral Nightly    insulin glargine  60 Units SubCUTAneous Daily    insulin glargine  40 Units SubCUTAneous Nightly    insulin lispro  0-18 Units SubCUTAneous TID WC    insulin lispro  0-9 Units SubCUTAneous Nightly    bacitracin   Topical BID    gabapentin  200 mg Oral BID    sertraline  100 mg Oral Daily    traZODone  100 mg Oral Nightly    heparin (porcine)  5,000 Units SubCUTAneous 3 times per day    sodium chloride flush  5-40 mL IntraVENous 2 times per day    polyethylene glycol  17 g Oral Daily    acetaminophen  1,000 mg Oral 3 times per day    methocarbamol  750 mg Oral 4x Daily    influenza virus vaccine  0.5 mL IntraMUSCular Prior to discharge     Continuous Infusions:    sodium chloride       PRN Meds: labetalol, oxyCODONE, glucose, dextrose, glucagon (rDNA), sodium chloride flush, sodium chloride, ondansetron **OR** ondansetron, melatonin    Data:     Past Medical History:   has a past medical history of Chronic pain, Closed fracture of multiple ribs of right side, Diabetes ([address] GERD (gastroesophageal reflux disease), and Osteogenesis imperfecta. Social History:   reports that he has quit smoking. He quit after 30.00 years of use. He has never used smokeless tobacco. He reports previous alcohol use. He reports current drug use. Drug: Marijuana [place]). Family History:   Family History   Problem Relation Age of Onset    Stroke Mother     Heart Attack Father        Vitals:  BP (!) 170/99   Pulse 93   Temp 98 °F (36.7 °C) (Oral)   Resp 18   Ht 6' 2\" (1.88 m)   Wt 200 lb (90.7 kg)   SpO2 96%   BMI 25.68 kg/m²   Temp (24hrs), Av.8 °F (36.6 °C), Min:97.5 °F (36.4 °C), Max:98 °F (36.7 °C)    Recent Labs     21  1330 21  1727 21  0818   POCGLU 129* 180* 183* 220*       I/O (24Hr): Intake/Output Summary (Last 24 hours) at 2021 0848  Last data filed at 2021 0435  Gross per 24 hour   Intake --   Output 1050 ml   Net -1050 ml       Labs:  Hematology:  Recent Labs     21  0848 21  0513   WBC 6.5 8.1   RBC 4.47 4.58   HGB 13.6 13.4   HCT 40.0* 40.0*   MCV 89.5 87.3   MCH 30.4 29.3   MCHC 34.0 33.5   RDW 12.9 12.6    188   MPV 10.5 9.9     Chemistry:  Recent Labs     21  0828 21  0848 21  0513   * 133* 129*   K 3.5* 4.8 4.8   CL 96* 100 98   CO2 22 19* 15*   GLUCOSE 152* 185* 236*   BUN 22 26* 23   CREATININE 1.84* 1.56* 1.29*   MG 1.6  --   --    ANIONGAP 16 14 16   LABGLOM 37* 45* 56*   GFRAA 45* 54* >60   CALCIUM 9.1 9.1 9.3     Recent Labs     21  0848 21  0914 21  1142 21  1230 21  1330 21  1727 21  0818   LABA1C 8.3*  --   --   --   --   --   --   --    POCGLU  --    < > 173* 164* 129* 180* 183* 220*    < > = values in this interval not displayed.      ABG:  Lab Results   Component Value Date    FIO2 INFORMATION NOT PROVIDED 12/10/2021       Radiology:  CT HEAD WO CONTRAST    Result Date: 12/10/2021  CT OF THE HEAD: 1. No skull fracture or acute intracranial abnormality. CT OF THE CERVICAL SPINE: 1. No fracture or joint dislocation is seen. 2. Degenerative changes, as described. CT OF THE CHEST: 1. Acute fractures of the right posterior 8th, 9th, 10th, 11th and 12th ribs. 2.  No acute cardiopulmonary abnormality. CT OF THE ABDOMEN AND PELVIS: 1. No traumatic or other acute abnormality. 2. Chronic compression fracture deformity of the L2 vertebral body with kyphoplasty changes. 3. Intramedullary christiano in the right femur. CT CERVICAL SPINE WO CONTRAST    Result Date: 12/10/2021  CT OF THE HEAD: 1. No skull fracture or acute intracranial abnormality. CT OF THE CERVICAL SPINE: 1. No fracture or joint dislocation is seen. 2. Degenerative changes, as described. CT OF THE CHEST: 1. Acute fractures of the right posterior 8th, 9th, 10th, 11th and 12th ribs. 2.  No acute cardiopulmonary abnormality. CT OF THE ABDOMEN AND PELVIS: 1. No traumatic or other acute abnormality. 2. Chronic compression fracture deformity of the L2 vertebral body with kyphoplasty changes. 3. Intramedullary christiano in the right femur. CT CHEST ABDOMEN PELVIS W CONTRAST    Result Date: 12/10/2021  CT OF THE HEAD: 1. No skull fracture or acute intracranial abnormality. CT OF THE CERVICAL SPINE: 1. No fracture or joint dislocation is seen. 2. Degenerative changes, as described. CT OF THE CHEST: 1. Acute fractures of the right posterior 8th, 9th, 10th, 11th and 12th ribs. 2.  No acute cardiopulmonary abnormality. CT OF THE ABDOMEN AND PELVIS: 1. No traumatic or other acute abnormality. 2. Chronic compression fracture deformity of the L2 vertebral body with kyphoplasty changes. 3. Intramedullary christiano in the right femur. CT LUMBAR SPINE TRAUMA RECONSTRUCTION    Result Date: 12/11/2021  Age-indeterminate mild superior endplate concavity at T8 and T9 are favored to be chronic.   If the patient endorses pain in this region, thoracic spine MRI can be performed to better evaluate the chronicity of these findings. Chronic L2 compression fracture status post vertebroplasty. CT THORACIC SPINE TRAUMA RECONSTRUCTION    Result Date: 12/11/2021  Age-indeterminate mild superior endplate concavity at T8 and T9 are favored to be chronic. If the patient endorses pain in this region, thoracic spine MRI can be performed to better evaluate the chronicity of these findings. Chronic L2 compression fracture status post vertebroplasty. Physical Examination:        General appearance:  alert, cooperative and no distress right-sided amblyopia, resting tremor appreciated  Mental Status:  oriented to person, place and time and normal affect  Lungs:  clear to auscultation bilaterally, normal effort  Heart:  regular rate and rhythm, no murmur  Abdomen: Obese, soft, nontender, nondistended, normal bowel sounds, no masses, hepatomegaly, splenomegaly  Extremities:  no edema, redness, tenderness in the calves  Skin:  no gross lesions, rashes, induration    Assessment:        Hospital Problems           Last Modified POA    * (Principal) Fall at home, initial encounter 12/13/2021 Yes    Osteogenesis imperfecta 12/13/2021 Yes    Multiple closed fractures of ribs of right side 12/13/2021 Yes    Type 2 diabetes mellitus without complication, with long term current use of insulin pump (HCC) (Chronic) 12/13/2021 Yes    Chronic pain (Chronic) 12/13/2021 Yes    Primary hypertension 12/14/2021 Yes          Plan:        1. Fall:   - Without LOC. - With multiple right-sided rib fractures. - Trauma following.    - Continue low-flow O2 as warranted, IS    2. History of osteogenic imperfecta:   - On Prolia every 6 months    3. DMT2:   - A.m. blood sugar in the 200 range. - Insulin drip has been discontinued. - Increase nightly Lantus from 40 units to 50 units  - continue morning dose of 60 units.   -  HI-ISS. - Monitor patient for hyper/hypoglycemic event    4.  VALARIE:   - CRT upon admission was 1.29.    - Up trended to 1.8.     5. Hypertension:   - Avoid ACEI/ARB. - Start Norvasc and monitor    6. Chronic pain:   - Multimodal pain management for acute pain. - Takes gabapentin at home    7.  Depression:   - Continue home dose of Zoloft  -  check EKG to evaluate QTC    8. GI/DVT prophylaxis:   - No GI prophylaxis warranted, continue heparin 3 times daily    ADEEL Sy NP  12/14/2021  8:48 AM

## 2021-12-14 NOTE — PROGRESS NOTES
upon arrival with c/o 6/10 pain in R side. Pt pleasant and cooperative throughout session. General Comment  Comments: Pt returned to supine in bed at end of session with call light in reach. Pain Screening  Patient Currently in Pain: Yes  Pain Assessment  Pain Assessment: 0-10  Pain Level: 6  Patient's Stated Pain Goal: No pain  Pain Type: Acute pain  Pain Location: Rib cage  Pain Orientation: Right  Vital Signs  Patient Currently in Pain: Yes       Orientation  Orientation  Overall Orientation Status: Within Functional Limits  Cognition   Cognition  Overall Cognitive Status: WFL  Objective   Bed mobility  Supine to Sit: Moderate assistance; 2 Person assistance (Pt required assistance with trunk and LE progression to EOB.)  Sit to Supine: Moderate assistance (Pt required assistance with LE progression back to bed.)  Scooting: Contact guard assistance  Comment: HOB elevated, required increased time/effort to complete d/t pain. Transfers  Sit to Stand: Moderate Assistance; 2 Person Assistance  Stand to sit: Moderate Assistance  Comment: RW used, cueing for hand placement with good return demo. Increased time/effort noted to acheive an upright position. Pt with difficulty flexing R knee to place foot under self to perform stand. Ambulation  Ambulation?: Yes  Ambulation 1  Surface: level tile  Device: Rolling Walker  Assistance: Contact guard assistance  Gait Deviations: Slow Eblén; Decreased step length; Decreased step height  Distance: 4-5 lateral side steps towards HOB, 3ft forwards/backwards x2. Comments: overall steady, no LOB noted. Stairs/Curb  Stairs?: No     Balance  Posture: Fair  Sitting - Static: Good  Sitting - Dynamic: Good  Standing - Static: Fair  Standing - Dynamic: Fair  Comments: Pt sat EOB ~15 minutes CGA/SBA. RW used to assess standing balance, pt required CGA while standing.   Exercises  Hip Flexion: seated marches: x20 BLE  Knee Long Arc Quad: x20 BLE  Ankle Pumps: x20 BLE  Comments: Pt

## 2021-12-14 NOTE — PROGRESS NOTES
PROGRESS NOTE          PATIENT NAME: Aly Samano  MEDICAL RECORD NO. [id number]  DATE: 2021  SURGEON: Dr. Raghu Templeton: No primary care provider on file. HD: # 4    ASSESSMENT    Patient Active Problem List   Diagnosis    Fall at home, initial encounter    Osteogenesis imperfecta    Multiple closed fractures of ribs of right side    Type 2 diabetes mellitus without complication, with long term current use of insulin pump (HCC)    Chronic pain       MEDICAL DECISION MAKING AND PLAN    1. Right posterior rib fractures 8-12 s/p fall from [facility]  1. Rib score of 9  2. IS 1500  3. On RA  4. MMPT - Humaira 5mg q8  5. Hx of osteogenesis imperfecta  6. PT/OT  7. Patient referral in for SNF  2. VALARIE on CKD likely secondary to acute trauma and uncontrolled DM-II  1. Improving, mild hyponatremia/pseudohyponatremia this AM, asymptomatic, monitor  3. Needs to be in chair today minimum 2-3 hours to improve mobility    Hypertensive   -Labetalol added PRN SBP >180, will need f/u with PCP for HTN regimen    Hyperglycemia   -hx of uncontrolled DM   -Lantus 60u daily and 40u nightly   -appreciate IM assistance with BG and HTN      Chief Complaint: \"Doing better\"    SUBJECTIVE    Aly Samano was seen and examined at bedside. HTN overnight, labetalol ordered, fentanyl 25mcg given overnight one time, pt still c/o rib pain, tolerating diet, urinating, no BM, passing flatus.  Needs to be up in chair today      OBJECTIVE  VITALS: Temp: Temp: 98 °F (36.7 °C)Temp  Av.8 °F (36.6 °C)  Min: 97.5 °F (36.4 °C)  Max: 98 °F (67.5 °C) BP Systolic (00ZHP), RZZ:223 , Min:142 , CVS:067   Diastolic (51EPI), JMS:567, Min:95, Max:124   Pulse Pulse  Av  Min: 83  Max: 101 Resp Resp  Av  Min: 16  Max: 18 Pulse ox SpO2  Av.7 %  Min: 94 %  Max: 97 %  GENERAL: alert, no distress  NEURO: ANO x4, able to move all 4 extremities  HEENT: NCAT  : deferred  LUNGS: equal chest rise and fall, no increased wob  HEART: normal rate and regular rhythm  ABDOMEN: soft, non-tender, non-distended  EXTREMITY: no cyanosis, clubbing or edema    I/O last 3 completed shifts:  In: -   Out: 1050 [Urine:1050]    Drain/tube output: In: -   Out: 4058 [Urine:1050]    LAB:  CBC:   Recent Labs     12/13/21  0848 12/14/21  0513   WBC 6.5 8.1   HGB 13.6 13.4   HCT 40.0* 40.0*   MCV 89.5 87.3    188     BMP:   Recent Labs     12/12/21  0828 12/13/21  0848 12/14/21  0513   * 133* 129*   K 3.5* 4.8 4.8   CL 96* 100 98   CO2 22 19* 15*   BUN 22 26* 23   CREATININE 1.84* 1.56* 1.29*   GLUCOSE 152* 185* 236*     COAGS:   No results for input(s): APTT, PROT, INR in the last 72 hours. RADIOLOGY:  No results found.         Electronically signed by Amy Ribera DO on 12/14/2021 at 8:26 AM

## 2021-12-14 NOTE — PROGRESS NOTES
Occupational Therapy  Facility/Department: [address] BURN UNIT  Daily Treatment Note  NAME: Mildred Mendoza  : 1955  MRN: [id number]    Date of Service: 2021    Discharge Recommendations:Further therapy recommended at discharge in order to increase pt balance, strength and independence. Discussed with OTR to update POC. Assessment   Performance deficits / Impairments: Decreased functional mobility ; Decreased ADL status; Decreased high-level IADLs; Decreased balance; Decreased strength; Decreased endurance; Decreased safe awareness  Prognosis: Good  OT Education: OT Role; Transfer Training; ADL Adaptive Strategies; Precautions  Patient Education: purpose of OT; proper hand and foot placement; balance maintaince  Barriers to Learning: pt demo F carry over  REQUIRES OT FOLLOW UP: Yes  Activity Tolerance  Activity Tolerance: Patient Tolerated treatment well; Patient limited by pain  Safety Devices  Safety Devices in place: Yes  Type of devices: Gait belt; Patient at risk for falls; Left in bed; Call light within reach; Bed alarm in place  Restraints  Initially in place: No       Patient Diagnosis(es): The encounter diagnosis was Closed fracture of multiple ribs of right side, initial encounter. has a past medical history of Chronic pain, Closed fracture of multiple ribs of right side, Diabetes ([employer] [facility] [address] GERD (gastroesophageal reflux disease), and Osteogenesis imperfecta. has a past surgical history that includes Leg Surgery (Bilateral); hip surgery (Left); Elbow surgery (Right); and Wrist surgery (Left).     Restrictions  Restrictions/Precautions  Restrictions/Precautions: Fall Risk  Required Braces or Orthoses?: No  Position Activity Restriction  Other position/activity restrictions: up with A, CTLS clear, R post. 8-12 rib fxs  Subjective   General  Chart Reviewed: Yes  Patient assessed for rehabilitation services?: Yes  Family / Caregiver Present: No  General Comment  Comments: Pt and RN agreeable

## 2021-12-14 NOTE — PROGRESS NOTES
Report given to nurse at [facility]. All questions answered in full.

## 2021-12-14 NOTE — DISCHARGE SUMMARY
DISCHARGE SUMMARY:    PATIENT NAME:  Chelly Farley  YOB: 1955  MEDICAL RECORD NO. [id number]  DATE: 12/14/21  PRIMARY CARE PHYSICIAN: No primary care provider on file. ADMIT DATE: 12/10/2021  DISPOSITION:  Discharge to rehab facility  DISCHARGE DATE:   12/14/2021  ADMITTING DIAGNOSIS:       DIAGNOSIS:   Patient Active Problem List   Diagnosis    Fall at home, initial encounter    Osteogenesis imperfecta    Multiple closed fractures of ribs of right side    Type 2 diabetes mellitus without complication, with long term current use of insulin pump (HCC)    Chronic pain    Primary hypertension       CONSULTANTS:  Internal Medicine, Nephrology    PROCEDURES:   None     HOSPITAL COURSE:   Chelly Farley is a 77 y.o. male who was admitted on 12/10/2021 with fractures of the right posterior 8-12th ribs. He required admission for pain management and aggressive pulmonary toilet. Labs and imaging were followed daily. At time of discharge, Chelly Farley was tolerating a regular diet, having bowel movements, ambulating on his own accord, had adequate analgesia on oral pain medications, and had no signs of symptoms of complications. He was deemed medically stable and discharged to rehab center on 12/14/2021 with instructions to follow up with PCP. Pt expressed understanding of and agreement with DC plans. PHYSICAL EXAMINATION:        Discharge Vitals:  height is 6' 2\" (1.88 m) and weight is 200 lb (90.7 kg). His temporal temperature is 97.3 °F (36.3 °C). His blood pressure is 141/96 (abnormal) and his pulse is 90. His respiration is 18 and oxygen saturation is 97%.    General appearance - alert, well appearing, and in no distress  Chest - clear to ausculation  Heart - normal rate and regular rhythm  Abdomen - soft, non tender, non distended, bowel sounds present  Neurological - motor and sensory grossly normal bilaterally  Musculoskeletal - full range of motion without pain  Extremities - peripheral pulses normal, no pedal edema, no clubbing or cyanosis    LABS:     Recent Labs     12/12/21  0828 12/13/21  0848 12/14/21  0513   WBC  --  6.5 8.1   HGB  --  13.6 13.4   HCT  --  40.0* 40.0*   PLT  --  230 188   * 133* 129*   K 3.5* 4.8 4.8   CL 96* 100 98   CO2 22 19* 15*   BUN 22 26* 23   CREATININE 1.84* 1.56* 1.29*       DIAGNOSTIC TESTS:    CT HEAD WO CONTRAST    Result Date: 12/10/2021  EXAMINATION: CT OF THE HEAD WITHOUT CONTRAST; CT OF THE CERVICAL SPINE WITHOUT CONTRAST; CT OF THE CHEST, ABDOMEN, AND PELVIS WITH CONTRAST  12/10/2021 10:07 pm TECHNIQUE: CT of the head was performed without the administration of intravenous contrast. Dose modulation, iterative reconstruction, and/or weight based adjustment of the mA/kV was utilized to reduce the radiation dose to as low as reasonably achievable.; CT of the cervical spine was performed without the administration of intravenous contrast. Multiplanar reformatted images are provided for review. Dose modulation, iterative reconstruction, and/or weight based adjustment of the mA/kV was utilized to reduce the radiation dose to as low as reasonably achievable.; CT of the chest, abdomen and pelvis was performed with the administration of intravenous contrast. Multiplanar reformatted images are provided for review. Dose modulation, iterative reconstruction, and/or weight based adjustment of the mA/kV was utilized to reduce the radiation dose to as low as reasonably achievable. COMPARISON: None. HISTORY: ORDERING SYSTEM PROVIDED HISTORY: fall TECHNOLOGIST PROVIDED HISTORY: fall Decision Support Exception - unselect if not a suspected or confirmed emergency medical condition->Emergency Medical Condition (MA) FINDINGS: CT OF THE BRAIN: BRAIN/VENTRICLES: No mass effect, edema or hemorrhage is seen. Mild-to-moderate volume loss is seen in the brain with mild chronic microvascular ischemic changes. No hydrocephalus or extra-axial fluid is seen.  ORBITS: The visualized portion of the orbits demonstrate no acute abnormality. SINUSES: The visualized paranasal sinuses and mastoid air cells demonstrate no acute abnormality. SOFT TISSUES/SKULL: No acute abnormality of the visualized skull or soft tissues. CT CERVICAL SPINE: BONES/ALIGNMENT: There is no acute fracture or traumatic malalignment. DEGENERATIVE CHANGES: Prominent loss of disc heights at multiple levels, most notably at C3-4 and C6-7. Small disc osteophyte complexes at multiple levels result in minimal central canal stenoses. Multilevel neural foraminal stenoses, worst (moderate to severe) at C6-7. SOFT TISSUES: There is no prevertebral soft tissue swelling. CT OF THE CHEST: MEDIASTINUM: The heart is normal in size. The thoracic aorta is unremarkable. The main pulmonary artery is normal in caliber. No mediastinal hematoma, mass lesion or lymphadenopathy. LUNGS/PLEURA: Minimal dependent atelectasis or scarring in the lower lobes. The lungs are otherwise clear. The central airway is clear. No pneumothorax or pleural effusion is seen. BONES/SOFT TISSUES: Acute fractures of the right posterior 8th, 9th, 10th, 11th and 12th ribs. Subacute or chronic fractures of the right lateral 6th and 7th ribs. CT OF THE ABDOMEN AND PELVIS: ORGANS: Liver: Unremarkable. Gallbladder: Surgically absent. Pancreas: Mild to moderately atrophied. Otherwise, unremarkable. Spleen:  Unremarkable. Adrenals: Unremarkable. Kidneys: Unremarkable. GI/BOWEL: No bowel wall thickening or obstruction. Normal appendix. PERITONEUM/EXTRA PERITONEUM: Minimal atherosclerosis is seen in the aorta. No aneurysm. No lymphadenopathy. No free air or free fluid is seen. PELVIS: The urinary bladder is unremarkable. The prostate gland is borderline enlarged. BONES/SOFT TISSUES: No acute fracture is seen. Intramedullary christiano is seen in the right femur. Chronic compression fracture deformity of the L2 vertebral body with kyphoplasty changes.      CT OF THE HEAD: 1. No skull fracture or acute intracranial abnormality. CT OF THE CERVICAL SPINE: 1. No fracture or joint dislocation is seen. 2. Degenerative changes, as described. CT OF THE CHEST: 1. Acute fractures of the right posterior 8th, 9th, 10th, 11th and 12th ribs. 2.  No acute cardiopulmonary abnormality. CT OF THE ABDOMEN AND PELVIS: 1. No traumatic or other acute abnormality. 2. Chronic compression fracture deformity of the L2 vertebral body with kyphoplasty changes. 3. Intramedullary christiano in the right femur. CT CERVICAL SPINE WO CONTRAST    Result Date: 12/10/2021  EXAMINATION: CT OF THE HEAD WITHOUT CONTRAST; CT OF THE CERVICAL SPINE WITHOUT CONTRAST; CT OF THE CHEST, ABDOMEN, AND PELVIS WITH CONTRAST  12/10/2021 10:07 pm TECHNIQUE: CT of the head was performed without the administration of intravenous contrast. Dose modulation, iterative reconstruction, and/or weight based adjustment of the mA/kV was utilized to reduce the radiation dose to as low as reasonably achievable.; CT of the cervical spine was performed without the administration of intravenous contrast. Multiplanar reformatted images are provided for review. Dose modulation, iterative reconstruction, and/or weight based adjustment of the mA/kV was utilized to reduce the radiation dose to as low as reasonably achievable.; CT of the chest, abdomen and pelvis was performed with the administration of intravenous contrast. Multiplanar reformatted images are provided for review. Dose modulation, iterative reconstruction, and/or weight based adjustment of the mA/kV was utilized to reduce the radiation dose to as low as reasonably achievable. COMPARISON: None.  HISTORY: ORDERING SYSTEM PROVIDED HISTORY: fall TECHNOLOGIST PROVIDED HISTORY: fall Decision Support Exception - unselect if not a suspected or confirmed emergency medical condition->Emergency Medical Condition (MA) FINDINGS: CT OF THE BRAIN: BRAIN/VENTRICLES: No mass effect, edema or hemorrhage is seen. Mild-to-moderate volume loss is seen in the brain with mild chronic microvascular ischemic changes. No hydrocephalus or extra-axial fluid is seen. ORBITS: The visualized portion of the orbits demonstrate no acute abnormality. SINUSES: The visualized paranasal sinuses and mastoid air cells demonstrate no acute abnormality. SOFT TISSUES/SKULL: No acute abnormality of the visualized skull or soft tissues. CT CERVICAL SPINE: BONES/ALIGNMENT: There is no acute fracture or traumatic malalignment. DEGENERATIVE CHANGES: Prominent loss of disc heights at multiple levels, most notably at C3-4 and C6-7. Small disc osteophyte complexes at multiple levels result in minimal central canal stenoses. Multilevel neural foraminal stenoses, worst (moderate to severe) at C6-7. SOFT TISSUES: There is no prevertebral soft tissue swelling. CT OF THE CHEST: MEDIASTINUM: The heart is normal in size. The thoracic aorta is unremarkable. The main pulmonary artery is normal in caliber. No mediastinal hematoma, mass lesion or lymphadenopathy. LUNGS/PLEURA: Minimal dependent atelectasis or scarring in the lower lobes. The lungs are otherwise clear. The central airway is clear. No pneumothorax or pleural effusion is seen. BONES/SOFT TISSUES: Acute fractures of the right posterior 8th, 9th, 10th, 11th and 12th ribs. Subacute or chronic fractures of the right lateral 6th and 7th ribs. CT OF THE ABDOMEN AND PELVIS: ORGANS: Liver: Unremarkable. Gallbladder: Surgically absent. Pancreas: Mild to moderately atrophied. Otherwise, unremarkable. Spleen:  Unremarkable. Adrenals: Unremarkable. Kidneys: Unremarkable. GI/BOWEL: No bowel wall thickening or obstruction. Normal appendix. PERITONEUM/EXTRA PERITONEUM: Minimal atherosclerosis is seen in the aorta. No aneurysm. No lymphadenopathy. No free air or free fluid is seen. PELVIS: The urinary bladder is unremarkable. The prostate gland is borderline enlarged.  BONES/SOFT TISSUES: No acute fracture is seen. Intramedullary christiano is seen in the right femur. Chronic compression fracture deformity of the L2 vertebral body with kyphoplasty changes. CT OF THE HEAD: 1. No skull fracture or acute intracranial abnormality. CT OF THE CERVICAL SPINE: 1. No fracture or joint dislocation is seen. 2. Degenerative changes, as described. CT OF THE CHEST: 1. Acute fractures of the right posterior 8th, 9th, 10th, 11th and 12th ribs. 2.  No acute cardiopulmonary abnormality. CT OF THE ABDOMEN AND PELVIS: 1. No traumatic or other acute abnormality. 2. Chronic compression fracture deformity of the L2 vertebral body with kyphoplasty changes. 3. Intramedullary christiano in the right femur. CT CHEST ABDOMEN PELVIS W CONTRAST    Result Date: 12/10/2021  EXAMINATION: CT OF THE HEAD WITHOUT CONTRAST; CT OF THE CERVICAL SPINE WITHOUT CONTRAST; CT OF THE CHEST, ABDOMEN, AND PELVIS WITH CONTRAST  12/10/2021 10:07 pm TECHNIQUE: CT of the head was performed without the administration of intravenous contrast. Dose modulation, iterative reconstruction, and/or weight based adjustment of the mA/kV was utilized to reduce the radiation dose to as low as reasonably achievable.; CT of the cervical spine was performed without the administration of intravenous contrast. Multiplanar reformatted images are provided for review. Dose modulation, iterative reconstruction, and/or weight based adjustment of the mA/kV was utilized to reduce the radiation dose to as low as reasonably achievable.; CT of the chest, abdomen and pelvis was performed with the administration of intravenous contrast. Multiplanar reformatted images are provided for review. Dose modulation, iterative reconstruction, and/or weight based adjustment of the mA/kV was utilized to reduce the radiation dose to as low as reasonably achievable. COMPARISON: None.  HISTORY: ORDERING SYSTEM PROVIDED HISTORY: fall TECHNOLOGIST PROVIDED HISTORY: fall Decision Support Exception - unselect if not a suspected or confirmed emergency medical condition->Emergency Medical Condition (MA) FINDINGS: CT OF THE BRAIN: BRAIN/VENTRICLES: No mass effect, edema or hemorrhage is seen. Mild-to-moderate volume loss is seen in the brain with mild chronic microvascular ischemic changes. No hydrocephalus or extra-axial fluid is seen. ORBITS: The visualized portion of the orbits demonstrate no acute abnormality. SINUSES: The visualized paranasal sinuses and mastoid air cells demonstrate no acute abnormality. SOFT TISSUES/SKULL: No acute abnormality of the visualized skull or soft tissues. CT CERVICAL SPINE: BONES/ALIGNMENT: There is no acute fracture or traumatic malalignment. DEGENERATIVE CHANGES: Prominent loss of disc heights at multiple levels, most notably at C3-4 and C6-7. Small disc osteophyte complexes at multiple levels result in minimal central canal stenoses. Multilevel neural foraminal stenoses, worst (moderate to severe) at C6-7. SOFT TISSUES: There is no prevertebral soft tissue swelling. CT OF THE CHEST: MEDIASTINUM: The heart is normal in size. The thoracic aorta is unremarkable. The main pulmonary artery is normal in caliber. No mediastinal hematoma, mass lesion or lymphadenopathy. LUNGS/PLEURA: Minimal dependent atelectasis or scarring in the lower lobes. The lungs are otherwise clear. The central airway is clear. No pneumothorax or pleural effusion is seen. BONES/SOFT TISSUES: Acute fractures of the right posterior 8th, 9th, 10th, 11th and 12th ribs. Subacute or chronic fractures of the right lateral 6th and 7th ribs. CT OF THE ABDOMEN AND PELVIS: ORGANS: Liver: Unremarkable. Gallbladder: Surgically absent. Pancreas: Mild to moderately atrophied. Otherwise, unremarkable. Spleen:  Unremarkable. Adrenals: Unremarkable. Kidneys: Unremarkable. GI/BOWEL: No bowel wall thickening or obstruction. Normal appendix. PERITONEUM/EXTRA PERITONEUM: Minimal atherosclerosis is seen in the aorta. No aneurysm. favored to be chronic. Chronic moderate L2 compression fracture status post vertebroplasty. Right posterior 9th through 12th rib fractures are again noted. Additional fractures are not included in the field of view. DEGENERATIVE CHANGES: Mild-to-moderate multilevel degenerative changes. These are most prominent in lumbosacral region. SOFT TISSUES: Please see separately dictated CT of the chest, abdomen pelvis for detailed findings. No paraspinal mass. .     Age-indeterminate mild superior endplate concavity at T8 and T9 are favored to be chronic. If the patient endorses pain in this region, thoracic spine MRI can be performed to better evaluate the chronicity of these findings. Chronic L2 compression fracture status post vertebroplasty. CT THORACIC SPINE TRAUMA RECONSTRUCTION    Result Date: 12/11/2021  EXAMINATION: CT OF THE THORACIC SPINE WITHOUT CONTRAST; CT OF THE LUMBAR SPINE WITHOUT CONTRAST  12/10/2021 10:07 pm: TECHNIQUE: CT of the thoracic spine was performed without the administration of intravenous contrast. Multiplanar reformatted images are provided for review. Dose modulation, iterative reconstruction, and/or weight based adjustment of the mA/kV was utilized to reduce the radiation dose to as low as reasonably achievable.; CT of the lumbar spine was performed without the administration of intravenous contrast. Multiplanar reformatted images are provided for review. Adjustment of mA and/or kV according to patient size was utilized. Dose modulation, iterative reconstruction, and/or weight based adjustment of the mA/kV was utilized to reduce the radiation dose to as low as reasonably achievable. COMPARISON: None. HISTORY: ORDERING SYSTEM PROVIDED HISTORY: fall TECHNOLOGIST PROVIDED HISTORY: fall FINDINGS: BONES/ALIGNMENT: Age indeterminate mild superior endplate concavity at T8 and T9, favored to be chronic. Chronic moderate L2 compression fracture status post vertebroplasty.   Right posterior 9th through 12th rib fractures are again noted. Additional fractures are not included in the field of view. DEGENERATIVE CHANGES: Mild-to-moderate multilevel degenerative changes. These are most prominent in lumbosacral region. SOFT TISSUES: Please see separately dictated CT of the chest, abdomen pelvis for detailed findings. No paraspinal mass. .     Age-indeterminate mild superior endplate concavity at T8 and T9 are favored to be chronic. If the patient endorses pain in this region, thoracic spine MRI can be performed to better evaluate the chronicity of these findings. Chronic L2 compression fracture status post vertebroplasty. DISCHARGE INSTRUCTIONS     Discharge Medications:        Medication List        START taking these medications      methocarbamol 750 MG tablet  Commonly known as: Robaxin-750  Take 1 tablet by mouth 3 times daily for 4 days     oxyCODONE 5 MG immediate release tablet  Commonly known as: Roxicodone  Take 1 tablet by mouth every 8 hours as needed for Pain for up to 4 days. Intended supply: 3 days. Take lowest dose possible to manage pain            CONTINUE taking these medications      gabapentin 100 MG capsule  Commonly known as: NEURONTIN     sertraline 100 MG tablet  Commonly known as: ZOLOFT     traZODone 100 MG tablet  Commonly known as: DESYREL               Where to Get Your Medications        You can get these medications from any pharmacy    Bring a paper prescription for each of these medications  methocarbamol 750 MG tablet  oxyCODONE 5 MG immediate release tablet       Diet: ADULT DIET;  Regular; 4 carb choices (60 gm/meal)  ADULT ORAL NUTRITION SUPPLEMENT; Breakfast, Lunch, Dinner; Diabetic Oral Supplement diet as tolerated  Activity: - Avoid strenuous activity or exercise until cleared during follow-up appointment  - No driving or operating heavy machinery while taking narcotics   Wound Care: Daily and as needed  Follow-up:   Follow up in the next few weeks with PCP: No primary care provider on file. Time Spent for discharge: 30 minutes    Ankit Nickerson MD  12/14/2021, 12:31 PM      DC criteria met.      Bar Mercer MD

## 2021-12-14 NOTE — PLAN OF CARE
Problem: Pain:  Goal: Pain level will decrease  Description: Pain level will decrease  12/14/2021 1649 by Luana Powers RN  Outcome: Completed  12/14/2021 0316 by Mirtha Tijerina RN  Outcome: Ongoing  Goal: Control of acute pain  Description: Control of acute pain  12/14/2021 1649 by Luana Powers RN  Outcome: Completed  12/14/2021 0316 by Mirtha Tijerina RN  Outcome: Ongoing  Goal: Control of chronic pain  Description: Control of chronic pain  12/14/2021 1649 by Luana Powers RN  Outcome: Completed  12/14/2021 0316 by Mirtha Tijerina RN  Outcome: Ongoing     Problem: Falls - Risk of:  Goal: Will remain free from falls  Description: Will remain free from falls  12/14/2021 1649 by Luana Powers RN  Outcome: Completed  12/14/2021 0316 by Mirtha Tijerina RN  Outcome: Ongoing  Goal: Absence of physical injury  Description: Absence of physical injury  12/14/2021 1649 by Luana Powers RN  Outcome: Completed  12/14/2021 0316 by Mirtha Tijerina RN  Outcome: Ongoing

## 2021-12-15 LAB
EKG ATRIAL RATE: 87 BPM
EKG P AXIS: 23 DEGREES
EKG P-R INTERVAL: 222 MS
EKG Q-T INTERVAL: 372 MS
EKG QRS DURATION: 94 MS
EKG QTC CALCULATION (BAZETT): 447 MS
EKG R AXIS: -49 DEGREES
EKG T AXIS: 108 DEGREES
EKG VENTRICULAR RATE: 87 BPM

## 2021-12-15 PROCEDURE — 93010 ELECTROCARDIOGRAM REPORT: CPT | Performed by: INTERNAL MEDICINE

## 2022-01-11 ENCOUNTER — HOSPITAL ENCOUNTER (OUTPATIENT)
Dept: CT IMAGING | Age: 67
Discharge: HOME | End: 2022-01-13
Payer: MEDICARE

## 2022-01-11 ENCOUNTER — HOSPITAL ENCOUNTER (OUTPATIENT)
Age: 67
Discharge: HOME | End: 2022-01-11
Payer: MEDICARE

## 2022-01-11 DIAGNOSIS — T14.8XXA HEMATOMA: ICD-10-CM

## 2022-01-11 LAB
ABSOLUTE EOS #: 0.4 K/UL (ref 0–0.4)
ABSOLUTE IMMATURE GRANULOCYTE: ABNORMAL K/UL (ref 0–0.3)
ABSOLUTE LYMPH #: 0.7 K/UL (ref 1–4.8)
ABSOLUTE MONO #: 0.3 K/UL (ref 0.1–1.3)
ANION GAP SERPL CALCULATED.3IONS-SCNC: 13 MMOL/L (ref 9–17)
BASOPHILS # BLD: 1 % (ref 0–2)
BASOPHILS ABSOLUTE: 0.1 K/UL (ref 0–0.2)
BUN BLDV-MCNC: 15 MG/DL (ref 8–23)
BUN/CREAT BLD: ABNORMAL (ref 9–20)
CALCIUM SERPL-MCNC: 9.3 MG/DL (ref 8.6–10.4)
CHLORIDE BLD-SCNC: 104 MMOL/L (ref 98–107)
CO2: 21 MMOL/L (ref 20–31)
CREAT SERPL-MCNC: 1.47 MG/DL (ref 0.7–1.2)
DIFFERENTIAL TYPE: ABNORMAL
EOSINOPHILS RELATIVE PERCENT: 7 % (ref 0–4)
GFR AFRICAN AMERICAN: 58 ML/MIN
GFR NON-AFRICAN AMERICAN: 48 ML/MIN
GFR SERPL CREATININE-BSD FRML MDRD: ABNORMAL ML/MIN/{1.73_M2}
GFR SERPL CREATININE-BSD FRML MDRD: ABNORMAL ML/MIN/{1.73_M2}
GLUCOSE BLD-MCNC: 209 MG/DL (ref 70–99)
HCT VFR BLD CALC: 40.4 % (ref 41–53)
HEMOGLOBIN: 13.7 G/DL (ref 13.5–17.5)
IMMATURE GRANULOCYTES: ABNORMAL %
LYMPHOCYTES # BLD: 12 % (ref 24–44)
MCH RBC QN AUTO: 29 PG (ref 26–34)
MCHC RBC AUTO-ENTMCNC: 33.9 G/DL (ref 31–37)
MCV RBC AUTO: 85.6 FL (ref 80–100)
MONOCYTES # BLD: 6 % (ref 1–7)
NRBC AUTOMATED: ABNORMAL PER 100 WBC
PDW BLD-RTO: 13.8 % (ref 11.5–14.9)
PLATELET # BLD: 215 K/UL (ref 150–450)
PLATELET ESTIMATE: ABNORMAL
PMV BLD AUTO: 7.9 FL (ref 6–12)
POTASSIUM SERPL-SCNC: 4.3 MMOL/L (ref 3.7–5.3)
RBC # BLD: 4.72 M/UL (ref 4.5–5.9)
RBC # BLD: ABNORMAL 10*6/UL
SEG NEUTROPHILS: 74 % (ref 36–66)
SEGMENTED NEUTROPHILS ABSOLUTE COUNT: 4.3 K/UL (ref 1.3–9.1)
SODIUM BLD-SCNC: 138 MMOL/L (ref 135–144)
WBC # BLD: 5.8 K/UL (ref 3.5–11)
WBC # BLD: ABNORMAL 10*3/UL

## 2022-01-11 PROCEDURE — 85025 COMPLETE CBC W/AUTO DIFF WBC: CPT

## 2022-01-11 PROCEDURE — 80048 BASIC METABOLIC PNL TOTAL CA: CPT

## 2022-01-11 PROCEDURE — 36415 COLL VENOUS BLD VENIPUNCTURE: CPT

## 2022-01-11 PROCEDURE — 74176 CT ABD & PELVIS W/O CONTRAST: CPT

## 2022-05-18 ENCOUNTER — HOSPITAL ENCOUNTER (OUTPATIENT)
Dept: INFUSION THERAPY | Age: 67
Setting detail: INFUSION SERIES
Discharge: HOME | End: 2022-05-18
Payer: MEDICARE

## 2022-05-18 VITALS
TEMPERATURE: 97.3 F | DIASTOLIC BLOOD PRESSURE: 76 MMHG | HEART RATE: 84 BPM | OXYGEN SATURATION: 96 % | RESPIRATION RATE: 17 BRPM | SYSTOLIC BLOOD PRESSURE: 122 MMHG

## 2022-05-18 DIAGNOSIS — M81.0 AGE-RELATED OSTEOPOROSIS WITHOUT CURRENT PATHOLOGICAL FRACTURE: Primary | ICD-10-CM

## 2022-05-18 LAB
CALCIUM SERPL-MCNC: 9.6 MG/DL (ref 8.6–10.4)
CREAT SERPL-MCNC: 1.71 MG/DL (ref 0.7–1.2)
GFR AFRICAN AMERICAN: 49 ML/MIN
GFR NON-AFRICAN AMERICAN: 40 ML/MIN
GFR SERPL CREATININE-BSD FRML MDRD: ABNORMAL ML/MIN/{1.73_M2}
MAGNESIUM: 1.5 MG/DL (ref 1.6–2.6)
PHOSPHORUS: 3.1 MG/DL (ref 2.5–4.5)

## 2022-05-18 PROCEDURE — 82565 ASSAY OF CREATININE: CPT

## 2022-05-18 PROCEDURE — 84100 ASSAY OF PHOSPHORUS: CPT

## 2022-05-18 PROCEDURE — 82310 ASSAY OF CALCIUM: CPT

## 2022-05-18 PROCEDURE — 96372 THER/PROPH/DIAG INJ SC/IM: CPT

## 2022-05-18 PROCEDURE — 83735 ASSAY OF MAGNESIUM: CPT

## 2022-05-18 PROCEDURE — 36415 COLL VENOUS BLD VENIPUNCTURE: CPT

## 2022-05-18 PROCEDURE — 6360000002 HC RX W HCPCS: Performed by: NURSE PRACTITIONER

## 2022-05-18 RX ORDER — EPINEPHRINE 1 MG/ML
0.3 INJECTION, SOLUTION, CONCENTRATE INTRAVENOUS PRN
Status: CANCELLED | OUTPATIENT
Start: 2022-11-09

## 2022-05-18 RX ORDER — DIPHENHYDRAMINE HYDROCHLORIDE 50 MG/ML
50 INJECTION INTRAMUSCULAR; INTRAVENOUS ONCE
Status: CANCELLED | OUTPATIENT
Start: 2022-11-09 | End: 2022-11-09

## 2022-05-18 RX ORDER — METHYLPREDNISOLONE SODIUM SUCCINATE 125 MG/2ML
125 INJECTION, POWDER, LYOPHILIZED, FOR SOLUTION INTRAMUSCULAR; INTRAVENOUS ONCE
Status: CANCELLED | OUTPATIENT
Start: 2022-11-09 | End: 2022-11-09

## 2022-05-18 RX ORDER — SODIUM CHLORIDE 9 MG/ML
INJECTION, SOLUTION INTRAVENOUS CONTINUOUS
Status: CANCELLED | OUTPATIENT
Start: 2022-11-09

## 2022-05-18 RX ADMIN — DENOSUMAB 60 MG: 60 INJECTION SUBCUTANEOUS at 12:18

## 2022-10-21 DIAGNOSIS — M81.0 AGE-RELATED OSTEOPOROSIS WITHOUT CURRENT PATHOLOGICAL FRACTURE: Primary | ICD-10-CM

## 2022-10-21 RX ORDER — ACETAMINOPHEN 325 MG/1
650 TABLET ORAL
OUTPATIENT
Start: 2022-10-21

## 2022-10-21 RX ORDER — SODIUM CHLORIDE 9 MG/ML
INJECTION, SOLUTION INTRAVENOUS CONTINUOUS
OUTPATIENT
Start: 2022-10-21

## 2022-10-21 RX ORDER — ONDANSETRON 2 MG/ML
8 INJECTION INTRAMUSCULAR; INTRAVENOUS
OUTPATIENT
Start: 2022-10-21

## 2022-10-21 RX ORDER — ALBUTEROL SULFATE 90 UG/1
4 AEROSOL, METERED RESPIRATORY (INHALATION) PRN
OUTPATIENT
Start: 2022-10-21

## 2022-10-21 RX ORDER — EPINEPHRINE 1 MG/ML
0.3 INJECTION, SOLUTION, CONCENTRATE INTRAVENOUS PRN
OUTPATIENT
Start: 2022-10-21

## 2022-10-21 RX ORDER — DIPHENHYDRAMINE HYDROCHLORIDE 50 MG/ML
50 INJECTION INTRAMUSCULAR; INTRAVENOUS
OUTPATIENT
Start: 2022-10-21

## 2022-11-22 ENCOUNTER — HOSPITAL ENCOUNTER (OUTPATIENT)
Dept: INFUSION THERAPY | Age: 67
Setting detail: INFUSION SERIES
Discharge: HOME | End: 2022-11-22
Payer: MEDICARE

## 2022-11-22 VITALS
OXYGEN SATURATION: 95 % | DIASTOLIC BLOOD PRESSURE: 88 MMHG | HEART RATE: 70 BPM | RESPIRATION RATE: 16 BRPM | TEMPERATURE: 97.8 F | SYSTOLIC BLOOD PRESSURE: 155 MMHG

## 2022-11-22 DIAGNOSIS — M81.0 AGE-RELATED OSTEOPOROSIS WITHOUT CURRENT PATHOLOGICAL FRACTURE: Primary | ICD-10-CM

## 2022-11-22 LAB
CALCIUM SERPL-MCNC: 9.7 MG/DL (ref 8.6–10.4)
CREAT SERPL-MCNC: 1.48 MG/DL (ref 0.7–1.2)
GFR SERPL CREATININE-BSD FRML MDRD: 52 ML/MIN/1.73M2
MAGNESIUM: 1.5 MG/DL (ref 1.6–2.6)
PHOSPHORUS: 2.7 MG/DL (ref 2.5–4.5)

## 2022-11-22 PROCEDURE — 6360000002 HC RX W HCPCS: Performed by: INTERNAL MEDICINE

## 2022-11-22 PROCEDURE — 84100 ASSAY OF PHOSPHORUS: CPT

## 2022-11-22 PROCEDURE — 82310 ASSAY OF CALCIUM: CPT

## 2022-11-22 PROCEDURE — 82565 ASSAY OF CREATININE: CPT

## 2022-11-22 PROCEDURE — 36415 COLL VENOUS BLD VENIPUNCTURE: CPT

## 2022-11-22 PROCEDURE — 96372 THER/PROPH/DIAG INJ SC/IM: CPT

## 2022-11-22 PROCEDURE — 83735 ASSAY OF MAGNESIUM: CPT

## 2022-11-22 RX ORDER — ONDANSETRON 2 MG/ML
8 INJECTION INTRAMUSCULAR; INTRAVENOUS
OUTPATIENT
Start: 2023-05-21

## 2022-11-22 RX ORDER — SODIUM CHLORIDE 9 MG/ML
INJECTION, SOLUTION INTRAVENOUS CONTINUOUS
OUTPATIENT
Start: 2023-05-21

## 2022-11-22 RX ORDER — ACETAMINOPHEN 325 MG/1
650 TABLET ORAL
OUTPATIENT
Start: 2023-05-21

## 2022-11-22 RX ORDER — ALBUTEROL SULFATE 90 UG/1
4 AEROSOL, METERED RESPIRATORY (INHALATION) PRN
OUTPATIENT
Start: 2023-05-21

## 2022-11-22 RX ORDER — DIPHENHYDRAMINE HYDROCHLORIDE 50 MG/ML
50 INJECTION INTRAMUSCULAR; INTRAVENOUS
OUTPATIENT
Start: 2023-05-21

## 2022-11-22 RX ORDER — EPINEPHRINE 1 MG/ML
0.3 INJECTION, SOLUTION, CONCENTRATE INTRAVENOUS PRN
OUTPATIENT
Start: 2023-05-21

## 2022-11-22 RX ADMIN — DENOSUMAB 60 MG: 60 INJECTION SUBCUTANEOUS at 15:45

## 2023-03-14 NOTE — ED NOTES
Pt continues to lay on the vacuum mattress from EMS. Pt has been offered several times if he would like to have it removed. Pt states he is decently comfortable and currently does not want to move off of it at this this time. Pt notified that if he changes his mind, to let this RN know. Pt verbalized understanding.      Leif Knapp RN  07/17/20 [id number] Screening colonoscopy approved my Dr Galarza.

## 2023-05-25 ENCOUNTER — HOSPITAL ENCOUNTER (OUTPATIENT)
Dept: INFUSION THERAPY | Age: 68
Setting detail: INFUSION SERIES
Discharge: HOME | End: 2023-05-25
Payer: MEDICARE

## 2023-05-25 VITALS
TEMPERATURE: 95.1 F | HEART RATE: 72 BPM | SYSTOLIC BLOOD PRESSURE: 153 MMHG | DIASTOLIC BLOOD PRESSURE: 74 MMHG | OXYGEN SATURATION: 95 % | RESPIRATION RATE: 17 BRPM

## 2023-05-25 DIAGNOSIS — M81.0 AGE-RELATED OSTEOPOROSIS WITHOUT CURRENT PATHOLOGICAL FRACTURE: Primary | ICD-10-CM

## 2023-05-25 LAB
ALBUMIN SERPL-MCNC: 4.3 G/DL (ref 3.5–5.2)
ALP SERPL-CCNC: 81 U/L (ref 40–129)
ALT SERPL-CCNC: 22 U/L (ref 5–41)
ANION GAP SERPL CALCULATED.3IONS-SCNC: 10 MMOL/L (ref 9–17)
AST SERPL-CCNC: 19 U/L
BILIRUB SERPL-MCNC: 0.4 MG/DL (ref 0.3–1.2)
BUN SERPL-MCNC: 19 MG/DL (ref 8–23)
CALCIUM SERPL-MCNC: 9.5 MG/DL (ref 8.6–10.4)
CHLORIDE SERPL-SCNC: 98 MMOL/L (ref 98–107)
CO2 SERPL-SCNC: 28 MMOL/L (ref 20–31)
CREAT SERPL-MCNC: 1.64 MG/DL (ref 0.7–1.2)
GFR SERPL CREATININE-BSD FRML MDRD: 46 ML/MIN/1.73M2
GLUCOSE SERPL-MCNC: 262 MG/DL (ref 70–99)
POTASSIUM SERPL-SCNC: 4.8 MMOL/L (ref 3.7–5.3)
PROT SERPL-MCNC: 7.4 G/DL (ref 6.4–8.3)
SODIUM SERPL-SCNC: 136 MMOL/L (ref 135–144)

## 2023-05-25 PROCEDURE — 36415 COLL VENOUS BLD VENIPUNCTURE: CPT

## 2023-05-25 PROCEDURE — 6360000002 HC RX W HCPCS: Performed by: INTERNAL MEDICINE

## 2023-05-25 PROCEDURE — 96372 THER/PROPH/DIAG INJ SC/IM: CPT

## 2023-05-25 PROCEDURE — 80053 COMPREHEN METABOLIC PANEL: CPT

## 2023-05-25 RX ORDER — ALBUTEROL SULFATE 90 UG/1
4 AEROSOL, METERED RESPIRATORY (INHALATION) PRN
OUTPATIENT
Start: 2023-11-19

## 2023-05-25 RX ORDER — ACETAMINOPHEN 325 MG/1
650 TABLET ORAL
OUTPATIENT
Start: 2023-11-19

## 2023-05-25 RX ORDER — DIPHENHYDRAMINE HYDROCHLORIDE 50 MG/ML
50 INJECTION INTRAMUSCULAR; INTRAVENOUS
OUTPATIENT
Start: 2023-11-19

## 2023-05-25 RX ORDER — EPINEPHRINE 1 MG/ML
0.3 INJECTION, SOLUTION, CONCENTRATE INTRAVENOUS PRN
OUTPATIENT
Start: 2023-11-19

## 2023-05-25 RX ORDER — ONDANSETRON 2 MG/ML
8 INJECTION INTRAMUSCULAR; INTRAVENOUS
OUTPATIENT
Start: 2023-11-19

## 2023-05-25 RX ORDER — SODIUM CHLORIDE 9 MG/ML
INJECTION, SOLUTION INTRAVENOUS CONTINUOUS
OUTPATIENT
Start: 2023-11-19

## 2023-05-25 RX ADMIN — DENOSUMAB 60 MG: 60 INJECTION SUBCUTANEOUS at 13:44

## 2023-05-25 NOTE — PROGRESS NOTES
Pt arrived for Prolia injection. Vitals obtained and labs drawn. Labs within written parameters. Injection given in L arm. Pt tolerated well. No s/s adverse reaction noted. Pt discharged home, via wheelchair with daughter.

## 2024-06-05 ENCOUNTER — APPOINTMENT (OUTPATIENT)
Dept: GENERAL RADIOLOGY | Age: 69
End: 2024-06-05
Payer: MEDICARE

## 2024-06-05 ENCOUNTER — HOSPITAL ENCOUNTER (EMERGENCY)
Age: 69
Discharge: HOME | End: 2024-06-05
Attending: EMERGENCY MEDICINE
Payer: MEDICARE

## 2024-06-05 VITALS
OXYGEN SATURATION: 94 % | WEIGHT: 220 LBS | TEMPERATURE: 99 F | DIASTOLIC BLOOD PRESSURE: 99 MMHG | BODY MASS INDEX: 29.8 KG/M2 | HEART RATE: 100 BPM | RESPIRATION RATE: 18 BRPM | SYSTOLIC BLOOD PRESSURE: 159 MMHG | HEIGHT: 72 IN

## 2024-06-05 DIAGNOSIS — M25.561 ACUTE PAIN OF RIGHT KNEE: Primary | ICD-10-CM

## 2024-06-05 LAB
ANION GAP SERPL CALCULATED.3IONS-SCNC: 15 MMOL/L (ref 9–17)
BASOPHILS # BLD: 0.1 K/UL (ref 0–0.2)
BASOPHILS NFR BLD: 1 % (ref 0–2)
BUN SERPL-MCNC: 45 MG/DL (ref 8–23)
CALCIUM SERPL-MCNC: 10.1 MG/DL (ref 8.6–10.4)
CHLORIDE SERPL-SCNC: 99 MMOL/L (ref 98–107)
CO2 SERPL-SCNC: 19 MMOL/L (ref 20–31)
CREAT SERPL-MCNC: 2 MG/DL (ref 0.7–1.2)
EOSINOPHIL # BLD: 0.1 K/UL (ref 0–0.4)
EOSINOPHILS RELATIVE PERCENT: 1 % (ref 0–4)
ERYTHROCYTE [DISTWIDTH] IN BLOOD BY AUTOMATED COUNT: 12.7 % (ref 11.5–14.9)
GFR, ESTIMATED: 36 ML/MIN/1.73M2
GLUCOSE SERPL-MCNC: 295 MG/DL (ref 70–99)
HCT VFR BLD AUTO: 42.1 % (ref 41–53)
HGB BLD-MCNC: 14.6 G/DL (ref 13.5–17.5)
LYMPHOCYTES NFR BLD: 0.8 K/UL (ref 1–4.8)
LYMPHOCYTES RELATIVE PERCENT: 7 % (ref 24–44)
MCH RBC QN AUTO: 30.4 PG (ref 26–34)
MCHC RBC AUTO-ENTMCNC: 34.7 G/DL (ref 31–37)
MCV RBC AUTO: 87.6 FL (ref 80–100)
MONOCYTES NFR BLD: 1.1 K/UL (ref 0.1–1.3)
MONOCYTES NFR BLD: 9 % (ref 1–7)
NEUTROPHILS NFR BLD: 82 % (ref 36–66)
NEUTS SEG NFR BLD: 10 K/UL (ref 1.3–9.1)
PLATELET # BLD AUTO: 347 K/UL (ref 150–450)
PMV BLD AUTO: 7.5 FL (ref 6–12)
POTASSIUM SERPL-SCNC: 5.2 MMOL/L (ref 3.7–5.3)
RBC # BLD AUTO: 4.8 M/UL (ref 4.5–5.9)
SODIUM SERPL-SCNC: 133 MMOL/L (ref 135–144)
WBC OTHER # BLD: 12.1 K/UL (ref 3.5–11)

## 2024-06-05 PROCEDURE — 6370000000 HC RX 637 (ALT 250 FOR IP)

## 2024-06-05 PROCEDURE — 99284 EMERGENCY DEPT VISIT MOD MDM: CPT

## 2024-06-05 PROCEDURE — 85025 COMPLETE CBC W/AUTO DIFF WBC: CPT

## 2024-06-05 PROCEDURE — 96374 THER/PROPH/DIAG INJ IV PUSH: CPT

## 2024-06-05 PROCEDURE — 80048 BASIC METABOLIC PNL TOTAL CA: CPT

## 2024-06-05 PROCEDURE — 73562 X-RAY EXAM OF KNEE 3: CPT

## 2024-06-05 PROCEDURE — 36415 COLL VENOUS BLD VENIPUNCTURE: CPT

## 2024-06-05 PROCEDURE — 6360000002 HC RX W HCPCS

## 2024-06-05 RX ORDER — MORPHINE SULFATE 4 MG/ML
4 INJECTION, SOLUTION INTRAMUSCULAR; INTRAVENOUS ONCE
Status: COMPLETED | OUTPATIENT
Start: 2024-06-05 | End: 2024-06-05

## 2024-06-05 RX ORDER — OXYCODONE HYDROCHLORIDE AND ACETAMINOPHEN 5; 325 MG/1; MG/1
1 TABLET ORAL EVERY 6 HOURS PRN
Qty: 6 TABLET | Refills: 0 | Status: SHIPPED | OUTPATIENT
Start: 2024-06-05 | End: 2024-06-08

## 2024-06-05 RX ORDER — OXYCODONE HYDROCHLORIDE AND ACETAMINOPHEN 5; 325 MG/1; MG/1
1 TABLET ORAL ONCE
Status: COMPLETED | OUTPATIENT
Start: 2024-06-05 | End: 2024-06-05

## 2024-06-05 RX ADMIN — OXYCODONE HYDROCHLORIDE AND ACETAMINOPHEN 1 TABLET: 5; 325 TABLET ORAL at 20:37

## 2024-06-05 RX ADMIN — MORPHINE SULFATE 4 MG: 4 INJECTION, SOLUTION INTRAMUSCULAR; INTRAVENOUS at 19:11

## 2024-06-05 ASSESSMENT — PAIN SCALES - GENERAL
PAINLEVEL_OUTOF10: 7

## 2024-06-05 ASSESSMENT — PAIN - FUNCTIONAL ASSESSMENT: PAIN_FUNCTIONAL_ASSESSMENT: 0-10
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
## 2024-06-05 NOTE — ED PROVIDER NOTES
[facility] ED  eMERGENCY dEPARTMENT eNCOUnter   Attending Attestation     Pt Name: Miles Chaves  MRN: [id number]  Birthdate 1955  Date of evaluation: 6/5/24       Miles Chaves is a 68 y.o. male who presents with Knee Pain      History:   Patient is here complaining of having some issues with his right knee with drainage and increasing pain.  Patient states he had issues with this for 5 years since he had surgery on his knee.  Patient denies any fevers.  Patient is visually impaired and cannot really see what his knee looks like to tell us if it is different.    Exam: Vitals:   Vitals:    06/05/24 1832   BP: (!) 160/99   Pulse: 97   Resp: 14   Temp: 99 °F (37.2 °C)   TempSrc: Oral   SpO2: 97%   Weight: 99.8 kg (220 lb)   Height: 1.829 m (6')     Patient has a mass hard apparently coming off the patella nonmobile nonfluctuant with no surrounding erythema but there is some skin discoloration there and nothing that low appears to be draining.    I performed a history and physical examination of the patient and discussed management with the resident. I reviewed the resident’s note and agree with the documented findings and plan of care. Any areas of disagreement are noted on the chart. I was personally present for the key portions of any procedures. I have documented in the chart those procedures where I was not present during the key portions. I have personally reviewed all images and agree with the resident's interpretation. I have reviewed the emergency nurses triage note. I agree with the chief complaint, past medical history, past surgical history, allergies, medications, social and family history as documented unless otherwise noted below. Documentation of the HPI, Physical Exam and Medical Decision Making performed by medical students or scribes is based on my personal performance of the HPI, PE and MDM. I personally evaluated and examined the patient in conjunction with the APC and agree with

## 2024-06-06 ASSESSMENT — ENCOUNTER SYMPTOMS
BACK PAIN: 0
SHORTNESS OF BREATH: 0

## 2024-06-06 NOTE — ED PROVIDER NOTES
[facility] ED  Emergency Department Encounter  Emergency Medicine Resident     Pt Name:Miles Chaves  MRN: [id number]  Birthdate 1955  Date of evaluation: 6/5/24  PCP:  Elva Chicas, ADEEL - CNP  Note Started: 8:29 PM EDT      CHIEF COMPLAINT       Chief Complaint   Patient presents with    Knee Pain       HISTORY OF PRESENT ILLNESS  (Location/Symptom, Timing/Onset, Context/Setting, Quality, Duration, Modifying Factors, Severity.)      Miles Chaves is a 68 y.o. male who presents with knee pain.  Patient states that he fell 5 days ago the lateral aspect of his knee is causing great deal of pain.  States is progressively got worse.  States has been taking Tylenol last dose was 2 hours prior to arrival without relief of symptoms.  States he does have prior history of any knee replacements in his right knee multiple years ago.  Sees Dr. Pandya, orthopedic surgery.    PAST MEDICAL / SURGICAL / SOCIAL / FAMILY HISTORY      has a past medical history of Chronic pain, Closed fracture of multiple ribs of right side, Contracture, elbow, Diabetes (HCC), Erectile dysfunction, GERD (gastroesophageal reflux disease), Hypertension, Osteogenesis imperfecta, Osteogenesis imperfecta, and Type II or unspecified type diabetes mellitus without mention of complication, not stated as uncontrolled.       has a past surgical history that includes Cholecystectomy; Tonsillectomy and adenoidectomy; fracture surgery (Right); fracture surgery (Bilateral); fracture surgery (Left); Femur fracture surgery (Right, 4/23/2020); ORIF PROXIMAL TIBIAL PLATEAU FRACTURE (Bilateral, 7/20/2020); Leg Surgery (Bilateral); hip surgery (Left); Elbow surgery (Right); Wrist surgery (Left); and US I&D BREAST ABSCESS DEEP (2/1/2022).    Social History     Socioeconomic History    Marital status:      Spouse name: Not on file    Number of children: Not on file    Years of education: Not on file    Highest education level: Not on file

## 2024-06-06 NOTE — DISCHARGE INSTRUCTIONS
You are seen in the emergency department for your knee pain.  He had fallen 5 days ago we did x-ray which looked normal.  To do believe that it is infected at this time.  Please follow-up with your orthopedic surgeon.     We also discussed that he had been having jerking movements concern for Parkinson's.  Please follow-up with his PCP for further evaluation.   PLEASE RETURN TO THE EMERGENCY DEPARTMENT IMMEDIATELY for worsening symptoms, pain not controlled with the prescribed / over the counter pain medication, numbness or tingling in your feet or toes, increased swelling to your toes, or if you develop any concerning symptoms such as: high fever not relieved by acetaminophen (Tylenol) and/or ibuprofen (Motrin / Advil), chills, shortness of breath, chest pain, feeling of your heart fluttering or racing, persistent nausea and/or vomiting, vomiting up blood, blood in your stool, numbness, loss of consciousness, weakness or tingling in the arms or legs or change in color of the extremities, changes in mental status, persistent headache, blurry vision, loss of bladder / bowel control, unable to follow up with your physician, or other any other care or concern.

## 2024-07-23 ENCOUNTER — HOSPITAL ENCOUNTER (INPATIENT)
Age: 69
LOS: 5 days | Discharge: NURSING, REHAB OR LONG-TERM CARE | DRG: 536 | End: 2024-07-29
Attending: EMERGENCY MEDICINE | Admitting: INTERNAL MEDICINE
Payer: MEDICARE

## 2024-07-23 ENCOUNTER — APPOINTMENT (OUTPATIENT)
Dept: CT IMAGING | Age: 69
DRG: 536 | End: 2024-07-23
Payer: MEDICARE

## 2024-07-23 ENCOUNTER — APPOINTMENT (OUTPATIENT)
Dept: GENERAL RADIOLOGY | Age: 69
DRG: 536 | End: 2024-07-23
Payer: MEDICARE

## 2024-07-23 DIAGNOSIS — S32.592A CLOSED BILATERAL FRACTURE OF PUBIC RAMI, INITIAL ENCOUNTER (HCC): ICD-10-CM

## 2024-07-23 DIAGNOSIS — R26.2 UNABLE TO AMBULATE: ICD-10-CM

## 2024-07-23 DIAGNOSIS — R53.81 DEBILITY: ICD-10-CM

## 2024-07-23 DIAGNOSIS — R52 INTRACTABLE PAIN: ICD-10-CM

## 2024-07-23 DIAGNOSIS — S32.591A CLOSED BILATERAL FRACTURE OF PUBIC RAMI, INITIAL ENCOUNTER (HCC): ICD-10-CM

## 2024-07-23 DIAGNOSIS — S22.39XA CLOSED TRAUMATIC DISPLACED FRACTURE OF ONE RIB: ICD-10-CM
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
## 2024-07-23 LAB
ALBUMIN SERPL-MCNC: 4.6 G/DL (ref 3.5–5.2)
ALP SERPL-CCNC: 111 U/L (ref 40–129)
ALT SERPL-CCNC: 22 U/L (ref 5–41)
ANION GAP SERPL CALCULATED.3IONS-SCNC: 10 MMOL/L (ref 9–17)
AST SERPL-CCNC: 18 U/L
BASOPHILS # BLD: 0.1 K/UL (ref 0–0.2)
BASOPHILS NFR BLD: 1 % (ref 0–2)
BILIRUB SERPL-MCNC: 0.4 MG/DL (ref 0.3–1.2)
BUN SERPL-MCNC: 22 MG/DL (ref 8–23)
CALCIUM SERPL-MCNC: 10.4 MG/DL (ref 8.6–10.4)
CHLORIDE SERPL-SCNC: 103 MMOL/L (ref 98–107)
CO2 SERPL-SCNC: 23 MMOL/L (ref 20–31)
CREAT SERPL-MCNC: 1.7 MG/DL (ref 0.7–1.2)
EOSINOPHIL # BLD: 0.2 K/UL (ref 0–0.4)
EOSINOPHILS RELATIVE PERCENT: 2 % (ref 0–4)
ERYTHROCYTE [DISTWIDTH] IN BLOOD BY AUTOMATED COUNT: 15.3 % (ref 11.5–14.9)
GFR, ESTIMATED: 43 ML/MIN/1.73M2
GLUCOSE SERPL-MCNC: 171 MG/DL (ref 70–99)
HCT VFR BLD AUTO: 42.5 % (ref 41–53)
HGB BLD-MCNC: 14.6 G/DL (ref 13.5–17.5)
INR PPP: 1.2
LYMPHOCYTES NFR BLD: 0.9 K/UL (ref 1–4.8)
LYMPHOCYTES RELATIVE PERCENT: 8 % (ref 24–44)
MCH RBC QN AUTO: 30.6 PG (ref 26–34)
MCHC RBC AUTO-ENTMCNC: 34.3 G/DL (ref 31–37)
MCV RBC AUTO: 89.2 FL (ref 80–100)
MONOCYTES NFR BLD: 0.6 K/UL (ref 0.1–1.3)
MONOCYTES NFR BLD: 5 % (ref 1–7)
NEUTROPHILS NFR BLD: 84 % (ref 36–66)
NEUTS SEG NFR BLD: 9.7 K/UL (ref 1.3–9.1)
PLATELET # BLD AUTO: 189 K/UL (ref 150–450)
PMV BLD AUTO: 7.7 FL (ref 6–12)
POTASSIUM SERPL-SCNC: 4.9 MMOL/L (ref 3.7–5.3)
PROT SERPL-MCNC: 8.1 G/DL (ref 6.4–8.3)
PROTHROMBIN TIME: 15.1 SEC (ref 11.8–14.6)
RBC # BLD AUTO: 4.76 M/UL (ref 4.5–5.9)
SODIUM SERPL-SCNC: 136 MMOL/L (ref 135–144)
WBC OTHER # BLD: 11.5 K/UL (ref 3.5–11)

## 2024-07-23 PROCEDURE — 96374 THER/PROPH/DIAG INJ IV PUSH: CPT

## 2024-07-23 PROCEDURE — 6360000004 HC RX CONTRAST MEDICATION: Performed by: EMERGENCY MEDICINE

## 2024-07-23 PROCEDURE — 73080 X-RAY EXAM OF ELBOW: CPT

## 2024-07-23 PROCEDURE — 73502 X-RAY EXAM HIP UNI 2-3 VIEWS: CPT

## 2024-07-23 PROCEDURE — 85610 PROTHROMBIN TIME: CPT

## 2024-07-23 PROCEDURE — 36415 COLL VENOUS BLD VENIPUNCTURE: CPT

## 2024-07-23 PROCEDURE — 85025 COMPLETE CBC W/AUTO DIFF WBC: CPT

## 2024-07-23 PROCEDURE — 2580000003 HC RX 258: Performed by: EMERGENCY MEDICINE

## 2024-07-23 PROCEDURE — 72125 CT NECK SPINE W/O DYE: CPT

## 2024-07-23 PROCEDURE — 70450 CT HEAD/BRAIN W/O DYE: CPT

## 2024-07-23 PROCEDURE — 6370000000 HC RX 637 (ALT 250 FOR IP)

## 2024-07-23 PROCEDURE — 80053 COMPREHEN METABOLIC PANEL: CPT

## 2024-07-23 PROCEDURE — 6360000002 HC RX W HCPCS

## 2024-07-23 PROCEDURE — 99285 EMERGENCY DEPT VISIT HI MDM: CPT

## 2024-07-23 PROCEDURE — 71260 CT THORAX DX C+: CPT

## 2024-07-23 PROCEDURE — 73562 X-RAY EXAM OF KNEE 3: CPT

## 2024-07-23 RX ORDER — LORAZEPAM 2 MG/ML
0.5 INJECTION INTRAMUSCULAR ONCE
Status: COMPLETED | OUTPATIENT
Start: 2024-07-23 | End: 2024-07-23

## 2024-07-23 RX ORDER — 0.9 % SODIUM CHLORIDE 0.9 %
100 INTRAVENOUS SOLUTION INTRAVENOUS ONCE
Status: COMPLETED | OUTPATIENT
Start: 2024-07-23 | End: 2024-07-24

## 2024-07-23 RX ORDER — FENTANYL CITRATE 0.05 MG/ML
50 INJECTION, SOLUTION INTRAMUSCULAR; INTRAVENOUS ONCE
Status: COMPLETED | OUTPATIENT
Start: 2024-07-23 | End: 2024-07-23

## 2024-07-23 RX ORDER — OXYCODONE HYDROCHLORIDE AND ACETAMINOPHEN 5; 325 MG/1; MG/1
1 TABLET ORAL ONCE
Status: COMPLETED | OUTPATIENT
Start: 2024-07-23 | End: 2024-07-23

## 2024-07-23 RX ORDER — SODIUM CHLORIDE 0.9 % (FLUSH) 0.9 %
10 SYRINGE (ML) INJECTION PRN
Status: COMPLETED | OUTPATIENT
Start: 2024-07-23 | End: 2024-07-23

## 2024-07-23 RX ADMIN — OXYCODONE HYDROCHLORIDE AND ACETAMINOPHEN 1 TABLET: 5; 325 TABLET ORAL at 22:37

## 2024-07-23 RX ADMIN — FENTANYL CITRATE 50 MCG: 50 INJECTION INTRAMUSCULAR; INTRAVENOUS at 23:14

## 2024-07-23 RX ADMIN — SODIUM CHLORIDE, PRESERVATIVE FREE 10 ML: 5 INJECTION INTRAVENOUS at 23:50

## 2024-07-23 RX ADMIN — IOPAMIDOL 100 ML: 755 INJECTION, SOLUTION INTRAVENOUS at 23:50

## 2024-07-23 RX ADMIN — LORAZEPAM 0.5 MG: 2 INJECTION INTRAMUSCULAR; INTRAVENOUS at 23:14

## 2024-07-23 RX ADMIN — SODIUM CHLORIDE 100 ML: 9 INJECTION, SOLUTION INTRAVENOUS at 23:50

## 2024-07-23 ASSESSMENT — PAIN DESCRIPTION - LOCATION
LOCATION: HIP;KNEE;RIB CAGE
LOCATION: HIP;KNEE;RIB CAGE

## 2024-07-23 ASSESSMENT — PAIN DESCRIPTION - ORIENTATION
ORIENTATION: RIGHT;LEFT
ORIENTATION: LEFT;RIGHT

## 2024-07-23 ASSESSMENT — PAIN DESCRIPTION - PAIN TYPE: TYPE: ACUTE PAIN

## 2024-07-23 ASSESSMENT — PAIN SCALES - GENERAL
PAINLEVEL_OUTOF10: 7
PAINLEVEL_OUTOF10: 6
PAINLEVEL_OUTOF10: 8

## 2024-07-23 ASSESSMENT — PAIN DESCRIPTION - DESCRIPTORS
DESCRIPTORS: ACHING
DESCRIPTORS: ACHING

## 2024-07-23 ASSESSMENT — PAIN - FUNCTIONAL ASSESSMENT
PAIN_FUNCTIONAL_ASSESSMENT: 0-10
PAIN_FUNCTIONAL_ASSESSMENT: PREVENTS OR INTERFERES WITH ALL ACTIVE AND SOME PASSIVE ACTIVITIES

## 2024-07-24 PROBLEM — M25.552 LEFT HIP PAIN: Status: ACTIVE | Noted: 2024-07-24

## 2024-07-24 PROBLEM — M17.12 ARTHRITIS OF LEFT KNEE: Status: ACTIVE | Noted: 2024-07-24

## 2024-07-24 PROBLEM — G25.71 AKATHISIA: Status: ACTIVE | Noted: 2024-07-24

## 2024-07-24 PROBLEM — T14.90XA TRAUMA: Status: ACTIVE | Noted: 2024-07-24

## 2024-07-24 PROBLEM — S32.020S CLOSED COMPRESSION FRACTURE OF L2 LUMBAR VERTEBRA, SEQUELA: Status: ACTIVE | Noted: 2024-07-24

## 2024-07-24 PROBLEM — M25.561 CHRONIC PAIN OF BOTH KNEES: Status: ACTIVE | Noted: 2024-07-24

## 2024-07-24 PROBLEM — R07.81 RIB PAIN: Status: ACTIVE | Noted: 2024-07-24

## 2024-07-24 PROBLEM — G89.29 CHRONIC MIDLINE LOW BACK PAIN WITHOUT SCIATICA: Status: ACTIVE | Noted: 2024-07-24

## 2024-07-24 PROBLEM — S22.070A CLOSED WEDGE COMPRESSION FRACTURE OF T9 VERTEBRA (HCC): Status: ACTIVE | Noted: 2024-07-24

## 2024-07-24 PROBLEM — S22.060A CLOSED WEDGE COMPRESSION FRACTURE OF T8 VERTEBRA (HCC): Status: ACTIVE | Noted: 2024-07-24

## 2024-07-24 PROBLEM — M54.50 CHRONIC MIDLINE LOW BACK PAIN WITHOUT SCIATICA: Status: ACTIVE | Noted: 2024-07-24

## 2024-07-24 PROBLEM — S22.41XD CLOSED FRACTURE OF MULTIPLE RIBS OF RIGHT SIDE WITH ROUTINE HEALING: Status: ACTIVE | Noted: 2021-12-11

## 2024-07-24 PROBLEM — G89.29 CHRONIC PAIN OF BOTH KNEES: Status: ACTIVE | Noted: 2024-07-24

## 2024-07-24 PROBLEM — M25.562 CHRONIC PAIN OF BOTH KNEES: Status: ACTIVE | Noted: 2024-07-24

## 2024-07-24 PROBLEM — S32.592A PUBIC RAMUS FRACTURE, LEFT, CLOSED, INITIAL ENCOUNTER (HCC): Status: ACTIVE | Noted: 2024-07-24

## 2024-07-24 LAB
AMMONIA PLAS-SCNC: 65 UMOL/L (ref 16–60)
ANION GAP SERPL CALCULATED.3IONS-SCNC: 16 MMOL/L (ref 9–17)
BASOPHILS # BLD: 0.1 K/UL (ref 0–0.2)
BASOPHILS NFR BLD: 1 % (ref 0–2)
BUN SERPL-MCNC: 22 MG/DL (ref 8–23)
CALCIUM SERPL-MCNC: 9.8 MG/DL (ref 8.6–10.4)
CHLORIDE SERPL-SCNC: 99 MMOL/L (ref 98–107)
CO2 SERPL-SCNC: 19 MMOL/L (ref 20–31)
CREAT SERPL-MCNC: 1.5 MG/DL (ref 0.7–1.2)
EOSINOPHIL # BLD: 0.1 K/UL (ref 0–0.4)
EOSINOPHILS RELATIVE PERCENT: 1 % (ref 0–4)
ERYTHROCYTE [DISTWIDTH] IN BLOOD BY AUTOMATED COUNT: 15.6 % (ref 11.5–14.9)
GFR, ESTIMATED: 50 ML/MIN/1.73M2
GLUCOSE BLD-MCNC: 223 MG/DL (ref 75–110)
GLUCOSE BLD-MCNC: 256 MG/DL (ref 75–110)
GLUCOSE SERPL-MCNC: 232 MG/DL (ref 70–99)
HCT VFR BLD AUTO: 40.8 % (ref 41–53)
HGB BLD-MCNC: 13.6 G/DL (ref 13.5–17.5)
LYMPHOCYTES NFR BLD: 1 K/UL (ref 1–4.8)
LYMPHOCYTES RELATIVE PERCENT: 9 % (ref 24–44)
MCH RBC QN AUTO: 30.2 PG (ref 26–34)
MCHC RBC AUTO-ENTMCNC: 33.5 G/DL (ref 31–37)
MCV RBC AUTO: 90.4 FL (ref 80–100)
MONOCYTES NFR BLD: 0.7 K/UL (ref 0.1–1.3)
MONOCYTES NFR BLD: 7 % (ref 1–7)
NEUTROPHILS NFR BLD: 82 % (ref 36–66)
NEUTS SEG NFR BLD: 8.3 K/UL (ref 1.3–9.1)
PLATELET # BLD AUTO: 172 K/UL (ref 150–450)
PMV BLD AUTO: 8 FL (ref 6–12)
POTASSIUM SERPL-SCNC: 5 MMOL/L (ref 3.7–5.3)
RBC # BLD AUTO: 4.51 M/UL (ref 4.5–5.9)
SODIUM SERPL-SCNC: 134 MMOL/L (ref 135–144)
TSH SERPL DL<=0.05 MIU/L-ACNC: 2.52 UIU/ML (ref 0.3–5)
VIT B12 SERPL-MCNC: 426 PG/ML (ref 232–1245)
WBC OTHER # BLD: 10.2 K/UL (ref 3.5–11)

## 2024-07-24 PROCEDURE — 99222 1ST HOSP IP/OBS MODERATE 55: CPT | Performed by: ORTHOPAEDIC SURGERY

## 2024-07-24 PROCEDURE — 80048 BASIC METABOLIC PNL TOTAL CA: CPT

## 2024-07-24 PROCEDURE — 6360000002 HC RX W HCPCS: Performed by: NURSE PRACTITIONER

## 2024-07-24 PROCEDURE — 6360000002 HC RX W HCPCS: Performed by: SURGERY

## 2024-07-24 PROCEDURE — 99223 1ST HOSP IP/OBS HIGH 75: CPT | Performed by: SURGERY

## 2024-07-24 PROCEDURE — 6370000000 HC RX 637 (ALT 250 FOR IP): Performed by: INTERNAL MEDICINE

## 2024-07-24 PROCEDURE — 2580000003 HC RX 258: Performed by: SURGERY

## 2024-07-24 PROCEDURE — 85025 COMPLETE CBC W/AUTO DIFF WBC: CPT

## 2024-07-24 PROCEDURE — 82140 ASSAY OF AMMONIA: CPT

## 2024-07-24 PROCEDURE — 82947 ASSAY GLUCOSE BLOOD QUANT: CPT

## 2024-07-24 PROCEDURE — 6370000000 HC RX 637 (ALT 250 FOR IP): Performed by: NURSE PRACTITIONER

## 2024-07-24 PROCEDURE — 99223 1ST HOSP IP/OBS HIGH 75: CPT | Performed by: INTERNAL MEDICINE

## 2024-07-24 PROCEDURE — 1200000000 HC SEMI PRIVATE

## 2024-07-24 PROCEDURE — 36415 COLL VENOUS BLD VENIPUNCTURE: CPT

## 2024-07-24 PROCEDURE — 82607 VITAMIN B-12: CPT

## 2024-07-24 PROCEDURE — 84443 ASSAY THYROID STIM HORMONE: CPT

## 2024-07-24 PROCEDURE — 99223 1ST HOSP IP/OBS HIGH 75: CPT | Performed by: PSYCHIATRY & NEUROLOGY

## 2024-07-24 RX ORDER — FENTANYL CITRATE 0.05 MG/ML
25 INJECTION, SOLUTION INTRAMUSCULAR; INTRAVENOUS
Status: DISCONTINUED | OUTPATIENT
Start: 2024-07-24 | End: 2024-07-29

## 2024-07-24 RX ORDER — DEXTROSE MONOHYDRATE 100 MG/ML
INJECTION, SOLUTION INTRAVENOUS CONTINUOUS PRN
Status: DISCONTINUED | OUTPATIENT
Start: 2024-07-24 | End: 2024-07-29 | Stop reason: HOSPADM

## 2024-07-24 RX ORDER — FENTANYL CITRATE 0.05 MG/ML
50 INJECTION, SOLUTION INTRAMUSCULAR; INTRAVENOUS ONCE
Status: COMPLETED | OUTPATIENT
Start: 2024-07-24 | End: 2024-07-24

## 2024-07-24 RX ORDER — INSULIN LISPRO 100 [IU]/ML
0-4 INJECTION, SOLUTION INTRAVENOUS; SUBCUTANEOUS NIGHTLY
Status: DISCONTINUED | OUTPATIENT
Start: 2024-07-24 | End: 2024-07-29 | Stop reason: HOSPADM

## 2024-07-24 RX ORDER — PANTOPRAZOLE SODIUM 40 MG/1
40 TABLET, DELAYED RELEASE ORAL DAILY
Status: DISCONTINUED | OUTPATIENT
Start: 2024-07-24 | End: 2024-07-29 | Stop reason: HOSPADM

## 2024-07-24 RX ORDER — INSULIN LISPRO 100 [IU]/ML
0-8 INJECTION, SOLUTION INTRAVENOUS; SUBCUTANEOUS
Status: DISCONTINUED | OUTPATIENT
Start: 2024-07-24 | End: 2024-07-29 | Stop reason: HOSPADM

## 2024-07-24 RX ORDER — ENOXAPARIN SODIUM 100 MG/ML
40 INJECTION SUBCUTANEOUS DAILY
Status: DISCONTINUED | OUTPATIENT
Start: 2024-07-24 | End: 2024-07-29 | Stop reason: HOSPADM

## 2024-07-24 RX ORDER — ACETAMINOPHEN 325 MG/1
650 TABLET ORAL EVERY 4 HOURS PRN
Status: DISCONTINUED | OUTPATIENT
Start: 2024-07-24 | End: 2024-07-29 | Stop reason: HOSPADM

## 2024-07-24 RX ORDER — FENTANYL CITRATE 0.05 MG/ML
50 INJECTION, SOLUTION INTRAMUSCULAR; INTRAVENOUS
Status: DISCONTINUED | OUTPATIENT
Start: 2024-07-24 | End: 2024-07-29

## 2024-07-24 RX ORDER — ONDANSETRON 2 MG/ML
4 INJECTION INTRAMUSCULAR; INTRAVENOUS EVERY 6 HOURS PRN
Status: DISCONTINUED | OUTPATIENT
Start: 2024-07-24 | End: 2024-07-29 | Stop reason: HOSPADM

## 2024-07-24 RX ORDER — SODIUM CHLORIDE 9 MG/ML
INJECTION, SOLUTION INTRAVENOUS CONTINUOUS
Status: DISCONTINUED | OUTPATIENT
Start: 2024-07-24 | End: 2024-07-29

## 2024-07-24 RX ORDER — OXYCODONE HYDROCHLORIDE AND ACETAMINOPHEN 5; 325 MG/1; MG/1
1 TABLET ORAL EVERY 4 HOURS PRN
Status: DISCONTINUED | OUTPATIENT
Start: 2024-07-24 | End: 2024-07-29 | Stop reason: HOSPADM

## 2024-07-24 RX ORDER — LIDOCAINE 4 G/G
1 PATCH TOPICAL DAILY
Status: DISCONTINUED | OUTPATIENT
Start: 2024-07-24 | End: 2024-07-29 | Stop reason: HOSPADM

## 2024-07-24 RX ORDER — SERTRALINE HYDROCHLORIDE 100 MG/1
100 TABLET, FILM COATED ORAL DAILY
Status: DISCONTINUED | OUTPATIENT
Start: 2024-07-24 | End: 2024-07-25

## 2024-07-24 RX ORDER — HYDROXYZINE HYDROCHLORIDE 10 MG/1
10 TABLET, FILM COATED ORAL 3 TIMES DAILY PRN
Status: DISCONTINUED | OUTPATIENT
Start: 2024-07-24 | End: 2024-07-29 | Stop reason: HOSPADM

## 2024-07-24 RX ADMIN — ENOXAPARIN SODIUM 40 MG: 100 INJECTION SUBCUTANEOUS at 09:11

## 2024-07-24 RX ADMIN — FENTANYL CITRATE 50 MCG: 0.05 INJECTION, SOLUTION INTRAMUSCULAR; INTRAVENOUS at 22:47

## 2024-07-24 RX ADMIN — FENTANYL CITRATE 50 MCG: 0.05 INJECTION, SOLUTION INTRAMUSCULAR; INTRAVENOUS at 06:46

## 2024-07-24 RX ADMIN — OXYCODONE AND ACETAMINOPHEN 1 TABLET: 5; 325 TABLET ORAL at 12:11

## 2024-07-24 RX ADMIN — METOPROLOL TARTRATE 25 MG: 25 TABLET, FILM COATED ORAL at 09:11

## 2024-07-24 RX ADMIN — INSULIN LISPRO 2 UNITS: 100 INJECTION, SOLUTION INTRAVENOUS; SUBCUTANEOUS at 09:11

## 2024-07-24 RX ADMIN — METOPROLOL TARTRATE 25 MG: 25 TABLET, FILM COATED ORAL at 21:38

## 2024-07-24 RX ADMIN — SERTRALINE 100 MG: 100 TABLET, FILM COATED ORAL at 09:11

## 2024-07-24 RX ADMIN — INSULIN LISPRO 2 UNITS: 100 INJECTION, SOLUTION INTRAVENOUS; SUBCUTANEOUS at 17:08

## 2024-07-24 RX ADMIN — INSULIN LISPRO 4 UNITS: 100 INJECTION, SOLUTION INTRAVENOUS; SUBCUTANEOUS at 12:11

## 2024-07-24 RX ADMIN — OXYCODONE AND ACETAMINOPHEN 1 TABLET: 5; 325 TABLET ORAL at 21:37

## 2024-07-24 RX ADMIN — OXYCODONE AND ACETAMINOPHEN 1 TABLET: 5; 325 TABLET ORAL at 17:08

## 2024-07-24 RX ADMIN — HYDROXYZINE HYDROCHLORIDE 10 MG: 10 TABLET ORAL at 17:34

## 2024-07-24 RX ADMIN — INSULIN HUMAN 30 UNITS: 100 INJECTION, SUSPENSION SUBCUTANEOUS at 21:37

## 2024-07-24 RX ADMIN — INSULIN HUMAN 70 UNITS: 100 INJECTION, SUSPENSION SUBCUTANEOUS at 09:11

## 2024-07-24 RX ADMIN — FENTANYL CITRATE 50 MCG: 0.05 INJECTION, SOLUTION INTRAMUSCULAR; INTRAVENOUS at 14:22

## 2024-07-24 RX ADMIN — PANTOPRAZOLE SODIUM 40 MG: 40 TABLET, DELAYED RELEASE ORAL at 09:11

## 2024-07-24 RX ADMIN — SODIUM CHLORIDE: 9 INJECTION, SOLUTION INTRAVENOUS at 06:51

## 2024-07-24 ASSESSMENT — PAIN SCALES - GENERAL
PAINLEVEL_OUTOF10: 6
PAINLEVEL_OUTOF10: 6
PAINLEVEL_OUTOF10: 9
PAINLEVEL_OUTOF10: 4
PAINLEVEL_OUTOF10: 9
PAINLEVEL_OUTOF10: 7

## 2024-07-24 ASSESSMENT — PAIN DESCRIPTION - ORIENTATION
ORIENTATION: LEFT;RIGHT
ORIENTATION: LEFT
ORIENTATION: RIGHT

## 2024-07-24 ASSESSMENT — LIFESTYLE VARIABLES
HOW MANY STANDARD DRINKS CONTAINING ALCOHOL DO YOU HAVE ON A TYPICAL DAY: PATIENT DOES NOT DRINK
HOW OFTEN DO YOU HAVE A DRINK CONTAINING ALCOHOL: NEVER

## 2024-07-24 ASSESSMENT — PAIN DESCRIPTION - LOCATION
LOCATION: RIB CAGE;HIP
LOCATION: HIP
LOCATION: RIB CAGE

## 2024-07-24 ASSESSMENT — PAIN DESCRIPTION - DESCRIPTORS
DESCRIPTORS: STABBING
DESCRIPTORS: ACHING
DESCRIPTORS: THROBBING
DESCRIPTORS: SHARP
DESCRIPTORS: SHARP

## 2024-07-24 NOTE — ED NOTES
Report given to CEASAR Fortune from [employer].   Report method by phone   The following was reviewed with receiving RN:   Current vital signs:  BP (!) 143/74   Pulse 87   Temp 97.5 °F (36.4 °C) (Oral)   Resp 18   Ht 1.829 m (6')   Wt 90.7 kg (200 lb)   SpO2 94%   BMI 27.12 kg/m²                MEWS Score: 1     Any medication or safety alerts were reviewed. Any pending diagnostics and notifications were also reviewed, as well as any safety concerns or issues, abnormal labs, abnormal imaging, and abnormal assessment findings. Questions were answered.

## 2024-07-24 NOTE — ACP (ADVANCE CARE PLANNING)
Advance Care Planning     Advance Care Planning Activator (Inpatient)  Conversation Note      Date of ACP Conversation: 7/24/2024     Conversation Conducted with: Patient with Decision Making Capacity    ACP Activator: Concha To RN    Health Care Decision Maker:     Current Designated Health Care Decision Maker:     Primary Decision Maker: Nasrin Chaves - Spouse - [phone]    Primary Decision Maker: Anne Whitmore - Child - [phone]    Secondary Decision Maker: Carlos Chaves - Child - [phone]  Click here to complete Healthcare Decision Makers including section of the Healthcare Decision Maker Relationship (ie \"Primary\")  Today we documented Decision Maker(s) consistent with Legal Next of Kin hierarchy.    Care Preferences    Ventilation:  \"If you were in your present state of health and suddenly became very ill and were unable to breathe on your own, what would your preference be about the use of a ventilator (breathing machine) if it were available to you?\"      Would the patient desire the use of ventilator (breathing machine)?: yes    \"If your health worsens and it becomes clear that your chance of recovery is unlikely, what would your preference be about the use of a ventilator (breathing machine) if it were available to you?\"     Would the patient desire the use of ventilator (breathing machine)?: No      Resuscitation  \"CPR works best to restart the heart when there is a sudden event, like a heart attack, in someone who is otherwise healthy. Unfortunately, CPR does not typically restart the heart for people who have serious health conditions or who are very sick.\"    \"In the event your heart stopped as a result of an underlying serious health condition, would you want attempts to be made to restart your heart (answer \"yes\" for attempt to resuscitate) or would you prefer a natural death (answer \"no\" for do not attempt to resuscitate)?\" yes       [] Yes   [x] No   Educated Patient /

## 2024-07-24 NOTE — ED PROVIDER NOTES
tenderness. There is no guarding.   Musculoskeletal:      Right shoulder: Normal.      Left shoulder: Normal.      Right elbow: No deformity. Decreased range of motion. Tenderness present.      Left elbow: Swelling present. Normal range of motion. Tenderness present.      Right wrist: Normal.      Left wrist: Normal.      Right hip: Bony tenderness present.      Left hip: Bony tenderness present.      Right knee: No bony tenderness.      Left knee: Swelling and bony tenderness present. No crepitus.   Skin:     General: Skin is warm and dry.      Capillary Refill: Capillary refill takes less than 2 seconds.   Neurological:      General: No focal deficit present.      Mental Status: He is alert and oriented to person, place, and time.       DDX/DIAGNOSTIC RESULTS / EMERGENCY DEPARTMENT COURSE / MDM     Medical Decision Making  Amount and/or Complexity of Data Reviewed  Labs: ordered.  Radiology: ordered.    Risk  Prescription drug management.    68-year-old male presenting for evaluation after a fall, history of osteogenesis imperfecta with multiple orthopedic injuries in the past.  Patient states he was loading his wheelchair into the back of his truck when he fell backwards and hit his head and the wheelchair landed on top of him.  See HPI for details.    On examination, the patient is maintained in a cervical collar.  There is no cervical spine tenderness.  There is no obvious injury to the head or the scalp.  Patient is GCS 15.  Alert and oriented x 4.  Motor and sensation intact in all 4 extremities.  Patient with bruising and skin tear to bilateral elbows, tenderness to bilateral hips, tenderness to the left knee.  He also has some tenderness over the anterior chest wall, however no crepitus.  No abdominal tenderness.  See physical examination for physical exam findings.    Given history of osteogenesis imperfecta, will plan to obtain CT head, CT of the cervical spine, CT thoracic, lumbar spine as well as CT

## 2024-07-24 NOTE — H&P
Modified POA    * (Principal) Trauma 7/24/2024 Yes       ASSESSMENT   68 y.o. male with history of fall  Blood work reviewed.  Sodium potassium normal.  Creatinine is mildly elevated.  LFTs are unremarkable.  WBC count is 11.5.  Hemoglobin 14.6.  Platelet count adequate.  CT of the thoracic spine reveals compression fracture of T8 and T9 likely representing old fractures.  Multilevel degenerative disc disease without significant spinal canal stenosis or neuroforaminal narrowing.  X-ray of the left elbow revealed moderate-sized joint effusion may represent old fracture.  Degenerative arthrosis of the elbow.  Deformity of the radius had likely due to old fracture.  Left knee revealed no acute abnormality.  Right elbow revealed no acute fracture or dislocation.  Advanced degenerative arthrosis.  X-ray of the hip with pelvis reveals acute left pubic rami fractures.  CT of the lumbar spine revealed no evidence of acute traumatic injury of the lumbar spine.  Old compression fracture of L2.  Multilevel degenerative changes.  CT of the chest abdomen and pelvis revealed acute displaced fracture of the right sixth rib posterior aspect.  Acute displaced left superior and inferior pubic rami fractures.  CT of the cervical spine revealed no acute fracture.  CT of the head shows no acute intracranial abnormality.    PLAN  Admission to the hospital.  Diet as tolerated.  GI/DVT prophylaxis.  Pain control.  Pulmonary toilet.  Incentive spirometer.  Deep breathing and coughing.  Lidoderm patch.  Orthopedic consult.  Internal medicine and pulmonary consult.  No acute general surgical intervention at this time necessary.  Orders placed in the computer.  PT OT eval and treatment.  Activity based on the recommendations by orthopedic surgery.    Thank you for this interesting consult and for allowing us to participate in the care of this patient. If you have any questions please don't hesitate to call.    The patient, Miles Chaves is

## 2024-07-25 PROBLEM — F41.1 GAD (GENERALIZED ANXIETY DISORDER): Status: ACTIVE | Noted: 2024-07-25

## 2024-07-25 PROBLEM — S32.591A CLOSED BILATERAL FRACTURE OF PUBIC RAMI (HCC): Status: ACTIVE | Noted: 2024-07-25

## 2024-07-25 PROBLEM — S22.31XA CLOSED FRACTURE OF ONE RIB OF RIGHT SIDE: Status: ACTIVE | Noted: 2024-07-25

## 2024-07-25 PROBLEM — Z91.81 HISTORY OF FALL: Status: ACTIVE | Noted: 2024-07-25

## 2024-07-25 PROBLEM — S32.592A CLOSED BILATERAL FRACTURE OF PUBIC RAMI (HCC): Status: ACTIVE | Noted: 2024-07-25

## 2024-07-25 LAB
ANION GAP SERPL CALCULATED.3IONS-SCNC: 11 MMOL/L (ref 9–17)
BASOPHILS # BLD: 0 K/UL (ref 0–0.2)
BASOPHILS NFR BLD: 1 % (ref 0–2)
BUN SERPL-MCNC: 23 MG/DL (ref 8–23)
CALCIUM SERPL-MCNC: 9.8 MG/DL (ref 8.6–10.4)
CHLORIDE SERPL-SCNC: 100 MMOL/L (ref 98–107)
CO2 SERPL-SCNC: 21 MMOL/L (ref 20–31)
CREAT SERPL-MCNC: 1.4 MG/DL (ref 0.7–1.2)
EOSINOPHIL # BLD: 0.1 K/UL (ref 0–0.4)
EOSINOPHILS RELATIVE PERCENT: 1 % (ref 0–4)
ERYTHROCYTE [DISTWIDTH] IN BLOOD BY AUTOMATED COUNT: 15.5 % (ref 11.5–14.9)
EST. AVERAGE GLUCOSE BLD GHB EST-MCNC: 154 MG/DL
GFR, ESTIMATED: 55 ML/MIN/1.73M2
GLUCOSE BLD-MCNC: 220 MG/DL (ref 75–110)
GLUCOSE BLD-MCNC: 224 MG/DL (ref 75–110)
GLUCOSE BLD-MCNC: 232 MG/DL (ref 75–110)
GLUCOSE BLD-MCNC: 245 MG/DL (ref 75–110)
GLUCOSE SERPL-MCNC: 224 MG/DL (ref 70–99)
HBA1C MFR BLD: 7 % (ref 4–6)
HCT VFR BLD AUTO: 37 % (ref 41–53)
HGB BLD-MCNC: 12.8 G/DL (ref 13.5–17.5)
LYMPHOCYTES NFR BLD: 0.7 K/UL (ref 1–4.8)
LYMPHOCYTES RELATIVE PERCENT: 8 % (ref 24–44)
MCH RBC QN AUTO: 31.2 PG (ref 26–34)
MCHC RBC AUTO-ENTMCNC: 34.5 G/DL (ref 31–37)
MCV RBC AUTO: 90.3 FL (ref 80–100)
MONOCYTES NFR BLD: 0.7 K/UL (ref 0.1–1.3)
MONOCYTES NFR BLD: 8 % (ref 1–7)
NEUTROPHILS NFR BLD: 82 % (ref 36–66)
NEUTS SEG NFR BLD: 7.5 K/UL (ref 1.3–9.1)
PLATELET # BLD AUTO: 157 K/UL (ref 150–450)
PMV BLD AUTO: 8.1 FL (ref 6–12)
POTASSIUM SERPL-SCNC: 4.6 MMOL/L (ref 3.7–5.3)
RBC # BLD AUTO: 4.1 M/UL (ref 4.5–5.9)
SODIUM SERPL-SCNC: 132 MMOL/L (ref 135–144)
WBC OTHER # BLD: 9 K/UL (ref 3.5–11)

## 2024-07-25 PROCEDURE — 6360000002 HC RX W HCPCS: Performed by: SURGERY

## 2024-07-25 PROCEDURE — 82947 ASSAY GLUCOSE BLOOD QUANT: CPT

## 2024-07-25 PROCEDURE — 6370000000 HC RX 637 (ALT 250 FOR IP): Performed by: INTERNAL MEDICINE

## 2024-07-25 PROCEDURE — 97530 THERAPEUTIC ACTIVITIES: CPT

## 2024-07-25 PROCEDURE — 99222 1ST HOSP IP/OBS MODERATE 55: CPT | Performed by: PSYCHIATRY & NEUROLOGY

## 2024-07-25 PROCEDURE — 85025 COMPLETE CBC W/AUTO DIFF WBC: CPT

## 2024-07-25 PROCEDURE — 6370000000 HC RX 637 (ALT 250 FOR IP): Performed by: PSYCHIATRY & NEUROLOGY

## 2024-07-25 PROCEDURE — 1200000000 HC SEMI PRIVATE

## 2024-07-25 PROCEDURE — 99233 SBSQ HOSP IP/OBS HIGH 50: CPT | Performed by: INTERNAL MEDICINE

## 2024-07-25 PROCEDURE — 83036 HEMOGLOBIN GLYCOSYLATED A1C: CPT

## 2024-07-25 PROCEDURE — 99232 SBSQ HOSP IP/OBS MODERATE 35: CPT | Performed by: SURGERY

## 2024-07-25 PROCEDURE — 2580000003 HC RX 258: Performed by: SURGERY

## 2024-07-25 PROCEDURE — 6370000000 HC RX 637 (ALT 250 FOR IP): Performed by: NURSE PRACTITIONER

## 2024-07-25 PROCEDURE — 6360000002 HC RX W HCPCS: Performed by: INTERNAL MEDICINE

## 2024-07-25 PROCEDURE — 80048 BASIC METABOLIC PNL TOTAL CA: CPT

## 2024-07-25 PROCEDURE — 36415 COLL VENOUS BLD VENIPUNCTURE: CPT

## 2024-07-25 PROCEDURE — 97163 PT EVAL HIGH COMPLEX 45 MIN: CPT

## 2024-07-25 PROCEDURE — 99232 SBSQ HOSP IP/OBS MODERATE 35: CPT | Performed by: PSYCHIATRY & NEUROLOGY

## 2024-07-25 PROCEDURE — 97167 OT EVAL HIGH COMPLEX 60 MIN: CPT

## 2024-07-25 PROCEDURE — 99222 1ST HOSP IP/OBS MODERATE 55: CPT | Performed by: STUDENT IN AN ORGANIZED HEALTH CARE EDUCATION/TRAINING PROGRAM

## 2024-07-25 RX ORDER — TRAZODONE HYDROCHLORIDE 50 MG/1
50 TABLET ORAL NIGHTLY
Status: DISCONTINUED | OUTPATIENT
Start: 2024-07-25 | End: 2024-07-26

## 2024-07-25 RX ORDER — LORAZEPAM 2 MG/ML
1 INJECTION INTRAMUSCULAR ONCE
Status: COMPLETED | OUTPATIENT
Start: 2024-07-25 | End: 2024-07-25

## 2024-07-25 RX ORDER — HYDRALAZINE HYDROCHLORIDE 20 MG/ML
10 INJECTION INTRAMUSCULAR; INTRAVENOUS EVERY 6 HOURS PRN
Status: DISCONTINUED | OUTPATIENT
Start: 2024-07-25 | End: 2024-07-29 | Stop reason: HOSPADM

## 2024-07-25 RX ORDER — DIPHENHYDRAMINE HYDROCHLORIDE 50 MG/ML
12.5 INJECTION INTRAMUSCULAR; INTRAVENOUS ONCE
Status: COMPLETED | OUTPATIENT
Start: 2024-07-26 | End: 2024-07-26

## 2024-07-25 RX ORDER — ALPRAZOLAM 0.5 MG/1
0.5 TABLET ORAL ONCE
Status: COMPLETED | OUTPATIENT
Start: 2024-07-25 | End: 2024-07-25

## 2024-07-25 RX ORDER — GABAPENTIN 100 MG/1
100 CAPSULE ORAL 2 TIMES DAILY
Status: DISCONTINUED | OUTPATIENT
Start: 2024-07-25 | End: 2024-07-29 | Stop reason: HOSPADM

## 2024-07-25 RX ADMIN — INSULIN LISPRO 2 UNITS: 100 INJECTION, SOLUTION INTRAVENOUS; SUBCUTANEOUS at 12:16

## 2024-07-25 RX ADMIN — GABAPENTIN 100 MG: 100 CAPSULE ORAL at 21:04

## 2024-07-25 RX ADMIN — HYDROXYZINE HYDROCHLORIDE 10 MG: 10 TABLET ORAL at 00:05

## 2024-07-25 RX ADMIN — FENTANYL CITRATE 50 MCG: 0.05 INJECTION, SOLUTION INTRAMUSCULAR; INTRAVENOUS at 06:34

## 2024-07-25 RX ADMIN — INSULIN LISPRO 2 UNITS: 100 INJECTION, SOLUTION INTRAVENOUS; SUBCUTANEOUS at 09:23

## 2024-07-25 RX ADMIN — INSULIN HUMAN 75 UNITS: 100 INJECTION, SUSPENSION SUBCUTANEOUS at 12:17

## 2024-07-25 RX ADMIN — METOPROLOL TARTRATE 25 MG: 25 TABLET, FILM COATED ORAL at 22:45

## 2024-07-25 RX ADMIN — OXYCODONE AND ACETAMINOPHEN 1 TABLET: 5; 325 TABLET ORAL at 13:48

## 2024-07-25 RX ADMIN — TRAZODONE HYDROCHLORIDE 50 MG: 50 TABLET ORAL at 21:04

## 2024-07-25 RX ADMIN — PANTOPRAZOLE SODIUM 40 MG: 40 TABLET, DELAYED RELEASE ORAL at 08:46

## 2024-07-25 RX ADMIN — ALPRAZOLAM 0.5 MG: 0.5 TABLET ORAL at 13:57

## 2024-07-25 RX ADMIN — OXYCODONE AND ACETAMINOPHEN 1 TABLET: 5; 325 TABLET ORAL at 20:06

## 2024-07-25 RX ADMIN — OXYCODONE AND ACETAMINOPHEN 1 TABLET: 5; 325 TABLET ORAL at 08:46

## 2024-07-25 RX ADMIN — LORAZEPAM 1 MG: 2 INJECTION INTRAMUSCULAR; INTRAVENOUS at 14:58

## 2024-07-25 RX ADMIN — OXYCODONE AND ACETAMINOPHEN 1 TABLET: 5; 325 TABLET ORAL at 03:26

## 2024-07-25 RX ADMIN — INSULIN LISPRO 2 UNITS: 100 INJECTION, SOLUTION INTRAVENOUS; SUBCUTANEOUS at 17:28

## 2024-07-25 RX ADMIN — HYDROXYZINE HYDROCHLORIDE 10 MG: 10 TABLET ORAL at 21:34

## 2024-07-25 RX ADMIN — FENTANYL CITRATE 25 MCG: 0.05 INJECTION, SOLUTION INTRAMUSCULAR; INTRAVENOUS at 12:47

## 2024-07-25 RX ADMIN — METOPROLOL TARTRATE 25 MG: 25 TABLET, FILM COATED ORAL at 08:46

## 2024-07-25 RX ADMIN — INSULIN HUMAN 35 UNITS: 100 INJECTION, SUSPENSION SUBCUTANEOUS at 21:06

## 2024-07-25 RX ADMIN — SODIUM CHLORIDE: 9 INJECTION, SOLUTION INTRAVENOUS at 17:37

## 2024-07-25 RX ADMIN — HYDROXYZINE HYDROCHLORIDE 10 MG: 10 TABLET ORAL at 09:10

## 2024-07-25 RX ADMIN — ENOXAPARIN SODIUM 40 MG: 100 INJECTION SUBCUTANEOUS at 09:19

## 2024-07-25 RX ADMIN — HYDRALAZINE HYDROCHLORIDE 10 MG: 20 INJECTION INTRAMUSCULAR; INTRAVENOUS at 18:11

## 2024-07-25 RX ADMIN — FENTANYL CITRATE 50 MCG: 0.05 INJECTION, SOLUTION INTRAMUSCULAR; INTRAVENOUS at 01:33

## 2024-07-25 ASSESSMENT — PAIN SCALES - GENERAL
PAINLEVEL_OUTOF10: 8
PAINLEVEL_OUTOF10: 10
PAINLEVEL_OUTOF10: 10
PAINLEVEL_OUTOF10: 8
PAINLEVEL_OUTOF10: 7
PAINLEVEL_OUTOF10: 7
PAINLEVEL_OUTOF10: 6
PAINLEVEL_OUTOF10: 5
PAINLEVEL_OUTOF10: 10
PAINLEVEL_OUTOF10: 5
PAINLEVEL_OUTOF10: 7
PAINLEVEL_OUTOF10: 7

## 2024-07-25 ASSESSMENT — PAIN DESCRIPTION - DESCRIPTORS
DESCRIPTORS: STABBING
DESCRIPTORS: DISCOMFORT;SHARP
DESCRIPTORS: STABBING
DESCRIPTORS: ACHING
DESCRIPTORS: STABBING
DESCRIPTORS: ACHING;BURNING

## 2024-07-25 ASSESSMENT — PAIN DESCRIPTION - LOCATION
LOCATION: HIP;RIB CAGE
LOCATION: RIB CAGE
LOCATION: OTHER (COMMENT)
LOCATION: RIB CAGE
LOCATION: RIB CAGE

## 2024-07-25 ASSESSMENT — PAIN DESCRIPTION - ORIENTATION
ORIENTATION: RIGHT

## 2024-07-26 ENCOUNTER — APPOINTMENT (OUTPATIENT)
Dept: GENERAL RADIOLOGY | Age: 69
DRG: 536 | End: 2024-07-26
Payer: MEDICARE

## 2024-07-26 PROBLEM — S22.39XA: Status: ACTIVE | Noted: 2024-07-26

## 2024-07-26 PROBLEM — R53.81 DEBILITY: Status: ACTIVE | Noted: 2024-07-26

## 2024-07-26 LAB
ANION GAP SERPL CALCULATED.3IONS-SCNC: 12 MMOL/L (ref 9–17)
BASOPHILS # BLD: 0 K/UL (ref 0–0.2)
BASOPHILS NFR BLD: 0 % (ref 0–2)
BUN SERPL-MCNC: 27 MG/DL (ref 8–23)
CALCIUM SERPL-MCNC: 9.8 MG/DL (ref 8.6–10.4)
CHLORIDE SERPL-SCNC: 100 MMOL/L (ref 98–107)
CO2 SERPL-SCNC: 22 MMOL/L (ref 20–31)
EOSINOPHILS RELATIVE PERCENT: 2 % (ref 0–4)
ERYTHROCYTE [DISTWIDTH] IN BLOOD BY AUTOMATED COUNT: 15.6 % (ref 11.5–14.9)
GFR, ESTIMATED: 55 ML/MIN/1.73M2
GLUCOSE BLD-MCNC: 154 MG/DL (ref 75–110)
GLUCOSE BLD-MCNC: 159 MG/DL (ref 75–110)
GLUCOSE BLD-MCNC: 177 MG/DL (ref 75–110)
GLUCOSE BLD-MCNC: 207 MG/DL (ref 75–110)
GLUCOSE SERPL-MCNC: 179 MG/DL (ref 70–99)
HCT VFR BLD AUTO: 35.4 % (ref 41–53)
HGB BLD-MCNC: 11.9 G/DL (ref 13.5–17.5)
LYMPHOCYTES NFR BLD: 0.8 K/UL (ref 1–4.8)
LYMPHOCYTES RELATIVE PERCENT: 9 % (ref 24–44)
MCHC RBC AUTO-ENTMCNC: 33.5 G/DL (ref 31–37)
MCV RBC AUTO: 90.9 FL (ref 80–100)
MONOCYTES NFR BLD: 0.8 K/UL (ref 0.1–1.3)
NEUTROPHILS NFR BLD: 80 % (ref 36–66)
NEUTS SEG NFR BLD: 7.3 K/UL (ref 1.3–9.1)
PLATELET # BLD AUTO: 154 K/UL (ref 150–450)
PMV BLD AUTO: 8.3 FL (ref 6–12)
POTASSIUM SERPL-SCNC: 4.3 MMOL/L (ref 3.7–5.3)
RBC # BLD AUTO: 3.9 M/UL (ref 4.5–5.9)
SODIUM SERPL-SCNC: 134 MMOL/L (ref 135–144)
WBC OTHER # BLD: 9.1 K/UL (ref 3.5–11)

## 2024-07-26 PROCEDURE — 6370000000 HC RX 637 (ALT 250 FOR IP): Performed by: NURSE PRACTITIONER

## 2024-07-26 PROCEDURE — 99232 SBSQ HOSP IP/OBS MODERATE 35: CPT | Performed by: NURSE PRACTITIONER

## 2024-07-26 PROCEDURE — 6360000002 HC RX W HCPCS: Performed by: SURGERY

## 2024-07-26 PROCEDURE — 97110 THERAPEUTIC EXERCISES: CPT

## 2024-07-26 PROCEDURE — 6370000000 HC RX 637 (ALT 250 FOR IP): Performed by: INTERNAL MEDICINE

## 2024-07-26 PROCEDURE — 99232 SBSQ HOSP IP/OBS MODERATE 35: CPT | Performed by: PSYCHIATRY & NEUROLOGY

## 2024-07-26 PROCEDURE — 85025 COMPLETE CBC W/AUTO DIFF WBC: CPT

## 2024-07-26 PROCEDURE — 1200000000 HC SEMI PRIVATE

## 2024-07-26 PROCEDURE — 80048 BASIC METABOLIC PNL TOTAL CA: CPT

## 2024-07-26 PROCEDURE — 36415 COLL VENOUS BLD VENIPUNCTURE: CPT

## 2024-07-26 PROCEDURE — 6360000002 HC RX W HCPCS

## 2024-07-26 PROCEDURE — 82947 ASSAY GLUCOSE BLOOD QUANT: CPT

## 2024-07-26 PROCEDURE — 97530 THERAPEUTIC ACTIVITIES: CPT

## 2024-07-26 PROCEDURE — 99231 SBSQ HOSP IP/OBS SF/LOW 25: CPT | Performed by: PSYCHIATRY & NEUROLOGY

## 2024-07-26 PROCEDURE — 2580000003 HC RX 258: Performed by: SURGERY

## 2024-07-26 PROCEDURE — 6370000000 HC RX 637 (ALT 250 FOR IP): Performed by: PSYCHIATRY & NEUROLOGY

## 2024-07-26 PROCEDURE — 99232 SBSQ HOSP IP/OBS MODERATE 35: CPT | Performed by: INTERNAL MEDICINE

## 2024-07-26 RX ORDER — LACTULOSE 10 G/15ML
10 SOLUTION ORAL 3 TIMES DAILY
Status: DISCONTINUED | OUTPATIENT
Start: 2024-07-26 | End: 2024-07-29 | Stop reason: HOSPADM

## 2024-07-26 RX ORDER — TRAZODONE HYDROCHLORIDE 100 MG/1
100 TABLET ORAL NIGHTLY
Status: DISCONTINUED | OUTPATIENT
Start: 2024-07-26 | End: 2024-07-29 | Stop reason: HOSPADM

## 2024-07-26 RX ADMIN — OXYCODONE AND ACETAMINOPHEN 1 TABLET: 5; 325 TABLET ORAL at 21:11

## 2024-07-26 RX ADMIN — FENTANYL CITRATE 50 MCG: 0.05 INJECTION, SOLUTION INTRAMUSCULAR; INTRAVENOUS at 15:25

## 2024-07-26 RX ADMIN — METOPROLOL TARTRATE 25 MG: 25 TABLET, FILM COATED ORAL at 20:34

## 2024-07-26 RX ADMIN — FENTANYL CITRATE 50 MCG: 0.05 INJECTION, SOLUTION INTRAMUSCULAR; INTRAVENOUS at 11:52

## 2024-07-26 RX ADMIN — INSULIN LISPRO 2 UNITS: 100 INJECTION, SOLUTION INTRAVENOUS; SUBCUTANEOUS at 11:51

## 2024-07-26 RX ADMIN — INSULIN HUMAN 35 UNITS: 100 INJECTION, SUSPENSION SUBCUTANEOUS at 20:34

## 2024-07-26 RX ADMIN — METOPROLOL TARTRATE 25 MG: 25 TABLET, FILM COATED ORAL at 09:11

## 2024-07-26 RX ADMIN — GABAPENTIN 100 MG: 100 CAPSULE ORAL at 20:33

## 2024-07-26 RX ADMIN — INSULIN HUMAN 75 UNITS: 100 INJECTION, SUSPENSION SUBCUTANEOUS at 09:44

## 2024-07-26 RX ADMIN — FENTANYL CITRATE 50 MCG: 0.05 INJECTION, SOLUTION INTRAMUSCULAR; INTRAVENOUS at 18:41

## 2024-07-26 RX ADMIN — HYDROXYZINE HYDROCHLORIDE 10 MG: 10 TABLET ORAL at 09:11

## 2024-07-26 RX ADMIN — ENOXAPARIN SODIUM 40 MG: 100 INJECTION SUBCUTANEOUS at 09:11

## 2024-07-26 RX ADMIN — PANTOPRAZOLE SODIUM 40 MG: 40 TABLET, DELAYED RELEASE ORAL at 09:11

## 2024-07-26 RX ADMIN — FENTANYL CITRATE 50 MCG: 0.05 INJECTION, SOLUTION INTRAMUSCULAR; INTRAVENOUS at 23:05

## 2024-07-26 RX ADMIN — LACTULOSE 10 G: 20 SOLUTION ORAL at 15:25

## 2024-07-26 RX ADMIN — SERTRALINE HYDROCHLORIDE 50 MG: 50 TABLET ORAL at 09:11

## 2024-07-26 RX ADMIN — OXYCODONE AND ACETAMINOPHEN 1 TABLET: 5; 325 TABLET ORAL at 09:11

## 2024-07-26 RX ADMIN — DIPHENHYDRAMINE HYDROCHLORIDE 12.5 MG: 50 INJECTION INTRAMUSCULAR; INTRAVENOUS at 00:11

## 2024-07-26 RX ADMIN — OXYCODONE AND ACETAMINOPHEN 1 TABLET: 5; 325 TABLET ORAL at 17:07

## 2024-07-26 RX ADMIN — GABAPENTIN 100 MG: 100 CAPSULE ORAL at 09:11

## 2024-07-26 RX ADMIN — TRAZODONE HYDROCHLORIDE 100 MG: 100 TABLET ORAL at 20:34

## 2024-07-26 RX ADMIN — LACTULOSE 10 G: 20 SOLUTION ORAL at 09:11

## 2024-07-26 RX ADMIN — LACTULOSE 10 G: 20 SOLUTION ORAL at 20:34

## 2024-07-26 RX ADMIN — SODIUM CHLORIDE: 9 INJECTION, SOLUTION INTRAVENOUS at 18:41

## 2024-07-26 RX ADMIN — OXYCODONE AND ACETAMINOPHEN 1 TABLET: 5; 325 TABLET ORAL at 02:43

## 2024-07-26 ASSESSMENT — PAIN SCALES - GENERAL
PAINLEVEL_OUTOF10: 9
PAINLEVEL_OUTOF10: 3
PAINLEVEL_OUTOF10: 10
PAINLEVEL_OUTOF10: 7
PAINLEVEL_OUTOF10: 4
PAINLEVEL_OUTOF10: 7
PAINLEVEL_OUTOF10: 7
PAINLEVEL_OUTOF10: 4
PAINLEVEL_OUTOF10: 10
PAINLEVEL_OUTOF10: 3
PAINLEVEL_OUTOF10: 10
PAINLEVEL_OUTOF10: 7

## 2024-07-26 ASSESSMENT — PAIN DESCRIPTION - ORIENTATION
ORIENTATION: RIGHT
ORIENTATION: RIGHT;LEFT

## 2024-07-26 ASSESSMENT — PAIN DESCRIPTION - DESCRIPTORS
DESCRIPTORS: ACHING;SHOOTING
DESCRIPTORS: THROBBING
DESCRIPTORS: ACHING;SHOOTING
DESCRIPTORS: ACHING;DISCOMFORT;STABBING

## 2024-07-26 ASSESSMENT — PAIN DESCRIPTION - LOCATION
LOCATION: BACK;HIP;PELVIS
LOCATION: PELVIS;RIB CAGE
LOCATION: BACK;RIB CAGE;HIP
LOCATION: PELVIS;RIB CAGE
LOCATION: HIP
LOCATION: BACK;RIB CAGE;PELVIS

## 2024-07-26 NOTE — DISCHARGE INSTR - COC
3b chronic kidney disease ([facility]) N18.32    Traumatic bilateral lower extremity fractures S82.91XA, S82.92XA    Closed right hip fracture, initial encounter ([facility]) S72.001A    Osteogenesis imperfecta Q78.0    Type 2 diabetes mellitus without complication ([facility]) E11.9    Type 2 diabetes mellitus with hyperglycemia ([facility]) E11.65    Gastroesophageal reflux disease without esophagitis K21.9    Tibial plateau fracture, left, closed, initial encounter S82.142A    Osteoporosis M81.0    Fracture of multiple pubic rami, right, closed, initial encounter ([facility]) S32.591A    Contracture of elbow M24.529    Pelvis fracture ([facility]) S32.9XXA    Fall W19.XXXA    Osteogenesis imperfecta Q78.0    Closed fracture of multiple ribs of right side with routine healing S22.41XD    Type 2 diabetes mellitus without complication, with long term current use of insulin pump ([facility]) E11.9, Z96.41    Chronic pain G89.29    Primary hypertension I10    Trauma T14.90XA    Akathisia G25.71    Rib pain R07.81    Chronic midline low back pain without sciatica M54.50, G89.29    Closed wedge compression fracture of T8 vertebra ([facility]) S22.060A    Closed wedge compression fracture of T9 vertebra ([facility]) S22.070A    Closed compression fracture of L2 lumbar vertebra, sequela S32.020S    Pubic ramus fracture, left, closed, initial encounter ([facility]) S32.592A    Left hip pain M25.552    Arthritis of left knee M17.12    Chronic pain of both knees M25.561, M25.562, G89.29    Closed fracture of one rib of right side S22.31XA    History of fall Z91.81    Closed bilateral fracture of pubic rami ([facility]) S32.591A, S32.592A    ANTONIO (generalized anxiety disorder) F41.1    Debility R53.81    Closed traumatic displaced fracture of one rib S22.39XA       Isolation/Infection:   Isolation            No Isolation          Patient Infection Status       None to display                     Nurse Assessment:  Last Vital Signs: /73   Pulse 78   Temp 97.7 °F (36.5 °C)   Resp 16   Ht 1.829

## 2024-07-26 NOTE — CONSULTS
[facility] Neurology   IN-PATIENT SERVICE      NEUROLOGY CONSULT  NOTE            Date:   7/24/2024  Patient name:  Miles Chaves  Date of admission:  7/23/2024  YOB: 1955      Chief Complaint:     Chief Complaint   Patient presents with   • Hip Pain     left       Reason for Consult:      Abnormal movements    History of Present Illness:     The patient is a 68 y.o. male who presents with Hip Pain (left)  . The patient was seen and examined and the chart was reviewed.  Patient suffered mechanical fall at home as he was trying to put a wheelchair into the trunk of the car, then lost balance and fell backwards.  He was found to have pubic rami fracture as well as rib fractures admitted to trauma team at the hospital.  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  Chronic compression fractures present in the thoracic spine also found.  Patient was noted to have abnormal movements in the upper extremities and neurology consulted for this.    Currently resting in bed he has some akathisia present of the bilateral upper extremities left greater than right.  They are not obvious in the lower extremities at this time.  Does not have any obvious tremor or twitching movements such as seizure.  When asked patient and wife state these have been ongoing for about 1 year at least.  He is on trazodone and also Zoloft which they believe he has been on for quite some time.  Wife does endorse some progression of memory difficulties over the last few months.    Past Medical History:     Past Medical History:   Diagnosis Date   • Chronic pain    • Closed fracture of multiple ribs of right side 12/10/2021   • Contracture, elbow     h/o right elbow fx, compound fracture.    • Diabetes (HCC)    • Erectile dysfunction    • GERD (gastroesophageal reflux disease)    • Hypertension    • Osteogenesis imperfecta     DX 6 months. 30 broken bones in past.    • Osteogenesis imperfecta    • Type II or 
Inpatient consult to Orthopedic Surgery  Consult performed by: Tom Garcia MD  Consult ordered by: Anthony Romero MD        Patient: Miles Chaves  Unit/Bed: 2075/2075-01  YOB: 1955  MRN: [id number]  Acct: [id number]   Admitting Diagnosis: Trauma [T14.90XA]  Unable to ambulate [R26.2]  Intractable pain [R52]  Fall, initial encounter [W19.XXXA]  Closed bilateral fracture of pubic rami, initial encounter ([facility]) [S32.591A, S32.592A]  Closed traumatic displaced fracture of one rib [S22.39XA]  Admit Date:  7/23/2024  Hospital Day: 0    Subjective:    Patient is having problems with  acute left hip and rib pain and chronic low back and bilateral knee pain  Hip Pain       Patient Seen, Chart, Labs, Radiologystudies, and Consults reviewed.    Patient with long history of orthopedic injuries to include an L2 compression fracture treated with kyphoplasty status post ORIF bilateral knees with severe arthritis left knee moderate arthritis right knee at least on a prior x-ray    Status post ORIF right elbow with right elbow arthritis and contracture history of right superior inferior pubic rami fractures healed    Patient with inability to play golf for an extended period of time due to all of these issues    Patient also with history of rib fractures on the right    Patient presents with ground-level fall new right rib pain chronic low back pain decreased ambulatory status and severe left hip pain                  Objective:   BP (!) 116/91   Pulse 80   Temp 97.8 °F (36.6 °C)   Resp 17   Ht 1.829 m (6')   Wt 90.7 kg (200 lb)   SpO2 96%   BMI 27.12 kg/m²   Intake/Output Summary (Last 24 hours) at 7/24/2024 1946  Last data filed at 7/24/2024 0535  Gross per 24 hour   Intake --   Output 100 ml   Net -100 ml     Review of Systems  Physical Exam  Vitals and nursing note reviewed.   Constitutional:       Appearance: He is well-developed.   HENT:      Head: Normocephalic and atraumatic.      Nose: Nose 
Father        Social History:  Lives With: Spouse (1 cat)  Type of Home: Mobile home  Home Layout: One level  Home Access: Stairs to enter with rails  Entrance Stairs - Number of Steps: 5  Entrance Stairs - Rails: Both (can reach both at same time)  Bathroom Shower/Tub: Walk-in shower, Doors  Bathroom Toilet: Handicap height  Bathroom Equipment: Shower chair, Hand-held shower, Grab bars around toilet  Bathroom Accessibility: Walker accessible  Home Equipment: Cane, Walker - Rolling, Electric scooter, Wheelchair - Manual, Cane - Quad, Sock aid, Reacher  Has the patient had two or more falls in the past year or any fall with injury in the past year?: Yes  ADL Assistance: Independent  Homemaking Assistance:  (wife is primary)  Homemaking Responsibilities: No  Ambulation Assistance: Independent (uses RW inside home, wheelchair in community)  Transfer Assistance: Independent  Active : Yes  Mode of Transportation: Redeemr  Occupation: Retired  Type of Occupation: manager for [employer]  IADL Comments: L handed; sleeps in regular flat bed (tall)  Additional Comments: No recent PT OT. Wife is retired and able to assist.  Social History     Socioeconomic History    Marital status:      Spouse name: None    Number of children: None    Years of education: None    Highest education level: None   Tobacco Use    Smoking status: Former     Current packs/day: 0.00     Average packs/day: 2.0 packs/day for 30.0 years (60.0 ttl pk-yrs)     Types: Cigarettes     Start date: 12/3/1961     Quit date: 12/3/1991     Years since quittin.6    Smokeless tobacco: Never   Vaping Use    Vaping Use: Never used   Substance and Sexual Activity    Alcohol use: Not Currently    Drug use: Not Currently     Types: Marijuana (Weed)   Social History Narrative    ** Merged History Encounter **          Social Determinants of Health     Food Insecurity: No Food Insecurity (2024)    Hunger Vital Sign     Worried About Running Out of 
for    Investigations:      Laboratory Testing:  Recent Results (from the past 24 hour(s))   CBC with Auto Differential    Collection Time: 07/23/24 10:45 PM   Result Value Ref Range    WBC 11.5 (H) 3.5 - 11.0 k/uL    RBC 4.76 4.5 - 5.9 m/uL    Hemoglobin 14.6 13.5 - 17.5 g/dL    Hematocrit 42.5 41 - 53 %    MCV 89.2 80 - 100 fL    MCH 30.6 26 - 34 pg    MCHC 34.3 31 - 37 g/dL    RDW 15.3 (H) 11.5 - 14.9 %    Platelets 189 150 - 450 k/uL    MPV 7.7 6.0 - 12.0 fL    Neutrophils % 84 (H) 36 - 66 %    Lymphocytes % 8 (L) 24 - 44 %    Monocytes % 5 1 - 7 %    Eosinophils % 2 0 - 4 %    Basophils % 1 0 - 2 %    Neutrophils Absolute 9.70 (H) 1.3 - 9.1 k/uL    Lymphocytes Absolute 0.90 (L) 1.0 - 4.8 k/uL    Monocytes Absolute 0.60 0.1 - 1.3 k/uL    Eosinophils Absolute 0.20 0.0 - 0.4 k/uL    Basophils Absolute 0.10 0.0 - 0.2 k/uL   CMP    Collection Time: 07/23/24 10:45 PM   Result Value Ref Range    Sodium 136 135 - 144 mmol/L    Potassium 4.9 3.7 - 5.3 mmol/L    Chloride 103 98 - 107 mmol/L    CO2 23 20 - 31 mmol/L    Anion Gap 10 9 - 17 mmol/L    Glucose 171 (H) 70 - 99 mg/dL    BUN 22 8 - 23 mg/dL    Creatinine 1.7 (H) 0.7 - 1.2 mg/dL    Est, Glom Filt Rate 43 (L) >60 mL/min/1.73m2    Calcium 10.4 8.6 - 10.4 mg/dL    Total Protein 8.1 6.4 - 8.3 g/dL    Albumin 4.6 3.5 - 5.2 g/dL    Total Bilirubin 0.4 0.3 - 1.2 mg/dL    Alkaline Phosphatase 111 40 - 129 U/L    ALT 22 5 - 41 U/L    AST 18 <40 U/L   Protime-INR    Collection Time: 07/23/24 10:45 PM   Result Value Ref Range    Protime 15.1 (H) 11.8 - 14.6 sec    INR 1.2    CBC with Auto Differential    Collection Time: 07/24/24  6:53 AM   Result Value Ref Range    WBC 10.2 3.5 - 11.0 k/uL    RBC 4.51 4.5 - 5.9 m/uL    Hemoglobin 13.6 13.5 - 17.5 g/dL    Hematocrit 40.8 (L) 41 - 53 %    MCV 90.4 80 - 100 fL    MCH 30.2 26 - 34 pg    MCHC 33.5 31 - 37 g/dL    RDW 15.6 (H) 11.5 - 14.9 %    Platelets 172 150 - 450 k/uL    MPV 8.0 6.0 - 12.0 fL    Neutrophils % 82 (H) 36 - 66 
reconstruction, and/or weight based adjustment of the mA/kV was utilized to reduce the radiation dose to as low as reasonably achievable. COMPARISON: None. HISTORY: ORDERING SYSTEM PROVIDED HISTORY: trauma TECHNOLOGIST PROVIDED HISTORY: trauma Reason for Exam: trauma Additional signs and symptoms: Fell, c/o generalized back pain and left hip pain. FINDINGS: BONES/ALIGNMENT: Mild compression fractures of T8 and T9 likely represent old fractures. DEGENERATIVE CHANGES: Multilevel degenerative disc disease without significant spinal canal stenosis or neural foraminal narrowing of the thoracic spine. SOFT TISSUES: No paraspinal mass is seen.     1. Mild compression fractures of T8 and T9 likely represent old fractures. 2. Multilevel degenerative disc disease without significant spinal canal stenosis or neural foraminal narrowing of the thoracic spine.     XR ELBOW LEFT (MIN 3 VIEWS)    Result Date: 7/24/2024  EXAMINATION: THREE XRAY VIEWS OF THE LEFT ELBOW 7/23/2024 9:31 pm COMPARISON: None. HISTORY: ORDERING SYSTEM PROVIDED HISTORY: fall, pain, swelling TECHNOLOGIST PROVIDED HISTORY: fall, pain, swelling Reason for Exam: PT CO pain in multiple sites after fall onto concrete tonight. Best images per pt chronic condition and decreased ROM. Multiple attempts made with best images sent per pt condition. FINDINGS: Moderate degenerative arthrosis in the elbow.  There is deformity of the radius head likely due to an old fracture.  There is moderate-sized joint effusion may represent occult fracture.     1. Moderate-sized joint effusion may represent occult fracture. 2. Moderate degenerative arthrosis in the elbow. 3. Deformity of the radius head likely due to an old fracture.     XR KNEE LEFT (3 VIEWS)    Result Date: 7/24/2024  EXAMINATION: THREE XRAY VIEWS OF THE LEFT KNEE 7/23/2024 10:31 pm COMPARISON: 12/14/2020 HISTORY: ORDERING SYSTEM PROVIDED HISTORY: fall, pain TECHNOLOGIST PROVIDED HISTORY: fall, pain Reason for Exam: 
Exam: PT CO pain in multiple sites after fall onto concrete tonight. Best images per pt chronic condition and decreased ROM. Multiple attempts made with best images sent per pt condition.; ORDERING SYSTEM PROVIDED HISTORY: Bilateral hip pain TECHNOLOGIST PROVIDED HISTORY: Bilateral hip pain Reason for Exam: PT CO pain in multiple sites after fall onto concrete tonight. Best images per pt chronic condition and decreased ROM. Multiple attempts made with best images sent per pt condition. FINDINGS: Motion artifact degrades image quality.  The bones are demineralized.  There are acute fractures involving the left superior and inferior pubic rami. There are old healed right inferior and superior pubic rami fractures.  There is mild-to-moderate bilateral hip osteoarthritis.  Status post right femoral intramedullary christiano and dynamic pin.  Degenerative changes involve the lumbar spine.     1. Acute left pubic rami fractures.     CT LUMBAR SPINE BONY RECONSTRUCTION    Result Date: 7/24/2024  EXAMINATION: CT OF THE LUMBAR SPINE WITHOUT CONTRAST  7/23/2024 TECHNIQUE: CT of the lumbar spine was performed without the administration of intravenous contrast. Multiplanar reformatted images are provided for review. Adjustment of mA and/or kV according to patient size was utilized.  Automated exposure control, iterative reconstruction, and/or weight based adjustment of the mA/kV was utilized to reduce the radiation dose to as low as reasonably achievable. COMPARISON: None HISTORY: ORDERING SYSTEM PROVIDED HISTORY: TRAUMA TECHNOLOGIST PROVIDED HISTORY: Trauma trauma Reason for Exam: trauma Additional signs and symptoms: Fell, c/o generalized back pain and left hip pain. FINDINGS: BONES/ALIGNMENT: There is an old compression fracture of L2.  There is 5 mm degenerative spondylolisthesis at L5-S1. DEGENERATIVE CHANGES: Moderate multilevel degenerative changes in the lumbar spine more prominent in the lower lumbar spine with resultant

## 2024-07-27 ENCOUNTER — APPOINTMENT (OUTPATIENT)
Dept: MRI IMAGING | Age: 69
DRG: 536 | End: 2024-07-27
Payer: MEDICARE

## 2024-07-27 LAB
ANION GAP SERPL CALCULATED.3IONS-SCNC: 11 MMOL/L (ref 9–17)
BASOPHILS # BLD: 0 K/UL (ref 0–0.2)
BUN SERPL-MCNC: 35 MG/DL (ref 8–23)
CALCIUM SERPL-MCNC: 9.3 MG/DL (ref 8.6–10.4)
CHLORIDE SERPL-SCNC: 102 MMOL/L (ref 98–107)
CO2 SERPL-SCNC: 22 MMOL/L (ref 20–31)
CREAT SERPL-MCNC: 1.5 MG/DL (ref 0.7–1.2)
EOSINOPHIL # BLD: 0.3 K/UL (ref 0–0.4)
EOSINOPHILS RELATIVE PERCENT: 5 % (ref 0–4)
ERYTHROCYTE [DISTWIDTH] IN BLOOD BY AUTOMATED COUNT: 14.8 % (ref 11.5–14.9)
GFR, ESTIMATED: 50 ML/MIN/1.73M2
GLUCOSE BLD-MCNC: 201 MG/DL (ref 75–110)
GLUCOSE BLD-MCNC: 258 MG/DL (ref 75–110)
GLUCOSE BLD-MCNC: 263 MG/DL (ref 75–110)
GLUCOSE BLD-MCNC: 98 MG/DL (ref 75–110)
GLUCOSE SERPL-MCNC: 103 MG/DL (ref 70–99)
HCT VFR BLD AUTO: 34.2 % (ref 41–53)
HGB BLD-MCNC: 11.6 G/DL (ref 13.5–17.5)
LYMPHOCYTES NFR BLD: 0.9 K/UL (ref 1–4.8)
LYMPHOCYTES RELATIVE PERCENT: 13 % (ref 24–44)
MCH RBC QN AUTO: 30.8 PG (ref 26–34)
MCHC RBC AUTO-ENTMCNC: 33.8 G/DL (ref 31–37)
MCV RBC AUTO: 90.9 FL (ref 80–100)
MONOCYTES NFR BLD: 0.7 K/UL (ref 0.1–1.3)
MONOCYTES NFR BLD: 10 % (ref 1–7)
NEUTROPHILS NFR BLD: 71 % (ref 36–66)
NEUTS SEG NFR BLD: 5.3 K/UL (ref 1.3–9.1)
PLATELET # BLD AUTO: 151 K/UL (ref 150–450)
RBC # BLD AUTO: 3.77 M/UL (ref 4.5–5.9)
SODIUM SERPL-SCNC: 135 MMOL/L (ref 135–144)
WBC OTHER # BLD: 7.3 K/UL (ref 3.5–11)

## 2024-07-27 PROCEDURE — 70551 MRI BRAIN STEM W/O DYE: CPT

## 2024-07-27 PROCEDURE — 36415 COLL VENOUS BLD VENIPUNCTURE: CPT

## 2024-07-27 PROCEDURE — 99231 SBSQ HOSP IP/OBS SF/LOW 25: CPT | Performed by: PSYCHIATRY & NEUROLOGY

## 2024-07-27 PROCEDURE — 6370000000 HC RX 637 (ALT 250 FOR IP): Performed by: PSYCHIATRY & NEUROLOGY

## 2024-07-27 PROCEDURE — 6370000000 HC RX 637 (ALT 250 FOR IP): Performed by: NURSE PRACTITIONER

## 2024-07-27 PROCEDURE — 82947 ASSAY GLUCOSE BLOOD QUANT: CPT

## 2024-07-27 PROCEDURE — 85025 COMPLETE CBC W/AUTO DIFF WBC: CPT

## 2024-07-27 PROCEDURE — 6370000000 HC RX 637 (ALT 250 FOR IP): Performed by: INTERNAL MEDICINE

## 2024-07-27 PROCEDURE — 1200000000 HC SEMI PRIVATE

## 2024-07-27 PROCEDURE — 99232 SBSQ HOSP IP/OBS MODERATE 35: CPT | Performed by: INTERNAL MEDICINE

## 2024-07-27 PROCEDURE — 80048 BASIC METABOLIC PNL TOTAL CA: CPT

## 2024-07-27 PROCEDURE — 6360000002 HC RX W HCPCS: Performed by: SURGERY

## 2024-07-27 RX ADMIN — INSULIN LISPRO 4 UNITS: 100 INJECTION, SOLUTION INTRAVENOUS; SUBCUTANEOUS at 18:00

## 2024-07-27 RX ADMIN — INSULIN HUMAN 75 UNITS: 100 INJECTION, SUSPENSION SUBCUTANEOUS at 10:59

## 2024-07-27 RX ADMIN — INSULIN LISPRO 2 UNITS: 100 INJECTION, SOLUTION INTRAVENOUS; SUBCUTANEOUS at 13:12

## 2024-07-27 RX ADMIN — FENTANYL CITRATE 50 MCG: 0.05 INJECTION, SOLUTION INTRAMUSCULAR; INTRAVENOUS at 03:52

## 2024-07-27 RX ADMIN — LACTULOSE 10 G: 20 SOLUTION ORAL at 13:12

## 2024-07-27 RX ADMIN — FENTANYL CITRATE 50 MCG: 0.05 INJECTION, SOLUTION INTRAMUSCULAR; INTRAVENOUS at 13:12

## 2024-07-27 RX ADMIN — METOPROLOL TARTRATE 25 MG: 25 TABLET, FILM COATED ORAL at 08:07

## 2024-07-27 RX ADMIN — OXYCODONE AND ACETAMINOPHEN 1 TABLET: 5; 325 TABLET ORAL at 19:20

## 2024-07-27 RX ADMIN — ENOXAPARIN SODIUM 40 MG: 100 INJECTION SUBCUTANEOUS at 08:05

## 2024-07-27 RX ADMIN — GABAPENTIN 100 MG: 100 CAPSULE ORAL at 08:07

## 2024-07-27 RX ADMIN — FENTANYL CITRATE 50 MCG: 0.05 INJECTION, SOLUTION INTRAMUSCULAR; INTRAVENOUS at 20:48

## 2024-07-27 RX ADMIN — LACTULOSE 10 G: 20 SOLUTION ORAL at 08:06

## 2024-07-27 RX ADMIN — GABAPENTIN 100 MG: 100 CAPSULE ORAL at 20:48

## 2024-07-27 RX ADMIN — OXYCODONE AND ACETAMINOPHEN 1 TABLET: 5; 325 TABLET ORAL at 11:50

## 2024-07-27 RX ADMIN — TRAZODONE HYDROCHLORIDE 100 MG: 100 TABLET ORAL at 20:48

## 2024-07-27 RX ADMIN — OXYCODONE AND ACETAMINOPHEN 1 TABLET: 5; 325 TABLET ORAL at 23:19

## 2024-07-27 RX ADMIN — FENTANYL CITRATE 50 MCG: 0.05 INJECTION, SOLUTION INTRAMUSCULAR; INTRAVENOUS at 16:33

## 2024-07-27 RX ADMIN — FENTANYL CITRATE 50 MCG: 0.05 INJECTION, SOLUTION INTRAMUSCULAR; INTRAVENOUS at 08:05

## 2024-07-27 RX ADMIN — SERTRALINE HYDROCHLORIDE 50 MG: 50 TABLET ORAL at 08:07

## 2024-07-27 RX ADMIN — LACTULOSE 10 G: 20 SOLUTION ORAL at 20:48

## 2024-07-27 RX ADMIN — OXYCODONE AND ACETAMINOPHEN 1 TABLET: 5; 325 TABLET ORAL at 05:03

## 2024-07-27 RX ADMIN — OXYCODONE AND ACETAMINOPHEN 1 TABLET: 5; 325 TABLET ORAL at 01:14

## 2024-07-27 RX ADMIN — PANTOPRAZOLE SODIUM 40 MG: 40 TABLET, DELAYED RELEASE ORAL at 08:06

## 2024-07-27 RX ADMIN — METOPROLOL TARTRATE 25 MG: 25 TABLET, FILM COATED ORAL at 20:48

## 2024-07-27 RX ADMIN — INSULIN HUMAN 35 UNITS: 100 INJECTION, SUSPENSION SUBCUTANEOUS at 20:49

## 2024-07-27 ASSESSMENT — PAIN DESCRIPTION - ORIENTATION
ORIENTATION: LEFT
ORIENTATION: RIGHT
ORIENTATION: LEFT

## 2024-07-27 ASSESSMENT — PAIN SCALES - GENERAL
PAINLEVEL_OUTOF10: 7
PAINLEVEL_OUTOF10: 4
PAINLEVEL_OUTOF10: 9
PAINLEVEL_OUTOF10: 7
PAINLEVEL_OUTOF10: 8
PAINLEVEL_OUTOF10: 9
PAINLEVEL_OUTOF10: 8
PAINLEVEL_OUTOF10: 7
PAINLEVEL_OUTOF10: 7

## 2024-07-27 ASSESSMENT — PAIN DESCRIPTION - LOCATION
LOCATION: HIP
LOCATION: BACK;PELVIS;RIB CAGE
LOCATION: PELVIS;RIB CAGE
LOCATION: HIP
LOCATION: PELVIS;RIB CAGE;BACK

## 2024-07-27 ASSESSMENT — PAIN DESCRIPTION - DESCRIPTORS
DESCRIPTORS: ACHING;THROBBING
DESCRIPTORS: THROBBING;ACHING

## 2024-07-28 LAB
GLUCOSE BLD-MCNC: 128 MG/DL (ref 75–110)
GLUCOSE BLD-MCNC: 239 MG/DL (ref 75–110)
GLUCOSE BLD-MCNC: 279 MG/DL (ref 75–110)
GLUCOSE BLD-MCNC: 294 MG/DL (ref 75–110)

## 2024-07-28 PROCEDURE — 82947 ASSAY GLUCOSE BLOOD QUANT: CPT

## 2024-07-28 PROCEDURE — 6370000000 HC RX 637 (ALT 250 FOR IP): Performed by: PSYCHIATRY & NEUROLOGY

## 2024-07-28 PROCEDURE — 99231 SBSQ HOSP IP/OBS SF/LOW 25: CPT | Performed by: PSYCHIATRY & NEUROLOGY

## 2024-07-28 PROCEDURE — 6370000000 HC RX 637 (ALT 250 FOR IP): Performed by: NURSE PRACTITIONER

## 2024-07-28 PROCEDURE — 6360000002 HC RX W HCPCS: Performed by: SURGERY

## 2024-07-28 PROCEDURE — 6370000000 HC RX 637 (ALT 250 FOR IP): Performed by: INTERNAL MEDICINE

## 2024-07-28 PROCEDURE — 99232 SBSQ HOSP IP/OBS MODERATE 35: CPT | Performed by: SURGERY

## 2024-07-28 PROCEDURE — 1200000000 HC SEMI PRIVATE

## 2024-07-28 PROCEDURE — 99232 SBSQ HOSP IP/OBS MODERATE 35: CPT | Performed by: INTERNAL MEDICINE

## 2024-07-28 RX ORDER — DOCUSATE SODIUM 100 MG/1
100 CAPSULE, LIQUID FILLED ORAL 2 TIMES DAILY
Status: DISCONTINUED | OUTPATIENT
Start: 2024-07-28 | End: 2024-07-29 | Stop reason: SDUPTHER

## 2024-07-28 RX ADMIN — FENTANYL CITRATE 50 MCG: 0.05 INJECTION, SOLUTION INTRAMUSCULAR; INTRAVENOUS at 09:28

## 2024-07-28 RX ADMIN — LACTULOSE 10 G: 20 SOLUTION ORAL at 09:29

## 2024-07-28 RX ADMIN — OXYCODONE AND ACETAMINOPHEN 1 TABLET: 5; 325 TABLET ORAL at 16:58

## 2024-07-28 RX ADMIN — PANTOPRAZOLE SODIUM 40 MG: 40 TABLET, DELAYED RELEASE ORAL at 09:29

## 2024-07-28 RX ADMIN — INSULIN HUMAN 35 UNITS: 100 INJECTION, SUSPENSION SUBCUTANEOUS at 21:16

## 2024-07-28 RX ADMIN — INSULIN HUMAN 75 UNITS: 100 INJECTION, SUSPENSION SUBCUTANEOUS at 10:18

## 2024-07-28 RX ADMIN — SERTRALINE HYDROCHLORIDE 50 MG: 50 TABLET ORAL at 09:29

## 2024-07-28 RX ADMIN — FENTANYL CITRATE 50 MCG: 0.05 INJECTION, SOLUTION INTRAMUSCULAR; INTRAVENOUS at 00:26

## 2024-07-28 RX ADMIN — LACTULOSE 10 G: 20 SOLUTION ORAL at 19:12

## 2024-07-28 RX ADMIN — DOCUSATE SODIUM 100 MG: 100 CAPSULE, LIQUID FILLED ORAL at 19:12

## 2024-07-28 RX ADMIN — FENTANYL CITRATE 50 MCG: 0.05 INJECTION, SOLUTION INTRAMUSCULAR; INTRAVENOUS at 22:24

## 2024-07-28 RX ADMIN — FENTANYL CITRATE 50 MCG: 0.05 INJECTION, SOLUTION INTRAMUSCULAR; INTRAVENOUS at 19:12

## 2024-07-28 RX ADMIN — METOPROLOL TARTRATE 25 MG: 25 TABLET, FILM COATED ORAL at 19:12

## 2024-07-28 RX ADMIN — OXYCODONE AND ACETAMINOPHEN 1 TABLET: 5; 325 TABLET ORAL at 12:55

## 2024-07-28 RX ADMIN — OXYCODONE AND ACETAMINOPHEN 1 TABLET: 5; 325 TABLET ORAL at 21:15

## 2024-07-28 RX ADMIN — INSULIN LISPRO 4 UNITS: 100 INJECTION, SOLUTION INTRAVENOUS; SUBCUTANEOUS at 16:58

## 2024-07-28 RX ADMIN — TRAZODONE HYDROCHLORIDE 100 MG: 100 TABLET ORAL at 19:12

## 2024-07-28 RX ADMIN — GABAPENTIN 100 MG: 100 CAPSULE ORAL at 09:29

## 2024-07-28 RX ADMIN — OXYCODONE AND ACETAMINOPHEN 1 TABLET: 5; 325 TABLET ORAL at 07:04

## 2024-07-28 RX ADMIN — DOCUSATE SODIUM 100 MG: 100 CAPSULE, LIQUID FILLED ORAL at 13:52

## 2024-07-28 RX ADMIN — INSULIN LISPRO 4 UNITS: 100 INJECTION, SOLUTION INTRAVENOUS; SUBCUTANEOUS at 11:39

## 2024-07-28 RX ADMIN — LACTULOSE 10 G: 20 SOLUTION ORAL at 12:56

## 2024-07-28 RX ADMIN — GABAPENTIN 100 MG: 100 CAPSULE ORAL at 19:12

## 2024-07-28 RX ADMIN — METOPROLOL TARTRATE 25 MG: 25 TABLET, FILM COATED ORAL at 09:29

## 2024-07-28 RX ADMIN — FENTANYL CITRATE 50 MCG: 0.05 INJECTION, SOLUTION INTRAMUSCULAR; INTRAVENOUS at 13:52

## 2024-07-28 RX ADMIN — ENOXAPARIN SODIUM 40 MG: 100 INJECTION SUBCUTANEOUS at 09:30

## 2024-07-28 ASSESSMENT — PAIN DESCRIPTION - ORIENTATION
ORIENTATION: LEFT;RIGHT

## 2024-07-28 ASSESSMENT — PAIN DESCRIPTION - DESCRIPTORS
DESCRIPTORS: SHARP;STABBING
DESCRIPTORS: SHOOTING;STABBING
DESCRIPTORS: THROBBING;ACHING

## 2024-07-28 ASSESSMENT — PAIN DESCRIPTION - LOCATION
LOCATION: HIP;RIB CAGE
LOCATION: HIP;BACK;RIB CAGE
LOCATION: HIP;RIB CAGE
LOCATION: HIP;RIB CAGE

## 2024-07-28 ASSESSMENT — PAIN SCALES - GENERAL
PAINLEVEL_OUTOF10: 7
PAINLEVEL_OUTOF10: 4
PAINLEVEL_OUTOF10: 7
PAINLEVEL_OUTOF10: 6
PAINLEVEL_OUTOF10: 7
PAINLEVEL_OUTOF10: 6
PAINLEVEL_OUTOF10: 6
PAINLEVEL_OUTOF10: 4
PAINLEVEL_OUTOF10: 7

## 2024-07-28 ASSESSMENT — PAIN - FUNCTIONAL ASSESSMENT
PAIN_FUNCTIONAL_ASSESSMENT: ACTIVITIES ARE NOT PREVENTED
PAIN_FUNCTIONAL_ASSESSMENT: ACTIVITIES ARE NOT PREVENTED

## 2024-07-28 ASSESSMENT — PAIN DESCRIPTION - PAIN TYPE: TYPE: ACUTE PAIN

## 2024-07-29 VITALS
TEMPERATURE: 98.4 F | RESPIRATION RATE: 16 BRPM | DIASTOLIC BLOOD PRESSURE: 74 MMHG | OXYGEN SATURATION: 98 % | SYSTOLIC BLOOD PRESSURE: 154 MMHG | HEART RATE: 83 BPM | BODY MASS INDEX: 27.09 KG/M2 | HEIGHT: 72 IN | WEIGHT: 200 LBS

## 2024-07-29 LAB
GLUCOSE BLD-MCNC: 230 MG/DL (ref 75–110)
GLUCOSE BLD-MCNC: 348 MG/DL (ref 75–110)

## 2024-07-29 PROCEDURE — 6370000000 HC RX 637 (ALT 250 FOR IP): Performed by: NURSE PRACTITIONER

## 2024-07-29 PROCEDURE — 6360000002 HC RX W HCPCS: Performed by: SURGERY

## 2024-07-29 PROCEDURE — 6370000000 HC RX 637 (ALT 250 FOR IP): Performed by: INTERNAL MEDICINE

## 2024-07-29 PROCEDURE — 99239 HOSP IP/OBS DSCHRG MGMT >30: CPT | Performed by: INTERNAL MEDICINE

## 2024-07-29 PROCEDURE — 82947 ASSAY GLUCOSE BLOOD QUANT: CPT

## 2024-07-29 PROCEDURE — 6370000000 HC RX 637 (ALT 250 FOR IP): Performed by: SURGERY

## 2024-07-29 PROCEDURE — 97530 THERAPEUTIC ACTIVITIES: CPT

## 2024-07-29 PROCEDURE — 6370000000 HC RX 637 (ALT 250 FOR IP): Performed by: PSYCHIATRY & NEUROLOGY

## 2024-07-29 PROCEDURE — 97110 THERAPEUTIC EXERCISES: CPT

## 2024-07-29 RX ORDER — OXYCODONE HYDROCHLORIDE AND ACETAMINOPHEN 5; 325 MG/1; MG/1
1 TABLET ORAL EVERY 8 HOURS PRN
Qty: 10 TABLET | Refills: 0 | Status: SHIPPED | OUTPATIENT
Start: 2024-07-29 | End: 2024-08-01

## 2024-07-29 RX ORDER — GABAPENTIN 100 MG/1
100 CAPSULE ORAL 2 TIMES DAILY
Qty: 6 CAPSULE | Refills: 0 | Status: SHIPPED | OUTPATIENT
Start: 2024-07-29 | End: 2024-08-01

## 2024-07-29 RX ORDER — DOCUSATE SODIUM 100 MG/1
100 CAPSULE, LIQUID FILLED ORAL DAILY
Status: DISCONTINUED | OUTPATIENT
Start: 2024-07-30 | End: 2024-07-29 | Stop reason: HOSPADM

## 2024-07-29 RX ORDER — PSEUDOEPHEDRINE HCL 30 MG
100 TABLET ORAL DAILY
Qty: 30 CAPSULE | Refills: 0 | Status: SHIPPED | OUTPATIENT
Start: 2024-07-30 | End: 2024-08-29

## 2024-07-29 RX ORDER — POLYETHYLENE GLYCOL 3350 17 G/17G
17 POWDER, FOR SOLUTION ORAL DAILY
Status: DISCONTINUED | OUTPATIENT
Start: 2024-07-29 | End: 2024-07-29 | Stop reason: HOSPADM

## 2024-07-29 RX ADMIN — OXYCODONE AND ACETAMINOPHEN 1 TABLET: 5; 325 TABLET ORAL at 06:55

## 2024-07-29 RX ADMIN — LACTULOSE 10 G: 20 SOLUTION ORAL at 13:10

## 2024-07-29 RX ADMIN — PANTOPRAZOLE SODIUM 40 MG: 40 TABLET, DELAYED RELEASE ORAL at 08:42

## 2024-07-29 RX ADMIN — DOCUSATE SODIUM 100 MG: 100 CAPSULE, LIQUID FILLED ORAL at 08:42

## 2024-07-29 RX ADMIN — ENOXAPARIN SODIUM 40 MG: 100 INJECTION SUBCUTANEOUS at 08:42

## 2024-07-29 RX ADMIN — OXYCODONE AND ACETAMINOPHEN 1 TABLET: 5; 325 TABLET ORAL at 01:11

## 2024-07-29 RX ADMIN — INSULIN HUMAN 75 UNITS: 100 INJECTION, SUSPENSION SUBCUTANEOUS at 09:57

## 2024-07-29 RX ADMIN — OXYCODONE AND ACETAMINOPHEN 1 TABLET: 5; 325 TABLET ORAL at 11:25

## 2024-07-29 RX ADMIN — INSULIN LISPRO 2 UNITS: 100 INJECTION, SOLUTION INTRAVENOUS; SUBCUTANEOUS at 08:43

## 2024-07-29 RX ADMIN — POLYETHYLENE GLYCOL 3350 17 G: 17 POWDER, FOR SOLUTION ORAL at 11:25

## 2024-07-29 RX ADMIN — INSULIN LISPRO 6 UNITS: 100 INJECTION, SOLUTION INTRAVENOUS; SUBCUTANEOUS at 11:30

## 2024-07-29 RX ADMIN — SERTRALINE HYDROCHLORIDE 50 MG: 50 TABLET ORAL at 08:42

## 2024-07-29 RX ADMIN — FENTANYL CITRATE 50 MCG: 0.05 INJECTION, SOLUTION INTRAMUSCULAR; INTRAVENOUS at 08:40

## 2024-07-29 RX ADMIN — ACETAMINOPHEN 650 MG: 325 TABLET ORAL at 11:30

## 2024-07-29 RX ADMIN — LACTULOSE 10 G: 20 SOLUTION ORAL at 08:42

## 2024-07-29 RX ADMIN — METOPROLOL TARTRATE 25 MG: 25 TABLET, FILM COATED ORAL at 08:42

## 2024-07-29 RX ADMIN — GABAPENTIN 100 MG: 100 CAPSULE ORAL at 08:42

## 2024-07-29 ASSESSMENT — PAIN SCALES - GENERAL
PAINLEVEL_OUTOF10: 8
PAINLEVEL_OUTOF10: 7
PAINLEVEL_OUTOF10: 6
PAINLEVEL_OUTOF10: 10
PAINLEVEL_OUTOF10: 6
PAINLEVEL_OUTOF10: 6

## 2024-07-29 ASSESSMENT — PAIN DESCRIPTION - LOCATION
LOCATION: HIP;RIB CAGE
LOCATION: HIP
LOCATION: HIP;RIB CAGE

## 2024-07-29 ASSESSMENT — PAIN DESCRIPTION - ORIENTATION
ORIENTATION: LEFT
ORIENTATION: LEFT;RIGHT

## 2024-07-29 ASSESSMENT — PAIN DESCRIPTION - DESCRIPTORS
DESCRIPTORS: SHARP;SHOOTING
DESCRIPTORS: SHOOTING;STABBING

## 2024-07-29 NOTE — PROGRESS NOTES
[facility] Neurology   IN-PATIENT SERVICE      NEUROLOGY PROGRESS  NOTE                Interval History:     Doing well, no current complaints.  Going down for MRI today.    History of Present Illness:     The patient is a 68 y.o. male who presents with Hip Pain (left)  . The patient was seen and examined and the chart was reviewed.  Patient suffered mechanical fall at home as he was trying to put a wheelchair into the trunk of the car, then lost balance and fell backwards.  He was found to have pubic rami fracture as well as rib fractures admitted to trauma team at the hospital.  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  Chronic compression fractures present in the thoracic spine also found.  Patient was noted to have abnormal movements in the upper extremities and neurology consulted for this.     Currently resting in bed he has some akathisia present of the bilateral upper extremities left greater than right.  They are not obvious in the lower extremities at this time.  Does not have any obvious tremor or twitching movements such as seizure.  When asked patient and wife state these have been ongoing for about 1 year at least.  He is on trazodone and also Zoloft which they believe he has been on for quite some time.  Wife does endorse some progression of memory difficulties over the last few months.    Physical Exam:   /79   Pulse 79   Temp 99.2 °F (37.3 °C) (Oral)   Resp 18   Ht 1.829 m (6')   Wt 90.7 kg (200 lb)   SpO2 94%   BMI 27.12 kg/m²   Temp (24hrs), Av.7 °F (37.1 °C), Min:98.2 °F (36.8 °C), Max:99.2 °F (37.3 °C)      Neurological examination:    Mental status    Alert and oriented x 3; following all commands; speech is fluent, no dysarthria, aphasia.       Cranial nerves    II - visual fields intact to confrontation; pupils reactive  III, IV, VI - extraocular muscles intact; no SCARLETT; no nystagmus; no ptosis   V - normal facial sensation                           
        [facility] Neurology   IN-PATIENT SERVICE      NEUROLOGY PROGRESS  NOTE                Interval History:     Doing well, no current complaints.  Akathisia is improved.  MRI of the brain without acute abnormalities.    History of Present Illness:     The patient is a 68 y.o. male who presents with Hip Pain (left)  . The patient was seen and examined and the chart was reviewed.  Patient suffered mechanical fall at home as he was trying to put a wheelchair into the trunk of the car, then lost balance and fell backwards.  He was found to have pubic rami fracture as well as rib fractures admitted to trauma team at the hospital.  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  Chronic compression fractures present in the thoracic spine also found.  Patient was noted to have abnormal movements in the upper extremities and neurology consulted for this.     Currently resting in bed he has some akathisia present of the bilateral upper extremities left greater than right.  They are not obvious in the lower extremities at this time.  Does not have any obvious tremor or twitching movements such as seizure.  When asked patient and wife state these have been ongoing for about 1 year at least.  He is on trazodone and also Zoloft which they believe he has been on for quite some time.  Wife does endorse some progression of memory difficulties over the last few months    Physical Exam:   BP (!) 162/78   Pulse 83   Temp 97.7 °F (36.5 °C) (Oral)   Resp 16   Ht 1.829 m (6')   Wt 90.7 kg (200 lb)   SpO2 100%   BMI 27.12 kg/m²   Temp (24hrs), Av.9 °F (36.6 °C), Min:97.7 °F (36.5 °C), Max:98.1 °F (36.7 °C)      Neurological examination:    Mental status    Alert and oriented x 3; following all commands; speech is fluent, no dysarthria, aphasia.       Cranial nerves    II - visual fields intact to confrontation; pupils reactive  III, IV, VI - extraocular muscles intact; no SCARLETT; no nystagmus; no ptosis   V - 
        [facility] Neurology   IN-PATIENT SERVICE      NEUROLOGY PROGRESS  NOTE                Interval History:     Patient's movements actually appear to be much worse today, he is more restless and anxious.  His Zoloft has been held.  Trazodone held.    History of Present Illness:     The patient is a 68 y.o. male who presents with Hip Pain (left)  . The patient was seen and examined and the chart was reviewed.  Patient suffered mechanical fall at home as he was trying to put a wheelchair into the trunk of the car, then lost balance and fell backwards.  He was found to have pubic rami fracture as well as rib fractures admitted to trauma team at the hospital.  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  Chronic compression fractures present in the thoracic spine also found.  Patient was noted to have abnormal movements in the upper extremities and neurology consulted for this.     Currently resting in bed he has some akathisia present of the bilateral upper extremities left greater than right.  They are not obvious in the lower extremities at this time.  Does not have any obvious tremor or twitching movements such as seizure.  When asked patient and wife state these have been ongoing for about 1 year at least.  He is on trazodone and also Zoloft which they believe he has been on for quite some time.  Wife does endorse some progression of memory difficulties over the last few months.    Physical Exam:   BP (!) 176/96   Pulse 84   Temp 99.5 °F (37.5 °C) (Oral)   Resp 18   Ht 1.829 m (6')   Wt 90.7 kg (200 lb)   SpO2 97%   BMI 27.12 kg/m²   Temp (24hrs), Av.7 °F (37.1 °C), Min:97.8 °F (36.6 °C), Max:99.5 °F (37.5 °C)      Neurological examination:    Mental status    Alert and oriented x 3; following all commands; speech is fluent, no dysarthria, aphasia.       Cranial nerves    II - visual fields intact to confrontation; pupils reactive  III, IV, VI - extraocular muscles intact; no SCARLETT; 
    [facility]   IN-PATIENT SERVICE   [facility]    HISTORY AND PHYSICAL EXAMINATION            Date:   7/25/2024  Patient name:  Miles Cahves  Date of admission:  7/23/2024  9:48 PM  MRN:   [id number]  Account:  [id number]  YOB: 1955  PCP:    Elva Chicas APRN - CNP  Room:   2075/2075-01  Code Status:    Prior    Chief Complaint:     Chief Complaint   Patient presents with   • Hip Pain     left       History Obtained From:     patient    History of Present Illness:     The patient is a 68 y.o.  Non- / non  male who presents with Hip Pain (left)   and he is admitted to the hospital by trauma service after mechanical fall.    68-year-old male with history of GERD, hypertension, osteogenesis imperfecta, diabetes mellitus type 2, erectile dysfunction, presented to the hospital after having a mechanical fall yesterday.  He was noted to have multiple fractures, admitted to trauma service.    When seen the patient reports having bilateral knee pain, right chest discomfort from the fractures, left elbow pain with hematoma and swelling.  Denies fevers or chills.    7/25/2024  Patient reports significant anxiety ever since his trazodone and Zoloft have been held.  Continues to have akathisia's, appear worse than yesterday.    Past Medical History:     Past Medical History:   Diagnosis Date   • Chronic pain    • Closed fracture of multiple ribs of right side 12/10/2021   • Contracture, elbow     h/o right elbow fx, compound fracture.    • Diabetes (HCC)    • Erectile dysfunction    • GERD (gastroesophageal reflux disease)    • Hypertension    • Osteogenesis imperfecta     DX 6 months. 30 broken bones in past.    • Osteogenesis imperfecta    • Type II or unspecified type diabetes mellitus without mention of complication, not stated as uncontrolled         Past Surgical History:     Past Surgical History:   Procedure Laterality Date   • CHOLECYSTECTOMY     • ELBOW SURGERY 
    [facility] PULMONARY,CRITICAL CARE & SLEEP   Minh Mcgarry MD/Dragan Wesley MD/Leonel DENIS AGACNP-BC, NP-C      Aan Laura DENIS NP-C    Bill DENIS NP-C                                         Pulmonary Progress Note    Patient - Miles Chaves   Age - 68 y.o.   - 1955  MRN - [id number]  [id number]  Date of Admission - 2024  9:48 PM    Consulting Service/Physician:       Primary Care Physician: Elva Chicas, APRN - CNP    SUBJECTIVE:     Chief Complaint:   Chief Complaint   Patient presents with    Hip Pain     left     Subjective:    Patient resting in bed  Seems a little sleepy, just had pain meds administered about 1 hour prior to my exam    Lidocaine patch on posterior chest seems to better control his rib he states     Denies any dyspnea, cough, wheeze, hemoptysis    VITALS  /73   Pulse 78   Temp 97.7 °F (36.5 °C)   Resp 20   Ht 1.829 m (6')   Wt 90.7 kg (200 lb)   SpO2 97%   BMI 27.12 kg/m²   Wt Readings from Last 3 Encounters:   24 90.7 kg (200 lb)   24 99.8 kg (220 lb)   21 90.7 kg (200 lb)     I/O (24 Hours)    Intake/Output Summary (Last 24 hours) at 2024 1034  Last data filed at 2024 0150  Gross per 24 hour   Intake --   Output 800 ml   Net -800 ml       Ventilator:      Exam:   Physical Exam   Constitutional: In no acute distress, alert,  HENT: Unremarkable  Head: Normocephalic and atraumatic.   Eyes: EOM are normal. Pupils are equal, round, and reactive to light.   Neck: Neck supple.   Cardiovascular:  Regular rate and rhythm.  Normal heart tones.  No JVD.    Pulmonary/Chest: Effort normal, breath sounds diminished but no adventitious sounds heard; lidocaine patch posterior right ribs   Abdominal: Soft. Bowel sounds are normal. There is no tenderness.   Musculoskeletal: Bilateral knee surgical scars noted, he is wheelchair-bound and has weak lower extremities bilaterally; 
    [facility]   IN-PATIENT SERVICE   [facility]    HISTORY AND PHYSICAL EXAMINATION            Date:   7/26/2024  Patient name:  Miles Chaves  Date of admission:  7/23/2024  9:48 PM  MRN:   [id number]  Account:  [id number]  YOB: 1955  PCP:    Elva Chicas APRN - CNP  Room:   2075/2075-01  Code Status:    Prior    Chief Complaint:     Chief Complaint   Patient presents with   • Hip Pain     left       History Obtained From:     patient    History of Present Illness:     The patient is a 68 y.o.  Non- / non  male who presents with Hip Pain (left)   and he is admitted to the hospital by trauma service after mechanical fall.    68-year-old male with history of GERD, hypertension, osteogenesis imperfecta, diabetes mellitus type 2, erectile dysfunction, presented to the hospital after having a mechanical fall yesterday.  He was noted to have multiple fractures, admitted to trauma service.    When seen the patient reports having bilateral knee pain, right chest discomfort from the fractures, left elbow pain with hematoma and swelling.  Denies fevers or chills.    7/26/2024  Patient appears calm today, seen by psychiatry, started on trazodone and Zoloft MRI pending    Past Medical History:     Past Medical History:   Diagnosis Date   • Chronic pain    • Closed fracture of multiple ribs of right side 12/10/2021   • Contracture, elbow     h/o right elbow fx, compound fracture.    • Diabetes (HCC)    • Erectile dysfunction    • GERD (gastroesophageal reflux disease)    • Hypertension    • Osteogenesis imperfecta     DX 6 months. 30 broken bones in past.    • Osteogenesis imperfecta    • Type II or unspecified type diabetes mellitus without mention of complication, not stated as uncontrolled         Past Surgical History:     Past Surgical History:   Procedure Laterality Date   • CHOLECYSTECTOMY     • ELBOW SURGERY Right    • FEMUR FRACTURE SURGERY Right 4/23/2020    HIP 
    [facility]   IN-PATIENT SERVICE   [facility]    HISTORY AND PHYSICAL EXAMINATION            Date:   7/29/2024  Patient name:  Miles Chaves  Date of admission:  7/23/2024  9:48 PM  MRN:   [id number]  Account:  [id number]  YOB: 1955  PCP:    Elva Chicas APRN - CNP  Room:   2075/2075-01  Code Status:    Full Code    Chief Complaint:     Chief Complaint   Patient presents with   • Hip Pain     left       History Obtained From:     patient    History of Present Illness:     The patient is a 68 y.o.  Non- / non  male who presents with Hip Pain (left)   and he is admitted to the hospital by trauma service after mechanical fall.    68-year-old male with history of GERD, hypertension, osteogenesis imperfecta, diabetes mellitus type 2, erectile dysfunction, presented to the hospital after having a mechanical fall yesterday.  He was noted to have multiple fractures, admitted to trauma service.    When seen the patient reports having bilateral knee pain, right chest discomfort from the fractures, left elbow pain with hematoma and swelling.  Denies fevers or chills.    7/26/2024  Patient appears calm today, seen by psychiatry, started on trazodone and Zoloft MRI pending    Past Medical History:     Past Medical History:   Diagnosis Date   • Chronic pain    • Closed fracture of multiple ribs of right side 12/10/2021   • Contracture, elbow     h/o right elbow fx, compound fracture.    • Diabetes (HCC)    • Erectile dysfunction    • GERD (gastroesophageal reflux disease)    • Hypertension    • Osteogenesis imperfecta     DX 6 months. 30 broken bones in past.    • Osteogenesis imperfecta    • Type II or unspecified type diabetes mellitus without mention of complication, not stated as uncontrolled         Past Surgical History:     Past Surgical History:   Procedure Laterality Date   • CHOLECYSTECTOMY     • ELBOW SURGERY Right    • FEMUR FRACTURE SURGERY Right 4/23/2020    
  [facility] General Surgery   Anthony Romero MD, FACS  Marycarmen Granados, APRN-CNP  [address], [address]  [place] [address]  P: [phone], F: [phone]    General and Robotic Surgery  Progress Note             PATIENT NAME: Miles Chaves   :  1955   MRN: [id number]   PCP:  Elva Chicas, APRN - CNP     TODAY'S DATE: 2024    68 y.o. male seen and examined.  Restless this morning.  Anxious.  Complaining of pain in the hip and lower extremity.  Patient was just given pain medication.  No nausea vomiting no abdominal pain.  Vital signs are stable.    PAST MEDICAL HISTORY     Past Medical History:   Diagnosis Date    Chronic pain     Closed fracture of multiple ribs of right side 12/10/2021    Contracture, elbow     h/o right elbow fx, compound fracture.     Diabetes (HCC)     Erectile dysfunction     GERD (gastroesophageal reflux disease)     Hypertension     Osteogenesis imperfecta     DX 6 months. 30 broken bones in past.     Osteogenesis imperfecta     Type II or unspecified type diabetes mellitus without mention of complication, not stated as uncontrolled        PROBLEM LIST     Patient Active Problem List   Diagnosis    DM w/o complication type II (HCC)    Essential hypertension    Erectile dysfunction    Microalbuminuria due to type 2 diabetes mellitus (HCC)    Stage 3b chronic kidney disease (HCC)    Traumatic bilateral lower extremity fractures    Closed right hip fracture, initial encounter ([facility])    Osteogenesis imperfecta    Type 2 diabetes mellitus without complication (HCC)    Type 2 diabetes mellitus with hyperglycemia (HCC)    Gastroesophageal reflux disease without esophagitis    Tibial plateau fracture, left, closed, initial encounter    Osteoporosis    Fracture of multiple pubic rami, right, closed, initial encounter ([facility])    Contracture of elbow    Pelvis fracture ([facility])    Fall    Osteogenesis imperfecta    Closed fracture of multiple ribs of right side with 
  [facility] General Surgery   Anthony Romero MD, FACS  Marycarmen Granados, APRN-CNP  [address], [address]  [place] [address]  P: [phone], F: [phone]    General and Robotic Surgery  Progress Note             PATIENT NAME: Miles Chaves   :  1955   MRN: [id number]   PCP:  Elva Chicas, APRN - CNP     TODAY'S DATE: 2024    68 y.o. male seen and examined.  Some right-sided chest pain because of rib fracture and pelvic pain because of pubic rami fractures.  Neurology workup is ongoing.  Patient is tolerating diet bowels are moving.  Overall he feels a little better today.  Family at the bedside.    PAST MEDICAL HISTORY     Past Medical History:   Diagnosis Date    Chronic pain     Closed fracture of multiple ribs of right side 12/10/2021    Contracture, elbow     h/o right elbow fx, compound fracture.     Diabetes (HCC)     Erectile dysfunction     GERD (gastroesophageal reflux disease)     Hypertension     Osteogenesis imperfecta     DX 6 months. 30 broken bones in past.     Osteogenesis imperfecta     Type II or unspecified type diabetes mellitus without mention of complication, not stated as uncontrolled        PROBLEM LIST     Patient Active Problem List   Diagnosis    DM w/o complication type II (HCC)    Essential hypertension    Erectile dysfunction    Microalbuminuria due to type 2 diabetes mellitus (HCC)    Stage 3b chronic kidney disease (HCC)    Traumatic bilateral lower extremity fractures    Closed right hip fracture, initial encounter ([facility])    Osteogenesis imperfecta    Type 2 diabetes mellitus without complication (HCC)    Type 2 diabetes mellitus with hyperglycemia (HCC)    Gastroesophageal reflux disease without esophagitis    Tibial plateau fracture, left, closed, initial encounter    Osteoporosis    Fracture of multiple pubic rami, right, closed, initial encounter ([facility])    Contracture of elbow    Pelvis fracture (HCC)    Fall    Osteogenesis imperfecta    
  [facility] General Surgery   Anthony Romero MD, FACS  Marycarmen Granados, APRN-CNP  [address], [address]  [place] [address]  P: [phone], F: [phone]    General and Robotic Surgery  Progress Note             PATIENT NAME: Miles Chaves   :  1955   MRN: [id number]   PCP:  Elva Chicas, APRN - CNP     TODAY'S DATE: 2024    68 y.o. male seen and examined earlier today.  Resting comfortably.  Napping but awakens easily.  Wife at bedside.  Patient's wife states overall his pain seems more controlled today.  Patient denies any abdominal pain.  Denies shortness of breath.  Denies nausea and vomiting.  Last bowel movement . Will be receiving lactulose today for elevated ammonia level.    PAST MEDICAL HISTORY     Past Medical History:   Diagnosis Date    Chronic pain     Closed fracture of multiple ribs of right side 12/10/2021    Contracture, elbow     h/o right elbow fx, compound fracture.     Diabetes (HCC)     Erectile dysfunction     GERD (gastroesophageal reflux disease)     Hypertension     Osteogenesis imperfecta     DX 6 months. 30 broken bones in past.     Osteogenesis imperfecta     Type II or unspecified type diabetes mellitus without mention of complication, not stated as uncontrolled        PROBLEM LIST     Patient Active Problem List   Diagnosis    DM w/o complication type II (HCC)    Essential hypertension    Erectile dysfunction    Microalbuminuria due to type 2 diabetes mellitus (HCC)    Stage 3b chronic kidney disease (HCC)    Traumatic bilateral lower extremity fractures    Closed right hip fracture, initial encounter (HCC)    Osteogenesis imperfecta    Type 2 diabetes mellitus without complication (HCC)    Type 2 diabetes mellitus with hyperglycemia (HCC)    Gastroesophageal reflux disease without esophagitis    Tibial plateau fracture, left, closed, initial encounter    Osteoporosis    Fracture of multiple pubic rami, right, closed, initial encounter 
Department of Psychiatry  Behavioral Health Consult Progress Note    REASON FOR CONSULT: anxiety     CONSULTING PHYSICIAN: Dr. Gilman    History obtained from:     Interim history  The patient was seen face-to-face.  He has some restlessness but does not seem to notice it or find it unpleasant.  He is a little fidgety.  The patient states that anxiety is getting worse and he had difficulty sleeping at night.  At this time we will increase his trazodone to 100 mg nightly.  He denies any suicidal thoughts or any auditory visual hallucinations or psychotic phenomena.        HISTORY OF PRESENT ILLNESS:     The patient is a 68 y.o. male with significant past psychiatric history of anxiety on zoloft presented after a mechanical fall.    Past medical history of GERD, HTN, osteogenesis imperfecta, T2DM, erectile dysfunction.     Patient was trying to place a wheelchair in the back trunk of his car when he fell. The wheelchair fell on his chest. He was admitted under general surgery as he was found to have acute left pubic rami fractures. In addition, he was noted to have bilateral arm (left worse than right) akathisia. Neurology evalauted the patient and believed it could be a result of concommitatnt use of trazodone and zoloft.      Patient was seen at bedside. He denies having distressing thoughts. He has some anxiety, but nothing that is disabling according to him. He states the involuntary abnormal movement started 1 year ago, and has stayed the same. He states \" I don't even know I'm doing it sometime.\" He has never seen a psychiatrist or had prior [facility] admissions. He states \" I am just a little anxious about what has happened so far.\" He denies SI, HI, hallucinations.     As per the patient's wife, he had an outburst in the morning where he was rude to her and the nurse after zoloft was held.     On zoloft 100 mg nightly, trazodone 100 mg, and Neurontin 100 mg TID.     The patient is not currently receiving care for the 
Family is adressing new concern for worsening confusion and anxiety. Writer spoke with daughter aakash at length informing what treatments have been done and what's the plan. Patient today has received a one time dose of xanax 0.5 mg, ativan 1 mg during the day. Patient has also had 2 doses of atarax for anxiety. Patient had percocet for pain, and trazodone to help relax him additonally tonight. Writer is unable to get a accurate blood pressure for patient since he can not relax. Last .    Writer sent perfect serve message to Sheila RESTREPO covering for medical night.    Sheila NP came to access patients confusion. Confusion assessment performed by NP's. Please see all notes, orders, and perfect serve message.   
New patient consult to psychiatry complete.  
Nurse spoke with mri for scheduling. Patient will need xray orbital for foreign body prior to mri scheduling.    Nurse updated Dr. Bennett. Order received for xray.   
[facility]   INPATIENT OCCUPATIONAL THERAPY  PROGRESS NOTE  Date: 2024  Patient Name: Miles Chaves       Room:   MRN: [id number]    : 1955  (68 y.o.)  Gender: male   Referring Practitioner: sleeve knee brace - not prescribed  Diagnosis: Trauma; Unable to ambulate; Intractable pain; Fall, initial encounter; Fall, initial encounter; Closed bilateral fracture of pubic rami, initial encounter; Closed traumatic displaced fracture of one rib      Discharge Recommendations:  Further Occupational Therapy is recommended upon facility discharge.    OT Equipment Recommendations  Other: TBD    Restrictions/Precautions  Restrictions/Precautions  Restrictions/Precautions: Fall Risk;Weight Bearing  Required Braces or Orthoses?: Yes  Implants present? : Metal implants (B LE, R elbow plate)  Lower Extremity Weight Bearing Restrictions  Right Lower Extremity Weight Bearing: Weight Bearing As Tolerated  Left Lower Extremity Weight Bearing: Weight Bearing As Tolerated    O2 Device: None (Room air)    Subjective  Subjective  Subjective: \"Please, I'm begging you. I want to get back in bed\" Pt initially agreeable to participate in therapy and sit up in chair. Once bed mobility was initiated, pt reported increase in pain and requesting to lay back down. Pt then stated, \"I don't know what to do\" while sitting EOB. He was returned to bed at end of session  Subjective  Pain: 7/10 L hip pain- noted to have received pain medication prior to start of session. Lidocaine patch placed along L rib cage  Comments: Okay for OT PT treatment per RN    Objective  Cognition  Overall Cognitive Status: Exceptions  Arousal/Alertness: Appropriate responses to stimuli  Following Commands: Follows one step commands with increased time;Follows one step commands with repetition  Attention Span: Attends with cues to redirect  Safety Judgement: Decreased awareness of need for assistance;Decreased awareness of need 
Orbits cleared for any metal by CRC Dr Lynn. Awaiting to see if pt willing to do MRI of brain. Any questions please call [phone].  Thanks!  
Patient downstairs getting an MRI of the brain.  Will evaluate tomorrow.  
Patient unable to tolerate moving to table to get xray completed, so he is unable to get mri.    Nurse updated neurology, Dr. Bennett.   
Physical Therapy  [facility]    Date: 24  Patient Name: Miles Chaves       Room: /5-01  MRN: [id number]   Account: [id number]   : 1955  (68 y.o.) Gender: male     Referring Practitioner: sleeve knee brace - not prescribed  Diagnosis: Trauma; Unable to ambulate; Intractable pain; Fall, initial encounter; Fall, initial encounter; Closed bilateral fracture of pubic rami, initial encounter; Closed traumatic displaced fracture of one rib  Past Medical History:  has a past medical history of Chronic pain, Closed fracture of multiple ribs of right side, Contracture, elbow, Diabetes (HCC), Erectile dysfunction, GERD (gastroesophageal reflux disease), Hypertension, Osteogenesis imperfecta, Osteogenesis imperfecta, and Type II or unspecified type diabetes mellitus without mention of complication, not stated as uncontrolled.   Past Surgical History:   has a past surgical history that includes Cholecystectomy; Tonsillectomy and adenoidectomy; fracture surgery (Right); fracture surgery (Bilateral); fracture surgery (Left); Femur fracture surgery (Right, 2020); ORIF PROXIMAL TIBIAL PLATEAU FRACTURE (Bilateral, 2020); Leg Surgery (Bilateral); hip surgery (Left); Elbow surgery (Right); Wrist surgery (Left); and US I&D BREAST ABSCESS DEEP (2022).  Additional Pertinent Hx: Imagin. Acute displaced fracture of the right 6th rib posterior aspect.  2. Acute displaced left superior and inferior pubic rami fractures.    Overall Orientation Status: Impaired  Restrictions/Precautions  Restrictions/Precautions: Fall Risk;Weight Bearing  Required Braces or Orthoses?: Yes  Implants present? : Metal implants (B LE, R elbow plate)  Lower Extremity Weight Bearing Restrictions  Right Lower Extremity Weight Bearing: Weight Bearing As Tolerated  Left Lower Extremity Weight Bearing: Weight Bearing As Tolerated  Required Braces or Orthoses  Right Lower Extremity Brace: Knee Brace  RLE 
Physical Therapy  Rehabilitation Physical Therapy    Date: 2024  Patient Name: Miles Chaves      Room:   MRN: [id number]    : 1955  (68 y.o.)  Gender: male     Referring Practitioner: Tom Garcia MD  Diagnosis: trauma  Additional Pertinent Hx: Imagin. Acute displaced fracture of the right 6th rib posterior aspect.  2. Acute displaced left superior and inferior pubic rami fractures.    Discharge Recommendations:  Patient would benefit from continued therapy after discharge, Continue to assess pending progress  Equipment Needed: No    Assessment  Assessment  Activity Tolerance: Patient limited by pain (patient slow moving with frequent resting due to rib fractures)  Discharge Recommendations: Patient would benefit from continued therapy after discharge;Continue to assess pending progress    Plan  Physical Therapy Plan  General Plan: 5-7 times per week  Specific Instructions for Next Treatment: co tx, transfers, standing tolerance  Current Treatment Recommendations: ROM, Strengthening, Balance training, Functional mobility training, Transfer training, Endurance training, Gait training, Equipment evaluation, education, & procurement, Patient/Caregiver education & training, Safety education & training, Home exercise program, Pain management, Positioning, Therapeutic activities     Restrictions  Restrictions/Precautions: Fall Risk, Weight Bearing  Implants present? : Metal implants (B LE, R elbow plate)  Other position/activity restrictions: OT PT eval and treat  Right Lower Extremity Weight Bearing: Weight Bearing As Tolerated  Left Lower Extremity Weight Bearing: Weight Bearing As Tolerated  Required Braces or Orthoses  Right Lower Extremity Brace: Knee Brace  RLE Brace Type: sleeve knee brace - not prescribed  Left Lower Extremity Brace: Knee Brace  LLE Brace Type: sleeve knee brace - not prescribed    Subjective  Subjective  Subjective: Patient resting in bed upon approach, agreeable 
Pt and family known to writer. Pt anxious and frustrated at his situation. Writer provided listening presence and blessing.    07/25/24 1435   Encounter Summary   Encounter Overview/Reason Spiritual/Emotional Needs   Service Provided For Patient and family together   Referral/Consult From Family   Support System Spouse;Children   Last Encounter  07/25/24   Complexity of Encounter High   Spiritual/Emotional needs   Type Spiritual Support;Emotional Distress   Assessment/Intervention/Outcome   Assessment Anxious;Impaired resilience   Intervention Active listening;Discussed illness injury and it’s impact;Explored/Affirmed feelings, thoughts, concerns;Explored Coping Skills/Resources;Prayer (assurance of)/[place];Sustaining Presence/Ministry of presence   Outcome Comfort;Engaged in conversation;Expressed feelings, needs, and concerns;Expressed Gratitude;Receptive;Venting emotion       
Pt's family is concerned with pt's d/c needs and pt's willingness to work with PT/OT. This is a difficult situation due to pt's ongoing needs and poor choices. Writer provided listening presence and emotional support.    07/28/24 1422   Encounter Summary   Encounter Overview/Reason Spiritual/Emotional Needs   Service Provided For Patient and family together;Family   Referral/Consult From Family   Support System Spouse;Children   Last Encounter  07/28/24   Complexity of Encounter Moderate   Begin Time 1240   End Time  1300   Total Time Calculated 20 min   Spiritual/Emotional needs   Type Emotional Distress;Spiritual Support   Grief, Loss, and Adjustments   Type Adjustment to illness;Life Adjustments   Assessment/Intervention/Outcome   Assessment Anxious;Impaired resilience   Intervention Active listening;Discussed illness injury and it’s impact;Explored/Affirmed feelings, thoughts, concerns;Explored Coping Skills/Resources;Prayer (assurance of)/[place];Sustaining Presence/Ministry of presence   Outcome Engaged in conversation;Expressed feelings, needs, and concerns;Expressed Gratitude;Venting emotion       
Rehab consult completed   
The patient was seen face-to-face.  He has some restlessness but does not seem to notice it or find it unpleasant.  He is a little fidgety.  The patient states that anxiety is getting worse and he had difficulty sleeping at night.  At this time we will increase his trazodone to 100 mg nightly.  He denies any suicidal thoughts or any auditory visual hallucinations or psychotic phenomena. 
[facility]   INPATIENT OCCUPATIONAL THERAPY  PROGRESS NOTE  Date: 2024  Patient Name: Miles Chaves       Room:   MRN: [id number]    : 1955  (68 y.o.)  Gender: male   Referring Practitioner: sleeve knee brace - not prescribed  Diagnosis: Trauma; Unable to ambulate; Intractable pain; Fall, initial encounter; Fall, initial encounter; Closed bilateral fracture of pubic rami, initial encounter; Closed traumatic displaced fracture of one rib      Discharge Recommendations:  Further Occupational Therapy is recommended upon facility discharge.    OT Equipment Recommendations  Other: TBD    Restrictions/Precautions  Restrictions/Precautions  Restrictions/Precautions: Fall Risk;Weight Bearing  Required Braces or Orthoses?: Yes  Implants present? : Metal implants (B LE, R elbow plate)  Lower Extremity Weight Bearing Restrictions  Right Lower Extremity Weight Bearing: Weight Bearing As Tolerated  Left Lower Extremity Weight Bearing: Weight Bearing As Tolerated    O2 Device: None (Room air)    Subjective  Subjective  Pain: Pt reported 7/10 R-sided rib pain (recent rib fractures); moderately  interfered with activities, required frequent therapeutic rest breaks  Comments: Okay for  OT PT treatment per RN    Objective  Cognition  Overall Cognitive Status: Exceptions  Arousal/Alertness: Appropriate responses to stimuli  Following Commands: Follows one step commands with increased time;Follows one step commands with repetition  Attention Span: Attends with cues to redirect  Safety Judgement: Decreased awareness of need for assistance;Decreased awareness of need for safety  Problem Solving: Assistance required to generate solutions;Assistance required to implement solutions  Insights: Decreased awareness of deficits  Initiation: Requires cues for some  Sequencing: Requires cues for some    Activities of Daily Living           Balance  Balance  Sitting Balance: Contact guard 
  V - normal facial sensation                                                               VII - normal facial symmetry                                                             VIII - intact hearing                                                                             IX, X - symmetrical palate elevation                                               XI - symmetrical shoulder shrug                                                       XII - midline tongue without atrophy or fasciculation      Motor function  Strength: 5/5 RUE, 5/5 RLE, 5/5 LUE, 5/5  LLE  Normal bulk and tone.   Akathisia present bilateral upper extremities left greater than right                      Sensory function Intact to touch, pin, vibration, proprioception throughout      Cerebellar Intact finger-nose-finger testing. Intact heel-shin testing. No dysdiadochokinesia present.       Reflex function 2/4 symmetric throughout . Downgoing plantar response bilaterally. (-)Antonio's sign bilaterally    Gait                  Not assessed due to multiple fractures.             Diagnostics:      Laboratory Testing:  CBC:   Recent Labs     07/24/24  0653 07/25/24  0542 07/26/24  0538   WBC 10.2 9.0 9.1   HGB 13.6 12.8* 11.9*    157 154     BMP:    Recent Labs     07/24/24  0653 07/25/24  0542 07/26/24  0538   * 132* 134*   K 5.0 4.6 4.3   CL 99 100 100   CO2 19* 21 22   BUN 22 23 27*   CREATININE 1.5* 1.4* 1.4*   GLUCOSE 232* 224* 179*         Lab Results   Component Value Date    CHOL 163 10/15/2018    HDL 39 (L) 08/12/2020    TRIG 161 (H) 10/15/2018    ALT 22 07/23/2024    AST 18 07/23/2024    TSH 2.52 07/24/2024    INR 1.2 07/23/2024    LABA1C 7.0 (H) 07/25/2024    [id number] 426 07/24/2024    MG 1.5 (L) 11/22/2022    PHOS 2.7 11/22/2022         Impression:      Akathisia  Status post mechanical fall with resultant rib fracture, left pubic rami fracture.  Chronic thoracic compression fractures.  Osteogenesis imperfecta.  Right eye 
TFN WITH C-ARM VISUALIZATION performed by Kunal Kramer MD at [facility] OR   • FRACTURE SURGERY Right     right elbow x 3. Compound fx at work.    • FRACTURE SURGERY Bilateral     with hardware   • FRACTURE SURGERY Left     with hardware   • HIP SURGERY Left    • LEG SURGERY Bilateral    • ORIF PROXIMAL TIBIAL PLATEAU FRACTURE Bilateral 7/20/2020    TIBIAL PLATEAU OPEN REDUCTION INTERNAL FIXATION LEFT AND 7.3 HELEN RIGHT KNEE performed by Dago Pabon MD at [facility] OR   • TONSILLECTOMY AND ADENOIDECTOMY     • US I&D BREAST ABSCESS DEEP  2/1/2022    US I&D BREAST ABSCESS DEEP 2/1/2022   • WRIST SURGERY Left         Medications Prior to Admission:     Prior to Admission medications    Medication Sig Start Date End Date Taking? Authorizing Provider   insulin NPH (HUMULIN N) 100 UNIT/ML injection vial Inject into the skin 2 times daily (before meals) Inject 70 units in the morning, and 30 units at night   Yes Adi Pisano MD   insulin regular (HUMULIN R;NOVOLIN R) 100 UNIT/ML injection Inject 10 Units into the skin 2 times daily (before meals)   Yes Adi Pisano MD   insulin NPH (HUMULIN N;NOVOLIN N) 100 UNIT/ML injection vial Inject 30 Units into the skin nightly   Yes Adi Pisano MD   sertraline (ZOLOFT) 100 MG tablet Take 1 tablet by mouth daily    Adi Pisano MD   traZODone (DESYREL) 100 MG tablet Take 1 tablet by mouth nightly    Adi Pisano MD   gabapentin (NEURONTIN) 100 MG capsule Take 100 mg by mouth 2 times daily.  Patient not taking: Reported on 7/24/2024    Adi Pisano MD   metoprolol tartrate (LOPRESSOR) 25 MG tablet Take 1 tablet by mouth 0.5 tab twice daily    Adi Pisano MD   enoxaparin (LOVENOX) 40 MG/0.4ML injection Inject 0.4 mLs into the skin daily  Patient not taking: Reported on 7/24/2024 10/1/20   Meng Vickers MD   insulin glargine (LANTUS) 100 UNIT/ML injection vial Inject 40 Units into the skin 2 times daily  Patient not taking: 
07/26/24 1151    insulin lispro (HUMALOG,ADMELOG) injection vial 0-4 Units, 0-4 Units, SubCUTAneous, Nightly, Gina Melgar, ADEEL - CNP    oxyCODONE-acetaminophen (PERCOCET) 5-325 MG per tablet 1 tablet, 1 tablet, Oral, Q4H PRN, Gina Melgar, APRN - CNP, 1 tablet at 07/27/24 0503    0.9 % sodium chloride infusion, , IntraVENous, Continuous, Anthony Romero MD, Last Rate: 50 mL/hr at 07/26/24 1841, New Bag at 07/26/24 1841    enoxaparin (LOVENOX) injection 40 mg, 40 mg, SubCUTAneous, Daily, Anthony Romero MD, 40 mg at 07/27/24 0805    ondansetron (ZOFRAN) injection 4 mg, 4 mg, IntraVENous, Q6H PRN, Anthony Romero MD    fentaNYL (SUBLIMAZE) injection 25 mcg, 25 mcg, IntraVENous, Q2H PRN, Anthony Romero MD, 25 mcg at 07/25/24 1247    fentaNYL (SUBLIMAZE) injection 50 mcg, 50 mcg, IntraVENous, Q2H PRN, Anthony Romero MD, 50 mcg at 07/27/24 0805    acetaminophen (TYLENOL) tablet 650 mg, 650 mg, Oral, Q4H PRN, Anthony Romero MD    lidocaine 4 % external patch 1 patch, 1 patch, TransDERmal, Daily, Bill Russ, APRN - CNP, 1 patch at 07/27/24 0806    hydrOXYzine HCl (ATARAX) tablet 10 mg, 10 mg, Oral, TID PRN, Cammy Gilman MD, 10 mg at 07/26/24 0911    Lab Results:     Lab Results   Component Value Date    WBC 7.3 07/27/2024    HGB 11.6 (L) 07/27/2024    HCT 34.2 (L) 07/27/2024    MCV 90.9 07/27/2024     07/27/2024     Lab Results   Component Value Date    CALCIUM 9.3 07/27/2024     07/27/2024    K 4.0 07/27/2024    CO2 22 07/27/2024     07/27/2024    BUN 35 (H) 07/27/2024    CREATININE 1.5 (H) 07/27/2024     No components found for: \"ABGSAMPLETYP\", \"ABGBODYTEMP\", \"ABGPHCORRFOR\", \"[id number]\", \"ABGPHCORRFOOR\", \"ABGPH\", \"ABGPCO2\", \"ABGPO2\", \"ABGBASEEXCES\", \"ABGBASEDEFIC\", \"ABGHCO3\", \"ZQPU8SNM\", \"ABGENDTIDALC\", \"ABGALLENSTES\", \"ABGSPO2\", \"ABGSAMEPLESIT\", \"[id number]\", \"ABGOXYGENSOU\"  Lab Results   Component Value Date    INR 1.2 07/23/2024    PROTIME 15.1 (H) 07/23/2024       Radiology: 
Nightly, Cammy Gilman MD, 35 Units at 07/28/24 2116    gabapentin (NEURONTIN) capsule 100 mg, 100 mg, Oral, BID, Cammy Gilman MD, 100 mg at 07/29/24 0842    hydrALAZINE (APRESOLINE) injection 10 mg, 10 mg, IntraVENous, Q6H PRN, Cammy Gilman MD, 10 mg at 07/25/24 1811    sertraline (ZOLOFT) tablet 50 mg, 50 mg, Oral, Daily, Emanuel Beltran MD, 50 mg at 07/29/24 0842    pantoprazole (PROTONIX) tablet 40 mg, 40 mg, Oral, Daily, Gina Melgar APRN - CNP, 40 mg at 07/29/24 0842    metoprolol tartrate (LOPRESSOR) tablet 25 mg, 25 mg, Oral, BID, Gina Melgar APRN - CNP, 25 mg at 07/29/24 0842    glucose chewable tablet 16 g, 4 tablet, Oral, PRN, Gina Melgar APRN - CNP    dextrose bolus 10% 125 mL, 125 mL, IntraVENous, PRN **OR** dextrose bolus 10% 250 mL, 250 mL, IntraVENous, PRN, Gina Melgar APRN - CNP    glucagon injection 1 mg, 1 mg, IntraMUSCular, PRN, Gina Melgar APRN - CNP    dextrose 10 % infusion, , IntraVENous, Continuous PRN, Gina Melgar APRN - CNP    insulin lispro (HUMALOG,ADMELOG) injection vial 0-8 Units, 0-8 Units, SubCUTAneous, TID WC, Gina Melgar APRN - CNP, 6 Units at 07/29/24 1130    insulin lispro (HUMALOG,ADMELOG) injection vial 0-4 Units, 0-4 Units, SubCUTAneous, Nightly, Gina Melgar APRN - CNP    oxyCODONE-acetaminophen (PERCOCET) 5-325 MG per tablet 1 tablet, 1 tablet, Oral, Q4H PRN, Gina Melgar APRN - CNP, 1 tablet at 07/29/24 1125    enoxaparin (LOVENOX) injection 40 mg, 40 mg, SubCUTAneous, Daily, Anthony Romero MD, 40 mg at 07/29/24 0842    ondansetron (ZOFRAN) injection 4 mg, 4 mg, IntraVENous, Q6H PRN, Anthony Romero MD    acetaminophen (TYLENOL) tablet 650 mg, 650 mg, Oral, Q4H PRN, Anthony Romero MD, 650 mg at 07/29/24 1130    lidocaine 4 % external patch 1 patch, 1 patch, TransDERmal, Daily, Bill Russ, APRN - CNP, 1 patch at 07/29/24 0843    hydrOXYzine HCl (ATARAX) tablet 10 mg, 10 mg, Oral, TID PRN, Cammy Gilman MD, 10 mg at 07/26/24 
habits, abdominal pain   GENITOURINARY:  negative for difficulty of urination, burning with urination, frequency   INTEGUMENT:  negative for rash, skin lesions, easy bruising   HEMATOLOGIC/LYMPHATIC:  negative for swelling/edema   ALLERGIC/IMMUNOLOGIC:  negative for urticaria , itching  ENDOCRINE:  negative increase in drinking, increase in urination, hot or cold intolerance  MUSCULOSKELETAL: As above  NEUROLOGICAL: As above      Physical Exam:   BP (!) 162/78   Pulse 83   Temp 97.7 °F (36.5 °C) (Oral)   Resp 16   Ht 1.829 m (6')   Wt 90.7 kg (200 lb)   SpO2 100%   BMI 27.12 kg/m²   Temp (24hrs), Av.9 °F (36.6 °C), Min:97.7 °F (36.5 °C), Max:98.1 °F (36.7 °C)    Recent Labs     24  1624 243 24  0600 24  1111   POCGLU 263* 258* 128* 279*         Intake/Output Summary (Last 24 hours) at 2024 1500  Last data filed at 2024  Gross per 24 hour   Intake 655 ml   Output 725 ml   Net -70 ml         General Appearance:  alert, not in acute distress  Mental status: oriented to person, place, and time with normal affect  Head:  normocephalic, atraumatic.  Eye: no icterus, redness, pupils equal and reactive, extraocular eye movements intact, conjunctiva clear  Ear: normal external ear, no discharge, hearing intact  Nose:  no drainage noted  Mouth: mucous membranes moist  Neck: supple, no carotid bruits, thyroid not palpable  Lungs: Bilateral equal air entry, clear to ausculation, no wheezing, rales or rhonchi, normal effort  Cardiovascular: normal rate, regular rhythm, no murmur, gallop, rub.  Abdomen: Soft, nontender, nondistended, normal bowel sounds, no hepatomegaly or splenomegaly  Neurologic: Drooping, oriented x 3, reports bilateral knee surgeries in the past and hence week there.  Physical exam is somewhat limited due to patient's fractures, however on closer review the patient has involuntary myoclonus of the left upper extremity.  Skin: Bruising noted  Extremities: 
in his 30s, smoked 20 years 2 pack/day, 40-pack-year history;   Full CODE STATUS  PLAN:   Continue Lidoderm patch  Continue to monitor O2 sats, is on room air  Encourage incentive spirometer, 10 breaths every other hour at minimum  Pain control, lidocaine patch  DVT prophylaxis per trauma team, appears to be on Lovenox  We will continue to follow intermittently, please call with any questions or concerns      Electronically signed by ADEEL MORLEY CNP on 07/25/24   [facility] Pulmonary, Critical Care & Sleep    Available via [employer]    [place] Office:  [address] , [place] [address] [phone]     [place] Office:  [facility] [address], [place] [address] [phone]     This progress note was completed using a voice transcription system. Every effort was made to ensure accuracy. However, inadvertent computerized transcription errors may be present.  
Minutes: 30 Minutes       Tonia Hammonds, PT         
bed mobility with Min A x1-2 to sit EOB 15+ minutes, SBA, during self-care/functional activity  Short Term Goal 2: Pt will complete UB ADLs with SBA and LB ADLs with Min A, fair safety, and use of AE/DME/Modified techniques as needed  Short Term Goal 3: Pt will complete sit to stand transfers with Min A x1-2 and fair safety in preparation for functional/ADL transfers  Short Term Goal 4: Pt will actively participate in 15+ minutes of therapeutic exercise/functional activity for improved safety and independence with self-care/mobility  Short Term Goal 5: Pt will tolerate standing for 5+ minutes, Min A, during self-care/functional activity for improved balance/activity tolerance  Short Term Goal 6: OT to further assess functional transfers/mobility and notify OTR to update goal    Plan  Occupational Therapy Plan  Times Per Week: 5-7  Times Per Day: Once a day  Current Treatment Recommendations: Self-Care / ADL, ROM, Strengthening, Balance training, Functional mobility training, Endurance training, Cognitive reorientation, Pain management, Safety education & training, Patient/Caregiver education & training, Equipment evaluation, education, & procurement, Positioning, Home management training, Co-Treatment    OT Individual Minutes  OT Individual Minutes  Time In: 1050  Time Out: 1135  Minutes: 45  Time Code Minutes   Timed Code Treatment Minutes: 23 Minutes    Electronically signed by SAMMY Solis on 7/25/24 at 1:11 PM EDT

## 2024-07-29 NOTE — PLAN OF CARE
Problem: Discharge Planning  Goal: Discharge to home or other facility with appropriate resources  7/25/2024 0409 by Tong Nicholson RN  Outcome: Progressing  7/25/2024 0408 by Tong Nicholson RN  Outcome: Progressing  Flowsheets (Taken 7/24/2024 2141)  Discharge to home or other facility with appropriate resources: Arrange for needed discharge resources and transportation as appropriate     Problem: Pain  Goal: Verbalizes/displays adequate comfort level or baseline comfort level  7/25/2024 0409 by Tong Nicholson RN  Outcome: Progressing  7/25/2024 0408 by Tong Nicholson RN  Outcome: Progressing  Flowsheets (Taken 7/24/2024 2137)  Verbalizes/displays adequate comfort level or baseline comfort level:   Encourage patient to monitor pain and request assistance   Administer analgesics based on type and severity of pain and evaluate response  7/24/2024 1950 by Bri Rivers RN  Outcome: Progressing  Note: Patient expresses relief following administration of prn pain medication.     Problem: Safety - Adult  Goal: Free from fall injury  7/25/2024 0409 by Tong Nicholson RN  Outcome: Progressing  7/25/2024 0408 by Tong Nicholson RN  Outcome: Progressing  Flowsheets (Taken 7/25/2024 0406)  Free From Fall Injury: Instruct family/caregiver on patient safety  7/24/2024 1950 by Bri Rivers RN  Outcome: Progressing  Note: Patient remains free of incidence/ injury. Bed remains in low position. Call light within reach. Side rails up x2.      Problem: ABCDS Injury Assessment  Goal: Absence of physical injury  7/25/2024 0409 by Tong Nicholson RN  Outcome: Progressing  7/25/2024 0408 by Tong Nicholson RN  Outcome: Progressing  Flowsheets (Taken 7/25/2024 0406)  Absence of Physical Injury: Implement safety measures based on patient assessment     Problem: Skin/Tissue Integrity  Goal: Absence of new skin breakdown  Description: 1.  Monitor for areas of redness and/or skin breakdown  2.  Assess vascular access sites 
  Problem: Discharge Planning  Goal: Discharge to home or other facility with appropriate resources  7/25/2024 1438 by Kadi Bach RN  Outcome: Progressing     Problem: Pain  Goal: Verbalizes/displays adequate comfort level or baseline comfort level  7/25/2024 1438 by Kadi Bach RN  Outcome: Progressing     Problem: Safety - Adult  Goal: Free from fall injury  7/25/2024 1438 by Kadi Bach RN  Outcome: Progressing  Flowsheets (Taken 7/25/2024 1224)  Free From Fall Injury: Instruct family/caregiver on patient safety     Problem: Discharge Planning  Goal: Discharge to home or other facility with appropriate resources  7/25/2024 1438 by Kadi Bach RN  Outcome: Progressing     Problem: Pain  Goal: Verbalizes/displays adequate comfort level or baseline comfort level  7/25/2024 1438 by Kadi Bach RN  Outcome: Progressing     Problem: Safety - Adult  Goal: Free from fall injury  7/25/2024 1438 by Kadi Bach RN  Outcome: Progressing  Flowsheets (Taken 7/25/2024 1224)  Free From Fall Injury: Instruct family/caregiver on patient safety     Problem: ABCDS Injury Assessment  Goal: Absence of physical injury  7/25/2024 1438 by Kadi Bach RN  Outcome: Progressing  Flowsheets (Taken 7/25/2024 1224)  Absence of Physical Injury: Implement safety measures based on patient assessment     Problem: Skin/Tissue Integrity  Goal: Absence of new skin breakdown  Description: 1.  Monitor for areas of redness and/or skin breakdown  2.  Assess vascular access sites hourly  3.  Every 4-6 hours minimum:  Change oxygen saturation probe site  4.  Every 4-6 hours:  If on nasal continuous positive airway pressure, respiratory therapy assess nares and determine need for appliance change or resting period.  7/25/2024 1438 by Kadi Bach RN  Outcome: Progressing     
  Problem: Discharge Planning  Goal: Discharge to home or other facility with appropriate resources  7/26/2024 0120 by Lana Rutledge, RN  Outcome: Progressing  Flowsheets (Taken 7/25/2024 2010)  Discharge to home or other facility with appropriate resources:   Identify barriers to discharge with patient and caregiver   Arrange for needed discharge resources and transportation as appropriate   Identify discharge learning needs (meds, wound care, etc)   Arrange for interpreters to assist at discharge as needed   Refer to discharge planning if patient needs post-hospital services based on physician order or complex needs related to functional status, cognitive ability or social support system       Problem: Pain  Goal: Verbalizes/displays adequate comfort level or baseline comfort level  7/26/2024 0120 by Lana Rutledge, RN  Outcome: Progressing, Patient expresses relief following administration of prn pain medication.      Problem: Safety - Adult  Goal: Free from fall injury  7/26/2024 0120 by Lana Rutledge, RN  Outcome: Progressing, Patient remains free of incidence/ injury. Bed remains in low position. Side rails up x2.          
  Problem: Discharge Planning  Goal: Discharge to home or other facility with appropriate resources  7/28/2024 0620 by Corine Berrios RN  Outcome: Progressing  Flowsheets (Taken 7/27/2024 2030)  Discharge to home or other facility with appropriate resources: Identify barriers to discharge with patient and caregiver     Problem: Pain  Goal: Verbalizes/displays adequate comfort level or baseline comfort level  7/28/2024 0620 by Corine Berrios RN  Outcome: Progressing  Flowsheets (Taken 7/28/2024 0026)  Verbalizes/displays adequate comfort level or baseline comfort level:   Encourage patient to monitor pain and request assistance   Assess pain using appropriate pain scale   Administer analgesics based on type and severity of pain and evaluate response   Implement non-pharmacological measures as appropriate and evaluate response     Problem: Safety - Adult  Goal: Free from fall injury  7/28/2024 0620 by Corine Berrios, RN  Outcome: Progressing   Free from falls throughout shift. Pt is compliant with safety measures in place. Pt seen by care team every hour with purposefully hourly rounding, bed is in the lowest position, call light and personal belongings within reach. Two side rails are up and bed alarm is on. Pt calls out appropriately.       Problem: ABCDS Injury Assessment  Goal: Absence of physical injury  7/28/2024 0620 by Corine Berrios RN  Outcome: Progressing     Problem: Skin/Tissue Integrity  Goal: Absence of new skin breakdown  Description: 1.  Monitor for areas of redness and/or skin breakdown  2.  Assess vascular access sites hourly  3.  Every 4-6 hours minimum:  Change oxygen saturation probe site  4.  Every 4-6 hours:  If on nasal continuous positive airway pressure, respiratory therapy assess nares and determine need for appliance change or resting period.  7/28/2024 0620 by Corine Berrios, RN  Outcome: Progressing     
  Problem: Discharge Planning  Goal: Discharge to home or other facility with appropriate resources  Outcome: Completed     Problem: Pain  Goal: Verbalizes/displays adequate comfort level or baseline comfort level  Outcome: Completed     Problem: Safety - Adult  Goal: Free from fall injury  Outcome: Completed     Problem: ABCDS Injury Assessment  Goal: Absence of physical injury  Outcome: Completed     Problem: Skin/Tissue Integrity  Goal: Absence of new skin breakdown  Description: 1.  Monitor for areas of redness and/or skin breakdown  2.  Assess vascular access sites hourly  3.  Every 4-6 hours minimum:  Change oxygen saturation probe site  4.  Every 4-6 hours:  If on nasal continuous positive airway pressure, respiratory therapy assess nares and determine need for appliance change or resting period.  Outcome: Completed     Problem: Chronic Conditions and Co-morbidities  Goal: Patient's chronic conditions and co-morbidity symptoms are monitored and maintained or improved  Outcome: Completed     
  Problem: Discharge Planning  Goal: Discharge to home or other facility with appropriate resources  Outcome: Progressing     Problem: Pain  Goal: Verbalizes/displays adequate comfort level or baseline comfort level  Outcome: Progressing     Problem: Safety - Adult  Goal: Free from fall injury  Outcome: Progressing     Problem: ABCDS Injury Assessment  Goal: Absence of physical injury  Outcome: Progressing     Problem: Skin/Tissue Integrity  Goal: Absence of new skin breakdown  Description: 1.  Monitor for areas of redness and/or skin breakdown  2.  Assess vascular access sites hourly  3.  Every 4-6 hours minimum:  Change oxygen saturation probe site  4.  Every 4-6 hours:  If on nasal continuous positive airway pressure, respiratory therapy assess nares and determine need for appliance change or resting period.  Outcome: Progressing     
  Problem: Discharge Planning  Goal: Discharge to home or other facility with appropriate resources  Outcome: Progressing     Problem: Pain  Goal: Verbalizes/displays adequate comfort level or baseline comfort level  Outcome: Progressing     Problem: Safety - Adult  Goal: Free from fall injury  Outcome: Progressing     Problem: ABCDS Injury Assessment  Goal: Absence of physical injury  Outcome: Progressing     Problem: Skin/Tissue Integrity  Goal: Absence of new skin breakdown  Outcome: Progressing     
  Problem: Discharge Planning  Goal: Discharge to home or other facility with appropriate resources  Outcome: Progressing  Flowsheets (Taken 7/24/2024 2141)  Discharge to home or other facility with appropriate resources: Arrange for needed discharge resources and transportation as appropriate     Problem: Pain  Goal: Verbalizes/displays adequate comfort level or baseline comfort level  7/25/2024 0408 by Tong Nicholson RN  Outcome: Progressing  Flowsheets (Taken 7/24/2024 2137)  Verbalizes/displays adequate comfort level or baseline comfort level:   Encourage patient to monitor pain and request assistance   Administer analgesics based on type and severity of pain and evaluate response  7/24/2024 1950 by Bri Rivers RN  Outcome: Progressing  Note: Patient expresses relief following administration of prn pain medication.     Problem: Safety - Adult  Goal: Free from fall injury  7/25/2024 0408 by Tong Nicholson RN  Outcome: Progressing  Flowsheets (Taken 7/25/2024 0406)  Free From Fall Injury: Instruct family/caregiver on patient safety  7/24/2024 1950 by Bri Rivers RN  Outcome: Progressing  Note: Patient remains free of incidence/ injury. Bed remains in low position. Call light within reach. Side rails up x2.      Problem: ABCDS Injury Assessment  Goal: Absence of physical injury  Outcome: Progressing  Flowsheets (Taken 7/25/2024 0406)  Absence of Physical Injury: Implement safety measures based on patient assessment     Problem: Skin/Tissue Integrity  Goal: Absence of new skin breakdown  Description: 1.  Monitor for areas of redness and/or skin breakdown  2.  Assess vascular access sites hourly  3.  Every 4-6 hours minimum:  Change oxygen saturation probe site  4.  Every 4-6 hours:  If on nasal continuous positive airway pressure, respiratory therapy assess nares and determine need for appliance change or resting period.  7/25/2024 0408 by Tong Nicholson RN  Outcome: Progressing  7/24/2024 1950 by 
  Problem: Pain  Goal: Verbalizes/displays adequate comfort level or baseline comfort level  Outcome: Progressing     Problem: Safety - Adult  Goal: Free from fall injury  Outcome: Progressing     Problem: ABCDS Injury Assessment  Goal: Absence of physical injury  Outcome: Progressing  Flowsheets (Taken 7/24/2024 0500)  Absence of Physical Injury: Implement safety measures based on patient assessment     Problem: Skin/Tissue Integrity  Goal: Absence of new skin breakdown  Description: 1.  Monitor for areas of redness and/or skin breakdown  2.  Assess vascular access sites hourly  3.  Every 4-6 hours minimum:  Change oxygen saturation probe site  4.  Every 4-6 hours:  If on nasal continuous positive airway pressure, respiratory therapy assess nares and determine need for appliance change or resting period.  Outcome: Progressing     
  Problem: Pain  Goal: Verbalizes/displays adequate comfort level or baseline comfort level  Outcome: Progressing  Note: Patient expresses relief following administration of prn pain medication.     Problem: Safety - Adult  Goal: Free from fall injury  Outcome: Progressing  Note: Patient remains free of incidence/ injury. Bed remains in low position. Call light within reach. Side rails up x2.      Problem: Skin/Tissue Integrity  Goal: Absence of new skin breakdown  Description: 1.  Monitor for areas of redness and/or skin breakdown  2.  Assess vascular access sites hourly  3.  Every 4-6 hours minimum:  Change oxygen saturation probe site  4.  Every 4-6 hours:  If on nasal continuous positive airway pressure, respiratory therapy assess nares and determine need for appliance change or resting period.  Outcome: Progressing  Note: No new occurrence of skin breakdown noted during this shift.     
[facility] here, report number [id number] taken
Please have patient or POA answer all MRI Screening questions in Epic. Exam will be scheduled after form has been completed and reviewed. Thank you.  
chronic conditions and co-morbidity symptoms are monitored and maintained or improved  Outcome: Progressing

## 2024-07-29 NOTE — CARE COORDINATION
Case Management Assessment  Initial Evaluation    Date/Time of Evaluation: 7/24/2024 10:40 AM  Assessment Completed by: Concha To RN    If patient is discharged prior to next notation, then this note serves as note for discharge by case management.    Patient Name: Miles Chaves                   YOB: 1955  Diagnosis: Trauma [T14.90XA]  Unable to ambulate [R26.2]  Intractable pain [R52]  Fall, initial encounter [W19.XXXA]  Closed bilateral fracture of pubic rami, initial encounter ([facility]) [S32.591A, S32.592A]  Closed traumatic displaced fracture of one rib [S22.39XA]                   Date / Time: 7/23/2024  9:48 PM    Patient Admission Status: Inpatient   Readmission Risk (Low < 19, Mod (19-27), High > 27): Readmission Risk Score: 14.6    Current PCP: Elva Chicas APRN - CNP  PCP verified by CM? Yes    Chart Reviewed: Yes      History Provided by: Patient  Patient Orientation: Alert and Oriented    Patient Cognition: Alert    Hospitalization in the last 30 days (Readmission):  No    If yes, Readmission Assessment in CM Navigator will be completed.    Advance Directives:      Code Status: Prior   Patient's Primary Decision Maker is: Legal Next of Kin    Primary Decision Maker: Nasrin Chaves - Spouse - [phone]    Primary Decision Maker: Anne Whitmore - Child - [phone]    Primary Decision Maker: AnastacioCarlos  Child - [phone]    Primary Decision Maker: Nasrin Chaves - Spouse - [phone]    Secondary Decision Maker: Carlos Chaves Child - [phone]    Discharge Planning:    Patient lives with: Spouse/Significant Other Type of Home: Trailer/Mobile Home  Primary Care Giver: Self  Patient Support Systems include: Spouse/Significant Other, Children   Current Financial resources: Medicare  Current community resources: None  Current services prior to admission: Durable Medical Equipment            Current DME: Cane, Walker, Wheelchair, Glucometer            Type of Home 
DISCHARGE PLANNING NOTE:    Insurance authorization approved for [place].    Transportation set up for patient at 1:30pm    [facility] nurse and wife notified.     IMM letter provided to patient.  Patient offered four hours to make informed decision regarding appeal process; patient agreeable to discharge.      Report to be called to [phone]    ADDENDUM:    BRADY complete, ERYN and DC orders faxed to facility.    Electronically signed by Concha To RN on 7/29/2024 at 1:07 PM    
DISCHARGE PLANNING NOTE:    Patient's family requested referral to [facility], in [facility]. This writer faxed this referral at this time, awaiting acceptance.    Electronically signed by Concha To RN on 7/26/2024 at 9:41 AM    ADDENDUM:    Failed fax to [place]; attempted to contact the facility, to update that their fax does not appear to be working, voicemail left. Resent fax.    Electronically signed by Concha To RN on 7/26/2024 at 11:14 AM    ADDENDUM:    Failed fax second time. Reached out to [place] a second time, no answer. Voicemail left. Awaiting return call.    Electronically signed by Concha To RN on 7/26/2024 at 3:29 PM    
ERYN completed.  
ONGOING DISCHARGE PLAN:    Patient is alert and oriented x4.    Spoke with patient regarding discharge plan and patient confirms that plan is still [place]. Facility accepts and authorization started today.     Neurology following patient for Akathisia  needs MRI brain.  Patient first needs x-ray of orbital area, for foreign body.    Will continue to follow for additional discharge needs.    If patient is discharged prior to next notation, then this note serves as note for discharge by case management.    Electronically signed by Mona Lyles RN on 7/26/2024 at 4:21 PM    
ONGOING DISCHARGE PLAN:    Patient is alert and oriented x4.    Spoke with patient regarding discharge plan and updated that PT/OT are recommending SNF vs inpatient rehab.                       Post Acute Facility/Agency List     Provided patient with the following list, the list includes the overall star ratings obtained from CMS per the Medicare Web site (www.Medicare.gov):     [] Long Term Acute Care Facilities  [x] Acute Inpatient Rehabilitation Facilities  [x] Skilled Nursing Facilities  [] Home Care    Provided verbal instructions on how to utilize the QR Code to obtain additional detailed star ratings from www.Medicare.gov     offered to print and provide the detailed list:    [x]Accepted   []Declined    The following printed detailed lists were provided:     Acute Inpatient Rehabilitation Facilities  Skilled Nursing Facilities    At this time requested a PM&R consult be placed. Referral sent to [facility] at patient's request.     Will continue to follow for additional discharge needs.    If patient is discharged prior to next notation, then this note serves as note for discharge by case management.    Electronically signed by Concha To RN on 7/25/2024 at 1:38PM    
- [phone] [place] [phone] - F [phone]  [address], [place] [address]  Phone: [phone]       docusate 100 MG Caps        insulin  UNIT/ML injection vial        insulin  UNIT/ML injection vial        metoprolol tartrate 25 MG tablet        sertraline 50 MG tablet      You can get these medications from any pharmacy    Bring a paper prescription for each of these medications      gabapentin 100 MG capsule        oxyCODONE-acetaminophen 5-325 MG per tablet

## 2024-08-02 NOTE — DISCHARGE SUMMARY
IN-PATIENT SERVICE   [facility] Internal Medicine    Discharge Summary     Patient ID: Miles Chaves  :  1955   MRN: [id number]     ACCOUNT:  [id number]   Patient's PCP: Elva Chicas APRN - CNP  Admit Date: 2024   Discharge Date: 2024    Length of Stay: 5  Code Status:  Prior  Admitting Physician: No admitting provider for patient encounter.  Discharge Physician: Harmony Daugherty MD     Active Discharge Diagnoses:     Primary Problem  Trauma      Hospital Problems  Active Hospital Problems    Diagnosis Date Noted    Debility [R53.81] 2024    Closed traumatic displaced fracture of one rib [S22.39XA] 2024    Closed fracture of one rib of right side [S22.31XA] 2024    History of fall [Z91.81] 2024    Closed bilateral fracture of pubic rami (HCC) [S32.591A, S32.592A] 2024    ANTONIO (generalized anxiety disorder) [F41.1] 2024    Trauma [T14.90XA] 2024    Akathisia [G25.71] 2024    Rib pain [R07.81] 2024    Chronic midline low back pain without sciatica [M54.50, G89.29] 2024    Closed wedge compression fracture of T8 vertebra (HCC) [S22.060A] 2024    Closed wedge compression fracture of T9 vertebra (HCC) [S22.070A] 2024    Closed compression fracture of L2 lumbar vertebra, sequela [S32.020S] 2024    Pubic ramus fracture, left, closed, initial encounter (HCC) [S32.592A] 2024    Left hip pain [M25.552] 2024    Arthritis of left knee [M17.12] 2024    Chronic pain of both knees [M25.561, M25.562, G89.29] 2024    Closed fracture of multiple ribs of right side with routine healing [S22.41XD] 2021    Fall [W19.XXXA] 12/10/2021       Admission Condition:  fair     Discharged Condition: fair    Hospital Stay:     Hospital Course:  Miles Chaves is a 68 y.o. 

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
## 2025-08-19 DIAGNOSIS — M81.0 AGE-RELATED OSTEOPOROSIS WITHOUT CURRENT PATHOLOGICAL FRACTURE: Primary | ICD-10-CM

## 2025-08-19 RX ORDER — ACETAMINOPHEN 325 MG/1
650 TABLET ORAL
OUTPATIENT
Start: 2025-08-19

## 2025-08-19 RX ORDER — DIPHENHYDRAMINE HYDROCHLORIDE 50 MG/ML
50 INJECTION, SOLUTION INTRAMUSCULAR; INTRAVENOUS
OUTPATIENT
Start: 2025-08-19

## 2025-08-19 RX ORDER — ALBUTEROL SULFATE 90 UG/1
4 INHALANT RESPIRATORY (INHALATION) PRN
OUTPATIENT
Start: 2025-08-19

## 2025-08-19 RX ORDER — ONDANSETRON 2 MG/ML
8 INJECTION INTRAMUSCULAR; INTRAVENOUS
OUTPATIENT
Start: 2025-08-19

## 2025-08-19 RX ORDER — SODIUM CHLORIDE 9 MG/ML
INJECTION, SOLUTION INTRAVENOUS PRN
OUTPATIENT
Start: 2025-08-19

## 2025-08-19 RX ORDER — HYDROCORTISONE SODIUM SUCCINATE 100 MG/2ML
100 INJECTION INTRAMUSCULAR; INTRAVENOUS
OUTPATIENT
Start: 2025-08-19

## 2025-08-19 RX ORDER — EPINEPHRINE 1 MG/ML
0.3 INJECTION, SOLUTION, CONCENTRATE INTRAVENOUS PRN
OUTPATIENT
Start: 2025-08-19

## 2025-08-20 ENCOUNTER — TELEPHONE (OUTPATIENT)
Dept: INFUSION THERAPY | Age: 70
End: 2025-08-20

## (undated) DEVICE — COVER,MAYO STAND,XL,STERILE: Brand: MEDLINE

## (undated) DEVICE — SUTURE ETHLN SZ 3-0 L18IN NONABSORBABLE BLK FS-1 L24MM 3/8 663H

## (undated) DEVICE — SUTURE VCRL + SZ 2-0 L27IN ABSRB CLR CT-1 1/2 CIR TAPERCUT VCP259H

## (undated) DEVICE — SOLUTION IV IRRIG WATER 1000ML POUR BRL 2F7114

## (undated) DEVICE — STOCKINETTE,IMPERVIOUS,12X48,STERILE: Brand: MEDLINE

## (undated) DEVICE — GAUZE,SPONGE,FLUFF,6"X6.75",STRL,5/TRAY: Brand: MEDLINE

## (undated) DEVICE — GLOVE ORTHO 8   MSG9480

## (undated) DEVICE — ZIMMER® STERILE DISPOSABLE TOURNIQUET CUFF WITH PLC, DUAL PORT, SINGLE BLADDER, 30 IN. (76 CM)

## (undated) DEVICE — BANDAGE COMPR W3INXL15FT BGE E SGL LAYERED CLP CLSR

## (undated) DEVICE — SUTURE VCRL + SZ 2-0 L27IN ABSRB UD CP-1 1/2 CIR REV CUT VCP266H

## (undated) DEVICE — 6619 2 PTNT ISO SYS INCISE AREA&LT;(&GT;&&LT;)&GT;P: Brand: STERI-DRAPE™ IOBAN™ 2

## (undated) DEVICE — SUTURE VCRL + SZ 1 L27IN ABSRB VLT CP-1 1/2 CIR REV CUT NDL VCP268H

## (undated) DEVICE — ELECTROSURGICAL PENCIL BUTTON SWITCH E-Z CLEAN COATED BLADE ELECTRODE 10 FT (3 M) CORD HOLSTER: Brand: MEGADYNE

## (undated) DEVICE — DRESSING POSTOP AG PRISMASEAL 3.5X6IN

## (undated) DEVICE — 3M™ STERI-DRAPE™ U-DRAPE 1015: Brand: STERI-DRAPE™

## (undated) DEVICE — YANKAUER,OPEN TIP,W/O VENT,STERILE: Brand: MEDLINE INDUSTRIES, INC.

## (undated) DEVICE — GOWN,SURGICAL,AURORA,SLEEVE: Brand: MEDLINE

## (undated) DEVICE — COVER,C-ARM,41X74: Brand: MEDLINE

## (undated) DEVICE — Device

## (undated) DEVICE — BANDAGE,ELASTIC,ESMARK,STERILE,6"X9',LF: Brand: MEDLINE

## (undated) DEVICE — GOWN,SIRUS,NONRNF,SETINSLV,XL,20/CS: Brand: MEDLINE

## (undated) DEVICE — SINGLE PORT MANIFOLD: Brand: NEPTUNE 2

## (undated) DEVICE — FLEXIBLE YANKAUER,LARGE TIP, NO VACUUM CONTROL: Brand: ARGYLE

## (undated) DEVICE — SUTURE VCRL SZ 0 L36IN ABSRB UD CT-1 L36MM 1/2 CIR TAPR PNT VCP946H

## (undated) DEVICE — CUFF REPROCESS BP TOURN SING BLDR 2 PORT 4X24IN

## (undated) DEVICE — CANNULA INJ FOR TFNA SYS STRL TRAUMACEM V+

## (undated) DEVICE — GUIDEWIRE ORTH L300MM DIA2.8MM S STL THRD SHRP TRCR TIP FOR

## (undated) DEVICE — SOLUTION IV IRRIG POUR BRL 0.9% SODIUM CHL 2F7124

## (undated) DEVICE — 1010 S-DRAPE TOWEL DRAPE 10/BX: Brand: STERI-DRAPE™

## (undated) DEVICE — POUCH INSTR W6.75XL11.5IN FRST 2 PKT ADH FOR ORTH AND

## (undated) DEVICE — GLOVE ORANGE PI 7 1/2   MSG9075

## (undated) DEVICE — DRAPE, EXTREMITY, BILATERAL, STERILE: Brand: MEDLINE

## (undated) DEVICE — ROD RMR L950MM DIA3MM W/ STR BALL TIP

## (undated) DEVICE — ROD RMR L950MM DIA2.5MM W/ EXTN BALL TIP

## (undated) DEVICE — 3M™ IOBAN™ 2 ANTIMICROBIAL INCISE DRAPE 6650EZ: Brand: IOBAN™ 2

## (undated) DEVICE — PATIENT RETURN ELECTRODE, SINGLE-USE, CONTACT QUALITY MONITORING, ADULT, WITH 9FT CORD, FOR PATIENTS WEIGING OVER 33LBS. (15KG): Brand: MEGADYNE

## (undated) DEVICE — SYRINGE 20ML LL S/C 50

## (undated) DEVICE — BLANKET WRM W29.9XL79.1IN UP BODY FORC AIR MISTRAL-AIR

## (undated) DEVICE — TUBING, SUCTION, 3/16" X 10', STRAIGHT: Brand: MEDLINE

## (undated) DEVICE — DRESSING HYDROCOLLOID BORDER 35X10 IN ALUM PRIMASEAL

## (undated) DEVICE — INTENDED FOR TISSUE SEPARATION, AND OTHER PROCEDURES THAT REQUIRE A SHARP SURGICAL BLADE TO PUNCTURE OR CUT.: Brand: BARD-PARKER ® CARBON RIB-BACK BLADES

## (undated) DEVICE — MARKER,SKIN,WI/RULER AND LABELS: Brand: MEDLINE

## (undated) DEVICE — SYRINGE MED 10ML LUERLOCK TIP W/O SFTY DISP

## (undated) DEVICE — SPONGE LAP W18XL18IN WHT COT 4 PLY FLD STRUNG RADPQ DISP ST

## (undated) DEVICE — BIT DRL L110MM DIA2.5MM G QUIK CPL W/O STP REUSE

## (undated) DEVICE — DRESSING TRNSPAR W5XL4.5IN FLM SHT SEMIPERMEABLE WIND

## (undated) DEVICE — DRAPE,U/ SHT,SPLIT,PLAS,STERIL: Brand: MEDLINE

## (undated) DEVICE — KIT SYR 1ML 2ML TRAUM TRAUMCEM V+

## (undated) DEVICE — MERCY HEALTH ST CHARLES: Brand: MEDLINE INDUSTRIES, INC.

## (undated) DEVICE — GLOVE ORANGE PI 7   MSG9070

## (undated) DEVICE — BANDAGE,SELF ADHRNT,COFLEX,4"X5YD,STRL: Brand: COLABEL

## (undated) DEVICE — DRAPE,REIN 53X77,STERILE: Brand: MEDLINE

## (undated) DEVICE — BNDG,ELSTC,MATRIX,STRL,6"X5YD,LF,HOOK&LP: Brand: MEDLINE

## (undated) DEVICE — COVER,TABLE,60X90,STERILE: Brand: MEDLINE

## (undated) DEVICE — DRESSING,GAUZE,XEROFORM,CURAD,1"X8",ST: Brand: CURAD

## (undated) DEVICE — NEEDLE INJ 10GA L10CM BNE CEM DEL NORIAN 1 EA

## (undated) DEVICE — PADDING CAST W6INXL4YD COT LO LINTING WYTEX